# Patient Record
Sex: MALE | Race: WHITE | Employment: OTHER | URBAN - METROPOLITAN AREA
[De-identification: names, ages, dates, MRNs, and addresses within clinical notes are randomized per-mention and may not be internally consistent; named-entity substitution may affect disease eponyms.]

---

## 2017-01-22 PROBLEM — E78.5 HYPERLIPIDEMIA: Chronic | Status: ACTIVE | Noted: 2017-01-22

## 2017-01-22 PROBLEM — R20.8 DECREASED SENSE OF VIBRATION: Status: ACTIVE | Noted: 2017-01-22

## 2017-01-22 PROBLEM — G47.00 INSOMNIA: Status: ACTIVE | Noted: 2017-01-22

## 2017-01-22 PROBLEM — D72.829 LEUKOCYTOSIS: Status: ACTIVE | Noted: 2017-01-22

## 2017-01-22 PROBLEM — I10 HTN (HYPERTENSION): Chronic | Status: ACTIVE | Noted: 2017-01-22

## 2017-01-22 PROBLEM — G43.909 MIGRAINES: Status: ACTIVE | Noted: 2017-01-22

## 2017-01-22 PROBLEM — R68.89 COLD INTOLERANCE: Status: ACTIVE | Noted: 2017-01-22

## 2017-01-22 PROBLEM — R11.0 NAUSEA: Status: ACTIVE | Noted: 2017-01-22

## 2017-01-22 PROBLEM — F32.A DEPRESSION: Status: ACTIVE | Noted: 2017-01-22

## 2017-01-23 PROBLEM — R19.7 DIARRHEA: Status: ACTIVE | Noted: 2017-01-23

## 2017-04-18 PROBLEM — M25.561 ACUTE PAIN OF RIGHT KNEE: Status: ACTIVE | Noted: 2017-04-18

## 2018-05-24 ENCOUNTER — OFFICE VISIT (OUTPATIENT)
Dept: ORTHOPEDIC SURGERY | Age: 83
End: 2018-05-24
Payer: MEDICARE

## 2018-05-24 VITALS
WEIGHT: 198 LBS | HEART RATE: 82 BPM | DIASTOLIC BLOOD PRESSURE: 90 MMHG | SYSTOLIC BLOOD PRESSURE: 142 MMHG | HEIGHT: 72 IN | BODY MASS INDEX: 26.82 KG/M2 | TEMPERATURE: 97.7 F

## 2018-05-24 DIAGNOSIS — G89.29 CHRONIC PAIN OF LEFT KNEE: ICD-10-CM

## 2018-05-24 DIAGNOSIS — M17.12 PRIMARY OSTEOARTHRITIS OF LEFT KNEE: Primary | ICD-10-CM

## 2018-05-24 DIAGNOSIS — M25.562 CHRONIC PAIN OF LEFT KNEE: ICD-10-CM

## 2018-05-24 DIAGNOSIS — G89.29 CHRONIC PAIN OF RIGHT KNEE: ICD-10-CM

## 2018-05-24 DIAGNOSIS — M25.561 CHRONIC PAIN OF RIGHT KNEE: ICD-10-CM

## 2018-05-24 PROCEDURE — 20610 DRAIN/INJ JOINT/BURSA W/O US: CPT | Performed by: ORTHOPAEDIC SURGERY

## 2018-05-24 PROCEDURE — G8419 CALC BMI OUT NRM PARAM NOF/U: HCPCS | Performed by: ORTHOPAEDIC SURGERY

## 2018-05-24 PROCEDURE — 4040F PNEUMOC VAC/ADMIN/RCVD: CPT | Performed by: ORTHOPAEDIC SURGERY

## 2018-05-24 PROCEDURE — 1123F ACP DISCUSS/DSCN MKR DOCD: CPT | Performed by: ORTHOPAEDIC SURGERY

## 2018-05-24 PROCEDURE — 99213 OFFICE O/P EST LOW 20 MIN: CPT | Performed by: ORTHOPAEDIC SURGERY

## 2018-05-24 PROCEDURE — G8427 DOCREV CUR MEDS BY ELIG CLIN: HCPCS | Performed by: ORTHOPAEDIC SURGERY

## 2018-05-24 PROCEDURE — 1036F TOBACCO NON-USER: CPT | Performed by: ORTHOPAEDIC SURGERY

## 2018-05-24 RX ORDER — BUPIVACAINE HYDROCHLORIDE 2.5 MG/ML
3 INJECTION, SOLUTION INFILTRATION; PERINEURAL ONCE
Status: COMPLETED | OUTPATIENT
Start: 2018-05-24 | End: 2018-05-24

## 2018-05-24 RX ORDER — TRIAMCINOLONE ACETONIDE 40 MG/ML
80 INJECTION, SUSPENSION INTRA-ARTICULAR; INTRAMUSCULAR ONCE
Status: COMPLETED | OUTPATIENT
Start: 2018-05-24 | End: 2018-05-24

## 2018-05-24 RX ADMIN — BUPIVACAINE HYDROCHLORIDE 7.5 MG: 2.5 INJECTION, SOLUTION INFILTRATION; PERINEURAL at 16:20

## 2018-05-24 RX ADMIN — TRIAMCINOLONE ACETONIDE 80 MG: 40 INJECTION, SUSPENSION INTRA-ARTICULAR; INTRAMUSCULAR at 16:20

## 2018-07-10 ENCOUNTER — OFFICE VISIT (OUTPATIENT)
Dept: ORTHOPEDIC SURGERY | Age: 83
End: 2018-07-10
Payer: MEDICARE

## 2018-07-10 VITALS
BODY MASS INDEX: 26.82 KG/M2 | DIASTOLIC BLOOD PRESSURE: 80 MMHG | HEART RATE: 71 BPM | TEMPERATURE: 98.2 F | SYSTOLIC BLOOD PRESSURE: 139 MMHG | HEIGHT: 72 IN | WEIGHT: 198 LBS

## 2018-07-10 DIAGNOSIS — M17.12 PRIMARY OSTEOARTHRITIS OF LEFT KNEE: Primary | ICD-10-CM

## 2018-07-10 PROCEDURE — 4040F PNEUMOC VAC/ADMIN/RCVD: CPT | Performed by: ORTHOPAEDIC SURGERY

## 2018-07-10 PROCEDURE — 99213 OFFICE O/P EST LOW 20 MIN: CPT | Performed by: ORTHOPAEDIC SURGERY

## 2018-07-10 PROCEDURE — G8427 DOCREV CUR MEDS BY ELIG CLIN: HCPCS | Performed by: ORTHOPAEDIC SURGERY

## 2018-07-10 PROCEDURE — G8419 CALC BMI OUT NRM PARAM NOF/U: HCPCS | Performed by: ORTHOPAEDIC SURGERY

## 2018-07-10 PROCEDURE — 1036F TOBACCO NON-USER: CPT | Performed by: ORTHOPAEDIC SURGERY

## 2018-07-10 PROCEDURE — 1123F ACP DISCUSS/DSCN MKR DOCD: CPT | Performed by: ORTHOPAEDIC SURGERY

## 2018-07-10 NOTE — PROGRESS NOTES
Bobby Morrison  presents with continued left knee symptoms, reports minimal change with the injection last visit, at this time he actually rather denies pain reports a feeling of weakness or giving way. He feels the symptoms are similar to those that he had on the right previously treated successfully with physical therapy. Weakness and giving way are affecting his transfers and stair climbing primarily. No other joint complaints. Allergies; medications; past medical, surgical, family, and social history; and problem list have been reviewed today and updated as indicated in this encounter. Current Outpatient Prescriptions   Medication Sig Dispense Refill    ibuprofen (ADVIL;MOTRIN) 800 MG tablet       losartan-hydrochlorothiazide (HYZAAR) 50-12.5 MG per tablet       ranitidine (ZANTAC) 150 MG tablet Take 150 mg by mouth 2 times daily       buPROPion (WELLBUTRIN XL) 150 MG extended release tablet Take 1 tablet by mouth daily 30 tablet 0    mirtazapine (REMERON) 15 MG tablet Take 1 tablet by mouth nightly (Patient taking differently: Take 7.5 mg by mouth nightly ) 30 tablet 0    aspirin 325 MG tablet Take 325 mg by mouth daily      Aspirin-Dipyridamole (AGGRENOX PO) Take  by mouth daily.  amLODIPine (NORVASC) 10 MG tablet Take 10 mg by mouth daily. No current facility-administered medications for this visit. Exam: Leg lengths equal hip motion painless. Right knee benign, left knee shows partially correctable valgus deformity but no appreciable tenderness, there is lateral crepitus with full range of motion 0-120. No focal motor sensory deficits. Radiographs: Review of previous radiographs today show essentially end-stage valgus degenerative disease of left knee. Siobhan Wilkins was seen today for knee pain.     Diagnoses and all orders for this visit:    Primary osteoarthritis of left knee  -     Amb External Referral To Physical Therapy     Treatment options were reviewed, given the

## 2018-09-11 ENCOUNTER — OFFICE VISIT (OUTPATIENT)
Dept: ORTHOPEDIC SURGERY | Age: 83
End: 2018-09-11
Payer: MEDICARE

## 2018-09-11 VITALS
SYSTOLIC BLOOD PRESSURE: 178 MMHG | TEMPERATURE: 97.4 F | BODY MASS INDEX: 26.82 KG/M2 | HEART RATE: 73 BPM | WEIGHT: 198 LBS | DIASTOLIC BLOOD PRESSURE: 90 MMHG | HEIGHT: 72 IN

## 2018-09-11 DIAGNOSIS — M17.12 PRIMARY OSTEOARTHRITIS OF LEFT KNEE: Primary | ICD-10-CM

## 2018-09-11 PROCEDURE — G8427 DOCREV CUR MEDS BY ELIG CLIN: HCPCS | Performed by: ORTHOPAEDIC SURGERY

## 2018-09-11 PROCEDURE — G8419 CALC BMI OUT NRM PARAM NOF/U: HCPCS | Performed by: ORTHOPAEDIC SURGERY

## 2018-09-11 PROCEDURE — 4040F PNEUMOC VAC/ADMIN/RCVD: CPT | Performed by: ORTHOPAEDIC SURGERY

## 2018-09-11 PROCEDURE — 1036F TOBACCO NON-USER: CPT | Performed by: ORTHOPAEDIC SURGERY

## 2018-09-11 PROCEDURE — 99214 OFFICE O/P EST MOD 30 MIN: CPT | Performed by: ORTHOPAEDIC SURGERY

## 2018-09-11 PROCEDURE — 1123F ACP DISCUSS/DSCN MKR DOCD: CPT | Performed by: ORTHOPAEDIC SURGERY

## 2018-09-11 PROCEDURE — 1101F PT FALLS ASSESS-DOCD LE1/YR: CPT | Performed by: ORTHOPAEDIC SURGERY

## 2018-09-11 RX ORDER — MECLIZINE HYDROCHLORIDE 25 MG/1
TABLET ORAL
Refills: 1 | COMMUNITY
Start: 2018-08-16 | End: 2018-09-13 | Stop reason: ALTCHOICE

## 2018-09-11 RX ORDER — MULTIVITAMIN WITH IRON
200 TABLET ORAL DAILY
COMMUNITY
End: 2018-09-13 | Stop reason: ALTCHOICE

## 2018-09-11 RX ORDER — SUCRALFATE 1 G/1
TABLET ORAL
Refills: 3 | COMMUNITY
Start: 2018-08-29 | End: 2018-09-13 | Stop reason: ALTCHOICE

## 2018-09-11 RX ORDER — PNV NO.95/FERROUS FUM/FOLIC AC 28MG-0.8MG
TABLET ORAL
COMMUNITY
Start: 2008-10-29 | End: 2018-09-13 | Stop reason: ALTCHOICE

## 2018-09-11 NOTE — LETTER
If you have any questions or concerns, please contact our nurse at [phone], Option 2:  Monday through Thursday 9:00 am - 4:00 pm  Friday 10:00 am - 12 noon
until the next business day. You will be informed when your prescription has been printed and ready for pickup at the doctor's office.     Any questions, problems or concerns regarding your surgery should be addressed to our nurse by calling the office Monday through Thursday 10:00 am - 3:00 pm and Friday 10:00 am - 2:00 pm.

## 2018-09-12 NOTE — PROGRESS NOTES
no laxity deformity or effusion, left knee shows non-correctable varus alignment with synovitis medial tenderness and crepitus no effusion, medial aspect of the knee is tender to palpation and painful with weightbearing. Range of motion 0-120. Radiographs: Recent weightbearing x-rays are reviewed showing left knee with varus deformity complete loss of medial joint space subchondral sclerosis and osteophyte formation consistent with diagnosis of end stage varus DJD left knee. Ness Randall was seen today for knee pain. Diagnoses and all orders for this visit:    Primary osteoarthritis of left knee     Treatment options were reviewed, the patient has considered his options and requests we proceed with left total knee. Procedure itself likely risks benefits and probable outcomes were detailed questions were asked and answered and understood, plan for surgery on a.m. admission basis with short postoperative hospital stay and rehab as outpatient. Follow-up in office 7-10 days postop for clinical and x-ray exams left knee. Return in about 3 weeks (around 10/3/2018).

## 2018-09-13 ENCOUNTER — APPOINTMENT (OUTPATIENT)
Dept: CT IMAGING | Age: 83
End: 2018-09-13
Payer: MEDICARE

## 2018-09-13 ENCOUNTER — HOSPITAL ENCOUNTER (EMERGENCY)
Age: 83
Discharge: LEFT BEFORE DISCHARGE | End: 2018-09-14
Attending: EMERGENCY MEDICINE
Payer: MEDICARE

## 2018-09-13 VITALS
HEART RATE: 69 BPM | DIASTOLIC BLOOD PRESSURE: 84 MMHG | HEIGHT: 72 IN | WEIGHT: 195 LBS | RESPIRATION RATE: 14 BRPM | TEMPERATURE: 98.3 F | SYSTOLIC BLOOD PRESSURE: 156 MMHG | BODY MASS INDEX: 26.41 KG/M2 | OXYGEN SATURATION: 99 %

## 2018-09-13 DIAGNOSIS — R11.0 NAUSEA: Primary | ICD-10-CM

## 2018-09-13 DIAGNOSIS — K52.9 COLITIS, ACUTE: ICD-10-CM

## 2018-09-13 LAB
ALBUMIN SERPL-MCNC: 4 G/DL (ref 3.5–5.2)
ALP BLD-CCNC: 81 U/L (ref 40–129)
ALT SERPL-CCNC: 8 U/L (ref 0–40)
ANION GAP SERPL CALCULATED.3IONS-SCNC: 12 MMOL/L (ref 7–16)
AST SERPL-CCNC: 15 U/L (ref 0–39)
BACTERIA: ABNORMAL /HPF
BILIRUB SERPL-MCNC: 0.7 MG/DL (ref 0–1.2)
BILIRUBIN URINE: NEGATIVE
BLOOD, URINE: ABNORMAL
BUN BLDV-MCNC: 6 MG/DL (ref 8–23)
CALCIUM SERPL-MCNC: 9.3 MG/DL (ref 8.6–10.2)
CHLORIDE BLD-SCNC: 100 MMOL/L (ref 98–107)
CLARITY: CLEAR
CO2: 29 MMOL/L (ref 22–29)
COLOR: YELLOW
CREAT SERPL-MCNC: 0.9 MG/DL (ref 0.7–1.2)
GFR AFRICAN AMERICAN: >60
GFR NON-AFRICAN AMERICAN: >60 ML/MIN/1.73
GLUCOSE BLD-MCNC: 100 MG/DL (ref 74–109)
GLUCOSE URINE: NEGATIVE MG/DL
HCT VFR BLD CALC: 41.4 % (ref 37–54)
HEMOGLOBIN: 13.7 G/DL (ref 12.5–16.5)
KETONES, URINE: NEGATIVE MG/DL
LACTIC ACID: 0.8 MMOL/L (ref 0.5–2.2)
LEUKOCYTE ESTERASE, URINE: NEGATIVE
LIPASE: 14 U/L (ref 13–60)
MCH RBC QN AUTO: 29.3 PG (ref 26–35)
MCHC RBC AUTO-ENTMCNC: 33.1 % (ref 32–34.5)
MCV RBC AUTO: 88.7 FL (ref 80–99.9)
NITRITE, URINE: NEGATIVE
PDW BLD-RTO: 12.9 FL (ref 11.5–15)
PH UA: 7 (ref 5–9)
PLATELET # BLD: 325 E9/L (ref 130–450)
PMV BLD AUTO: 9.4 FL (ref 7–12)
POTASSIUM SERPL-SCNC: 3.1 MMOL/L (ref 3.5–5)
PROTEIN UA: NEGATIVE MG/DL
RBC # BLD: 4.67 E12/L (ref 3.8–5.8)
RBC UA: ABNORMAL /HPF (ref 0–2)
SODIUM BLD-SCNC: 141 MMOL/L (ref 132–146)
SPECIFIC GRAVITY UA: 1.02 (ref 1–1.03)
TOTAL PROTEIN: 7.3 G/DL (ref 6.4–8.3)
UROBILINOGEN, URINE: 0.2 E.U./DL
WBC # BLD: 8.2 E9/L (ref 4.5–11.5)
WBC UA: ABNORMAL /HPF (ref 0–5)

## 2018-09-13 PROCEDURE — 80053 COMPREHEN METABOLIC PANEL: CPT

## 2018-09-13 PROCEDURE — 81001 URINALYSIS AUTO W/SCOPE: CPT

## 2018-09-13 PROCEDURE — 6360000004 HC RX CONTRAST MEDICATION: Performed by: RADIOLOGY

## 2018-09-13 PROCEDURE — 99284 EMERGENCY DEPT VISIT MOD MDM: CPT

## 2018-09-13 PROCEDURE — 70450 CT HEAD/BRAIN W/O DYE: CPT

## 2018-09-13 PROCEDURE — 83690 ASSAY OF LIPASE: CPT

## 2018-09-13 PROCEDURE — 74177 CT ABD & PELVIS W/CONTRAST: CPT

## 2018-09-13 PROCEDURE — 6370000000 HC RX 637 (ALT 250 FOR IP): Performed by: EMERGENCY MEDICINE

## 2018-09-13 PROCEDURE — 83605 ASSAY OF LACTIC ACID: CPT

## 2018-09-13 PROCEDURE — 85027 COMPLETE CBC AUTOMATED: CPT

## 2018-09-13 PROCEDURE — 36415 COLL VENOUS BLD VENIPUNCTURE: CPT

## 2018-09-13 RX ORDER — POTASSIUM CHLORIDE 20 MEQ/1
40 TABLET, EXTENDED RELEASE ORAL ONCE
Status: COMPLETED | OUTPATIENT
Start: 2018-09-13 | End: 2018-09-13

## 2018-09-13 RX ORDER — PANTOPRAZOLE SODIUM 40 MG/1
40 GRANULE, DELAYED RELEASE ORAL
COMMUNITY
End: 2019-06-13 | Stop reason: SDUPTHER

## 2018-09-13 RX ADMIN — POTASSIUM CHLORIDE 40 MEQ: 20 TABLET, EXTENDED RELEASE ORAL at 20:57

## 2018-09-13 RX ADMIN — IOHEXOL 50 ML: 240 INJECTION, SOLUTION INTRATHECAL; INTRAVASCULAR; INTRAVENOUS; ORAL at 22:32

## 2018-09-13 RX ADMIN — IOPAMIDOL 100 ML: 755 INJECTION, SOLUTION INTRAVENOUS at 22:32

## 2018-09-13 ASSESSMENT — PAIN DESCRIPTION - PAIN TYPE: TYPE: ACUTE PAIN

## 2018-09-13 ASSESSMENT — PAIN DESCRIPTION - ONSET: ONSET: ON-GOING

## 2018-09-13 ASSESSMENT — PAIN DESCRIPTION - LOCATION: LOCATION: ABDOMEN

## 2018-09-13 ASSESSMENT — PAIN SCALES - GENERAL: PAINLEVEL_OUTOF10: 7

## 2018-09-13 ASSESSMENT — PAIN DESCRIPTION - DESCRIPTORS: DESCRIPTORS: CONSTANT;ACHING

## 2018-09-13 ASSESSMENT — PAIN DESCRIPTION - PROGRESSION: CLINICAL_PROGRESSION: NOT CHANGED

## 2018-09-14 ENCOUNTER — TELEPHONE (OUTPATIENT)
Dept: ORTHOPEDIC SURGERY | Age: 83
End: 2018-09-14

## 2018-09-14 RX ORDER — LEVOFLOXACIN 500 MG/1
500 TABLET, FILM COATED ORAL DAILY
Qty: 7 TABLET | Refills: 0 | Status: SHIPPED | OUTPATIENT
Start: 2018-09-14 | End: 2018-09-21

## 2018-09-14 NOTE — ED NOTES
Pt does not wish to be admitted at this time. Stated his DrBeth Is out of town and will return on Tuesday when his appt is scheduled. Dr. Jaime Em notified.       Jayesh Eddy RN  09/14/18 9911

## 2018-09-14 NOTE — ED PROVIDER NOTES
HPI:  9/14/18, Time: 1:24 AM         [facility] 81 [facility] presenting to the ED for vague weakness and abdominal pain, beginning 1 week ago. The patient presents with chief complaint of some generalized weakness and fatigue. He is also complaining of some abdominal discomfort and bloating. Patient states he normally has significant more energy than he is presenting with. Patient denies any melanotic stools. I did go over the findings of the CAT scan with the patient and he does not want to be admitted at this time. Patient will sign out against medical advice. Patient denies fever/chills, cough, congestion, chest pain, shortness of breath, edema, headache, visual disturbances, focal paresthesias, focal weakness, vomiting, diarrhea, constipation, dysuria, hematuria, trauma, neck or back pain or other complaints. ROS:   Pertinent positives and negatives are stated within HPI, all other systems reviewed and are negative.      --------------------------------------------- PAST HISTORY ---------------------------------------------  Past Medical History:  has a past medical history of Hyperlipidemia; Hypertension; and Unspecified cerebral artery occlusion with cerebral infarction. Past Surgical History:  has a past surgical history that includes Appendectomy; Cholecystectomy; Tonsillectomy; ECHO Compl W Dop Color Flow (12/4/2012); hernia repair (11/13/2002); sinus surgery (2002); and Anterior cruciate ligament repair (Left, 11/21/2001). Social History:  reports that he has never smoked. He has never used smokeless tobacco. He reports that he does not drink alcohol or use drugs. Family History: family history is not on file. The patients home medications have been reviewed. Allergies: Patient has no known allergies.         ---------------------------------------------------PHYSICAL EXAM--------------------------------------    Constitutional:  Well developed, well nourished, no acute Oxygen Saturation Interpretation: Normal    The patients available past medical records and past encounters were reviewed. ------------------------------ ED COURSE/MEDICAL DECISION MAKING---------------------      This patient's ED course included: a personal history and physicial examination and a personal history and physicial eaxmination    This patient has remained hemodynamically stable during their ED course. Re-Evaluations:             Time: 0030  Re-evaluation. Patients symptoms are improving  Repeat physical examination is improved          Counseling: The emergency provider has spoken with the patient and discussed todays results, in addition to providing specific details for the plan of care and counseling regarding the diagnosis and prognosis. Questions are answered at this time and they are agreeable with the plan.       --------------------------------- IMPRESSION AND DISPOSITION ---------------------------------    IMPRESSION  1. Nausea    2.  Colitis, acute        DISPOSITION  Disposition: Other Disposition: Left AMA  Patient condition is good                  [address] [place] [address], DO  09/14/18 5641

## 2018-09-19 ENCOUNTER — TELEPHONE (OUTPATIENT)
Dept: ORTHOPEDIC SURGERY | Age: 83
End: 2018-09-19

## 2018-10-03 ENCOUNTER — OFFICE VISIT (OUTPATIENT)
Dept: VASCULAR SURGERY | Age: 83
End: 2018-10-03
Payer: MEDICARE

## 2018-10-03 DIAGNOSIS — I72.2 ANEURYSM OF RIGHT RENAL ARTERY (HCC): ICD-10-CM

## 2018-10-03 PROBLEM — M25.561 ACUTE PAIN OF RIGHT KNEE: Status: RESOLVED | Noted: 2017-04-18 | Resolved: 2018-10-03

## 2018-10-03 PROBLEM — R19.7 DIARRHEA: Status: RESOLVED | Noted: 2017-01-23 | Resolved: 2018-10-03

## 2018-10-03 PROBLEM — R11.0 NAUSEA: Status: RESOLVED | Noted: 2017-01-22 | Resolved: 2018-10-03

## 2018-10-03 PROCEDURE — 1036F TOBACCO NON-USER: CPT | Performed by: SURGERY

## 2018-10-03 PROCEDURE — G8419 CALC BMI OUT NRM PARAM NOF/U: HCPCS | Performed by: SURGERY

## 2018-10-03 PROCEDURE — 4040F PNEUMOC VAC/ADMIN/RCVD: CPT | Performed by: SURGERY

## 2018-10-03 PROCEDURE — 1101F PT FALLS ASSESS-DOCD LE1/YR: CPT | Performed by: SURGERY

## 2018-10-03 PROCEDURE — 1123F ACP DISCUSS/DSCN MKR DOCD: CPT | Performed by: SURGERY

## 2018-10-03 PROCEDURE — 99204 OFFICE O/P NEW MOD 45 MIN: CPT | Performed by: SURGERY

## 2018-10-03 PROCEDURE — G8484 FLU IMMUNIZE NO ADMIN: HCPCS | Performed by: SURGERY

## 2018-10-03 PROCEDURE — G8427 DOCREV CUR MEDS BY ELIG CLIN: HCPCS | Performed by: SURGERY

## 2018-10-03 RX ORDER — MIRTAZAPINE 15 MG/1
7.5 TABLET, FILM COATED ORAL NIGHTLY
Refills: 3 | COMMUNITY
Start: 2018-09-18 | End: 2019-06-13 | Stop reason: CLARIF

## 2018-10-03 NOTE — PROGRESS NOTES
feet is normal.        Pulses Right  Left    Brachial 3 3    Radial    3=normal   Femoral 2 2  2=diminished   Popliteal    1=barely palpable   Dorsalis pedis    0=absent   Posterior tibial    4=aneurysmal             Other pertinent information:1. The past medical records were reviewed. 2.  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 Aneurysm of right renal artery (HCC)              Plan:        Discussed in detail the patient regarding all options, risks benefits and alternatives, as the patient is asymptomatic, as the aneurysm is only 1.5 cm, and densely calcified, unlikely to ruptured, patient was recommended conservative therapy with follow-up evaluation in one year and to call me when necessary if he has any abdominal, flank or back pain and come to Hospital emergency room          Patient was instructed to continue walking program and to call if any worsening of symptoms and to call if any focal lateralizing neurological symptoms like loss of speech, vision or loss of function of extremity. All the questions were answered. Indicated follow-up: Return in about 1 year (around 10/3/2019), or if symptoms worsen or fail to improve.

## 2018-12-20 ENCOUNTER — HOSPITAL ENCOUNTER (OUTPATIENT)
Age: 83
Discharge: HOME | End: 2018-12-22
Payer: MEDICARE

## 2018-12-20 PROCEDURE — 88304 TISSUE EXAM BY PATHOLOGIST: CPT

## 2019-01-07 ENCOUNTER — TELEPHONE (OUTPATIENT)
Dept: ORTHOPEDIC SURGERY | Age: 84
End: 2019-01-07

## 2019-01-07 DIAGNOSIS — G89.29 CHRONIC PAIN OF LEFT KNEE: ICD-10-CM

## 2019-01-07 DIAGNOSIS — M25.562 CHRONIC PAIN OF LEFT KNEE: ICD-10-CM

## 2019-01-07 DIAGNOSIS — M17.12 PRIMARY OSTEOARTHRITIS OF LEFT KNEE: Primary | ICD-10-CM

## 2019-01-10 ENCOUNTER — OFFICE VISIT (OUTPATIENT)
Dept: ORTHOPEDIC SURGERY | Age: 84
End: 2019-01-10
Payer: MEDICARE

## 2019-01-10 VITALS
HEART RATE: 75 BPM | DIASTOLIC BLOOD PRESSURE: 87 MMHG | SYSTOLIC BLOOD PRESSURE: 150 MMHG | BODY MASS INDEX: 26.41 KG/M2 | TEMPERATURE: 97.1 F | WEIGHT: 195 LBS | HEIGHT: 72 IN

## 2019-01-10 DIAGNOSIS — M17.12 PRIMARY OSTEOARTHRITIS OF LEFT KNEE: Primary | ICD-10-CM

## 2019-01-10 PROCEDURE — 4040F PNEUMOC VAC/ADMIN/RCVD: CPT | Performed by: ORTHOPAEDIC SURGERY

## 2019-01-10 PROCEDURE — 1036F TOBACCO NON-USER: CPT | Performed by: ORTHOPAEDIC SURGERY

## 2019-01-10 PROCEDURE — 1101F PT FALLS ASSESS-DOCD LE1/YR: CPT | Performed by: ORTHOPAEDIC SURGERY

## 2019-01-10 PROCEDURE — G8419 CALC BMI OUT NRM PARAM NOF/U: HCPCS | Performed by: ORTHOPAEDIC SURGERY

## 2019-01-10 PROCEDURE — 1123F ACP DISCUSS/DSCN MKR DOCD: CPT | Performed by: ORTHOPAEDIC SURGERY

## 2019-01-10 PROCEDURE — 99213 OFFICE O/P EST LOW 20 MIN: CPT | Performed by: ORTHOPAEDIC SURGERY

## 2019-01-10 PROCEDURE — G8484 FLU IMMUNIZE NO ADMIN: HCPCS | Performed by: ORTHOPAEDIC SURGERY

## 2019-01-10 PROCEDURE — G8427 DOCREV CUR MEDS BY ELIG CLIN: HCPCS | Performed by: ORTHOPAEDIC SURGERY

## 2019-01-10 RX ORDER — ACETAMINOPHEN 325 MG/1
650 TABLET ORAL 2 TIMES DAILY PRN
COMMUNITY
End: 2019-01-21

## 2019-01-11 ENCOUNTER — TELEPHONE (OUTPATIENT)
Dept: ORTHOPEDIC SURGERY | Age: 84
End: 2019-01-11

## 2019-01-15 ENCOUNTER — HOSPITAL ENCOUNTER (OUTPATIENT)
Dept: CT IMAGING | Age: 84
Discharge: HOME | End: 2019-01-17
Payer: MEDICARE

## 2019-01-15 DIAGNOSIS — M17.12 ARTHRITIS OF KNEE, LEFT: ICD-10-CM

## 2019-01-15 PROCEDURE — 73700 CT LOWER EXTREMITY W/O DYE: CPT

## 2019-01-20 ENCOUNTER — ANESTHESIA EVENT (OUTPATIENT)
Dept: OPERATING ROOM | Age: 84
DRG: 470 | End: 2019-01-20
Payer: MEDICARE

## 2019-01-21 ENCOUNTER — HOSPITAL ENCOUNTER (OUTPATIENT)
Dept: PREADMISSION TESTING | Age: 84
Discharge: HOME | End: 2019-01-21
Payer: MEDICARE

## 2019-01-21 VITALS
HEART RATE: 70 BPM | RESPIRATION RATE: 16 BRPM | WEIGHT: 202 LBS | SYSTOLIC BLOOD PRESSURE: 178 MMHG | DIASTOLIC BLOOD PRESSURE: 84 MMHG | HEIGHT: 72 IN | BODY MASS INDEX: 27.36 KG/M2 | OXYGEN SATURATION: 98 % | TEMPERATURE: 98 F

## 2019-01-21 LAB
ANION GAP SERPL CALCULATED.3IONS-SCNC: 11 MMOL/L (ref 7–16)
BUN BLDV-MCNC: 12 MG/DL (ref 8–23)
CALCIUM SERPL-MCNC: 9.2 MG/DL (ref 8.6–10.2)
CHLORIDE BLD-SCNC: 102 MMOL/L (ref 98–107)
CO2: 30 MMOL/L (ref 22–29)
CREAT SERPL-MCNC: 0.9 MG/DL (ref 0.7–1.2)
GFR AFRICAN AMERICAN: >60
GFR NON-AFRICAN AMERICAN: >60 ML/MIN/1.73
GLUCOSE BLD-MCNC: 97 MG/DL (ref 74–99)
HCT VFR BLD CALC: 44.4 % (ref 37–54)
HEMOGLOBIN: 14.3 G/DL (ref 12.5–16.5)
MCH RBC QN AUTO: 29.1 PG (ref 26–35)
MCHC RBC AUTO-ENTMCNC: 32.2 % (ref 32–34.5)
MCV RBC AUTO: 90.2 FL (ref 80–99.9)
PDW BLD-RTO: 13.2 FL (ref 11.5–15)
PLATELET # BLD: 374 E9/L (ref 130–450)
PMV BLD AUTO: 9.7 FL (ref 7–12)
POTASSIUM SERPL-SCNC: 3.4 MMOL/L (ref 3.5–5)
RBC # BLD: 4.92 E12/L (ref 3.8–5.8)
SODIUM BLD-SCNC: 143 MMOL/L (ref 132–146)
WBC # BLD: 5.6 E9/L (ref 4.5–11.5)

## 2019-01-21 PROCEDURE — 80048 BASIC METABOLIC PNL TOTAL CA: CPT

## 2019-01-21 PROCEDURE — 36415 COLL VENOUS BLD VENIPUNCTURE: CPT

## 2019-01-21 PROCEDURE — 85027 COMPLETE CBC AUTOMATED: CPT

## 2019-01-21 PROCEDURE — 87081 CULTURE SCREEN ONLY: CPT

## 2019-01-21 RX ORDER — GABAPENTIN 100 MG/1
200 CAPSULE ORAL ONCE
Status: CANCELLED | OUTPATIENT
Start: 2019-01-21 | End: 2019-01-21

## 2019-01-21 RX ORDER — OXYCODONE HYDROCHLORIDE 5 MG/1
5 TABLET ORAL ONCE
Status: CANCELLED | OUTPATIENT
Start: 2019-01-21 | End: 2019-01-21

## 2019-01-21 RX ORDER — DEXAMETHASONE SODIUM PHOSPHATE 4 MG/ML
4 INJECTION, SOLUTION INTRA-ARTICULAR; INTRALESIONAL; INTRAMUSCULAR; INTRAVENOUS; SOFT TISSUE ONCE
Status: CANCELLED | OUTPATIENT
Start: 2019-01-21 | End: 2019-01-21

## 2019-01-21 RX ORDER — ROPIVACAINE HYDROCHLORIDE 5 MG/ML
30 INJECTION, SOLUTION EPIDURAL; INFILTRATION; PERINEURAL
Status: CANCELLED | OUTPATIENT
Start: 2019-01-21 | End: 2019-01-21

## 2019-01-21 RX ORDER — FENTANYL CITRATE 50 UG/ML
100 INJECTION, SOLUTION INTRAMUSCULAR; INTRAVENOUS ONCE
Status: CANCELLED | OUTPATIENT
Start: 2019-01-21 | End: 2019-01-21

## 2019-01-21 RX ORDER — ACETAMINOPHEN 500 MG
1000 TABLET ORAL ONCE
Status: CANCELLED | OUTPATIENT
Start: 2019-01-21 | End: 2019-01-21

## 2019-01-21 RX ORDER — ACETAMINOPHEN 500 MG
1000 TABLET ORAL EVERY 6 HOURS PRN
COMMUNITY
End: 2019-05-14 | Stop reason: ALTCHOICE

## 2019-01-21 RX ORDER — MIDAZOLAM HYDROCHLORIDE 1 MG/ML
1 INJECTION INTRAMUSCULAR; INTRAVENOUS EVERY 5 MIN PRN
Status: CANCELLED | OUTPATIENT
Start: 2019-01-21

## 2019-01-21 RX ORDER — CELECOXIB 100 MG/1
200 CAPSULE ORAL ONCE
Status: CANCELLED | OUTPATIENT
Start: 2019-01-21 | End: 2019-01-21

## 2019-01-21 RX ORDER — LIDOCAINE HYDROCHLORIDE 10 MG/ML
10 INJECTION, SOLUTION INFILTRATION; PERINEURAL
Status: CANCELLED | OUTPATIENT
Start: 2019-01-21 | End: 2019-01-21

## 2019-01-21 ASSESSMENT — KOOS JR
STANDING UPRIGHT: 3
STRAIGHTENING KNEE FULLY: 2
TWISING OR PIVOTING ON KNEE: 1
BENDING TO THE FLOOR TO PICK UP OBJECT: 4
HOW SEVERE IS YOUR KNEE STIFFNESS AFTER FIRST WAKING IN MORNING: 3
GOING UP OR DOWN STAIRS: 4
RISING FROM SITTING: 3

## 2019-01-21 ASSESSMENT — PAIN DESCRIPTION - ONSET: ONSET: ON-GOING

## 2019-01-21 ASSESSMENT — PAIN DESCRIPTION - PAIN TYPE: TYPE: CHRONIC PAIN

## 2019-01-21 ASSESSMENT — PAIN DESCRIPTION - FREQUENCY: FREQUENCY: CONTINUOUS

## 2019-01-21 ASSESSMENT — PAIN SCALES - GENERAL: PAINLEVEL_OUTOF10: 6

## 2019-01-21 ASSESSMENT — PAIN DESCRIPTION - DESCRIPTORS: DESCRIPTORS: ACHING

## 2019-01-21 ASSESSMENT — PAIN DESCRIPTION - LOCATION: LOCATION: KNEE

## 2019-01-21 ASSESSMENT — PAIN DESCRIPTION - ORIENTATION: ORIENTATION: LEFT

## 2019-01-21 ASSESSMENT — PAIN DESCRIPTION - PROGRESSION: CLINICAL_PROGRESSION: GRADUALLY WORSENING

## 2019-01-21 ASSESSMENT — PAIN - FUNCTIONAL ASSESSMENT: PAIN_FUNCTIONAL_ASSESSMENT: PREVENTS OR INTERFERES SOME ACTIVE ACTIVITIES AND ADLS

## 2019-01-22 ENCOUNTER — PREP FOR PROCEDURE (OUTPATIENT)
Dept: ORTHOPEDIC SURGERY | Age: 84
End: 2019-01-22

## 2019-01-22 DIAGNOSIS — M17.12 PRIMARY OSTEOARTHRITIS OF LEFT KNEE: Primary | ICD-10-CM

## 2019-01-22 RX ORDER — SODIUM CHLORIDE 0.9 % (FLUSH) 0.9 %
10 SYRINGE (ML) INJECTION PRN
Status: CANCELLED | OUTPATIENT
Start: 2019-01-22

## 2019-01-22 RX ORDER — SODIUM CHLORIDE 9 MG/ML
INJECTION, SOLUTION INTRAVENOUS CONTINUOUS
Status: CANCELLED | OUTPATIENT
Start: 2019-01-22

## 2019-01-22 RX ORDER — SODIUM CHLORIDE 0.9 % (FLUSH) 0.9 %
10 SYRINGE (ML) INJECTION EVERY 12 HOURS SCHEDULED
Status: CANCELLED | OUTPATIENT
Start: 2019-01-22

## 2019-01-23 LAB — MRSA CULTURE ONLY: NORMAL

## 2019-01-25 ENCOUNTER — TELEPHONE (OUTPATIENT)
Dept: ORTHOPEDIC SURGERY | Age: 84
End: 2019-01-25

## 2019-01-28 ENCOUNTER — HOSPITAL ENCOUNTER (INPATIENT)
Age: 84
LOS: 2 days | Discharge: HOME HEALTH | DRG: 470 | End: 2019-01-30
Attending: ORTHOPAEDIC SURGERY | Admitting: ORTHOPAEDIC SURGERY
Payer: MEDICARE

## 2019-01-28 ENCOUNTER — ANESTHESIA (OUTPATIENT)
Dept: OPERATING ROOM | Age: 84
DRG: 470 | End: 2019-01-28
Payer: MEDICARE

## 2019-01-28 VITALS — OXYGEN SATURATION: 99 % | SYSTOLIC BLOOD PRESSURE: 105 MMHG | DIASTOLIC BLOOD PRESSURE: 53 MMHG

## 2019-01-28 DIAGNOSIS — Z96.652 STATUS POST LEFT KNEE REPLACEMENT: Primary | ICD-10-CM

## 2019-01-28 PROBLEM — G47.00 INSOMNIA: Status: RESOLVED | Noted: 2017-01-22 | Resolved: 2019-01-28

## 2019-01-28 PROBLEM — G43.909 MIGRAINES: Status: RESOLVED | Noted: 2017-01-22 | Resolved: 2019-01-28

## 2019-01-28 PROBLEM — F32.9 MAJOR DEPRESSIVE DISORDER: Status: ACTIVE | Noted: 2017-01-22

## 2019-01-28 PROBLEM — D72.829 LEUKOCYTOSIS: Status: RESOLVED | Noted: 2017-01-22 | Resolved: 2019-01-28

## 2019-01-28 PROBLEM — M19.90 OSTEOARTHRITIS: Status: ACTIVE | Noted: 2019-01-28

## 2019-01-28 PROBLEM — R68.89 COLD INTOLERANCE: Status: RESOLVED | Noted: 2017-01-22 | Resolved: 2019-01-28

## 2019-01-28 PROBLEM — M17.12 OSTEOARTHRITIS OF LEFT KNEE: Status: ACTIVE | Noted: 2019-01-28

## 2019-01-28 LAB — POTASSIUM SERPL-SCNC: 3.2 MMOL/L (ref 3.5–5)

## 2019-01-28 PROCEDURE — 2580000003 HC RX 258: Performed by: STUDENT IN AN ORGANIZED HEALTH CARE EDUCATION/TRAINING PROGRAM

## 2019-01-28 PROCEDURE — 27447 TOTAL KNEE ARTHROPLASTY: CPT | Performed by: ORTHOPAEDIC SURGERY

## 2019-01-28 PROCEDURE — 2580000003 HC RX 258: Performed by: ORTHOPAEDIC SURGERY

## 2019-01-28 PROCEDURE — 2500000003 HC RX 250 WO HCPCS: Performed by: ORTHOPAEDIC SURGERY

## 2019-01-28 PROCEDURE — 88305 TISSUE EXAM BY PATHOLOGIST: CPT

## 2019-01-28 PROCEDURE — 6360000002 HC RX W HCPCS: Performed by: ORTHOPAEDIC SURGERY

## 2019-01-28 PROCEDURE — 7100000000 HC PACU RECOVERY - FIRST 15 MIN: Performed by: ORTHOPAEDIC SURGERY

## 2019-01-28 PROCEDURE — 3600000015 HC SURGERY LEVEL 5 ADDTL 15MIN: Performed by: ORTHOPAEDIC SURGERY

## 2019-01-28 PROCEDURE — 36415 COLL VENOUS BLD VENIPUNCTURE: CPT

## 2019-01-28 PROCEDURE — 2709999900 HC NON-CHARGEABLE SUPPLY: Performed by: ORTHOPAEDIC SURGERY

## 2019-01-28 PROCEDURE — 7100000001 HC PACU RECOVERY - ADDTL 15 MIN: Performed by: ORTHOPAEDIC SURGERY

## 2019-01-28 PROCEDURE — 6360000002 HC RX W HCPCS: Performed by: STUDENT IN AN ORGANIZED HEALTH CARE EDUCATION/TRAINING PROGRAM

## 2019-01-28 PROCEDURE — 2720000010 HC SURG SUPPLY STERILE: Performed by: ORTHOPAEDIC SURGERY

## 2019-01-28 PROCEDURE — 0QRF07Z REPLACEMENT OF LEFT PATELLA WITH AUTOLOGOUS TISSUE SUBSTITUTE, OPEN APPROACH: ICD-10-PCS | Performed by: ORTHOPAEDIC SURGERY

## 2019-01-28 PROCEDURE — 6370000000 HC RX 637 (ALT 250 FOR IP): Performed by: STUDENT IN AN ORGANIZED HEALTH CARE EDUCATION/TRAINING PROGRAM

## 2019-01-28 PROCEDURE — 6370000000 HC RX 637 (ALT 250 FOR IP): Performed by: ANESTHESIOLOGY

## 2019-01-28 PROCEDURE — 2500000003 HC RX 250 WO HCPCS

## 2019-01-28 PROCEDURE — 3700000001 HC ADD 15 MINUTES (ANESTHESIA): Performed by: ORTHOPAEDIC SURGERY

## 2019-01-28 PROCEDURE — 76942 ECHO GUIDE FOR BIOPSY: CPT | Performed by: ANESTHESIOLOGY

## 2019-01-28 PROCEDURE — 6360000002 HC RX W HCPCS: Performed by: ANESTHESIOLOGY

## 2019-01-28 PROCEDURE — 0SRD0J9 REPLACEMENT OF LEFT KNEE JOINT WITH SYNTHETIC SUBSTITUTE, CEMENTED, OPEN APPROACH: ICD-10-PCS | Performed by: ORTHOPAEDIC SURGERY

## 2019-01-28 PROCEDURE — 3700000000 HC ANESTHESIA ATTENDED CARE: Performed by: ORTHOPAEDIC SURGERY

## 2019-01-28 PROCEDURE — C1776 JOINT DEVICE (IMPLANTABLE): HCPCS | Performed by: ORTHOPAEDIC SURGERY

## 2019-01-28 PROCEDURE — 20985 CPTR-ASST DIR MS PX: CPT | Performed by: ORTHOPAEDIC SURGERY

## 2019-01-28 PROCEDURE — 1200000000 HC SEMI PRIVATE

## 2019-01-28 PROCEDURE — C1713 ANCHOR/SCREW BN/BN,TIS/BN: HCPCS | Performed by: ORTHOPAEDIC SURGERY

## 2019-01-28 PROCEDURE — 97161 PT EVAL LOW COMPLEX 20 MIN: CPT

## 2019-01-28 PROCEDURE — 3600000005 HC SURGERY LEVEL 5 BASE: Performed by: ORTHOPAEDIC SURGERY

## 2019-01-28 PROCEDURE — 8E0Y0CZ ROBOTIC ASSISTED PROCEDURE OF LOWER EXTREMITY, OPEN APPROACH: ICD-10-PCS | Performed by: ORTHOPAEDIC SURGERY

## 2019-01-28 PROCEDURE — 6360000002 HC RX W HCPCS

## 2019-01-28 PROCEDURE — 88311 DECALCIFY TISSUE: CPT

## 2019-01-28 PROCEDURE — 97165 OT EVAL LOW COMPLEX 30 MIN: CPT

## 2019-01-28 PROCEDURE — 84132 ASSAY OF SERUM POTASSIUM: CPT

## 2019-01-28 DEVICE — CEMENT BNE 40 GM RADIOPAQUE BA SIMPLEX P: Type: IMPLANTABLE DEVICE | Site: KNEE | Status: FUNCTIONAL

## 2019-01-28 DEVICE — BASEPLATE TIB SZ 7 KNEE TRITANIUM CEM PRI LO PROF TRIATHLON: Type: IMPLANTABLE DEVICE | Site: KNEE | Status: FUNCTIONAL

## 2019-01-28 DEVICE — IMPLANTABLE DEVICE: Type: IMPLANTABLE DEVICE | Site: KNEE | Status: FUNCTIONAL

## 2019-01-28 RX ORDER — HYDROCHLOROTHIAZIDE 12.5 MG/1
12.5 TABLET ORAL DAILY
Status: DISCONTINUED | OUTPATIENT
Start: 2019-01-29 | End: 2019-01-30

## 2019-01-28 RX ORDER — SODIUM CHLORIDE 0.9 % (FLUSH) 0.9 %
10 SYRINGE (ML) INJECTION EVERY 12 HOURS SCHEDULED
Status: DISCONTINUED | OUTPATIENT
Start: 2019-01-28 | End: 2019-01-28 | Stop reason: HOSPADM

## 2019-01-28 RX ORDER — SODIUM CHLORIDE 0.9 % (FLUSH) 0.9 %
10 SYRINGE (ML) INJECTION EVERY 12 HOURS SCHEDULED
Status: DISCONTINUED | OUTPATIENT
Start: 2019-01-28 | End: 2019-01-30 | Stop reason: HOSPADM

## 2019-01-28 RX ORDER — KETOROLAC TROMETHAMINE 30 MG/ML
15 INJECTION, SOLUTION INTRAMUSCULAR; INTRAVENOUS EVERY 6 HOURS PRN
Status: DISCONTINUED | OUTPATIENT
Start: 2019-01-28 | End: 2019-01-30 | Stop reason: HOSPADM

## 2019-01-28 RX ORDER — SODIUM CHLORIDE 9 MG/ML
INJECTION, SOLUTION INTRAVENOUS CONTINUOUS
Status: DISCONTINUED | OUTPATIENT
Start: 2019-01-28 | End: 2019-01-28

## 2019-01-28 RX ORDER — LIDOCAINE HYDROCHLORIDE 10 MG/ML
INJECTION, SOLUTION INFILTRATION; PERINEURAL PRN
Status: DISCONTINUED | OUTPATIENT
Start: 2019-01-28 | End: 2019-01-28 | Stop reason: SDUPTHER

## 2019-01-28 RX ORDER — EPHEDRINE SULFATE 50 MG/ML
INJECTION, SOLUTION INTRAVENOUS PRN
Status: DISCONTINUED | OUTPATIENT
Start: 2019-01-28 | End: 2019-01-28 | Stop reason: SDUPTHER

## 2019-01-28 RX ORDER — HYDROCODONE BITARTRATE AND ACETAMINOPHEN 5; 325 MG/1; MG/1
2 TABLET ORAL EVERY 4 HOURS PRN
Status: DISCONTINUED | OUTPATIENT
Start: 2019-01-28 | End: 2019-01-30 | Stop reason: HOSPADM

## 2019-01-28 RX ORDER — GABAPENTIN 100 MG/1
200 CAPSULE ORAL EVERY 8 HOURS
Status: DISCONTINUED | OUTPATIENT
Start: 2019-01-28 | End: 2019-01-30 | Stop reason: HOSPADM

## 2019-01-28 RX ORDER — LOSARTAN POTASSIUM AND HYDROCHLOROTHIAZIDE 12.5; 5 MG/1; MG/1
1 TABLET ORAL DAILY
Status: DISCONTINUED | OUTPATIENT
Start: 2019-01-28 | End: 2019-01-28

## 2019-01-28 RX ORDER — MORPHINE SULFATE 2 MG/ML
2 INJECTION, SOLUTION INTRAMUSCULAR; INTRAVENOUS
Status: DISCONTINUED | OUTPATIENT
Start: 2019-01-28 | End: 2019-01-30 | Stop reason: HOSPADM

## 2019-01-28 RX ORDER — DIPHENHYDRAMINE HYDROCHLORIDE 50 MG/ML
25 INJECTION INTRAMUSCULAR; INTRAVENOUS EVERY 6 HOURS PRN
Status: DISCONTINUED | OUTPATIENT
Start: 2019-01-28 | End: 2019-01-30 | Stop reason: HOSPADM

## 2019-01-28 RX ORDER — DIPHENHYDRAMINE HYDROCHLORIDE 50 MG/ML
12.5 INJECTION INTRAMUSCULAR; INTRAVENOUS
Status: DISCONTINUED | OUTPATIENT
Start: 2019-01-28 | End: 2019-01-28 | Stop reason: HOSPADM

## 2019-01-28 RX ORDER — PROPOFOL 10 MG/ML
INJECTION, EMULSION INTRAVENOUS CONTINUOUS PRN
Status: DISCONTINUED | OUTPATIENT
Start: 2019-01-28 | End: 2019-01-28 | Stop reason: SDUPTHER

## 2019-01-28 RX ORDER — FENTANYL CITRATE 50 UG/ML
50 INJECTION, SOLUTION INTRAMUSCULAR; INTRAVENOUS EVERY 5 MIN PRN
Status: DISCONTINUED | OUTPATIENT
Start: 2019-01-28 | End: 2019-01-28 | Stop reason: HOSPADM

## 2019-01-28 RX ORDER — CEFAZOLIN SODIUM 2 G/50ML
2 SOLUTION INTRAVENOUS
Status: COMPLETED | OUTPATIENT
Start: 2019-01-28 | End: 2019-01-28

## 2019-01-28 RX ORDER — ACETAMINOPHEN 500 MG
1000 TABLET ORAL ONCE
Status: DISCONTINUED | OUTPATIENT
Start: 2019-01-28 | End: 2019-01-28

## 2019-01-28 RX ORDER — SODIUM CHLORIDE 0.9 % (FLUSH) 0.9 %
10 SYRINGE (ML) INJECTION PRN
Status: DISCONTINUED | OUTPATIENT
Start: 2019-01-28 | End: 2019-01-28 | Stop reason: HOSPADM

## 2019-01-28 RX ORDER — LIDOCAINE HYDROCHLORIDE 10 MG/ML
10 INJECTION, SOLUTION INFILTRATION; PERINEURAL
Status: DISCONTINUED | OUTPATIENT
Start: 2019-01-28 | End: 2019-01-28 | Stop reason: HOSPADM

## 2019-01-28 RX ORDER — CEFAZOLIN SODIUM 2 G/50ML
2 SOLUTION INTRAVENOUS EVERY 8 HOURS
Status: COMPLETED | OUTPATIENT
Start: 2019-01-28 | End: 2019-01-29

## 2019-01-28 RX ORDER — MIRTAZAPINE 15 MG/1
7.5 TABLET, FILM COATED ORAL NIGHTLY
Status: DISCONTINUED | OUTPATIENT
Start: 2019-01-28 | End: 2019-01-30 | Stop reason: HOSPADM

## 2019-01-28 RX ORDER — HYDROCODONE BITARTRATE AND ACETAMINOPHEN 5; 325 MG/1; MG/1
1 TABLET ORAL EVERY 4 HOURS PRN
Status: DISCONTINUED | OUTPATIENT
Start: 2019-01-28 | End: 2019-01-30 | Stop reason: HOSPADM

## 2019-01-28 RX ORDER — OXYCODONE HYDROCHLORIDE 5 MG/1
5 TABLET ORAL ONCE
Status: COMPLETED | OUTPATIENT
Start: 2019-01-28 | End: 2019-01-28

## 2019-01-28 RX ORDER — PROMETHAZINE HYDROCHLORIDE 25 MG/ML
12.5 INJECTION, SOLUTION INTRAMUSCULAR; INTRAVENOUS EVERY 6 HOURS PRN
Status: DISCONTINUED | OUTPATIENT
Start: 2019-01-28 | End: 2019-01-30 | Stop reason: HOSPADM

## 2019-01-28 RX ORDER — FENTANYL CITRATE 50 UG/ML
100 INJECTION, SOLUTION INTRAMUSCULAR; INTRAVENOUS ONCE
Status: COMPLETED | OUTPATIENT
Start: 2019-01-28 | End: 2019-01-28

## 2019-01-28 RX ORDER — DEXAMETHASONE SODIUM PHOSPHATE 10 MG/ML
4 INJECTION, SOLUTION INTRAMUSCULAR; INTRAVENOUS ONCE
Status: DISCONTINUED | OUTPATIENT
Start: 2019-01-28 | End: 2019-01-28 | Stop reason: HOSPADM

## 2019-01-28 RX ORDER — OXYCODONE HYDROCHLORIDE AND ACETAMINOPHEN 5; 325 MG/1; MG/1
1 TABLET ORAL
Status: DISCONTINUED | OUTPATIENT
Start: 2019-01-28 | End: 2019-01-28 | Stop reason: HOSPADM

## 2019-01-28 RX ORDER — PROMETHAZINE HYDROCHLORIDE 25 MG/ML
6.25 INJECTION, SOLUTION INTRAMUSCULAR; INTRAVENOUS
Status: DISCONTINUED | OUTPATIENT
Start: 2019-01-28 | End: 2019-01-28 | Stop reason: HOSPADM

## 2019-01-28 RX ORDER — CELECOXIB 100 MG/1
200 CAPSULE ORAL ONCE
Status: COMPLETED | OUTPATIENT
Start: 2019-01-28 | End: 2019-01-28

## 2019-01-28 RX ORDER — DOCUSATE SODIUM 100 MG/1
100 CAPSULE, LIQUID FILLED ORAL 2 TIMES DAILY
Status: DISCONTINUED | OUTPATIENT
Start: 2019-01-28 | End: 2019-01-30 | Stop reason: HOSPADM

## 2019-01-28 RX ORDER — SENNA AND DOCUSATE SODIUM 50; 8.6 MG/1; MG/1
1 TABLET, FILM COATED ORAL DAILY
Status: DISCONTINUED | OUTPATIENT
Start: 2019-01-28 | End: 2019-01-30 | Stop reason: HOSPADM

## 2019-01-28 RX ORDER — ACETAMINOPHEN 325 MG/1
650 TABLET ORAL EVERY 4 HOURS PRN
Status: DISCONTINUED | OUTPATIENT
Start: 2019-01-28 | End: 2019-01-30 | Stop reason: HOSPADM

## 2019-01-28 RX ORDER — ROPIVACAINE HYDROCHLORIDE 5 MG/ML
30 INJECTION, SOLUTION EPIDURAL; INFILTRATION; PERINEURAL
Status: COMPLETED | OUTPATIENT
Start: 2019-01-28 | End: 2019-01-28

## 2019-01-28 RX ORDER — BUPROPION HYDROCHLORIDE 150 MG/1
150 TABLET ORAL DAILY
Status: DISCONTINUED | OUTPATIENT
Start: 2019-01-29 | End: 2019-01-30 | Stop reason: HOSPADM

## 2019-01-28 RX ORDER — PANTOPRAZOLE SODIUM 40 MG/1
40 GRANULE, DELAYED RELEASE ORAL
Status: DISCONTINUED | OUTPATIENT
Start: 2019-01-29 | End: 2019-01-30 | Stop reason: HOSPADM

## 2019-01-28 RX ORDER — MIDAZOLAM HYDROCHLORIDE 1 MG/ML
1 INJECTION INTRAMUSCULAR; INTRAVENOUS EVERY 5 MIN PRN
Status: DISCONTINUED | OUTPATIENT
Start: 2019-01-28 | End: 2019-01-28 | Stop reason: HOSPADM

## 2019-01-28 RX ORDER — GABAPENTIN 100 MG/1
200 CAPSULE ORAL ONCE
Status: COMPLETED | OUTPATIENT
Start: 2019-01-28 | End: 2019-01-28

## 2019-01-28 RX ORDER — LOSARTAN POTASSIUM 50 MG/1
50 TABLET ORAL DAILY
Status: DISCONTINUED | OUTPATIENT
Start: 2019-01-29 | End: 2019-01-30

## 2019-01-28 RX ORDER — BUPIVACAINE HYDROCHLORIDE 7.5 MG/ML
INJECTION, SOLUTION INTRASPINAL PRN
Status: DISCONTINUED | OUTPATIENT
Start: 2019-01-28 | End: 2019-01-28 | Stop reason: SDUPTHER

## 2019-01-28 RX ORDER — SODIUM CHLORIDE 0.9 % (FLUSH) 0.9 %
10 SYRINGE (ML) INJECTION PRN
Status: DISCONTINUED | OUTPATIENT
Start: 2019-01-28 | End: 2019-01-30 | Stop reason: HOSPADM

## 2019-01-28 RX ORDER — MEPERIDINE HYDROCHLORIDE 25 MG/ML
12.5 INJECTION INTRAMUSCULAR; INTRAVENOUS; SUBCUTANEOUS EVERY 5 MIN PRN
Status: DISCONTINUED | OUTPATIENT
Start: 2019-01-28 | End: 2019-01-28 | Stop reason: HOSPADM

## 2019-01-28 RX ORDER — FENTANYL CITRATE 50 UG/ML
25 INJECTION, SOLUTION INTRAMUSCULAR; INTRAVENOUS EVERY 5 MIN PRN
Status: DISCONTINUED | OUTPATIENT
Start: 2019-01-28 | End: 2019-01-28 | Stop reason: HOSPADM

## 2019-01-28 RX ORDER — AMLODIPINE BESYLATE 5 MG/1
5 TABLET ORAL DAILY
Status: DISCONTINUED | OUTPATIENT
Start: 2019-01-29 | End: 2019-01-30 | Stop reason: HOSPADM

## 2019-01-28 RX ORDER — SODIUM CHLORIDE 9 MG/ML
INJECTION, SOLUTION INTRAVENOUS CONTINUOUS
Status: DISCONTINUED | OUTPATIENT
Start: 2019-01-28 | End: 2019-01-29

## 2019-01-28 RX ORDER — ONDANSETRON 2 MG/ML
4 INJECTION INTRAMUSCULAR; INTRAVENOUS EVERY 6 HOURS PRN
Status: DISCONTINUED | OUTPATIENT
Start: 2019-01-28 | End: 2019-01-30 | Stop reason: HOSPADM

## 2019-01-28 RX ORDER — MORPHINE SULFATE 2 MG/ML
4 INJECTION, SOLUTION INTRAMUSCULAR; INTRAVENOUS
Status: DISCONTINUED | OUTPATIENT
Start: 2019-01-28 | End: 2019-01-30 | Stop reason: HOSPADM

## 2019-01-28 RX ADMIN — EPHEDRINE SULFATE 5 MG: 50 INJECTION INTRAMUSCULAR; INTRAVENOUS; SUBCUTANEOUS at 13:34

## 2019-01-28 RX ADMIN — PHENYLEPHRINE HYDROCHLORIDE 100 MCG: 10 INJECTION INTRAVENOUS at 13:40

## 2019-01-28 RX ADMIN — GABAPENTIN 200 MG: 100 CAPSULE ORAL at 10:20

## 2019-01-28 RX ADMIN — SODIUM CHLORIDE: 9 INJECTION, SOLUTION INTRAVENOUS at 12:31

## 2019-01-28 RX ADMIN — CEFAZOLIN SODIUM 2 G: 2 SOLUTION INTRAVENOUS at 20:35

## 2019-01-28 RX ADMIN — SODIUM CHLORIDE: 9 INJECTION, SOLUTION INTRAVENOUS at 16:35

## 2019-01-28 RX ADMIN — OXYCODONE HYDROCHLORIDE 5 MG: 5 TABLET ORAL at 10:21

## 2019-01-28 RX ADMIN — BUPIVACAINE HYDROCHLORIDE IN DEXTROSE 1.8 ML: 7.5 INJECTION, SOLUTION SUBARACHNOID at 12:39

## 2019-01-28 RX ADMIN — TRANEXAMIC ACID 1000 MG: 100 INJECTION, SOLUTION INTRAVENOUS at 15:50

## 2019-01-28 RX ADMIN — ROPIVACAINE HYDROCHLORIDE 30 ML: 5 INJECTION EPIDURAL; INFILTRATION; PERINEURAL at 11:15

## 2019-01-28 RX ADMIN — PHENYLEPHRINE HYDROCHLORIDE 100 MCG: 10 INJECTION INTRAVENOUS at 13:28

## 2019-01-28 RX ADMIN — HYDROCODONE BITARTRATE AND ACETAMINOPHEN 1 TABLET: 5; 325 TABLET ORAL at 20:37

## 2019-01-28 RX ADMIN — TRANEXAMIC ACID 1 G: 100 INJECTION, SOLUTION INTRAVENOUS at 12:42

## 2019-01-28 RX ADMIN — MIRTAZAPINE 7.5 MG: 15 TABLET, FILM COATED ORAL at 20:34

## 2019-01-28 RX ADMIN — PHENYLEPHRINE HYDROCHLORIDE 100 MCG: 10 INJECTION INTRAVENOUS at 13:15

## 2019-01-28 RX ADMIN — SODIUM CHLORIDE: 9 INJECTION, SOLUTION INTRAVENOUS at 13:20

## 2019-01-28 RX ADMIN — PROPOFOL 50 MCG/KG/MIN: 10 INJECTION, EMULSION INTRAVENOUS at 12:42

## 2019-01-28 RX ADMIN — EPHEDRINE SULFATE 5 MG: 50 INJECTION INTRAMUSCULAR; INTRAVENOUS; SUBCUTANEOUS at 13:55

## 2019-01-28 RX ADMIN — CEFAZOLIN SODIUM 2 G: 2 SOLUTION INTRAVENOUS at 12:40

## 2019-01-28 RX ADMIN — EPHEDRINE SULFATE 10 MG: 50 INJECTION INTRAMUSCULAR; INTRAVENOUS; SUBCUTANEOUS at 13:38

## 2019-01-28 RX ADMIN — CELECOXIB 200 MG: 100 CAPSULE ORAL at 10:19

## 2019-01-28 RX ADMIN — EPHEDRINE SULFATE 10 MG: 50 INJECTION INTRAMUSCULAR; INTRAVENOUS; SUBCUTANEOUS at 13:58

## 2019-01-28 RX ADMIN — LIDOCAINE HYDROCHLORIDE 2 ML: 10 INJECTION, SOLUTION INFILTRATION; PERINEURAL at 12:39

## 2019-01-28 RX ADMIN — FENTANYL CITRATE 100 MCG: 50 INJECTION, SOLUTION INTRAMUSCULAR; INTRAVENOUS at 11:11

## 2019-01-28 RX ADMIN — DOCUSATE SODIUM 100 MG: 100 CAPSULE, LIQUID FILLED ORAL at 20:34

## 2019-01-28 RX ADMIN — ASPIRIN 325 MG: 325 TABLET, DELAYED RELEASE ORAL at 20:34

## 2019-01-28 RX ADMIN — MIDAZOLAM HYDROCHLORIDE 1 MG: 1 INJECTION, SOLUTION INTRAMUSCULAR; INTRAVENOUS at 11:11

## 2019-01-28 ASSESSMENT — PULMONARY FUNCTION TESTS
PIF_VALUE: 0
PIF_VALUE: 1
PIF_VALUE: 0
PIF_VALUE: 1
PIF_VALUE: 0
PIF_VALUE: 1
PIF_VALUE: 0
PIF_VALUE: 0
PIF_VALUE: 1
PIF_VALUE: 0
PIF_VALUE: 0
PIF_VALUE: 1
PIF_VALUE: 0
PIF_VALUE: 1
PIF_VALUE: 0
PIF_VALUE: 1
PIF_VALUE: 0
PIF_VALUE: 1
PIF_VALUE: 0
PIF_VALUE: 0
PIF_VALUE: 1
PIF_VALUE: 1
PIF_VALUE: 0
PIF_VALUE: 1
PIF_VALUE: 0
PIF_VALUE: 1
PIF_VALUE: 0
PIF_VALUE: 1
PIF_VALUE: 0
PIF_VALUE: 1
PIF_VALUE: 0
PIF_VALUE: 1
PIF_VALUE: 0
PIF_VALUE: 1
PIF_VALUE: 0
PIF_VALUE: 0
PIF_VALUE: 1
PIF_VALUE: 0
PIF_VALUE: 1
PIF_VALUE: 0
PIF_VALUE: 1
PIF_VALUE: 0
PIF_VALUE: 1
PIF_VALUE: 0
PIF_VALUE: 1
PIF_VALUE: 0
PIF_VALUE: 1
PIF_VALUE: 0
PIF_VALUE: 1
PIF_VALUE: 0
PIF_VALUE: 1
PIF_VALUE: 0
PIF_VALUE: 1
PIF_VALUE: 1
PIF_VALUE: 0
PIF_VALUE: 1
PIF_VALUE: 0

## 2019-01-28 ASSESSMENT — PAIN DESCRIPTION - FREQUENCY: FREQUENCY: CONTINUOUS

## 2019-01-28 ASSESSMENT — PAIN SCALES - GENERAL
PAINLEVEL_OUTOF10: 0
PAINLEVEL_OUTOF10: 2
PAINLEVEL_OUTOF10: 0
PAINLEVEL_OUTOF10: 4

## 2019-01-28 ASSESSMENT — PAIN DESCRIPTION - PROGRESSION: CLINICAL_PROGRESSION: GRADUALLY WORSENING

## 2019-01-28 ASSESSMENT — PAIN DESCRIPTION - DESCRIPTORS: DESCRIPTORS: ACHING;DISCOMFORT;DULL

## 2019-01-28 ASSESSMENT — PAIN DESCRIPTION - LOCATION: LOCATION: KNEE

## 2019-01-28 ASSESSMENT — PAIN DESCRIPTION - ONSET: ONSET: ON-GOING

## 2019-01-28 ASSESSMENT — PAIN DESCRIPTION - PAIN TYPE: TYPE: SURGICAL PAIN

## 2019-01-28 ASSESSMENT — PAIN - FUNCTIONAL ASSESSMENT: PAIN_FUNCTIONAL_ASSESSMENT: 0-10

## 2019-01-28 ASSESSMENT — PAIN DESCRIPTION - ORIENTATION: ORIENTATION: LEFT

## 2019-01-29 LAB
AMORPHOUS: ABNORMAL
ANION GAP SERPL CALCULATED.3IONS-SCNC: 11 MMOL/L (ref 7–16)
BACTERIA: ABNORMAL /HPF
BASOPHILS ABSOLUTE: 0.02 E9/L (ref 0–0.2)
BASOPHILS RELATIVE PERCENT: 0.2 % (ref 0–2)
BILIRUBIN URINE: NEGATIVE
BLOOD, URINE: NEGATIVE
BUN BLDV-MCNC: 10 MG/DL (ref 8–23)
CALCIUM SERPL-MCNC: 8.7 MG/DL (ref 8.6–10.2)
CHLORIDE BLD-SCNC: 104 MMOL/L (ref 98–107)
CLARITY: CLEAR
CO2: 25 MMOL/L (ref 22–29)
COLOR: YELLOW
CREAT SERPL-MCNC: 1 MG/DL (ref 0.7–1.2)
EOSINOPHILS ABSOLUTE: 0.01 E9/L (ref 0.05–0.5)
EOSINOPHILS RELATIVE PERCENT: 0.1 % (ref 0–6)
GFR AFRICAN AMERICAN: >60
GFR NON-AFRICAN AMERICAN: >60 ML/MIN/1.73
GLUCOSE BLD-MCNC: 130 MG/DL (ref 74–99)
GLUCOSE URINE: NEGATIVE MG/DL
HCT VFR BLD CALC: 39.3 % (ref 37–54)
HEMOGLOBIN: 13.2 G/DL (ref 12.5–16.5)
IMMATURE GRANULOCYTES #: 0.04 E9/L
IMMATURE GRANULOCYTES %: 0.4 % (ref 0–5)
KETONES, URINE: 15 MG/DL
LEUKOCYTE ESTERASE, URINE: NEGATIVE
LYMPHOCYTES ABSOLUTE: 0.85 E9/L (ref 1.5–4)
LYMPHOCYTES RELATIVE PERCENT: 8.1 % (ref 20–42)
MAGNESIUM: 1.7 MG/DL (ref 1.6–2.6)
MCH RBC QN AUTO: 29.9 PG (ref 26–35)
MCHC RBC AUTO-ENTMCNC: 33.6 % (ref 32–34.5)
MCV RBC AUTO: 89.1 FL (ref 80–99.9)
MONOCYTES ABSOLUTE: 0.98 E9/L (ref 0.1–0.95)
MONOCYTES RELATIVE PERCENT: 9.4 % (ref 2–12)
NEUTROPHILS ABSOLUTE: 8.53 E9/L (ref 1.8–7.3)
NEUTROPHILS RELATIVE PERCENT: 81.8 % (ref 43–80)
NITRITE, URINE: NEGATIVE
PDW BLD-RTO: 13.2 FL (ref 11.5–15)
PH UA: 7 (ref 5–9)
PLATELET # BLD: 313 E9/L (ref 130–450)
PMV BLD AUTO: 10 FL (ref 7–12)
POTASSIUM REFLEX MAGNESIUM: 3.4 MMOL/L (ref 3.5–5)
PROTEIN UA: NEGATIVE MG/DL
RBC # BLD: 4.41 E12/L (ref 3.8–5.8)
RBC UA: ABNORMAL /HPF (ref 0–2)
SODIUM BLD-SCNC: 140 MMOL/L (ref 132–146)
SPECIFIC GRAVITY UA: 1.01 (ref 1–1.03)
UROBILINOGEN, URINE: 0.2 E.U./DL
WBC # BLD: 10.4 E9/L (ref 4.5–11.5)
WBC UA: ABNORMAL /HPF (ref 0–5)

## 2019-01-29 PROCEDURE — 1200000000 HC SEMI PRIVATE

## 2019-01-29 PROCEDURE — 85025 COMPLETE CBC W/AUTO DIFF WBC: CPT

## 2019-01-29 PROCEDURE — 87088 URINE BACTERIA CULTURE: CPT

## 2019-01-29 PROCEDURE — 80048 BASIC METABOLIC PNL TOTAL CA: CPT

## 2019-01-29 PROCEDURE — 97110 THERAPEUTIC EXERCISES: CPT

## 2019-01-29 PROCEDURE — 83735 ASSAY OF MAGNESIUM: CPT

## 2019-01-29 PROCEDURE — 97530 THERAPEUTIC ACTIVITIES: CPT

## 2019-01-29 PROCEDURE — 81001 URINALYSIS AUTO W/SCOPE: CPT

## 2019-01-29 PROCEDURE — 36415 COLL VENOUS BLD VENIPUNCTURE: CPT

## 2019-01-29 PROCEDURE — 97535 SELF CARE MNGMENT TRAINING: CPT

## 2019-01-29 PROCEDURE — 6370000000 HC RX 637 (ALT 250 FOR IP): Performed by: INTERNAL MEDICINE

## 2019-01-29 PROCEDURE — 6370000000 HC RX 637 (ALT 250 FOR IP): Performed by: STUDENT IN AN ORGANIZED HEALTH CARE EDUCATION/TRAINING PROGRAM

## 2019-01-29 PROCEDURE — 2580000003 HC RX 258: Performed by: STUDENT IN AN ORGANIZED HEALTH CARE EDUCATION/TRAINING PROGRAM

## 2019-01-29 PROCEDURE — 6360000002 HC RX W HCPCS: Performed by: STUDENT IN AN ORGANIZED HEALTH CARE EDUCATION/TRAINING PROGRAM

## 2019-01-29 RX ORDER — ALPRAZOLAM 0.25 MG/1
0.5 TABLET ORAL NIGHTLY PRN
Status: DISCONTINUED | OUTPATIENT
Start: 2019-01-29 | End: 2019-01-30 | Stop reason: HOSPADM

## 2019-01-29 RX ORDER — POTASSIUM CHLORIDE 20 MEQ/1
40 TABLET, EXTENDED RELEASE ORAL ONCE
Status: COMPLETED | OUTPATIENT
Start: 2019-01-29 | End: 2019-01-29

## 2019-01-29 RX ADMIN — POTASSIUM CHLORIDE 40 MEQ: 20 TABLET, EXTENDED RELEASE ORAL at 08:43

## 2019-01-29 RX ADMIN — DOCUSATE SODIUM 100 MG: 100 CAPSULE, LIQUID FILLED ORAL at 08:44

## 2019-01-29 RX ADMIN — Medication 10 ML: at 22:33

## 2019-01-29 RX ADMIN — AMLODIPINE BESYLATE 5 MG: 5 TABLET ORAL at 08:44

## 2019-01-29 RX ADMIN — GABAPENTIN 200 MG: 100 CAPSULE ORAL at 00:32

## 2019-01-29 RX ADMIN — CEFAZOLIN SODIUM 2 G: 2 SOLUTION INTRAVENOUS at 04:21

## 2019-01-29 RX ADMIN — SENNOSIDES AND DOCUSATE SODIUM 1 TABLET: 8.6; 5 TABLET ORAL at 22:31

## 2019-01-29 RX ADMIN — MIRTAZAPINE 7.5 MG: 15 TABLET, FILM COATED ORAL at 22:31

## 2019-01-29 RX ADMIN — DOCUSATE SODIUM 100 MG: 100 CAPSULE, LIQUID FILLED ORAL at 22:31

## 2019-01-29 RX ADMIN — GABAPENTIN 200 MG: 100 CAPSULE ORAL at 17:06

## 2019-01-29 RX ADMIN — KETOROLAC TROMETHAMINE 15 MG: 30 INJECTION, SOLUTION INTRAMUSCULAR; INTRAVENOUS at 16:32

## 2019-01-29 RX ADMIN — Medication 10 ML: at 16:32

## 2019-01-29 RX ADMIN — Medication 10 ML: at 08:42

## 2019-01-29 RX ADMIN — HYDROCODONE BITARTRATE AND ACETAMINOPHEN 2 TABLET: 5; 325 TABLET ORAL at 04:33

## 2019-01-29 RX ADMIN — PANTOPRAZOLE SODIUM 40 MG: 40 GRANULE, DELAYED RELEASE ORAL at 05:28

## 2019-01-29 RX ADMIN — BUPROPION HYDROCHLORIDE 150 MG: 150 TABLET, FILM COATED, EXTENDED RELEASE ORAL at 08:43

## 2019-01-29 RX ADMIN — HYDROCODONE BITARTRATE AND ACETAMINOPHEN 2 TABLET: 5; 325 TABLET ORAL at 12:53

## 2019-01-29 RX ADMIN — LOSARTAN POTASSIUM 50 MG: 50 TABLET, FILM COATED ORAL at 08:45

## 2019-01-29 RX ADMIN — ASPIRIN 325 MG: 325 TABLET, DELAYED RELEASE ORAL at 08:43

## 2019-01-29 RX ADMIN — HYDROCODONE BITARTRATE AND ACETAMINOPHEN 2 TABLET: 5; 325 TABLET ORAL at 22:37

## 2019-01-29 RX ADMIN — ASPIRIN 325 MG: 325 TABLET, DELAYED RELEASE ORAL at 22:31

## 2019-01-29 RX ADMIN — GABAPENTIN 200 MG: 100 CAPSULE ORAL at 08:43

## 2019-01-29 ASSESSMENT — PAIN DESCRIPTION - DESCRIPTORS
DESCRIPTORS: ACHING;CONSTANT;DISCOMFORT
DESCRIPTORS: ACHING;DISCOMFORT;DULL
DESCRIPTORS: ACHING;CONSTANT;DISCOMFORT

## 2019-01-29 ASSESSMENT — PAIN DESCRIPTION - LOCATION
LOCATION: KNEE

## 2019-01-29 ASSESSMENT — PAIN DESCRIPTION - ORIENTATION
ORIENTATION: LEFT

## 2019-01-29 ASSESSMENT — PAIN DESCRIPTION - PROGRESSION
CLINICAL_PROGRESSION: GRADUALLY WORSENING

## 2019-01-29 ASSESSMENT — PAIN SCALES - GENERAL
PAINLEVEL_OUTOF10: 6
PAINLEVEL_OUTOF10: 8
PAINLEVEL_OUTOF10: 8
PAINLEVEL_OUTOF10: 7
PAINLEVEL_OUTOF10: 0
PAINLEVEL_OUTOF10: 0
PAINLEVEL_OUTOF10: 8

## 2019-01-29 ASSESSMENT — PAIN DESCRIPTION - FREQUENCY
FREQUENCY: CONTINUOUS

## 2019-01-29 ASSESSMENT — PAIN DESCRIPTION - ONSET
ONSET: ON-GOING

## 2019-01-29 ASSESSMENT — PAIN DESCRIPTION - PAIN TYPE
TYPE: SURGICAL PAIN

## 2019-01-30 VITALS
SYSTOLIC BLOOD PRESSURE: 143 MMHG | BODY MASS INDEX: 27.36 KG/M2 | OXYGEN SATURATION: 97 % | DIASTOLIC BLOOD PRESSURE: 72 MMHG | HEIGHT: 72 IN | WEIGHT: 202 LBS | HEART RATE: 95 BPM | RESPIRATION RATE: 16 BRPM | TEMPERATURE: 99.2 F

## 2019-01-30 LAB
BASOPHILS ABSOLUTE: 0.02 E9/L (ref 0–0.2)
BASOPHILS RELATIVE PERCENT: 0.2 % (ref 0–2)
EOSINOPHILS ABSOLUTE: 0.03 E9/L (ref 0.05–0.5)
EOSINOPHILS RELATIVE PERCENT: 0.3 % (ref 0–6)
HCT VFR BLD CALC: 35.9 % (ref 37–54)
HEMOGLOBIN: 11.9 G/DL (ref 12.5–16.5)
IMMATURE GRANULOCYTES #: 0.07 E9/L
IMMATURE GRANULOCYTES %: 0.6 % (ref 0–5)
LYMPHOCYTES ABSOLUTE: 1.1 E9/L (ref 1.5–4)
LYMPHOCYTES RELATIVE PERCENT: 9.8 % (ref 20–42)
MCH RBC QN AUTO: 29.9 PG (ref 26–35)
MCHC RBC AUTO-ENTMCNC: 33.1 % (ref 32–34.5)
MCV RBC AUTO: 90.2 FL (ref 80–99.9)
MONOCYTES ABSOLUTE: 1.36 E9/L (ref 0.1–0.95)
MONOCYTES RELATIVE PERCENT: 12.1 % (ref 2–12)
NEUTROPHILS ABSOLUTE: 8.62 E9/L (ref 1.8–7.3)
NEUTROPHILS RELATIVE PERCENT: 77 % (ref 43–80)
PDW BLD-RTO: 13.6 FL (ref 11.5–15)
PLATELET # BLD: 250 E9/L (ref 130–450)
PMV BLD AUTO: 10.4 FL (ref 7–12)
RBC # BLD: 3.98 E12/L (ref 3.8–5.8)
WBC # BLD: 11.2 E9/L (ref 4.5–11.5)

## 2019-01-30 PROCEDURE — 6370000000 HC RX 637 (ALT 250 FOR IP): Performed by: STUDENT IN AN ORGANIZED HEALTH CARE EDUCATION/TRAINING PROGRAM

## 2019-01-30 PROCEDURE — 85025 COMPLETE CBC W/AUTO DIFF WBC: CPT

## 2019-01-30 PROCEDURE — 97530 THERAPEUTIC ACTIVITIES: CPT

## 2019-01-30 PROCEDURE — 36415 COLL VENOUS BLD VENIPUNCTURE: CPT

## 2019-01-30 PROCEDURE — 6370000000 HC RX 637 (ALT 250 FOR IP): Performed by: INTERNAL MEDICINE

## 2019-01-30 PROCEDURE — 2580000003 HC RX 258: Performed by: STUDENT IN AN ORGANIZED HEALTH CARE EDUCATION/TRAINING PROGRAM

## 2019-01-30 PROCEDURE — 97110 THERAPEUTIC EXERCISES: CPT

## 2019-01-30 PROCEDURE — 97535 SELF CARE MNGMENT TRAINING: CPT

## 2019-01-30 RX ORDER — HYDROCODONE BITARTRATE AND ACETAMINOPHEN 5; 325 MG/1; MG/1
1 TABLET ORAL EVERY 6 HOURS PRN
Qty: 28 TABLET | Refills: 0 | Status: SHIPPED | OUTPATIENT
Start: 2019-01-30 | End: 2019-02-14 | Stop reason: SDUPTHER

## 2019-01-30 RX ORDER — LOSARTAN POTASSIUM AND HYDROCHLOROTHIAZIDE 25; 100 MG/1; MG/1
1 TABLET ORAL DAILY
Status: DISCONTINUED | OUTPATIENT
Start: 2019-01-30 | End: 2019-01-30 | Stop reason: CLARIF

## 2019-01-30 RX ORDER — LOSARTAN POTASSIUM 50 MG/1
100 TABLET ORAL DAILY
Status: DISCONTINUED | OUTPATIENT
Start: 2019-01-30 | End: 2019-01-30 | Stop reason: HOSPADM

## 2019-01-30 RX ORDER — HYDROCHLOROTHIAZIDE 25 MG/1
25 TABLET ORAL DAILY
Status: DISCONTINUED | OUTPATIENT
Start: 2019-01-30 | End: 2019-01-30 | Stop reason: HOSPADM

## 2019-01-30 RX ORDER — LOSARTAN POTASSIUM AND HYDROCHLOROTHIAZIDE 12.5; 5 MG/1; MG/1
2 TABLET ORAL DAILY
Qty: 30 TABLET | Refills: 0 | Status: SHIPPED | OUTPATIENT
Start: 2019-01-30 | End: 2019-05-14 | Stop reason: ALTCHOICE

## 2019-01-30 RX ORDER — LOSARTAN POTASSIUM AND HYDROCHLOROTHIAZIDE 12.5; 5 MG/1; MG/1
2 TABLET ORAL DAILY
Qty: 30 TABLET | Refills: 0 | Status: SHIPPED
Start: 2019-01-30 | End: 2019-01-30

## 2019-01-30 RX ADMIN — AMLODIPINE BESYLATE 5 MG: 5 TABLET ORAL at 09:17

## 2019-01-30 RX ADMIN — LOSARTAN POTASSIUM 100 MG: 50 TABLET, FILM COATED ORAL at 09:16

## 2019-01-30 RX ADMIN — DOCUSATE SODIUM 100 MG: 100 CAPSULE, LIQUID FILLED ORAL at 09:17

## 2019-01-30 RX ADMIN — BUPROPION HYDROCHLORIDE 150 MG: 150 TABLET, FILM COATED, EXTENDED RELEASE ORAL at 09:16

## 2019-01-30 RX ADMIN — GABAPENTIN 200 MG: 100 CAPSULE ORAL at 09:17

## 2019-01-30 RX ADMIN — ASPIRIN 325 MG: 325 TABLET, DELAYED RELEASE ORAL at 09:17

## 2019-01-30 RX ADMIN — HYDROCHLOROTHIAZIDE 25 MG: 25 TABLET ORAL at 09:16

## 2019-01-30 RX ADMIN — HYDROCODONE BITARTRATE AND ACETAMINOPHEN 1 TABLET: 5; 325 TABLET ORAL at 10:45

## 2019-01-30 RX ADMIN — PANTOPRAZOLE SODIUM 40 MG: 40 GRANULE, DELAYED RELEASE ORAL at 09:13

## 2019-01-30 RX ADMIN — HYDROCODONE BITARTRATE AND ACETAMINOPHEN 1 TABLET: 5; 325 TABLET ORAL at 05:59

## 2019-01-30 RX ADMIN — Medication 10 ML: at 09:16

## 2019-01-30 ASSESSMENT — PAIN DESCRIPTION - DESCRIPTORS
DESCRIPTORS: DISCOMFORT;TENDER
DESCRIPTORS: ACHING;CONSTANT;DISCOMFORT

## 2019-01-30 ASSESSMENT — PAIN DESCRIPTION - PAIN TYPE
TYPE: SURGICAL PAIN

## 2019-01-30 ASSESSMENT — PAIN DESCRIPTION - LOCATION
LOCATION: KNEE

## 2019-01-30 ASSESSMENT — PAIN DESCRIPTION - ONSET
ONSET: ON-GOING
ONSET: ON-GOING

## 2019-01-30 ASSESSMENT — PAIN SCALES - GENERAL
PAINLEVEL_OUTOF10: 4
PAINLEVEL_OUTOF10: 0
PAINLEVEL_OUTOF10: 0
PAINLEVEL_OUTOF10: 4
PAINLEVEL_OUTOF10: 6

## 2019-01-30 ASSESSMENT — PAIN DESCRIPTION - ORIENTATION
ORIENTATION: LEFT

## 2019-01-30 ASSESSMENT — PAIN DESCRIPTION - PROGRESSION: CLINICAL_PROGRESSION: GRADUALLY WORSENING

## 2019-01-30 ASSESSMENT — PAIN DESCRIPTION - FREQUENCY
FREQUENCY: CONTINUOUS
FREQUENCY: INTERMITTENT

## 2019-01-30 ASSESSMENT — PAIN - FUNCTIONAL ASSESSMENT: PAIN_FUNCTIONAL_ASSESSMENT: PREVENTS OR INTERFERES SOME ACTIVE ACTIVITIES AND ADLS

## 2019-01-31 LAB — URINE CULTURE, ROUTINE: NORMAL

## 2019-02-07 ENCOUNTER — OFFICE VISIT (OUTPATIENT)
Dept: ORTHOPEDIC SURGERY | Age: 84
End: 2019-02-07

## 2019-02-07 VITALS
DIASTOLIC BLOOD PRESSURE: 83 MMHG | HEART RATE: 95 BPM | HEIGHT: 72 IN | TEMPERATURE: 97 F | BODY MASS INDEX: 26.41 KG/M2 | WEIGHT: 195 LBS | SYSTOLIC BLOOD PRESSURE: 139 MMHG

## 2019-02-07 DIAGNOSIS — Z96.652 S/P TOTAL KNEE REPLACEMENT, LEFT: Primary | ICD-10-CM

## 2019-02-07 PROCEDURE — 99024 POSTOP FOLLOW-UP VISIT: CPT | Performed by: ORTHOPAEDIC SURGERY

## 2019-02-14 DIAGNOSIS — Z96.652 STATUS POST LEFT KNEE REPLACEMENT: ICD-10-CM

## 2019-02-14 RX ORDER — HYDROCODONE BITARTRATE AND ACETAMINOPHEN 5; 325 MG/1; MG/1
1 TABLET ORAL EVERY 8 HOURS PRN
Qty: 15 TABLET | Refills: 0 | Status: SHIPPED | OUTPATIENT
Start: 2019-02-14 | End: 2019-02-19

## 2019-03-07 ENCOUNTER — OFFICE VISIT (OUTPATIENT)
Dept: ORTHOPEDIC SURGERY | Age: 84
End: 2019-03-07

## 2019-03-07 VITALS
TEMPERATURE: 97.4 F | BODY MASS INDEX: 26.41 KG/M2 | HEART RATE: 78 BPM | HEIGHT: 72 IN | DIASTOLIC BLOOD PRESSURE: 82 MMHG | SYSTOLIC BLOOD PRESSURE: 142 MMHG | WEIGHT: 195 LBS

## 2019-03-07 DIAGNOSIS — Z96.652 S/P TOTAL KNEE REPLACEMENT, LEFT: Primary | ICD-10-CM

## 2019-03-07 PROCEDURE — 99024 POSTOP FOLLOW-UP VISIT: CPT | Performed by: ORTHOPAEDIC SURGERY

## 2019-04-18 ENCOUNTER — OFFICE VISIT (OUTPATIENT)
Dept: ORTHOPEDIC SURGERY | Age: 84
End: 2019-04-18

## 2019-04-18 VITALS
SYSTOLIC BLOOD PRESSURE: 133 MMHG | WEIGHT: 193 LBS | BODY MASS INDEX: 26.14 KG/M2 | TEMPERATURE: 97.2 F | HEART RATE: 72 BPM | HEIGHT: 72 IN | DIASTOLIC BLOOD PRESSURE: 85 MMHG

## 2019-04-18 DIAGNOSIS — Z96.652 S/P TOTAL KNEE REPLACEMENT, LEFT: Primary | ICD-10-CM

## 2019-04-18 PROCEDURE — 99024 POSTOP FOLLOW-UP VISIT: CPT | Performed by: ORTHOPAEDIC SURGERY

## 2019-04-19 NOTE — PROGRESS NOTES
Chief Complaint:   Chief Complaint   Patient presents with    Post-Op Check     Post op check left total knee replacement (surg. 1/28/19). Pt. states he is doing really well has slight stiffness in morning. Paloma Zaldivar  is approaching 3 months after left total knee, overall doing very well with continued improvement now virtually normal in all activities, no fever chills sweats chest pain or dyspnea taking nothing for pain. He does note some stiffness in the morning that improves with activities. No other joint complaints. Allergies; medications; past medical, surgical, family, and social history; and problem list have been reviewed today and updated as indicated in this encounter seen below. Exam: Left knee incision is healed completely, there is no erythema fluctuance or drainage, minimal expected periarticular soft tissue swelling and warmth no effusion. Excellent tissue balance range of motion from 5-115° of flexion, soft endpoints. Radiographs: Deferred today review of postoperative x-rays showed proper implant alignment fit fixation. Abdelrahman Muller was seen today for post-op check. Diagnoses and all orders for this visit:    S/P total knee replacement, left  -     Cancel: Amb External Referral To Physical Therapy  -     Amb External Referral To Physical Therapy       Patient is doing well, he would like to advance into a maintenance type exercise program rather than the [facility], he was given a prescription to do so. He will increase activities at home to tolerance, continue home exercise remain in spaces well, follow-up in 3 months for outcome assessment. Return in about 3 months (around 7/18/2019).      Current Outpatient Medications   Medication Sig Dispense Refill    aspirin 81 MG tablet Take 81 mg by mouth daily      mupirocin (BACTROBAN) 2 % ointment JOSE ARMANDO EXT AA BID  0    losartan-hydrochlorothiazide (HYZAAR) 50-12.5 MG per tablet Take 2 tablets by mouth daily (Patient taking differently: Take 1 tablet by mouth 2 times daily ) 30 tablet 0    acetaminophen (TYLENOL) 500 MG tablet Take 1,000 mg by mouth every 6 hours as needed for Pain Instructed to take morning of surgery with a sip of water if needs      mirtazapine (REMERON) 15 MG tablet Take 7.5 mg by mouth nightly   3    pantoprazole sodium (PROTONIX) 40 MG PACK packet Take 40 mg by mouth every morning (before breakfast) Instructed to take morning of surgery with a sip of water      buPROPion (WELLBUTRIN XL) 150 MG extended release tablet Take 1 tablet by mouth daily 30 tablet 0    amLODIPine (NORVASC) 5 MG tablet Take 5 mg by mouth daily Instructed to take morning of surgery with a sip of water       No current facility-administered medications for this visit. Patient Active Problem List   Diagnosis    Major depressive disorder    Decreased sense of vibration at the toes    HTN (hypertension)    Hyperlipidemia    Aneurysm of right renal artery (HCC)    Osteoarthritis of left knee    Status post left knee replacement       Past Medical History:   Diagnosis Date    Aneurysm of right renal artery ([address] 10/03/2018    follows with Dr. Lorenzo Million yearly    Depression     GERD (gastroesophageal reflux disease)     Hyperlipidemia     Hypertension     TIA (transient ischemic attack)     last episode 2006?     Wears dentures     upper partial plate       Past Surgical History:   Procedure Laterality Date    ANTERIOR CRUCIATE LIGAMENT REPAIR Left 11/21/2001    APPENDECTOMY      CHOLECYSTECTOMY  2007    Lap    ECHO COMPL W DOP COLOR FLOW  12/4/2012         HERNIA REPAIR Right 11/13/2002    double    SINUS SURGERY  2002,2003     done x 2    TONSILLECTOMY      TOTAL KNEE ARTHROPLASTY Left 1/28/2019    LEFT  ROBOTIC KNEE TOTAL ARTHROPLASTY  ++MEREDITH- OBALZVOX++   ++ADDUCTOR BLOCK++ performed by Marine [facility] at [facility] OR       No Known Allergies    Social History     Socioeconomic History    Marital status: Neurological: Alert and oriented to person, place, and time. Skin: Skin is warm and dry. Psychiatric: Normal mood and affect.  Behavior is normal. Thought content normal.    4/19/2019  12:05 PM

## 2019-04-24 ENCOUNTER — HOSPITAL ENCOUNTER (OUTPATIENT)
Age: 84
Discharge: HOME | End: 2019-04-26
Payer: MEDICARE

## 2019-04-24 LAB — PROSTATE SPECIFIC ANTIGEN: 2.86 NG/ML (ref 0–4)

## 2019-04-24 PROCEDURE — G0103 PSA SCREENING: HCPCS

## 2019-05-09 ENCOUNTER — TELEPHONE (OUTPATIENT)
Dept: FAMILY MEDICINE CLINIC | Age: 84
End: 2019-05-09

## 2019-05-09 RX ORDER — LISINOPRIL AND HYDROCHLOROTHIAZIDE 12.5; 1 MG/1; MG/1
1 TABLET ORAL DAILY
Qty: 30 TABLET | Refills: 5 | Status: SHIPPED | OUTPATIENT
Start: 2019-05-09 | End: 2019-08-08 | Stop reason: SINTOL

## 2019-05-09 NOTE — TELEPHONE ENCOUNTER
Pt would like a Rx to replace his Hyzaar due to the recent recall. He states he was contacted by his pharmacy that his medication was affected. Verified pharmacy with pt.

## 2019-05-14 ENCOUNTER — OFFICE VISIT (OUTPATIENT)
Dept: PRIMARY CARE CLINIC | Age: 84
End: 2019-05-14
Payer: MEDICARE

## 2019-05-14 VITALS
WEIGHT: 196 LBS | DIASTOLIC BLOOD PRESSURE: 72 MMHG | BODY MASS INDEX: 26.55 KG/M2 | SYSTOLIC BLOOD PRESSURE: 136 MMHG | HEART RATE: 73 BPM | HEIGHT: 72 IN | OXYGEN SATURATION: 99 % | TEMPERATURE: 97.5 F

## 2019-05-14 DIAGNOSIS — I10 ESSENTIAL HYPERTENSION: ICD-10-CM

## 2019-05-14 DIAGNOSIS — J01.10 ACUTE NON-RECURRENT FRONTAL SINUSITIS: Primary | ICD-10-CM

## 2019-05-14 DIAGNOSIS — R10.9 STOMACH DISCOMFORT: ICD-10-CM

## 2019-05-14 PROCEDURE — 99214 OFFICE O/P EST MOD 30 MIN: CPT | Performed by: FAMILY MEDICINE

## 2019-05-14 RX ORDER — PREDNISONE 1 MG/1
TABLET ORAL
Qty: 30 TABLET | Refills: 0 | Status: SHIPPED | OUTPATIENT
Start: 2019-05-14 | End: 2019-05-30

## 2019-05-14 RX ORDER — AZITHROMYCIN 250 MG/1
TABLET, FILM COATED ORAL
Qty: 1 PACKET | Refills: 0 | Status: SHIPPED | OUTPATIENT
Start: 2019-05-14 | End: 2019-05-30

## 2019-05-14 ASSESSMENT — ENCOUNTER SYMPTOMS
RESPIRATORY NEGATIVE: 1
ALLERGIC/IMMUNOLOGIC NEGATIVE: 1
EYES NEGATIVE: 1
SINUS PRESSURE: 1
NAUSEA: 1
SINUS PAIN: 1
COUGH: 1

## 2019-05-14 NOTE — PROGRESS NOTES
Negative. Musculoskeletal: Negative. Skin: Negative. Allergic/Immunologic: Negative. Neurological: Negative. Hematological: Negative. Psychiatric/Behavioral: Negative. Current Outpatient Medications:     azithromycin (ZITHROMAX Z-ELENA) 250 MG tablet, 2 today  Then 1 qd  4 days, Disp: 1 packet, Rfl: 0    predniSONE (DELTASONE) 5 MG tablet, 4 tablets daily for 3 days then 3 tablets daily for 3 days then 2 tablets daily for 3 days then 1 tablet daily for 3 days, Disp: 30 tablet, Rfl: 0    lisinopril-hydrochlorothiazide (PRINZIDE;ZESTORETIC) 10-12.5 MG per tablet, Take 1 tablet by mouth daily, Disp: 30 tablet, Rfl: 5    aspirin 81 MG tablet, Take 81 mg by mouth daily, Disp: , Rfl:     pantoprazole sodium (PROTONIX) 40 MG PACK packet, Take 40 mg by mouth every morning (before breakfast) Instructed to take morning of surgery with a sip of water, Disp: , Rfl:     buPROPion (WELLBUTRIN XL) 150 MG extended release tablet, Take 1 tablet by mouth daily, Disp: 30 tablet, Rfl: 0    amLODIPine (NORVASC) 5 MG tablet, Take 5 mg by mouth daily Instructed to take morning of surgery with a sip of water, Disp: , Rfl:     mirtazapine (REMERON) 15 MG tablet, Take 7.5 mg by mouth nightly , Disp: , Rfl: 3    No Known Allergies    Social History     Tobacco Use    Smoking status: Never Smoker    Smokeless tobacco: Never Used   Substance Use Topics    Alcohol use: No    Drug use: No      Past Surgical History:   Procedure Laterality Date    ANTERIOR CRUCIATE LIGAMENT REPAIR Left 11/21/2001    APPENDECTOMY      CHOLECYSTECTOMY  2007    Lap    ECHO COMPL W DOP COLOR FLOW  12/4/2012         HERNIA REPAIR Right 11/13/2002    double    SINUS SURGERY  2002,2003     done x 2    TONSILLECTOMY      TOTAL KNEE ARTHROPLASTY Left 1/28/2019    LEFT  ROBOTIC KNEE TOTAL ARTHROPLASTY  ++MEREDITH- [id number]++   ++ADDUCTOR BLOCK++ performed by Julissa [facility] at [facility] OR     No family history on file.   Past Medical History:   Diagnosis Date    Aneurysm of right renal artery ([address] 10/03/2018    follows with Dr. Knowles Generous yearly    Depression     GERD (gastroesophageal reflux disease)     Hyperlipidemia     Hypertension     TIA (transient ischemic attack)     last episode 2006?  Wears dentures     upper partial plate       Vitals:    05/14/19 1325   BP: 136/72   Pulse: 73   Temp: 97.5 °F (36.4 °C)   SpO2: 99%   Weight: 196 lb (88.9 kg)   Height: 6' (1.829 m)       Physical Exam   Constitutional: He is oriented to person, place, and time. He appears well-developed and well-nourished. HENT:   Head: Normocephalic. Right Ear: External ear normal.   Left Ear: External ear normal.   Nose: Nose normal.   Mouth/Throat: Oropharynx is clear and moist.   Increased drainage posterior pharynx drainage nares. Thick mucous secretions. Eyes: Pupils are equal, round, and reactive to light. Conjunctivae and EOM are normal.   Neck: Normal range of motion. Neck supple. Cardiovascular: Normal rate and intact distal pulses. Pulmonary/Chest: Breath sounds normal.   Abdominal: Soft. Bowel sounds are normal.   Musculoskeletal: Normal range of motion. Neurological: He is alert and oriented to person, place, and time. Skin: Skin is warm and dry. Psychiatric: He has a normal mood and affect. His behavior is normal.   Nursing note and vitals reviewed. Lab Results   Component Value Date    WBC 11.2 01/30/2019    HGB 11.9 (L) 01/30/2019    HCT 35.9 (L) 01/30/2019     01/30/2019    ALT 8 09/13/2018    AST 15 09/13/2018     01/29/2019    K 3.4 (L) 01/29/2019     01/29/2019    CREATININE 1.0 01/29/2019    BUN 10 01/29/2019    CO2 25 01/29/2019    TSH 1.200 01/22/2017    PSA 2.86 04/24/2019    INR 1.2 12/03/2012      Exam findings stable aside from sinus drainage. Hypertension controlled. Maintain current medication reassessment in June. Lab work to be completed prior.       Plan Per Assessment:  Patterson Pallas was seen today for hypertension and sinusitis. Diagnoses and all orders for this visit:    Acute non-recurrent frontal sinusitis  -     CBC Auto Differential; Future  -     Comprehensive Metabolic Panel; Future  -     T4; Future  -     TSH without Reflex; Future  -     Lipid Panel; Future    Essential hypertension  -     CBC Auto Differential; Future  -     Comprehensive Metabolic Panel; Future  -     T4; Future  -     TSH without Reflex; Future  -     Lipid Panel; Future    Stomach discomfort  -     CBC Auto Differential; Future  -     Comprehensive Metabolic Panel; Future  -     T4; Future  -     TSH without Reflex; Future  -     Lipid Panel; Future    Other orders  -     azithromycin (ZITHROMAX Z-ELENA) 250 MG tablet; 2 today  Then 1 qd  4 days  -     predniSONE (DELTASONE) 5 MG tablet; 4 tablets daily for 3 days then 3 tablets daily for 3 days then 2 tablets daily for 3 days then 1 tablet daily for 3 days            No follow-ups on file. Irene Gutierrez DO    Note was generated with the assistance of voice recognition software. Document was reviewed however may contain grammatical errors.

## 2019-05-14 NOTE — PROGRESS NOTES
(WELLBUTRIN XL) 150 MG extended release tablet, Take 1 tablet by mouth daily, Disp: 30 tablet, Rfl: 0    amLODIPine (NORVASC) 5 MG tablet, Take 5 mg by mouth daily Instructed to take morning of surgery with a sip of water, Disp: , Rfl:     mirtazapine (REMERON) 15 MG tablet, Take 7.5 mg by mouth nightly , Disp: , Rfl: 3    No Known Allergies    Social History     Tobacco Use    Smoking status: Never Smoker    Smokeless tobacco: Never Used   Substance Use Topics    Alcohol use: No    Drug use: No      Past Surgical History:   Procedure Laterality Date    ANTERIOR CRUCIATE LIGAMENT REPAIR Left 11/21/2001    APPENDECTOMY      CHOLECYSTECTOMY  2007    Lap    ECHO COMPL W DOP COLOR FLOW  12/4/2012         HERNIA REPAIR Right 11/13/2002    double    SINUS SURGERY  2002,2003     done x 2    TONSILLECTOMY      TOTAL KNEE ARTHROPLASTY Left 1/28/2019    LEFT  ROBOTIC KNEE TOTAL ARTHROPLASTY  ++MEREDITH- [id number]++   ++ADDUCTOR BLOCK++ performed by Joni Gordon MD at [facility] OR     No family history on file. Past Medical History:   Diagnosis Date    Aneurysm of right renal artery ([address] 10/03/2018    follows with Dr. Debora Aguilera yearly    Depression     GERD (gastroesophageal reflux disease)     Hyperlipidemia     Hypertension     TIA (transient ischemic attack)     last episode 2006?  Wears dentures     upper partial plate       Vitals:    05/14/19 1325   BP: 136/72   Pulse: 73   Temp: 97.5 °F (36.4 °C)   SpO2: 99%   Weight: 196 lb (88.9 kg)   Height: 6' (1.829 m)       Physical Exam   Constitutional: He is oriented to person, place, and time. He appears well-developed and well-nourished. HENT:   Head: Normocephalic. Right Ear: External ear normal.   Left Ear: External ear normal.   Nose: Nose normal.   Mouth/Throat: Oropharynx is clear and moist.   Eyes: Pupils are equal, round, and reactive to light. Conjunctivae and EOM are normal.   Neck: Normal range of motion. Neck supple.    Cardiovascular: Normal rate and intact distal pulses. Pulmonary/Chest: Breath sounds normal.   Abdominal: Soft. Bowel sounds are normal.   Musculoskeletal: Normal range of motion. Neurological: He is alert and oriented to person, place, and time. Skin: Skin is warm and dry. Psychiatric: He has a normal mood and affect. His behavior is normal.   Nursing note and vitals reviewed. Lab Results   Component Value Date    WBC 11.2 01/30/2019    HGB 11.9 (L) 01/30/2019    HCT 35.9 (L) 01/30/2019     01/30/2019    ALT 8 09/13/2018    AST 15 09/13/2018     01/29/2019    K 3.4 (L) 01/29/2019     01/29/2019    CREATININE 1.0 01/29/2019    BUN 10 01/29/2019    CO2 25 01/29/2019    TSH 1.200 01/22/2017    PSA 2.86 04/24/2019    INR 1.2 12/03/2012            Plan Per Assessment:  Anisa Hinson was seen today for hypertension and sinusitis. Diagnoses and all orders for this visit:    Acute non-recurrent frontal sinusitis    Essential hypertension    Stomach discomfort            No follow-ups on file. Vicki Peña DO    Note was generated with the assistance of voice recognition software. Document was reviewed however may contain grammatical errors.

## 2019-05-30 ENCOUNTER — OFFICE VISIT (OUTPATIENT)
Dept: FAMILY MEDICINE CLINIC | Age: 84
End: 2019-05-30
Payer: MEDICARE

## 2019-05-30 VITALS
RESPIRATION RATE: 18 BRPM | SYSTOLIC BLOOD PRESSURE: 134 MMHG | BODY MASS INDEX: 25.87 KG/M2 | DIASTOLIC BLOOD PRESSURE: 80 MMHG | WEIGHT: 191 LBS | HEIGHT: 72 IN | HEART RATE: 78 BPM | TEMPERATURE: 98.7 F

## 2019-05-30 DIAGNOSIS — J01.41 ACUTE RECURRENT PANSINUSITIS: Primary | ICD-10-CM

## 2019-05-30 PROCEDURE — 1123F ACP DISCUSS/DSCN MKR DOCD: CPT | Performed by: FAMILY MEDICINE

## 2019-05-30 PROCEDURE — 1036F TOBACCO NON-USER: CPT | Performed by: FAMILY MEDICINE

## 2019-05-30 PROCEDURE — G8427 DOCREV CUR MEDS BY ELIG CLIN: HCPCS | Performed by: FAMILY MEDICINE

## 2019-05-30 PROCEDURE — 4040F PNEUMOC VAC/ADMIN/RCVD: CPT | Performed by: FAMILY MEDICINE

## 2019-05-30 PROCEDURE — G8419 CALC BMI OUT NRM PARAM NOF/U: HCPCS | Performed by: FAMILY MEDICINE

## 2019-05-30 PROCEDURE — 99213 OFFICE O/P EST LOW 20 MIN: CPT | Performed by: FAMILY MEDICINE

## 2019-05-30 RX ORDER — DOXYCYCLINE HYCLATE 100 MG/1
100 CAPSULE ORAL 2 TIMES DAILY
Qty: 14 CAPSULE | Refills: 0 | Status: SHIPPED | OUTPATIENT
Start: 2019-05-30 | End: 2019-06-06

## 2019-05-30 NOTE — PROGRESS NOTES
19  Debi Isaac : 1935 Sex: male  Age: 81 y.o. Chief Complaint   Patient presents with    Congestion     x  2 week    Headache     x 2 week       HPI: : URI  The patient states that they have had rhinorrhea, nausea, congestion for the last 14 days. Cough is improved. The patient also reports nasal congestion and min sore throat. Positive for nasal discharge. The patient has had general malaise. No fever or chills but has felt warm at times. No CP. No dyspnea. No vomiting or diarrhea. Completed abx about 1 week ago with mild improvement. The patient presents for evaluation. ROS:  Const: Positives and pertinent negatives as per HPI. All others reviewed and are negative. Current Outpatient Medications:     doxycycline hyclate (VIBRAMYCIN) 100 MG capsule, Take 1 capsule by mouth 2 times daily for 7 days, Disp: 14 capsule, Rfl: 0    lisinopril-hydrochlorothiazide (PRINZIDE;ZESTORETIC) 10-12.5 MG per tablet, Take 1 tablet by mouth daily, Disp: 30 tablet, Rfl: 5    aspirin 81 MG tablet, Take 81 mg by mouth daily, Disp: , Rfl:     mirtazapine (REMERON) 15 MG tablet, Take 7.5 mg by mouth nightly , Disp: , Rfl: 3    pantoprazole sodium (PROTONIX) 40 MG PACK packet, Take 40 mg by mouth every morning (before breakfast) Instructed to take morning of surgery with a sip of water, Disp: , Rfl:     buPROPion (WELLBUTRIN XL) 150 MG extended release tablet, Take 1 tablet by mouth daily, Disp: 30 tablet, Rfl: 0    amLODIPine (NORVASC) 5 MG tablet, Take 5 mg by mouth daily Instructed to take morning of surgery with a sip of water, Disp: , Rfl:   No Known Allergies    Past Medical History:   Diagnosis Date    Aneurysm of right renal artery ([facility] [address] 10/03/2018    follows with Dr. Isaiah Morris yearly    Depression     GERD (gastroesophageal reflux disease)     Hyperlipidemia     Hypertension     TIA (transient ischemic attack)     last episode ?     Wears dentures     upper partial plate Past Surgical History:   Procedure Laterality Date    ANTERIOR CRUCIATE LIGAMENT REPAIR Left 11/21/2001    APPENDECTOMY      CHOLECYSTECTOMY  2007    Lap    ECHO COMPL W DOP COLOR FLOW  12/4/2012         HERNIA REPAIR Right 11/13/2002    double    SINUS SURGERY  2002,2003     done x 2    TONSILLECTOMY      TOTAL KNEE ARTHROPLASTY Left 1/28/2019    LEFT  ROBOTIC KNEE TOTAL ARTHROPLASTY  ++MEREDITH- [id number]++   ++ADDUCTOR BLOCK++ performed by Chelsie Wong MD at [facility] OR     No family history on file.   Social History     Socioeconomic History    Marital status:      Spouse name: Not on file    Number of children: Not on file    Years of education: Not on file    Highest education level: Not on file   Occupational History    Not on file   Social Needs    Financial resource strain: Not on file    Food insecurity:     Worry: Not on file     Inability: Not on file    Transportation needs:     Medical: Not on file     Non-medical: Not on file   Tobacco Use    Smoking status: Never Smoker    Smokeless tobacco: Never Used   Substance and Sexual Activity    Alcohol use: No    Drug use: No    Sexual activity: Not on file   Lifestyle    Physical activity:     Days per week: Not on file     Minutes per session: Not on file    Stress: Not on file   Relationships    Social connections:     Talks on phone: Not on file     Gets together: Not on file     Attends Hoahaoism service: Not on file     Active member of club or organization: Not on file     Attends meetings of clubs or organizations: Not on file     Relationship status: Not on file    Intimate partner violence:     Fear of current or ex partner: Not on file     Emotionally abused: Not on file     Physically abused: Not on file     Forced sexual activity: Not on file   Other Topics Concern    Not on file   Social History Narrative    Not on file         Vitals:    05/30/19 1515   BP: 134/80   Pulse: 78   Resp: 18   Temp: 98.7 °F (37.1 °C)   Weight: 191 lb (86.6 kg)   Height: 6' (1.829 m)       Exam:  Physical Exam   Constitutional: He appears well-developed. HENT:   Head: Normocephalic. Right Ear: Tympanic membrane normal.   Left Ear: Tympanic membrane normal.   Nose: Mucosal edema and rhinorrhea present. Right sinus exhibits maxillary sinus tenderness and frontal sinus tenderness. Left sinus exhibits maxillary sinus tenderness and frontal sinus tenderness. Mouth/Throat: Posterior oropharyngeal erythema present. No oropharyngeal exudate or posterior oropharyngeal edema. Eyes: Pupils are equal, round, and reactive to light. Conjunctivae are normal.   Cardiovascular: Normal rate, regular rhythm and normal heart sounds. No murmur heard. Pulmonary/Chest: Effort normal and breath sounds normal. He has no wheezes. He has no rales. Abdominal: Soft. Bowel sounds are normal. There is no tenderness. Lymphadenopathy:     He has no cervical adenopathy. Neurological: He is alert. Psychiatric: He has a normal mood and affect. Assessment and Plan:  Payam Jeffery was seen today for congestion and headache. Diagnoses and all orders for this visit:    Acute recurrent pansinusitis  -     doxycycline hyclate (VIBRAMYCIN) 100 MG capsule; Take 1 capsule by mouth 2 times daily for 7 days    Patient advised that this may be seasonal allergies. Advised on OTC mng for such. Return in 1 week (on 6/6/2019) for Recheck. The above treatment regimen was discussed at length and all questions in regards to such were answered. Antibiotics were reviewed including common side effects and rare side effects including but not limited to C. diff infection. Patient is to proceed to the emergency department with any worsening symptoms. Patient should follow-up with her family doctor within the next 3-5 days.     Seen By:   Oziel Alcantar MD

## 2019-06-10 ENCOUNTER — HOSPITAL ENCOUNTER (OUTPATIENT)
Age: 84
Discharge: HOME | End: 2019-06-10
Payer: MEDICARE

## 2019-06-10 DIAGNOSIS — R10.9 STOMACH DISCOMFORT: ICD-10-CM

## 2019-06-10 DIAGNOSIS — J01.10 ACUTE NON-RECURRENT FRONTAL SINUSITIS: ICD-10-CM

## 2019-06-10 DIAGNOSIS — I10 ESSENTIAL HYPERTENSION: ICD-10-CM

## 2019-06-10 LAB
ALBUMIN SERPL-MCNC: 4 G/DL (ref 3.5–5.2)
ALP BLD-CCNC: 80 U/L (ref 40–129)
ALT SERPL-CCNC: 10 U/L (ref 0–40)
ANION GAP SERPL CALCULATED.3IONS-SCNC: 9 MMOL/L (ref 7–16)
AST SERPL-CCNC: 14 U/L (ref 0–39)
BASOPHILS ABSOLUTE: 0.03 E9/L (ref 0–0.2)
BASOPHILS RELATIVE PERCENT: 0.5 % (ref 0–2)
BILIRUB SERPL-MCNC: 0.4 MG/DL (ref 0–1.2)
BUN BLDV-MCNC: 9 MG/DL (ref 8–23)
CALCIUM SERPL-MCNC: 9.2 MG/DL (ref 8.6–10.2)
CHLORIDE BLD-SCNC: 102 MMOL/L (ref 98–107)
CHOLESTEROL, TOTAL: 155 MG/DL (ref 0–199)
CO2: 33 MMOL/L (ref 22–29)
CREAT SERPL-MCNC: 0.9 MG/DL (ref 0.7–1.2)
EOSINOPHILS ABSOLUTE: 0.12 E9/L (ref 0.05–0.5)
EOSINOPHILS RELATIVE PERCENT: 2.1 % (ref 0–6)
GFR AFRICAN AMERICAN: >60
GFR NON-AFRICAN AMERICAN: >60 ML/MIN/1.73
GLUCOSE BLD-MCNC: 94 MG/DL (ref 74–99)
HCT VFR BLD CALC: 42.1 % (ref 37–54)
HDLC SERPL-MCNC: 47 MG/DL
HEMOGLOBIN: 13.3 G/DL (ref 12.5–16.5)
IMMATURE GRANULOCYTES #: 0.03 E9/L
IMMATURE GRANULOCYTES %: 0.5 % (ref 0–5)
LDL CHOLESTEROL CALCULATED: 99 MG/DL (ref 0–99)
LYMPHOCYTES ABSOLUTE: 1.52 E9/L (ref 1.5–4)
LYMPHOCYTES RELATIVE PERCENT: 26.5 % (ref 20–42)
MCH RBC QN AUTO: 28.4 PG (ref 26–35)
MCHC RBC AUTO-ENTMCNC: 31.6 % (ref 32–34.5)
MCV RBC AUTO: 90 FL (ref 80–99.9)
MONOCYTES ABSOLUTE: 0.52 E9/L (ref 0.1–0.95)
MONOCYTES RELATIVE PERCENT: 9.1 % (ref 2–12)
NEUTROPHILS ABSOLUTE: 3.52 E9/L (ref 1.8–7.3)
NEUTROPHILS RELATIVE PERCENT: 61.3 % (ref 43–80)
PDW BLD-RTO: 14.1 FL (ref 11.5–15)
PLATELET # BLD: 341 E9/L (ref 130–450)
PMV BLD AUTO: 10.5 FL (ref 7–12)
POTASSIUM SERPL-SCNC: 3.8 MMOL/L (ref 3.5–5)
RBC # BLD: 4.68 E12/L (ref 3.8–5.8)
SODIUM BLD-SCNC: 144 MMOL/L (ref 132–146)
T4 TOTAL: 6.6 MCG/DL (ref 4.5–11.7)
TOTAL PROTEIN: 7.1 G/DL (ref 6.4–8.3)
TRIGL SERPL-MCNC: 45 MG/DL (ref 0–149)
TSH SERPL DL<=0.05 MIU/L-ACNC: 2.37 UIU/ML (ref 0.27–4.2)
VLDLC SERPL CALC-MCNC: 9 MG/DL
WBC # BLD: 5.7 E9/L (ref 4.5–11.5)

## 2019-06-10 PROCEDURE — 80061 LIPID PANEL: CPT

## 2019-06-10 PROCEDURE — 36415 COLL VENOUS BLD VENIPUNCTURE: CPT

## 2019-06-10 PROCEDURE — 84443 ASSAY THYROID STIM HORMONE: CPT

## 2019-06-10 PROCEDURE — 84436 ASSAY OF TOTAL THYROXINE: CPT

## 2019-06-10 PROCEDURE — 80053 COMPREHEN METABOLIC PANEL: CPT

## 2019-06-10 PROCEDURE — 85025 COMPLETE CBC W/AUTO DIFF WBC: CPT

## 2019-06-13 ENCOUNTER — OFFICE VISIT (OUTPATIENT)
Dept: PRIMARY CARE CLINIC | Age: 84
End: 2019-06-13
Payer: MEDICARE

## 2019-06-13 VITALS
HEART RATE: 89 BPM | SYSTOLIC BLOOD PRESSURE: 142 MMHG | WEIGHT: 195 LBS | DIASTOLIC BLOOD PRESSURE: 76 MMHG | OXYGEN SATURATION: 97 % | BODY MASS INDEX: 26.45 KG/M2 | TEMPERATURE: 99 F

## 2019-06-13 DIAGNOSIS — E78.2 MIXED HYPERLIPIDEMIA: Chronic | ICD-10-CM

## 2019-06-13 DIAGNOSIS — I10 ESSENTIAL HYPERTENSION: Chronic | ICD-10-CM

## 2019-06-13 DIAGNOSIS — J01.01 ACUTE RECURRENT MAXILLARY SINUSITIS: Primary | ICD-10-CM

## 2019-06-13 DIAGNOSIS — Z96.652 STATUS POST LEFT KNEE REPLACEMENT: ICD-10-CM

## 2019-06-13 PROCEDURE — 99214 OFFICE O/P EST MOD 30 MIN: CPT | Performed by: FAMILY MEDICINE

## 2019-06-13 RX ORDER — CEFDINIR 300 MG/1
300 CAPSULE ORAL 2 TIMES DAILY
Qty: 20 CAPSULE | Refills: 0 | Status: SHIPPED | OUTPATIENT
Start: 2019-06-13 | End: 2019-06-23

## 2019-06-13 RX ORDER — PREDNISONE 1 MG/1
TABLET ORAL
Qty: 30 TABLET | Refills: 0 | Status: SHIPPED | OUTPATIENT
Start: 2019-06-13 | End: 2019-06-28 | Stop reason: CLARIF

## 2019-06-13 RX ORDER — PANTOPRAZOLE SODIUM 40 MG/1
TABLET, DELAYED RELEASE ORAL
Refills: 5 | COMMUNITY
Start: 2019-06-08 | End: 2019-06-28 | Stop reason: SDUPTHER

## 2019-06-13 ASSESSMENT — ENCOUNTER SYMPTOMS
SINUS PAIN: 1
GASTROINTESTINAL NEGATIVE: 1
RESPIRATORY NEGATIVE: 1
EYES NEGATIVE: 1
SINUS PRESSURE: 1
ALLERGIC/IMMUNOLOGIC NEGATIVE: 1

## 2019-06-13 NOTE — PROGRESS NOTES
19     Nay Carey    : 1935 Sex: male   Age: 81 y.o. Chief Complaint   Patient presents with    Discuss Labs    Hypertension    Carotid Disease    Sinusitis     alot of headaches. Using nedi pot at home       Prior to Admission medications    Medication Sig Start Date End Date Taking? Authorizing Provider   pantoprazole (PROTONIX) 40 MG tablet TK 1 T PO QD 19  Yes Historical Provider, MD   cefdinir (OMNICEF) 300 MG capsule Take 1 capsule by mouth 2 times daily for 10 days 19 Yes Mikayla Villa DO   predniSONE (DELTASONE) 5 MG tablet 4 tablets daily for 3 days then 3 tablets daily for 3 days then 2 tablets daily for 3 days then 1 tablet daily for 3 days 19  Yes Mikayla Villa DO   lisinopril-hydrochlorothiazide (PRINZIDE;ZESTORETIC) 10-12.5 MG per tablet Take 1 tablet by mouth daily 19  Yes Mikayla Villa DO   aspirin 81 MG tablet Take 81 mg by mouth daily   Yes Historical Provider, MD   buPROPion (WELLBUTRIN XL) 150 MG extended release tablet Take 1 tablet by mouth daily 17  Yes Mariza Amaya MD   amLODIPine (NORVASC) 5 MG tablet Take 5 mg by mouth daily Instructed to take morning of surgery with a sip of water   Yes Historical Provider, MD          HPI: . Seen today in medical follow-up hypertensive disease good control hyperlipidemia stable. Today with continued frontal maxillary sinus pressure. Has been on doxycycline as well as Zithromax without relief. Recommended CT scan of the sinuses. Placed on Cefdinir and prednisone. Will follow-up x-ray results. Mild stomach upset I believe related to doxycycline as well as use of probiotics. Discontinued both. Review of Systems   Constitutional: Negative. HENT: Positive for congestion, sinus pressure and sinus pain. Eyes: Negative. Respiratory: Negative. Gastrointestinal: Negative. Endocrine: Negative. Genitourinary: Negative. Musculoskeletal: Negative.     Skin: (transient ischemic attack)     last episode 2006?  Wears dentures     upper partial plate       Vitals:    06/13/19 1532 06/13/19 1535   BP: (!) 150/78 (!) 142/76   Pulse: 89    Temp: 99 °F (37.2 °C)    SpO2: 97%    Weight: 195 lb (88.5 kg)      BP Readings from Last 3 Encounters:   06/13/19 (!) 142/76   05/30/19 134/80   05/14/19 136/72        Physical Exam   Constitutional: He is oriented to person, place, and time. He appears well-developed and well-nourished. HENT:   Head: Normocephalic. Right Ear: External ear normal.   Left Ear: External ear normal.   Nose: Nose normal.   Mouth/Throat: Oropharynx is clear and moist.   Tenderness frontal maxillary sinus   Eyes: Pupils are equal, round, and reactive to light. Conjunctivae and EOM are normal.   Neck: Normal range of motion. Neck supple. Cardiovascular: Normal rate and intact distal pulses. Pulmonary/Chest: Breath sounds normal.   Abdominal: Soft. Bowel sounds are normal.   Musculoskeletal: Normal range of motion. Neurological: He is alert and oriented to person, place, and time. Skin: Skin is warm and dry. Psychiatric: He has a normal mood and affect. His behavior is normal.   Nursing note and vitals reviewed. URI sinusitis as noted. Follow-up CT scan. Treatment as noted. Will reassess in July. Labs reviewed all currently stable.     Lab Results   Component Value Date    TSH 2.370 06/10/2019    TSH 1.200 01/22/2017    O0ZVFNS 6.6 06/10/2019    T4FREE 1.10 01/22/2017     Lab Results   Component Value Date    CHOL 155 06/10/2019     Lab Results   Component Value Date    TRIG 45 06/10/2019     Lab Results   Component Value Date    HDL 47 06/10/2019     No results found for: [facility]  Lab Results   Component Value Date    LABVLDL 9 06/10/2019     No results found for: [facility]  Lab Results   Component Value Date    WBC 5.7 06/10/2019    HGB 13.3 06/10/2019    HCT 42.1 06/10/2019    MCV 90.0 06/10/2019     06/10/2019    LYMPHOPCT 26.5 06/10/2019    RBC 4.68 06/10/2019    MCH 28.4 06/10/2019    MCHC 31.6 (L) 06/10/2019    RDW 14.1 06/10/2019     Lab Results   Component Value Date     06/10/2019    K 3.8 06/10/2019     06/10/2019    CO2 33 (H) 06/10/2019    BUN 9 06/10/2019    CREATININE 0.9 06/10/2019    GLUCOSE 94 06/10/2019    CALCIUM 9.2 06/10/2019    PROT 7.1 06/10/2019    LABALBU 4.0 06/10/2019    BILITOT 0.4 06/10/2019    ALKPHOS 80 06/10/2019    AST 14 06/10/2019    ALT 10 06/10/2019    LABGLOM >60 06/10/2019    GFRAA >60 06/10/2019        Lab Results   Component Value Date    PSA 2.86 04/24/2019                     Plan Per Assessment:  Jumana Reyes was seen today for discuss labs, hypertension, carotid disease and sinusitis. Diagnoses and all orders for this visit:    Acute recurrent maxillary sinusitis  -     CT SINUS WO CONTRAST; Future    Essential hypertension    Mixed hyperlipidemia    Status post left knee replacement    Other orders  -     cefdinir (OMNICEF) 300 MG capsule; Take 1 capsule by mouth 2 times daily for 10 days  -     predniSONE (DELTASONE) 5 MG tablet; 4 tablets daily for 3 days then 3 tablets daily for 3 days then 2 tablets daily for 3 days then 1 tablet daily for 3 days            No follow-ups on file. [place]otah Life, DO    Note was generated with the assistance of voice recognition software. Document was reviewed however may contain grammatical errors.

## 2019-06-21 DIAGNOSIS — J01.01 ACUTE RECURRENT MAXILLARY SINUSITIS: ICD-10-CM

## 2019-06-28 ENCOUNTER — HOSPITAL ENCOUNTER (OUTPATIENT)
Age: 84
Discharge: HOME | End: 2019-06-30
Payer: MEDICARE

## 2019-06-28 ENCOUNTER — OFFICE VISIT (OUTPATIENT)
Dept: PRIMARY CARE CLINIC | Age: 84
End: 2019-06-28
Payer: MEDICARE

## 2019-06-28 VITALS
DIASTOLIC BLOOD PRESSURE: 80 MMHG | TEMPERATURE: 98.7 F | SYSTOLIC BLOOD PRESSURE: 138 MMHG | BODY MASS INDEX: 26.18 KG/M2 | WEIGHT: 193 LBS | OXYGEN SATURATION: 97 % | HEART RATE: 73 BPM

## 2019-06-28 DIAGNOSIS — R51.9 NONINTRACTABLE HEADACHE, UNSPECIFIED CHRONICITY PATTERN, UNSPECIFIED HEADACHE TYPE: ICD-10-CM

## 2019-06-28 DIAGNOSIS — M54.41 MIDLINE LOW BACK PAIN WITH RIGHT-SIDED SCIATICA, UNSPECIFIED CHRONICITY: Primary | ICD-10-CM

## 2019-06-28 DIAGNOSIS — I10 ESSENTIAL HYPERTENSION: ICD-10-CM

## 2019-06-28 DIAGNOSIS — R31.9 HEMATURIA, UNSPECIFIED TYPE: ICD-10-CM

## 2019-06-28 LAB
BILIRUBIN, POC: NORMAL
BLOOD URINE, POC: NORMAL
CLARITY, POC: CLEAR
COLOR, POC: YELLOW
GLUCOSE URINE, POC: NORMAL
KETONES, POC: NORMAL
LEUKOCYTE EST, POC: NORMAL
NITRITE, POC: NORMAL
PH, POC: 7
PROTEIN, POC: NORMAL
SPECIFIC GRAVITY, POC: 1.01
UROBILINOGEN, POC: 0.2

## 2019-06-28 PROCEDURE — 81002 URINALYSIS NONAUTO W/O SCOPE: CPT | Performed by: FAMILY MEDICINE

## 2019-06-28 PROCEDURE — 99214 OFFICE O/P EST MOD 30 MIN: CPT | Performed by: FAMILY MEDICINE

## 2019-06-28 PROCEDURE — 87088 URINE BACTERIA CULTURE: CPT

## 2019-06-28 RX ORDER — PANTOPRAZOLE SODIUM 40 MG/1
TABLET, DELAYED RELEASE ORAL
Qty: 90 TABLET | Refills: 3 | Status: SHIPPED
Start: 2019-06-28 | End: 2020-06-03 | Stop reason: SDUPTHER

## 2019-06-28 RX ORDER — IBUPROFEN 800 MG/1
800 TABLET ORAL
Qty: 90 TABLET | Refills: 5 | Status: SHIPPED | OUTPATIENT
Start: 2019-06-28 | End: 2019-10-07

## 2019-06-28 ASSESSMENT — ENCOUNTER SYMPTOMS
GASTROINTESTINAL NEGATIVE: 1
EYES NEGATIVE: 1
RESPIRATORY NEGATIVE: 1
ALLERGIC/IMMUNOLOGIC NEGATIVE: 1

## 2019-06-28 NOTE — PROGRESS NOTES
19     Donald Hines    : 1935 Sex: male   Age: 81 y.o. Chief Complaint   Patient presents with    Hypertension    Sinus Problem     review Ct scan still no improvement.  Flank Pain     last few days       Prior to Admission medications    Medication Sig Start Date End Date Taking? Authorizing Provider   pantoprazole (PROTONIX) 40 MG tablet TK 1 T PO QD 19  Yes Lenn Drain Traikoff, DO   lisinopril-hydrochlorothiazide (PRINZIDE;ZESTORETIC) 10-12.5 MG per tablet Take 1 tablet by mouth daily 19  Yes Lenn Drain Traikoff, DO   aspirin 81 MG tablet Take 81 mg by mouth daily   Yes Historical Provider, MD   buPROPion (WELLBUTRIN XL) 150 MG extended release tablet Take 1 tablet by mouth daily 17  Yes Any Thompson MD   amLODIPine (NORVASC) 5 MG tablet Take 5 mg by mouth daily Instructed to take morning of surgery with a sip of water   Yes Historical Provider, MD          HPI:           Review of Systems   Constitutional: Negative. HENT: Positive for congestion. Eyes: Negative. Respiratory: Negative. Gastrointestinal: Negative. Endocrine: Negative. Genitourinary: Negative. Musculoskeletal: Positive for arthralgias. Skin: Negative. Allergic/Immunologic: Negative. Neurological: Positive for headaches. Hematological: Negative. Psychiatric/Behavioral: Negative.                Current Outpatient Medications:     pantoprazole (PROTONIX) 40 MG tablet, TK 1 T PO QD, Disp: 90 tablet, Rfl: 3    lisinopril-hydrochlorothiazide (PRINZIDE;ZESTORETIC) 10-12.5 MG per tablet, Take 1 tablet by mouth daily, Disp: 30 tablet, Rfl: 5    aspirin 81 MG tablet, Take 81 mg by mouth daily, Disp: , Rfl:     buPROPion (WELLBUTRIN XL) 150 MG extended release tablet, Take 1 tablet by mouth daily, Disp: 30 tablet, Rfl: 0    amLODIPine (NORVASC) 5 MG tablet, Take 5 mg by mouth daily Instructed to take morning of surgery with a sip of water, Disp: , Rfl:     No Known Allergies    Social History     Tobacco Use    Smoking status: Never Smoker    Smokeless tobacco: Never Used   Substance Use Topics    Alcohol use: No    Drug use: No      Past Surgical History:   Procedure Laterality Date    ANTERIOR CRUCIATE LIGAMENT REPAIR Left 11/21/2001    APPENDECTOMY      CHOLECYSTECTOMY  2007    Lap    ECHO COMPL W DOP COLOR FLOW  12/4/2012         HERNIA REPAIR Right 11/13/2002    double    SINUS SURGERY  2002,2003     done x 2    TONSILLECTOMY      TOTAL KNEE ARTHROPLASTY Left 1/28/2019    LEFT  ROBOTIC KNEE TOTAL ARTHROPLASTY  ++MEREDITH- [id number]++   ++ADDUCTOR BLOCK++ performed by Estefania Lugo MD at [facility] OR     No family history on file. Past Medical History:   Diagnosis Date    Aneurysm of right renal artery ([address] 10/03/2018    follows with Dr. Victorina Garcia yearly    Depression     GERD (gastroesophageal reflux disease)     Hyperlipidemia     Hypertension     TIA (transient ischemic attack)     last episode 2006?  Wears dentures     upper partial plate       Vitals:    06/28/19 1137 06/28/19 1140   BP: (!) 146/72 138/80   Pulse: 73    Temp: 98.7 °F (37.1 °C)    TempSrc: Temporal    SpO2: 97%    Weight: 193 lb (87.5 kg)      BP Readings from Last 3 Encounters:   06/28/19 138/80   06/13/19 (!) 142/76   05/30/19 134/80        Physical Exam   Constitutional: He is oriented to person, place, and time. He appears well-developed and well-nourished. HENT:   Head: Normocephalic. Right Ear: External ear normal.   Left Ear: External ear normal.   Nose: Nose normal.   Mouth/Throat: Oropharynx is clear and moist.   Eyes: Pupils are equal, round, and reactive to light. Conjunctivae and EOM are normal.   Neck: Normal range of motion. Neck supple. Cardiovascular: Normal rate and intact distal pulses. Pulmonary/Chest: Breath sounds normal.   Abdominal: Soft. Bowel sounds are normal.   Musculoskeletal: Normal range of motion.    Neurological: He is alert and oriented to person, place, and time. Skin: Skin is warm and dry. Psychiatric: He has a normal mood and affect. His behavior is normal.   Nursing note and vitals reviewed. Lab Results   Component Value Date    TSH 2.370 06/10/2019    TSH 1.200 01/22/2017    K2SYUGV 6.6 06/10/2019    T4FREE 1.10 01/22/2017     Lab Results   Component Value Date    CHOL 155 06/10/2019     Lab Results   Component Value Date    TRIG 45 06/10/2019     Lab Results   Component Value Date    HDL 47 06/10/2019     No results found for: [facility]  Lab Results   Component Value Date    LABVLDL 9 06/10/2019     No results found for: [facility]  Lab Results   Component Value Date    WBC 5.7 06/10/2019    HGB 13.3 06/10/2019    HCT 42.1 06/10/2019    MCV 90.0 06/10/2019     06/10/2019    LYMPHOPCT 26.5 06/10/2019    RBC 4.68 06/10/2019    MCH 28.4 06/10/2019    MCHC 31.6 (L) 06/10/2019    RDW 14.1 06/10/2019     Lab Results   Component Value Date     06/10/2019    K 3.8 06/10/2019     06/10/2019    CO2 33 (H) 06/10/2019    BUN 9 06/10/2019    CREATININE 0.9 06/10/2019    GLUCOSE 94 06/10/2019    CALCIUM 9.2 06/10/2019    PROT 7.1 06/10/2019    LABALBU 4.0 06/10/2019    BILITOT 0.4 06/10/2019    ALKPHOS 80 06/10/2019    AST 14 06/10/2019    ALT 10 06/10/2019    LABGLOM >60 06/10/2019    GFRAA >60 06/10/2019        Lab Results   Component Value Date    PSA 2.86 04/24/2019      No results found for: LABA1C  No results found for: EAG     Plan Per Assessment:  Andrew Waite was seen today for hypertension, sinus problem and flank pain. Diagnoses and all orders for this visit:    Midline low back pain with right-sided sciatica, unspecified chronicity  -     POCT Urinalysis no Micro    Hematuria, unspecified type  -     URINE CULTURE; Future    Other orders  -     pantoprazole (PROTONIX) 40 MG tablet; TK 1 T PO QD            No follow-ups on file.       Cristobal Stagers, DO    Note was generated with the assistance of voice recognition software. Document was reviewed however may contain grammatical errors.

## 2019-07-01 LAB — URINE CULTURE, ROUTINE: NORMAL

## 2019-07-15 ENCOUNTER — HOSPITAL ENCOUNTER (OUTPATIENT)
Age: 84
Discharge: HOME | End: 2019-07-17
Payer: MEDICARE

## 2019-07-15 ENCOUNTER — OFFICE VISIT (OUTPATIENT)
Dept: PRIMARY CARE CLINIC | Age: 84
End: 2019-07-15
Payer: MEDICARE

## 2019-07-15 VITALS
OXYGEN SATURATION: 98 % | HEART RATE: 80 BPM | WEIGHT: 193 LBS | SYSTOLIC BLOOD PRESSURE: 142 MMHG | BODY MASS INDEX: 26.18 KG/M2 | DIASTOLIC BLOOD PRESSURE: 78 MMHG

## 2019-07-15 DIAGNOSIS — E78.2 MIXED HYPERLIPIDEMIA: Chronic | ICD-10-CM

## 2019-07-15 DIAGNOSIS — I10 ESSENTIAL HYPERTENSION: Primary | Chronic | ICD-10-CM

## 2019-07-15 DIAGNOSIS — R19.7 DIARRHEA, UNSPECIFIED TYPE: ICD-10-CM

## 2019-07-15 DIAGNOSIS — R10.31 RIGHT LOWER QUADRANT ABDOMINAL PAIN: ICD-10-CM

## 2019-07-15 LAB
ALBUMIN SERPL-MCNC: 4.1 G/DL (ref 3.5–5.2)
ALP BLD-CCNC: 89 U/L (ref 40–129)
ALT SERPL-CCNC: 13 U/L (ref 0–40)
AMYLASE: 61 U/L (ref 20–100)
ANION GAP SERPL CALCULATED.3IONS-SCNC: 12 MMOL/L (ref 7–16)
AST SERPL-CCNC: 17 U/L (ref 0–39)
BASOPHILS ABSOLUTE: 0.03 E9/L (ref 0–0.2)
BASOPHILS RELATIVE PERCENT: 0.5 % (ref 0–2)
BILIRUB SERPL-MCNC: 0.4 MG/DL (ref 0–1.2)
BUN BLDV-MCNC: 9 MG/DL (ref 8–23)
CALCIUM SERPL-MCNC: 9.5 MG/DL (ref 8.6–10.2)
CHLORIDE BLD-SCNC: 97 MMOL/L (ref 98–107)
CO2: 31 MMOL/L (ref 22–29)
CREAT SERPL-MCNC: 0.9 MG/DL (ref 0.7–1.2)
EOSINOPHILS ABSOLUTE: 0.14 E9/L (ref 0.05–0.5)
EOSINOPHILS RELATIVE PERCENT: 2.5 % (ref 0–6)
GFR AFRICAN AMERICAN: >60
GFR NON-AFRICAN AMERICAN: >60 ML/MIN/1.73
GLUCOSE BLD-MCNC: 100 MG/DL (ref 74–99)
HCT VFR BLD CALC: 43.7 % (ref 37–54)
HEMOGLOBIN: 14.1 G/DL (ref 12.5–16.5)
IMMATURE GRANULOCYTES #: 0.02 E9/L
IMMATURE GRANULOCYTES %: 0.4 % (ref 0–5)
LIPASE: 17 U/L (ref 13–60)
LYMPHOCYTES ABSOLUTE: 1.58 E9/L (ref 1.5–4)
LYMPHOCYTES RELATIVE PERCENT: 28.4 % (ref 20–42)
MCH RBC QN AUTO: 29 PG (ref 26–35)
MCHC RBC AUTO-ENTMCNC: 32.3 % (ref 32–34.5)
MCV RBC AUTO: 89.9 FL (ref 80–99.9)
MONOCYTES ABSOLUTE: 0.72 E9/L (ref 0.1–0.95)
MONOCYTES RELATIVE PERCENT: 12.9 % (ref 2–12)
NEUTROPHILS ABSOLUTE: 3.08 E9/L (ref 1.8–7.3)
NEUTROPHILS RELATIVE PERCENT: 55.3 % (ref 43–80)
PDW BLD-RTO: 13.4 FL (ref 11.5–15)
PLATELET # BLD: 404 E9/L (ref 130–450)
PMV BLD AUTO: 10.5 FL (ref 7–12)
POTASSIUM SERPL-SCNC: 4.1 MMOL/L (ref 3.5–5)
RBC # BLD: 4.86 E12/L (ref 3.8–5.8)
SEDIMENTATION RATE, ERYTHROCYTE: 15 MM/HR (ref 0–15)
SODIUM BLD-SCNC: 140 MMOL/L (ref 132–146)
TOTAL PROTEIN: 7.6 G/DL (ref 6.4–8.3)
WBC # BLD: 5.6 E9/L (ref 4.5–11.5)

## 2019-07-15 PROCEDURE — 85651 RBC SED RATE NONAUTOMATED: CPT

## 2019-07-15 PROCEDURE — 99214 OFFICE O/P EST MOD 30 MIN: CPT | Performed by: FAMILY MEDICINE

## 2019-07-15 PROCEDURE — 80053 COMPREHEN METABOLIC PANEL: CPT

## 2019-07-15 PROCEDURE — 82150 ASSAY OF AMYLASE: CPT

## 2019-07-15 PROCEDURE — 85025 COMPLETE CBC W/AUTO DIFF WBC: CPT

## 2019-07-15 PROCEDURE — 36415 COLL VENOUS BLD VENIPUNCTURE: CPT

## 2019-07-15 PROCEDURE — 83690 ASSAY OF LIPASE: CPT

## 2019-07-15 RX ORDER — METRONIDAZOLE 250 MG/1
250 TABLET ORAL 3 TIMES DAILY
Qty: 21 TABLET | Refills: 0 | Status: SHIPPED | OUTPATIENT
Start: 2019-07-15 | End: 2019-07-22

## 2019-07-15 ASSESSMENT — ENCOUNTER SYMPTOMS
ABDOMINAL PAIN: 1
RESPIRATORY NEGATIVE: 1
ABDOMINAL DISTENTION: 1
DIARRHEA: 1

## 2019-07-15 NOTE — PROGRESS NOTES
7/15/19     David Wing    : 1935 Sex: male   Age: 81 y.o. Chief Complaint   Patient presents with    Diarrhea     alot of belly burning & has rectal pain after bowel movements. Started worsening last 2-3 weeks    Abdominal Cramping     right side       Prior to Admission medications    Medication Sig Start Date End Date Taking? Authorizing Provider   pantoprazole (PROTONIX) 40 MG tablet TK 1 T PO QD 19  Yes Sweta Villa, DO   ibuprofen (ADVIL;MOTRIN) 800 MG tablet Take 1 tablet by mouth 3 times daily (with meals) 19  Yes Leellen Cheadle, DO   lisinopril-hydrochlorothiazide (PRINZIDE;ZESTORETIC) 10-12.5 MG per tablet Take 1 tablet by mouth daily 19  Yes Sweta Villa,    aspirin 81 MG tablet Take 81 mg by mouth daily   Yes Historical Provider, MD   buPROPion (WELLBUTRIN XL) 150 MG extended release tablet Take 1 tablet by mouth daily 17  Yes Rossana Siddiqui MD   amLODIPine (NORVASC) 5 MG tablet Take 5 mg by mouth daily Instructed to take morning of surgery with a sip of water   Yes Historical Provider, MD          HPI: Patient evaluated today some abdominal pain right lower quadrant etiology remains uncertain occasional intermittent diarrhea denies nausea vomiting or loss of appetite. Symptoms over the past week. History of mild colitis in the past.  Last colonoscopy and upper endoscopy in 2016 stable at that time        Review of Systems   Constitutional: Negative. HENT: Negative. Respiratory: Negative. Cardiovascular: Negative. Gastrointestinal: Positive for abdominal distention, abdominal pain (RLQ pain  ) and diarrhea (intermittent). Genitourinary: Negative. Musculoskeletal: Negative.                Current Outpatient Medications:     pantoprazole (PROTONIX) 40 MG tablet, TK 1 T PO QD, Disp: 90 tablet, Rfl: 3    ibuprofen (ADVIL;MOTRIN) 800 MG tablet, Take 1 tablet by mouth 3 times daily (with meals), Disp: 90 tablet, Rfl: 5  

## 2019-07-18 ENCOUNTER — OFFICE VISIT (OUTPATIENT)
Dept: ORTHOPEDIC SURGERY | Age: 84
End: 2019-07-18
Payer: MEDICARE

## 2019-07-18 VITALS
SYSTOLIC BLOOD PRESSURE: 139 MMHG | HEIGHT: 72 IN | BODY MASS INDEX: 26.14 KG/M2 | TEMPERATURE: 98.5 F | HEART RATE: 74 BPM | DIASTOLIC BLOOD PRESSURE: 85 MMHG | WEIGHT: 193 LBS

## 2019-07-18 DIAGNOSIS — Z96.652 S/P TOTAL KNEE REPLACEMENT, LEFT: Primary | ICD-10-CM

## 2019-07-18 PROCEDURE — G8419 CALC BMI OUT NRM PARAM NOF/U: HCPCS | Performed by: ORTHOPAEDIC SURGERY

## 2019-07-18 PROCEDURE — 4040F PNEUMOC VAC/ADMIN/RCVD: CPT | Performed by: ORTHOPAEDIC SURGERY

## 2019-07-18 PROCEDURE — 99212 OFFICE O/P EST SF 10 MIN: CPT | Performed by: ORTHOPAEDIC SURGERY

## 2019-07-18 PROCEDURE — 1036F TOBACCO NON-USER: CPT | Performed by: ORTHOPAEDIC SURGERY

## 2019-07-18 PROCEDURE — G8427 DOCREV CUR MEDS BY ELIG CLIN: HCPCS | Performed by: ORTHOPAEDIC SURGERY

## 2019-07-18 PROCEDURE — 1123F ACP DISCUSS/DSCN MKR DOCD: CPT | Performed by: ORTHOPAEDIC SURGERY

## 2019-07-29 ENCOUNTER — APPOINTMENT (OUTPATIENT)
Dept: CT IMAGING | Age: 84
End: 2019-07-29
Payer: MEDICARE

## 2019-07-29 ENCOUNTER — HOSPITAL ENCOUNTER (EMERGENCY)
Age: 84
Discharge: HOME | End: 2019-07-30
Attending: EMERGENCY MEDICINE
Payer: MEDICARE

## 2019-07-29 DIAGNOSIS — K56.7 ILEUS (HCC): Primary | ICD-10-CM

## 2019-07-29 LAB
ALBUMIN SERPL-MCNC: 4.3 G/DL (ref 3.5–5.2)
ALP BLD-CCNC: 85 U/L (ref 40–129)
ALT SERPL-CCNC: 20 U/L (ref 0–40)
ANION GAP SERPL CALCULATED.3IONS-SCNC: 14 MMOL/L (ref 7–16)
AST SERPL-CCNC: 19 U/L (ref 0–39)
BACTERIA: ABNORMAL /HPF
BASOPHILS ABSOLUTE: 0.04 E9/L (ref 0–0.2)
BASOPHILS RELATIVE PERCENT: 0.5 % (ref 0–2)
BILIRUB SERPL-MCNC: 0.6 MG/DL (ref 0–1.2)
BILIRUBIN URINE: NEGATIVE
BLOOD, URINE: ABNORMAL
BUN BLDV-MCNC: 12 MG/DL (ref 8–23)
CALCIUM SERPL-MCNC: 9.5 MG/DL (ref 8.6–10.2)
CHLORIDE BLD-SCNC: 97 MMOL/L (ref 98–107)
CLARITY: CLEAR
CO2: 30 MMOL/L (ref 22–29)
COLOR: YELLOW
CREAT SERPL-MCNC: 0.9 MG/DL (ref 0.7–1.2)
EOSINOPHILS ABSOLUTE: 0.12 E9/L (ref 0.05–0.5)
EOSINOPHILS RELATIVE PERCENT: 1.6 % (ref 0–6)
GFR AFRICAN AMERICAN: >60
GFR NON-AFRICAN AMERICAN: >60 ML/MIN/1.73
GLUCOSE BLD-MCNC: 99 MG/DL (ref 74–99)
GLUCOSE URINE: NEGATIVE MG/DL
HCT VFR BLD CALC: 44.2 % (ref 37–54)
HEMOGLOBIN: 14.4 G/DL (ref 12.5–16.5)
IMMATURE GRANULOCYTES #: 0.03 E9/L
IMMATURE GRANULOCYTES %: 0.4 % (ref 0–5)
KETONES, URINE: NEGATIVE MG/DL
LEUKOCYTE ESTERASE, URINE: ABNORMAL
LYMPHOCYTES ABSOLUTE: 1.77 E9/L (ref 1.5–4)
LYMPHOCYTES RELATIVE PERCENT: 23 % (ref 20–42)
MCH RBC QN AUTO: 29.3 PG (ref 26–35)
MCHC RBC AUTO-ENTMCNC: 32.6 % (ref 32–34.5)
MCV RBC AUTO: 89.8 FL (ref 80–99.9)
MONOCYTES ABSOLUTE: 0.8 E9/L (ref 0.1–0.95)
MONOCYTES RELATIVE PERCENT: 10.4 % (ref 2–12)
NEUTROPHILS ABSOLUTE: 4.94 E9/L (ref 1.8–7.3)
NEUTROPHILS RELATIVE PERCENT: 64.1 % (ref 43–80)
NITRITE, URINE: NEGATIVE
PDW BLD-RTO: 13.2 FL (ref 11.5–15)
PH UA: 7 (ref 5–9)
PLATELET # BLD: 409 E9/L (ref 130–450)
PMV BLD AUTO: 9.1 FL (ref 7–12)
POTASSIUM SERPL-SCNC: 3.6 MMOL/L (ref 3.5–5)
PROTEIN UA: NEGATIVE MG/DL
RBC # BLD: 4.92 E12/L (ref 3.8–5.8)
RBC UA: ABNORMAL /HPF (ref 0–2)
SODIUM BLD-SCNC: 141 MMOL/L (ref 132–146)
SPECIFIC GRAVITY UA: <=1.005 (ref 1–1.03)
TOTAL PROTEIN: 7.8 G/DL (ref 6.4–8.3)
UROBILINOGEN, URINE: 0.2 E.U./DL
WBC # BLD: 7.7 E9/L (ref 4.5–11.5)
WBC UA: ABNORMAL /HPF (ref 0–5)

## 2019-07-29 PROCEDURE — 36415 COLL VENOUS BLD VENIPUNCTURE: CPT

## 2019-07-29 PROCEDURE — 74177 CT ABD & PELVIS W/CONTRAST: CPT

## 2019-07-29 PROCEDURE — 81001 URINALYSIS AUTO W/SCOPE: CPT

## 2019-07-29 PROCEDURE — 6360000004 HC RX CONTRAST MEDICATION: Performed by: EMERGENCY MEDICINE

## 2019-07-29 PROCEDURE — 80053 COMPREHEN METABOLIC PANEL: CPT

## 2019-07-29 PROCEDURE — 2580000003 HC RX 258: Performed by: EMERGENCY MEDICINE

## 2019-07-29 PROCEDURE — 99284 EMERGENCY DEPT VISIT MOD MDM: CPT

## 2019-07-29 PROCEDURE — 85025 COMPLETE CBC W/AUTO DIFF WBC: CPT

## 2019-07-29 RX ORDER — 0.9 % SODIUM CHLORIDE 0.9 %
1000 INTRAVENOUS SOLUTION INTRAVENOUS ONCE
Status: COMPLETED | OUTPATIENT
Start: 2019-07-29 | End: 2019-07-30

## 2019-07-29 RX ADMIN — SODIUM CHLORIDE 1000 ML: 9 INJECTION, SOLUTION INTRAVENOUS at 23:08

## 2019-07-29 RX ADMIN — IOPAMIDOL 100 ML: 755 INJECTION, SOLUTION INTRAVENOUS at 23:56

## 2019-07-29 ASSESSMENT — PAIN DESCRIPTION - LOCATION: LOCATION: ABDOMEN

## 2019-07-29 ASSESSMENT — PAIN DESCRIPTION - DESCRIPTORS: DESCRIPTORS: BURNING

## 2019-07-29 ASSESSMENT — PAIN DESCRIPTION - ORIENTATION: ORIENTATION: RIGHT;LOWER

## 2019-07-29 ASSESSMENT — PAIN SCALES - GENERAL: PAINLEVEL_OUTOF10: 6

## 2019-07-30 VITALS
WEIGHT: 190 LBS | RESPIRATION RATE: 16 BRPM | HEIGHT: 72 IN | BODY MASS INDEX: 25.73 KG/M2 | DIASTOLIC BLOOD PRESSURE: 72 MMHG | SYSTOLIC BLOOD PRESSURE: 140 MMHG | TEMPERATURE: 98.2 F | OXYGEN SATURATION: 96 % | HEART RATE: 77 BPM

## 2019-07-30 PROCEDURE — 6360000002 HC RX W HCPCS: Performed by: EMERGENCY MEDICINE

## 2019-07-30 PROCEDURE — 96375 TX/PRO/DX INJ NEW DRUG ADDON: CPT

## 2019-07-30 PROCEDURE — 96374 THER/PROPH/DIAG INJ IV PUSH: CPT

## 2019-07-30 RX ORDER — ONDANSETRON 2 MG/ML
4 INJECTION INTRAMUSCULAR; INTRAVENOUS ONCE
Status: COMPLETED | OUTPATIENT
Start: 2019-07-30 | End: 2019-07-30

## 2019-07-30 RX ORDER — KETOROLAC TROMETHAMINE 30 MG/ML
30 INJECTION, SOLUTION INTRAMUSCULAR; INTRAVENOUS ONCE
Status: COMPLETED | OUTPATIENT
Start: 2019-07-30 | End: 2019-07-30

## 2019-07-30 RX ADMIN — KETOROLAC TROMETHAMINE 30 MG: 30 INJECTION, SOLUTION INTRAMUSCULAR; INTRAVENOUS at 00:32

## 2019-07-30 RX ADMIN — ONDANSETRON HYDROCHLORIDE 4 MG: 2 SOLUTION INTRAMUSCULAR; INTRAVENOUS at 00:32

## 2019-07-30 ASSESSMENT — PAIN SCALES - GENERAL
PAINLEVEL_OUTOF10: 3
PAINLEVEL_OUTOF10: 6

## 2019-08-08 ENCOUNTER — OFFICE VISIT (OUTPATIENT)
Dept: PRIMARY CARE CLINIC | Age: 84
End: 2019-08-08
Payer: MEDICARE

## 2019-08-08 VITALS
HEIGHT: 72 IN | WEIGHT: 193 LBS | DIASTOLIC BLOOD PRESSURE: 64 MMHG | BODY MASS INDEX: 26.14 KG/M2 | TEMPERATURE: 99.9 F | HEART RATE: 89 BPM | OXYGEN SATURATION: 97 % | SYSTOLIC BLOOD PRESSURE: 138 MMHG

## 2019-08-08 DIAGNOSIS — B00.1 COLD SORE: ICD-10-CM

## 2019-08-08 DIAGNOSIS — R10.9 ABDOMINAL CRAMPS: ICD-10-CM

## 2019-08-08 DIAGNOSIS — I72.2 ANEURYSM OF RIGHT RENAL ARTERY (HCC): ICD-10-CM

## 2019-08-08 DIAGNOSIS — I10 ESSENTIAL HYPERTENSION: Primary | Chronic | ICD-10-CM

## 2019-08-08 PROCEDURE — 99214 OFFICE O/P EST MOD 30 MIN: CPT | Performed by: FAMILY MEDICINE

## 2019-08-08 RX ORDER — VALACYCLOVIR HYDROCHLORIDE 500 MG/1
TABLET, FILM COATED ORAL
Qty: 14 TABLET | Refills: 2 | Status: SHIPPED | OUTPATIENT
Start: 2019-08-08 | End: 2019-10-07

## 2019-08-08 RX ORDER — AMLODIPINE BESYLATE 5 MG/1
5 TABLET ORAL DAILY
Qty: 90 TABLET | Refills: 3 | Status: SHIPPED | OUTPATIENT
Start: 2019-08-08 | End: 2019-10-07

## 2019-08-08 RX ORDER — BUPROPION HYDROCHLORIDE 150 MG/1
150 TABLET ORAL DAILY
Qty: 90 TABLET | Refills: 3 | Status: SHIPPED
Start: 2019-08-08 | End: 2020-06-03 | Stop reason: SDUPTHER

## 2019-08-08 ASSESSMENT — ENCOUNTER SYMPTOMS
RECTAL PAIN: 1
ABDOMINAL PAIN: 1

## 2019-08-15 ENCOUNTER — OFFICE VISIT (OUTPATIENT)
Dept: PRIMARY CARE CLINIC | Age: 84
End: 2019-08-15
Payer: MEDICARE

## 2019-08-15 VITALS
BODY MASS INDEX: 26.45 KG/M2 | TEMPERATURE: 98.3 F | DIASTOLIC BLOOD PRESSURE: 90 MMHG | WEIGHT: 195 LBS | SYSTOLIC BLOOD PRESSURE: 178 MMHG

## 2019-08-15 DIAGNOSIS — F32.89 OTHER DEPRESSION: ICD-10-CM

## 2019-08-15 DIAGNOSIS — I72.2 ANEURYSM OF RIGHT RENAL ARTERY (HCC): ICD-10-CM

## 2019-08-15 DIAGNOSIS — E78.2 MIXED HYPERLIPIDEMIA: Chronic | ICD-10-CM

## 2019-08-15 DIAGNOSIS — I10 ESSENTIAL HYPERTENSION: Primary | Chronic | ICD-10-CM

## 2019-08-15 PROBLEM — F32.A DEPRESSION: Status: ACTIVE | Noted: 2019-08-15

## 2019-08-15 PROCEDURE — 99214 OFFICE O/P EST MOD 30 MIN: CPT | Performed by: FAMILY MEDICINE

## 2019-08-15 RX ORDER — LOSARTAN POTASSIUM 50 MG/1
50 TABLET ORAL DAILY
Qty: 30 TABLET | Refills: 3 | Status: SHIPPED | OUTPATIENT
Start: 2019-08-15 | End: 2019-08-15 | Stop reason: SDUPTHER

## 2019-08-15 RX ORDER — LOSARTAN POTASSIUM 50 MG/1
50 TABLET ORAL DAILY
Qty: 90 TABLET | Refills: 3 | Status: SHIPPED | OUTPATIENT
Start: 2019-08-15 | End: 2019-09-30 | Stop reason: SDUPTHER

## 2019-08-15 ASSESSMENT — ENCOUNTER SYMPTOMS
EYES NEGATIVE: 1
ALLERGIC/IMMUNOLOGIC NEGATIVE: 1
RESPIRATORY NEGATIVE: 1
GASTROINTESTINAL NEGATIVE: 1

## 2019-08-15 NOTE — PROGRESS NOTES
extended release tablet, Take 1 tablet by mouth daily, Disp: 90 tablet, Rfl: 3    amLODIPine (NORVASC) 5 MG tablet, Take 1 tablet by mouth daily Instructed to take morning of surgery with a sip of water, Disp: 90 tablet, Rfl: 3    pantoprazole (PROTONIX) 40 MG tablet, TK 1 T PO QD, Disp: 90 tablet, Rfl: 3    ibuprofen (ADVIL;MOTRIN) 800 MG tablet, Take 1 tablet by mouth 3 times daily (with meals), Disp: 90 tablet, Rfl: 5    aspirin 81 MG tablet, Take 81 mg by mouth daily, Disp: , Rfl:     valACYclovir (VALTREX) 500 MG tablet, bid (Patient not taking: Reported on 8/15/2019), Disp: 14 tablet, Rfl: 2    Allergies   Allergen Reactions    Doxycycline      Other reaction(s): DIARRHEA AND ABD PAIN       Social History     Tobacco Use    Smoking status: Never Smoker    Smokeless tobacco: Never Used   Substance Use Topics    Alcohol use: No    Drug use: No      Past Surgical History:   Procedure Laterality Date    ANTERIOR CRUCIATE LIGAMENT REPAIR Left 11/21/2001    APPENDECTOMY      CHOLECYSTECTOMY  2007    Lap    ECHO COMPL W DOP COLOR FLOW  12/4/2012         HERNIA REPAIR Right 11/13/2002    double    SINUS SURGERY  2002,2003     done x 2    TONSILLECTOMY      TOTAL KNEE ARTHROPLASTY Left 1/28/2019    LEFT  ROBOTIC KNEE TOTAL ARTHROPLASTY  ++MEREDITH- [id number]++   ++ADDUCTOR BLOCK++ performed by Blane Lee MD at [facility] OR     No family history on file. Past Medical History:   Diagnosis Date    Aneurysm of right renal artery ([address] 10/03/2018    follows with Dr. Eliseo Colon yearly    Colitis     Depression     GERD (gastroesophageal reflux disease)     Hyperlipidemia     Hypertension     TIA (transient ischemic attack)     last episode 2006?     Wears dentures     upper partial plate       Vitals:    08/15/19 1411 08/15/19 1414   BP: (!) 184/88 (!) 178/90   Site: Left Upper Arm Right Upper Arm   Position: Sitting Sitting   Cuff Size: Medium Adult Medium Adult   Temp: 98.3 °F (36.8 °C)    Weight:

## 2019-08-21 ENCOUNTER — HOSPITAL ENCOUNTER (OUTPATIENT)
Age: 84
Discharge: HOME | End: 2019-08-21
Payer: MEDICARE

## 2019-08-21 DIAGNOSIS — I10 ESSENTIAL HYPERTENSION: Chronic | ICD-10-CM

## 2019-08-21 LAB
ALBUMIN SERPL-MCNC: 4.1 G/DL (ref 3.5–5.2)
ALP BLD-CCNC: 93 U/L (ref 40–129)
ALT SERPL-CCNC: 14 U/L (ref 0–40)
ANION GAP SERPL CALCULATED.3IONS-SCNC: 11 MMOL/L (ref 7–16)
AST SERPL-CCNC: 17 U/L (ref 0–39)
BILIRUB SERPL-MCNC: 0.6 MG/DL (ref 0–1.2)
BUN BLDV-MCNC: 11 MG/DL (ref 8–23)
CALCIUM SERPL-MCNC: 9.6 MG/DL (ref 8.6–10.2)
CHLORIDE BLD-SCNC: 102 MMOL/L (ref 98–107)
CO2: 30 MMOL/L (ref 22–29)
CREAT SERPL-MCNC: 1 MG/DL (ref 0.7–1.2)
GFR AFRICAN AMERICAN: >60
GFR NON-AFRICAN AMERICAN: >60 ML/MIN/1.73
GLUCOSE BLD-MCNC: 102 MG/DL (ref 74–99)
POTASSIUM SERPL-SCNC: 4.1 MMOL/L (ref 3.5–5)
SODIUM BLD-SCNC: 143 MMOL/L (ref 132–146)
TOTAL PROTEIN: 7.7 G/DL (ref 6.4–8.3)

## 2019-08-21 PROCEDURE — 36415 COLL VENOUS BLD VENIPUNCTURE: CPT

## 2019-08-21 PROCEDURE — 80053 COMPREHEN METABOLIC PANEL: CPT

## 2019-08-22 ENCOUNTER — OFFICE VISIT (OUTPATIENT)
Dept: PRIMARY CARE CLINIC | Age: 84
End: 2019-08-22
Payer: MEDICARE

## 2019-08-22 VITALS
SYSTOLIC BLOOD PRESSURE: 150 MMHG | DIASTOLIC BLOOD PRESSURE: 80 MMHG | BODY MASS INDEX: 26.04 KG/M2 | OXYGEN SATURATION: 96 % | WEIGHT: 192 LBS | TEMPERATURE: 98.2 F | HEART RATE: 70 BPM

## 2019-08-22 DIAGNOSIS — R10.9 STOMACH ACHE: ICD-10-CM

## 2019-08-22 DIAGNOSIS — R11.0 NAUSEA: ICD-10-CM

## 2019-08-22 DIAGNOSIS — I10 ESSENTIAL HYPERTENSION: Primary | ICD-10-CM

## 2019-08-22 DIAGNOSIS — R73.01 IMPAIRED FASTING GLUCOSE: ICD-10-CM

## 2019-08-22 DIAGNOSIS — K21.00 GASTROESOPHAGEAL REFLUX DISEASE WITH ESOPHAGITIS: ICD-10-CM

## 2019-08-22 PROCEDURE — 99214 OFFICE O/P EST MOD 30 MIN: CPT | Performed by: FAMILY MEDICINE

## 2019-08-22 ASSESSMENT — ENCOUNTER SYMPTOMS
EYES NEGATIVE: 1
ABDOMINAL PAIN: 1
ALLERGIC/IMMUNOLOGIC NEGATIVE: 1
RESPIRATORY NEGATIVE: 1
NAUSEA: 1

## 2019-08-22 NOTE — PROGRESS NOTES
Readings from Last 3 Encounters:   08/22/19 (!) 150/80   08/15/19 (!) 178/90   08/08/19 138/64        Physical Exam   Constitutional: He is oriented to person, place, and time. He appears well-developed and well-nourished. HENT:   Head: Normocephalic. Right Ear: External ear normal.   Left Ear: External ear normal.   Nose: Nose normal.   Mouth/Throat: Oropharynx is clear and moist.   Eyes: Pupils are equal, round, and reactive to light. Conjunctivae and EOM are normal.   Neck: Normal range of motion. Neck supple. Cardiovascular: Normal rate and intact distal pulses. Pulmonary/Chest: Breath sounds normal.   Abdominal: Soft. Bowel sounds are normal.   Musculoskeletal: Normal range of motion. Neurological: He is alert and oriented to person, place, and time. Skin: Skin is warm and dry. Psychiatric: He has a normal mood and affect. His behavior is normal.   Nursing note and vitals reviewed. Current physical exam findings are stable. Meds to continue as prescribed. Abdomen soft benign no organomegaly. No rebound no guarding as noted. Labs reviewed and all quite stable. Amylase lipase normal.  Surgical evaluation for recommendations maintain current care may require upper endoscopy. Review of CT scan from July unremarkable reading noted.   Lab Results   Component Value Date    TSH 2.370 06/10/2019    TSH 1.200 01/22/2017    H0KVGDH 6.6 06/10/2019    T4FREE 1.10 01/22/2017     Lab Results   Component Value Date    CHOL 155 06/10/2019     Lab Results   Component Value Date    TRIG 45 06/10/2019     Lab Results   Component Value Date    HDL 47 06/10/2019     No results found for: [facility]  Lab Results   Component Value Date    LABVLDL 9 06/10/2019     No results found for: [facility]  Lab Results   Component Value Date    WBC 7.7 07/29/2019    HGB 14.4 07/29/2019    HCT 44.2 07/29/2019    MCV 89.8 07/29/2019     07/29/2019    LYMPHOPCT 23.0 07/29/2019    RBC 4.92 07/29/2019    MCH 29.3
with the assistance of voice recognition software. Document was reviewed however may contain grammatical errors.

## 2019-08-30 ENCOUNTER — OFFICE VISIT (OUTPATIENT)
Dept: SURGERY | Age: 84
End: 2019-08-30
Payer: MEDICARE

## 2019-08-30 VITALS
SYSTOLIC BLOOD PRESSURE: 182 MMHG | HEIGHT: 72 IN | DIASTOLIC BLOOD PRESSURE: 91 MMHG | BODY MASS INDEX: 25.98 KG/M2 | OXYGEN SATURATION: 96 % | RESPIRATION RATE: 18 BRPM | TEMPERATURE: 97.8 F | WEIGHT: 191.8 LBS | HEART RATE: 68 BPM

## 2019-08-30 DIAGNOSIS — R10.9 ABDOMINAL CRAMPS: ICD-10-CM

## 2019-08-30 DIAGNOSIS — K29.70 GASTRITIS WITHOUT BLEEDING, UNSPECIFIED CHRONICITY, UNSPECIFIED GASTRITIS TYPE: Primary | ICD-10-CM

## 2019-08-30 DIAGNOSIS — R11.0 NAUSEA: ICD-10-CM

## 2019-08-30 PROCEDURE — 1036F TOBACCO NON-USER: CPT | Performed by: SURGERY

## 2019-08-30 PROCEDURE — G8419 CALC BMI OUT NRM PARAM NOF/U: HCPCS | Performed by: SURGERY

## 2019-08-30 PROCEDURE — 99203 OFFICE O/P NEW LOW 30 MIN: CPT | Performed by: SURGERY

## 2019-08-30 PROCEDURE — 1123F ACP DISCUSS/DSCN MKR DOCD: CPT | Performed by: SURGERY

## 2019-08-30 PROCEDURE — 4040F PNEUMOC VAC/ADMIN/RCVD: CPT | Performed by: SURGERY

## 2019-08-30 PROCEDURE — G8427 DOCREV CUR MEDS BY ELIG CLIN: HCPCS | Performed by: SURGERY

## 2019-08-30 RX ORDER — DICYCLOMINE HYDROCHLORIDE 10 MG/1
10 CAPSULE ORAL 4 TIMES DAILY PRN
Qty: 120 CAPSULE | Refills: 3 | Status: SHIPPED
Start: 2019-08-30 | End: 2020-07-23

## 2019-08-30 NOTE — PROGRESS NOTES
REPAIR Left 11/21/2001    APPENDECTOMY      CHOLECYSTECTOMY  2007    Lap    ECHO COMPL W DOP COLOR FLOW  12/4/2012         HERNIA REPAIR Right 11/13/2002    double    SINUS SURGERY  2002,2003     done x 2    TONSILLECTOMY      TOTAL KNEE ARTHROPLASTY Left 1/28/2019    LEFT  ROBOTIC KNEE TOTAL ARTHROPLASTY  ++MEREDITH- OKOIPCGO++   ++ADDUCTOR BLOCK++ performed by Adrian Duval MD at [address]       Prior to Admission medications    Medication Sig Start Date End Date Taking? Authorizing Provider   dicyclomine (BENTYL) 10 MG capsule Take 1 capsule by mouth 4 times daily as needed (cramping) 8/30/19  Yes Zoraida Vickers MD   losartan (COZAAR) 50 MG tablet TAKE 1 TABLET BY MOUTH DAILY 8/15/19  Yes Kyle Villa DO   buPROPion (WELLBUTRIN XL) 150 MG extended release tablet Take 1 tablet by mouth daily 8/8/19  Yes Kyle Villa DO   amLODIPine (NORVASC) 5 MG tablet Take 1 tablet by mouth daily Instructed to take morning of surgery with a sip of water 8/8/19  Yes Kyle Villa DO   valACYclovir (VALTREX) 500 MG tablet bid 8/8/19  Yes Kyle Villa DO   pantoprazole (PROTONIX) 40 MG tablet TK 1 T PO QD 6/28/19  Yes Kyle Villa DO   ibuprofen (ADVIL;MOTRIN) 800 MG tablet Take 1 tablet by mouth 3 times daily (with meals) 6/28/19  Yes Kyle Villa DO   aspirin 81 MG tablet Take 81 mg by mouth daily   Yes Historical Provider, MD       Allergies   Allergen Reactions    Doxycycline      Other reaction(s): DIARRHEA AND ABD PAIN       Social History     Socioeconomic History    Marital status:       Spouse name: None    Number of children: None    Years of education: None    Highest education level: None   Occupational History    None   Social Needs    Financial resource strain: None    Food insecurity:     Worry: None     Inability: None    Transportation needs:     Medical: None     Non-medical: None   Tobacco Use    Smoking status: Never Smoker    Smokeless tobacco: Never

## 2019-09-03 ENCOUNTER — TELEPHONE (OUTPATIENT)
Dept: SURGERY | Age: 84
End: 2019-09-03

## 2019-09-04 ENCOUNTER — OFFICE VISIT (OUTPATIENT)
Dept: PRIMARY CARE CLINIC | Age: 84
End: 2019-09-04
Payer: MEDICARE

## 2019-09-04 VITALS
WEIGHT: 192 LBS | RESPIRATION RATE: 19 BRPM | DIASTOLIC BLOOD PRESSURE: 74 MMHG | HEART RATE: 64 BPM | TEMPERATURE: 97.2 F | BODY MASS INDEX: 26.04 KG/M2 | OXYGEN SATURATION: 97 % | SYSTOLIC BLOOD PRESSURE: 146 MMHG

## 2019-09-04 DIAGNOSIS — I10 ESSENTIAL HYPERTENSION: Primary | ICD-10-CM

## 2019-09-04 DIAGNOSIS — R73.01 IMPAIRED FASTING GLUCOSE: ICD-10-CM

## 2019-09-04 DIAGNOSIS — R10.9 ABDOMINAL CRAMPS: ICD-10-CM

## 2019-09-04 DIAGNOSIS — K29.50 CHRONIC GASTRITIS WITHOUT BLEEDING, UNSPECIFIED GASTRITIS TYPE: ICD-10-CM

## 2019-09-04 DIAGNOSIS — R11.0 NAUSEA: ICD-10-CM

## 2019-09-04 PROCEDURE — 99214 OFFICE O/P EST MOD 30 MIN: CPT | Performed by: FAMILY MEDICINE

## 2019-09-04 ASSESSMENT — ENCOUNTER SYMPTOMS
NAUSEA: 1
ALLERGIC/IMMUNOLOGIC NEGATIVE: 1
EYES NEGATIVE: 1
RESPIRATORY NEGATIVE: 1
ABDOMINAL DISTENTION: 1

## 2019-09-04 NOTE — PROGRESS NOTES
07/29/2019    RBC 4.92 07/29/2019    MCH 29.3 07/29/2019    MCHC 32.6 07/29/2019    RDW 13.2 07/29/2019     Lab Results   Component Value Date     08/21/2019    K 4.1 08/21/2019     08/21/2019    CO2 30 (H) 08/21/2019    BUN 11 08/21/2019    CREATININE 1.0 08/21/2019    GLUCOSE 102 (H) 08/21/2019    CALCIUM 9.6 08/21/2019    PROT 7.7 08/21/2019    LABALBU 4.1 08/21/2019    BILITOT 0.6 08/21/2019    ALKPHOS 93 08/21/2019    AST 17 08/21/2019    ALT 14 08/21/2019    LABGLOM >60 08/21/2019    GFRAA >60 08/21/2019        Lab Results   Component Value Date    PSA 2.86 04/24/2019      No results found for: LABA1C  No results found for: EAG           Plan Per Assessment:  Suresh Enriquez was seen today for hypertension and gi problem. Diagnoses and all orders for this visit:    Essential hypertension    Impaired fasting glucose    Abdominal cramps    Nausea    Chronic gastritis without bleeding, unspecified gastritis type            No follow-ups on file. Emory Brownlee DO    Note was generated with the assistance of voice recognition software. Document was reviewed however may contain grammatical errors.

## 2019-09-18 ENCOUNTER — HOSPITAL ENCOUNTER (OUTPATIENT)
Dept: NUCLEAR MEDICINE | Age: 84
Discharge: HOME | End: 2019-09-18
Payer: MEDICARE

## 2019-09-18 ENCOUNTER — APPOINTMENT (OUTPATIENT)
Dept: GENERAL RADIOLOGY | Age: 84
End: 2019-09-18
Payer: MEDICARE

## 2019-09-18 DIAGNOSIS — R11.0 NAUSEA: ICD-10-CM

## 2019-09-18 PROCEDURE — A9541 TC99M SULFUR COLLOID: HCPCS | Performed by: RADIOLOGY

## 2019-09-18 PROCEDURE — 3430000000 HC RX DIAGNOSTIC RADIOPHARMACEUTICAL: Performed by: RADIOLOGY

## 2019-09-18 PROCEDURE — 78264 GASTRIC EMPTYING IMG STUDY: CPT

## 2019-09-18 RX ADMIN — Medication 1 MILLICURIE: at 10:23

## 2019-09-24 ENCOUNTER — HOSPITAL ENCOUNTER (OUTPATIENT)
Dept: GENERAL RADIOLOGY | Age: 84
Discharge: HOME | End: 2019-09-26
Payer: MEDICARE

## 2019-09-24 DIAGNOSIS — R10.9 ABDOMINAL CRAMPS: ICD-10-CM

## 2019-09-24 PROCEDURE — 74245 FL UGI W SMALL BOWEL: CPT

## 2019-09-24 PROCEDURE — 6370000000 HC RX 637 (ALT 250 FOR IP): Performed by: RADIOLOGY

## 2019-09-24 PROCEDURE — 2500000003 HC RX 250 WO HCPCS: Performed by: RADIOLOGY

## 2019-09-24 RX ADMIN — ANTACID/ANTIFLATULENT 1 EACH: 380; 550; 10; 10 GRANULE, EFFERVESCENT ORAL at 11:59

## 2019-09-24 RX ADMIN — BARIUM SULFATE 340 G: 980 POWDER, FOR SUSPENSION ORAL at 11:58

## 2019-09-24 RX ADMIN — BARIUM SULFATE 352 G: 960 POWDER, FOR SUSPENSION ORAL at 11:58

## 2019-09-27 ENCOUNTER — TELEPHONE (OUTPATIENT)
Dept: SURGERY | Age: 84
End: 2019-09-27

## 2019-09-30 ENCOUNTER — TELEPHONE (OUTPATIENT)
Dept: SURGERY | Age: 84
End: 2019-09-30

## 2019-09-30 RX ORDER — LOSARTAN POTASSIUM 50 MG/1
50 TABLET ORAL DAILY
Qty: 90 TABLET | Refills: 3 | Status: SHIPPED | OUTPATIENT
Start: 2019-09-30 | End: 2019-09-30 | Stop reason: SDUPTHER

## 2019-09-30 RX ORDER — LOSARTAN POTASSIUM 50 MG/1
TABLET ORAL
Qty: 180 TABLET | Refills: 3 | Status: SHIPPED
Start: 2019-09-30 | End: 2020-06-03 | Stop reason: SDUPTHER

## 2019-10-02 ENCOUNTER — TELEPHONE (OUTPATIENT)
Dept: SURGERY | Age: 84
End: 2019-10-02

## 2019-10-02 RX ORDER — ONDANSETRON 4 MG/1
4 TABLET, FILM COATED ORAL EVERY 8 HOURS PRN
Qty: 30 TABLET | Refills: 0 | Status: ON HOLD | OUTPATIENT
Start: 2019-10-02 | End: 2019-10-10 | Stop reason: SDUPTHER

## 2019-10-03 ENCOUNTER — OFFICE VISIT (OUTPATIENT)
Dept: VASCULAR SURGERY | Age: 84
End: 2019-10-03
Payer: MEDICARE

## 2019-10-03 DIAGNOSIS — R10.9 ABDOMINAL CRAMPS: ICD-10-CM

## 2019-10-03 DIAGNOSIS — R10.31 RIGHT LOWER QUADRANT ABDOMINAL PAIN: ICD-10-CM

## 2019-10-03 DIAGNOSIS — I72.2 ANEURYSM OF RIGHT RENAL ARTERY (HCC): Primary | ICD-10-CM

## 2019-10-03 PROCEDURE — G8427 DOCREV CUR MEDS BY ELIG CLIN: HCPCS | Performed by: SURGERY

## 2019-10-03 PROCEDURE — 1123F ACP DISCUSS/DSCN MKR DOCD: CPT | Performed by: SURGERY

## 2019-10-03 PROCEDURE — 4040F PNEUMOC VAC/ADMIN/RCVD: CPT | Performed by: SURGERY

## 2019-10-03 PROCEDURE — G8484 FLU IMMUNIZE NO ADMIN: HCPCS | Performed by: SURGERY

## 2019-10-03 PROCEDURE — 1036F TOBACCO NON-USER: CPT | Performed by: SURGERY

## 2019-10-03 PROCEDURE — 99214 OFFICE O/P EST MOD 30 MIN: CPT | Performed by: SURGERY

## 2019-10-03 PROCEDURE — G8419 CALC BMI OUT NRM PARAM NOF/U: HCPCS | Performed by: SURGERY

## 2019-10-07 RX ORDER — DICYCLOMINE HYDROCHLORIDE 10 MG/1
10 CAPSULE ORAL 4 TIMES DAILY PRN
COMMUNITY
End: 2020-04-24 | Stop reason: SDUPTHER

## 2019-10-07 RX ORDER — ONDANSETRON 4 MG/1
4 TABLET, FILM COATED ORAL EVERY 8 HOURS PRN
COMMUNITY
End: 2019-10-21

## 2019-10-07 RX ORDER — AMLODIPINE BESYLATE 5 MG/1
5 TABLET ORAL DAILY
COMMUNITY
End: 2020-06-03 | Stop reason: SDUPTHER

## 2019-10-10 ENCOUNTER — ANESTHESIA EVENT (OUTPATIENT)
Dept: ENDOSCOPY | Age: 84
End: 2019-10-10
Payer: MEDICARE

## 2019-10-10 ENCOUNTER — HOSPITAL ENCOUNTER (OUTPATIENT)
Age: 84
Setting detail: OUTPATIENT SURGERY
Discharge: HOME | End: 2019-10-10
Attending: SURGERY | Admitting: SURGERY
Payer: MEDICARE

## 2019-10-10 ENCOUNTER — ANESTHESIA (OUTPATIENT)
Dept: ENDOSCOPY | Age: 84
End: 2019-10-10
Payer: MEDICARE

## 2019-10-10 VITALS
HEART RATE: 77 BPM | DIASTOLIC BLOOD PRESSURE: 93 MMHG | TEMPERATURE: 97.3 F | RESPIRATION RATE: 18 BRPM | BODY MASS INDEX: 25.87 KG/M2 | SYSTOLIC BLOOD PRESSURE: 185 MMHG | HEIGHT: 72 IN | OXYGEN SATURATION: 97 % | WEIGHT: 191 LBS

## 2019-10-10 VITALS
SYSTOLIC BLOOD PRESSURE: 159 MMHG | OXYGEN SATURATION: 97 % | DIASTOLIC BLOOD PRESSURE: 87 MMHG | RESPIRATION RATE: 13 BRPM

## 2019-10-10 PROCEDURE — 2709999900 HC NON-CHARGEABLE SUPPLY: Performed by: SURGERY

## 2019-10-10 PROCEDURE — 7100000011 HC PHASE II RECOVERY - ADDTL 15 MIN: Performed by: SURGERY

## 2019-10-10 PROCEDURE — 7100000010 HC PHASE II RECOVERY - FIRST 15 MIN: Performed by: SURGERY

## 2019-10-10 PROCEDURE — 43239 EGD BIOPSY SINGLE/MULTIPLE: CPT | Performed by: SURGERY

## 2019-10-10 PROCEDURE — 88305 TISSUE EXAM BY PATHOLOGIST: CPT

## 2019-10-10 PROCEDURE — 2580000003 HC RX 258: Performed by: NURSE ANESTHETIST, CERTIFIED REGISTERED

## 2019-10-10 PROCEDURE — 3700000001 HC ADD 15 MINUTES (ANESTHESIA): Performed by: SURGERY

## 2019-10-10 PROCEDURE — 6360000002 HC RX W HCPCS: Performed by: NURSE ANESTHETIST, CERTIFIED REGISTERED

## 2019-10-10 PROCEDURE — 3609012400 HC EGD TRANSORAL BIOPSY SINGLE/MULTIPLE: Performed by: SURGERY

## 2019-10-10 PROCEDURE — 3700000000 HC ANESTHESIA ATTENDED CARE: Performed by: SURGERY

## 2019-10-10 RX ORDER — PROPOFOL 10 MG/ML
INJECTION, EMULSION INTRAVENOUS PRN
Status: DISCONTINUED | OUTPATIENT
Start: 2019-10-10 | End: 2019-10-10 | Stop reason: SDUPTHER

## 2019-10-10 RX ORDER — ONDANSETRON 4 MG/1
4 TABLET, FILM COATED ORAL EVERY 8 HOURS PRN
Qty: 30 TABLET | Refills: 0 | Status: SHIPPED | OUTPATIENT
Start: 2019-10-10 | End: 2019-10-21

## 2019-10-10 RX ORDER — 0.9 % SODIUM CHLORIDE 0.9 %
10 VIAL (ML) INJECTION PRN
Status: DISCONTINUED | OUTPATIENT
Start: 2019-10-10 | End: 2019-10-10 | Stop reason: HOSPADM

## 2019-10-10 RX ORDER — SODIUM CHLORIDE 0.9 % (FLUSH) 0.9 %
10 SYRINGE (ML) INJECTION EVERY 12 HOURS SCHEDULED
Status: DISCONTINUED | OUTPATIENT
Start: 2019-10-10 | End: 2019-10-10 | Stop reason: HOSPADM

## 2019-10-10 RX ORDER — SODIUM CHLORIDE 9 MG/ML
INJECTION, SOLUTION INTRAVENOUS CONTINUOUS PRN
Status: DISCONTINUED | OUTPATIENT
Start: 2019-10-10 | End: 2019-10-10 | Stop reason: SDUPTHER

## 2019-10-10 RX ADMIN — SODIUM CHLORIDE: 9 INJECTION, SOLUTION INTRAVENOUS at 10:51

## 2019-10-10 RX ADMIN — PROPOFOL 90 MG: 10 INJECTION, EMULSION INTRAVENOUS at 10:57

## 2019-10-10 ASSESSMENT — PAIN - FUNCTIONAL ASSESSMENT
PAIN_FUNCTIONAL_ASSESSMENT: 0-10
PAIN_FUNCTIONAL_ASSESSMENT: ACTIVITIES ARE NOT PREVENTED

## 2019-10-10 ASSESSMENT — PAIN SCALES - GENERAL
PAINLEVEL_OUTOF10: 0

## 2019-10-10 ASSESSMENT — PAIN DESCRIPTION - LOCATION
LOCATION: THROAT

## 2019-10-10 ASSESSMENT — PAIN DESCRIPTION - PAIN TYPE
TYPE: ACUTE PAIN
TYPE: ACUTE PAIN

## 2019-10-10 ASSESSMENT — PAIN DESCRIPTION - DESCRIPTORS: DESCRIPTORS: ACHING

## 2019-10-10 ASSESSMENT — PAIN DESCRIPTION - PROGRESSION
CLINICAL_PROGRESSION: NOT CHANGED
CLINICAL_PROGRESSION: NOT CHANGED

## 2019-10-15 ENCOUNTER — OFFICE VISIT (OUTPATIENT)
Dept: PRIMARY CARE CLINIC | Age: 84
End: 2019-10-15
Payer: MEDICARE

## 2019-10-15 VITALS
WEIGHT: 196 LBS | OXYGEN SATURATION: 98 % | TEMPERATURE: 97.8 F | RESPIRATION RATE: 16 BRPM | HEART RATE: 75 BPM | DIASTOLIC BLOOD PRESSURE: 76 MMHG | BODY MASS INDEX: 26.58 KG/M2 | SYSTOLIC BLOOD PRESSURE: 168 MMHG

## 2019-10-15 DIAGNOSIS — Z23 NEED FOR PNEUMOCOCCAL VACCINATION: Primary | ICD-10-CM

## 2019-10-15 DIAGNOSIS — I10 ESSENTIAL HYPERTENSION: ICD-10-CM

## 2019-10-15 DIAGNOSIS — F32.89 OTHER DEPRESSION: ICD-10-CM

## 2019-10-15 PROCEDURE — 1036F TOBACCO NON-USER: CPT | Performed by: FAMILY MEDICINE

## 2019-10-15 PROCEDURE — G0008 ADMIN INFLUENZA VIRUS VAC: HCPCS | Performed by: FAMILY MEDICINE

## 2019-10-15 PROCEDURE — 4040F PNEUMOC VAC/ADMIN/RCVD: CPT | Performed by: FAMILY MEDICINE

## 2019-10-15 PROCEDURE — G8419 CALC BMI OUT NRM PARAM NOF/U: HCPCS | Performed by: FAMILY MEDICINE

## 2019-10-15 PROCEDURE — 99214 OFFICE O/P EST MOD 30 MIN: CPT | Performed by: FAMILY MEDICINE

## 2019-10-15 PROCEDURE — G0009 ADMIN PNEUMOCOCCAL VACCINE: HCPCS | Performed by: FAMILY MEDICINE

## 2019-10-15 PROCEDURE — G8427 DOCREV CUR MEDS BY ELIG CLIN: HCPCS | Performed by: FAMILY MEDICINE

## 2019-10-15 PROCEDURE — 90670 PCV13 VACCINE IM: CPT | Performed by: FAMILY MEDICINE

## 2019-10-15 PROCEDURE — G8482 FLU IMMUNIZE ORDER/ADMIN: HCPCS | Performed by: FAMILY MEDICINE

## 2019-10-15 PROCEDURE — 90653 IIV ADJUVANT VACCINE IM: CPT | Performed by: FAMILY MEDICINE

## 2019-10-15 PROCEDURE — 1123F ACP DISCUSS/DSCN MKR DOCD: CPT | Performed by: FAMILY MEDICINE

## 2019-10-15 RX ORDER — HYDROCHLOROTHIAZIDE 25 MG/1
25 TABLET ORAL EVERY MORNING
Qty: 30 TABLET | Refills: 5 | Status: SHIPPED | OUTPATIENT
Start: 2019-10-15 | End: 2019-10-15 | Stop reason: SDUPTHER

## 2019-10-15 RX ORDER — SERTRALINE HYDROCHLORIDE 25 MG/1
25 TABLET, FILM COATED ORAL DAILY
Qty: 30 TABLET | Refills: 3
Start: 2019-10-15 | End: 2019-10-16 | Stop reason: SDUPTHER

## 2019-10-15 RX ORDER — HYDROCHLOROTHIAZIDE 25 MG/1
25 TABLET ORAL EVERY MORNING
Qty: 90 TABLET | Refills: 1 | Status: SHIPPED | OUTPATIENT
Start: 2019-10-15 | End: 2020-01-27 | Stop reason: SDUPTHER

## 2019-10-15 ASSESSMENT — ENCOUNTER SYMPTOMS
ALLERGIC/IMMUNOLOGIC NEGATIVE: 1
EYES NEGATIVE: 1
RESPIRATORY NEGATIVE: 1
GASTROINTESTINAL NEGATIVE: 1

## 2019-10-16 RX ORDER — SERTRALINE HYDROCHLORIDE 25 MG/1
25 TABLET, FILM COATED ORAL DAILY
Qty: 90 TABLET | Refills: 0 | Status: SHIPPED | OUTPATIENT
Start: 2019-10-16 | End: 2019-10-31

## 2019-10-16 RX ORDER — SERTRALINE HYDROCHLORIDE 25 MG/1
25 TABLET, FILM COATED ORAL DAILY
Qty: 30 TABLET | Refills: 3 | Status: SHIPPED | OUTPATIENT
Start: 2019-10-16 | End: 2019-10-16 | Stop reason: SDUPTHER

## 2019-10-21 ENCOUNTER — HOSPITAL ENCOUNTER (EMERGENCY)
Age: 84
Discharge: HOME | End: 2019-10-21
Attending: EMERGENCY MEDICINE
Payer: MEDICARE

## 2019-10-21 ENCOUNTER — OFFICE VISIT (OUTPATIENT)
Dept: FAMILY MEDICINE CLINIC | Age: 84
End: 2019-10-21
Payer: MEDICARE

## 2019-10-21 ENCOUNTER — APPOINTMENT (OUTPATIENT)
Dept: CT IMAGING | Age: 84
End: 2019-10-21
Payer: MEDICARE

## 2019-10-21 VITALS
HEIGHT: 72 IN | HEART RATE: 60 BPM | WEIGHT: 188 LBS | SYSTOLIC BLOOD PRESSURE: 124 MMHG | RESPIRATION RATE: 16 BRPM | TEMPERATURE: 98.7 F | BODY MASS INDEX: 25.47 KG/M2 | DIASTOLIC BLOOD PRESSURE: 60 MMHG | OXYGEN SATURATION: 97 %

## 2019-10-21 VITALS
HEART RATE: 63 BPM | SYSTOLIC BLOOD PRESSURE: 153 MMHG | DIASTOLIC BLOOD PRESSURE: 74 MMHG | RESPIRATION RATE: 18 BRPM | TEMPERATURE: 98.2 F | OXYGEN SATURATION: 100 %

## 2019-10-21 DIAGNOSIS — E86.0 DEHYDRATION: ICD-10-CM

## 2019-10-21 DIAGNOSIS — F41.9 ANXIETY: ICD-10-CM

## 2019-10-21 DIAGNOSIS — R63.0 DECREASED APPETITE: ICD-10-CM

## 2019-10-21 DIAGNOSIS — R11.0 NAUSEA: ICD-10-CM

## 2019-10-21 DIAGNOSIS — R10.84 GENERALIZED ABDOMINAL PAIN: Primary | ICD-10-CM

## 2019-10-21 DIAGNOSIS — F41.1 ANXIETY STATE: ICD-10-CM

## 2019-10-21 LAB
ALBUMIN SERPL-MCNC: 4.2 G/DL (ref 3.5–5.2)
ALP BLD-CCNC: 90 U/L (ref 40–129)
ALT SERPL-CCNC: 11 U/L (ref 0–40)
ANION GAP SERPL CALCULATED.3IONS-SCNC: 13 MMOL/L (ref 7–16)
AST SERPL-CCNC: 13 U/L (ref 0–39)
BACTERIA: NORMAL /HPF
BASOPHILS ABSOLUTE: 0.03 E9/L (ref 0–0.2)
BASOPHILS RELATIVE PERCENT: 0.5 % (ref 0–2)
BILIRUB SERPL-MCNC: 0.7 MG/DL (ref 0–1.2)
BILIRUBIN URINE: NEGATIVE
BLOOD, URINE: ABNORMAL
BUN BLDV-MCNC: 9 MG/DL (ref 8–23)
CALCIUM SERPL-MCNC: 9.2 MG/DL (ref 8.6–10.2)
CHLORIDE BLD-SCNC: 88 MMOL/L (ref 98–107)
CLARITY: CLEAR
CO2: 29 MMOL/L (ref 22–29)
COLOR: YELLOW
CREAT SERPL-MCNC: 0.9 MG/DL (ref 0.7–1.2)
EOSINOPHILS ABSOLUTE: 0.01 E9/L (ref 0.05–0.5)
EOSINOPHILS RELATIVE PERCENT: 0.2 % (ref 0–6)
GFR AFRICAN AMERICAN: >60
GFR NON-AFRICAN AMERICAN: >60 ML/MIN/1.73
GLUCOSE BLD-MCNC: 155 MG/DL (ref 74–99)
GLUCOSE URINE: NEGATIVE MG/DL
HCT VFR BLD CALC: 41.5 % (ref 37–54)
HEMOGLOBIN: 14.2 G/DL (ref 12.5–16.5)
IMMATURE GRANULOCYTES #: 0.01 E9/L
IMMATURE GRANULOCYTES %: 0.2 % (ref 0–5)
KETONES, URINE: ABNORMAL MG/DL
LACTIC ACID: 1.4 MMOL/L (ref 0.5–2.2)
LEUKOCYTE ESTERASE, URINE: NEGATIVE
LIPASE: 17 U/L (ref 13–60)
LYMPHOCYTES ABSOLUTE: 0.84 E9/L (ref 1.5–4)
LYMPHOCYTES RELATIVE PERCENT: 13.4 % (ref 20–42)
MAGNESIUM: 1.8 MG/DL (ref 1.6–2.6)
MCH RBC QN AUTO: 29.8 PG (ref 26–35)
MCHC RBC AUTO-ENTMCNC: 34.2 % (ref 32–34.5)
MCV RBC AUTO: 87 FL (ref 80–99.9)
MONOCYTES ABSOLUTE: 0.74 E9/L (ref 0.1–0.95)
MONOCYTES RELATIVE PERCENT: 11.8 % (ref 2–12)
MUCUS: PRESENT
NEUTROPHILS ABSOLUTE: 4.63 E9/L (ref 1.8–7.3)
NEUTROPHILS RELATIVE PERCENT: 73.9 % (ref 43–80)
NITRITE, URINE: NEGATIVE
PDW BLD-RTO: 12.2 FL (ref 11.5–15)
PH UA: 7 (ref 5–9)
PLATELET # BLD: 380 E9/L (ref 130–450)
PMV BLD AUTO: 9.7 FL (ref 7–12)
POTASSIUM REFLEX MAGNESIUM: 3 MMOL/L (ref 3.5–5)
PROTEIN UA: 30 MG/DL
RBC # BLD: 4.77 E12/L (ref 3.8–5.8)
RBC UA: NORMAL /HPF (ref 0–2)
SODIUM BLD-SCNC: 130 MMOL/L (ref 132–146)
SPECIFIC GRAVITY UA: 1.01 (ref 1–1.03)
TOTAL PROTEIN: 7.2 G/DL (ref 6.4–8.3)
TROPONIN: <0.01 NG/ML (ref 0–0.03)
UROBILINOGEN, URINE: 2 E.U./DL
WBC # BLD: 6.3 E9/L (ref 4.5–11.5)
WBC UA: NORMAL /HPF (ref 0–5)

## 2019-10-21 PROCEDURE — 1123F ACP DISCUSS/DSCN MKR DOCD: CPT | Performed by: FAMILY MEDICINE

## 2019-10-21 PROCEDURE — 84484 ASSAY OF TROPONIN QUANT: CPT

## 2019-10-21 PROCEDURE — 99284 EMERGENCY DEPT VISIT MOD MDM: CPT

## 2019-10-21 PROCEDURE — 80053 COMPREHEN METABOLIC PANEL: CPT

## 2019-10-21 PROCEDURE — 81001 URINALYSIS AUTO W/SCOPE: CPT

## 2019-10-21 PROCEDURE — 83690 ASSAY OF LIPASE: CPT

## 2019-10-21 PROCEDURE — 99213 OFFICE O/P EST LOW 20 MIN: CPT | Performed by: FAMILY MEDICINE

## 2019-10-21 PROCEDURE — G8417 CALC BMI ABV UP PARAM F/U: HCPCS | Performed by: FAMILY MEDICINE

## 2019-10-21 PROCEDURE — 6370000000 HC RX 637 (ALT 250 FOR IP): Performed by: EMERGENCY MEDICINE

## 2019-10-21 PROCEDURE — 4040F PNEUMOC VAC/ADMIN/RCVD: CPT | Performed by: FAMILY MEDICINE

## 2019-10-21 PROCEDURE — 1036F TOBACCO NON-USER: CPT | Performed by: FAMILY MEDICINE

## 2019-10-21 PROCEDURE — 6360000002 HC RX W HCPCS: Performed by: EMERGENCY MEDICINE

## 2019-10-21 PROCEDURE — 83735 ASSAY OF MAGNESIUM: CPT

## 2019-10-21 PROCEDURE — G8482 FLU IMMUNIZE ORDER/ADMIN: HCPCS | Performed by: FAMILY MEDICINE

## 2019-10-21 PROCEDURE — 74177 CT ABD & PELVIS W/CONTRAST: CPT

## 2019-10-21 PROCEDURE — 85025 COMPLETE CBC W/AUTO DIFF WBC: CPT

## 2019-10-21 PROCEDURE — 87088 URINE BACTERIA CULTURE: CPT

## 2019-10-21 PROCEDURE — G8427 DOCREV CUR MEDS BY ELIG CLIN: HCPCS | Performed by: FAMILY MEDICINE

## 2019-10-21 PROCEDURE — 6360000004 HC RX CONTRAST MEDICATION: Performed by: RADIOLOGY

## 2019-10-21 PROCEDURE — 83605 ASSAY OF LACTIC ACID: CPT

## 2019-10-21 PROCEDURE — 96372 THER/PROPH/DIAG INJ SC/IM: CPT

## 2019-10-21 PROCEDURE — 93005 ELECTROCARDIOGRAM TRACING: CPT | Performed by: EMERGENCY MEDICINE

## 2019-10-21 RX ORDER — PROMETHAZINE HYDROCHLORIDE 25 MG/ML
25 INJECTION, SOLUTION INTRAMUSCULAR; INTRAVENOUS ONCE
Status: COMPLETED | OUTPATIENT
Start: 2019-10-21 | End: 2019-10-21

## 2019-10-21 RX ORDER — ONDANSETRON 4 MG/1
4 TABLET, ORALLY DISINTEGRATING ORAL EVERY 8 HOURS PRN
Qty: 24 TABLET | Refills: 0 | Status: SHIPPED | OUTPATIENT
Start: 2019-10-21 | End: 2020-04-24 | Stop reason: CLARIF

## 2019-10-21 RX ORDER — ONDANSETRON 2 MG/ML
4 INJECTION INTRAMUSCULAR; INTRAVENOUS ONCE
Status: DISCONTINUED | OUTPATIENT
Start: 2019-10-21 | End: 2019-10-21

## 2019-10-21 RX ORDER — POTASSIUM CHLORIDE 20 MEQ/1
40 TABLET, EXTENDED RELEASE ORAL ONCE
Status: COMPLETED | OUTPATIENT
Start: 2019-10-21 | End: 2019-10-21

## 2019-10-21 RX ADMIN — PROMETHAZINE HYDROCHLORIDE 25 MG: 25 INJECTION INTRAMUSCULAR; INTRAVENOUS at 10:14

## 2019-10-21 RX ADMIN — POTASSIUM CHLORIDE 40 MEQ: 20 TABLET, EXTENDED RELEASE ORAL at 12:18

## 2019-10-21 RX ADMIN — IOPAMIDOL 110 ML: 755 INJECTION, SOLUTION INTRAVENOUS at 10:26

## 2019-10-21 ASSESSMENT — PAIN DESCRIPTION - LOCATION: LOCATION: ABDOMEN

## 2019-10-21 ASSESSMENT — ENCOUNTER SYMPTOMS
VOMITING: 0
ABDOMINAL PAIN: 1
WHEEZING: 0
SORE THROAT: 0
NAUSEA: 1
COUGH: 0
EYE DISCHARGE: 0
DIARRHEA: 0
SHORTNESS OF BREATH: 0
SINUS PRESSURE: 0
EYE REDNESS: 0
BACK PAIN: 0
EYE PAIN: 0

## 2019-10-21 ASSESSMENT — PAIN DESCRIPTION - DESCRIPTORS: DESCRIPTORS: ACHING

## 2019-10-21 ASSESSMENT — PAIN DESCRIPTION - FREQUENCY: FREQUENCY: CONTINUOUS

## 2019-10-21 ASSESSMENT — PAIN DESCRIPTION - ORIENTATION: ORIENTATION: RIGHT

## 2019-10-21 ASSESSMENT — PAIN SCALES - GENERAL: PAINLEVEL_OUTOF10: 2

## 2019-10-21 ASSESSMENT — PAIN DESCRIPTION - PAIN TYPE: TYPE: ACUTE PAIN

## 2019-10-21 ASSESSMENT — PAIN DESCRIPTION - PROGRESSION: CLINICAL_PROGRESSION: GRADUALLY WORSENING

## 2019-10-22 LAB
EKG ATRIAL RATE: 54 BPM
EKG P AXIS: 56 DEGREES
EKG P-R INTERVAL: 194 MS
EKG Q-T INTERVAL: 484 MS
EKG QRS DURATION: 158 MS
EKG QTC CALCULATION (BAZETT): 458 MS
EKG R AXIS: 48 DEGREES
EKG T AXIS: 38 DEGREES
EKG VENTRICULAR RATE: 54 BPM

## 2019-10-22 PROCEDURE — 93010 ELECTROCARDIOGRAM REPORT: CPT | Performed by: INTERNAL MEDICINE

## 2019-10-23 LAB — URINE CULTURE, ROUTINE: NORMAL

## 2019-10-25 ASSESSMENT — KOOS JR
BENDING TO THE FLOOR TO PICK UP OBJECT: 1
HOW SEVERE IS YOUR KNEE STIFFNESS AFTER FIRST WAKING IN MORNING: 0
GOING UP OR DOWN STAIRS: 0
STRAIGHTENING KNEE FULLY: 0
RISING FROM SITTING: 0
TWISING OR PIVOTING ON KNEE: 0
STANDING UPRIGHT: 0

## 2019-10-25 ASSESSMENT — PROMIS GLOBAL HEALTH SCALE
HOW IS THE PROMIS V1.1 BEING ADMINISTERED?: 2
IN GENERAL, HOW WOULD YOU RATE YOUR PHYSICAL HEALTH [ON A SCALE OF 1 (POOR) TO 5 (EXCELLENT)]?: 4
IN GENERAL, PLEASE RATE HOW WELL YOU CARRY OUT YOUR USUAL SOCIAL ACTIVITIES (INCLUDES ACTIVITIES AT HOME, AT WORK, AND IN YOUR COMMUNITY, AND RESPONSIBILITIES AS A PARENT, CHILD, SPOUSE, EMPLOYEE, FRIEND, ETC) [ON A SCALE OF 1 (POOR) TO 5 (EXCELLENT)]?: 4
IN THE PAST 7 DAYS, HOW OFTEN HAVE YOU BEEN BOTHERED BY EMOTIONAL PROBLEMS, SUCH AS FEELING ANXIOUS, DEPRESSED, OR IRRITABLE [ON A SCALE FROM 1 (NEVER) TO 5 (ALWAYS)]?: 2
IN GENERAL, HOW WOULD YOU RATE YOUR SATISFACTION WITH YOUR SOCIAL ACTIVITIES AND RELATIONSHIPS [ON A SCALE OF 1 (POOR) TO 5 (EXCELLENT)]?: 4
IN GENERAL, WOULD YOU SAY YOUR HEALTH IS...[ON A SCALE OF 1 (POOR) TO 5 (EXCELLENT)]: 4
SUM OF RESPONSES TO QUESTIONS 2, 4, 5, & 10: 15
TO WHAT EXTENT ARE YOU ABLE TO CARRY OUT YOUR EVERYDAY PHYSICAL ACTIVITIES SUCH AS WALKING, CLIMBING STAIRS, CARRYING GROCERIES, OR MOVING A CHAIR [ON A SCALE OF 1 (NOT AT ALL) TO 5 (COMPLETELY)]?: 5
IN THE PAST 7 DAYS, HOW WOULD YOU RATE YOUR FATIGUE ON AVERAGE [ON A SCALE FROM 1 (NONE) TO 5 (VERY SEVERE)]?: 4
IN GENERAL, HOW WOULD YOU RATE YOUR MENTAL HEALTH, INCLUDING YOUR MOOD AND YOUR ABILITY TO THINK [ON A SCALE OF 1 (POOR) TO 5 (EXCELLENT)]?: 4
IN GENERAL, WOULD YOU SAY YOUR QUALITY OF LIFE IS...[ON A SCALE OF 1 (POOR) TO 5 (EXCELLENT)]: 5
SUM OF RESPONSES TO QUESTIONS 3, 6, 7, & 8: 13
IN THE PAST 7 DAYS, HOW WOULD YOU RATE YOUR PAIN ON AVERAGE [ON A SCALE FROM 0 (NO PAIN) TO 10 (WORST IMAGINABLE PAIN)]?: 0
WHO IS THE PERSON COMPLETING THE PROMIS V1.1 SURVEY?: 0

## 2019-10-29 ENCOUNTER — OFFICE VISIT (OUTPATIENT)
Dept: SURGERY | Age: 84
End: 2019-10-29
Payer: MEDICARE

## 2019-10-29 VITALS
OXYGEN SATURATION: 98 % | SYSTOLIC BLOOD PRESSURE: 151 MMHG | RESPIRATION RATE: 16 BRPM | DIASTOLIC BLOOD PRESSURE: 82 MMHG | WEIGHT: 191 LBS | BODY MASS INDEX: 25.87 KG/M2 | TEMPERATURE: 98 F | HEART RATE: 80 BPM | HEIGHT: 72 IN

## 2019-10-29 DIAGNOSIS — R20.8 BURNING SENSATION OF RECTUM: ICD-10-CM

## 2019-10-29 DIAGNOSIS — R11.0 NAUSEA: Primary | ICD-10-CM

## 2019-10-29 PROCEDURE — 99213 OFFICE O/P EST LOW 20 MIN: CPT | Performed by: SURGERY

## 2019-10-29 PROCEDURE — 4040F PNEUMOC VAC/ADMIN/RCVD: CPT | Performed by: SURGERY

## 2019-10-29 PROCEDURE — G8482 FLU IMMUNIZE ORDER/ADMIN: HCPCS | Performed by: SURGERY

## 2019-10-29 PROCEDURE — 1123F ACP DISCUSS/DSCN MKR DOCD: CPT | Performed by: SURGERY

## 2019-10-29 PROCEDURE — G8427 DOCREV CUR MEDS BY ELIG CLIN: HCPCS | Performed by: SURGERY

## 2019-10-29 PROCEDURE — 1036F TOBACCO NON-USER: CPT | Performed by: SURGERY

## 2019-10-29 PROCEDURE — G8417 CALC BMI ABV UP PARAM F/U: HCPCS | Performed by: SURGERY

## 2019-10-29 RX ORDER — METOCLOPRAMIDE 10 MG/1
10 TABLET ORAL
Qty: 120 TABLET | Refills: 3 | Status: SHIPPED | OUTPATIENT
Start: 2019-10-29 | End: 2019-10-31 | Stop reason: SINTOL

## 2019-10-31 ENCOUNTER — OFFICE VISIT (OUTPATIENT)
Dept: PRIMARY CARE CLINIC | Age: 84
End: 2019-10-31
Payer: MEDICARE

## 2019-10-31 VITALS
WEIGHT: 188 LBS | BODY MASS INDEX: 25.5 KG/M2 | DIASTOLIC BLOOD PRESSURE: 70 MMHG | TEMPERATURE: 98.9 F | SYSTOLIC BLOOD PRESSURE: 120 MMHG | HEART RATE: 89 BPM | OXYGEN SATURATION: 98 %

## 2019-10-31 DIAGNOSIS — F34.1 DYSTHYMIA: ICD-10-CM

## 2019-10-31 DIAGNOSIS — R73.01 IMPAIRED FASTING GLUCOSE: ICD-10-CM

## 2019-10-31 DIAGNOSIS — I10 ESSENTIAL HYPERTENSION: Primary | Chronic | ICD-10-CM

## 2019-10-31 DIAGNOSIS — K21.00 GASTROESOPHAGEAL REFLUX DISEASE WITH ESOPHAGITIS: ICD-10-CM

## 2019-10-31 DIAGNOSIS — E78.2 MIXED HYPERLIPIDEMIA: Chronic | ICD-10-CM

## 2019-10-31 PROCEDURE — G8427 DOCREV CUR MEDS BY ELIG CLIN: HCPCS | Performed by: FAMILY MEDICINE

## 2019-10-31 PROCEDURE — 99214 OFFICE O/P EST MOD 30 MIN: CPT | Performed by: FAMILY MEDICINE

## 2019-10-31 PROCEDURE — G8482 FLU IMMUNIZE ORDER/ADMIN: HCPCS | Performed by: FAMILY MEDICINE

## 2019-10-31 PROCEDURE — 4040F PNEUMOC VAC/ADMIN/RCVD: CPT | Performed by: FAMILY MEDICINE

## 2019-10-31 PROCEDURE — 1036F TOBACCO NON-USER: CPT | Performed by: FAMILY MEDICINE

## 2019-10-31 PROCEDURE — G8417 CALC BMI ABV UP PARAM F/U: HCPCS | Performed by: FAMILY MEDICINE

## 2019-10-31 PROCEDURE — 1123F ACP DISCUSS/DSCN MKR DOCD: CPT | Performed by: FAMILY MEDICINE

## 2019-10-31 RX ORDER — MIRTAZAPINE 15 MG/1
15 TABLET, FILM COATED ORAL NIGHTLY
Qty: 30 TABLET | Refills: 3 | Status: SHIPPED | OUTPATIENT
Start: 2019-10-31 | End: 2020-01-27 | Stop reason: SDUPTHER

## 2019-10-31 ASSESSMENT — ENCOUNTER SYMPTOMS
RESPIRATORY NEGATIVE: 1
GASTROINTESTINAL NEGATIVE: 1
ALLERGIC/IMMUNOLOGIC NEGATIVE: 1
EYES NEGATIVE: 1

## 2019-11-14 ENCOUNTER — TELEPHONE (OUTPATIENT)
Dept: PRIMARY CARE CLINIC | Age: 84
End: 2019-11-14

## 2019-11-20 ENCOUNTER — OFFICE VISIT (OUTPATIENT)
Dept: PRIMARY CARE CLINIC | Age: 84
End: 2019-11-20
Payer: MEDICARE

## 2019-11-20 VITALS
OXYGEN SATURATION: 99 % | TEMPERATURE: 98.6 F | BODY MASS INDEX: 25.47 KG/M2 | RESPIRATION RATE: 16 BRPM | DIASTOLIC BLOOD PRESSURE: 80 MMHG | HEIGHT: 72 IN | SYSTOLIC BLOOD PRESSURE: 130 MMHG | WEIGHT: 188 LBS | HEART RATE: 78 BPM

## 2019-11-20 DIAGNOSIS — I10 ESSENTIAL HYPERTENSION: Primary | Chronic | ICD-10-CM

## 2019-11-20 DIAGNOSIS — E78.2 MIXED HYPERLIPIDEMIA: Chronic | ICD-10-CM

## 2019-11-20 DIAGNOSIS — F32.89 OTHER DEPRESSION: ICD-10-CM

## 2019-11-20 DIAGNOSIS — Z96.652 STATUS POST LEFT KNEE REPLACEMENT: ICD-10-CM

## 2019-11-20 DIAGNOSIS — F41.9 ANXIETY: ICD-10-CM

## 2019-11-20 DIAGNOSIS — G47.00 INSOMNIA, UNSPECIFIED TYPE: ICD-10-CM

## 2019-11-20 PROBLEM — E86.0 DEHYDRATION: Status: RESOLVED | Noted: 2019-10-21 | Resolved: 2019-11-20

## 2019-11-20 PROCEDURE — 4040F PNEUMOC VAC/ADMIN/RCVD: CPT | Performed by: FAMILY MEDICINE

## 2019-11-20 PROCEDURE — 1123F ACP DISCUSS/DSCN MKR DOCD: CPT | Performed by: FAMILY MEDICINE

## 2019-11-20 PROCEDURE — 1036F TOBACCO NON-USER: CPT | Performed by: FAMILY MEDICINE

## 2019-11-20 PROCEDURE — G8427 DOCREV CUR MEDS BY ELIG CLIN: HCPCS | Performed by: FAMILY MEDICINE

## 2019-11-20 PROCEDURE — 99214 OFFICE O/P EST MOD 30 MIN: CPT | Performed by: FAMILY MEDICINE

## 2019-11-20 PROCEDURE — G8482 FLU IMMUNIZE ORDER/ADMIN: HCPCS | Performed by: FAMILY MEDICINE

## 2019-11-20 PROCEDURE — G8417 CALC BMI ABV UP PARAM F/U: HCPCS | Performed by: FAMILY MEDICINE

## 2019-11-20 ASSESSMENT — ENCOUNTER SYMPTOMS
ALLERGIC/IMMUNOLOGIC NEGATIVE: 1
GASTROINTESTINAL NEGATIVE: 1
RESPIRATORY NEGATIVE: 1
EYES NEGATIVE: 1

## 2019-12-19 ENCOUNTER — APPOINTMENT (OUTPATIENT)
Dept: CT IMAGING | Age: 84
End: 2019-12-19
Payer: MEDICARE

## 2019-12-19 ENCOUNTER — APPOINTMENT (OUTPATIENT)
Dept: GENERAL RADIOLOGY | Age: 84
End: 2019-12-19
Payer: MEDICARE

## 2019-12-19 ENCOUNTER — TELEPHONE (OUTPATIENT)
Dept: PRIMARY CARE CLINIC | Age: 84
End: 2019-12-19

## 2019-12-19 ENCOUNTER — HOSPITAL ENCOUNTER (EMERGENCY)
Age: 84
Discharge: HOME | End: 2019-12-19
Attending: FAMILY MEDICINE
Payer: MEDICARE

## 2019-12-19 VITALS
SYSTOLIC BLOOD PRESSURE: 170 MMHG | OXYGEN SATURATION: 97 % | RESPIRATION RATE: 18 BRPM | WEIGHT: 188 LBS | DIASTOLIC BLOOD PRESSURE: 90 MMHG | BODY MASS INDEX: 25.47 KG/M2 | HEART RATE: 78 BPM | TEMPERATURE: 98.3 F | HEIGHT: 72 IN

## 2019-12-19 DIAGNOSIS — R42 DIZZINESS: Primary | ICD-10-CM

## 2019-12-19 LAB
ALBUMIN SERPL-MCNC: 4.1 G/DL (ref 3.5–5.2)
ALP BLD-CCNC: 92 U/L (ref 40–129)
ALT SERPL-CCNC: 18 U/L (ref 0–40)
ANION GAP SERPL CALCULATED.3IONS-SCNC: 11 MMOL/L (ref 7–16)
AST SERPL-CCNC: 22 U/L (ref 0–39)
BACTERIA: NORMAL /HPF
BASOPHILS ABSOLUTE: 0.04 E9/L (ref 0–0.2)
BASOPHILS RELATIVE PERCENT: 0.6 % (ref 0–2)
BILIRUB SERPL-MCNC: 0.5 MG/DL (ref 0–1.2)
BILIRUBIN URINE: NEGATIVE
BLOOD, URINE: NORMAL
BUN BLDV-MCNC: 12 MG/DL (ref 8–23)
CALCIUM SERPL-MCNC: 9.5 MG/DL (ref 8.6–10.2)
CHLORIDE BLD-SCNC: 99 MMOL/L (ref 98–107)
CLARITY: CLEAR
CO2: 32 MMOL/L (ref 22–29)
COLOR: YELLOW
CREAT SERPL-MCNC: 0.9 MG/DL (ref 0.7–1.2)
EOSINOPHILS ABSOLUTE: 0.07 E9/L (ref 0.05–0.5)
EOSINOPHILS RELATIVE PERCENT: 1.1 % (ref 0–6)
GFR AFRICAN AMERICAN: >60
GFR NON-AFRICAN AMERICAN: >60 ML/MIN/1.73
GLUCOSE BLD-MCNC: 103 MG/DL (ref 74–99)
GLUCOSE URINE: NEGATIVE MG/DL
HCT VFR BLD CALC: 43.2 % (ref 37–54)
HEMOGLOBIN: 14 G/DL (ref 12.5–16.5)
IMMATURE GRANULOCYTES #: 0.02 E9/L
IMMATURE GRANULOCYTES %: 0.3 % (ref 0–5)
KETONES, URINE: NEGATIVE MG/DL
LEUKOCYTE ESTERASE, URINE: NEGATIVE
LYMPHOCYTES ABSOLUTE: 1.28 E9/L (ref 1.5–4)
LYMPHOCYTES RELATIVE PERCENT: 19.7 % (ref 20–42)
MCH RBC QN AUTO: 29.4 PG (ref 26–35)
MCHC RBC AUTO-ENTMCNC: 32.4 % (ref 32–34.5)
MCV RBC AUTO: 90.8 FL (ref 80–99.9)
METER GLUCOSE: 88 MG/DL (ref 74–99)
MONOCYTES ABSOLUTE: 0.6 E9/L (ref 0.1–0.95)
MONOCYTES RELATIVE PERCENT: 9.2 % (ref 2–12)
NEUTROPHILS ABSOLUTE: 4.48 E9/L (ref 1.8–7.3)
NEUTROPHILS RELATIVE PERCENT: 69.1 % (ref 43–80)
NITRITE, URINE: NEGATIVE
PDW BLD-RTO: 13 FL (ref 11.5–15)
PH UA: 7 (ref 5–9)
PLATELET # BLD: 363 E9/L (ref 130–450)
PMV BLD AUTO: 9.6 FL (ref 7–12)
POTASSIUM SERPL-SCNC: 3.6 MMOL/L (ref 3.5–5)
PROTEIN UA: NEGATIVE MG/DL
RBC # BLD: 4.76 E12/L (ref 3.8–5.8)
RBC UA: NORMAL /HPF (ref 0–2)
SODIUM BLD-SCNC: 142 MMOL/L (ref 132–146)
SPECIFIC GRAVITY UA: 1.01 (ref 1–1.03)
TOTAL PROTEIN: 7.7 G/DL (ref 6.4–8.3)
TROPONIN: <0.01 NG/ML (ref 0–0.03)
UROBILINOGEN, URINE: 0.2 E.U./DL
WBC # BLD: 6.5 E9/L (ref 4.5–11.5)
WBC UA: NORMAL /HPF (ref 0–5)

## 2019-12-19 PROCEDURE — 71045 X-RAY EXAM CHEST 1 VIEW: CPT

## 2019-12-19 PROCEDURE — 99284 EMERGENCY DEPT VISIT MOD MDM: CPT

## 2019-12-19 PROCEDURE — 93005 ELECTROCARDIOGRAM TRACING: CPT | Performed by: FAMILY MEDICINE

## 2019-12-19 PROCEDURE — 70450 CT HEAD/BRAIN W/O DYE: CPT

## 2019-12-19 PROCEDURE — 85025 COMPLETE CBC W/AUTO DIFF WBC: CPT

## 2019-12-19 PROCEDURE — 80053 COMPREHEN METABOLIC PANEL: CPT

## 2019-12-19 PROCEDURE — 82962 GLUCOSE BLOOD TEST: CPT

## 2019-12-19 PROCEDURE — 6370000000 HC RX 637 (ALT 250 FOR IP)

## 2019-12-19 PROCEDURE — 84484 ASSAY OF TROPONIN QUANT: CPT

## 2019-12-19 PROCEDURE — 36415 COLL VENOUS BLD VENIPUNCTURE: CPT

## 2019-12-19 PROCEDURE — 2580000003 HC RX 258: Performed by: FAMILY MEDICINE

## 2019-12-19 PROCEDURE — 81001 URINALYSIS AUTO W/SCOPE: CPT

## 2019-12-19 PROCEDURE — 6360000002 HC RX W HCPCS

## 2019-12-19 RX ORDER — 0.9 % SODIUM CHLORIDE 0.9 %
1000 INTRAVENOUS SOLUTION INTRAVENOUS ONCE
Status: COMPLETED | OUTPATIENT
Start: 2019-12-19 | End: 2019-12-19

## 2019-12-19 RX ORDER — ACETAMINOPHEN 500 MG
1000 TABLET ORAL ONCE
Status: COMPLETED | OUTPATIENT
Start: 2019-12-19 | End: 2019-12-19

## 2019-12-19 RX ORDER — ONDANSETRON 2 MG/ML
4 INJECTION INTRAMUSCULAR; INTRAVENOUS ONCE
Status: COMPLETED | OUTPATIENT
Start: 2019-12-19 | End: 2019-12-19

## 2019-12-19 RX ORDER — ONDANSETRON 2 MG/ML
INJECTION INTRAMUSCULAR; INTRAVENOUS
Status: COMPLETED
Start: 2019-12-19 | End: 2019-12-19

## 2019-12-19 RX ORDER — ACETAMINOPHEN 500 MG
TABLET ORAL
Status: COMPLETED
Start: 2019-12-19 | End: 2019-12-19

## 2019-12-19 RX ADMIN — Medication 1000 MG: at 19:24

## 2019-12-19 RX ADMIN — ONDANSETRON 4 MG: 2 INJECTION INTRAMUSCULAR; INTRAVENOUS at 17:43

## 2019-12-19 RX ADMIN — SODIUM CHLORIDE 1000 ML: 9 INJECTION, SOLUTION INTRAVENOUS at 17:43

## 2019-12-19 RX ADMIN — ACETAMINOPHEN 1000 MG: 500 TABLET ORAL at 19:24

## 2019-12-19 ASSESSMENT — PAIN DESCRIPTION - PAIN TYPE: TYPE: ACUTE PAIN

## 2019-12-19 ASSESSMENT — PAIN DESCRIPTION - FREQUENCY: FREQUENCY: CONTINUOUS

## 2019-12-19 ASSESSMENT — PAIN DESCRIPTION - LOCATION: LOCATION: HEAD

## 2019-12-19 ASSESSMENT — PAIN DESCRIPTION - DESCRIPTORS: DESCRIPTORS: HEADACHE

## 2019-12-19 ASSESSMENT — PAIN SCALES - GENERAL
PAINLEVEL_OUTOF10: 5
PAINLEVEL_OUTOF10: 6

## 2019-12-20 LAB
EKG ATRIAL RATE: 68 BPM
EKG P AXIS: 56 DEGREES
EKG P-R INTERVAL: 186 MS
EKG Q-T INTERVAL: 398 MS
EKG QRS DURATION: 144 MS
EKG QTC CALCULATION (BAZETT): 423 MS
EKG R AXIS: 53 DEGREES
EKG T AXIS: 18 DEGREES
EKG VENTRICULAR RATE: 68 BPM

## 2019-12-20 PROCEDURE — 93010 ELECTROCARDIOGRAM REPORT: CPT | Performed by: INTERNAL MEDICINE

## 2019-12-26 ENCOUNTER — OFFICE VISIT (OUTPATIENT)
Dept: PRIMARY CARE CLINIC | Age: 84
End: 2019-12-26
Payer: MEDICARE

## 2019-12-26 VITALS
BODY MASS INDEX: 23.71 KG/M2 | RESPIRATION RATE: 18 BRPM | DIASTOLIC BLOOD PRESSURE: 72 MMHG | WEIGHT: 174.8 LBS | OXYGEN SATURATION: 95 % | HEART RATE: 83 BPM | SYSTOLIC BLOOD PRESSURE: 136 MMHG | TEMPERATURE: 98.1 F

## 2019-12-26 DIAGNOSIS — F41.9 ANXIETY: ICD-10-CM

## 2019-12-26 DIAGNOSIS — E78.2 MIXED HYPERLIPIDEMIA: Chronic | ICD-10-CM

## 2019-12-26 DIAGNOSIS — R42 VERTIGO: Primary | ICD-10-CM

## 2019-12-26 DIAGNOSIS — F32.89 OTHER DEPRESSION: ICD-10-CM

## 2019-12-26 DIAGNOSIS — J01.00 ACUTE NON-RECURRENT MAXILLARY SINUSITIS: ICD-10-CM

## 2019-12-26 DIAGNOSIS — I10 ESSENTIAL HYPERTENSION: Chronic | ICD-10-CM

## 2019-12-26 PROCEDURE — 1036F TOBACCO NON-USER: CPT | Performed by: FAMILY MEDICINE

## 2019-12-26 PROCEDURE — G8427 DOCREV CUR MEDS BY ELIG CLIN: HCPCS | Performed by: FAMILY MEDICINE

## 2019-12-26 PROCEDURE — 99214 OFFICE O/P EST MOD 30 MIN: CPT | Performed by: FAMILY MEDICINE

## 2019-12-26 PROCEDURE — 4040F PNEUMOC VAC/ADMIN/RCVD: CPT | Performed by: FAMILY MEDICINE

## 2019-12-26 PROCEDURE — G8420 CALC BMI NORM PARAMETERS: HCPCS | Performed by: FAMILY MEDICINE

## 2019-12-26 PROCEDURE — G8482 FLU IMMUNIZE ORDER/ADMIN: HCPCS | Performed by: FAMILY MEDICINE

## 2019-12-26 PROCEDURE — 1123F ACP DISCUSS/DSCN MKR DOCD: CPT | Performed by: FAMILY MEDICINE

## 2019-12-26 RX ORDER — MECLIZINE HYDROCHLORIDE 25 MG/1
TABLET ORAL
Qty: 30 TABLET | Refills: 1 | Status: SHIPPED
Start: 2019-12-26 | End: 2020-05-20 | Stop reason: SDUPTHER

## 2019-12-26 RX ORDER — CEFDINIR 300 MG/1
300 CAPSULE ORAL 2 TIMES DAILY
Qty: 20 CAPSULE | Refills: 0 | Status: SHIPPED | OUTPATIENT
Start: 2019-12-26 | End: 2020-01-05

## 2019-12-26 RX ORDER — PREDNISONE 1 MG/1
TABLET ORAL
Qty: 30 TABLET | Refills: 0 | Status: SHIPPED
Start: 2019-12-26 | End: 2020-04-24 | Stop reason: CLARIF

## 2019-12-26 ASSESSMENT — ENCOUNTER SYMPTOMS
EYES NEGATIVE: 1
GASTROINTESTINAL NEGATIVE: 1
SINUS PAIN: 1
SINUS PRESSURE: 1
RESPIRATORY NEGATIVE: 1
ALLERGIC/IMMUNOLOGIC NEGATIVE: 1

## 2020-01-27 ENCOUNTER — OFFICE VISIT (OUTPATIENT)
Dept: PRIMARY CARE CLINIC | Age: 85
End: 2020-01-27
Payer: MEDICARE

## 2020-01-27 VITALS
SYSTOLIC BLOOD PRESSURE: 144 MMHG | OXYGEN SATURATION: 96 % | HEART RATE: 71 BPM | RESPIRATION RATE: 18 BRPM | WEIGHT: 198 LBS | BODY MASS INDEX: 26.85 KG/M2 | TEMPERATURE: 97.6 F | DIASTOLIC BLOOD PRESSURE: 68 MMHG

## 2020-01-27 PROCEDURE — G8417 CALC BMI ABV UP PARAM F/U: HCPCS | Performed by: FAMILY MEDICINE

## 2020-01-27 PROCEDURE — G8427 DOCREV CUR MEDS BY ELIG CLIN: HCPCS | Performed by: FAMILY MEDICINE

## 2020-01-27 PROCEDURE — 1123F ACP DISCUSS/DSCN MKR DOCD: CPT | Performed by: FAMILY MEDICINE

## 2020-01-27 PROCEDURE — G8482 FLU IMMUNIZE ORDER/ADMIN: HCPCS | Performed by: FAMILY MEDICINE

## 2020-01-27 PROCEDURE — 99214 OFFICE O/P EST MOD 30 MIN: CPT | Performed by: FAMILY MEDICINE

## 2020-01-27 PROCEDURE — 1036F TOBACCO NON-USER: CPT | Performed by: FAMILY MEDICINE

## 2020-01-27 PROCEDURE — 4040F PNEUMOC VAC/ADMIN/RCVD: CPT | Performed by: FAMILY MEDICINE

## 2020-01-27 RX ORDER — MIRTAZAPINE 15 MG/1
15 TABLET, FILM COATED ORAL NIGHTLY
Qty: 30 TABLET | Refills: 3 | Status: SHIPPED
Start: 2020-01-27 | End: 2020-04-24 | Stop reason: SDUPTHER

## 2020-01-27 RX ORDER — HYDROCHLOROTHIAZIDE 25 MG/1
25 TABLET ORAL EVERY MORNING
Qty: 90 TABLET | Refills: 3 | Status: SHIPPED
Start: 2020-01-27 | End: 2020-12-14

## 2020-01-27 NOTE — PROGRESS NOTES
20     Laisha Shen    : 1935 Sex: male   Age: 81 y.o. Chief Complaint   Patient presents with    Hypertension     discuss losartan- directions say BID, pt has been taking daily.  Hyperlipidemia    Anxiety    Depression       Prior to Admission medications    Medication Sig Start Date End Date Taking?  Authorizing Provider   hydrochlorothiazide (HYDRODIURIL) 25 MG tablet Take 1 tablet by mouth every morning 20  Yes Veda Villa, DO   mirtazapine (REMERON) 15 MG tablet Take 1 tablet by mouth nightly 20  Yes Veda Villa, DO   meclizine (ANTIVERT) 25 MG tablet Bid prn 19  Yes David Villa, DO   amLODIPine (NORVASC) 5 MG tablet Take 5 mg by mouth daily   Yes Historical Provider, MD   losartan (COZAAR) 50 MG tablet 1 by mouth bid  Patient taking differently: Take 50 mg by mouth daily  19  Yes Veda Villa DO   buPROPion (WELLBUTRIN XL) 150 MG extended release tablet Take 1 tablet by mouth daily 19  Yes Veda Villa DO   pantoprazole (PROTONIX) 40 MG tablet TK 1 T PO QD 19  Yes Veda Villa DO   aspirin 81 MG tablet Take 81 mg by mouth daily   Yes Historical Provider, MD   predniSONE (DELTASONE) 5 MG tablet 4 tablets daily for 3 days then 3 tablets daily for 3 days then 2 tablets daily for 3 days then 1 tablet daily for 3 days  Patient not taking: Reported on 19   Veda Villa, DO   ondansetron (ZOFRAN ODT) 4 MG disintegrating tablet Take 1 tablet by mouth every 8 hours as needed for Nausea or Vomiting  Patient not taking: Reported on 2020 10/21/19   Denis Grace, DO   dicyclomine (BENTYL) 10 MG capsule Take 10 mg by mouth 4 times daily as needed    Historical Provider, MD   dicyclomine (BENTYL) 10 MG capsule Take 1 capsule by mouth 4 times daily as needed (cramping)  Patient not taking: Reported on 2020   Gloria Parker MD          HPI: Patient is seen today hypertension hyperlipidemia

## 2020-02-06 ENCOUNTER — CARE COORDINATION (OUTPATIENT)
Dept: CARE COORDINATION | Age: 85
End: 2020-02-06

## 2020-02-06 NOTE — LETTER
2/6/2020      [phone] [id number]      Dear Adan Montes,    My name is Lucas Gaxiola and I am a registered nurse who partners with Marylou Carlos DO to improve patients' health. Marylou Carlos DO believes you would benefit from working with me. As a member of your health care team, I would work with other providers involved in your care, offer education for your specific health conditions, and connect you with additional resources as needed. I will collaborate with Marylou Carlos DO to support you in following your treatment plan. The additional support I provide is no additional cost to you. My primary focus is to help you achieve specific goals and improve your health. We are committed to walk with you on this journey and look forward to working with you. Please call me to further discuss your healthcare needs. I am available by phone or for appointments at the office. You can reach me at [phone].       In good health,         Lucas Gaxiola RN

## 2020-02-07 ENCOUNTER — CARE COORDINATION (OUTPATIENT)
Dept: CARE COORDINATION | Age: 85
End: 2020-02-07

## 2020-02-10 ENCOUNTER — CARE COORDINATION (OUTPATIENT)
Dept: CARE COORDINATION | Age: 85
End: 2020-02-10

## 2020-02-10 NOTE — CARE COORDINATION
-ACM attempted to reach patient to offer enrollment into Care Coordination program, however no answer.  -Again, Pt's mailbox is full an unable to except any messages.  -Will call daughter, Summer Escalante.

## 2020-02-13 ENCOUNTER — CARE COORDINATION (OUTPATIENT)
Dept: CARE COORDINATION | Age: 85
End: 2020-02-13

## 2020-02-13 NOTE — CARE COORDINATION
-ACM attempted second  outreach to patient to offer enrollment into Care Coordination program, however no answer.  -Again, Pt's mailbox is full an unable to except any messages.  -Will call daughter, Lay Hirsch.

## 2020-02-13 NOTE — CARE COORDINATION
-[facility] attempted to reach daughter, Anahi Genao. to offer enrollment into Care Coordination program for her father, however no answer.  -Detailed VM left introducing self, reason for call, and brief explanation of program.  -Left AC's contact information, requesting call back.       V

## 2020-02-20 ENCOUNTER — CARE COORDINATION (OUTPATIENT)
Dept: CARE COORDINATION | Age: 85
End: 2020-02-20

## 2020-04-21 ENCOUNTER — HOSPITAL ENCOUNTER (OUTPATIENT)
Age: 85
Discharge: HOME | End: 2020-04-23
Payer: MEDICARE

## 2020-04-21 LAB
ALBUMIN SERPL-MCNC: 4 G/DL (ref 3.5–5.2)
ALP BLD-CCNC: 91 U/L (ref 40–129)
ALT SERPL-CCNC: 10 U/L (ref 0–40)
ANION GAP SERPL CALCULATED.3IONS-SCNC: 14 MMOL/L (ref 7–16)
AST SERPL-CCNC: 17 U/L (ref 0–39)
BASOPHILS ABSOLUTE: 0.04 E9/L (ref 0–0.2)
BASOPHILS RELATIVE PERCENT: 0.5 % (ref 0–2)
BILIRUB SERPL-MCNC: 0.6 MG/DL (ref 0–1.2)
BUN BLDV-MCNC: 12 MG/DL (ref 8–23)
CALCIUM SERPL-MCNC: 9.6 MG/DL (ref 8.6–10.2)
CHLORIDE BLD-SCNC: 100 MMOL/L (ref 98–107)
CHOLESTEROL, TOTAL: 157 MG/DL (ref 0–199)
CO2: 28 MMOL/L (ref 22–29)
CREAT SERPL-MCNC: 1 MG/DL (ref 0.7–1.2)
EOSINOPHILS ABSOLUTE: 0.12 E9/L (ref 0.05–0.5)
EOSINOPHILS RELATIVE PERCENT: 1.6 % (ref 0–6)
GFR AFRICAN AMERICAN: >60
GFR NON-AFRICAN AMERICAN: >60 ML/MIN/1.73
GLUCOSE BLD-MCNC: 97 MG/DL (ref 74–99)
HCT VFR BLD CALC: 43.8 % (ref 37–54)
HDLC SERPL-MCNC: 38 MG/DL
HEMOGLOBIN: 14.1 G/DL (ref 12.5–16.5)
IMMATURE GRANULOCYTES #: 0.02 E9/L
IMMATURE GRANULOCYTES %: 0.3 % (ref 0–5)
LDL CHOLESTEROL CALCULATED: 103 MG/DL (ref 0–99)
LYMPHOCYTES ABSOLUTE: 2.08 E9/L (ref 1.5–4)
LYMPHOCYTES RELATIVE PERCENT: 28.1 % (ref 20–42)
MCH RBC QN AUTO: 29.1 PG (ref 26–35)
MCHC RBC AUTO-ENTMCNC: 32.2 % (ref 32–34.5)
MCV RBC AUTO: 90.3 FL (ref 80–99.9)
MONOCYTES ABSOLUTE: 0.83 E9/L (ref 0.1–0.95)
MONOCYTES RELATIVE PERCENT: 11.2 % (ref 2–12)
NEUTROPHILS ABSOLUTE: 4.31 E9/L (ref 1.8–7.3)
NEUTROPHILS RELATIVE PERCENT: 58.3 % (ref 43–80)
PDW BLD-RTO: 12.5 FL (ref 11.5–15)
PLATELET # BLD: 420 E9/L (ref 130–450)
PMV BLD AUTO: 10.1 FL (ref 7–12)
POTASSIUM SERPL-SCNC: 3.6 MMOL/L (ref 3.5–5)
RBC # BLD: 4.85 E12/L (ref 3.8–5.8)
SODIUM BLD-SCNC: 142 MMOL/L (ref 132–146)
T4 TOTAL: 7.7 MCG/DL (ref 4.5–11.7)
TOTAL PROTEIN: 7.5 G/DL (ref 6.4–8.3)
TRIGL SERPL-MCNC: 78 MG/DL (ref 0–149)
TSH SERPL DL<=0.05 MIU/L-ACNC: 3.64 UIU/ML (ref 0.27–4.2)
VLDLC SERPL CALC-MCNC: 16 MG/DL
WBC # BLD: 7.4 E9/L (ref 4.5–11.5)

## 2020-04-21 PROCEDURE — 80053 COMPREHEN METABOLIC PANEL: CPT

## 2020-04-21 PROCEDURE — 85025 COMPLETE CBC W/AUTO DIFF WBC: CPT

## 2020-04-21 PROCEDURE — 84443 ASSAY THYROID STIM HORMONE: CPT

## 2020-04-21 PROCEDURE — 36415 COLL VENOUS BLD VENIPUNCTURE: CPT

## 2020-04-21 PROCEDURE — 80061 LIPID PANEL: CPT

## 2020-04-21 PROCEDURE — 84436 ASSAY OF TOTAL THYROXINE: CPT

## 2020-04-24 ENCOUNTER — OFFICE VISIT (OUTPATIENT)
Dept: PRIMARY CARE CLINIC | Age: 85
End: 2020-04-24
Payer: MEDICARE

## 2020-04-24 VITALS
SYSTOLIC BLOOD PRESSURE: 138 MMHG | TEMPERATURE: 98.3 F | OXYGEN SATURATION: 97 % | BODY MASS INDEX: 27.4 KG/M2 | WEIGHT: 202 LBS | HEART RATE: 83 BPM | DIASTOLIC BLOOD PRESSURE: 64 MMHG

## 2020-04-24 PROCEDURE — 1123F ACP DISCUSS/DSCN MKR DOCD: CPT | Performed by: FAMILY MEDICINE

## 2020-04-24 PROCEDURE — 99214 OFFICE O/P EST MOD 30 MIN: CPT | Performed by: FAMILY MEDICINE

## 2020-04-24 PROCEDURE — 1036F TOBACCO NON-USER: CPT | Performed by: FAMILY MEDICINE

## 2020-04-24 PROCEDURE — 4040F PNEUMOC VAC/ADMIN/RCVD: CPT | Performed by: FAMILY MEDICINE

## 2020-04-24 PROCEDURE — G8417 CALC BMI ABV UP PARAM F/U: HCPCS | Performed by: FAMILY MEDICINE

## 2020-04-24 PROCEDURE — G8427 DOCREV CUR MEDS BY ELIG CLIN: HCPCS | Performed by: FAMILY MEDICINE

## 2020-04-24 RX ORDER — IBUPROFEN 800 MG/1
TABLET ORAL
COMMUNITY
Start: 2020-03-31 | End: 2020-04-24 | Stop reason: SDUPTHER

## 2020-04-24 RX ORDER — IBUPROFEN 800 MG/1
800 TABLET ORAL EVERY 8 HOURS PRN
Qty: 120 TABLET | Refills: 2 | Status: SHIPPED
Start: 2020-04-24 | End: 2020-10-01 | Stop reason: SDUPTHER

## 2020-04-24 RX ORDER — MIRTAZAPINE 15 MG/1
15 TABLET, FILM COATED ORAL NIGHTLY
Qty: 30 TABLET | Refills: 3 | Status: SHIPPED
Start: 2020-04-24 | End: 2020-12-28 | Stop reason: SDUPTHER

## 2020-04-24 ASSESSMENT — ENCOUNTER SYMPTOMS
EYES NEGATIVE: 1
GASTROINTESTINAL NEGATIVE: 1
ALLERGIC/IMMUNOLOGIC NEGATIVE: 1
RESPIRATORY NEGATIVE: 1

## 2020-04-24 NOTE — PROGRESS NOTES
Outpatient Medications:     mirtazapine (REMERON) 15 MG tablet, Take 1 tablet by mouth nightly, Disp: 30 tablet, Rfl: 3    ibuprofen (ADVIL;MOTRIN) 800 MG tablet, Take 1 tablet by mouth every 8 hours as needed for Pain, Disp: 120 tablet, Rfl: 2    hydrochlorothiazide (HYDRODIURIL) 25 MG tablet, Take 1 tablet by mouth every morning, Disp: 90 tablet, Rfl: 3    meclizine (ANTIVERT) 25 MG tablet, Bid prn, Disp: 30 tablet, Rfl: 1    amLODIPine (NORVASC) 5 MG tablet, Take 5 mg by mouth daily, Disp: , Rfl:     losartan (COZAAR) 50 MG tablet, 1 by mouth bid (Patient taking differently: Take 50 mg by mouth daily ), Disp: 180 tablet, Rfl: 3    buPROPion (WELLBUTRIN XL) 150 MG extended release tablet, Take 1 tablet by mouth daily, Disp: 90 tablet, Rfl: 3    pantoprazole (PROTONIX) 40 MG tablet, TK 1 T PO QD, Disp: 90 tablet, Rfl: 3    aspirin 81 MG tablet, Take 81 mg by mouth daily, Disp: , Rfl:     dicyclomine (BENTYL) 10 MG capsule, Take 1 capsule by mouth 4 times daily as needed (cramping) (Patient not taking: Reported on 4/24/2020), Disp: 120 capsule, Rfl: 3    No Known Allergies    Social History     Tobacco Use    Smoking status: Never Smoker    Smokeless tobacco: Never Used   Substance Use Topics    Alcohol use: No    Drug use: No      Past Surgical History:   Procedure Laterality Date    ANTERIOR CRUCIATE LIGAMENT REPAIR Left 11/21/2001    APPENDECTOMY      CHOLECYSTECTOMY  2007    Lap    ECHO COMPL W DOP COLOR FLOW  12/4/2012         HERNIA REPAIR Right 11/13/2002    double    SINUS SURGERY  2002,2003     done x 2    TONSILLECTOMY      TOTAL KNEE ARTHROPLASTY Left 1/28/2019    LEFT  ROBOTIC KNEE TOTAL ARTHROPLASTY  ++MEREDITH- YYSLAGZP++   ++ADDUCTOR BLOCK++ performed by Milla Aponte MD at [address] N/[address] 10/10/2019    EGD BIOPSY performed by Rodriguez Castro MD at [facility] ENDOSCOPY     No family history on file.   Past Medical History:   Diagnosis Date    04/21/2020    TRIG 45 06/10/2019     Lab Results   Component Value Date    HDL 38 04/21/2020    HDL 47 06/10/2019     No results found for: [facility]  Lab Results   Component Value Date    LABVLDL 16 04/21/2020    LABVLDL 9 06/10/2019     No results found for: [facility]  Lab Results   Component Value Date    WBC 7.4 04/21/2020    HGB 14.1 04/21/2020    HCT 43.8 04/21/2020    MCV 90.3 04/21/2020     04/21/2020    LYMPHOPCT 28.1 04/21/2020    RBC 4.85 04/21/2020    MCH 29.1 04/21/2020    MCHC 32.2 04/21/2020    RDW 12.5 04/21/2020     Lab Results   Component Value Date     04/21/2020    K 3.6 04/21/2020     04/21/2020    CO2 28 04/21/2020    BUN 12 04/21/2020    CREATININE 1.0 04/21/2020    GLUCOSE 97 04/21/2020    CALCIUM 9.6 04/21/2020    PROT 7.5 04/21/2020    LABALBU 4.0 04/21/2020    BILITOT 0.6 04/21/2020    ALKPHOS 91 04/21/2020    AST 17 04/21/2020    ALT 10 04/21/2020    LABGLOM >60 04/21/2020    GFRAA >60 04/21/2020        Lab Results   Component Value Date    PSA 2.86 04/24/2019      No results found for: LABA1C  No results found for: EAG Plan Per Assessment:  Rowdy Dubose was seen today for hypertension and discuss labs. Diagnoses and all orders for this visit:    Gastroesophageal reflux disease with esophagitis    Mixed hyperlipidemia    Anxiety    Essential hypertension    Other orders  -     mirtazapine (REMERON) 15 MG tablet; Take 1 tablet by mouth nightly  -     ibuprofen (ADVIL;MOTRIN) 800 MG tablet; Take 1 tablet by mouth every 8 hours as needed for Pain            Return in about 3 months (around 7/24/2020). Mohan You DO    Note was generated with the assistance of voice recognition software. Document was reviewed however may contain grammatical errors.

## 2020-04-29 ENCOUNTER — HOSPITAL ENCOUNTER (OUTPATIENT)
Age: 85
Discharge: HOME | End: 2020-05-01
Payer: MEDICARE

## 2020-04-29 LAB — PROSTATE SPECIFIC ANTIGEN: 2.83 NG/ML (ref 0–4)

## 2020-04-29 PROCEDURE — G0103 PSA SCREENING: HCPCS

## 2020-05-20 RX ORDER — MECLIZINE HYDROCHLORIDE 25 MG/1
TABLET ORAL
Qty: 30 TABLET | Refills: 1 | Status: SHIPPED
Start: 2020-05-20 | End: 2020-10-01 | Stop reason: SDUPTHER

## 2020-05-27 ENCOUNTER — HOSPITAL ENCOUNTER (EMERGENCY)
Age: 85
Discharge: HOME | End: 2020-05-27
Attending: EMERGENCY MEDICINE
Payer: MEDICARE

## 2020-05-27 ENCOUNTER — APPOINTMENT (OUTPATIENT)
Dept: CT IMAGING | Age: 85
End: 2020-05-27
Payer: MEDICARE

## 2020-05-27 VITALS
WEIGHT: 195 LBS | RESPIRATION RATE: 16 BRPM | DIASTOLIC BLOOD PRESSURE: 80 MMHG | HEART RATE: 70 BPM | BODY MASS INDEX: 26.41 KG/M2 | OXYGEN SATURATION: 98 % | HEIGHT: 72 IN | TEMPERATURE: 98.3 F | SYSTOLIC BLOOD PRESSURE: 150 MMHG

## 2020-05-27 LAB
ALBUMIN SERPL-MCNC: 4.1 G/DL (ref 3.5–5.2)
ALP BLD-CCNC: 80 U/L (ref 40–129)
ALT SERPL-CCNC: 11 U/L (ref 0–40)
ANION GAP SERPL CALCULATED.3IONS-SCNC: 11 MMOL/L (ref 7–16)
AST SERPL-CCNC: 17 U/L (ref 0–39)
BASOPHILS ABSOLUTE: 0.04 E9/L (ref 0–0.2)
BASOPHILS RELATIVE PERCENT: 0.6 % (ref 0–2)
BILIRUB SERPL-MCNC: 0.5 MG/DL (ref 0–1.2)
BUN BLDV-MCNC: 10 MG/DL (ref 8–23)
CALCIUM SERPL-MCNC: 9.2 MG/DL (ref 8.6–10.2)
CHLORIDE BLD-SCNC: 95 MMOL/L (ref 98–107)
CO2: 30 MMOL/L (ref 22–29)
CREAT SERPL-MCNC: 1 MG/DL (ref 0.7–1.2)
EOSINOPHILS ABSOLUTE: 0.07 E9/L (ref 0.05–0.5)
EOSINOPHILS RELATIVE PERCENT: 1.1 % (ref 0–6)
GFR AFRICAN AMERICAN: >60
GFR NON-AFRICAN AMERICAN: >60 ML/MIN/1.73
GLUCOSE BLD-MCNC: 103 MG/DL (ref 74–99)
HCT VFR BLD CALC: 40.3 % (ref 37–54)
HEMOGLOBIN: 13.6 G/DL (ref 12.5–16.5)
IMMATURE GRANULOCYTES #: 0.02 E9/L
IMMATURE GRANULOCYTES %: 0.3 % (ref 0–5)
LACTIC ACID, SEPSIS: 1 MMOL/L (ref 0.5–1.9)
LIPASE: 16 U/L (ref 13–60)
LYMPHOCYTES ABSOLUTE: 1.48 E9/L (ref 1.5–4)
LYMPHOCYTES RELATIVE PERCENT: 22.3 % (ref 20–42)
MCH RBC QN AUTO: 30.4 PG (ref 26–35)
MCHC RBC AUTO-ENTMCNC: 33.7 % (ref 32–34.5)
MCV RBC AUTO: 90 FL (ref 80–99.9)
MONOCYTES ABSOLUTE: 0.7 E9/L (ref 0.1–0.95)
MONOCYTES RELATIVE PERCENT: 10.5 % (ref 2–12)
NEUTROPHILS ABSOLUTE: 4.33 E9/L (ref 1.8–7.3)
NEUTROPHILS RELATIVE PERCENT: 65.2 % (ref 43–80)
PDW BLD-RTO: 13 FL (ref 11.5–15)
PLATELET # BLD: 357 E9/L (ref 130–450)
PMV BLD AUTO: 9.3 FL (ref 7–12)
POTASSIUM SERPL-SCNC: 3.6 MMOL/L (ref 3.5–5)
RBC # BLD: 4.48 E12/L (ref 3.8–5.8)
SODIUM BLD-SCNC: 136 MMOL/L (ref 132–146)
TOTAL PROTEIN: 7.1 G/DL (ref 6.4–8.3)
WBC # BLD: 6.6 E9/L (ref 4.5–11.5)

## 2020-05-27 PROCEDURE — 6360000002 HC RX W HCPCS: Performed by: EMERGENCY MEDICINE

## 2020-05-27 PROCEDURE — 93005 ELECTROCARDIOGRAM TRACING: CPT | Performed by: EMERGENCY MEDICINE

## 2020-05-27 PROCEDURE — 80053 COMPREHEN METABOLIC PANEL: CPT

## 2020-05-27 PROCEDURE — 96374 THER/PROPH/DIAG INJ IV PUSH: CPT

## 2020-05-27 PROCEDURE — 99284 EMERGENCY DEPT VISIT MOD MDM: CPT

## 2020-05-27 PROCEDURE — 85025 COMPLETE CBC W/AUTO DIFF WBC: CPT

## 2020-05-27 PROCEDURE — 70450 CT HEAD/BRAIN W/O DYE: CPT

## 2020-05-27 PROCEDURE — 36415 COLL VENOUS BLD VENIPUNCTURE: CPT

## 2020-05-27 PROCEDURE — 83605 ASSAY OF LACTIC ACID: CPT

## 2020-05-27 PROCEDURE — 83690 ASSAY OF LIPASE: CPT

## 2020-05-27 RX ORDER — OFLOXACIN 3 MG/ML
5 SOLUTION AURICULAR (OTIC) 2 TIMES DAILY
Qty: 1 BOTTLE | Refills: 0 | Status: SHIPPED | OUTPATIENT
Start: 2020-05-27 | End: 2020-06-10

## 2020-05-27 RX ORDER — ONDANSETRON 2 MG/ML
8 INJECTION INTRAMUSCULAR; INTRAVENOUS ONCE
Status: COMPLETED | OUTPATIENT
Start: 2020-05-27 | End: 2020-05-27

## 2020-05-27 RX ADMIN — ONDANSETRON 8 MG: 2 INJECTION INTRAMUSCULAR; INTRAVENOUS at 17:00

## 2020-05-27 ASSESSMENT — PAIN DESCRIPTION - DESCRIPTORS: DESCRIPTORS: HEADACHE

## 2020-05-27 ASSESSMENT — PAIN DESCRIPTION - LOCATION: LOCATION: HEAD

## 2020-05-27 ASSESSMENT — PAIN SCALES - GENERAL: PAINLEVEL_OUTOF10: 6

## 2020-05-27 NOTE — ED PROVIDER NOTES
Department of Emergency Medicine   ED  Provider Note  Admit Date/RoomTime: 5/27/2020  3:48 PM  ED Room: 07/07 5/27/20  3:59 PM EDT      HISTORY OF PRESENT ILLNESS:  (Nurses Notes Reviewed)    Chief Complaint:   Otalgia (bilateral ); Dizziness (hx of vertigo  no relief with antivert); Headache (x 2 days); and Nausea (x 2 days)      Source of history provided by:  patient. History/Exam Limitations: none. Tamar Gaston is a 81 y.o. old male presenting to the emergency department for complaints of sudden onset dizziness which began 3 day(s) prior to arrival.  Since recognized his symptoms have been intermittent. He has associated symptoms of headaches and nausea and denies any fever, chest pain, palpitations, blurred vision, loss of vision, slurred speech, focal weakness, focal sensory loss or clumsiness. The symptoms are aggravated by Rolling out of bed. The patient has a past history of: Vertigo and HTN. There has been no history of recent trauma. Patient reports the past few days he is been having intermittent episodes of dizziness that he describes a room spinning sensation. The episodes are sudden onset and greatest when he is first getting out of bed but then improve throughout the day. He denies any current dizziness. He also complains of a frontal headache that he currently rates as 6/10, he took ibuprofen yesterday with partial relief has not taken anything today. Also complains of nausea and bilateral earaches. Patient has a history of GERD 2 ago for which he is prescribed meclizine but reports it has not been working this week and so did not take it today. Vertebrobasilar CVA Symptoms:    Vertigo: yes (1). Diplopia: no (0). Decreased Vision: no (0). Oscillopsia: no (0). Ataxia: no (0). Precipitated by moving one upper extremity w/  Decreased BP (subclavian steal syndrome):   no (0). Total:    1.          Review of Systems:   Pertinent positives and negatives are stated within HPI, all other systems reviewed and are negative.          --------------------------------------------- PAST HISTORY ---------------------------------------------  Past Medical History:  has a past medical history of Aneurysm of right renal artery ([facility] [address] Colitis, Depression, GERD (gastroesophageal reflux disease), Hyperlipidemia, Hypertension, and TIA (transient ischemic attack). Past Surgical History:  has a past surgical history that includes Appendectomy; Tonsillectomy; ECHO Compl W Dop Color Flow (12/4/2012); sinus surgery (0901,4041); Anterior cruciate ligament repair (Left, 11/21/2001); Cholecystectomy (2007); hernia repair (Right, 11/13/2002); Total knee arthroplasty (Left, 1/28/2019); and Upper gastrointestinal endoscopy (N/A, 10/10/2019). Social History:  reports that he has never smoked. He has never used smokeless tobacco. He reports that he does not drink alcohol or use drugs. Family History: family history is not on file. The patients home medications have been reviewed. Allergies: Patient has no known allergies. ---------------------------------------------------PHYSICAL EXAM--------------------------------------    Constitutional/General: Alert and oriented x3, well appearing, non toxic in NAD  Head: Normocephalic and atraumatic  Eyes: PERRL, EOMI  Ears: Right TM occluded by cerumen. Left TM clear. Mouth: Oropharynx clear, handling secretions, no trismus  Neck: Supple, full ROM, non tender to palpation in the midline, no stridor, no crepitus, no meningeal signs  Pulmonary: Lungs clear to auscultation bilaterally, no wheezes, rales, or rhonchi. Not in respiratory distress  Cardiovascular:  Regular rate. Regular rhythm. No murmurs, gallops, or rubs. 2+ distal pulses  Chest: no chest wall tenderness  Abdomen: Soft. Non tender. Non distended. +BS. No rebound, guarding, or rigidity. No pulsatile masses appreciated.   Musculoskeletal: Moves all MCHC 33.7 32.0 - 34.5 %    RDW 13.0 11.5 - 15.0 fL    Platelets 671 096 - 032 E9/L    MPV 9.3 7.0 - 12.0 fL    Neutrophils % 65.2 43.0 - 80.0 %    Immature Granulocytes % 0.3 0.0 - 5.0 %    Lymphocytes % 22.3 20.0 - 42.0 %    Monocytes % 10.5 2.0 - 12.0 %    Eosinophils % 1.1 0.0 - 6.0 %    Basophils % 0.6 0.0 - 2.0 %    Neutrophils Absolute 4.33 1.80 - 7.30 E9/L    Immature Granulocytes # 0.02 E9/L    Lymphocytes Absolute 1.48 (L) 1.50 - 4.00 E9/L    Monocytes Absolute 0.70 0.10 - 0.95 E9/L    Eosinophils Absolute 0.07 0.05 - 0.50 E9/L    Basophils Absolute 0.04 0.00 - 0.20 E9/L   Comprehensive Metabolic Panel   Result Value Ref Range    Sodium 136 132 - 146 mmol/L    Potassium 3.6 3.5 - 5.0 mmol/L    Chloride 95 (L) 98 - 107 mmol/L    CO2 30 (H) 22 - 29 mmol/L    Anion Gap 11 7 - 16 mmol/L    Glucose 103 (H) 74 - 99 mg/dL    BUN 10 8 - 23 mg/dL    CREATININE 1.0 0.7 - 1.2 mg/dL    GFR Non-African American >60 >=60 mL/min/1.73    GFR African American >60     Calcium 9.2 8.6 - 10.2 mg/dL    Total Protein 7.1 6.4 - 8.3 g/dL    Alb 4.1 3.5 - 5.2 g/dL    Total Bilirubin 0.5 0.0 - 1.2 mg/dL    Alkaline Phosphatase 80 40 - 129 U/L    ALT 11 0 - 40 U/L    AST 17 0 - 39 U/L   Lipase   Result Value Ref Range    Lipase 16 13 - 60 U/L   Lactate, Sepsis   Result Value Ref Range    Lactic Acid, Sepsis 1.0 0.5 - 1.9 mmol/L   EKG 12 Lead   Result Value Ref Range    Ventricular Rate 62 BPM    Atrial Rate 62 BPM    P-R Interval 208 ms    QRS Duration 148 ms    Q-T Interval 444 ms    QTc Calculation (Bazett) 450 ms    P Axis 43 degrees    R Axis 43 degrees    T Axis 22 degrees       RADIOLOGY:  Interpreted by Radiologist.  CT Head WO Contrast   Final Result   Mild atrophy and likely chronic microvascular ischemic disease. EKG:  This EKG is signed by emergency department physician.     Rate: 62  Rhythm: Sinus  Interpretation: right bundle branch block  Comparison: stable as compared to patient's most recent EKG

## 2020-05-28 ENCOUNTER — TELEPHONE (OUTPATIENT)
Dept: ENT CLINIC | Age: 85
End: 2020-05-28

## 2020-05-29 LAB
EKG ATRIAL RATE: 62 BPM
EKG P AXIS: 43 DEGREES
EKG P-R INTERVAL: 208 MS
EKG Q-T INTERVAL: 444 MS
EKG QRS DURATION: 148 MS
EKG QTC CALCULATION (BAZETT): 450 MS
EKG R AXIS: 43 DEGREES
EKG T AXIS: 22 DEGREES
EKG VENTRICULAR RATE: 62 BPM

## 2020-05-29 PROCEDURE — 93010 ELECTROCARDIOGRAM REPORT: CPT | Performed by: INTERNAL MEDICINE

## 2020-06-01 ENCOUNTER — TELEPHONE (OUTPATIENT)
Dept: ADMINISTRATIVE | Age: 85
End: 2020-06-01

## 2020-06-03 ENCOUNTER — OFFICE VISIT (OUTPATIENT)
Dept: PRIMARY CARE CLINIC | Age: 85
End: 2020-06-03
Payer: MEDICARE

## 2020-06-03 VITALS — DIASTOLIC BLOOD PRESSURE: 78 MMHG | OXYGEN SATURATION: 96 % | HEART RATE: 84 BPM | SYSTOLIC BLOOD PRESSURE: 140 MMHG

## 2020-06-03 PROBLEM — H92.01 OTALGIA OF RIGHT EAR: Status: ACTIVE | Noted: 2020-06-03

## 2020-06-03 PROCEDURE — 1123F ACP DISCUSS/DSCN MKR DOCD: CPT | Performed by: FAMILY MEDICINE

## 2020-06-03 PROCEDURE — G8427 DOCREV CUR MEDS BY ELIG CLIN: HCPCS | Performed by: FAMILY MEDICINE

## 2020-06-03 PROCEDURE — G8417 CALC BMI ABV UP PARAM F/U: HCPCS | Performed by: FAMILY MEDICINE

## 2020-06-03 PROCEDURE — 99214 OFFICE O/P EST MOD 30 MIN: CPT | Performed by: FAMILY MEDICINE

## 2020-06-03 PROCEDURE — 4040F PNEUMOC VAC/ADMIN/RCVD: CPT | Performed by: FAMILY MEDICINE

## 2020-06-03 PROCEDURE — 1036F TOBACCO NON-USER: CPT | Performed by: FAMILY MEDICINE

## 2020-06-03 RX ORDER — PANTOPRAZOLE SODIUM 40 MG/1
TABLET, DELAYED RELEASE ORAL
Qty: 90 TABLET | Refills: 3 | Status: SHIPPED
Start: 2020-06-03 | End: 2021-03-29 | Stop reason: SDUPTHER

## 2020-06-03 RX ORDER — AMLODIPINE BESYLATE 5 MG/1
5 TABLET ORAL DAILY
Qty: 90 TABLET | Refills: 3 | Status: SHIPPED
Start: 2020-06-03 | End: 2021-03-10

## 2020-06-03 RX ORDER — LOSARTAN POTASSIUM 50 MG/1
50 TABLET ORAL DAILY
Qty: 90 TABLET | Refills: 3 | Status: SHIPPED
Start: 2020-06-03 | End: 2020-12-14 | Stop reason: SDUPTHER

## 2020-06-03 RX ORDER — BUPROPION HYDROCHLORIDE 150 MG/1
150 TABLET ORAL DAILY
Qty: 90 TABLET | Refills: 3 | Status: SHIPPED
Start: 2020-06-03 | End: 2021-01-04

## 2020-06-03 ASSESSMENT — ENCOUNTER SYMPTOMS
GASTROINTESTINAL NEGATIVE: 1
ALLERGIC/IMMUNOLOGIC NEGATIVE: 1
RESPIRATORY NEGATIVE: 1
EYES NEGATIVE: 1

## 2020-06-03 NOTE — PROGRESS NOTES
Reba        Review of Systems   Constitutional: Negative. HENT: Negative. Eyes: Negative. Respiratory: Negative. Gastrointestinal: Negative. Endocrine: Negative. Genitourinary: Negative. Musculoskeletal: Negative. Skin: Negative. Allergic/Immunologic: Negative. Neurological: Negative. Hematological: Negative. Psychiatric/Behavioral: Negative. Systems review is all stable. Medications continue as prescribed.           Current Outpatient Medications:     amLODIPine (NORVASC) 5 MG tablet, Take 1 tablet by mouth daily, Disp: 90 tablet, Rfl: 3    pantoprazole (PROTONIX) 40 MG tablet, TK 1 T PO QD, Disp: 90 tablet, Rfl: 3    buPROPion (WELLBUTRIN XL) 150 MG extended release tablet, Take 1 tablet by mouth daily, Disp: 90 tablet, Rfl: 3    losartan (COZAAR) 50 MG tablet, Take 1 tablet by mouth daily, Disp: 90 tablet, Rfl: 3    ofloxacin (FLOXIN OTIC) 0.3 % otic solution, Place 5 drops into the right ear 2 times daily for 14 days Use only on affected side, Disp: 1 Bottle, Rfl: 0    meclizine (ANTIVERT) 25 MG tablet, Bid prn, Disp: 30 tablet, Rfl: 1    mirtazapine (REMERON) 15 MG tablet, Take 1 tablet by mouth nightly, Disp: 30 tablet, Rfl: 3    ibuprofen (ADVIL;MOTRIN) 800 MG tablet, Take 1 tablet by mouth every 8 hours as needed for Pain, Disp: 120 tablet, Rfl: 2    hydrochlorothiazide (HYDRODIURIL) 25 MG tablet, Take 1 tablet by mouth every morning, Disp: 90 tablet, Rfl: 3    dicyclomine (BENTYL) 10 MG capsule, Take 1 capsule by mouth 4 times daily as needed (cramping), Disp: 120 capsule, Rfl: 3    aspirin 81 MG tablet, Take 81 mg by mouth daily, Disp: , Rfl:     No Known Allergies    Social History     Tobacco Use    Smoking status: Never Smoker    Smokeless tobacco: Never Used   Substance Use Topics    Alcohol use: No    Drug use: No      Past Surgical History:   Procedure Laterality Date    ANTERIOR CRUCIATE LIGAMENT REPAIR Left 11/21/2001    APPENDECTOMY      within the tympanic membrane. Will consult with ear nose and throat on vertigo and discomfort but stable at this time. Reassessment in July. Plan Per Assessment:  Gemma Xavier was seen today for otalgia. Diagnoses and all orders for this visit:    Essential hypertension    Gastroesophageal reflux disease with esophagitis    Mixed hyperlipidemia    Anxiety    Vertigo  -     Amb External Referral To ENT    Otalgia of right ear  -     Amb External Referral To ENT    Other orders  -     amLODIPine (NORVASC) 5 MG tablet; Take 1 tablet by mouth daily  -     pantoprazole (PROTONIX) 40 MG tablet; TK 1 T PO QD  -     buPROPion (WELLBUTRIN XL) 150 MG extended release tablet; Take 1 tablet by mouth daily  -     losartan (COZAAR) 50 MG tablet; Take 1 tablet by mouth daily            No follow-ups on file. John Dooley DO    Note was generated with the assistance of voice recognition software. Document was reviewed however may contain grammatical errors.

## 2020-07-23 ENCOUNTER — OFFICE VISIT (OUTPATIENT)
Dept: PRIMARY CARE CLINIC | Age: 85
End: 2020-07-23
Payer: MEDICARE

## 2020-07-23 VITALS
HEART RATE: 82 BPM | DIASTOLIC BLOOD PRESSURE: 70 MMHG | BODY MASS INDEX: 26.31 KG/M2 | SYSTOLIC BLOOD PRESSURE: 130 MMHG | TEMPERATURE: 98.2 F | WEIGHT: 194 LBS | OXYGEN SATURATION: 97 %

## 2020-07-23 PROBLEM — J01.01 ACUTE RECURRENT MAXILLARY SINUSITIS: Status: ACTIVE | Noted: 2020-07-23

## 2020-07-23 PROCEDURE — 99214 OFFICE O/P EST MOD 30 MIN: CPT | Performed by: FAMILY MEDICINE

## 2020-07-23 PROCEDURE — 1036F TOBACCO NON-USER: CPT | Performed by: FAMILY MEDICINE

## 2020-07-23 PROCEDURE — 4040F PNEUMOC VAC/ADMIN/RCVD: CPT | Performed by: FAMILY MEDICINE

## 2020-07-23 PROCEDURE — G8427 DOCREV CUR MEDS BY ELIG CLIN: HCPCS | Performed by: FAMILY MEDICINE

## 2020-07-23 PROCEDURE — G8417 CALC BMI ABV UP PARAM F/U: HCPCS | Performed by: FAMILY MEDICINE

## 2020-07-23 PROCEDURE — 1123F ACP DISCUSS/DSCN MKR DOCD: CPT | Performed by: FAMILY MEDICINE

## 2020-07-23 RX ORDER — AZITHROMYCIN 250 MG/1
TABLET, FILM COATED ORAL
Qty: 1 PACKET | Refills: 0 | Status: SHIPPED
Start: 2020-07-23 | End: 2020-09-01 | Stop reason: ALTCHOICE

## 2020-07-23 RX ORDER — PREDNISONE 1 MG/1
TABLET ORAL
Qty: 30 TABLET | Refills: 0 | Status: SHIPPED
Start: 2020-07-23 | End: 2020-09-01 | Stop reason: ALTCHOICE

## 2020-07-23 ASSESSMENT — ENCOUNTER SYMPTOMS
SINUS PAIN: 1
EYES NEGATIVE: 1
ALLERGIC/IMMUNOLOGIC NEGATIVE: 1
RESPIRATORY NEGATIVE: 1
SINUS PRESSURE: 1
GASTROINTESTINAL NEGATIVE: 1

## 2020-07-23 NOTE — PROGRESS NOTES
20     Gaudencio Joshi    : 1935 Sex: male   Age: [address] y.o. Chief Complaint   Patient presents with    Hypertension    Gastroesophageal Reflux    Sinusitis       Prior to Admission medications    Medication Sig Start Date End Date Taking? Authorizing Provider   predniSONE (DELTASONE) 5 MG tablet 4 tablets daily for 3 days then 3 tablets daily for 3 days then 2 tablets daily for 3 days then 1 tablet daily for 3 days 20  Yes Shawn Villa, DO   azithromycin (ZITHROMAX Z-ELNEA) 250 MG tablet 2 today  Then 1 qd  4 days 20  Yes Shawn Villa, DO   amLODIPine (NORVASC) 5 MG tablet Take 1 tablet by mouth daily 6/3/20  Yes Shawn Villa, DO   pantoprazole (PROTONIX) 40 MG tablet TK 1 T PO QD 6/3/20  Yes Shawn Villa, DO   buPROPion (WELLBUTRIN XL) 150 MG extended release tablet Take 1 tablet by mouth daily 6/3/20  Yes Shawn Villa, DO   losartan (COZAAR) 50 MG tablet Take 1 tablet by mouth daily 6/3/20  Yes Shawn Villa, DO   meclizine (ANTIVERT) 25 MG tablet Bid prn 20  Yes Shawn Villa, DO   mirtazapine (REMERON) 15 MG tablet Take 1 tablet by mouth nightly 20  Yes Shawn Villa, DO   ibuprofen (ADVIL;MOTRIN) 800 MG tablet Take 1 tablet by mouth every 8 hours as needed for Pain 20  Yes Shawn Villa, DO   hydrochlorothiazide (HYDRODIURIL) 25 MG tablet Take 1 tablet by mouth every morning 20  Yes Shawn Villa, DO   aspirin 81 MG tablet Take 81 mg by mouth daily   Yes Historical Provider, MD          HPI: Patient evaluated today with hypertension impaired fasting glucose hyperlipidemia renal artery aneurysm all of which is been stable. Medications currently well-tolerated. Intermittent vertigo he is going to undergo ENT  Vertiginous testing in the next couple weeks. Presently dealing with some sinus pressure maxillary frontal and treatment will be provided today. Review of Systems   Constitutional: Negative.     HENT: Positive for congestion, ear pain, postnasal drip, sinus pressure and sinus pain. Eyes: Negative. Respiratory: Negative. Gastrointestinal: Negative. Endocrine: Negative. Genitourinary: Negative. Musculoskeletal: Negative. Skin: Negative. Allergic/Immunologic: Negative. Neurological: Negative. Hematological: Negative. Psychiatric/Behavioral: Negative. System review is overall stable aside from sinus complaints as noted.         Current Outpatient Medications:     predniSONE (DELTASONE) 5 MG tablet, 4 tablets daily for 3 days then 3 tablets daily for 3 days then 2 tablets daily for 3 days then 1 tablet daily for 3 days, Disp: 30 tablet, Rfl: 0    azithromycin (ZITHROMAX Z-ELENA) 250 MG tablet, 2 today  Then 1 qd  4 days, Disp: 1 packet, Rfl: 0    amLODIPine (NORVASC) 5 MG tablet, Take 1 tablet by mouth daily, Disp: 90 tablet, Rfl: 3    pantoprazole (PROTONIX) 40 MG tablet, TK 1 T PO QD, Disp: 90 tablet, Rfl: 3    buPROPion (WELLBUTRIN XL) 150 MG extended release tablet, Take 1 tablet by mouth daily, Disp: 90 tablet, Rfl: 3    losartan (COZAAR) 50 MG tablet, Take 1 tablet by mouth daily, Disp: 90 tablet, Rfl: 3    meclizine (ANTIVERT) 25 MG tablet, Bid prn, Disp: 30 tablet, Rfl: 1    mirtazapine (REMERON) 15 MG tablet, Take 1 tablet by mouth nightly, Disp: 30 tablet, Rfl: 3    ibuprofen (ADVIL;MOTRIN) 800 MG tablet, Take 1 tablet by mouth every 8 hours as needed for Pain, Disp: 120 tablet, Rfl: 2    hydrochlorothiazide (HYDRODIURIL) 25 MG tablet, Take 1 tablet by mouth every morning, Disp: 90 tablet, Rfl: 3    aspirin 81 MG tablet, Take 81 mg by mouth daily, Disp: , Rfl:     No Known Allergies    Social History     Tobacco Use    Smoking status: Never Smoker    Smokeless tobacco: Never Used   Substance Use Topics    Alcohol use: No    Drug use: No      Past Surgical History:   Procedure Laterality Date    ANTERIOR CRUCIATE LIGAMENT REPAIR Left 11/21/2001    APPENDECTOMY      CHOLECYSTECTOMY  2007    Lap    ECHO COMPL W DOP COLOR FLOW  12/4/2012         HERNIA REPAIR Right 11/13/2002    double    SINUS SURGERY  [id number]     done x 2    TONSILLECTOMY      TOTAL KNEE ARTHROPLASTY Left 1/28/2019    LEFT  ROBOTIC KNEE TOTAL ARTHROPLASTY  ++MEREDITH- [id number]++   ++ADDUCTOR BLOCK++ performed by Deirdre Hopkins MD at [address] [place] [address] 10/10/2019    EGD BIOPSY performed by Tariq Vyas MD at [facility] ENDOSCOPY     No family history on file. Past Medical History:   Diagnosis Date    Aneurysm of right renal artery ([address]     follows with Dr. Laurence Acuña yearly (last visit 9/19)    Colitis     Depression     GERD (gastroesophageal reflux disease)     Hyperlipidemia     Hypertension     TIA (transient ischemic attack)        Vitals:    07/23/20 1302   BP: 130/70   Pulse: 82   Temp: 98.2 °F (36.8 °C)   SpO2: 97%   Weight: 194 lb (88 kg)     BP Readings from Last 3 Encounters:   07/23/20 130/70   06/03/20 (!) 140/78   05/27/20 (!) 150/80        Physical Exam  Vitals signs and nursing note reviewed. Constitutional:       Appearance: He is well-developed. HENT:      Head: Normocephalic. Right Ear: External ear normal.      Left Ear: External ear normal.      Nose: Nose normal.   Eyes:      Conjunctiva/sclera: Conjunctivae normal.      Pupils: Pupils are equal, round, and reactive to light. Neck:      Musculoskeletal: Normal range of motion and neck supple. Cardiovascular:      Rate and Rhythm: Normal rate. Pulmonary:      Breath sounds: Normal breath sounds. Abdominal:      General: Bowel sounds are normal.      Palpations: Abdomen is soft. Musculoskeletal: Normal range of motion. Skin:     General: Skin is warm and dry. Neurological:      Mental Status: He is alert and oriented to person, place, and time. Psychiatric:         Behavior: Behavior normal.     Today's vitals physical exam stable. Pressures controlled.   I will maintain present medications and care with the addition of Zithromax and prednisone today for the sinuses and reassess if persistent. All improves we will follow-up in October with blood work. Plan Per Assessment:  Marlene Perez was seen today for hypertension, gastroesophageal reflux and sinusitis. Diagnoses and all orders for this visit:    Essential hypertension  -     CBC Auto Differential; Future  -     Comprehensive Metabolic Panel; Future  -     Lipid Panel; Future  -     T4; Future  -     TSH without Reflex; Future    Aneurysm of right renal artery (HCC)    Impaired fasting glucose  -     CBC Auto Differential; Future  -     Comprehensive Metabolic Panel; Future  -     Lipid Panel; Future  -     T4; Future  -     TSH without Reflex; Future  -     Hemoglobin A1C; Future    Mixed hyperlipidemia  -     CBC Auto Differential; Future  -     Comprehensive Metabolic Panel; Future  -     Lipid Panel; Future  -     T4; Future  -     TSH without Reflex; Future    Acute recurrent maxillary sinusitis  -     azithromycin (ZITHROMAX Z-ELENA) 250 MG tablet; 2 today  Then 1 qd  4 days    Other orders  -     predniSONE (DELTASONE) 5 MG tablet; 4 tablets daily for 3 days then 3 tablets daily for 3 days then 2 tablets daily for 3 days then 1 tablet daily for 3 days            Return in about 3 months (around 10/23/2020). Annetta Carter DO    Note was generated with the assistance of voice recognition software. Document was reviewed however may contain grammatical errors.

## 2020-08-24 ENCOUNTER — TELEPHONE (OUTPATIENT)
Dept: PRIMARY CARE CLINIC | Age: 85
End: 2020-08-24

## 2020-08-24 NOTE — TELEPHONE ENCOUNTER
The pt is calling because since yesterday morning he has been dealing with some vertigo, nausea, and he has a headache. He says she takes his temperature regularly and he doesn't have one, but he feels flushed and his ears actually feel hot.  I offered for the pt to see if you have any openings today and he says he wouldn't be able to drive

## 2020-08-24 NOTE — TELEPHONE ENCOUNTER
Fluids today. And then slowly advance diet if tolerated. Persistent or worsening symptoms or should see.

## 2020-09-01 ENCOUNTER — HOSPITAL ENCOUNTER (OUTPATIENT)
Age: 85
Discharge: HOME | End: 2020-09-03
Payer: MEDICARE

## 2020-09-01 ENCOUNTER — OFFICE VISIT (OUTPATIENT)
Dept: FAMILY MEDICINE CLINIC | Age: 85
End: 2020-09-01
Payer: MEDICARE

## 2020-09-01 VITALS
RESPIRATION RATE: 18 BRPM | HEART RATE: 80 BPM | BODY MASS INDEX: 26.68 KG/M2 | WEIGHT: 197 LBS | DIASTOLIC BLOOD PRESSURE: 76 MMHG | TEMPERATURE: 97.6 F | HEIGHT: 72 IN | OXYGEN SATURATION: 97 % | SYSTOLIC BLOOD PRESSURE: 132 MMHG

## 2020-09-01 PROCEDURE — 1123F ACP DISCUSS/DSCN MKR DOCD: CPT | Performed by: PHYSICIAN ASSISTANT

## 2020-09-01 PROCEDURE — 99213 OFFICE O/P EST LOW 20 MIN: CPT | Performed by: PHYSICIAN ASSISTANT

## 2020-09-01 PROCEDURE — 4040F PNEUMOC VAC/ADMIN/RCVD: CPT | Performed by: PHYSICIAN ASSISTANT

## 2020-09-01 PROCEDURE — G8417 CALC BMI ABV UP PARAM F/U: HCPCS | Performed by: PHYSICIAN ASSISTANT

## 2020-09-01 PROCEDURE — U0003 INFECTIOUS AGENT DETECTION BY NUCLEIC ACID (DNA OR RNA); SEVERE ACUTE RESPIRATORY SYNDROME CORONAVIRUS 2 (SARS-COV-2) (CORONAVIRUS DISEASE [COVID-19]), AMPLIFIED PROBE TECHNIQUE, MAKING USE OF HIGH THROUGHPUT TECHNOLOGIES AS DESCRIBED BY CMS-2020-01-R: HCPCS

## 2020-09-01 PROCEDURE — G8427 DOCREV CUR MEDS BY ELIG CLIN: HCPCS | Performed by: PHYSICIAN ASSISTANT

## 2020-09-01 PROCEDURE — 1036F TOBACCO NON-USER: CPT | Performed by: PHYSICIAN ASSISTANT

## 2020-09-01 RX ORDER — AZITHROMYCIN 250 MG/1
250 TABLET, FILM COATED ORAL SEE ADMIN INSTRUCTIONS
Qty: 6 TABLET | Refills: 0 | Status: SHIPPED | OUTPATIENT
Start: 2020-09-01 | End: 2020-09-06

## 2020-09-01 RX ORDER — DEXTROMETHORPHAN HYDROBROMIDE AND PROMETHAZINE HYDROCHLORIDE 15; 6.25 MG/5ML; MG/5ML
5 SYRUP ORAL 4 TIMES DAILY PRN
Qty: 120 ML | Refills: 0 | Status: SHIPPED | OUTPATIENT
Start: 2020-09-01 | End: 2020-09-08

## 2020-09-01 NOTE — PROGRESS NOTES
20  Yadira Atkinson : 1935 Sex: male  Age 81 y.o. Subjective:  Chief Complaint   Patient presents with    Sinus Problem     pt states sinus drainage, headache and upset stomach          HPI:   Yadira Atkinson , 81 y.o. male presents to express care for evaluation of 0sinus drainage, headache, nausea. The patient has had the symptoms ongoing for about a week now. Does not seem to be getting better. Patient does not have any chest pain, shortness of breath, lightheadedness or dizziness. The patient is not currently on any antibiotics. The patient called in to speak with his physician and they recommended that he be evaluated. Patient was also told to increase fluids. The patient is not having a lightheadedness, dizziness. The patient is not having any syncope. He denies any loss of smell, taste. No fevers. ROS:   Unless otherwise stated in this report the patient's positive and negative responses for review of systems for constitutional, eyes, ENT, cardiovascular, respiratory, gastrointestinal, neurological, , musculoskeletal, and integument systems and related systems to the presenting problem are either stated in the history of present illness or were not pertinent or were negative for the symptoms and/or complaints related to the presenting medical problem. Positives and pertinent negatives as per HPI. All others reviewed and are negative.       PMH:     Past Medical History:   Diagnosis Date    Aneurysm of right renal artery ([address]     follows with Dr. Justen Valentine yearly (last visit )    Colitis     Depression     GERD (gastroesophageal reflux disease)     Hyperlipidemia     Hypertension     TIA (transient ischemic attack)        Past Surgical History:   Procedure Laterality Date    ANTERIOR CRUCIATE LIGAMENT REPAIR Left 2001    APPENDECTOMY      CHOLECYSTECTOMY      Lap    ECHO COMPL W DOP COLOR FLOW  2012         HERNIA REPAIR Right 2002 double    SINUS SURGERY  2002,2003     done x 2    TONSILLECTOMY      TOTAL KNEE ARTHROPLASTY Left 1/28/2019    LEFT  ROBOTIC KNEE TOTAL ARTHROPLASTY  ++MEREDITH- [id number]++   ++ADDUCTOR BLOCK++ performed by Gavin Herron MD at [place] [address] 10/10/2019    EGD BIOPSY performed by Shubham Pacheco MD at [facility] ENDOSCOPY       No family history on file. Medications:     Current Outpatient Medications:     promethazine-dextromethorphan (PROMETHAZINE-DM) 6.25-15 MG/5ML syrup, Take 5 mLs by mouth 4 times daily as needed for Cough, Disp: 120 mL, Rfl: 0    azithromycin (ZITHROMAX) 250 MG tablet, Take 1 tablet by mouth See Admin Instructions for 5 days 500mg on day 1 followed by 250mg on days 2 - 5, Disp: 6 tablet, Rfl: 0    amLODIPine (NORVASC) 5 MG tablet, Take 1 tablet by mouth daily, Disp: 90 tablet, Rfl: 3    pantoprazole (PROTONIX) 40 MG tablet, TK 1 T PO QD, Disp: 90 tablet, Rfl: 3    buPROPion (WELLBUTRIN XL) 150 MG extended release tablet, Take 1 tablet by mouth daily, Disp: 90 tablet, Rfl: 3    losartan (COZAAR) 50 MG tablet, Take 1 tablet by mouth daily, Disp: 90 tablet, Rfl: 3    meclizine (ANTIVERT) 25 MG tablet, Bid prn, Disp: 30 tablet, Rfl: 1    mirtazapine (REMERON) 15 MG tablet, Take 1 tablet by mouth nightly, Disp: 30 tablet, Rfl: 3    ibuprofen (ADVIL;MOTRIN) 800 MG tablet, Take 1 tablet by mouth every 8 hours as needed for Pain, Disp: 120 tablet, Rfl: 2    hydrochlorothiazide (HYDRODIURIL) 25 MG tablet, Take 1 tablet by mouth every morning, Disp: 90 tablet, Rfl: 3    aspirin 81 MG tablet, Take 81 mg by mouth daily, Disp: , Rfl:     Allergies:   No Known Allergies    Social History:     Social History     Tobacco Use    Smoking status: Never Smoker    Smokeless tobacco: Never Used   Substance Use Topics    Alcohol use: No    Drug use: No       Patient lives at home.     Physical Exam:     Vitals:    09/01/20 1508   BP: 132/76   Pulse: 80   Resp: 18 Temp: 97.6 °F (36.4 °C)   SpO2: 97%   Weight: 197 lb (89.4 kg)   Height: 6' (1.829 m)       Exam:  Physical Exam  Nurse's notes and vital signs reviewed. The patient is not hypoxic. ? General: Alert, no acute distress, patient resting comfortably Patient is not toxic or lethargic. Skin: Warm, intact, no pallor noted. There is no evidence of rash at this time. Head: Normocephalic, atraumatic  Eye: Normal conjunctiva  Ears, Nose, Throat: Right tympanic membrane clear, left tympanic membrane clear. No drainage or discharge noted. No pre- or post-auricular tenderness, erythema, or swelling noted. Clear rhinorrhea, drainage, no epistaxis  Posterior oropharynx shows erythema and cobblestoning but no evidence of tonsillar hypertrophy, or exudate. the uvula is midline. No trismus or drooling is noted. Moist mucous membranes. Neck: No anterior/posterior lymphadenopathy noted. No erythema, no masses, no fluctuance or induration noted. No meningeal signs. Cardiovascular: Regular Rate and Rhythm  Respiratory: No acute distress, no rhonchi, wheezing or crackles noted. No stridor or retractions are noted. Neurological: A&O x4, normal speech  Psychiatric: Cooperative         Testing:           Medical Decision Making:     The patient has been seen and evaluated. The vital signs have been reviewed. The patient does not appear to be toxic or lethargic. The patient will have COVID-19 swab performed. The patient will be contacted with the results. The patient will return if any of the signs or symptoms worsen. The patient will also be started on a azithromycin and Promethazine DM. The patient was educated on the proper dosage of motrin and tylenol and the appropriate intervals of each. The patient is to increase fluid intake over the next several days.  The patient is to use OTC nasal spray    The patient is to return to express care or go directly to the emergency department should any of the signs or symptoms worsen. The patient is to followup with primary care physician in 2-3 days for repeat evaluation. The patient has no other questions or concerns at this time the patient will be discharged home. Clinical Impression:   Tanya Duarte was seen today for sinus problem. Diagnoses and all orders for this visit:    Acute upper respiratory infection, unspecified  -     azithromycin (ZITHROMAX) 250 MG tablet; Take 1 tablet by mouth See Admin Instructions for 5 days 500mg on day 1 followed by 250mg on days 2 - 5    Acute non-recurrent sinusitis, unspecified location    Sinus headache    Nausea    Other orders  -     COVID-19 Ambulatory; Future  -     promethazine-dextromethorphan (PROMETHAZINE-DM) 6.25-15 MG/5ML syrup; Take 5 mLs by mouth 4 times daily as needed for Cough        The patient is to call for any concerns or return if any of the signs or symptoms worsen. The patient is to follow-up with PCP in the next 2-3 days for repeat evaluation repeat assessment or go directly to the emergency department.      SIGNATURE: Sofya Monroe III, PA-C

## 2020-09-09 LAB
SARS-COV-2: NOT DETECTED
SOURCE: NORMAL

## 2020-09-10 ENCOUNTER — VIRTUAL VISIT (OUTPATIENT)
Dept: PRIMARY CARE CLINIC | Age: 85
End: 2020-09-10
Payer: MEDICARE

## 2020-09-10 PROBLEM — J01.91 ACUTE RECURRENT SINUSITIS: Status: ACTIVE | Noted: 2020-07-23

## 2020-09-10 PROCEDURE — 1036F TOBACCO NON-USER: CPT | Performed by: FAMILY MEDICINE

## 2020-09-10 PROCEDURE — 99213 OFFICE O/P EST LOW 20 MIN: CPT | Performed by: FAMILY MEDICINE

## 2020-09-10 PROCEDURE — 1123F ACP DISCUSS/DSCN MKR DOCD: CPT | Performed by: FAMILY MEDICINE

## 2020-09-10 PROCEDURE — 4040F PNEUMOC VAC/ADMIN/RCVD: CPT | Performed by: FAMILY MEDICINE

## 2020-09-10 PROCEDURE — G8417 CALC BMI ABV UP PARAM F/U: HCPCS | Performed by: FAMILY MEDICINE

## 2020-09-10 PROCEDURE — G8427 DOCREV CUR MEDS BY ELIG CLIN: HCPCS | Performed by: FAMILY MEDICINE

## 2020-09-10 RX ORDER — PREDNISONE 1 MG/1
TABLET ORAL
Qty: 30 TABLET | Refills: 0 | Status: SHIPPED
Start: 2020-09-10 | End: 2020-10-01

## 2020-09-10 RX ORDER — AZITHROMYCIN 250 MG/1
TABLET, FILM COATED ORAL
Qty: 1 PACKET | Refills: 0 | Status: SHIPPED
Start: 2020-09-10 | End: 2020-09-17 | Stop reason: CLARIF

## 2020-09-10 ASSESSMENT — ENCOUNTER SYMPTOMS
SINUS PAIN: 1
ALLERGIC/IMMUNOLOGIC NEGATIVE: 1
RESPIRATORY NEGATIVE: 1
SINUS PRESSURE: 1
EYES NEGATIVE: 1
GASTROINTESTINAL NEGATIVE: 1

## 2020-09-10 NOTE — PROGRESS NOTES
9/10/20     Fawn Books    : 1935 Sex: male   Age: 81 y.o. Chief Complaint   Patient presents with    Hypertension    Gastroesophageal Reflux       Prior to Admission medications    Medication Sig Start Date End Date Taking?  Authorizing Provider   amLODIPine (NORVASC) 5 MG tablet Take 1 tablet by mouth daily 6/3/20   Phoebe Leyden, DO   pantoprazole (PROTONIX) 40 MG tablet TK 1 T PO QD 6/3/20   Phoebe Leyden, DO   buPROPion (WELLBUTRIN XL) 150 MG extended release tablet Take 1 tablet by mouth daily 6/3/20   [facility], DO   losartan (COZAAR) 50 MG tablet Take 1 tablet by mouth daily 6/3/20   [facility], DO   meclizine (ANTIVERT) 25 MG tablet Bid prn 20   [facility], DO   mirtazapine (REMERON) 15 MG tablet Take 1 tablet by mouth nightly 20   Phoebe Leyden, DO   ibuprofen (ADVIL;MOTRIN) 800 MG tablet Take 1 tablet by mouth every 8 hours as needed for Pain 20   Suzanne Villa, DO   hydrochlorothiazide (HYDRODIURIL) 25 MG tablet Take 1 tablet by mouth every morning 20   Phoebe Leyden, DO   aspirin 81 MG tablet Take 81 mg by mouth daily    Historical Provider, MD          HPI:           Review of Systems           Current Outpatient Medications:     amLODIPine (NORVASC) 5 MG tablet, Take 1 tablet by mouth daily, Disp: 90 tablet, Rfl: 3    pantoprazole (PROTONIX) 40 MG tablet, TK 1 T PO QD, Disp: 90 tablet, Rfl: 3    buPROPion (WELLBUTRIN XL) 150 MG extended release tablet, Take 1 tablet by mouth daily, Disp: 90 tablet, Rfl: 3    losartan (COZAAR) 50 MG tablet, Take 1 tablet by mouth daily, Disp: 90 tablet, Rfl: 3    meclizine (ANTIVERT) 25 MG tablet, Bid prn, Disp: 30 tablet, Rfl: 1    mirtazapine (REMERON) 15 MG tablet, Take 1 tablet by mouth nightly, Disp: 30 tablet, Rfl: 3    ibuprofen (ADVIL;MOTRIN) 800 MG tablet, Take 1 tablet by mouth every 8 hours as needed for Pain, Disp: 120 tablet, Rfl: 2    hydrochlorothiazide (HYDRODIURIL) 25 MG tablet, Take 1 tablet by mouth every morning, Disp: 90 tablet, Rfl: 3    aspirin 81 MG tablet, Take 81 mg by mouth daily, Disp: , Rfl:     No Known Allergies    Social History     Tobacco Use    Smoking status: Never Smoker    Smokeless tobacco: Never Used   Substance Use Topics    Alcohol use: No    Drug use: No      Past Surgical History:   Procedure Laterality Date    ANTERIOR CRUCIATE LIGAMENT REPAIR Left 11/21/2001    APPENDECTOMY      CHOLECYSTECTOMY  2007    Lap    ECHO COMPL W DOP COLOR FLOW  12/4/2012         HERNIA REPAIR Right 11/13/2002    double    SINUS SURGERY  2002,2003     done x 2    TONSILLECTOMY      TOTAL KNEE ARTHROPLASTY Left 1/28/2019    LEFT  ROBOTIC KNEE TOTAL ARTHROPLASTY  ++MEREDITH- ANURAZKF++   ++ADDUCTOR BLOCK++ performed by Debbie Zuluaga MD at [facility] 10/10/2019    EGD BIOPSY performed by Arliss Bernheim, MD at [facility] ENDOSCOPY     No family history on file. Past Medical History:   Diagnosis Date    Aneurysm of right renal artery ([address]     follows with Dr. Tahir Magaña yearly (last visit 9/19)    Colitis     Depression     GERD (gastroesophageal reflux disease)     Hyperlipidemia     Hypertension     TIA (transient ischemic attack)        There were no vitals filed for this visit. BP Readings from Last 3 Encounters:   09/01/20 132/76   07/23/20 130/70   06/03/20 (!) 140/78        Physical Exam             Plan Per Assessment:  There are no diagnoses linked to this encounter. No follow-ups on file. Jaswinder Ritchie DO    Note was generated with the assistance of voice recognition software. Document was reviewed however may contain grammatical errors.

## 2020-09-10 NOTE — PROGRESS NOTES
9/10/20     Janak Jaramillo    : 1935 Sex: male   Age: 81 y.o. Chief Complaint   Patient presents with    Hypertension    Gastroesophageal Reflux       Prior to Admission medications    Medication Sig Start Date End Date Taking? Authorizing Provider   azithromycin (ZITHROMAX Z-ELENA) 250 MG tablet 2 today  Then 1 qd  4 days 9/10/20  Yes Jonas Villa, DO   predniSONE (DELTASONE) 5 MG tablet 4 tablets daily for 3 days then 3 tablets daily for 3 days then 2 tablets daily for 3 days then 1 tablet daily for 3 days 9/10/20  Yes Jonas Villa, DO   amLODIPine (NORVASC) 5 MG tablet Take 1 tablet by mouth daily 6/3/20   Dorothea Goel, DO   pantoprazole (PROTONIX) 40 MG tablet TK 1 T PO QD 6/3/20   Dorothea Goel, DO   buPROPion (WELLBUTRIN XL) 150 MG extended release tablet Take 1 tablet by mouth daily 6/3/20   Dorothea Goel, DO   losartan (COZAAR) 50 MG tablet Take 1 tablet by mouth daily 6/3/20   Dorothea Goel, DO   meclizine (ANTIVERT) 25 MG tablet Bid prn 20   Dorothea Goel, DO   mirtazapine (REMERON) 15 MG tablet Take 1 tablet by mouth nightly 20   Dorothea Goel, DO   ibuprofen (ADVIL;MOTRIN) 800 MG tablet Take 1 tablet by mouth every 8 hours as needed for Pain 20   Jonas Villa, DO   hydrochlorothiazide (HYDRODIURIL) 25 MG tablet Take 1 tablet by mouth every morning 20   Dorothea Goel, DO   aspirin 81 MG tablet Take 81 mg by mouth daily    Historical Provider, MD          HPI: Liyahoralia Velazquez is evaluated today video visit. Problems with reflux disease hypertension impaired fasting glucose chronic sinus. Today with complaints of sinus drainage upset stomach frontal maxillary pressure. Addition of Zithromax and prednisone and then will follow-up in about 3 weeks. He will see ear nose and throat on . Review of Systems   Constitutional: Negative. HENT: Positive for congestion, sinus pressure and sinus pain. Eyes: Negative. Respiratory: Negative. Gastrointestinal: Negative. Endocrine: Negative. Genitourinary: Negative. Musculoskeletal: Negative. Skin: Negative. Allergic/Immunologic: Negative. Neurological: Negative. Hematological: Negative. Psychiatric/Behavioral: Negative. This time review currently stable aside from the upper respiratory complaints as noted.       Current Outpatient Medications:     azithromycin (ZITHROMAX Z-ELENA) 250 MG tablet, 2 today  Then 1 qd  4 days, Disp: 1 packet, Rfl: 0    predniSONE (DELTASONE) 5 MG tablet, 4 tablets daily for 3 days then 3 tablets daily for 3 days then 2 tablets daily for 3 days then 1 tablet daily for 3 days, Disp: 30 tablet, Rfl: 0    amLODIPine (NORVASC) 5 MG tablet, Take 1 tablet by mouth daily, Disp: 90 tablet, Rfl: 3    pantoprazole (PROTONIX) 40 MG tablet, TK 1 T PO QD, Disp: 90 tablet, Rfl: 3    buPROPion (WELLBUTRIN XL) 150 MG extended release tablet, Take 1 tablet by mouth daily, Disp: 90 tablet, Rfl: 3    losartan (COZAAR) 50 MG tablet, Take 1 tablet by mouth daily, Disp: 90 tablet, Rfl: 3    meclizine (ANTIVERT) 25 MG tablet, Bid prn, Disp: 30 tablet, Rfl: 1    mirtazapine (REMERON) 15 MG tablet, Take 1 tablet by mouth nightly, Disp: 30 tablet, Rfl: 3    ibuprofen (ADVIL;MOTRIN) 800 MG tablet, Take 1 tablet by mouth every 8 hours as needed for Pain, Disp: 120 tablet, Rfl: 2    hydrochlorothiazide (HYDRODIURIL) 25 MG tablet, Take 1 tablet by mouth every morning, Disp: 90 tablet, Rfl: 3    aspirin 81 MG tablet, Take 81 mg by mouth daily, Disp: , Rfl:     No Known Allergies    Social History     Tobacco Use    Smoking status: Never Smoker    Smokeless tobacco: Never Used   Substance Use Topics    Alcohol use: No    Drug use: No      Past Surgical History:   Procedure Laterality Date    ANTERIOR CRUCIATE LIGAMENT REPAIR Left 11/21/2001    APPENDECTOMY      CHOLECYSTECTOMY  2007    Lap    ECHO COMPL W DOP COLOR FLOW  12/4/2012  HERNIA REPAIR Right 11/13/2002    double    SINUS SURGERY  0228,5557     done x 2    TONSILLECTOMY      TOTAL KNEE ARTHROPLASTY Left 1/28/2019    LEFT  ROBOTIC KNEE TOTAL ARTHROPLASTY  ++MEREDITH- [id number]++   ++ADDUCTOR BLOCK++ performed by Mindi Cee MD at [address] [place] [address] 10/10/2019    EGD BIOPSY performed by Marie Ervin MD at [facility] ENDOSCOPY     No family history on file. Past Medical History:   Diagnosis Date    Aneurysm of right renal artery ([address]     follows with Dr. Can Montana yearly (last visit 9/19)    Colitis     Depression     GERD (gastroesophageal reflux disease)     Hyperlipidemia     Hypertension     TIA (transient ischemic attack)        There were no vitals filed for this visit. BP Readings from Last 3 Encounters:   09/01/20 132/76   07/23/20 130/70   06/03/20 (!) 140/78        Physical Exam  Nursing note reviewed. Video visit today. Unable to do physical exam in the office but no mouth sores were noted via video evaluation and mucous membranes were pink and moist.  Drainage is present. Treatment as noted reassessment with me in about 3 weeks post ENT evaluation. Remainder medications as prescribed. TeleMedicine Video Visit    This visit was performed as a virtual video visit using a synchronous, two-way, audio-video telehealth technology platform. Patient identification was verified at the start of the visit, including the patient's telephone number and physical location. I discussed with the patient the nature of our telehealth visits, that:     1. Due to the nature of an audio- video modality, the only components of a physical exam that could be done are the elements supported by direct observation. 2. I would evaluate the patient and recommend diagnostics and treatments based on my assessment. 3. If it was felt that the patient should be evaluated in clinic or an emergency room setting, then they would be directed there.   4. Our sessions are not being recorded and that personal health information is protected. 5. Our team would provide follow up care in person if/when the patient needs it. Patient does agree to proceed with telemedicine consultation. Patient's location: home address in [facility]  Physician  location home address in [facility] other people involved in call      Time spent: Greater than Not billed by time    This visit was completed virtually using Doxy. me              Plan Per Assessment:  Star Handy was seen today for hypertension and gastroesophageal reflux. Diagnoses and all orders for this visit:    Gastroesophageal reflux disease with esophagitis    Essential hypertension    Impaired fasting glucose    Acute recurrent sinusitis, unspecified location  -     azithromycin (ZITHROMAX Z-ELENA) 250 MG tablet; 2 today  Then 1 qd  4 days    Other orders  -     predniSONE (DELTASONE) 5 MG tablet; 4 tablets daily for 3 days then 3 tablets daily for 3 days then 2 tablets daily for 3 days then 1 tablet daily for 3 days            No follow-ups on file. Gabriella Cantu DO    Note was generated with the assistance of voice recognition software. Document was reviewed however may contain grammatical errors.

## 2020-09-16 ENCOUNTER — TELEPHONE (OUTPATIENT)
Dept: ADMINISTRATIVE | Age: 85
End: 2020-09-16

## 2020-09-16 NOTE — TELEPHONE ENCOUNTER
Pt calling in to let dr know that he feels warm, does not have a fever, still has upset stomach, is on prednisone and z kole, please advise pt at [phone]

## 2020-09-17 ENCOUNTER — OFFICE VISIT (OUTPATIENT)
Dept: PRIMARY CARE CLINIC | Age: 85
End: 2020-09-17
Payer: MEDICARE

## 2020-09-17 VITALS
TEMPERATURE: 98.1 F | WEIGHT: 198 LBS | DIASTOLIC BLOOD PRESSURE: 82 MMHG | OXYGEN SATURATION: 97 % | BODY MASS INDEX: 26.85 KG/M2 | HEART RATE: 94 BPM | SYSTOLIC BLOOD PRESSURE: 136 MMHG

## 2020-09-17 PROBLEM — R53.83 FATIGUE: Status: ACTIVE | Noted: 2020-09-17

## 2020-09-17 PROCEDURE — 1036F TOBACCO NON-USER: CPT | Performed by: FAMILY MEDICINE

## 2020-09-17 PROCEDURE — G8427 DOCREV CUR MEDS BY ELIG CLIN: HCPCS | Performed by: FAMILY MEDICINE

## 2020-09-17 PROCEDURE — 1123F ACP DISCUSS/DSCN MKR DOCD: CPT | Performed by: FAMILY MEDICINE

## 2020-09-17 PROCEDURE — 99214 OFFICE O/P EST MOD 30 MIN: CPT | Performed by: FAMILY MEDICINE

## 2020-09-17 PROCEDURE — 4040F PNEUMOC VAC/ADMIN/RCVD: CPT | Performed by: FAMILY MEDICINE

## 2020-09-17 PROCEDURE — G8417 CALC BMI ABV UP PARAM F/U: HCPCS | Performed by: FAMILY MEDICINE

## 2020-09-17 RX ORDER — CIPROFLOXACIN 500 MG/1
500 TABLET, FILM COATED ORAL 2 TIMES DAILY
Qty: 14 TABLET | Refills: 0 | Status: SHIPPED | OUTPATIENT
Start: 2020-09-17 | End: 2020-09-24

## 2020-09-17 ASSESSMENT — ENCOUNTER SYMPTOMS
RESPIRATORY NEGATIVE: 1
ALLERGIC/IMMUNOLOGIC NEGATIVE: 1
EYES NEGATIVE: 1
DIARRHEA: 1

## 2020-09-17 NOTE — PROGRESS NOTES
20     Andrew Machuca    : 1935 Sex: male   Age: 81 y.o. Chief Complaint   Patient presents with    Sinus Problem     felt warm all over yesterday no fever    Diarrhea       Prior to Admission medications    Medication Sig Start Date End Date Taking? Authorizing Provider   ciprofloxacin (CIPRO) 500 MG tablet Take 1 tablet by mouth 2 times daily for 7 days 20 Yes Levon Carrel Traikoff, DO   predniSONE (DELTASONE) 5 MG tablet 4 tablets daily for 3 days then 3 tablets daily for 3 days then 2 tablets daily for 3 days then 1 tablet daily for 3 days 9/10/20  Yes Fabion Carrel Traikoff, DO   amLODIPine (NORVASC) 5 MG tablet Take 1 tablet by mouth daily 6/3/20  Yes Fabion Carrel Traikoff, DO   pantoprazole (PROTONIX) 40 MG tablet TK 1 T PO QD 6/3/20  Yes Fabion Carrel Traikoff, DO   buPROPion (WELLBUTRIN XL) 150 MG extended release tablet Take 1 tablet by mouth daily 6/3/20  Yes Fabion Carrel Traikoff, DO   losartan (COZAAR) 50 MG tablet Take 1 tablet by mouth daily 6/3/20  Yes Fabion Carrel Traikoff, DO   meclizine (ANTIVERT) 25 MG tablet Bid prn 20  Yes Fabion Carrel Traikoff, DO   mirtazapine (REMERON) 15 MG tablet Take 1 tablet by mouth nightly 20  Yes Fabion Carrel Traikoff, DO   ibuprofen (ADVIL;MOTRIN) 800 MG tablet Take 1 tablet by mouth every 8 hours as needed for Pain 20  Yes Fabion Carrel Traikoff, DO   hydrochlorothiazide (HYDRODIURIL) 25 MG tablet Take 1 tablet by mouth every morning 20  Yes Fabion Carrel Traikoff, DO   aspirin 81 MG tablet Take 81 mg by mouth daily   Yes Historical Provider, MD          HPI: Here is in today for evaluation on complaints of diarrhea muscle aches not feeling himself for the past 1 to 2 days. Denies fever chills night sweats. Recently tested for COVID virus was negative. Hypertension currently controlled. Fatigue is present. Review of Systems   Constitutional: Positive for fatigue. HENT: Negative. Eyes: Negative. Respiratory: Negative. Gastrointestinal: Positive for diarrhea. Endocrine: Negative. Genitourinary: Negative. Musculoskeletal: Negative. Skin: Negative. Allergic/Immunologic: Negative. Neurological: Negative. Hematological: Negative. Psychiatric/Behavioral: Negative.                Current Outpatient Medications:     ciprofloxacin (CIPRO) 500 MG tablet, Take 1 tablet by mouth 2 times daily for 7 days, Disp: 14 tablet, Rfl: 0    predniSONE (DELTASONE) 5 MG tablet, 4 tablets daily for 3 days then 3 tablets daily for 3 days then 2 tablets daily for 3 days then 1 tablet daily for 3 days, Disp: 30 tablet, Rfl: 0    amLODIPine (NORVASC) 5 MG tablet, Take 1 tablet by mouth daily, Disp: 90 tablet, Rfl: 3    pantoprazole (PROTONIX) 40 MG tablet, TK 1 T PO QD, Disp: 90 tablet, Rfl: 3    buPROPion (WELLBUTRIN XL) 150 MG extended release tablet, Take 1 tablet by mouth daily, Disp: 90 tablet, Rfl: 3    losartan (COZAAR) 50 MG tablet, Take 1 tablet by mouth daily, Disp: 90 tablet, Rfl: 3    meclizine (ANTIVERT) 25 MG tablet, Bid prn, Disp: 30 tablet, Rfl: 1    mirtazapine (REMERON) 15 MG tablet, Take 1 tablet by mouth nightly, Disp: 30 tablet, Rfl: 3    ibuprofen (ADVIL;MOTRIN) 800 MG tablet, Take 1 tablet by mouth every 8 hours as needed for Pain, Disp: 120 tablet, Rfl: 2    hydrochlorothiazide (HYDRODIURIL) 25 MG tablet, Take 1 tablet by mouth every morning, Disp: 90 tablet, Rfl: 3    aspirin 81 MG tablet, Take 81 mg by mouth daily, Disp: , Rfl:     No Known Allergies    Social History     Tobacco Use    Smoking status: Never Smoker    Smokeless tobacco: Never Used   Substance Use Topics    Alcohol use: No    Drug use: No      Past Surgical History:   Procedure Laterality Date    ANTERIOR CRUCIATE LIGAMENT REPAIR Left 11/21/2001    APPENDECTOMY      CHOLECYSTECTOMY  2007    Lap    ECHO COMPL W DOP COLOR FLOW  12/4/2012         HERNIA REPAIR Right 11/13/2002    double    SINUS SURGERY  2002,2003     done x 2    TONSILLECTOMY      TOTAL KNEE ARTHROPLASTY Left 1/28/2019    LEFT  ROBOTIC KNEE TOTAL ARTHROPLASTY  ++MEREDITH- [id number]++   ++ADDUCTOR BLOCK++ performed by Renae Lim MD at [facility] 10/10/2019    EGD BIOPSY performed by Tahmina Berg MD at [facility] ENDOSCOPY     No family history on file. Past Medical History:   Diagnosis Date    Aneurysm of right renal artery ([address]     follows with Dr. Patricia Oakley yearly (last visit 9/19)    Colitis     Depression     GERD (gastroesophageal reflux disease)     Hyperlipidemia     Hypertension     TIA (transient ischemic attack)        Vitals:    09/17/20 1511   BP: 136/82   Pulse: 94   Temp: 98.1 °F (36.7 °C)   SpO2: 97%   Weight: 198 lb (89.8 kg)     BP Readings from Last 3 Encounters:   09/17/20 136/82   09/01/20 132/76   07/23/20 130/70        Physical Exam  Vitals signs and nursing note reviewed. Constitutional:       Appearance: He is well-developed. HENT:      Head: Normocephalic. Right Ear: External ear normal.      Left Ear: External ear normal.      Nose: Nose normal.   Eyes:      Conjunctiva/sclera: Conjunctivae normal.      Pupils: Pupils are equal, round, and reactive to light. Neck:      Musculoskeletal: Normal range of motion and neck supple. Cardiovascular:      Rate and Rhythm: Normal rate. Pulmonary:      Breath sounds: Normal breath sounds. Abdominal:      General: Bowel sounds are normal.      Palpations: Abdomen is soft. Musculoskeletal: Normal range of motion. Skin:     General: Skin is warm and dry. Neurological:      Mental Status: He is alert and oriented to person, place, and time. Psychiatric:         Behavior: Behavior normal.     Today's physical exam findings are actually all stable. Heart remains controlled lungs are clear abdomen benign. We will sit tight with present medications care.   Addition of Cipro 500 twice daily over the next week and reassessment in 1 to 2 weeks.  Encouraged adequate fluid intake and follow-up sooner if problems. Plan Per Assessment:  Kylee Multani was seen today for sinus problem and diarrhea. Diagnoses and all orders for this visit:    Diarrhea, unspecified type    Essential hypertension    Fatigue, unspecified type    Other orders  -     ciprofloxacin (CIPRO) 500 MG tablet; Take 1 tablet by mouth 2 times daily for 7 days            Return in about 1 week (around 9/24/2020). Maximilian Freire DO    Note was generated with the assistance of voice recognition software. Document was reviewed however may contain grammatical errors.

## 2020-10-01 ENCOUNTER — OFFICE VISIT (OUTPATIENT)
Dept: PRIMARY CARE CLINIC | Age: 85
End: 2020-10-01
Payer: MEDICARE

## 2020-10-01 VITALS
HEART RATE: 75 BPM | BODY MASS INDEX: 26.83 KG/M2 | DIASTOLIC BLOOD PRESSURE: 76 MMHG | SYSTOLIC BLOOD PRESSURE: 134 MMHG | TEMPERATURE: 97.7 F | WEIGHT: 197.8 LBS

## 2020-10-01 PROBLEM — J30.89 NON-SEASONAL ALLERGIC RHINITIS: Status: ACTIVE | Noted: 2020-10-01

## 2020-10-01 PROCEDURE — 4040F PNEUMOC VAC/ADMIN/RCVD: CPT | Performed by: FAMILY MEDICINE

## 2020-10-01 PROCEDURE — 1036F TOBACCO NON-USER: CPT | Performed by: FAMILY MEDICINE

## 2020-10-01 PROCEDURE — G8417 CALC BMI ABV UP PARAM F/U: HCPCS | Performed by: FAMILY MEDICINE

## 2020-10-01 PROCEDURE — 90694 VACC AIIV4 NO PRSRV 0.5ML IM: CPT | Performed by: FAMILY MEDICINE

## 2020-10-01 PROCEDURE — G8427 DOCREV CUR MEDS BY ELIG CLIN: HCPCS | Performed by: FAMILY MEDICINE

## 2020-10-01 PROCEDURE — 99214 OFFICE O/P EST MOD 30 MIN: CPT | Performed by: FAMILY MEDICINE

## 2020-10-01 PROCEDURE — G8484 FLU IMMUNIZE NO ADMIN: HCPCS | Performed by: FAMILY MEDICINE

## 2020-10-01 PROCEDURE — G0008 ADMIN INFLUENZA VIRUS VAC: HCPCS | Performed by: FAMILY MEDICINE

## 2020-10-01 PROCEDURE — 1123F ACP DISCUSS/DSCN MKR DOCD: CPT | Performed by: FAMILY MEDICINE

## 2020-10-01 RX ORDER — IBUPROFEN 800 MG/1
800 TABLET ORAL EVERY 8 HOURS PRN
Qty: 120 TABLET | Refills: 2 | Status: ON HOLD
Start: 2020-10-01 | End: 2021-08-13

## 2020-10-01 RX ORDER — MECLIZINE HYDROCHLORIDE 25 MG/1
TABLET ORAL
Qty: 30 TABLET | Refills: 1 | Status: SHIPPED
Start: 2020-10-01 | End: 2021-08-10

## 2020-10-01 ASSESSMENT — ENCOUNTER SYMPTOMS
ALLERGIC/IMMUNOLOGIC NEGATIVE: 1
GASTROINTESTINAL NEGATIVE: 1
EYES NEGATIVE: 1
RESPIRATORY NEGATIVE: 1

## 2020-10-01 NOTE — PROGRESS NOTES
10/1/20     Ephriam Pencil    : 1935 Sex: male   Age: 81 y.o. Chief Complaint   Patient presents with    Sinusitis     discuss visit with dr Moisés Lomeli Hypertension       Prior to Admission medications    Medication Sig Start Date End Date Taking? Authorizing Provider   meclizine (ANTIVERT) 25 MG tablet Bid prn 10/1/20  Yes Koby Villa, DO   ibuprofen (ADVIL;MOTRIN) 800 MG tablet Take 1 tablet by mouth every 8 hours as needed for Pain 10/1/20  Yes Koby Villa, DO   amLODIPine (NORVASC) 5 MG tablet Take 1 tablet by mouth daily 6/3/20   Nico Cano, DO   pantoprazole (PROTONIX) 40 MG tablet TK 1 T PO QD 6/3/20   [facility], DO   buPROPion (WELLBUTRIN XL) 150 MG extended release tablet Take 1 tablet by mouth daily 6/3/20   Nico Cano, DO   losartan (COZAAR) 50 MG tablet Take 1 tablet by mouth daily 6/3/20   Nico Banco, DO   mirtazapine (REMERON) 15 MG tablet Take 1 tablet by mouth nightly 20   Nico Banco, DO   hydrochlorothiazide (HYDRODIURIL) 25 MG tablet Take 1 tablet by mouth every morning 20   Nico Cano, DO   aspirin 81 MG tablet Take 81 mg by mouth daily    Historical Provider, MD          HPI: Katie Mccormick seen today in follow-up on hypertension hyperlipidemia depression anxiety. He is coming along very well at this time. Medications well-tolerated. Seasonal allergies controlled with Zyrtec and then provided some Nasacort spray today. 1 spray each nostril daily as needed. Continue with simply saline. Review of Systems   Constitutional: Negative. HENT: Negative. Eyes: Negative. Respiratory: Negative. Gastrointestinal: Negative. Endocrine: Negative. Genitourinary: Negative. Musculoskeletal: Negative. Skin: Negative. Allergic/Immunologic: Negative. Neurological: Negative. Hematological: Negative. Psychiatric/Behavioral: Negative. Today systems review is stable.   Doing well with current medications. Allergies controlled. Current Outpatient Medications:     meclizine (ANTIVERT) 25 MG tablet, Bid prn, Disp: 30 tablet, Rfl: 1    ibuprofen (ADVIL;MOTRIN) 800 MG tablet, Take 1 tablet by mouth every 8 hours as needed for Pain, Disp: 120 tablet, Rfl: 2    amLODIPine (NORVASC) 5 MG tablet, Take 1 tablet by mouth daily, Disp: 90 tablet, Rfl: 3    pantoprazole (PROTONIX) 40 MG tablet, TK 1 T PO QD, Disp: 90 tablet, Rfl: 3    buPROPion (WELLBUTRIN XL) 150 MG extended release tablet, Take 1 tablet by mouth daily, Disp: 90 tablet, Rfl: 3    losartan (COZAAR) 50 MG tablet, Take 1 tablet by mouth daily, Disp: 90 tablet, Rfl: 3    mirtazapine (REMERON) 15 MG tablet, Take 1 tablet by mouth nightly, Disp: 30 tablet, Rfl: 3    hydrochlorothiazide (HYDRODIURIL) 25 MG tablet, Take 1 tablet by mouth every morning, Disp: 90 tablet, Rfl: 3    aspirin 81 MG tablet, Take 81 mg by mouth daily, Disp: , Rfl:     No Known Allergies    Social History     Tobacco Use    Smoking status: Never Smoker    Smokeless tobacco: Never Used   Substance Use Topics    Alcohol use: No    Drug use: No      Past Surgical History:   Procedure Laterality Date    ANTERIOR CRUCIATE LIGAMENT REPAIR Left 11/21/2001    APPENDECTOMY      CHOLECYSTECTOMY  2007    Lap    ECHO COMPL W DOP COLOR FLOW  12/4/2012         HERNIA REPAIR Right 11/13/2002    double    SINUS SURGERY  2002,2003     done x 2    TONSILLECTOMY      TOTAL KNEE ARTHROPLASTY Left 1/28/2019    LEFT  ROBOTIC KNEE TOTAL ARTHROPLASTY  ++MEREDITH- [id number]++   ++ADDUCTOR BLOCK++ performed by Sherri Kellogg MD at [address] N/[address] 10/10/2019    EGD BIOPSY performed by Phylicia Khan MD at [facility] ENDOSCOPY     No family history on file.   Past Medical History:   Diagnosis Date    Aneurysm of right renal artery ([address]     follows with Dr. Heri Valle yearly (last visit 9/19)    Colitis     Depression     GERD (gastroesophageal reflux disease)     Hyperlipidemia     Hypertension     TIA (transient ischemic attack)        Vitals:    10/01/20 1430 10/01/20 1441   BP: (!) 150/84 134/76   Site: Right Upper Arm    Position: Sitting    Pulse: 75    Temp: 97.7 °F (36.5 °C)    TempSrc: Temporal    Weight: 197 lb 12.8 oz (89.7 kg)      BP Readings from Last 3 Encounters:   10/01/20 134/76   09/17/20 136/82   09/01/20 132/76      134/76  Physical Exam  Vitals signs and nursing note reviewed. Constitutional:       Appearance: He is well-developed. HENT:      Head: Normocephalic. Right Ear: External ear normal.      Left Ear: External ear normal.      Nose: Nose normal.   Eyes:      Conjunctiva/sclera: Conjunctivae normal.      Pupils: Pupils are equal, round, and reactive to light. Neck:      Musculoskeletal: Normal range of motion and neck supple. Cardiovascular:      Rate and Rhythm: Normal rate. Pulmonary:      Breath sounds: Normal breath sounds. Abdominal:      General: Bowel sounds are normal.      Palpations: Abdomen is soft. Musculoskeletal: Normal range of motion. Skin:     General: Skin is warm and dry. Neurological:      Mental Status: He is alert and oriented to person, place, and time. Psychiatric:         Behavior: Behavior normal.     Today's vitals physical exam stable. I will continue with present medications and care. Reassessment end of month and sooner if problems. Plan Per Assessment:  Mary Ellen Ward was seen today for sinusitis and hypertension. Diagnoses and all orders for this visit:    Essential hypertension    Anxiety    Depression, unspecified depression type    Mixed hyperlipidemia    Non-seasonal allergic rhinitis, unspecified trigger    Need for influenza vaccination  -     INFLUENZA, QUADV, ADJUVANTED, 65 YRS =, IM, PF, PREFILL SYR, 0.5ML (FLUAD)    Other orders  -     meclizine (ANTIVERT) 25 MG tablet; Bid prn  -     ibuprofen (ADVIL;MOTRIN) 800 MG tablet;  Take 1 tablet by mouth every 8

## 2020-10-08 ENCOUNTER — OFFICE VISIT (OUTPATIENT)
Dept: VASCULAR SURGERY | Age: 85
End: 2020-10-08
Payer: MEDICARE

## 2020-10-08 VITALS — BODY MASS INDEX: 25.73 KG/M2 | HEIGHT: 72 IN | WEIGHT: 190 LBS | RESPIRATION RATE: 16 BRPM

## 2020-10-08 PROBLEM — R10.9 ABDOMINAL CRAMPS: Status: RESOLVED | Noted: 2019-08-08 | Resolved: 2020-10-08

## 2020-10-08 PROBLEM — J01.91 ACUTE RECURRENT SINUSITIS: Status: RESOLVED | Noted: 2020-07-23 | Resolved: 2020-10-08

## 2020-10-08 PROBLEM — R53.83 FATIGUE: Status: RESOLVED | Noted: 2020-09-17 | Resolved: 2020-10-08

## 2020-10-08 PROBLEM — R10.31 RIGHT LOWER QUADRANT ABDOMINAL PAIN: Status: RESOLVED | Noted: 2019-07-15 | Resolved: 2020-10-08

## 2020-10-08 PROBLEM — R11.0 NAUSEA: Status: RESOLVED | Noted: 2017-01-22 | Resolved: 2020-10-08

## 2020-10-08 PROBLEM — J01.00 ACUTE NON-RECURRENT MAXILLARY SINUSITIS: Status: RESOLVED | Noted: 2019-12-26 | Resolved: 2020-10-08

## 2020-10-08 PROBLEM — R10.9 STOMACH ACHE: Status: RESOLVED | Noted: 2019-08-22 | Resolved: 2020-10-08

## 2020-10-08 PROBLEM — R19.7 DIARRHEA: Status: RESOLVED | Noted: 2017-01-23 | Resolved: 2020-10-08

## 2020-10-08 PROBLEM — H92.01 OTALGIA OF RIGHT EAR: Status: RESOLVED | Noted: 2020-06-03 | Resolved: 2020-10-08

## 2020-10-08 PROCEDURE — G8417 CALC BMI ABV UP PARAM F/U: HCPCS | Performed by: SURGERY

## 2020-10-08 PROCEDURE — G8427 DOCREV CUR MEDS BY ELIG CLIN: HCPCS | Performed by: SURGERY

## 2020-10-08 PROCEDURE — 1036F TOBACCO NON-USER: CPT | Performed by: SURGERY

## 2020-10-08 PROCEDURE — 99213 OFFICE O/P EST LOW 20 MIN: CPT | Performed by: SURGERY

## 2020-10-08 PROCEDURE — 1123F ACP DISCUSS/DSCN MKR DOCD: CPT | Performed by: SURGERY

## 2020-10-08 PROCEDURE — G8484 FLU IMMUNIZE NO ADMIN: HCPCS | Performed by: SURGERY

## 2020-10-08 PROCEDURE — 4040F PNEUMOC VAC/ADMIN/RCVD: CPT | Performed by: SURGERY

## 2020-10-08 NOTE — PROGRESS NOTES
Chief Complaint:   Chief Complaint   Patient presents with    Follow-up     renal artery         HPI: Patient came to the office, for the evaluation of right renal artery disease, documented on the CAT scan done last year, 1.5 cm, densely calcified, doing well    Patient denies any abdominal pain or back pain      Patient denies any focal lateralizing neurological symptoms like loss of speech, vision or loss of function of extremity    Patient can walk a few blocks , and denies any symptoms of rest pain    No Known Allergies    Current Outpatient Medications   Medication Sig Dispense Refill    meclizine (ANTIVERT) 25 MG tablet Bid prn 30 tablet 1    ibuprofen (ADVIL;MOTRIN) 800 MG tablet Take 1 tablet by mouth every 8 hours as needed for Pain 120 tablet 2    amLODIPine (NORVASC) 5 MG tablet Take 1 tablet by mouth daily 90 tablet 3    pantoprazole (PROTONIX) 40 MG tablet TK 1 T PO QD 90 tablet 3    buPROPion (WELLBUTRIN XL) 150 MG extended release tablet Take 1 tablet by mouth daily 90 tablet 3    losartan (COZAAR) 50 MG tablet Take 1 tablet by mouth daily 90 tablet 3    mirtazapine (REMERON) 15 MG tablet Take 1 tablet by mouth nightly 30 tablet 3    hydrochlorothiazide (HYDRODIURIL) 25 MG tablet Take 1 tablet by mouth every morning 90 tablet 3    aspirin 81 MG tablet Take 81 mg by mouth daily       No current facility-administered medications for this visit.         Past Medical History:   Diagnosis Date    Aneurysm of right renal artery ([address]     follows with Dr. Richard Guillen yearly (last visit 9/19)    Colitis     Depression     GERD (gastroesophageal reflux disease)     Hyperlipidemia     Hypertension     TIA (transient ischemic attack)        Past Surgical History:   Procedure Laterality Date    ANTERIOR CRUCIATE LIGAMENT REPAIR Left 11/21/2001    APPENDECTOMY      CHOLECYSTECTOMY  2007    Lap    ECHO COMPL W DOP COLOR FLOW  12/4/2012         HERNIA REPAIR Right 11/13/2002    double    SINUS SURGERY  2002,2003     done x 2    TONSILLECTOMY      TOTAL KNEE ARTHROPLASTY Left 1/28/2019    LEFT  ROBOTIC KNEE TOTAL ARTHROPLASTY  ++MEREDITH- AVCEIGRS++   ++ADDUCTOR BLOCK++ performed by Kathie Cote MD at [facility] 10/10/2019    EGD BIOPSY performed by Ivana Evans MD at [facility]       History reviewed. No pertinent family history. Social History     Socioeconomic History    Marital status:      Spouse name: Not on file    Number of children: Not on file    Years of education: Not on file    Highest education level: Not on file   Occupational History    Not on file   Social Needs    Financial resource strain: Not on file    Food insecurity     Worry: Not on file     Inability: Not on file    Transportation needs     Medical: Not on file     Non-medical: Not on file   Tobacco Use    Smoking status: Never Smoker    Smokeless tobacco: Never Used   Substance and Sexual Activity    Alcohol use: No    Drug use: No    Sexual activity: Not on file   Lifestyle    Physical activity     Days per week: Not on file     Minutes per session: Not on file    Stress: Not on file   Relationships    Social connections     Talks on phone: Not on file     Gets together: Not on file     Attends [employer] service: Not on file     Active member of club or organization: Not on file     Attends meetings of clubs or organizations: Not on file     Relationship status: Not on file    Intimate partner violence     Fear of current or ex partner: Not on file     Emotionally abused: Not on file     Physically abused: Not on file     Forced sexual activity: Not on file   Other Topics Concern    Not on file   Social History Narrative    Not on file       Review of Systems:    Eyes:  No blurring, diplopia or vision loss. Respiratory:  No cough, pleuritic chest pain, dyspnea, or wheezing. Cardiovascular: No angina, palpitations .   Hypertension  Musculoskeletal: No arthritis or weakness. Neurologic:  No paralysis, paresis, paresthesia, seizures or headache. Physical Exam:  General appearance:  Alert, awake, oriented x 3. No distress. Eyes:  Conjunctivae appear normal; PERRL  Neck:  No jugular venous distention, lymphadenopathy or thyromegaly. No evidence of carotid bruit  Lungs:  Clear to ausculation bilaterally. No rhonchi, crackles, wheezes  Heart:  Regular rate and rhythm. No rub or murmur  Abdomen:  Soft, non-tender. No masses, organomegaly. Musculoskeletal : No joint effusions, tenderness swelling    Neuro: Speech is intact. Moving all extremities. No focal motor or sensory deficits      Extremities:  Both feet are warm to touch. The color of both feet is normal.        Pulses Right  Left    Brachial 3 3    Radial    3=normal   Femoral 2 2  2=diminished   Popliteal    1=barely palpable   Dorsalis pedis    0=absent   Posterior tibial    4=aneurysmal             Other pertinent information:1. The past medical records were reviewed. Assessment:    1. Aneurysm of right renal artery (HCC)              Plan:       Discussed with the patient, options risks benefits and alternatives explained, at the patient's renal artery instrument densely calcified, it was decided to proceed with a KUB of the abdomen for documentation to decrease the radiation, and will defer the CT scan till next year and patient was instructed call and come to hospital any abdominal, flank or back pain           All the questions were answered. Orders Placed This Encounter   Procedures    XR ABDOMEN (KUB) (SINGLE AP VIEW)             Indicated follow-up: Return in about 1 year (around 10/8/2021), or if symptoms worsen or fail to improve.

## 2020-10-09 ENCOUNTER — HOSPITAL ENCOUNTER (OUTPATIENT)
Age: 85
Discharge: HOME | End: 2020-10-11
Payer: MEDICARE

## 2020-10-09 ENCOUNTER — HOSPITAL ENCOUNTER (OUTPATIENT)
Dept: GENERAL RADIOLOGY | Age: 85
Discharge: HOME | End: 2020-10-11
Payer: MEDICARE

## 2020-10-09 PROCEDURE — 74018 RADEX ABDOMEN 1 VIEW: CPT

## 2020-10-12 ENCOUNTER — TELEPHONE (OUTPATIENT)
Dept: VASCULAR SURGERY | Age: 85
End: 2020-10-12

## 2020-10-12 NOTE — TELEPHONE ENCOUNTER
Notified patient that the x-ray showed a stable aneurysm, keep follow up appointment next year and call with any problems in the interim.

## 2020-10-27 ENCOUNTER — OFFICE VISIT (OUTPATIENT)
Dept: PRIMARY CARE CLINIC | Age: 85
End: 2020-10-27
Payer: MEDICARE

## 2020-10-27 VITALS
BODY MASS INDEX: 26.85 KG/M2 | DIASTOLIC BLOOD PRESSURE: 82 MMHG | SYSTOLIC BLOOD PRESSURE: 136 MMHG | OXYGEN SATURATION: 97 % | HEART RATE: 75 BPM | WEIGHT: 198 LBS | TEMPERATURE: 96.2 F

## 2020-10-27 PROCEDURE — 1123F ACP DISCUSS/DSCN MKR DOCD: CPT | Performed by: FAMILY MEDICINE

## 2020-10-27 PROCEDURE — 99214 OFFICE O/P EST MOD 30 MIN: CPT | Performed by: FAMILY MEDICINE

## 2020-10-27 PROCEDURE — G8484 FLU IMMUNIZE NO ADMIN: HCPCS | Performed by: FAMILY MEDICINE

## 2020-10-27 PROCEDURE — G8417 CALC BMI ABV UP PARAM F/U: HCPCS | Performed by: FAMILY MEDICINE

## 2020-10-27 PROCEDURE — 4040F PNEUMOC VAC/ADMIN/RCVD: CPT | Performed by: FAMILY MEDICINE

## 2020-10-27 PROCEDURE — G8427 DOCREV CUR MEDS BY ELIG CLIN: HCPCS | Performed by: FAMILY MEDICINE

## 2020-10-27 PROCEDURE — 1036F TOBACCO NON-USER: CPT | Performed by: FAMILY MEDICINE

## 2020-10-27 ASSESSMENT — ENCOUNTER SYMPTOMS
GASTROINTESTINAL NEGATIVE: 1
RESPIRATORY NEGATIVE: 1
ALLERGIC/IMMUNOLOGIC NEGATIVE: 1
EYES NEGATIVE: 1

## 2020-10-27 NOTE — PROGRESS NOTES
10/27/20     Angel Aparicio    : 1935 Sex: male   Age: 81 y.o. Chief Complaint   Patient presents with    Hypertension    Gastroesophageal Reflux       Prior to Admission medications    Medication Sig Start Date End Date Taking? Authorizing Provider   meclizine (ANTIVERT) 25 MG tablet Bid prn 10/1/20  Yes Jene Lundborg Traikoff, DO   ibuprofen (ADVIL;MOTRIN) 800 MG tablet Take 1 tablet by mouth every 8 hours as needed for Pain 10/1/20  Yes Jene Lundborg Traikoff, DO   amLODIPine (NORVASC) 5 MG tablet Take 1 tablet by mouth daily 6/3/20  Yes Jene Lundborg Traikoff, DO   pantoprazole (PROTONIX) 40 MG tablet TK 1 T PO QD 6/3/20  Yes Jene Lundborg Traikoff, DO   buPROPion (WELLBUTRIN XL) 150 MG extended release tablet Take 1 tablet by mouth daily 6/3/20  Yes Jene Lundborg Traikoff, DO   losartan (COZAAR) 50 MG tablet Take 1 tablet by mouth daily 6/3/20  Yes Jene Lundborg Traikoff, DO   mirtazapine (REMERON) 15 MG tablet Take 1 tablet by mouth nightly 20  Yes Jene Lundborg Traikoff, DO   hydrochlorothiazide (HYDRODIURIL) 25 MG tablet Take 1 tablet by mouth every morning 20  Yes Jene Lundborg Traikoff, DO   aspirin 81 MG tablet Take 81 mg by mouth daily   Yes Historical Provider, MD          HPI:  evaluated today with hypertension anxiety hyperlipidemia insomnia reflux disease gastritis. Medically doing well. Medications well-tolerated. Systems review currently stable. Review of Systems   Constitutional: Negative. HENT: Negative. Eyes: Negative. Respiratory: Negative. Gastrointestinal: Negative. Endocrine: Negative. Genitourinary: Negative. Musculoskeletal: Negative. Skin: Negative. Allergic/Immunologic: Negative. Neurological: Negative. Hematological: Negative. Psychiatric/Behavioral: Negative.                Current Outpatient Medications:     meclizine (ANTIVERT) 25 MG tablet, Bid prn, Disp: 30 tablet, Rfl: 1    ibuprofen (ADVIL;MOTRIN) 800 MG tablet, Take 1 tablet by mouth every 8 hours as needed for Pain, Disp: 120 tablet, Rfl: 2    amLODIPine (NORVASC) 5 MG tablet, Take 1 tablet by mouth daily, Disp: 90 tablet, Rfl: 3    pantoprazole (PROTONIX) 40 MG tablet, TK 1 T PO QD, Disp: 90 tablet, Rfl: 3    buPROPion (WELLBUTRIN XL) 150 MG extended release tablet, Take 1 tablet by mouth daily, Disp: 90 tablet, Rfl: 3    losartan (COZAAR) 50 MG tablet, Take 1 tablet by mouth daily, Disp: 90 tablet, Rfl: 3    mirtazapine (REMERON) 15 MG tablet, Take 1 tablet by mouth nightly, Disp: 30 tablet, Rfl: 3    hydrochlorothiazide (HYDRODIURIL) 25 MG tablet, Take 1 tablet by mouth every morning, Disp: 90 tablet, Rfl: 3    aspirin 81 MG tablet, Take 81 mg by mouth daily, Disp: , Rfl:     No Known Allergies    Social History     Tobacco Use    Smoking status: Never Smoker    Smokeless tobacco: Never Used   Substance Use Topics    Alcohol use: No    Drug use: No      Past Surgical History:   Procedure Laterality Date    ANTERIOR CRUCIATE LIGAMENT REPAIR Left 11/21/2001    APPENDECTOMY      CHOLECYSTECTOMY  2007    Lap    ECHO COMPL W DOP COLOR FLOW  12/4/2012         HERNIA REPAIR Right 11/13/2002    double    SINUS SURGERY  2002,2003     done x 2    TONSILLECTOMY      TOTAL KNEE ARTHROPLASTY Left 1/28/2019    LEFT  ROBOTIC KNEE TOTAL ARTHROPLASTY  ++MEREDITH- GHOCQYNR++   ++ADDUCTOR BLOCK++ performed by Dominga Cain MD at [facility] N/A 10/10/2019    EGD BIOPSY performed by Nancy Campo MD at [facility] ENDOSCOPY     No family history on file.   Past Medical History:   Diagnosis Date    [address] of right renal artery [address] [address]     follows with Dr. Juliocesar Masterson yearly (last visit 9/19)    Colitis     Depression     GERD (gastroesophageal reflux disease)     Hyperlipidemia     Hypertension     TIA (transient ischemic attack)        Vitals:    10/27/20 1256   BP: 136/82   Pulse: 75   Temp: 96.2 °F (35.7 °C)   SpO2: 97%   Weight: 198 lb (89.8 kg)     BP Readings unspecified gastritis type            Return in about 3 months (around 1/27/2021). Les Dibjuan carlos, DO    Note was generated with the assistance of voice recognition software. Document was reviewed however may contain grammatical errors.

## 2020-10-29 ENCOUNTER — TELEPHONE (OUTPATIENT)
Dept: PRIMARY CARE CLINIC | Age: 85
End: 2020-10-29

## 2020-10-29 NOTE — TELEPHONE ENCOUNTER
The pt was here to see you on Tuesday so he didn't eat until about 2 o'clock around an hour later he started to feel very crampy so he went to use the restroom. He says at first it was very hard to have a bowel movement then it started to loosen up. He says he passed out, but we talked about it because he says he was still sitting up on the toilet so he decided he thinks he may have just blacked out but he says it was almost like a dream to him.  Since then he has had diarrhea, nausea, no appetite, and a bad headache, he generally just doens't feel well

## 2020-10-30 ENCOUNTER — APPOINTMENT (OUTPATIENT)
Dept: CT IMAGING | Age: 85
End: 2020-10-30
Payer: MEDICARE

## 2020-10-30 ENCOUNTER — HOSPITAL ENCOUNTER (EMERGENCY)
Age: 85
Discharge: HOME | End: 2020-10-30
Attending: EMERGENCY MEDICINE
Payer: MEDICARE

## 2020-10-30 VITALS
OXYGEN SATURATION: 97 % | RESPIRATION RATE: 18 BRPM | DIASTOLIC BLOOD PRESSURE: 80 MMHG | HEART RATE: 68 BPM | TEMPERATURE: 97 F | SYSTOLIC BLOOD PRESSURE: 158 MMHG | WEIGHT: 195 LBS | BODY MASS INDEX: 26.41 KG/M2 | HEIGHT: 72 IN

## 2020-10-30 LAB
ALBUMIN SERPL-MCNC: 4.2 G/DL (ref 3.5–5.2)
ALP BLD-CCNC: 92 U/L (ref 40–129)
ALT SERPL-CCNC: 15 U/L (ref 0–40)
ANION GAP SERPL CALCULATED.3IONS-SCNC: 12 MMOL/L (ref 7–16)
AST SERPL-CCNC: 18 U/L (ref 0–39)
BACTERIA: ABNORMAL /HPF
BASOPHILS ABSOLUTE: 0.03 E9/L (ref 0–0.2)
BASOPHILS RELATIVE PERCENT: 0.5 % (ref 0–2)
BILIRUB SERPL-MCNC: 0.8 MG/DL (ref 0–1.2)
BILIRUBIN URINE: NEGATIVE
BLOOD, URINE: NORMAL
BUN BLDV-MCNC: 7 MG/DL (ref 8–23)
CALCIUM SERPL-MCNC: 9.7 MG/DL (ref 8.6–10.2)
CHLORIDE BLD-SCNC: 92 MMOL/L (ref 98–107)
CLARITY: CLEAR
CO2: 30 MMOL/L (ref 22–29)
COLOR: YELLOW
CREAT SERPL-MCNC: 0.9 MG/DL (ref 0.7–1.2)
EOSINOPHILS ABSOLUTE: 0.03 E9/L (ref 0.05–0.5)
EOSINOPHILS RELATIVE PERCENT: 0.5 % (ref 0–6)
EPITHELIAL CELLS, UA: ABNORMAL /HPF
GFR AFRICAN AMERICAN: >60
GFR NON-AFRICAN AMERICAN: >60 ML/MIN/1.73
GLUCOSE BLD-MCNC: 109 MG/DL (ref 74–99)
GLUCOSE URINE: NEGATIVE MG/DL
HCT VFR BLD CALC: 41.6 % (ref 37–54)
HEMOGLOBIN: 14.4 G/DL (ref 12.5–16.5)
IMMATURE GRANULOCYTES #: 0.02 E9/L
IMMATURE GRANULOCYTES %: 0.3 % (ref 0–5)
KETONES, URINE: NEGATIVE MG/DL
LACTIC ACID: 1.1 MMOL/L (ref 0.5–2.2)
LEUKOCYTE ESTERASE, URINE: NEGATIVE
LIPASE: 14 U/L (ref 13–60)
LYMPHOCYTES ABSOLUTE: 0.91 E9/L (ref 1.5–4)
LYMPHOCYTES RELATIVE PERCENT: 15.8 % (ref 20–42)
MAGNESIUM: 1.8 MG/DL (ref 1.6–2.6)
MCH RBC QN AUTO: 30.4 PG (ref 26–35)
MCHC RBC AUTO-ENTMCNC: 34.6 % (ref 32–34.5)
MCV RBC AUTO: 87.9 FL (ref 80–99.9)
MONOCYTES ABSOLUTE: 0.48 E9/L (ref 0.1–0.95)
MONOCYTES RELATIVE PERCENT: 8.3 % (ref 2–12)
NEUTROPHILS ABSOLUTE: 4.3 E9/L (ref 1.8–7.3)
NEUTROPHILS RELATIVE PERCENT: 74.6 % (ref 43–80)
NITRITE, URINE: NEGATIVE
PDW BLD-RTO: 12.3 FL (ref 11.5–15)
PH UA: 7 (ref 5–9)
PLATELET # BLD: 389 E9/L (ref 130–450)
PMV BLD AUTO: 9.4 FL (ref 7–12)
POTASSIUM REFLEX MAGNESIUM: 3.3 MMOL/L (ref 3.5–5)
PROTEIN UA: NEGATIVE MG/DL
RBC # BLD: 4.73 E12/L (ref 3.8–5.8)
RBC UA: ABNORMAL /HPF (ref 0–2)
SODIUM BLD-SCNC: 134 MMOL/L (ref 132–146)
SPECIFIC GRAVITY UA: <=1.005 (ref 1–1.03)
TOTAL PROTEIN: 7.7 G/DL (ref 6.4–8.3)
TROPONIN: <0.01 NG/ML (ref 0–0.03)
UROBILINOGEN, URINE: 0.2 E.U./DL
WBC # BLD: 5.8 E9/L (ref 4.5–11.5)
WBC UA: ABNORMAL /HPF (ref 0–5)

## 2020-10-30 PROCEDURE — 96374 THER/PROPH/DIAG INJ IV PUSH: CPT

## 2020-10-30 PROCEDURE — 80053 COMPREHEN METABOLIC PANEL: CPT

## 2020-10-30 PROCEDURE — 99283 EMERGENCY DEPT VISIT LOW MDM: CPT

## 2020-10-30 PROCEDURE — 36415 COLL VENOUS BLD VENIPUNCTURE: CPT

## 2020-10-30 PROCEDURE — 83735 ASSAY OF MAGNESIUM: CPT

## 2020-10-30 PROCEDURE — 83605 ASSAY OF LACTIC ACID: CPT

## 2020-10-30 PROCEDURE — 83690 ASSAY OF LIPASE: CPT

## 2020-10-30 PROCEDURE — 84484 ASSAY OF TROPONIN QUANT: CPT

## 2020-10-30 PROCEDURE — 85025 COMPLETE CBC W/AUTO DIFF WBC: CPT

## 2020-10-30 PROCEDURE — 6360000004 HC RX CONTRAST MEDICATION: Performed by: RADIOLOGY

## 2020-10-30 PROCEDURE — 74177 CT ABD & PELVIS W/CONTRAST: CPT

## 2020-10-30 PROCEDURE — 96372 THER/PROPH/DIAG INJ SC/IM: CPT

## 2020-10-30 PROCEDURE — 81001 URINALYSIS AUTO W/SCOPE: CPT

## 2020-10-30 PROCEDURE — 2580000003 HC RX 258: Performed by: EMERGENCY MEDICINE

## 2020-10-30 PROCEDURE — 6360000002 HC RX W HCPCS: Performed by: EMERGENCY MEDICINE

## 2020-10-30 PROCEDURE — 93005 ELECTROCARDIOGRAM TRACING: CPT | Performed by: EMERGENCY MEDICINE

## 2020-10-30 RX ORDER — DICYCLOMINE HYDROCHLORIDE 10 MG/ML
20 INJECTION INTRAMUSCULAR ONCE
Status: COMPLETED | OUTPATIENT
Start: 2020-10-30 | End: 2020-10-30

## 2020-10-30 RX ORDER — 0.9 % SODIUM CHLORIDE 0.9 %
1000 INTRAVENOUS SOLUTION INTRAVENOUS ONCE
Status: COMPLETED | OUTPATIENT
Start: 2020-10-30 | End: 2020-10-30

## 2020-10-30 RX ORDER — ONDANSETRON 2 MG/ML
4 INJECTION INTRAMUSCULAR; INTRAVENOUS ONCE
Status: COMPLETED | OUTPATIENT
Start: 2020-10-30 | End: 2020-10-30

## 2020-10-30 RX ORDER — ONDANSETRON 4 MG/1
4 TABLET, ORALLY DISINTEGRATING ORAL EVERY 8 HOURS PRN
Qty: 10 TABLET | Refills: 0 | Status: SHIPPED | OUTPATIENT
Start: 2020-10-30 | End: 2020-11-02 | Stop reason: SDUPTHER

## 2020-10-30 RX ORDER — KETOROLAC TROMETHAMINE 30 MG/ML
15 INJECTION, SOLUTION INTRAMUSCULAR; INTRAVENOUS ONCE
Status: DISCONTINUED | OUTPATIENT
Start: 2020-10-30 | End: 2020-10-30

## 2020-10-30 RX ADMIN — SODIUM CHLORIDE 1000 ML: 9 INJECTION, SOLUTION INTRAVENOUS at 15:36

## 2020-10-30 RX ADMIN — ONDANSETRON 4 MG: 2 INJECTION INTRAMUSCULAR; INTRAVENOUS at 15:36

## 2020-10-30 RX ADMIN — IOPAMIDOL 75 ML: 755 INJECTION, SOLUTION INTRAVENOUS at 17:27

## 2020-10-30 RX ADMIN — DICYCLOMINE HYDROCHLORIDE 20 MG: 20 INJECTION, SOLUTION INTRAMUSCULAR at 18:21

## 2020-10-30 ASSESSMENT — ENCOUNTER SYMPTOMS
ABDOMINAL PAIN: 1
VOMITING: 0
EYE REDNESS: 0
SHORTNESS OF BREATH: 0
NAUSEA: 1

## 2020-10-30 ASSESSMENT — PAIN DESCRIPTION - PROGRESSION: CLINICAL_PROGRESSION: GRADUALLY WORSENING

## 2020-10-30 ASSESSMENT — PAIN DESCRIPTION - DESCRIPTORS: DESCRIPTORS: ACHING

## 2020-10-30 ASSESSMENT — PAIN SCALES - GENERAL: PAINLEVEL_OUTOF10: 6

## 2020-10-30 ASSESSMENT — PAIN DESCRIPTION - ORIENTATION: ORIENTATION: MID

## 2020-10-30 ASSESSMENT — PAIN DESCRIPTION - LOCATION: LOCATION: ABDOMEN

## 2020-10-30 ASSESSMENT — PAIN DESCRIPTION - PAIN TYPE: TYPE: ACUTE PAIN

## 2020-10-30 ASSESSMENT — PAIN DESCRIPTION - FREQUENCY: FREQUENCY: CONTINUOUS

## 2020-10-30 NOTE — ED PROVIDER NOTES
Chief complaint: Abdominal pain    HPI:  10/30/20, Time: 3:29 PM EDT      HPI       Shaye Marinelli is a 81 y.o. male presenting to the ED for dental pain. The history is obtained from the patient as well as the patient's medical record. The patient is presenting to the emergency department the chief complaint of abdominal pain. Patient states that the pain has been chronic for the last few weeks but is worsening over the last 3 days. The pain is located in the right lower quadrant as well as right upper quadrant. The pain is carved as dull and aching. Pain currently rated 6 out of 10. Nothing makes it better. Nothing makes worse. The pain is nonradiating. He has not tried any treatments prior to arrival for the pain. Does admit to associated nausea with no episodes of emesis. He has never had any similar pain in the past.  He does have prior cholecystectomy, appendectomy. He does have a renal artery aneurysm. ROS:   Review of Systems   Constitutional: Negative for chills and fever. HENT: Negative for congestion. Eyes: Negative for redness. Respiratory: Negative for shortness of breath. Cardiovascular: Negative for chest pain. Gastrointestinal: Positive for abdominal pain and nausea. Negative for vomiting. Genitourinary: Negative for dysuria. Musculoskeletal: Negative for arthralgias. Skin: Negative for rash. Neurological: Negative for light-headedness. Psychiatric/Behavioral: Negative for confusion.       --------------------------------------------- PAST HISTORY ---------------------------------------------  Past Medical History:  has a past medical history of Aneurysm of right renal artery ([address] Colitis, Depression, GERD (gastroesophageal reflux disease), Hyperlipidemia, Hypertension, and TIA (transient ischemic attack). Past Surgical History:  has a past surgical history that includes Appendectomy;  Tonsillectomy; ECHO Compl W Dop Color Flow (12/4/2012); sinus surgery (8733,1615); Anterior cruciate ligament repair (Left, 11/21/2001); Cholecystectomy (2007); hernia repair (Right, 11/13/2002); Total knee arthroplasty (Left, 1/28/2019); and Upper gastrointestinal endoscopy (N/A, 10/10/2019). Social History:  reports that he has never smoked. He has never used smokeless tobacco. He reports that he does not drink alcohol or use drugs. Family History: family history is not on file. The patients home medications have been reviewed. Allergies: Patient has no known allergies. ---------------------------------------------------PHYSICAL EXAM--------------------------------------    Constitutional/General: Alert and oriented x3, well appearing, non toxic in NAD  Head: Normocephalic and atraumatic  Mouth: Oropharynx clear, handling secretions, no trismus  Neck: Supple, full ROM,  Pulmonary: Lungs clear to auscultation bilaterally, no wheezes, rales, or rhonchi. Not in respiratory distress  Cardiovascular:  Regular rate. Regular rhythm. No murmurs  Chest: no chest wall tenderness  Abdomen: Soft. Non tender. Non distended. No rebound, guarding, or rigidity. No pulsatile masses appreciated. Musculoskeletal: Moves all extremities x 4. Warm and well perfused, no clubbing, cyanosis, or edema. Capillary refill <3 seconds  Skin: warm and dry. No rashes. Neurologic: GCS 15, no gross focal neurologic deficits  Psych: Normal Affect    -------------------------------------------------- RESULTS -------------------------------------------------  I have personally reviewed all laboratory and imaging results for this patient. Results are listed below.      LABS:  Results for orders placed or performed during the hospital encounter of 10/30/20   CBC Auto Differential   Result Value Ref Range    WBC 5.8 4.5 - 11.5 E9/L    RBC 4.73 3.80 - 5.80 E12/L    Hemoglobin 14.4 12.5 - 16.5 g/dL    Hematocrit 41.6 37.0 - 54.0 %    MCV 87.9 80.0 - 99.9 fL    MCH 30.4 26.0 - 35.0 pg    MCHC 34.6 (H) 32.0 - 34.5 %    RDW 12.3 11.5 - 15.0 fL    Platelets 139 451 - 592 E9/L    MPV 9.4 7.0 - 12.0 fL    Neutrophils % 74.6 43.0 - 80.0 %    Immature Granulocytes % 0.3 0.0 - 5.0 %    Lymphocytes % 15.8 (L) 20.0 - 42.0 %    Monocytes % 8.3 2.0 - 12.0 %    Eosinophils % 0.5 0.0 - 6.0 %    Basophils % 0.5 0.0 - 2.0 %    Neutrophils Absolute 4.30 1.80 - 7.30 E9/L    Immature Granulocytes # 0.02 E9/L    Lymphocytes Absolute 0.91 (L) 1.50 - 4.00 E9/L    Monocytes Absolute 0.48 0.10 - 0.95 E9/L    Eosinophils Absolute 0.03 (L) 0.05 - 0.50 E9/L    Basophils Absolute 0.03 0.00 - 0.20 E9/L   Comprehensive Metabolic Panel w/ Reflex to MG   Result Value Ref Range    Sodium 134 132 - 146 mmol/L    Potassium reflex Magnesium 3.3 (L) 3.5 - 5.0 mmol/L    Chloride 92 (L) 98 - 107 mmol/L    CO2 30 (H) 22 - 29 mmol/L    Anion Gap 12 7 - 16 mmol/L    Glucose 109 (H) 74 - 99 mg/dL    BUN 7 (L) 8 - 23 mg/dL    CREATININE 0.9 0.7 - 1.2 mg/dL    GFR Non-African American >60 >=60 mL/min/1.73    GFR African American >60     Calcium 9.7 8.6 - 10.2 mg/dL    Total Protein 7.7 6.4 - 8.3 g/dL    Alb 4.2 3.5 - 5.2 g/dL    Total Bilirubin 0.8 0.0 - 1.2 mg/dL    Alkaline Phosphatase 92 40 - 129 U/L    ALT 15 0 - 40 U/L    AST 18 0 - 39 U/L   Lipase   Result Value Ref Range    Lipase 14 13 - 60 U/L   Urinalysis, reflex to microscopic   Result Value Ref Range    Color, UA Yellow Straw/Yellow    Clarity, UA Clear Clear    Glucose, Ur Negative Negative mg/dL    Bilirubin Urine Negative Negative    Ketones, Urine Negative Negative mg/dL    Specific Gravity, UA <=1.005 1.005 - 1.030    Blood, Urine TRACE-INTACT Negative    pH, UA 7.0 5.0 - 9.0    Protein, UA Negative Negative mg/dL    Urobilinogen, Urine 0.2 <2.0 E.U./dL    Nitrite, Urine Negative Negative    Leukocyte Esterase, Urine Negative Negative   Lactic Acid, Plasma   Result Value Ref Range    Lactic Acid 1.1 0.5 - 2.2 mmol/L   Troponin   Result Value Ref Range    Troponin <0.01 0.00 - 0.03 ng/mL Microscopic Urinalysis   Result Value Ref Range    WBC, UA 0-1 0 - 5 /HPF    RBC, UA 0-1 0 - 2 /HPF    Epithelial Cells, UA FEW /HPF    Bacteria, UA RARE (A) None Seen /HPF   Magnesium   Result Value Ref Range    Magnesium 1.8 1.6 - 2.6 mg/dL   EKG 12 Lead   Result Value Ref Range    Ventricular Rate 63 BPM    Atrial Rate 63 BPM    P-R Interval 162 ms    QRS Duration 152 ms    Q-T Interval 466 ms    QTc Calculation (Bazett) 476 ms    P Axis 28 degrees    R Axis 39 degrees    T Axis 11 degrees       RADIOLOGY:  Interpreted by Radiologist.  CT ABDOMEN PELVIS W IV CONTRAST Additional Contrast? None   Final Result   1. No acute abnormality is seen in the abdomen or the pelvis. 2. Grossly stable 1.7 cm rim calcified right renal artery aneurysm. CT   follow-up recommended in 12 months. 3. Enlarged prostate. 4. Mild bladder wall thickening may be the result of chronic outlet   obstruction. 5. Noncalcified plaque in the proximal SMA, resulting in approximately 50%   stenosis. EKG:  Normal sinus rhythm, rate of 63, no ST segment elevation or depression, IN interval 162 MS,  MS,  MS, stable compared to patient's prior EKG      ------------------------- NURSING NOTES AND VITALS REVIEWED ---------------------------   The nursing notes within the ED encounter and vital signs as below have been reviewed by myself. BP (!) 158/80   Pulse 68   Temp 97 °F (36.1 °C) (Infrared)   Resp 18   Ht 6' (1.829 m)   Wt 195 lb (88.5 kg)   SpO2 97%   BMI 26.45 kg/m²   Oxygen Saturation Interpretation: Normal    The patients available past medical records and past encounters were reviewed.         ------------------------------ ED COURSE/MEDICAL DECISION MAKING----------------------  Medications   dicyclomine (BENTYL) injection 20 mg (has no administration in time range)   0.9 % sodium chloride bolus (0 mLs Intravenous Stopped 10/30/20 3831)   ondansetron (ZOFRAN) injection 4 mg (4 mg Intravenous Given 10/30/20 1536)   iopamidol (ISOVUE-370) 76 % injection 75 mL (75 mLs Intravenous Given 10/30/20 1727)             Medical Decision Making:   Patient is present emergency department the chief complaint of abdominal pain and nausea. Patient did have labs and imaging which reviewed. Patient was educated about renal artery aneurysm as well as SMA stenosis. Patient will be discharged and follow-up outpatient. Re-Evaluations/Consultations:              Patient no acute distress. Results discussed. Patient be discharged. This patient's ED course included: History, physical examination, reevaluation prior to disposition, labs, imaging, telemetry monitoring, EKG, IV fluid, IV medication    This patient has remained hemodynamically stable during their ED course. Counseling: The emergency provider has spoken with the patient and discussed todays results, in addition to providing specific details for the plan of care and counseling regarding the diagnosis and prognosis. Questions are answered at this time and they are agreeable with the plan.       --------------------------------- IMPRESSION AND DISPOSITION ---------------------------------    IMPRESSION  1. Generalized abdominal pain    2. Renal artery aneurysm ([address]    3. Superior mesenteric artery stenosis ([address]    4. Nausea        DISPOSITION  Disposition: Discharge to home  Patient condition is stable        NOTE: This report was transcribed using voice recognition software.  Every effort was made to ensure accuracy; however, inadvertent computerized transcription errors may be present          Lilian Byrd DO  10/30/20 7836

## 2020-10-31 LAB
EKG ATRIAL RATE: 63 BPM
EKG P AXIS: 28 DEGREES
EKG P-R INTERVAL: 162 MS
EKG Q-T INTERVAL: 466 MS
EKG QRS DURATION: 152 MS
EKG QTC CALCULATION (BAZETT): 476 MS
EKG R AXIS: 39 DEGREES
EKG T AXIS: 11 DEGREES
EKG VENTRICULAR RATE: 63 BPM

## 2020-10-31 PROCEDURE — 93010 ELECTROCARDIOGRAM REPORT: CPT | Performed by: INTERNAL MEDICINE

## 2020-11-02 ENCOUNTER — TELEPHONE (OUTPATIENT)
Dept: VASCULAR SURGERY | Age: 85
End: 2020-11-02

## 2020-11-02 ENCOUNTER — OFFICE VISIT (OUTPATIENT)
Dept: PRIMARY CARE CLINIC | Age: 85
End: 2020-11-02
Payer: MEDICARE

## 2020-11-02 VITALS
BODY MASS INDEX: 26.45 KG/M2 | OXYGEN SATURATION: 97 % | HEART RATE: 92 BPM | SYSTOLIC BLOOD PRESSURE: 140 MMHG | DIASTOLIC BLOOD PRESSURE: 76 MMHG | WEIGHT: 195 LBS | TEMPERATURE: 98.7 F

## 2020-11-02 PROBLEM — R10.11 RIGHT UPPER QUADRANT ABDOMINAL PAIN: Status: ACTIVE | Noted: 2020-11-02

## 2020-11-02 PROBLEM — K55.1 SMAS (SUPERIOR MESENTERIC ARTERY SYNDROME) (HCC): Status: ACTIVE | Noted: 2020-11-02

## 2020-11-02 PROBLEM — K55.1 SMAS (SUPERIOR MESENTERIC ARTERY SYNDROME) (HCC): Status: RESOLVED | Noted: 2020-11-02 | Resolved: 2020-11-02

## 2020-11-02 PROCEDURE — 1123F ACP DISCUSS/DSCN MKR DOCD: CPT | Performed by: FAMILY MEDICINE

## 2020-11-02 PROCEDURE — G8417 CALC BMI ABV UP PARAM F/U: HCPCS | Performed by: FAMILY MEDICINE

## 2020-11-02 PROCEDURE — 99214 OFFICE O/P EST MOD 30 MIN: CPT | Performed by: FAMILY MEDICINE

## 2020-11-02 PROCEDURE — 1036F TOBACCO NON-USER: CPT | Performed by: FAMILY MEDICINE

## 2020-11-02 PROCEDURE — 4040F PNEUMOC VAC/ADMIN/RCVD: CPT | Performed by: FAMILY MEDICINE

## 2020-11-02 PROCEDURE — G8484 FLU IMMUNIZE NO ADMIN: HCPCS | Performed by: FAMILY MEDICINE

## 2020-11-02 PROCEDURE — G8427 DOCREV CUR MEDS BY ELIG CLIN: HCPCS | Performed by: FAMILY MEDICINE

## 2020-11-02 RX ORDER — ONDANSETRON 4 MG/1
4 TABLET, ORALLY DISINTEGRATING ORAL EVERY 8 HOURS PRN
Qty: 10 TABLET | Refills: 2 | Status: SHIPPED
Start: 2020-11-02 | End: 2020-11-16 | Stop reason: DRUGHIGH

## 2020-11-02 ASSESSMENT — ENCOUNTER SYMPTOMS
ALLERGIC/IMMUNOLOGIC NEGATIVE: 1
ABDOMINAL DISTENTION: 1
NAUSEA: 1
EYES NEGATIVE: 1
ABDOMINAL PAIN: 1
RESPIRATORY NEGATIVE: 1

## 2020-11-02 NOTE — TELEPHONE ENCOUNTER
Discussed with the patient's PCP Dr. Brina kaplan, regarding the CTA, reviewed personally all the CAT scan done since September 2018 the last one being done on the October 2020, contrast mixture, poor quality, evidence of approximate 20% stenosis of the SMA in the lateral view noted increasing up to 62% in certain views only again the quality of the pictures is not good, the celiac axis widely patent internal iliacs are patent, what appears to be inflammation arteries patent, no weight loss, doubt patient has severe mesenteric artery syndrome and angina etc., left message for the patient to call the office to discuss further, if necessary we will see him in the office, repeat the CT scan and then make additional recommendations

## 2020-11-02 NOTE — PROGRESS NOTES
20     Kathe Dumas    : 1935 Sex: male   Age: 81 y.o. Chief Complaint   Patient presents with    Abdominal Pain     was seen in ER not feeling much better    Constipation     took ducolax but still having issues feels very weak       Prior to Admission medications    Medication Sig Start Date End Date Taking? Authorizing Provider   ondansetron (ZOFRAN ODT) 4 MG disintegrating tablet Take 1 tablet by mouth every 8 hours as needed for Nausea or Vomiting 20  Yes Rosanne Villa, DO   meclizine (ANTIVERT) 25 MG tablet Bid prn 10/1/20  Yes Rosanne Villa, DO   ibuprofen (ADVIL;MOTRIN) 800 MG tablet Take 1 tablet by mouth every 8 hours as needed for Pain 10/1/20  Yes Rosanne Villa, DO   amLODIPine (NORVASC) 5 MG tablet Take 1 tablet by mouth daily 6/3/20  Yes Rosanne Villa, DO   pantoprazole (PROTONIX) 40 MG tablet TK 1 T PO QD 6/3/20  Yes Rosanne Villa, DO   buPROPion (WELLBUTRIN XL) 150 MG extended release tablet Take 1 tablet by mouth daily 6/3/20  Yes Rosanne Villa, DO   losartan (COZAAR) 50 MG tablet Take 1 tablet by mouth daily 6/3/20  Yes Rosanne Dos Santosoff, DO   mirtazapine (REMERON) 15 MG tablet Take 1 tablet by mouth nightly 20  Yes Rosanne Villa, DO   hydrochlorothiazide (HYDRODIURIL) 25 MG tablet Take 1 tablet by mouth every morning 20  Yes Rosanne Dos Santosoff, DO   aspirin 81 MG tablet Take 81 mg by mouth daily   Yes Historical Provider, MD          HPI: Patient seen today hypertension reflux disease decreased appetite right upper quadrant abdominal pain. Was evaluated emergency room over the weekend after difficulty with a bowel movement and mild vasovagal symptoms which have since resolved. CT scan study of the abdomen was suggestive of superior mesenteric artery stenosis and we will have study reviewed by vascular surgery and then further discuss. Review of Systems   Constitutional: Negative. HENT: Negative. Eyes: Negative. Respiratory: Negative. Gastrointestinal: Positive for abdominal distention, abdominal pain and nausea. Endocrine: Negative. Genitourinary: Negative. Musculoskeletal: Negative. Skin: Negative. Allergic/Immunologic: Negative. Neurological: Negative. Hematological: Negative. Psychiatric/Behavioral: Negative. Systems review is presently stable aside from the abdominal complaints and associated nausea.         Current Outpatient Medications:     ondansetron (ZOFRAN ODT) 4 MG disintegrating tablet, Take 1 tablet by mouth every 8 hours as needed for Nausea or Vomiting, Disp: 10 tablet, Rfl: 2    meclizine (ANTIVERT) 25 MG tablet, Bid prn, Disp: 30 tablet, Rfl: 1    ibuprofen (ADVIL;MOTRIN) 800 MG tablet, Take 1 tablet by mouth every 8 hours as needed for Pain, Disp: 120 tablet, Rfl: 2    amLODIPine (NORVASC) 5 MG tablet, Take 1 tablet by mouth daily, Disp: 90 tablet, Rfl: 3    pantoprazole (PROTONIX) 40 MG tablet, TK 1 T PO QD, Disp: 90 tablet, Rfl: 3    buPROPion (WELLBUTRIN XL) 150 MG extended release tablet, Take 1 tablet by mouth daily, Disp: 90 tablet, Rfl: 3    losartan (COZAAR) 50 MG tablet, Take 1 tablet by mouth daily, Disp: 90 tablet, Rfl: 3    mirtazapine (REMERON) 15 MG tablet, Take 1 tablet by mouth nightly, Disp: 30 tablet, Rfl: 3    hydrochlorothiazide (HYDRODIURIL) 25 MG tablet, Take 1 tablet by mouth every morning, Disp: 90 tablet, Rfl: 3    aspirin 81 MG tablet, Take 81 mg by mouth daily, Disp: , Rfl:     No Known Allergies    Social History     Tobacco Use    Smoking status: Never Smoker    Smokeless tobacco: Never Used   Substance Use Topics    Alcohol use: No    Drug use: No      Past Surgical History:   Procedure Laterality Date    ANTERIOR CRUCIATE LIGAMENT REPAIR Left 11/21/2001    APPENDECTOMY      CHOLECYSTECTOMY  2007    Lap    ECHO COMPL W DOP COLOR FLOW  12/4/2012         HERNIA REPAIR Right 11/13/2002    double    SINUS SURGERY  2002,2003 done x 2    TONSILLECTOMY      TOTAL KNEE ARTHROPLASTY Left 1/28/2019    LEFT  ROBOTIC KNEE TOTAL ARTHROPLASTY  ++MEREDITH- [id number]++   ++ADDUCTOR BLOCK++ performed [place] 10/10/2019    EGD BIOPSY performed by Gaurang Freeman MD at [facility] ENDOSCOPY     No family history on file. Past Medical History:   Diagnosis Date    Aneurysm of right renal artery ([address]     follows with Dr. Myron Watson yearly (last visit 9/19)    Colitis     Depression     GERD (gastroesophageal reflux disease)     Hyperlipidemia     Hypertension     TIA (transient ischemic attack)        Vitals:    11/02/20 1302   BP: (!) 140/76   Pulse: 92   Temp: 98.7 °F (37.1 °C)   SpO2: 97%   Weight: 195 lb (88.5 kg)     BP Readings from Last 3 Encounters:   11/02/20 (!) 140/76   10/30/20 (!) 158/80   10/27/20 136/82        Physical Exam  Vitals signs and nursing note reviewed. Constitutional:       Appearance: He is well-developed. HENT:      Head: Normocephalic. Right Ear: External ear normal.      Left Ear: External ear normal.      Nose: Nose normal.   Eyes:      Conjunctiva/sclera: Conjunctivae normal.      Pupils: Pupils are equal, round, and reactive to light. Neck:      Musculoskeletal: Normal range of motion and neck supple. Cardiovascular:      Rate and Rhythm: Normal rate. Pulmonary:      Breath sounds: Normal breath sounds. Abdominal:      General: Bowel sounds are normal.      Palpations: Abdomen is soft. Musculoskeletal: Normal range of motion. Skin:     General: Skin is warm and dry. Neurological:      Mental Status: He is alert and oriented to person, place, and time. Psychiatric:         Behavior: Behavior normal.        Currently afebrile vitals are stable. Abdominal pain as noted. Associated nausea. Abnormal CT scan with mesenteric artery stenosis on reading.   Will discuss further with vascular surgery and then give further recommendations. 1 week follow-up. Plan Per Assessment:  Cady Quiros was seen today for abdominal pain and constipation. Diagnoses and all orders for this visit:    Essential hypertension    Gastroesophageal reflux disease with esophagitis without hemorrhage    Decreased appetite    Right upper quadrant abdominal pain    Other orders  -     ondansetron (ZOFRAN ODT) 4 MG disintegrating tablet; Take 1 tablet by mouth every 8 hours as needed for Nausea or Vomiting            Return in about 1 week (around 11/9/2020). Libby Christine,     Note was generated with the assistance of voice recognition software. Document was reviewed however may contain grammatical errors.

## 2020-11-03 ENCOUNTER — TELEPHONE (OUTPATIENT)
Dept: VASCULAR SURGERY | Age: 85
End: 2020-11-03

## 2020-11-03 PROBLEM — F32.A DEPRESSION: Status: RESOLVED | Noted: 2019-08-15 | Resolved: 2020-11-03

## 2020-11-04 ENCOUNTER — HOSPITAL ENCOUNTER (OUTPATIENT)
Age: 85
Discharge: HOME | End: 2020-11-04
Payer: MEDICARE

## 2020-11-04 ENCOUNTER — TELEPHONE (OUTPATIENT)
Dept: VASCULAR SURGERY | Age: 85
End: 2020-11-04

## 2020-11-04 ENCOUNTER — OFFICE VISIT (OUTPATIENT)
Dept: VASCULAR SURGERY | Age: 85
End: 2020-11-04
Payer: MEDICARE

## 2020-11-04 VITALS — HEIGHT: 72 IN | RESPIRATION RATE: 16 BRPM | BODY MASS INDEX: 26.41 KG/M2 | WEIGHT: 195 LBS

## 2020-11-04 PROBLEM — K55.1 SUPERIOR MESENTERIC ARTERY STENOSIS (HCC): Status: ACTIVE | Noted: 2020-11-04

## 2020-11-04 LAB
ANION GAP SERPL CALCULATED.3IONS-SCNC: 10 MMOL/L (ref 7–16)
BUN BLDV-MCNC: 12 MG/DL (ref 8–23)
CALCIUM SERPL-MCNC: 9.7 MG/DL (ref 8.6–10.2)
CHLORIDE BLD-SCNC: 96 MMOL/L (ref 98–107)
CO2: 34 MMOL/L (ref 22–29)
CREAT SERPL-MCNC: 1.1 MG/DL (ref 0.7–1.2)
GFR AFRICAN AMERICAN: >60
GFR NON-AFRICAN AMERICAN: >60 ML/MIN/1.73
GLUCOSE BLD-MCNC: 114 MG/DL (ref 74–99)
POTASSIUM SERPL-SCNC: 3.7 MMOL/L (ref 3.5–5)
SODIUM BLD-SCNC: 140 MMOL/L (ref 132–146)

## 2020-11-04 PROCEDURE — 99214 OFFICE O/P EST MOD 30 MIN: CPT | Performed by: SURGERY

## 2020-11-04 PROCEDURE — G8427 DOCREV CUR MEDS BY ELIG CLIN: HCPCS | Performed by: SURGERY

## 2020-11-04 PROCEDURE — G8417 CALC BMI ABV UP PARAM F/U: HCPCS | Performed by: SURGERY

## 2020-11-04 PROCEDURE — 36415 COLL VENOUS BLD VENIPUNCTURE: CPT

## 2020-11-04 PROCEDURE — 4040F PNEUMOC VAC/ADMIN/RCVD: CPT | Performed by: SURGERY

## 2020-11-04 PROCEDURE — 1123F ACP DISCUSS/DSCN MKR DOCD: CPT | Performed by: SURGERY

## 2020-11-04 PROCEDURE — G8484 FLU IMMUNIZE NO ADMIN: HCPCS | Performed by: SURGERY

## 2020-11-04 PROCEDURE — 1036F TOBACCO NON-USER: CPT | Performed by: SURGERY

## 2020-11-04 PROCEDURE — 80048 BASIC METABOLIC PNL TOTAL CA: CPT

## 2020-11-04 NOTE — TELEPHONE ENCOUNTER
Left message regarding CTA at [facility] on Friday, 11-6-20 at 7:30 am.  Maysel at 7:00 am.  NPO 4 hours prior.

## 2020-11-04 NOTE — PROGRESS NOTES
Chief Complaint:   Chief Complaint   Patient presents with    Follow-up     anekouorosoym of rt. renal artery         HPI: Patient came to the office for evaluation of abnormal CT scan abdomen.   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, denies any flank pain or back pain    Patient denies any abdominal pain, denies any postprandial pain, denies any weight loss    Patient also complains of decreased appetite      Patient denies any focal lateralizing neurological symptoms like loss of speech, vision or loss of function of extremity    Patient can walk a few blocks slowly, and denies any symptoms of rest pain    No Known Allergies    Current Outpatient Medications   Medication Sig Dispense Refill    ondansetron (ZOFRAN ODT) 4 MG disintegrating tablet Take 1 tablet by mouth every 8 hours as needed for Nausea or Vomiting 10 tablet 2    meclizine (ANTIVERT) 25 MG tablet Bid prn 30 tablet 1    ibuprofen (ADVIL;MOTRIN) 800 MG tablet Take 1 tablet by mouth every 8 hours as needed for Pain 120 tablet 2    amLODIPine (NORVASC) 5 MG tablet Take 1 tablet by mouth daily 90 tablet 3    pantoprazole (PROTONIX) 40 MG tablet TK 1 T PO QD 90 tablet 3    buPROPion (WELLBUTRIN XL) 150 MG extended release tablet Take 1 tablet by mouth daily 90 tablet 3    losartan (COZAAR) 50 MG tablet Take 1 tablet by mouth daily 90 tablet 3    mirtazapine (REMERON) 15 MG tablet Take 1 tablet by mouth nightly 30 tablet 3    hydrochlorothiazide (HYDRODIURIL) 25 MG tablet Take 1 tablet by mouth every morning 90 tablet 3    aspirin 81 MG tablet Take 81 mg by mouth daily       No current facility-administered medications for this visit. Past Medical History:   Diagnosis Date    Aneurysm of right renal artery (HCC)     follows with Dr. Shay Ford yearly (last visit 9/19)    Colitis     Depression     GERD (gastroesophageal reflux disease)     Hyperlipidemia     Hypertension     Superior mesenteric artery stenosis ([address] 11/4/2020    TIA (transient ischemic attack)        Past Surgical History:   Procedure Laterality Date    ANTERIOR CRUCIATE LIGAMENT REPAIR Left 11/21/2001    APPENDECTOMY      CHOLECYSTECTOMY  2007    Lap    ECHO COMPL W DOP COLOR FLOW  12/4/2012         HERNIA REPAIR Right 11/13/2002    double    SINUS SURGERY  2002,2003     done x 2    TONSILLECTOMY      TOTAL KNEE ARTHROPLASTY Left 1/28/2019    LEFT  ROBOTIC KNEE TOTAL ARTHROPLASTY  ++MEREDITH- OIRMUFAO++   ++ADDUCTOR BLOCK++ performed by Radha Bennett MD at [facility] 10/10/2019    EGD BIOPSY performed by Tahmina Newsome MD at [address]       History reviewed. No pertinent family history. Social History     Socioeconomic History    Marital status:       Spouse name: Not on file    Number of children: Not on file    Years of education: Not on file    Highest education level: Not on file   Occupational History    Not on file   Social Needs    Financial resource strain: Not on file    Food insecurity     Worry: Not on file     Inability: Not on file    Transportation needs     Medical: Not on file     Non-medical: Not on file   Tobacco Use    Smoking status: Never Smoker    Smokeless tobacco: Never Used   Substance and Sexual Activity    Alcohol use: No    Drug use: No    Sexual activity: Not on file   Lifestyle    Physical activity     Days per week: Not on file     Minutes per session: Not on file    Stress: Not on file   Relationships    Social connections     Talks on phone: Not on file     Gets together: Not on file     Attends Christian service: Not on file     Active member of club or organization: Not on file     Attends meetings of clubs or organizations: Not on file     Relationship status: Not on file    Intimate partner violence     Fear of current or ex partner: Not on file     Emotionally abused: Not on file     Physically abused: Not on file     Forced sexual activity: Not on file   Other Topics Concern    Not on file   Social History Narrative    Not on file       Review of Systems:    Eyes:  No blurring, diplopia or vision loss. Respiratory:  No cough, pleuritic chest pain, dyspnea, or wheezing. Cardiovascular: No angina, palpitations . Hypertension  Musculoskeletal:  No arthritis or weakness. Neurologic:  No paralysis, paresis, paresthesia, seizures or headache. Gastrointestinal: History of GERD and esophagitis in the past, recently underwent endoscopy, for nausea, lack of appetite, tells me that he was told no major abnormal findings were noted      Physical Exam:  General appearance:  Alert, awake, oriented x 3. No distress. Eyes:  Conjunctivae appear normal; PERRL  Neck:  No jugular venous distention, lymphadenopathy or thyromegaly. No evidence of carotid bruit  Lungs:  Clear to ausculation bilaterally. No rhonchi, crackles, wheezes  Heart:  Regular rate and rhythm. No rub or murmur  Abdomen:  Soft, non-tender. No masses, organomegaly. Musculoskeletal : No joint effusions, tenderness swelling    Neuro: Speech is intact. Moving all extremities. No focal motor or sensory deficits      Extremities:  Both feet are warm to touch. The color of both feet is normal.        Pulses Right  Left    Brachial 3 3    Radial    3=normal   Femoral 2 2  2=diminished   Popliteal    1=barely palpable   Dorsalis pedis    0=absent   Posterior tibial    4=aneurysmal             Other pertinent information:1.  The past medical records were reviewed. 2.  I have personally reviewed the 3 CT scans that were done during the last few years, with contrast, unfortunately the quality of the imaging is poor, but noted, the latest one revealed approximately 40% narrowing particularly noted in the lateral views, with some suspicion of potential dissection but clarity is not clear, unable to explain the symptoms based upon the findings are noted on the CT scan    The celiac axis is patent, the inflammation in the artery appears to be patent, the hypogastric arteries are patent      Assessment:    1. Decreased appetite    2. Nausea    3. Superior mesenteric artery stenosis ([address]              Plan:       I had a long and detailed discussion the patient, based upon the CT scan that was done, though the technical quality is suboptimal, that I do not feel his symptoms of nausea, lack of appetite are coming from basically missing artery stenosis but nevertheless informed him the quality of the x-rays is poor    At the patient's concern, after detailed discussion, patient was recommended repeat CT scan, CTA of abdomen, for evaluation of supraventricular stenosis then make additional recommendations, patient was instead call me immediately for any abdominal pain etc.           All the questions were answered. Orders Placed This Encounter   Procedures    CTA ABDOMEN PELVIS W CONTRAST    Basic Metabolic Panel             Indicated follow-up: Return if symptoms worsen or fail to improve.

## 2020-11-06 ENCOUNTER — HOSPITAL ENCOUNTER (OUTPATIENT)
Dept: CT IMAGING | Age: 85
Discharge: HOME | End: 2020-11-08
Payer: MEDICARE

## 2020-11-06 PROCEDURE — 2580000003 HC RX 258: Performed by: STUDENT IN AN ORGANIZED HEALTH CARE EDUCATION/TRAINING PROGRAM

## 2020-11-06 PROCEDURE — 6360000004 HC RX CONTRAST MEDICATION: Performed by: STUDENT IN AN ORGANIZED HEALTH CARE EDUCATION/TRAINING PROGRAM

## 2020-11-06 PROCEDURE — 74174 CTA ABD&PLVS W/CONTRAST: CPT

## 2020-11-06 RX ORDER — SODIUM CHLORIDE 0.9 % (FLUSH) 0.9 %
10 SYRINGE (ML) INJECTION
Status: COMPLETED | OUTPATIENT
Start: 2020-11-06 | End: 2020-11-06

## 2020-11-06 RX ADMIN — Medication 10 ML: at 07:46

## 2020-11-06 RX ADMIN — IOPAMIDOL 90 ML: 755 INJECTION, SOLUTION INTRAVENOUS at 07:46

## 2020-11-13 ENCOUNTER — TELEPHONE (OUTPATIENT)
Dept: VASCULAR SURGERY | Age: 85
End: 2020-11-13

## 2020-11-13 NOTE — TELEPHONE ENCOUNTER
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

## 2020-11-16 ENCOUNTER — OFFICE VISIT (OUTPATIENT)
Dept: PRIMARY CARE CLINIC | Age: 85
End: 2020-11-16
Payer: MEDICARE

## 2020-11-16 ENCOUNTER — FOLLOWUP TELEPHONE ENCOUNTER (OUTPATIENT)
Dept: PRIMARY CARE CLINIC | Age: 85
End: 2020-11-16

## 2020-11-16 VITALS
WEIGHT: 195 LBS | OXYGEN SATURATION: 99 % | TEMPERATURE: 97.6 F | SYSTOLIC BLOOD PRESSURE: 136 MMHG | HEART RATE: 64 BPM | RESPIRATION RATE: 16 BRPM | BODY MASS INDEX: 26.45 KG/M2 | DIASTOLIC BLOOD PRESSURE: 64 MMHG

## 2020-11-16 PROCEDURE — 99213 OFFICE O/P EST LOW 20 MIN: CPT | Performed by: STUDENT IN AN ORGANIZED HEALTH CARE EDUCATION/TRAINING PROGRAM

## 2020-11-16 RX ORDER — ONDANSETRON 4 MG/1
8 TABLET, ORALLY DISINTEGRATING ORAL EVERY 8 HOURS PRN
Qty: 30 TABLET | Refills: 3 | Status: SHIPPED | OUTPATIENT
Start: 2020-11-16 | End: 2020-12-16

## 2020-11-16 ASSESSMENT — ENCOUNTER SYMPTOMS
EYE PAIN: 0
ABDOMINAL PAIN: 0
COUGH: 0
DIARRHEA: 0
CHEST TIGHTNESS: 0
CONSTIPATION: 1
VOMITING: 0
NAUSEA: 1
SHORTNESS OF BREATH: 0
RHINORRHEA: 0
BACK PAIN: 0

## 2020-11-16 NOTE — PROGRESS NOTES
Alva Brass Traikoff, DO   ibuprofen (ADVIL;MOTRIN) 800 MG tablet Take 1 tablet by mouth every 8 hours as needed for Pain Yes Alva Brass Traikoff, DO   amLODIPine (NORVASC) 5 MG tablet Take 1 tablet by mouth daily Yes Alva Brass Traikoff, DO   pantoprazole (PROTONIX) 40 MG tablet TK 1 T PO QD Yes Alva Brass Traikoff, DO   buPROPion (WELLBUTRIN XL) 150 MG extended release tablet Take 1 tablet by mouth daily Yes Alva Brass Traikoff, DO   losartan (COZAAR) 50 MG tablet Take 1 tablet by mouth daily Yes Alva Brass Traikoff, DO   mirtazapine (REMERON) 15 MG tablet Take 1 tablet by mouth nightly Yes Alva Brass Traikoff, DO   hydrochlorothiazide (HYDRODIURIL) 25 MG tablet Take 1 tablet by mouth every morning Yes Alva Brass Traikoff, DO   aspirin 81 MG tablet Take 81 mg by mouth daily Yes Historical Provider, MD        No Known Allergies    Past Medical History:   Diagnosis Date    [address]m of right renal [address]     follows with Dr. Ashlee Silva yearly (last visit 9/19)    Colitis     Depression     GERD (gastroesophageal reflux disease)     Hyperlipidemia     Hypertension     Superior mesenteric artery stenosis ([facility] 11/4/2020    TIA (transient ischemic attack)        Past Surgical History:   Procedure Laterality Date    ANTERIOR CRUCIATE LIGAMENT REPAIR Left 11/21/2001    APPENDECTOMY      CHOLECYSTECTOMY  2007    Lap    ECHO COMPL W DOP COLOR FLOW  12/4/2012         HERNIA REPAIR Right 11/13/2002    double    SINUS SURGERY  2002,2003     done x 2    TONSILLECTOMY      TOTAL KNEE ARTHROPLASTY Left 1/28/2019    LEFT  ROBOTIC KNEE TOTAL ARTHROPLASTY  ++MEREDITH- ZTTANTDI++   ++ADDUCTOR BLOCK++ performed by Juan Pablo Lancaster MD at [facility] N/A 10/10/2019    EGD BIOPSY performed by Angela Gregg MD at [facility] History     Socioeconomic History    Marital status:       Spouse name: Not on file    Number of children: Not on file    Years of education: Not on file  Highest education level: Not on file   Occupational History    Not on file   Social Needs    Financial resource strain: Not on file    Food insecurity     Worry: Not on file     Inability: Not on file    Transportation needs     Medical: Not on file     Non-medical: Not on file   Tobacco Use    Smoking status: Never Smoker    Smokeless tobacco: Never Used   Substance and Sexual Activity    Alcohol use: No    Drug use: No    Sexual activity: Not on file   Lifestyle    Physical activity     Days per week: Not on file     Minutes per session: Not on file    Stress: Not on file   Relationships    Social connections     Talks on phone: Not on file     Gets together: Not on file     Attends Confucianism service: Not on file     Active member of club or organization: Not on file     Attends meetings of clubs or organizations: Not on file     Relationship status: Not on file    Intimate partner violence     Fear of current or ex partner: Not on file     Emotionally abused: Not on file     Physically abused: Not on file     Forced sexual activity: Not on file   Other Topics Concern    Not on file   Social History Narrative    Not on file        History reviewed. No pertinent family history. Vitals:    11/16/20 1259   BP: 136/64   Pulse: 64   Resp: 16   Temp: 97.6 °F (36.4 °C)   TempSrc: Temporal   SpO2: 99%   Weight: 195 lb (88.5 kg)     Estimated body mass index is 26.45 kg/m² as calculated from the following:    Height as of 11/4/20: 6' (1.829 m). Weight as of this encounter: 195 lb (88.5 kg).     Most Recent Labs  CBC  Lab Results   Component Value Date    WBC 5.8 10/30/2020    WBC 6.6 05/27/2020    WBC 7.4 04/21/2020    RBC 4.73 10/30/2020    RBC 4.48 05/27/2020    RBC 4.85 04/21/2020    HGB 14.4 10/30/2020    HGB 13.6 05/27/2020    HGB 14.1 04/21/2020    HCT 41.6 10/30/2020    HCT 40.3 05/27/2020    HCT 43.8 04/21/2020    MCV 87.9 10/30/2020    MCV 90.0 05/27/2020    MCV 90.3 04/21/2020    PLT 389 10/30/2020     05/27/2020     04/21/2020      CMP  Lab Results   Component Value Date     11/04/2020     10/30/2020     05/27/2020    K 3.7 11/04/2020    K 3.3 10/30/2020    K 3.6 05/27/2020    K 3.6 04/21/2020    K 3.0 10/21/2019    K 3.4 01/29/2019    CL 96 11/04/2020    CL 92 10/30/2020    CL 95 05/27/2020    CO2 34 11/04/2020    CO2 30 10/30/2020    CO2 30 05/27/2020    ANIONGAP 10 11/04/2020    ANIONGAP 12 10/30/2020    ANIONGAP 11 05/27/2020    GLUCOSE 114 11/04/2020    GLUCOSE 109 10/30/2020    GLUCOSE 103 05/27/2020    BUN 12 11/04/2020    BUN 7 10/30/2020    BUN 10 05/27/2020    CREATININE 1.1 11/04/2020    CREATININE 0.9 10/30/2020    CREATININE 1.0 05/27/2020    LABGLOM >60 11/04/2020    LABGLOM >60 10/30/2020    LABGLOM >60 05/27/2020    GFRAA >60 11/04/2020    GFRAA >60 10/30/2020    GFRAA >60 05/27/2020    CALCIUM 9.7 11/04/2020    CALCIUM 9.7 10/30/2020    CALCIUM 9.2 05/27/2020    PROT 7.7 10/30/2020    PROT 7.1 05/27/2020    PROT 7.5 04/21/2020    LABALBU 4.2 10/30/2020    LABALBU 4.1 05/27/2020    LABALBU 4.0 04/21/2020    BILITOT 0.8 10/30/2020    BILITOT 0.5 05/27/2020    BILITOT 0.6 04/21/2020    ALKPHOS 92 10/30/2020    ALKPHOS 80 05/27/2020    ALKPHOS 91 04/21/2020    AST 18 10/30/2020    AST 17 05/27/2020    AST 17 04/21/2020    ALT 15 10/30/2020    ALT 11 05/27/2020    ALT 10 04/21/2020     A1C  No results found for: LABA1C  TSH  Lab Results   Component Value Date    TSH 3.640 04/21/2020    TSH 2.370 06/10/2019    TSH 1.200 01/22/2017     FREET4  Lab Results   Component Value Date    H1LURPX 7.7 04/21/2020    F7JQGMT 6.6 06/10/2019     LIPID  Lab Results   Component Value Date    CHOL 157 04/21/2020    CHOL 155 06/10/2019    HDL 38 04/21/2020    HDL 47 06/10/2019    LDLCALC 103 04/21/2020    LDLCALC 99 06/10/2019    TRIG 78 04/21/2020    TRIG 45 06/10/2019     VITAMIN D  No results found for: VITD25  MAGNESIUM  Lab Results   Component Value Date    MG 1.8 10/30/2020    MG 1.8 10/21/2019    MG 1.7 01/29/2019   UA  Lab Results   Component Value Date    COLORU Yellow 10/30/2020    COLORU Yellow 12/19/2019    COLORU Yellow 10/21/2019    CLARITYU Clear 10/30/2020    CLARITYU Clear 12/19/2019    CLARITYU Clear 10/21/2019    GLUCOSEU Negative 10/30/2020    GLUCOSEU Negative 12/19/2019    GLUCOSEU Negative 10/21/2019    BILIRUBINUR Negative 10/30/2020    BILIRUBINUR Negative 12/19/2019    BILIRUBINUR Negative 10/21/2019    BILIRUBINUR neg 06/28/2019    KETUA Negative 10/30/2020    KETUA Negative 12/19/2019    KETUA TRACE 10/21/2019    SPECGRAV <=1.005 10/30/2020    SPECGRAV 1.010 12/19/2019    SPECGRAV 1.010 10/21/2019    BLOODU TRACE-INTACT 10/30/2020    BLOODU TRACE-LYSED 12/19/2019    BLOODU TRACE-INTACT 10/21/2019    PHUR 7.0 10/30/2020    PHUR 7.0 12/19/2019    PHUR 7.0 10/21/2019    PROTEINU Negative 10/30/2020    PROTEINU Negative 12/19/2019    PROTEINU 30 10/21/2019    UROBILINOGEN 0.2 10/30/2020    UROBILINOGEN 0.2 12/19/2019    UROBILINOGEN 2.0 10/21/2019    NITRU Negative 10/30/2020    NITRU Negative 12/19/2019    NITRU Negative 10/21/2019    LEUKOCYTESUR Negative 10/30/2020    LEUKOCYTESUR Negative 12/19/2019    LEUKOCYTESUR Negative 10/21/2019         Physical Exam    ASSESSMENT/PLAN:  Demarcus Rivers was seen today for nausea. Diagnoses and all orders for this visit:    Nausea  -     ondansetron (ZOFRAN ODT) 4 MG disintegrating tablet; Take 2 tablets by mouth every 8 hours as needed for Nausea or Vomiting  Will increase dose, if still does not work, can try pherergan     Constipation, unspecified constipation type  Discussed using miralax at home and if he does not have a BM or has abdominal pain or blood in his stools to call us and/or go to the ER    Superior mesenteric artery stenosis [facility])  Called Dr. Em Mcneal office and will speak with them there for further plan of action for patient.       Educational materials and/or home exercises printed for patient's

## 2020-11-16 NOTE — PROGRESS NOTES
Please call patient and let him know I spoke with Dr. Starr Liao. We will have patient follow up with the doctor who did his previous colonoscopy and set up an appointment with them to follow with these symptoms. Dr. Starr Liao would like him to call his office as well.

## 2020-11-16 NOTE — TELEPHONE ENCOUNTER
Spoke with patient advised  He should see GI states that his previous GI was Dr. Gopal Dill who is . Would like referral to a new one. Thank you.

## 2020-11-17 ENCOUNTER — TELEPHONE (OUTPATIENT)
Dept: VASCULAR SURGERY | Age: 85
End: 2020-11-17

## 2020-11-17 ENCOUNTER — TELEPHONE (OUTPATIENT)
Dept: PRIMARY CARE CLINIC | Age: 85
End: 2020-11-17

## 2020-11-17 NOTE — TELEPHONE ENCOUNTER
Discussed yesterday with Dr. Reese Nathan who is covering for Dr. 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 Jimbo Fam, unable to explain the symptoms of nausea without any abdominal pain or postprandial pain based upon the CTA findings    Informed him, the best approach would be to discuss with his general surgeon who has done endoscopy, including gastroscopy to see what else is going on, it is been noted, patient in the past has undergone cholecystectomy    Patient told me that he in fact is scheduled for colonoscopy for further evaluation of his symptoms    Patient is able to tolerate oral feedings, taking small feedings, with the protein supplements and drinks, still denies any abdominal pain    Informed him, if no definite etiology is identified after colonoscopy and after general surgery work-up, to let me know, I will send him for second opinion    Patient also was informed to call me pain if any abdominal pain    All his questions were answered

## 2020-11-17 NOTE — TELEPHONE ENCOUNTER
You saw the pt in the office yesterday for Dr. Joe Varghese and discussed with him about him getting a colonoscopy.   He is calling because in the past he has seen Dr. Matilde Calderón for his colonoscopy's so he is asking that the referral be placed to him

## 2020-11-17 NOTE — TELEPHONE ENCOUNTER
I have put in an external referral for Dr. Héctor Reina. Please let me know if you need anything else!

## 2020-11-23 ENCOUNTER — TELEPHONE (OUTPATIENT)
Dept: PRIMARY CARE CLINIC | Age: 85
End: 2020-11-23

## 2020-11-23 RX ORDER — PROMETHAZINE HYDROCHLORIDE 12.5 MG/1
12.5 TABLET ORAL 3 TIMES DAILY PRN
Qty: 90 TABLET | Refills: 0 | Status: SHIPPED | OUTPATIENT
Start: 2020-11-23 | End: 2020-12-23

## 2020-11-23 NOTE — TELEPHONE ENCOUNTER
You saw pt last week for nausea, upped his zofran. Not helping any with the nausea, wondering if you could send in the phenergan that you had recommended?

## 2020-11-25 NOTE — TELEPHONE ENCOUNTER
lmom advising him that a referral was placed to Dr Matilde Calderón, but an appointment was is in the computer so pt may have been advised already

## 2020-12-14 ENCOUNTER — OFFICE VISIT (OUTPATIENT)
Dept: PRIMARY CARE CLINIC | Age: 85
End: 2020-12-14
Payer: MEDICARE

## 2020-12-14 VITALS
OXYGEN SATURATION: 98 % | SYSTOLIC BLOOD PRESSURE: 122 MMHG | DIASTOLIC BLOOD PRESSURE: 68 MMHG | WEIGHT: 192 LBS | HEART RATE: 72 BPM | BODY MASS INDEX: 26.04 KG/M2 | TEMPERATURE: 97.2 F

## 2020-12-14 PROCEDURE — 4040F PNEUMOC VAC/ADMIN/RCVD: CPT | Performed by: FAMILY MEDICINE

## 2020-12-14 PROCEDURE — G8417 CALC BMI ABV UP PARAM F/U: HCPCS | Performed by: FAMILY MEDICINE

## 2020-12-14 PROCEDURE — G8484 FLU IMMUNIZE NO ADMIN: HCPCS | Performed by: FAMILY MEDICINE

## 2020-12-14 PROCEDURE — G8427 DOCREV CUR MEDS BY ELIG CLIN: HCPCS | Performed by: FAMILY MEDICINE

## 2020-12-14 PROCEDURE — 99214 OFFICE O/P EST MOD 30 MIN: CPT | Performed by: FAMILY MEDICINE

## 2020-12-14 PROCEDURE — 1123F ACP DISCUSS/DSCN MKR DOCD: CPT | Performed by: FAMILY MEDICINE

## 2020-12-14 PROCEDURE — 1036F TOBACCO NON-USER: CPT | Performed by: FAMILY MEDICINE

## 2020-12-14 RX ORDER — PREDNISONE 1 MG/1
TABLET ORAL
Qty: 30 TABLET | Refills: 0 | Status: SHIPPED
Start: 2020-12-14 | End: 2020-12-28 | Stop reason: CLARIF

## 2020-12-14 RX ORDER — AZITHROMYCIN 250 MG/1
TABLET, FILM COATED ORAL
Qty: 1 PACKET | Refills: 0 | Status: SHIPPED
Start: 2020-12-14 | End: 2020-12-28 | Stop reason: CLARIF

## 2020-12-14 RX ORDER — LOSARTAN POTASSIUM 50 MG/1
50 TABLET ORAL DAILY
Qty: 90 TABLET | Refills: 3 | Status: SHIPPED
Start: 2020-12-14 | End: 2021-03-22

## 2020-12-14 ASSESSMENT — ENCOUNTER SYMPTOMS
RESPIRATORY NEGATIVE: 1
EYES NEGATIVE: 1
ALLERGIC/IMMUNOLOGIC NEGATIVE: 1
SINUS PRESSURE: 1
SINUS PAIN: 1
GASTROINTESTINAL NEGATIVE: 1

## 2020-12-14 NOTE — PROGRESS NOTES
20     Sola Castillo    : 1935 Sex: male   Age: 81 y.o. Chief Complaint   Patient presents with    Congestion    Drainage    Nausea & Vomiting       Prior to Admission medications    Medication Sig Start Date End Date Taking?  Authorizing Provider   losartan (COZAAR) 50 MG tablet Take 1 tablet by mouth daily 20  Yes Holley Villa, DO   predniSONE (DELTASONE) 5 MG tablet 4 tablets daily for 3 days then 3 tablets daily for 3 days then 2 tablets daily for 3 days then 1 tablet daily for 3 days 20  Yes Holley Kyle Dos Santosoff, DO   azithromycin (ZITHROMAX Z-ELENA) 250 MG tablet 2 today  Then 1 qd  4 days 20  Yes Holley Villa, DO   promethazine (PHENERGAN) 12.5 MG tablet Take 1 tablet by mouth 3 times daily as needed for Nausea 20  Marva Morrissey DO   ondansetron (ZOFRAN ODT) 4 MG disintegrating tablet Take 2 tablets by mouth every 8 hours as needed for Nausea or Vomiting 20  Marva Morrissey DO   meclizine (ANTIVERT) 25 MG tablet Bid prn 10/1/20   Wild Orellana DO   ibuprofen (ADVIL;MOTRIN) 800 MG tablet Take 1 tablet by mouth every 8 hours as needed for Pain 10/1/20   Holley Villa,    amLODIPine (NORVASC) 5 MG tablet Take 1 tablet by mouth daily 6/3/20   Holley Villa, DO   pantoprazole (PROTONIX) 40 MG tablet TK 1 T PO QD 6/3/20   Wild Orellana DO   buPROPion (WELLBUTRIN XL) 150 MG extended release tablet Take 1 tablet by mouth daily 6/3/20   Wild Orellana DO   mirtazapine (REMERON) 15 MG tablet Take 1 tablet by mouth nightly 20   Holley Villa, DO   aspirin 81 MG tablet Take 81 mg by mouth daily    Historical Provider, MD HPI: Patient is seen today to follow-up on upper respiratory infection hypertension some nausea reflux disease anxiety. Medically overall doing well. Primary complaint today is the sinus drainage and associated nausea. Addition of Zithromax prednisone and some Mucinex and then will follow-up in a couple weeks. Blood pressure has been very well controlled and we discussed possibly stopping hydrochlorothiazide at this point and then continuing to monitor. Is on amlodipine and losartan and will maintain. Review of Systems   Constitutional: Negative. HENT: Positive for congestion, postnasal drip, sinus pressure and sinus pain. Eyes: Negative. Respiratory: Negative. Gastrointestinal: Negative. Endocrine: Negative. Genitourinary: Negative. Musculoskeletal: Negative. Skin: Negative. Allergic/Immunologic: Negative. Neurological: Negative. Hematological: Negative. Psychiatric/Behavioral: Negative.                Current Outpatient Medications:     losartan (COZAAR) 50 MG tablet, Take 1 tablet by mouth daily, Disp: 90 tablet, Rfl: 3    predniSONE (DELTASONE) 5 MG tablet, 4 tablets daily for 3 days then 3 tablets daily for 3 days then 2 tablets daily for 3 days then 1 tablet daily for 3 days, Disp: 30 tablet, Rfl: 0    azithromycin (ZITHROMAX Z-ELENA) 250 MG tablet, 2 today  Then 1 qd  4 days, Disp: 1 packet, Rfl: 0    promethazine (PHENERGAN) 12.5 MG tablet, Take 1 tablet by mouth 3 times daily as needed for Nausea, Disp: 90 tablet, Rfl: 0    ondansetron (ZOFRAN ODT) 4 MG disintegrating tablet, Take 2 tablets by mouth every 8 hours as needed for Nausea or Vomiting, Disp: 30 tablet, Rfl: 3    meclizine (ANTIVERT) 25 MG tablet, Bid prn, Disp: 30 tablet, Rfl: 1    ibuprofen (ADVIL;MOTRIN) 800 MG tablet, Take 1 tablet by mouth every 8 hours as needed for Pain, Disp: 120 tablet, Rfl: 2    amLODIPine (NORVASC) 5 MG tablet, Take 1 tablet by mouth daily, Disp: 90 tablet, Rfl: 3   pantoprazole (PROTONIX) 40 MG tablet, TK 1 T PO QD, Disp: 90 tablet, Rfl: 3    buPROPion (WELLBUTRIN XL) 150 MG extended release tablet, Take 1 tablet by mouth daily, Disp: 90 tablet, Rfl: 3    mirtazapine (REMERON) 15 MG tablet, Take 1 tablet by mouth nightly, Disp: 30 tablet, Rfl: 3    aspirin 81 MG tablet, Take 81 mg by mouth daily, Disp: , Rfl:     No Known Allergies    Social History     Tobacco Use    Smoking status: Never Smoker    Smokeless tobacco: Never Used   Substance Use Topics    Alcohol use: No    Drug use: No      Past Surgical History:   Procedure Laterality Date    ANTERIOR CRUCIATE LIGAMENT REPAIR Left 11/21/2001    APPENDECTOMY      CHOLECYSTECTOMY  2007    Lap    ECHO COMPL W DOP COLOR FLOW  12/4/2012         HERNIA REPAIR Right 11/13/2002    double    SINUS SURGERY  2002,2003     done x 2    TONSILLECTOMY      TOTAL KNEE ARTHROPLASTY Left 1/28/2019    LEFT  ROBOTIC KNEE TOTAL ARTHROPLASTY  ++MEREDITH- [id number]++   ++ADDUCTOR BLOCK++ performed by Carly Lilly MD at [address] [place] [address] N/A 10/10/2019    EGD BIOPSY performed by Tegan Gomez MD at [facility] ENDOSCOPY     No family history on file. Past Medical History:   Diagnosis Date    Aneurysm of right renal artery (HCC)     follows with Dr. Corina Agustin yearly (last visit 9/19)    Colitis     Depression     GERD (gastroesophageal reflux disease)     Hyperlipidemia     Hypertension     Superior mesenteric artery stenosis ([facility] [address] 11/4/2020    TIA (transient ischemic attack)        Vitals:    12/14/20 1308   BP: 122/68   Pulse: 72   Temp: 97.2 °F (36.2 °C)   SpO2: 98%   Weight: 192 lb (87.1 kg)     BP Readings from Last 3 Encounters:   12/14/20 122/68   11/16/20 136/64   11/02/20 (!) 140/76        Physical Exam  Vitals signs and nursing note reviewed. Constitutional:       Appearance: He is well-developed. HENT:      Head: Normocephalic.       Right Ear: External ear normal. Left Ear: External ear normal.      Nose: Nose normal.   Eyes:      Conjunctiva/sclera: Conjunctivae normal.      Pupils: Pupils are equal, round, and reactive to light. Neck:      Musculoskeletal: Normal range of motion and neck supple. Cardiovascular:      Rate and Rhythm: Normal rate. Pulmonary:      Breath sounds: Normal breath sounds. Abdominal:      General: Bowel sounds are normal.      Palpations: Abdomen is soft. Musculoskeletal: Normal range of motion. Skin:     General: Skin is warm and dry. Neurological:      Mental Status: He is alert and oriented to person, place, and time. Psychiatric:         Behavior: Behavior normal.        Exam findings remain stable. Meds to continue as prescribed. Reassessment 2 weeks and sooner if problems. Plan Per Assessment:  Robert Bran was seen today for congestion, drainage and nausea & vomiting. Diagnoses and all orders for this visit:    Acute recurrent maxillary sinusitis  -     azithromycin (ZITHROMAX Z-ELENA) 250 MG tablet; 2 today  Then 1 qd  4 days    Essential hypertension    Nausea    Gastroesophageal reflux disease with esophagitis without hemorrhage    Anxiety    Other orders  -     losartan (COZAAR) 50 MG tablet; Take 1 tablet by mouth daily  -     predniSONE (DELTASONE) 5 MG tablet; 4 tablets daily for 3 days then 3 tablets daily for 3 days then 2 tablets daily for 3 days then 1 tablet daily for 3 days            Return in about 2 weeks (around 12/28/2020). Behzad Varner DO    Note was generated with the assistance of voice recognition software. Document was reviewed however may contain grammatical errors.

## 2020-12-28 ENCOUNTER — OFFICE VISIT (OUTPATIENT)
Dept: PRIMARY CARE CLINIC | Age: 85
End: 2020-12-28
Payer: MEDICARE

## 2020-12-28 VITALS
BODY MASS INDEX: 26.31 KG/M2 | HEART RATE: 82 BPM | TEMPERATURE: 97.7 F | SYSTOLIC BLOOD PRESSURE: 148 MMHG | OXYGEN SATURATION: 98 % | WEIGHT: 194 LBS | DIASTOLIC BLOOD PRESSURE: 78 MMHG

## 2020-12-28 PROCEDURE — G8484 FLU IMMUNIZE NO ADMIN: HCPCS | Performed by: FAMILY MEDICINE

## 2020-12-28 PROCEDURE — 1123F ACP DISCUSS/DSCN MKR DOCD: CPT | Performed by: FAMILY MEDICINE

## 2020-12-28 PROCEDURE — 99214 OFFICE O/P EST MOD 30 MIN: CPT | Performed by: FAMILY MEDICINE

## 2020-12-28 PROCEDURE — 1036F TOBACCO NON-USER: CPT | Performed by: FAMILY MEDICINE

## 2020-12-28 PROCEDURE — G8427 DOCREV CUR MEDS BY ELIG CLIN: HCPCS | Performed by: FAMILY MEDICINE

## 2020-12-28 PROCEDURE — G8417 CALC BMI ABV UP PARAM F/U: HCPCS | Performed by: FAMILY MEDICINE

## 2020-12-28 PROCEDURE — 4040F PNEUMOC VAC/ADMIN/RCVD: CPT | Performed by: FAMILY MEDICINE

## 2020-12-28 RX ORDER — MIRTAZAPINE 15 MG/1
15 TABLET, FILM COATED ORAL NIGHTLY
Qty: 30 TABLET | Refills: 3 | Status: SHIPPED
Start: 2020-12-28 | End: 2021-01-27 | Stop reason: SDUPTHER

## 2020-12-28 ASSESSMENT — ENCOUNTER SYMPTOMS
NAUSEA: 1
EYES NEGATIVE: 1
ALLERGIC/IMMUNOLOGIC NEGATIVE: 1
RESPIRATORY NEGATIVE: 1

## 2021-01-04 ENCOUNTER — OFFICE VISIT (OUTPATIENT)
Dept: PRIMARY CARE CLINIC | Age: 86
End: 2021-01-04
Payer: MEDICARE

## 2021-01-04 VITALS
WEIGHT: 194 LBS | DIASTOLIC BLOOD PRESSURE: 66 MMHG | HEART RATE: 68 BPM | OXYGEN SATURATION: 98 % | SYSTOLIC BLOOD PRESSURE: 138 MMHG | TEMPERATURE: 96.8 F | BODY MASS INDEX: 26.31 KG/M2

## 2021-01-04 DIAGNOSIS — R63.0 DECREASED APPETITE: ICD-10-CM

## 2021-01-04 DIAGNOSIS — G47.00 INSOMNIA, UNSPECIFIED TYPE: ICD-10-CM

## 2021-01-04 DIAGNOSIS — I10 ESSENTIAL HYPERTENSION: Primary | Chronic | ICD-10-CM

## 2021-01-04 DIAGNOSIS — F41.9 ANXIETY: ICD-10-CM

## 2021-01-04 PROCEDURE — 1036F TOBACCO NON-USER: CPT | Performed by: FAMILY MEDICINE

## 2021-01-04 PROCEDURE — 99214 OFFICE O/P EST MOD 30 MIN: CPT | Performed by: FAMILY MEDICINE

## 2021-01-04 PROCEDURE — G8484 FLU IMMUNIZE NO ADMIN: HCPCS | Performed by: FAMILY MEDICINE

## 2021-01-04 PROCEDURE — G8427 DOCREV CUR MEDS BY ELIG CLIN: HCPCS | Performed by: FAMILY MEDICINE

## 2021-01-04 PROCEDURE — 4040F PNEUMOC VAC/ADMIN/RCVD: CPT | Performed by: FAMILY MEDICINE

## 2021-01-04 PROCEDURE — G8417 CALC BMI ABV UP PARAM F/U: HCPCS | Performed by: FAMILY MEDICINE

## 2021-01-04 PROCEDURE — 1123F ACP DISCUSS/DSCN MKR DOCD: CPT | Performed by: FAMILY MEDICINE

## 2021-01-04 ASSESSMENT — ENCOUNTER SYMPTOMS
ABDOMINAL DISTENTION: 1
EYES NEGATIVE: 1
RESPIRATORY NEGATIVE: 1
NAUSEA: 1
ALLERGIC/IMMUNOLOGIC NEGATIVE: 1

## 2021-01-04 NOTE — PROGRESS NOTES
21     Armond Cosby    : 1935 Sex: male   Age: 81 y.o. Chief Complaint   Patient presents with    Hypertension    Abdominal Pain     cramps/ little improvement from last week     Anorexia       Prior to Admission medications    Medication Sig Start Date End Date Taking? Authorizing Provider   mirtazapine (REMERON) 15 MG tablet Take 1 tablet by mouth nightly 20   Barbaralevy Allen, DO   losartan (COZAAR) 50 MG tablet Take 1 tablet by mouth daily 20   Barbaralevy Allen, DO   meclizine (ANTIVERT) 25 MG tablet Bid prn 10/1/20   Barbaralevy Allen, DO   ibuprofen (ADVIL;MOTRIN) 800 MG tablet Take 1 tablet by mouth every 8 hours as needed for Pain 10/1/20   Orest Spatz Traikoff, DO   amLODIPine (NORVASC) 5 MG tablet Take 1 tablet by mouth daily 6/3/20   Orest Spatz Traikoff, DO   pantoprazole (PROTONIX) 40 MG tablet TK 1 T PO QD 6/3/20   Barbara Allen, DO   aspirin 81 MG tablet Take 81 mg by mouth daily    Historical Provider, MD          HPI: Patient evaluated today with hypertension insomnia decreased appetite and continued complaints of abdominal discomfort bloating. Further discussion today he is taking fiber supplements bene fiber which I have asked him to discontinue. Wellbutrin XL has been placed on hold. Remeron adjusted to 30 mg at at bedtime and reassess here in the next 2 weeks. Counseling session for next week. Review of Systems   Constitutional: Negative. HENT: Negative. Eyes: Negative. Respiratory: Negative. Gastrointestinal: Positive for abdominal distention and nausea. Endocrine: Negative. Genitourinary: Negative. Musculoskeletal: Negative. Skin: Negative. Allergic/Immunologic: Negative. Neurological: Negative. Hematological: Negative. Psychiatric/Behavioral: Negative.                Current Outpatient Medications:     mirtazapine (REMERON) 15 MG tablet, Take 1 tablet by mouth nightly, Disp: 30 tablet, Rfl: 3   losartan (COZAAR) 50 MG tablet, Take 1 tablet by mouth daily, Disp: 90 tablet, Rfl: 3    meclizine (ANTIVERT) 25 MG tablet, Bid prn, Disp: 30 tablet, Rfl: 1    ibuprofen (ADVIL;MOTRIN) 800 MG tablet, Take 1 tablet by mouth every 8 hours as needed for Pain, Disp: 120 tablet, Rfl: 2    amLODIPine (NORVASC) 5 MG tablet, Take 1 tablet by mouth daily, Disp: 90 tablet, Rfl: 3    pantoprazole (PROTONIX) 40 MG tablet, TK 1 T PO QD, Disp: 90 tablet, Rfl: 3    aspirin 81 MG tablet, Take 81 mg by mouth daily, Disp: , Rfl:     No Known Allergies    Social History     Tobacco Use    Smoking status: Never Smoker    Smokeless tobacco: Never Used   Substance Use Topics    Alcohol use: No    Drug use: No      Past Surgical History:   Procedure Laterality Date    ANTERIOR CRUCIATE LIGAMENT REPAIR Left 11/21/2001    APPENDECTOMY      CHOLECYSTECTOMY  2007    Lap    ECHO COMPL W DOP COLOR FLOW  12/4/2012         HERNIA REPAIR Right 11/13/2002    double    SINUS SURGERY  2002,2003     done x 2    TONSILLECTOMY      TOTAL KNEE ARTHROPLASTY Left 1/28/2019    LEFT  ROBOTIC KNEE TOTAL ARTHROPLASTY  ++MEREDITH- [id number]++   ++ADDUCTOR BLOCK++ performed by Gabriel Nichols MD at [facility] N/A 10/10/2019    EGD BIOPSY performed by Sapphire Wheatley MD at [facility] ENDOSCOPY     No family history on file.   Past Medical History:   Diagnosis Date    Aneurysm of right renal artery (HCC)     follows with Dr. Dari Trinidad yearly (last visit 9/19)    Colitis     Depression     GERD (gastroesophageal reflux disease)     Hyperlipidemia     Hypertension     Superior mesenteric artery stenosis ([facility] [address] 11/4/2020    TIA (transient ischemic attack)        Vitals:    01/04/21 1544   BP: 138/66   Pulse: 68   Temp: 96.8 °F (36 °C)   SpO2: 98%   Weight: 194 lb (88 kg)     BP Readings from Last 3 Encounters:   01/04/21 138/66   12/28/20 (!) 148/78   12/14/20 122/68        Physical Exam Vitals signs and nursing note reviewed. Constitutional:       Appearance: He is well-developed. HENT:      Head: Normocephalic. Right Ear: External ear normal.      Left Ear: External ear normal.      Nose: Nose normal.   Eyes:      Conjunctiva/sclera: Conjunctivae normal.      Pupils: Pupils are equal, round, and reactive to light. Neck:      Musculoskeletal: Normal range of motion and neck supple. Cardiovascular:      Rate and Rhythm: Normal rate. Pulmonary:      Breath sounds: Normal breath sounds. Abdominal:      General: Bowel sounds are normal.      Palpations: Abdomen is soft. Musculoskeletal: Normal range of motion. Skin:     General: Skin is warm and dry. Neurological:      Mental Status: He is alert and oriented to person, place, and time. Psychiatric:         Behavior: Behavior normal.        Today's vitals physical examination stable. Heart was controlled lungs are clear. Medications as prescribed. Abdominal symptoms may be related to the fiber supplementation. Antidepressant adjusted and fiber supplement discontinued. Reassessment 2 weeks        Plan Per Assessment:  Brandy Lund was seen today for hypertension, abdominal pain and anorexia. Diagnoses and all orders for this visit:    Essential hypertension    Insomnia, unspecified type    Decreased appetite    Anxiety            Return in about 2 weeks (around 1/18/2021). Karlie Joseph DO    Note was generated with the assistance of voice recognition software. Document was reviewed however may contain grammatical errors.

## 2021-01-14 ENCOUNTER — OFFICE VISIT (OUTPATIENT)
Dept: PRIMARY CARE CLINIC | Age: 86
End: 2021-01-14
Payer: MEDICARE

## 2021-01-14 VITALS
HEART RATE: 71 BPM | RESPIRATION RATE: 18 BRPM | WEIGHT: 194 LBS | DIASTOLIC BLOOD PRESSURE: 86 MMHG | SYSTOLIC BLOOD PRESSURE: 150 MMHG | OXYGEN SATURATION: 98 % | HEIGHT: 72 IN | BODY MASS INDEX: 26.28 KG/M2 | TEMPERATURE: 97.6 F

## 2021-01-14 DIAGNOSIS — R73.01 IMPAIRED FASTING GLUCOSE: ICD-10-CM

## 2021-01-14 DIAGNOSIS — F41.9 ANXIETY: ICD-10-CM

## 2021-01-14 DIAGNOSIS — E78.2 MIXED HYPERLIPIDEMIA: Chronic | ICD-10-CM

## 2021-01-14 DIAGNOSIS — G47.00 INSOMNIA, UNSPECIFIED TYPE: ICD-10-CM

## 2021-01-14 DIAGNOSIS — I10 ESSENTIAL HYPERTENSION: Primary | Chronic | ICD-10-CM

## 2021-01-14 PROCEDURE — G8484 FLU IMMUNIZE NO ADMIN: HCPCS | Performed by: FAMILY MEDICINE

## 2021-01-14 PROCEDURE — 99214 OFFICE O/P EST MOD 30 MIN: CPT | Performed by: FAMILY MEDICINE

## 2021-01-14 PROCEDURE — 4040F PNEUMOC VAC/ADMIN/RCVD: CPT | Performed by: FAMILY MEDICINE

## 2021-01-14 PROCEDURE — G8417 CALC BMI ABV UP PARAM F/U: HCPCS | Performed by: FAMILY MEDICINE

## 2021-01-14 PROCEDURE — G8427 DOCREV CUR MEDS BY ELIG CLIN: HCPCS | Performed by: FAMILY MEDICINE

## 2021-01-14 PROCEDURE — 1036F TOBACCO NON-USER: CPT | Performed by: FAMILY MEDICINE

## 2021-01-14 PROCEDURE — 1123F ACP DISCUSS/DSCN MKR DOCD: CPT | Performed by: FAMILY MEDICINE

## 2021-01-14 ASSESSMENT — ENCOUNTER SYMPTOMS
ALLERGIC/IMMUNOLOGIC NEGATIVE: 1
EYES NEGATIVE: 1
GASTROINTESTINAL NEGATIVE: 1
RESPIRATORY NEGATIVE: 1

## 2021-01-14 NOTE — PROGRESS NOTES
21     Shayla Panchal    : 1935 Sex: male   Age: 81 y.o. Chief Complaint   Patient presents with    Hypertension     2wk       Prior to Admission medications    Medication Sig Start Date End Date Taking? Authorizing Provider   mirtazapine (REMERON) 15 MG tablet Take 1 tablet by mouth nightly 20  Yes Tierney Villa, DO   losartan (COZAAR) 50 MG tablet Take 1 tablet by mouth daily 20  Yes Tierney Villa, DO   meclizine (ANTIVERT) 25 MG tablet Bid prn 10/1/20  Yes Tierney Villa,    ibuprofen (ADVIL;MOTRIN) 800 MG tablet Take 1 tablet by mouth every 8 hours as needed for Pain 10/1/20  Yes Tierney Villa, DO   amLODIPine (NORVASC) 5 MG tablet Take 1 tablet by mouth daily 6/3/20  Yes Tierney Villa DO   pantoprazole (PROTONIX) 40 MG tablet TK 1 T PO QD 6/3/20  Yes Tierney Villa DO   aspirin 81 MG tablet Take 81 mg by mouth daily   Yes Historical Provider, MD          HPI: Patient seen today for hypertension impaired fasting glucose hyperlipidemia anxiety insomnia. He is doing better at this point. Remeron is well-tolerated and will maintain 30 mg daily. Wellbutrin has been discontinued. Review of Systems   Constitutional: Negative. HENT: Negative. Eyes: Negative. Respiratory: Negative. Gastrointestinal: Negative. Endocrine: Negative. Genitourinary: Negative. Musculoskeletal: Negative. Skin: Negative. Allergic/Immunologic: Negative. Neurological: Negative. Hematological: Negative. Psychiatric/Behavioral: Negative.                Current Outpatient Medications:     mirtazapine (REMERON) 15 MG tablet, Take 1 tablet by mouth nightly, Disp: 30 tablet, Rfl: 3    losartan (COZAAR) 50 MG tablet, Take 1 tablet by mouth daily, Disp: 90 tablet, Rfl: 3    meclizine (ANTIVERT) 25 MG tablet, Bid prn, Disp: 30 tablet, Rfl: 1 Appearance: He is well-developed. HENT:      Head: Normocephalic. Right Ear: External ear normal.      Left Ear: External ear normal.      Nose: Nose normal.   Eyes:      Conjunctiva/sclera: Conjunctivae normal.      Pupils: Pupils are equal, round, and reactive to light. Neck:      Musculoskeletal: Normal range of motion and neck supple. Cardiovascular:      Rate and Rhythm: Normal rate. Pulmonary:      Breath sounds: Normal breath sounds. Abdominal:      General: Bowel sounds are normal.      Palpations: Abdomen is soft. Musculoskeletal: Normal range of motion. Skin:     General: Skin is warm and dry. Neurological:      Mental Status: He is alert and oriented to person, place, and time. Psychiatric:         Behavior: Behavior normal.     's vitals physical examination stable. Medications as prescribed. Maintain current care follow-up again 27th of this month and sooner if problems. Plan Per Assessment:  Rolanda Ye was seen today for hypertension. Diagnoses and all orders for this visit:    Essential hypertension  -     CBC Auto Differential; Future  -     Comprehensive Metabolic Panel; Future  -     Hemoglobin A1C; Future  -     Lipid Panel; Future  -     T4; Future  -     TSH without Reflex; Future    Impaired fasting glucose  -     CBC Auto Differential; Future  -     Comprehensive Metabolic Panel; Future  -     Hemoglobin A1C; Future  -     Lipid Panel; Future  -     T4; Future  -     TSH without Reflex; Future    Mixed hyperlipidemia  -     CBC Auto Differential; Future  -     Comprehensive Metabolic Panel; Future  -     Hemoglobin A1C; Future  -     Lipid Panel; Future  -     T4; Future  -     TSH without Reflex; Future    Anxiety    Insomnia, unspecified type            No follow-ups on file. Rozina Moreno DO    Note was generated with the assistance of voice recognition software. Document was reviewed however may contain grammatical errors.

## 2021-01-25 ENCOUNTER — HOSPITAL ENCOUNTER (OUTPATIENT)
Age: 86
Discharge: HOME | End: 2021-01-25
Payer: MEDICARE

## 2021-01-25 DIAGNOSIS — E78.2 MIXED HYPERLIPIDEMIA: Chronic | ICD-10-CM

## 2021-01-25 DIAGNOSIS — R73.01 IMPAIRED FASTING GLUCOSE: ICD-10-CM

## 2021-01-25 DIAGNOSIS — I10 ESSENTIAL HYPERTENSION: Chronic | ICD-10-CM

## 2021-01-25 LAB
ALBUMIN SERPL-MCNC: 3.9 G/DL (ref 3.5–5.2)
ALP BLD-CCNC: 95 U/L (ref 40–129)
ALT SERPL-CCNC: 11 U/L (ref 0–40)
ANION GAP SERPL CALCULATED.3IONS-SCNC: 10 MMOL/L (ref 7–16)
AST SERPL-CCNC: 14 U/L (ref 0–39)
BASOPHILS ABSOLUTE: 0.03 E9/L (ref 0–0.2)
BASOPHILS RELATIVE PERCENT: 0.4 % (ref 0–2)
BILIRUB SERPL-MCNC: 0.4 MG/DL (ref 0–1.2)
BUN BLDV-MCNC: 11 MG/DL (ref 8–23)
CALCIUM SERPL-MCNC: 9.3 MG/DL (ref 8.6–10.2)
CHLORIDE BLD-SCNC: 103 MMOL/L (ref 98–107)
CHOLESTEROL, TOTAL: 153 MG/DL (ref 0–199)
CO2: 31 MMOL/L (ref 22–29)
CREAT SERPL-MCNC: 0.9 MG/DL (ref 0.7–1.2)
EOSINOPHILS ABSOLUTE: 0.13 E9/L (ref 0.05–0.5)
EOSINOPHILS RELATIVE PERCENT: 1.8 % (ref 0–6)
GFR AFRICAN AMERICAN: >60
GFR NON-AFRICAN AMERICAN: >60 ML/MIN/1.73
GLUCOSE BLD-MCNC: 98 MG/DL (ref 74–99)
HBA1C MFR BLD: 5.1 % (ref 4–5.6)
HCT VFR BLD CALC: 43.5 % (ref 37–54)
HDLC SERPL-MCNC: 47 MG/DL
HEMOGLOBIN: 14.3 G/DL (ref 12.5–16.5)
IMMATURE GRANULOCYTES #: 0.03 E9/L
IMMATURE GRANULOCYTES %: 0.4 % (ref 0–5)
LDL CHOLESTEROL CALCULATED: 95 MG/DL (ref 0–99)
LYMPHOCYTES ABSOLUTE: 1.9 E9/L (ref 1.5–4)
LYMPHOCYTES RELATIVE PERCENT: 26 % (ref 20–42)
MCH RBC QN AUTO: 30 PG (ref 26–35)
MCHC RBC AUTO-ENTMCNC: 32.9 % (ref 32–34.5)
MCV RBC AUTO: 91.4 FL (ref 80–99.9)
MONOCYTES ABSOLUTE: 0.66 E9/L (ref 0.1–0.95)
MONOCYTES RELATIVE PERCENT: 9 % (ref 2–12)
NEUTROPHILS ABSOLUTE: 4.56 E9/L (ref 1.8–7.3)
NEUTROPHILS RELATIVE PERCENT: 62.4 % (ref 43–80)
PDW BLD-RTO: 13.5 FL (ref 11.5–15)
PLATELET # BLD: 362 E9/L (ref 130–450)
PMV BLD AUTO: 9.1 FL (ref 7–12)
POTASSIUM SERPL-SCNC: 4.1 MMOL/L (ref 3.5–5)
RBC # BLD: 4.76 E12/L (ref 3.8–5.8)
SODIUM BLD-SCNC: 144 MMOL/L (ref 132–146)
T4 TOTAL: 6.8 MCG/DL (ref 4.5–11.7)
TOTAL PROTEIN: 7.2 G/DL (ref 6.4–8.3)
TRIGL SERPL-MCNC: 55 MG/DL (ref 0–149)
TSH SERPL DL<=0.05 MIU/L-ACNC: 1.97 UIU/ML (ref 0.27–4.2)
VLDLC SERPL CALC-MCNC: 11 MG/DL
WBC # BLD: 7.3 E9/L (ref 4.5–11.5)

## 2021-01-25 PROCEDURE — 84436 ASSAY OF TOTAL THYROXINE: CPT

## 2021-01-25 PROCEDURE — 83036 HEMOGLOBIN GLYCOSYLATED A1C: CPT

## 2021-01-25 PROCEDURE — 80061 LIPID PANEL: CPT

## 2021-01-25 PROCEDURE — 36415 COLL VENOUS BLD VENIPUNCTURE: CPT

## 2021-01-25 PROCEDURE — 84443 ASSAY THYROID STIM HORMONE: CPT

## 2021-01-25 PROCEDURE — 85025 COMPLETE CBC W/AUTO DIFF WBC: CPT

## 2021-01-25 PROCEDURE — 80053 COMPREHEN METABOLIC PANEL: CPT

## 2021-01-27 ENCOUNTER — OFFICE VISIT (OUTPATIENT)
Dept: PRIMARY CARE CLINIC | Age: 86
End: 2021-01-27
Payer: MEDICARE

## 2021-01-27 VITALS
DIASTOLIC BLOOD PRESSURE: 80 MMHG | HEART RATE: 82 BPM | SYSTOLIC BLOOD PRESSURE: 150 MMHG | BODY MASS INDEX: 26.85 KG/M2 | TEMPERATURE: 98.4 F | OXYGEN SATURATION: 96 % | WEIGHT: 198 LBS

## 2021-01-27 DIAGNOSIS — I10 ESSENTIAL HYPERTENSION: Primary | Chronic | ICD-10-CM

## 2021-01-27 DIAGNOSIS — G47.00 INSOMNIA, UNSPECIFIED TYPE: ICD-10-CM

## 2021-01-27 DIAGNOSIS — K21.00 GASTROESOPHAGEAL REFLUX DISEASE WITH ESOPHAGITIS WITHOUT HEMORRHAGE: ICD-10-CM

## 2021-01-27 DIAGNOSIS — F41.9 ANXIETY: ICD-10-CM

## 2021-01-27 DIAGNOSIS — E78.2 MIXED HYPERLIPIDEMIA: Chronic | ICD-10-CM

## 2021-01-27 PROCEDURE — 1036F TOBACCO NON-USER: CPT | Performed by: FAMILY MEDICINE

## 2021-01-27 PROCEDURE — G8417 CALC BMI ABV UP PARAM F/U: HCPCS | Performed by: FAMILY MEDICINE

## 2021-01-27 PROCEDURE — 99214 OFFICE O/P EST MOD 30 MIN: CPT | Performed by: FAMILY MEDICINE

## 2021-01-27 PROCEDURE — G8484 FLU IMMUNIZE NO ADMIN: HCPCS | Performed by: FAMILY MEDICINE

## 2021-01-27 PROCEDURE — G8427 DOCREV CUR MEDS BY ELIG CLIN: HCPCS | Performed by: FAMILY MEDICINE

## 2021-01-27 PROCEDURE — 1123F ACP DISCUSS/DSCN MKR DOCD: CPT | Performed by: FAMILY MEDICINE

## 2021-01-27 PROCEDURE — 4040F PNEUMOC VAC/ADMIN/RCVD: CPT | Performed by: FAMILY MEDICINE

## 2021-01-27 RX ORDER — MIRTAZAPINE 30 MG/1
30 TABLET, FILM COATED ORAL NIGHTLY
Qty: 30 TABLET | Refills: 2 | Status: SHIPPED
Start: 2021-01-27 | End: 2021-03-29 | Stop reason: SDUPTHER

## 2021-01-27 ASSESSMENT — ENCOUNTER SYMPTOMS
EYES NEGATIVE: 1
RESPIRATORY NEGATIVE: 1
GASTROINTESTINAL NEGATIVE: 1
ALLERGIC/IMMUNOLOGIC NEGATIVE: 1

## 2021-01-27 NOTE — PROGRESS NOTES
21     Lila Arce    : 1935 Sex: male   Age: 81 y.o. Chief Complaint   Patient presents with    Hypertension    Discuss Labs       Prior to Admission medications    Medication Sig Start Date End Date Taking? Authorizing Provider   mirtazapine (REMERON) 30 MG tablet Take 1 tablet by mouth nightly 21  Yes Billie Render Traikoff, DO   losartan (COZAAR) 50 MG tablet Take 1 tablet by mouth daily 20  Yes Billie Render Traikoff, DO   meclizine (ANTIVERT) 25 MG tablet Bid prn 10/1/20  Yes Billie Render Traikoff, DO   ibuprofen (ADVIL;MOTRIN) 800 MG tablet Take 1 tablet by mouth every 8 hours as needed for Pain 10/1/20  Yes Billie Render Traikoff, DO   amLODIPine (NORVASC) 5 MG tablet Take 1 tablet by mouth daily 6/3/20  Yes Billie Render Traikoff, DO   pantoprazole (PROTONIX) 40 MG tablet TK 1 T PO QD 6/3/20  Yes Billie Render Traikoff, DO   aspirin 81 MG tablet Take 81 mg by mouth daily   Yes Historical Provider, MD          HPI: pt seen today medical follow-up hypertension reflux disease hyperlipidemia insomnia anxiety depression. Actually doing quite well at this point. Increased dose of Remeron has improved his appetite as well as his sleep and anxiety. We will maintain present meds present dosing and reassess in about 6 weeks recheck blood pressure at that time. Mild systolic elevation will continue to closely monitor remains elevated adjust medication accordingly. Review of Systems   Constitutional: Negative. HENT: Negative. Eyes: Negative. Respiratory: Negative. Gastrointestinal: Negative. Endocrine: Negative. Genitourinary: Negative. Musculoskeletal: Negative. Skin: Negative. Allergic/Immunologic: Negative. Neurological: Negative. Hematological: Negative. Psychiatric/Behavioral: Negative. Systems review is stable. Medications well-tolerated.         Current Outpatient Medications:   mirtazapine (REMERON) 30 MG tablet, Take 1 tablet by mouth nightly, Disp: 30 tablet, Rfl: 2    losartan (COZAAR) 50 MG tablet, Take 1 tablet by mouth daily, Disp: 90 tablet, Rfl: 3    meclizine (ANTIVERT) 25 MG tablet, Bid prn, Disp: 30 tablet, Rfl: 1    ibuprofen (ADVIL;MOTRIN) 800 MG tablet, Take 1 tablet by mouth every 8 hours as needed for Pain, Disp: 120 tablet, Rfl: 2    amLODIPine (NORVASC) 5 MG tablet, Take 1 tablet by mouth daily, Disp: 90 tablet, Rfl: 3    pantoprazole (PROTONIX) 40 MG tablet, TK 1 T PO QD, Disp: 90 tablet, Rfl: 3    aspirin 81 MG tablet, Take 81 mg by mouth daily, Disp: , Rfl:     No Known Allergies    Social History     Tobacco Use    Smoking status: Never Smoker    Smokeless tobacco: Never Used   Substance Use Topics    Alcohol use: No    Drug use: No      Past Surgical History:   Procedure Laterality Date    ANTERIOR CRUCIATE LIGAMENT REPAIR Left 11/21/2001    APPENDECTOMY      CHOLECYSTECTOMY  2007    Lap    ECHO COMPL W DOP COLOR FLOW  12/4/2012         HERNIA REPAIR Right 11/13/2002    double    SINUS SURGERY  2002,2003     done x 2    TONSILLECTOMY      TOTAL KNEE ARTHROPLASTY Left 1/28/2019    LEFT  ROBOTIC KNEE TOTAL ARTHROPLASTY  ++MEREDITH- [id number]++   ++ADDUCTOR BLOCK++ performed by Patrick Cook MD at [address] [place] [address] N/A 10/10/2019    EGD BIOPSY performed by Gabrielle Barron MD at [facility] ENDOSCOPY     No family history on file.   Past Medical History:   Diagnosis Date    Aneurysm of right renal artery ([facility] [address]     follows with Dr. Carmen Downey yearly (last visit 9/19)    Colitis     Depression     GERD (gastroesophageal reflux disease)     Hyperlipidemia     Hypertension     Superior mesenteric artery stenosis ([facility] 11/4/2020    TIA (transient ischemic attack)        Vitals:    01/27/21 1314 01/27/21 1317   BP: (!) 158/78 (!) 150/80   Pulse: 82    Temp: 98.4 °F (36.9 °C)    SpO2: 96%    Weight: 198 lb (89.8 kg) BP Readings from Last 3 Encounters:   01/27/21 (!) 150/80   01/14/21 (!) 150/86   01/04/21 138/66      142/78  Physical Exam  Vitals signs and nursing note reviewed. Constitutional:       Appearance: He is well-developed. HENT:      Head: Normocephalic. Right Ear: External ear normal.      Left Ear: External ear normal.      Nose: Nose normal.   Eyes:      Conjunctiva/sclera: Conjunctivae normal.      Pupils: Pupils are equal, round, and reactive to light. Neck:      Musculoskeletal: Normal range of motion and neck supple. Cardiovascular:      Rate and Rhythm: Normal rate. Pulmonary:      Breath sounds: Normal breath sounds. Abdominal:      General: Bowel sounds are normal.      Palpations: Abdomen is soft. Musculoskeletal: Normal range of motion. Skin:     General: Skin is warm and dry. Neurological:      Mental Status: He is alert and oriented to person, place, and time. Psychiatric:         Behavior: Behavior normal.        Present vitals physical examination stable. I will maintain current medications and care. Reassessment 6 weeks and sooner if problems.       Lab Results   Component Value Date    TSH 1.970 01/25/2021    TSH 3.640 04/21/2020    G3YIHLR 6.8 01/25/2021    O4KQCOW 7.7 04/21/2020    T4FREE 1.10 01/22/2017     Lab Results   Component Value Date    CHOL 153 01/25/2021    CHOL 157 04/21/2020     Lab Results   Component Value Date    TRIG 55 01/25/2021    TRIG 78 04/21/2020     Lab Results   Component Value Date    HDL 47 01/25/2021    HDL 38 04/21/2020     No results found for: [facility]  Lab Results   Component Value Date    LABVLDL 11 01/25/2021    LABVLDL 16 04/21/2020     No results found for: [place]  Lab Results   Component Value Date    WBC 7.3 01/25/2021    HGB 14.3 01/25/2021    HCT 43.5 01/25/2021    MCV 91.4 01/25/2021     01/25/2021    LYMPHOPCT 26.0 01/25/2021    RBC 4.76 01/25/2021    MCH 30.0 01/25/2021    MCHC 32.9 01/25/2021 RDW 13.5 01/25/2021     Lab Results   Component Value Date     01/25/2021    K 4.1 01/25/2021     01/25/2021    CO2 31 (H) 01/25/2021    BUN 11 01/25/2021    CREATININE 0.9 01/25/2021    GLUCOSE 98 01/25/2021    CALCIUM 9.3 01/25/2021    PROT 7.2 01/25/2021    LABALBU 3.9 01/25/2021    BILITOT 0.4 01/25/2021    ALKPHOS 95 01/25/2021    AST 14 01/25/2021    ALT 11 01/25/2021    LABGLOM >60 01/25/2021    GFRAA >60 01/25/2021        Lab Results   Component Value Date    PSA 2.83 04/29/2020    PSA 2.86 04/24/2019      Lab Results   Component Value Date    LABA1C 5.1 01/25/2021     No results found for: EAG     Plan Per Assessment:  Samuel Cox was seen today for hypertension and discuss labs. Diagnoses and all orders for this visit:    Essential hypertension    Gastroesophageal reflux disease with esophagitis without hemorrhage    Mixed hyperlipidemia    Insomnia, unspecified type    Anxiety    Other orders  -     mirtazapine (REMERON) 30 MG tablet; Take 1 tablet by mouth nightly            Return in about 6 weeks (around 3/10/2021). Zack Edward, DO    Note was generated with the assistance of voice recognition software. Document was reviewed however may contain grammatical errors.

## 2021-03-10 ENCOUNTER — OFFICE VISIT (OUTPATIENT)
Dept: PRIMARY CARE CLINIC | Age: 86
End: 2021-03-10
Payer: MEDICARE

## 2021-03-10 VITALS
TEMPERATURE: 98.4 F | HEART RATE: 74 BPM | BODY MASS INDEX: 26.99 KG/M2 | DIASTOLIC BLOOD PRESSURE: 64 MMHG | WEIGHT: 199 LBS | OXYGEN SATURATION: 98 % | SYSTOLIC BLOOD PRESSURE: 146 MMHG

## 2021-03-10 DIAGNOSIS — R60.0 EDEMA OF EXTREMITIES: ICD-10-CM

## 2021-03-10 DIAGNOSIS — K21.00 GASTROESOPHAGEAL REFLUX DISEASE WITH ESOPHAGITIS WITHOUT HEMORRHAGE: ICD-10-CM

## 2021-03-10 DIAGNOSIS — I10 ESSENTIAL HYPERTENSION: Primary | Chronic | ICD-10-CM

## 2021-03-10 DIAGNOSIS — E78.2 MIXED HYPERLIPIDEMIA: Chronic | ICD-10-CM

## 2021-03-10 DIAGNOSIS — F41.9 ANXIETY: ICD-10-CM

## 2021-03-10 DIAGNOSIS — I10 ESSENTIAL HYPERTENSION: Chronic | ICD-10-CM

## 2021-03-10 LAB
ALBUMIN SERPL-MCNC: 4.2 G/DL (ref 3.5–5.2)
ALP BLD-CCNC: 90 U/L (ref 40–129)
ALT SERPL-CCNC: 9 U/L (ref 0–40)
ANION GAP SERPL CALCULATED.3IONS-SCNC: 11 MMOL/L (ref 7–16)
AST SERPL-CCNC: 18 U/L (ref 0–39)
BASOPHILS ABSOLUTE: 0.03 E9/L (ref 0–0.2)
BASOPHILS RELATIVE PERCENT: 0.5 % (ref 0–2)
BILIRUB SERPL-MCNC: 0.4 MG/DL (ref 0–1.2)
BUN BLDV-MCNC: 10 MG/DL (ref 8–23)
CALCIUM SERPL-MCNC: 9.3 MG/DL (ref 8.6–10.2)
CHLORIDE BLD-SCNC: 105 MMOL/L (ref 98–107)
CHOLESTEROL, TOTAL: 147 MG/DL (ref 0–199)
CO2: 30 MMOL/L (ref 22–29)
CREAT SERPL-MCNC: 0.9 MG/DL (ref 0.7–1.2)
EOSINOPHILS ABSOLUTE: 0.1 E9/L (ref 0.05–0.5)
EOSINOPHILS RELATIVE PERCENT: 1.7 % (ref 0–6)
GFR AFRICAN AMERICAN: >60
GFR NON-AFRICAN AMERICAN: >60 ML/MIN/1.73
GLUCOSE BLD-MCNC: 90 MG/DL (ref 74–99)
HCT VFR BLD CALC: 46 % (ref 37–54)
HDLC SERPL-MCNC: 39 MG/DL
HEMOGLOBIN: 14.5 G/DL (ref 12.5–16.5)
IMMATURE GRANULOCYTES #: 0.02 E9/L
IMMATURE GRANULOCYTES %: 0.3 % (ref 0–5)
LDL CHOLESTEROL CALCULATED: 93 MG/DL (ref 0–99)
LYMPHOCYTES ABSOLUTE: 1.41 E9/L (ref 1.5–4)
LYMPHOCYTES RELATIVE PERCENT: 23.5 % (ref 20–42)
MCH RBC QN AUTO: 29.4 PG (ref 26–35)
MCHC RBC AUTO-ENTMCNC: 31.5 % (ref 32–34.5)
MCV RBC AUTO: 93.3 FL (ref 80–99.9)
MONOCYTES ABSOLUTE: 0.63 E9/L (ref 0.1–0.95)
MONOCYTES RELATIVE PERCENT: 10.5 % (ref 2–12)
NEUTROPHILS ABSOLUTE: 3.8 E9/L (ref 1.8–7.3)
NEUTROPHILS RELATIVE PERCENT: 63.5 % (ref 43–80)
PDW BLD-RTO: 13.1 FL (ref 11.5–15)
PLATELET # BLD: 385 E9/L (ref 130–450)
PMV BLD AUTO: 10.5 FL (ref 7–12)
POTASSIUM SERPL-SCNC: 4.3 MMOL/L (ref 3.5–5)
RBC # BLD: 4.93 E12/L (ref 3.8–5.8)
SEDIMENTATION RATE, ERYTHROCYTE: 13 MM/HR (ref 0–15)
SODIUM BLD-SCNC: 146 MMOL/L (ref 132–146)
T4 TOTAL: 7.6 MCG/DL (ref 4.5–11.7)
TOTAL PROTEIN: 7.5 G/DL (ref 6.4–8.3)
TRIGL SERPL-MCNC: 76 MG/DL (ref 0–149)
TSH SERPL DL<=0.05 MIU/L-ACNC: 2.21 UIU/ML (ref 0.27–4.2)
VLDLC SERPL CALC-MCNC: 15 MG/DL
WBC # BLD: 6 E9/L (ref 4.5–11.5)

## 2021-03-10 PROCEDURE — 1123F ACP DISCUSS/DSCN MKR DOCD: CPT | Performed by: FAMILY MEDICINE

## 2021-03-10 PROCEDURE — G8484 FLU IMMUNIZE NO ADMIN: HCPCS | Performed by: FAMILY MEDICINE

## 2021-03-10 PROCEDURE — 4040F PNEUMOC VAC/ADMIN/RCVD: CPT | Performed by: FAMILY MEDICINE

## 2021-03-10 PROCEDURE — G8427 DOCREV CUR MEDS BY ELIG CLIN: HCPCS | Performed by: FAMILY MEDICINE

## 2021-03-10 PROCEDURE — 1036F TOBACCO NON-USER: CPT | Performed by: FAMILY MEDICINE

## 2021-03-10 PROCEDURE — 99214 OFFICE O/P EST MOD 30 MIN: CPT | Performed by: FAMILY MEDICINE

## 2021-03-10 PROCEDURE — G8417 CALC BMI ABV UP PARAM F/U: HCPCS | Performed by: FAMILY MEDICINE

## 2021-03-10 RX ORDER — BUPROPION HYDROCHLORIDE 150 MG/1
TABLET ORAL
COMMUNITY
Start: 2021-02-16 | End: 2021-03-10

## 2021-03-10 NOTE — PROGRESS NOTES
REPAIR Left 11/21/2001    APPENDECTOMY      CHOLECYSTECTOMY  2007    Lap    ECHO COMPL W DOP COLOR FLOW  12/4/2012         HERNIA REPAIR Right 11/13/2002    double    SINUS SURGERY  8402,6802     done x 2    TONSILLECTOMY      TOTAL KNEE ARTHROPLASTY Left 1/28/2019    LEFT  ROBOTIC KNEE TOTAL ARTHROPLASTY  ++MEREDITH- [id number]++   ++ADDUCTOR BLOCK++ performed by Henny Iniguez MD at [address] 10/10/2019    EGD BIOPSY performed by Reed Boyle MD at [facility] ENDOSCOPY     No family history on file. Past Medical History:   Diagnosis Date    Aneurysm of right renal artery (HCC)     follows with Dr. Celena Bowling yearly (last visit 9/19)    Colitis     Depression     GERD (gastroesophageal reflux disease)     Hyperlipidemia     Hypertension     Superior mesenteric artery stenosis ([facility] [address] 11/4/2020    TIA (transient ischemic attack)        Vitals:    03/10/21 1257 03/10/21 1301   BP: (!) 160/78 (!) 146/64   Pulse: 74    Temp: 98.4 °F (36.9 °C)    SpO2: 98%    Weight: 199 lb (90.3 kg)      BP Readings from Last 3 Encounters:   03/10/21 (!) 146/64   01/27/21 (!) 150/80   01/14/21 (!) 150/86    156/80    Physical Exam             Plan Per Assessment:  There are no diagnoses linked to this encounter. No follow-ups on file. Mynor Thomas DO    Note was generated with the assistance of voice recognition software. Document was reviewed however may contain grammatical errors.

## 2021-03-10 NOTE — PROGRESS NOTES
3/10/21     Faith Donaldson    : 1935 Sex: male   Age: 81 y.o. Chief Complaint   Patient presents with    Hypertension    Anxiety    Insomnia     sleeping better at night       Prior to Admission medications    Medication Sig Start Date End Date Taking? Authorizing Provider   mirtazapine (REMERON) 30 MG tablet Take 1 tablet by mouth nightly 21  Yes Winsome Villa DO   losartan (COZAAR) 50 MG tablet Take 1 tablet by mouth daily 20  Yes Winsome Villa DO   meclizine (ANTIVERT) 25 MG tablet Bid prn 10/1/20  Yes Winsome Villa DO   ibuprofen (ADVIL;MOTRIN) 800 MG tablet Take 1 tablet by mouth every 8 hours as needed for Pain 10/1/20  Yes Winsome Villa DO   pantoprazole (PROTONIX) 40 MG tablet TK 1 T PO QD 6/3/20  Yes Winsome Villa DO   aspirin 81 MG tablet Take 81 mg by mouth daily   Yes Historical Provider, MD          HPI: Patient evaluated today problems with hypertensive disease reflux disease recent problems with bilateral foot swelling just minimal.  Shoes feel tight. Mild headache. Concerns for amlodipine and we have placed this on hold. Adjusted losartan to 50 mg twice a day or 100 mg once a day and then reassess in a week. CBC CMP today. Westergren sed rate today. Review of Systems   Constitutional: Negative. HENT: Negative. Eyes: Negative. Respiratory: Negative. Gastrointestinal: Negative. Endocrine: Negative. Genitourinary: Negative. Musculoskeletal: Negative. Skin: Negative. Allergic/Immunologic: Negative. Neurological: Positive for headaches. Hematological: Negative. Psychiatric/Behavioral: Negative.                Current Outpatient Medications:     mirtazapine (REMERON) 30 MG tablet, Take 1 tablet by mouth nightly, Disp: 30 tablet, Rfl: 2    losartan (COZAAR) 50 MG tablet, Take 1 tablet by mouth daily, Disp: 90 tablet, Rfl: 3    meclizine (ANTIVERT) 25 MG tablet, Bid prn, Disp: 30 tablet, Rfl: 1   ibuprofen (ADVIL;MOTRIN) 800 MG tablet, Take 1 tablet by mouth every 8 hours as needed for Pain, Disp: 120 tablet, Rfl: 2    pantoprazole (PROTONIX) 40 MG tablet, TK 1 T PO QD, Disp: 90 tablet, Rfl: 3    aspirin 81 MG tablet, Take 81 mg by mouth daily, Disp: , Rfl:     No Known Allergies    Social History     Tobacco Use    Smoking status: Never Smoker    Smokeless tobacco: Never Used   Substance Use Topics    Alcohol use: No    Drug use: No      Past Surgical History:   Procedure Laterality Date    ANTERIOR CRUCIATE LIGAMENT REPAIR Left 11/21/2001    APPENDECTOMY      CHOLECYSTECTOMY  2007    Lap    ECHO COMPL W DOP COLOR FLOW  12/4/2012         HERNIA REPAIR Right 11/13/2002    double    SINUS SURGERY  2002,2003     done x 2    TONSILLECTOMY      TOTAL KNEE ARTHROPLASTY Left 1/28/2019    LEFT  ROBOTIC KNEE TOTAL ARTHROPLASTY  ++MEREDITH- [id number]++   ++ADDUCTOR BLOCK++ performed by Milagros Fuentes MD at [id number] N/A 10/10/2019    EGD BIOPSY performed by Radha Sibley MD at [facility] ENDOSCOPY     No family history on file. Past Medical History:   Diagnosis Date    Aneurysm of right renal artery (HCC)     follows with Dr. Edwards Left yearly (last visit 9/19)    Colitis     Depression     GERD (gastroesophageal reflux disease)     Hyperlipidemia     Hypertension     Superior mesenteric artery stenosis ([address] 11/4/2020    TIA (transient ischemic attack)        Vitals:    03/10/21 1257 03/10/21 1301   BP: (!) 160/78 (!) 146/64   Pulse: 74    Temp: 98.4 °F (36.9 °C)    SpO2: 98%    Weight: 199 lb (90.3 kg)      BP Readings from Last 3 Encounters:   03/10/21 (!) 146/64   01/27/21 (!) 150/80   01/14/21 (!) 150/86        Physical Exam  Vitals signs and nursing note reviewed. Constitutional:       Appearance: He is well-developed. HENT:      Head: Normocephalic.       Right Ear: External ear normal.      Left Ear: External ear normal.      Nose: Nose normal.   Eyes: Conjunctiva/sclera: Conjunctivae normal.      Pupils: Pupils are equal, round, and reactive to light. Neck:      Musculoskeletal: Normal range of motion and neck supple. Cardiovascular:      Rate and Rhythm: Normal rate. Pulmonary:      Breath sounds: Normal breath sounds. Abdominal:      General: Bowel sounds are normal.      Palpations: Abdomen is soft. Musculoskeletal: Normal range of motion. Skin:     General: Skin is warm and dry. Neurological:      Mental Status: He is alert and oriented to person, place, and time. Psychiatric:         Behavior: Behavior normal.     Labile blood pressure as noted. Lower extremity edema as noted. Mild headaches. Adjustments today with medication and reassess in a week further recommendations at that time. Plan Per Assessment:  Ramos Palmer was seen today for hypertension, anxiety and insomnia. Diagnoses and all orders for this visit:    Essential hypertension  -     CBC Auto Differential; Future  -     Comprehensive Metabolic Panel; Future  -     SEDIMENTATION RATE; Future            Return in about 1 week (around 3/17/2021). Lamin Olson DO    Note was generated with the assistance of voice recognition software. Document was reviewed however may contain grammatical errors.

## 2021-03-22 ENCOUNTER — OFFICE VISIT (OUTPATIENT)
Dept: PRIMARY CARE CLINIC | Age: 86
End: 2021-03-22
Payer: MEDICARE

## 2021-03-22 VITALS
TEMPERATURE: 97.3 F | OXYGEN SATURATION: 98 % | HEIGHT: 72 IN | BODY MASS INDEX: 26.95 KG/M2 | SYSTOLIC BLOOD PRESSURE: 166 MMHG | DIASTOLIC BLOOD PRESSURE: 90 MMHG | RESPIRATION RATE: 18 BRPM | HEART RATE: 63 BPM | WEIGHT: 199 LBS

## 2021-03-22 DIAGNOSIS — I10 ESSENTIAL HYPERTENSION: Primary | Chronic | ICD-10-CM

## 2021-03-22 DIAGNOSIS — K21.00 GASTROESOPHAGEAL REFLUX DISEASE WITH ESOPHAGITIS WITHOUT HEMORRHAGE: ICD-10-CM

## 2021-03-22 DIAGNOSIS — J30.89 NON-SEASONAL ALLERGIC RHINITIS, UNSPECIFIED TRIGGER: ICD-10-CM

## 2021-03-22 DIAGNOSIS — E78.2 MIXED HYPERLIPIDEMIA: Chronic | ICD-10-CM

## 2021-03-22 PROCEDURE — 4040F PNEUMOC VAC/ADMIN/RCVD: CPT | Performed by: FAMILY MEDICINE

## 2021-03-22 PROCEDURE — 99214 OFFICE O/P EST MOD 30 MIN: CPT | Performed by: FAMILY MEDICINE

## 2021-03-22 PROCEDURE — 1123F ACP DISCUSS/DSCN MKR DOCD: CPT | Performed by: FAMILY MEDICINE

## 2021-03-22 PROCEDURE — G8484 FLU IMMUNIZE NO ADMIN: HCPCS | Performed by: FAMILY MEDICINE

## 2021-03-22 PROCEDURE — 1036F TOBACCO NON-USER: CPT | Performed by: FAMILY MEDICINE

## 2021-03-22 PROCEDURE — G8427 DOCREV CUR MEDS BY ELIG CLIN: HCPCS | Performed by: FAMILY MEDICINE

## 2021-03-22 PROCEDURE — G8417 CALC BMI ABV UP PARAM F/U: HCPCS | Performed by: FAMILY MEDICINE

## 2021-03-22 RX ORDER — LOSARTAN POTASSIUM 50 MG/1
50 TABLET ORAL 2 TIMES DAILY
COMMUNITY
End: 2021-03-29 | Stop reason: SDUPTHER

## 2021-03-22 RX ORDER — AMLODIPINE BESYLATE 5 MG/1
5 TABLET ORAL DAILY
Qty: 30 TABLET | Refills: 3 | Status: SHIPPED
Start: 2021-03-22 | End: 2021-03-29 | Stop reason: SDUPTHER

## 2021-03-22 ASSESSMENT — ENCOUNTER SYMPTOMS
EYES NEGATIVE: 1
RESPIRATORY NEGATIVE: 1
ALLERGIC/IMMUNOLOGIC NEGATIVE: 1
GASTROINTESTINAL NEGATIVE: 1

## 2021-03-22 NOTE — PROGRESS NOTES
Disp: 30 tablet, Rfl: 2    meclizine (ANTIVERT) 25 MG tablet, Bid prn, Disp: 30 tablet, Rfl: 1    ibuprofen (ADVIL;MOTRIN) 800 MG tablet, Take 1 tablet by mouth every 8 hours as needed for Pain, Disp: 120 tablet, Rfl: 2    pantoprazole (PROTONIX) 40 MG tablet, TK 1 T PO QD, Disp: 90 tablet, Rfl: 3    aspirin 81 MG tablet, Take 81 mg by mouth daily, Disp: , Rfl:     No Known Allergies    Social History     Tobacco Use    Smoking status: Never Smoker    Smokeless tobacco: Never Used   Substance Use Topics    Alcohol use: No    Drug use: No      Past Surgical History:   Procedure Laterality Date    ANTERIOR CRUCIATE LIGAMENT REPAIR Left 11/21/2001    APPENDECTOMY      CHOLECYSTECTOMY  2007    Lap    ECHO COMPL W DOP COLOR FLOW  12/4/2012         HERNIA REPAIR Right 11/13/2002    double    SINUS SURGERY  2002,2003     done x 2    TONSILLECTOMY      TOTAL KNEE ARTHROPLASTY Left 1/28/2019    LEFT  ROBOTIC KNEE TOTAL ARTHROPLASTY  ++MEREDITH- [id number]++   ++ADDUCTOR BLOCK++ performed by Gabriel Nichols MD at [address] N/A 10/10/2019    EGD BIOPSY performed by Sapphire Wheatley MD at [facility] ENDOSCOPY     No family history on file. Past Medical History:   Diagnosis Date    Aneurysm of right renal artery (HCC)     follows with Dr. Dari Trinidad yearly (last visit 9/19)    Colitis     Depression     GERD (gastroesophageal reflux disease)     Hyperlipidemia     Hypertension     Superior mesenteric artery stenosis ([facility] [address] 11/4/2020    TIA (transient ischemic attack)        Vitals:    03/22/21 0915 03/22/21 0919   BP: (!) 184/100 (!) 166/90   Site: Left Upper Arm Right Upper Arm   Pulse: 63    Resp: 18    Temp: 97.3 °F (36.3 °C)    SpO2: 98%    Weight: 199 lb (90.3 kg)    Height: 6' (1.829 m)      BP Readings from Last 3 Encounters:   03/22/21 (!) 166/90   03/10/21 (!) 146/64   01/27/21 (!) 150/80        Physical Exam  Vitals signs and nursing note reviewed.    Constitutional: Appearance: He is well-developed. HENT:      Head: Normocephalic. Right Ear: External ear normal.      Left Ear: External ear normal.      Nose: Nose normal.   Eyes:      Conjunctiva/sclera: Conjunctivae normal.      Pupils: Pupils are equal, round, and reactive to light. Neck:      Musculoskeletal: Normal range of motion and neck supple. Cardiovascular:      Rate and Rhythm: Normal rate. Pulmonary:      Breath sounds: Normal breath sounds. Abdominal:      General: Bowel sounds are normal.      Palpations: Abdomen is soft. Musculoskeletal: Normal range of motion. Skin:     General: Skin is warm and dry. Neurological:      Mental Status: He is alert and oriented to person, place, and time. Psychiatric:         Behavior: Behavior normal.     Today's vitals physical exam stable we will sit tight with current meds. Neurologically stable. Blood pressure was 152/82 per myself. Reassessment again 1 week and sooner if problems. Further adjustments on meds at that time if necessary. Plan Per Assessment:  Cortez Cat was seen today for hypertension and medication check. Diagnoses and all orders for this visit:    Essential hypertension    Mixed hyperlipidemia    Non-seasonal allergic rhinitis, unspecified trigger    Gastroesophageal reflux disease with esophagitis without hemorrhage    Other orders  -     amLODIPine (NORVASC) 5 MG tablet; Take 1 tablet by mouth daily            Return in about 1 week (around 3/29/2021). Andre Rhodes DO    Note was generated with the assistance of voice recognition software. Document was reviewed however may contain grammatical errors.

## 2021-03-29 ENCOUNTER — OFFICE VISIT (OUTPATIENT)
Dept: PRIMARY CARE CLINIC | Age: 86
End: 2021-03-29
Payer: MEDICARE

## 2021-03-29 VITALS
TEMPERATURE: 98 F | HEART RATE: 78 BPM | SYSTOLIC BLOOD PRESSURE: 138 MMHG | WEIGHT: 199 LBS | OXYGEN SATURATION: 98 % | DIASTOLIC BLOOD PRESSURE: 82 MMHG | BODY MASS INDEX: 26.99 KG/M2

## 2021-03-29 DIAGNOSIS — E78.2 MIXED HYPERLIPIDEMIA: Chronic | ICD-10-CM

## 2021-03-29 DIAGNOSIS — F41.9 ANXIETY: ICD-10-CM

## 2021-03-29 DIAGNOSIS — I10 ESSENTIAL HYPERTENSION: Primary | Chronic | ICD-10-CM

## 2021-03-29 DIAGNOSIS — G47.00 INSOMNIA, UNSPECIFIED TYPE: ICD-10-CM

## 2021-03-29 DIAGNOSIS — Z96.652 STATUS POST LEFT KNEE REPLACEMENT: ICD-10-CM

## 2021-03-29 DIAGNOSIS — K21.00 GASTROESOPHAGEAL REFLUX DISEASE WITH ESOPHAGITIS WITHOUT HEMORRHAGE: ICD-10-CM

## 2021-03-29 PROCEDURE — 1123F ACP DISCUSS/DSCN MKR DOCD: CPT | Performed by: FAMILY MEDICINE

## 2021-03-29 PROCEDURE — 1036F TOBACCO NON-USER: CPT | Performed by: FAMILY MEDICINE

## 2021-03-29 PROCEDURE — G8417 CALC BMI ABV UP PARAM F/U: HCPCS | Performed by: FAMILY MEDICINE

## 2021-03-29 PROCEDURE — G8427 DOCREV CUR MEDS BY ELIG CLIN: HCPCS | Performed by: FAMILY MEDICINE

## 2021-03-29 PROCEDURE — G8484 FLU IMMUNIZE NO ADMIN: HCPCS | Performed by: FAMILY MEDICINE

## 2021-03-29 PROCEDURE — 99214 OFFICE O/P EST MOD 30 MIN: CPT | Performed by: FAMILY MEDICINE

## 2021-03-29 PROCEDURE — 4040F PNEUMOC VAC/ADMIN/RCVD: CPT | Performed by: FAMILY MEDICINE

## 2021-03-29 RX ORDER — AMLODIPINE BESYLATE 5 MG/1
5 TABLET ORAL DAILY
Qty: 90 TABLET | Refills: 3 | Status: SHIPPED
Start: 2021-03-29 | End: 2021-07-20 | Stop reason: SDUPTHER

## 2021-03-29 RX ORDER — TERAZOSIN 5 MG/1
5 CAPSULE ORAL NIGHTLY
Qty: 30 CAPSULE | Refills: 3 | Status: SHIPPED
Start: 2021-03-29 | End: 2021-04-02

## 2021-03-29 RX ORDER — LOSARTAN POTASSIUM 100 MG/1
TABLET ORAL
Qty: 90 TABLET | Refills: 1 | Status: SHIPPED
Start: 2021-03-29 | End: 2021-07-20 | Stop reason: SDUPTHER

## 2021-03-29 RX ORDER — PANTOPRAZOLE SODIUM 40 MG/1
TABLET, DELAYED RELEASE ORAL
Qty: 90 TABLET | Refills: 3 | Status: SHIPPED
Start: 2021-03-29 | End: 2021-06-23 | Stop reason: SINTOL

## 2021-03-29 RX ORDER — MIRTAZAPINE 30 MG/1
30 TABLET, FILM COATED ORAL NIGHTLY
Qty: 90 TABLET | Refills: 3 | Status: SHIPPED
Start: 2021-03-29 | End: 2021-06-15

## 2021-03-29 ASSESSMENT — ENCOUNTER SYMPTOMS
GASTROINTESTINAL NEGATIVE: 1
ALLERGIC/IMMUNOLOGIC NEGATIVE: 1
EYES NEGATIVE: 1
RESPIRATORY NEGATIVE: 1

## 2021-03-29 NOTE — PROGRESS NOTES
losartan (COZAAR) 100 MG tablet, 1 QD, Disp: 90 tablet, Rfl: 1    amLODIPine (NORVASC) 5 MG tablet, Take 1 tablet by mouth daily, Disp: 90 tablet, Rfl: 3    pantoprazole (PROTONIX) 40 MG tablet, TK 1 T PO QD, Disp: 90 tablet, Rfl: 3    mirtazapine (REMERON) 30 MG tablet, Take 1 tablet by mouth nightly, Disp: 90 tablet, Rfl: 3    terazosin (HYTRIN) 5 MG capsule, Take 1 capsule by mouth nightly, Disp: 30 capsule, Rfl: 3    meclizine (ANTIVERT) 25 MG tablet, Bid prn, Disp: 30 tablet, Rfl: 1    ibuprofen (ADVIL;MOTRIN) 800 MG tablet, Take 1 tablet by mouth every 8 hours as needed for Pain, Disp: 120 tablet, Rfl: 2    aspirin 81 MG tablet, Take 81 mg by mouth daily, Disp: , Rfl:     No Known Allergies    Social History     Tobacco Use    Smoking status: Never Smoker    Smokeless tobacco: Never Used   Substance Use Topics    Alcohol use: No    Drug use: No      Past Surgical History:   Procedure Laterality Date    ANTERIOR CRUCIATE LIGAMENT REPAIR Left 11/21/2001    APPENDECTOMY      CHOLECYSTECTOMY  2007    Lap    ECHO COMPL W DOP COLOR FLOW  12/4/2012         HERNIA REPAIR Right 11/13/2002    double    SINUS SURGERY  2002,2003     done x 2    TONSILLECTOMY      TOTAL KNEE ARTHROPLASTY Left 1/28/2019    LEFT  ROBOTIC KNEE TOTAL ARTHROPLASTY  ++MEREDITH- [id number]++   ++ADDUCTOR BLOCK++ performed by Ventura Garcia MD at [address] N/A 10/10/2019    EGD BIOPSY performed by Alondra Hanley MD at [facility] ENDOSCOPY     No family history on file.   Past Medical History:   Diagnosis Date    Aneurysm of right renal artery ([facility]     follows with Dr. Bush  yearly (last visit 9/19)    Colitis     Depression     GERD (gastroesophageal reflux disease)     Hyperlipidemia     Hypertension     Superior mesenteric artery stenosis ([facility] 11/4/2020    TIA (transient ischemic attack)        Vitals:    03/29/21 1352   BP: 138/82   Pulse: 78   Temp: 98 °F (36.7 °C)   SpO2: 98% Weight: 199 lb (90.3 kg)     BP Readings from Last 3 Encounters:   03/29/21 138/82   03/22/21 (!) 166/90   03/10/21 (!) 146/64    156/82    Physical Exam  Vitals signs and nursing note reviewed. Constitutional:       Appearance: He is well-developed. HENT:      Head: Normocephalic. Right Ear: External ear normal.      Left Ear: External ear normal.      Nose: Nose normal.   Eyes:      Conjunctiva/sclera: Conjunctivae normal.      Pupils: Pupils are equal, round, and reactive to light. Neck:      Musculoskeletal: Normal range of motion and neck supple. Cardiovascular:      Rate and Rhythm: Normal rate. Pulmonary:      Breath sounds: Normal breath sounds. Abdominal:      General: Bowel sounds are normal.      Palpations: Abdomen is soft. Musculoskeletal: Normal range of motion. Skin:     General: Skin is warm and dry. Neurological:      Mental Status: He is alert and oriented to person, place, and time. Psychiatric:         Behavior: Behavior normal.     Present vitals physical examination stable. I will maintain present meds and care. Reassessment 2 weeks and sooner if need be. Addition of Terazosin today. Plan Per Assessment:  Feliz Fong was seen today for hypertension, hyperlipidemia and discuss labs. Diagnoses and all orders for this visit:    Essential hypertension    Gastroesophageal reflux disease with esophagitis without hemorrhage    Anxiety    Insomnia, unspecified type    Mixed hyperlipidemia    Status post left knee replacement    Other orders  -     losartan (COZAAR) 100 MG tablet; 1 QD  -     amLODIPine (NORVASC) 5 MG tablet; Take 1 tablet by mouth daily  -     pantoprazole (PROTONIX) 40 MG tablet; TK 1 T PO QD  -     mirtazapine (REMERON) 30 MG tablet; Take 1 tablet by mouth nightly  -     terazosin (HYTRIN) 5 MG capsule; Take 1 capsule by mouth nightly            Return in about 2 weeks (around 4/12/2021).       Denzel Arana DO    Note was generated with the assistance of voice recognition software. Document was reviewed however may contain grammatical errors.

## 2021-03-31 ENCOUNTER — TELEPHONE (OUTPATIENT)
Dept: PRIMARY CARE CLINIC | Age: 86
End: 2021-03-31

## 2021-03-31 NOTE — TELEPHONE ENCOUNTER
Patient called into the office stating that you advised him to call if he started having side effects from the Terazosin. Patient stated he is feeling nauseated. When asking if he had any other sx he stated he is having a drainage/runny nose. Pt's BP is 167/84 L;  166/84 R. Please advise.

## 2021-04-02 ENCOUNTER — TELEPHONE (OUTPATIENT)
Dept: PRIMARY CARE CLINIC | Age: 86
End: 2021-04-02

## 2021-04-02 ENCOUNTER — OFFICE VISIT (OUTPATIENT)
Dept: PRIMARY CARE CLINIC | Age: 86
End: 2021-04-02
Payer: MEDICARE

## 2021-04-02 VITALS
DIASTOLIC BLOOD PRESSURE: 76 MMHG | TEMPERATURE: 97.9 F | OXYGEN SATURATION: 98 % | HEART RATE: 82 BPM | SYSTOLIC BLOOD PRESSURE: 170 MMHG

## 2021-04-02 DIAGNOSIS — I10 ESSENTIAL HYPERTENSION: Primary | Chronic | ICD-10-CM

## 2021-04-02 DIAGNOSIS — G44.89 OTHER HEADACHE SYNDROME: ICD-10-CM

## 2021-04-02 DIAGNOSIS — F41.9 ANXIETY: ICD-10-CM

## 2021-04-02 DIAGNOSIS — K21.00 GASTROESOPHAGEAL REFLUX DISEASE WITH ESOPHAGITIS WITHOUT HEMORRHAGE: ICD-10-CM

## 2021-04-02 PROCEDURE — 99214 OFFICE O/P EST MOD 30 MIN: CPT | Performed by: FAMILY MEDICINE

## 2021-04-02 PROCEDURE — 1036F TOBACCO NON-USER: CPT | Performed by: FAMILY MEDICINE

## 2021-04-02 PROCEDURE — G8427 DOCREV CUR MEDS BY ELIG CLIN: HCPCS | Performed by: FAMILY MEDICINE

## 2021-04-02 PROCEDURE — G8417 CALC BMI ABV UP PARAM F/U: HCPCS | Performed by: FAMILY MEDICINE

## 2021-04-02 PROCEDURE — 1123F ACP DISCUSS/DSCN MKR DOCD: CPT | Performed by: FAMILY MEDICINE

## 2021-04-02 PROCEDURE — 4040F PNEUMOC VAC/ADMIN/RCVD: CPT | Performed by: FAMILY MEDICINE

## 2021-04-02 PROCEDURE — 96372 THER/PROPH/DIAG INJ SC/IM: CPT | Performed by: FAMILY MEDICINE

## 2021-04-02 RX ORDER — KETOROLAC TROMETHAMINE 30 MG/ML
60 INJECTION, SOLUTION INTRAMUSCULAR; INTRAVENOUS ONCE
Status: COMPLETED | OUTPATIENT
Start: 2021-04-02 | End: 2021-04-02

## 2021-04-02 RX ORDER — KETOROLAC TROMETHAMINE 30 MG/ML
60 INJECTION, SOLUTION INTRAMUSCULAR; INTRAVENOUS ONCE
Qty: 2 ML | Refills: 0
Start: 2021-04-02 | End: 2021-04-02

## 2021-04-02 RX ORDER — HYDROCHLOROTHIAZIDE 25 MG/1
25 TABLET ORAL EVERY MORNING
Qty: 30 TABLET | Refills: 5 | Status: SHIPPED
Start: 2021-04-02 | End: 2021-07-20 | Stop reason: SDUPTHER

## 2021-04-02 RX ADMIN — KETOROLAC TROMETHAMINE 60 MG: 30 INJECTION, SOLUTION INTRAMUSCULAR; INTRAVENOUS at 10:40

## 2021-04-02 NOTE — PROGRESS NOTES
Negative. Neurological: Positive for headaches. Hematological: Negative. Psychiatric/Behavioral: Negative. Systems review overall stable aside from the headache complaints. Current Outpatient Medications:     hydroCHLOROthiazide (HYDRODIURIL) 25 MG tablet, Take 1 tablet by mouth every morning, Disp: 30 tablet, Rfl: 5    losartan (COZAAR) 100 MG tablet, 1 QD, Disp: 90 tablet, Rfl: 1    amLODIPine (NORVASC) 5 MG tablet, Take 1 tablet by mouth daily (Patient taking differently: Take 10 mg by mouth daily ), Disp: 90 tablet, Rfl: 3    pantoprazole (PROTONIX) 40 MG tablet, TK 1 T PO QD, Disp: 90 tablet, Rfl: 3    mirtazapine (REMERON) 30 MG tablet, Take 1 tablet by mouth nightly, Disp: 90 tablet, Rfl: 3    meclizine (ANTIVERT) 25 MG tablet, Bid prn, Disp: 30 tablet, Rfl: 1    ibuprofen (ADVIL;MOTRIN) 800 MG tablet, Take 1 tablet by mouth every 8 hours as needed for Pain, Disp: 120 tablet, Rfl: 2    aspirin 81 MG tablet, Take 81 mg by mouth daily, Disp: , Rfl:     No Known Allergies    Social History     Tobacco Use    Smoking status: Never Smoker    Smokeless tobacco: Never Used   Substance Use Topics    Alcohol use: No    Drug use: No      Past Surgical History:   Procedure Laterality Date    ANTERIOR CRUCIATE LIGAMENT REPAIR Left 11/21/2001    APPENDECTOMY      CHOLECYSTECTOMY  2007    Lap    ECHO COMPL W DOP COLOR FLOW  12/4/2012         HERNIA REPAIR Right 11/13/2002    double    SINUS SURGERY  2002,2003     done x 2    TONSILLECTOMY      TOTAL KNEE ARTHROPLASTY Left 1/28/2019    LEFT  ROBOTIC KNEE TOTAL ARTHROPLASTY  ++MEREDITH- [id number]++   ++ADDUCTOR BLOCK++ performed by Jimi Orona MD at [address] N/A 10/10/2019    EGD BIOPSY performed by Ruth Martinez MD at [facility] ENDOSCOPY     No family history on file.   Past Medical History:   Diagnosis Date    Aneurysm of right renal artery ([address]     follows with Dr. Anabela Verdugo yearly (last visit LDLCHOLESTEROL  Lab Results   Component Value Date    LABVLDL 15 03/10/2021    LABVLDL 11 01/25/2021     No results found for: [place]  Lab Results   Component Value Date    WBC 6.0 03/10/2021    HGB 14.5 03/10/2021    HCT 46.0 03/10/2021    MCV 93.3 03/10/2021     03/10/2021    LYMPHOPCT 23.5 03/10/2021    RBC 4.93 03/10/2021    MCH 29.4 03/10/2021    MCHC 31.5 (L) 03/10/2021    RDW 13.1 03/10/2021     Lab Results   Component Value Date     03/10/2021    K 4.3 03/10/2021     03/10/2021    CO2 30 (H) 03/10/2021    BUN 10 03/10/2021    CREATININE 0.9 03/10/2021    GLUCOSE 90 03/10/2021    CALCIUM 9.3 03/10/2021    PROT 7.5 03/10/2021    LABALBU 4.2 03/10/2021    BILITOT 0.4 03/10/2021    ALKPHOS 90 03/10/2021    AST 18 03/10/2021    ALT 9 03/10/2021    LABGLOM >60 03/10/2021    GFRAA >60 03/10/2021        Lab Results   Component Value Date    PSA 2.83 04/29/2020    PSA 2.86 04/24/2019      Lab Results   Component Value Date    LABA1C 5.1 01/25/2021     No results found for: EAG     Plan Per Assessment:  Debbie Renee was seen today for hypertension and nausea. Diagnoses and all orders for this visit:    Essential hypertension  -     Comprehensive Metabolic Panel; Future    Anxiety    Gastroesophageal reflux disease with esophagitis without hemorrhage    Other headache syndrome    Other orders  -     hydroCHLOROthiazide (HYDRODIURIL) 25 MG tablet; Take 1 tablet by mouth every morning  -     Discontinue: ketorolac (TORADOL) 60 MG/2ML injection; Inject 2 mLs into the muscle once for 1 dose  -     ketorolac (TORADOL) injection 60 mg            No follow-ups on file. Jakob Francois DO    Note was generated with the assistance of voice recognition software. Document was reviewed however may contain grammatical errors.

## 2021-04-09 ENCOUNTER — HOSPITAL ENCOUNTER (OUTPATIENT)
Age: 86
Discharge: HOME | End: 2021-04-09
Payer: MEDICARE

## 2021-04-09 DIAGNOSIS — I10 ESSENTIAL HYPERTENSION: Chronic | ICD-10-CM

## 2021-04-09 LAB
ALBUMIN SERPL-MCNC: 4.2 G/DL (ref 3.5–5.2)
ALP BLD-CCNC: 90 U/L (ref 40–129)
ALT SERPL-CCNC: 11 U/L (ref 0–40)
ANION GAP SERPL CALCULATED.3IONS-SCNC: 7 MMOL/L (ref 7–16)
AST SERPL-CCNC: 16 U/L (ref 0–39)
BILIRUB SERPL-MCNC: 0.7 MG/DL (ref 0–1.2)
BUN BLDV-MCNC: 11 MG/DL (ref 8–23)
CALCIUM SERPL-MCNC: 9.8 MG/DL (ref 8.6–10.2)
CHLORIDE BLD-SCNC: 101 MMOL/L (ref 98–107)
CO2: 36 MMOL/L (ref 22–29)
CREAT SERPL-MCNC: 1.1 MG/DL (ref 0.7–1.2)
GFR AFRICAN AMERICAN: >60
GFR NON-AFRICAN AMERICAN: >60 ML/MIN/1.73
GLUCOSE BLD-MCNC: 102 MG/DL (ref 74–99)
POTASSIUM SERPL-SCNC: 3.2 MMOL/L (ref 3.5–5)
SODIUM BLD-SCNC: 144 MMOL/L (ref 132–146)
TOTAL PROTEIN: 7.5 G/DL (ref 6.4–8.3)

## 2021-04-09 PROCEDURE — 80053 COMPREHEN METABOLIC PANEL: CPT

## 2021-04-09 PROCEDURE — 36415 COLL VENOUS BLD VENIPUNCTURE: CPT

## 2021-04-12 ENCOUNTER — OFFICE VISIT (OUTPATIENT)
Dept: PRIMARY CARE CLINIC | Age: 86
End: 2021-04-12
Payer: MEDICARE

## 2021-04-12 VITALS
SYSTOLIC BLOOD PRESSURE: 144 MMHG | OXYGEN SATURATION: 97 % | BODY MASS INDEX: 26.55 KG/M2 | WEIGHT: 196 LBS | TEMPERATURE: 97.7 F | DIASTOLIC BLOOD PRESSURE: 72 MMHG | HEIGHT: 72 IN | HEART RATE: 89 BPM

## 2021-04-12 DIAGNOSIS — F41.9 ANXIETY: ICD-10-CM

## 2021-04-12 DIAGNOSIS — E87.6 HYPOKALEMIA: ICD-10-CM

## 2021-04-12 DIAGNOSIS — I10 ESSENTIAL HYPERTENSION: Primary | Chronic | ICD-10-CM

## 2021-04-12 DIAGNOSIS — F32.A DEPRESSION, UNSPECIFIED DEPRESSION TYPE: ICD-10-CM

## 2021-04-12 DIAGNOSIS — E78.2 MIXED HYPERLIPIDEMIA: Chronic | ICD-10-CM

## 2021-04-12 PROCEDURE — G8427 DOCREV CUR MEDS BY ELIG CLIN: HCPCS | Performed by: FAMILY MEDICINE

## 2021-04-12 PROCEDURE — 4040F PNEUMOC VAC/ADMIN/RCVD: CPT | Performed by: FAMILY MEDICINE

## 2021-04-12 PROCEDURE — 1036F TOBACCO NON-USER: CPT | Performed by: FAMILY MEDICINE

## 2021-04-12 PROCEDURE — G8417 CALC BMI ABV UP PARAM F/U: HCPCS | Performed by: FAMILY MEDICINE

## 2021-04-12 PROCEDURE — 99214 OFFICE O/P EST MOD 30 MIN: CPT | Performed by: FAMILY MEDICINE

## 2021-04-12 PROCEDURE — 1123F ACP DISCUSS/DSCN MKR DOCD: CPT | Performed by: FAMILY MEDICINE

## 2021-04-12 RX ORDER — POTASSIUM CHLORIDE 20 MEQ/1
20 TABLET, EXTENDED RELEASE ORAL DAILY
Qty: 30 TABLET | Refills: 5 | Status: SHIPPED
Start: 2021-04-12 | End: 2021-07-20 | Stop reason: SDUPTHER

## 2021-04-12 RX ORDER — BUPROPION HYDROCHLORIDE 150 MG/1
150 TABLET ORAL EVERY MORNING
Qty: 30 TABLET | Refills: 3 | Status: SHIPPED
Start: 2021-04-12 | End: 2021-07-02

## 2021-04-12 ASSESSMENT — ENCOUNTER SYMPTOMS
GASTROINTESTINAL NEGATIVE: 1
EYES NEGATIVE: 1
ALLERGIC/IMMUNOLOGIC NEGATIVE: 1
RESPIRATORY NEGATIVE: 1

## 2021-04-12 NOTE — PROGRESS NOTES
Negative. Respiratory: Negative. Gastrointestinal: Negative. Endocrine: Negative. Genitourinary: Negative. Musculoskeletal: Negative. Skin: Negative. Allergic/Immunologic: Negative. Neurological: Negative. Hematological: Negative. Psychiatric/Behavioral: Negative.                Current Outpatient Medications:     buPROPion (WELLBUTRIN XL) 150 MG extended release tablet, Take 1 tablet by mouth every morning, Disp: 30 tablet, Rfl: 3    potassium chloride (KLOR-CON M) 20 MEQ extended release tablet, Take 1 tablet by mouth daily, Disp: 30 tablet, Rfl: 5    hydroCHLOROthiazide (HYDRODIURIL) 25 MG tablet, Take 1 tablet by mouth every morning, Disp: 30 tablet, Rfl: 5    losartan (COZAAR) 100 MG tablet, 1 QD, Disp: 90 tablet, Rfl: 1    amLODIPine (NORVASC) 5 MG tablet, Take 1 tablet by mouth daily (Patient taking differently: Take 10 mg by mouth daily ), Disp: 90 tablet, Rfl: 3    pantoprazole (PROTONIX) 40 MG tablet, TK 1 T PO QD, Disp: 90 tablet, Rfl: 3    mirtazapine (REMERON) 30 MG tablet, Take 1 tablet by mouth nightly, Disp: 90 tablet, Rfl: 3    meclizine (ANTIVERT) 25 MG tablet, Bid prn, Disp: 30 tablet, Rfl: 1    ibuprofen (ADVIL;MOTRIN) 800 MG tablet, Take 1 tablet by mouth every 8 hours as needed for Pain, Disp: 120 tablet, Rfl: 2    aspirin 81 MG tablet, Take 81 mg by mouth daily, Disp: , Rfl:     No Known Allergies    Social History     Tobacco Use    Smoking status: Never Smoker    Smokeless tobacco: Never Used   Substance Use Topics    Alcohol use: No    Drug use: No      Past Surgical History:   Procedure Laterality Date    ANTERIOR CRUCIATE LIGAMENT REPAIR Left 11/21/2001    APPENDECTOMY      CHOLECYSTECTOMY  2007    Lap    ECHO COMPL W DOP COLOR FLOW  12/4/2012         HERNIA REPAIR Right 11/13/2002    double    SINUS SURGERY  2002,2003     done x 2    TONSILLECTOMY      TOTAL KNEE ARTHROPLASTY Left 1/28/2019    LEFT  ROBOTIC KNEE TOTAL ARTHROPLASTY ++MEREDITH- LDLSWHFH++   ++ADDUCTOR BLOCK++ performed by Sanaz Ibarra MD at [facility] 10/10/2019    EGD BIOPSY performed by Vianey Sevilla MD at [facility] ENDOSCOPY     No family history on file. Past Medical History:   Diagnosis Date    Aneurysm of right renal artery (HCC)     follows with Dr. Kristy Renee yearly (last visit 9/19)    Colitis     Depression     GERD (gastroesophageal reflux disease)     Hyperlipidemia     Hypertension     Superior mesenteric artery stenosis ([facility] [address] 11/4/2020    TIA (transient ischemic attack)        Vitals:    04/12/21 1357 04/12/21 1401   BP: (!) 142/84 (!) 144/72   Pulse: 89    Temp: 97.7 °F (36.5 °C)    SpO2: 97%    Weight: 196 lb (88.9 kg)    Height: 6' (1.829 m)      BP Readings from Last 3 Encounters:   04/12/21 (!) 144/72   04/02/21 (!) 170/76   03/29/21 138/82    140/68    Physical Exam  Vitals signs and nursing note reviewed. Constitutional:       Appearance: He is well-developed. HENT:      Head: Normocephalic. Right Ear: External ear normal.      Left Ear: External ear normal.      Nose: Nose normal.   Eyes:      Conjunctiva/sclera: Conjunctivae normal.      Pupils: Pupils are equal, round, and reactive to light. Neck:      Musculoskeletal: Normal range of motion and neck supple. Cardiovascular:      Rate and Rhythm: Normal rate. Pulmonary:      Breath sounds: Normal breath sounds. Abdominal:      General: Bowel sounds are normal.      Palpations: Abdomen is soft. Musculoskeletal: Normal range of motion. Skin:     General: Skin is warm and dry. Neurological:      Mental Status: He is alert and oriented to person, place, and time. Psychiatric:         Behavior: Behavior normal.     Today's vitals physical examination stable. Medications as prescribed. Reassessment with me 2 weeks and sooner if problems.   Lab Results   Component Value Date    TSH 2.210 03/10/2021    TSH 1.970 01/25/2021    [id number] 7.6 03/10/2021    B8SAEVO 6.8 01/25/2021    T4FREE 1.10 01/22/2017     Lab Results   Component Value Date    CHOL 147 03/10/2021    CHOL 153 01/25/2021     Lab Results   Component Value Date    TRIG 76 03/10/2021    TRIG 55 01/25/2021     Lab Results   Component Value Date    HDL 39 03/10/2021    HDL 47 01/25/2021     No results found for: [facility]  Lab Results   Component Value Date    LABVLDL 15 03/10/2021    LABVLDL 11 01/25/2021     No results found for: [facility]  Lab Results   Component Value Date    WBC 6.0 03/10/2021    HGB 14.5 03/10/2021    HCT 46.0 03/10/2021    MCV 93.3 03/10/2021     03/10/2021    LYMPHOPCT 23.5 03/10/2021    RBC 4.93 03/10/2021    MCH 29.4 03/10/2021    MCHC 31.5 (L) 03/10/2021    RDW 13.1 03/10/2021     Lab Results   Component Value Date     04/09/2021    K 3.2 (L) 04/09/2021     04/09/2021    CO2 36 (H) 04/09/2021    BUN 11 04/09/2021    CREATININE 1.1 04/09/2021    GLUCOSE 102 (H) 04/09/2021    CALCIUM 9.8 04/09/2021    PROT 7.5 04/09/2021    LABALBU 4.2 04/09/2021    BILITOT 0.7 04/09/2021    ALKPHOS 90 04/09/2021    AST 16 04/09/2021    ALT 11 04/09/2021    LABGLOM >60 04/09/2021    GFRAA >60 04/09/2021        Lab Results   Component Value Date    PSA 2.83 04/29/2020    PSA 2.86 04/24/2019      Lab Results   Component Value Date    LABA1C 5.1 01/25/2021     No results found for: EAG           Plan Per Assessment:  Margot Munoz was seen today for hypertension and depression. Diagnoses and all orders for this visit:    Essential hypertension  -     Comprehensive Metabolic Panel; Future    Anxiety    Mixed hyperlipidemia    Depression, unspecified depression type    Hypokalemia  -     Comprehensive Metabolic Panel; Future    Other orders  -     buPROPion (WELLBUTRIN XL) 150 MG extended release tablet; Take 1 tablet by mouth every morning  -     potassium chloride (KLOR-CON M) 20 MEQ extended release tablet;  Take 1 tablet by mouth daily            Return in about 2 weeks (around 4/26/2021). Corin Polanco DO    Note was generated with the assistance of voice recognition software. Document was reviewed however may contain grammatical errors.

## 2021-04-26 ENCOUNTER — HOSPITAL ENCOUNTER (OUTPATIENT)
Age: 86
Discharge: HOME | End: 2021-04-26
Payer: MEDICARE

## 2021-04-26 LAB
ALBUMIN SERPL-MCNC: 4.2 G/DL (ref 3.5–5.2)
ALP BLD-CCNC: 103 U/L (ref 40–129)
ALT SERPL-CCNC: 10 U/L (ref 0–40)
ANION GAP SERPL CALCULATED.3IONS-SCNC: 9 MMOL/L (ref 7–16)
AST SERPL-CCNC: 16 U/L (ref 0–39)
BILIRUB SERPL-MCNC: 0.5 MG/DL (ref 0–1.2)
BUN BLDV-MCNC: 13 MG/DL (ref 6–23)
CALCIUM SERPL-MCNC: 9.8 MG/DL (ref 8.6–10.2)
CHLORIDE BLD-SCNC: 103 MMOL/L (ref 98–107)
CO2: 33 MMOL/L (ref 22–29)
CREAT SERPL-MCNC: 1.1 MG/DL (ref 0.7–1.2)
GFR AFRICAN AMERICAN: >60
GFR NON-AFRICAN AMERICAN: >60 ML/MIN/1.73
GLUCOSE BLD-MCNC: 105 MG/DL (ref 74–99)
POTASSIUM SERPL-SCNC: 4.3 MMOL/L (ref 3.5–5)
SODIUM BLD-SCNC: 145 MMOL/L (ref 132–146)
TOTAL PROTEIN: 7.9 G/DL (ref 6.4–8.3)

## 2021-04-26 PROCEDURE — 36415 COLL VENOUS BLD VENIPUNCTURE: CPT

## 2021-04-26 PROCEDURE — 80053 COMPREHEN METABOLIC PANEL: CPT

## 2021-04-27 ENCOUNTER — OFFICE VISIT (OUTPATIENT)
Dept: PRIMARY CARE CLINIC | Age: 86
End: 2021-04-27
Payer: MEDICARE

## 2021-04-27 VITALS
WEIGHT: 194 LBS | TEMPERATURE: 97.8 F | HEART RATE: 94 BPM | SYSTOLIC BLOOD PRESSURE: 126 MMHG | OXYGEN SATURATION: 98 % | DIASTOLIC BLOOD PRESSURE: 52 MMHG | BODY MASS INDEX: 26.31 KG/M2

## 2021-04-27 DIAGNOSIS — I10 ESSENTIAL HYPERTENSION: Primary | Chronic | ICD-10-CM

## 2021-04-27 DIAGNOSIS — J30.89 NON-SEASONAL ALLERGIC RHINITIS, UNSPECIFIED TRIGGER: ICD-10-CM

## 2021-04-27 DIAGNOSIS — K55.1 SUPERIOR MESENTERIC ARTERY STENOSIS (HCC): ICD-10-CM

## 2021-04-27 DIAGNOSIS — F32.A DEPRESSION, UNSPECIFIED DEPRESSION TYPE: ICD-10-CM

## 2021-04-27 PROCEDURE — 1036F TOBACCO NON-USER: CPT | Performed by: FAMILY MEDICINE

## 2021-04-27 PROCEDURE — 99214 OFFICE O/P EST MOD 30 MIN: CPT | Performed by: FAMILY MEDICINE

## 2021-04-27 PROCEDURE — G8427 DOCREV CUR MEDS BY ELIG CLIN: HCPCS | Performed by: FAMILY MEDICINE

## 2021-04-27 PROCEDURE — G8417 CALC BMI ABV UP PARAM F/U: HCPCS | Performed by: FAMILY MEDICINE

## 2021-04-27 PROCEDURE — 1123F ACP DISCUSS/DSCN MKR DOCD: CPT | Performed by: FAMILY MEDICINE

## 2021-04-27 PROCEDURE — 4040F PNEUMOC VAC/ADMIN/RCVD: CPT | Performed by: FAMILY MEDICINE

## 2021-04-27 ASSESSMENT — ENCOUNTER SYMPTOMS
ALLERGIC/IMMUNOLOGIC NEGATIVE: 1
RESPIRATORY NEGATIVE: 1
EYES NEGATIVE: 1
GASTROINTESTINAL NEGATIVE: 1

## 2021-04-27 NOTE — PROGRESS NOTES
21     Jimena Armenta    : 1935 Sex: male   Age: 81 y.o. Chief Complaint   Patient presents with    Discuss Labs    Anxiety    Hypertension       Prior to Admission medications    Medication Sig Start Date End Date Taking? Authorizing Provider   buPROPion (WELLBUTRIN XL) 150 MG extended release tablet Take 1 tablet by mouth every morning 21  Yes Carson Villa DO   potassium chloride (KLOR-CON M) 20 MEQ extended release tablet Take 1 tablet by mouth daily 21  Yes Carson Villa DO   hydroCHLOROthiazide (HYDRODIURIL) 25 MG tablet Take 1 tablet by mouth every morning 21  Yes Carson Villa DO   losartan (COZAAR) 100 MG tablet 1 QD 3/29/21 6/29/21 Yes David Villa DO   amLODIPine (NORVASC) 5 MG tablet Take 1 tablet by mouth daily  Patient taking differently: Take 10 mg by mouth daily  3/29/21  Yes Carson Villa DO   pantoprazole (PROTONIX) 40 MG tablet TK 1 T PO QD 3/29/21  Yes Carson Villa DO   mirtazapine (REMERON) 30 MG tablet Take 1 tablet by mouth nightly 3/29/21  Yes Carson Villa DO   meclizine (ANTIVERT) 25 MG tablet Bid prn 10/1/20  Yes Carson Villa DO   ibuprofen (ADVIL;MOTRIN) 800 MG tablet Take 1 tablet by mouth every 8 hours as needed for Pain 10/1/20  Yes Carson Villa DO   aspirin 81 MG tablet Take 81 mg by mouth daily   Yes Historical Provider, MD          HPI: Patient evaluated today hypertension none seasonal allergies anxiety depression. Doing much better at this point on Wellbutrin we will maintain at 150 daily. Feeling wellbeing stable. Reviewed and will continue as prescribed. Blood pressures come under nice control. Review of Systems   Constitutional: Negative. HENT: Negative. Eyes: Negative. Respiratory: Negative. Gastrointestinal: Negative. Endocrine: Negative. Genitourinary: Negative. Musculoskeletal: Negative. Skin: Negative. Allergic/Immunologic: Negative.     Neurological: Negative. Hematological: Negative. Psychiatric/Behavioral: Negative.                Current Outpatient Medications:     buPROPion (WELLBUTRIN XL) 150 MG extended release tablet, Take 1 tablet by mouth every morning, Disp: 30 tablet, Rfl: 3    potassium chloride (KLOR-CON M) 20 MEQ extended release tablet, Take 1 tablet by mouth daily, Disp: 30 tablet, Rfl: 5    hydroCHLOROthiazide (HYDRODIURIL) 25 MG tablet, Take 1 tablet by mouth every morning, Disp: 30 tablet, Rfl: 5    losartan (COZAAR) 100 MG tablet, 1 QD, Disp: 90 tablet, Rfl: 1    amLODIPine (NORVASC) 5 MG tablet, Take 1 tablet by mouth daily (Patient taking differently: Take 10 mg by mouth daily ), Disp: 90 tablet, Rfl: 3    pantoprazole (PROTONIX) 40 MG tablet, TK 1 T PO QD, Disp: 90 tablet, Rfl: 3    mirtazapine (REMERON) 30 MG tablet, Take 1 tablet by mouth nightly, Disp: 90 tablet, Rfl: 3    meclizine (ANTIVERT) 25 MG tablet, Bid prn, Disp: 30 tablet, Rfl: 1    ibuprofen (ADVIL;MOTRIN) 800 MG tablet, Take 1 tablet by mouth every 8 hours as needed for Pain, Disp: 120 tablet, Rfl: 2    aspirin 81 MG tablet, Take 81 mg by mouth daily, Disp: , Rfl:     No Known Allergies    Social History     Tobacco Use    Smoking status: Never Smoker    Smokeless tobacco: Never Used   Substance Use Topics    Alcohol use: No    Drug use: No      Past Surgical History:   Procedure Laterality Date    ANTERIOR CRUCIATE LIGAMENT REPAIR Left 11/21/2001    APPENDECTOMY      CHOLECYSTECTOMY  2007    Lap    ECHO COMPL W DOP COLOR FLOW  12/4/2012         HERNIA REPAIR Right 11/13/2002    double    SINUS SURGERY  2002,2003     done x 2    TONSILLECTOMY      TOTAL KNEE ARTHROPLASTY Left 1/28/2019    LEFT  ROBOTIC KNEE TOTAL ARTHROPLASTY  ++MEREDITH- [id number]++   ++ADDUCTOR BLOCK++ performed by Wolf Walker MD at [facility]. N/A 10/10/2019    EGD BIOPSY performed by Krishna Olguin MD at [facility] ENDOSCOPY     No family history on file. Past Medical History:   Diagnosis Date    Aneurysm of right renal artery (HCC)     follows with Dr. Melissa Lawrence yearly (last visit 9/19)    Colitis     Depression     GERD (gastroesophageal reflux disease)     Hyperlipidemia     Hypertension     [address] mesenteric artery stenosis [address] [address] 11/4/2020    TIA (transient ischemic attack)        Vitals:    04/27/21 1302   BP: (!) 126/52   Pulse: 94   Temp: 97.8 °F (36.6 °C)   SpO2: 98%   Weight: 194 lb (88 kg)     BP Readings from Last 3 Encounters:   04/27/21 (!) 126/52   04/12/21 (!) 144/72   04/02/21 (!) 170/76    122/56    Physical Exam  Vitals signs and nursing note reviewed. Constitutional:       Appearance: He is well-developed. HENT:      Head: Normocephalic. Right Ear: External ear normal.      Left Ear: External ear normal.      Nose: Nose normal.   Eyes:      Conjunctiva/sclera: Conjunctivae normal.      Pupils: Pupils are equal, round, and reactive to light. Neck:      Musculoskeletal: Normal range of motion and neck supple. Cardiovascular:      Rate and Rhythm: Normal rate. Pulmonary:      Breath sounds: Normal breath sounds. Abdominal:      General: Bowel sounds are normal.      Palpations: Abdomen is soft. Musculoskeletal: Normal range of motion. Skin:     General: Skin is warm and dry. Neurological:      Mental Status: He is alert and oriented to person, place, and time. Psychiatric:         Behavior: Behavior normal.     Today's vitals physical examination stable. We will maintain current meds and care. Reassessment 1 month and sooner if problems. Maintain Wellbutrin at current dosing. Continue Norvasc at 10 daily. Further adjustments next visit if necessary. Labs reviewed and very stable.     Lab Results   Component Value Date    TSH 2.210 03/10/2021    TSH 1.970 01/25/2021    [id number] 7.6 03/10/2021    R7OUSLI 6.8 01/25/2021    T4FREE 1.10 01/22/2017     Lab Results   Component Value Date    CHOL 147 03/10/2021 CHOL 153 01/25/2021     Lab Results   Component Value Date    TRIG 76 03/10/2021    TRIG 55 01/25/2021     Lab Results   Component Value Date    HDL 39 03/10/2021    HDL 47 01/25/2021     No results found for: [facility]  Lab Results   Component Value Date    LABVLDL 15 03/10/2021    LABVLDL 11 01/25/2021     No results found for: [facility]  Lab Results   Component Value Date    WBC 6.0 03/10/2021    HGB 14.5 03/10/2021    HCT 46.0 03/10/2021    MCV 93.3 03/10/2021     03/10/2021    LYMPHOPCT 23.5 03/10/2021    RBC 4.93 03/10/2021    MCH 29.4 03/10/2021    MCHC 31.5 (L) 03/10/2021    RDW 13.1 03/10/2021     Lab Results   Component Value Date     04/26/2021    K 4.3 04/26/2021     04/26/2021    CO2 33 (H) 04/26/2021    BUN 13 04/26/2021    CREATININE 1.1 04/26/2021    GLUCOSE 105 (H) 04/26/2021    CALCIUM 9.8 04/26/2021    PROT 7.9 04/26/2021    LABALBU 4.2 04/26/2021    BILITOT 0.5 04/26/2021    ALKPHOS 103 04/26/2021    AST 16 04/26/2021    ALT 10 04/26/2021    LABGLOM >60 04/26/2021    GFRAA >60 04/26/2021        Lab Results   Component Value Date    PSA 2.83 04/29/2020    PSA 2.86 04/24/2019      Lab Results   Component Value Date    LABA1C 5.1 01/25/2021     No results found for: EAG       Plan Per Assessment:  Ulysees Severs was seen today for discuss labs, anxiety and hypertension. Diagnoses and all orders for this visit:    Essential hypertension    Superior mesenteric artery stenosis (HCC)    Non-seasonal allergic rhinitis, unspecified trigger    Depression, unspecified depression type            No follow-ups on file. Parul Crane DO    Note was generated with the assistance of voice recognition software. Document was reviewed however may contain grammatical errors.

## 2021-05-02 ENCOUNTER — HOSPITAL ENCOUNTER (EMERGENCY)
Age: 86
Discharge: HOME | End: 2021-05-02
Attending: FAMILY MEDICINE
Payer: MEDICARE

## 2021-05-02 ENCOUNTER — APPOINTMENT (OUTPATIENT)
Dept: CT IMAGING | Age: 86
End: 2021-05-02
Payer: MEDICARE

## 2021-05-02 VITALS
SYSTOLIC BLOOD PRESSURE: 146 MMHG | BODY MASS INDEX: 25.73 KG/M2 | WEIGHT: 190 LBS | RESPIRATION RATE: 18 BRPM | DIASTOLIC BLOOD PRESSURE: 90 MMHG | HEART RATE: 94 BPM | TEMPERATURE: 97.8 F | HEIGHT: 72 IN | OXYGEN SATURATION: 98 %

## 2021-05-02 DIAGNOSIS — R11.0 NAUSEA: ICD-10-CM

## 2021-05-02 DIAGNOSIS — R51.9 NONINTRACTABLE HEADACHE, UNSPECIFIED CHRONICITY PATTERN, UNSPECIFIED HEADACHE TYPE: Primary | ICD-10-CM

## 2021-05-02 LAB
ALBUMIN SERPL-MCNC: 4 G/DL (ref 3.5–5.2)
ALP BLD-CCNC: 99 U/L (ref 40–129)
ALT SERPL-CCNC: 22 U/L (ref 0–40)
ANION GAP SERPL CALCULATED.3IONS-SCNC: 8 MMOL/L (ref 7–16)
AST SERPL-CCNC: 27 U/L (ref 0–39)
BASOPHILS ABSOLUTE: 0.03 E9/L (ref 0–0.2)
BASOPHILS RELATIVE PERCENT: 0.5 % (ref 0–2)
BILIRUB SERPL-MCNC: 0.5 MG/DL (ref 0–1.2)
BUN BLDV-MCNC: 8 MG/DL (ref 6–23)
CALCIUM SERPL-MCNC: 9.5 MG/DL (ref 8.6–10.2)
CHLORIDE BLD-SCNC: 99 MMOL/L (ref 98–107)
CO2: 31 MMOL/L (ref 22–29)
CREAT SERPL-MCNC: 1 MG/DL (ref 0.7–1.2)
EOSINOPHILS ABSOLUTE: 0.09 E9/L (ref 0.05–0.5)
EOSINOPHILS RELATIVE PERCENT: 1.6 % (ref 0–6)
GFR AFRICAN AMERICAN: >60
GFR NON-AFRICAN AMERICAN: >60 ML/MIN/1.73
GLUCOSE BLD-MCNC: 108 MG/DL (ref 74–99)
HCT VFR BLD CALC: 41.7 % (ref 37–54)
HEMOGLOBIN: 13.9 G/DL (ref 12.5–16.5)
IMMATURE GRANULOCYTES #: 0.02 E9/L
IMMATURE GRANULOCYTES %: 0.4 % (ref 0–5)
LACTIC ACID: 1.9 MMOL/L (ref 0.5–2.2)
LIPASE: 14 U/L (ref 13–60)
LYMPHOCYTES ABSOLUTE: 1.14 E9/L (ref 1.5–4)
LYMPHOCYTES RELATIVE PERCENT: 20.7 % (ref 20–42)
MCH RBC QN AUTO: 29.1 PG (ref 26–35)
MCHC RBC AUTO-ENTMCNC: 33.3 % (ref 32–34.5)
MCV RBC AUTO: 87.2 FL (ref 80–99.9)
MONOCYTES ABSOLUTE: 0.51 E9/L (ref 0.1–0.95)
MONOCYTES RELATIVE PERCENT: 9.3 % (ref 2–12)
NEUTROPHILS ABSOLUTE: 3.72 E9/L (ref 1.8–7.3)
NEUTROPHILS RELATIVE PERCENT: 67.5 % (ref 43–80)
PDW BLD-RTO: 12.7 FL (ref 11.5–15)
PLATELET # BLD: 360 E9/L (ref 130–450)
PMV BLD AUTO: 9.4 FL (ref 7–12)
POTASSIUM SERPL-SCNC: 3.5 MMOL/L (ref 3.5–5)
RBC # BLD: 4.78 E12/L (ref 3.8–5.8)
SODIUM BLD-SCNC: 138 MMOL/L (ref 132–146)
TOTAL PROTEIN: 7.3 G/DL (ref 6.4–8.3)
TROPONIN: <0.01 NG/ML (ref 0–0.03)
WBC # BLD: 5.5 E9/L (ref 4.5–11.5)

## 2021-05-02 PROCEDURE — 85025 COMPLETE CBC W/AUTO DIFF WBC: CPT

## 2021-05-02 PROCEDURE — 99283 EMERGENCY DEPT VISIT LOW MDM: CPT

## 2021-05-02 PROCEDURE — 83690 ASSAY OF LIPASE: CPT

## 2021-05-02 PROCEDURE — 36415 COLL VENOUS BLD VENIPUNCTURE: CPT

## 2021-05-02 PROCEDURE — 6360000004 HC RX CONTRAST MEDICATION: Performed by: RADIOLOGY

## 2021-05-02 PROCEDURE — 93005 ELECTROCARDIOGRAM TRACING: CPT | Performed by: FAMILY MEDICINE

## 2021-05-02 PROCEDURE — 70450 CT HEAD/BRAIN W/O DYE: CPT

## 2021-05-02 PROCEDURE — 83605 ASSAY OF LACTIC ACID: CPT

## 2021-05-02 PROCEDURE — 84484 ASSAY OF TROPONIN QUANT: CPT

## 2021-05-02 PROCEDURE — 2580000003 HC RX 258: Performed by: FAMILY MEDICINE

## 2021-05-02 PROCEDURE — 74177 CT ABD & PELVIS W/CONTRAST: CPT

## 2021-05-02 PROCEDURE — 80053 COMPREHEN METABOLIC PANEL: CPT

## 2021-05-02 RX ORDER — 0.9 % SODIUM CHLORIDE 0.9 %
1000 INTRAVENOUS SOLUTION INTRAVENOUS ONCE
Status: COMPLETED | OUTPATIENT
Start: 2021-05-02 | End: 2021-05-02

## 2021-05-02 RX ORDER — ONDANSETRON 4 MG/1
4 TABLET, ORALLY DISINTEGRATING ORAL EVERY 8 HOURS PRN
Qty: 10 TABLET | Refills: 0 | Status: SHIPPED | OUTPATIENT
Start: 2021-05-02 | End: 2021-06-08 | Stop reason: SDUPTHER

## 2021-05-02 RX ORDER — BUTALBITAL, ACETAMINOPHEN AND CAFFEINE 50; 325; 40 MG/1; MG/1; MG/1
1 TABLET ORAL EVERY 6 HOURS PRN
Qty: 12 TABLET | Refills: 0 | Status: SHIPPED | OUTPATIENT
Start: 2021-05-02 | End: 2021-06-15

## 2021-05-02 RX ADMIN — IOPAMIDOL 75 ML: 755 INJECTION, SOLUTION INTRAVENOUS at 14:41

## 2021-05-02 RX ADMIN — SODIUM CHLORIDE 1000 ML: 9 INJECTION, SOLUTION INTRAVENOUS at 13:25

## 2021-05-02 NOTE — ED PROVIDER NOTES
[address] [facility]  Department of Emergency Medicine   ED  Encounter Note  Admit Date/RoomTime: 2021 12:54 PM  ED Room:     NAME: Wm Castellanos  : 1935  MRN: [id number]     Chief Complaint:  Abdominal Pain (Nausea since Thursday. \"Really bad headache. \")    History of Present Illness        Wm Castellanos is a 81 y.o. old male who presents to the emergency department by private vehicle, for gradual onset aching pain in the entire abdomen without radiation which began 3 day(s) prior to arrival.  There has been no similar episodes in the past .   Since onset the symptoms have been persistent. The pain is associated with Decreased appetite and nausea. The pain is aggravated by nothing in particular and relieved by nothing. There has been NO back pain, chills, constipation, diarrhea, dysuria, hematuria, urinary frequency or vomiting. Patient also complains of bad headache frontal pounding has had previous headaches but not like this or this bad was gradual onset denies any blurred vision any neck pain any numbness tingling or weakness no difficulty walking. Timoteo PAYNE   Pertinent positives and negatives are stated within HPI, all other systems reviewed and are negative. Past Medical History:  has a past medical history of Aneurysm of right renal artery ([address] Colitis, Depression, GERD (gastroesophageal reflux disease), Hyperlipidemia, Hypertension, Superior mesenteric artery stenosis ([address] and TIA (transient ischemic attack). Surgical History:  has a past surgical history that includes Appendectomy; Tonsillectomy; ECHO Compl W Dop Color Flow (2012); sinus surgery (6324,5453); Anterior cruciate ligament repair (Left, 2001); Cholecystectomy (); hernia repair (Right, 2002); Total knee arthroplasty (Left, 2019); and Upper gastrointestinal endoscopy (N/A, 10/10/2019). Social History:  reports that he has never smoked.  He has never used smokeless tobacco. He reports that he does not drink alcohol or use drugs. Family History: family history is not on file. Allergies: Patient has no known allergies. Physical Exam   Oxygen Saturation Interpretation: Normal.        ED Triage Vitals   BP Temp Temp Source Pulse Resp SpO2 Height Weight   05/02/21 1252 05/02/21 1252 05/02/21 1252 05/02/21 1252 05/02/21 1252 05/02/21 1252 05/02/21 1301 05/02/21 1301   (!) 140/85 97.8 °F (36.6 °C) Infrared 97 18 98 % 6' (1.829 m) 190 lb (86.2 kg)         General Appearance/Constitutional:  Alert, development consistent with age. HEENT:  NC/NT. PERRLA. Airway patent. Neck:  Supple. No lymphadenopathy. Respiratory: Lungs Clear to auscultation and breath sounds equal.  CV:  Regular rate and rhythm. GI:  normal appearing, non-distended with no visible hernias. Bowel sounds: hypoactive bowel sounds. Tenderness: There is mild tenderness present - located in the LLQ. [place] Liver: non-tender. Spleen:  non-tender. Back: CVA Tenderness: No.  Integument:  Normal turgor. Warm, dry, without visible rash, unless noted elsewhere. Neurological:  Orientation age-appropriate. Motor functions intact.   CN II through XII grossly intact muscular tone and strength 5/5 gait is normal    Lab / Imaging Results   (All laboratory and radiology results have been personally reviewed by myself)  Labs:  Results for orders placed or performed during the hospital encounter of 05/02/21   CBC Auto Differential   Result Value Ref Range    WBC 5.5 4.5 - 11.5 E9/L    RBC 4.78 3.80 - 5.80 E12/L    Hemoglobin 13.9 12.5 - 16.5 g/dL    Hematocrit 41.7 37.0 - 54.0 %    MCV 87.2 80.0 - 99.9 fL    MCH 29.1 26.0 - 35.0 pg    MCHC 33.3 32.0 - 34.5 %    RDW 12.7 11.5 - 15.0 fL    Platelets 590 259 - 660 E9/L    MPV 9.4 7.0 - 12.0 fL    Neutrophils % 67.5 43.0 - 80.0 %    Immature Granulocytes % 0.4 0.0 - 5.0 %    Lymphocytes % 20.7 20.0 - 42.0 %    Monocytes % 9.3 2.0 - 12.0 %    Eosinophils % 1.6 0.0 - 6.0 %    Basophils % 0.5 0.0 - 2.0 %    Neutrophils Absolute 3.72 1.80 - 7.30 E9/L    Immature Granulocytes # 0.02 E9/L    Lymphocytes Absolute 1.14 (L) 1.50 - 4.00 E9/L    Monocytes Absolute 0.51 0.10 - 0.95 E9/L    Eosinophils Absolute 0.09 0.05 - 0.50 E9/L    Basophils Absolute 0.03 0.00 - 0.20 E9/L   Comprehensive Metabolic Panel   Result Value Ref Range    Sodium 138 132 - 146 mmol/L    Potassium 3.5 3.5 - 5.0 mmol/L    Chloride 99 98 - 107 mmol/L    CO2 31 (H) 22 - 29 mmol/L    Anion Gap 8 7 - 16 mmol/L    Glucose 108 (H) 74 - 99 mg/dL    BUN 8 6 - 23 mg/dL    CREATININE 1.0 0.7 - 1.2 mg/dL    GFR Non-African American >60 >=60 mL/min/1.73    GFR African American >60     Calcium 9.5 8.6 - 10.2 mg/dL    Total Protein 7.3 6.4 - 8.3 g/dL    Albumin 4.0 3.5 - 5.2 g/dL    Total Bilirubin 0.5 0.0 - 1.2 mg/dL    Alkaline Phosphatase 99 40 - 129 U/L    ALT 22 0 - 40 U/L    AST 27 0 - 39 U/L   Lactic Acid, Plasma   Result Value Ref Range    Lactic Acid 1.9 0.5 - 2.2 mmol/L   Lipase   Result Value Ref Range    Lipase 14 13 - 60 U/L   Troponin   Result Value Ref Range    Troponin <0.01 0.00 - 0.03 ng/mL   EKG 12 Lead   Result Value Ref Range    Ventricular Rate 69 BPM    Atrial Rate 69 BPM    P-R Interval 196 ms    QRS Duration 140 ms    Q-T Interval 434 ms    QTc Calculation (Bazett) 465 ms    P Axis 92 degrees    R Axis 38 degrees    T Axis 19 degrees     Imaging: All Radiology results interpreted by Radiologist unless otherwise noted. CT HEAD WO CONTRAST   Final Result   No evidence of acute intracranial process. Mild atrophy and moderate chronic   small vessel ischemic changes noted. CT ABDOMEN PELVIS W IV CONTRAST Additional Contrast? None   Final Result   Unremarkable contrast-enhanced CT examination of the abdomen and pelvis,   without finding to account for the patient's symptoms.       Small hiatal hernia again noted, along with a peripherally calcified 1.5 cm   right renal artery aneurysm. EKG:  This EKG is signed and interpreted by me. Rate: 69  Rhythm: Sinus rhythm right bundle branch block  Interpretation: Normal sinus rhythm right bundle branch block  Comparison: changes compared to previous EKG      ED Course / Medical Decision Making     Medications   0.9 % sodium chloride bolus (1,000 mLs Intravenous New Bag 5/2/21 1325)   iopamidol (ISOVUE-370) 76 % injection 75 mL (75 mLs Intravenous Given 5/2/21 1441)        Re-Evaluations:  5/2/21      Time: 3:30    Patients condition is improving after treatment. Consultations:             None    Procedures:   none    MDM: Patient has no focal neurological findings gait is normal abdomen soft nontender is no guarding or rebound CT of the head and abdomen and pelvis with IV and oral contrast are unremarkable for any acute findings    Plan of Care/Counseling:  I reviewed today's visit with the patient in addition to providing specific details for the plan of care and counseling regarding the diagnosis and prognosis. Questions are answered at this time and are agreeable with the plan. Assessment      1. Nonintractable headache, unspecified chronicity pattern, unspecified headache type    2. Nausea      This patient's ED course included: a personal history and physicial examination, re-evaluation prior to disposition, multiple bedside re-evaluations, IV medications and complex medical decision making and emergency management  This patient has remained hemodynamically stable and improved during their ED course. Plan   Discharge home  Patient condition is good.     New Medications     New Prescriptions    BUTALBITAL-ACETAMINOPHEN-CAFFEINE (FIORICET, ESGIC) -40 MG PER TABLET    Take 1 tablet by mouth every 6 hours as needed for Headaches    ONDANSETRON (ZOFRAN ODT) 4 MG DISINTEGRATING TABLET    Place 1 tablet under the tongue every 8 hours as needed for Nausea or Vomiting     Electronically signed by Haile Hoep Edgar Gonzalez MD   DD: 5/2/21  **This report was transcribed using voice recognition software. Every effort was made to ensure accuracy; however, inadvertent computerized transcription errors may be present.   END OF PROVIDER NOTE        Rocio Wood MD  05/02/21 0824

## 2021-05-03 LAB
EKG ATRIAL RATE: 69 BPM
EKG P AXIS: 92 DEGREES
EKG P-R INTERVAL: 196 MS
EKG Q-T INTERVAL: 434 MS
EKG QRS DURATION: 140 MS
EKG QTC CALCULATION (BAZETT): 465 MS
EKG R AXIS: 38 DEGREES
EKG T AXIS: 19 DEGREES
EKG VENTRICULAR RATE: 69 BPM

## 2021-05-03 PROCEDURE — 93010 ELECTROCARDIOGRAM REPORT: CPT | Performed by: INTERNAL MEDICINE

## 2021-05-25 ENCOUNTER — OFFICE VISIT (OUTPATIENT)
Dept: PRIMARY CARE CLINIC | Age: 86
End: 2021-05-25
Payer: MEDICARE

## 2021-05-25 VITALS
TEMPERATURE: 97.8 F | HEART RATE: 78 BPM | OXYGEN SATURATION: 97 % | SYSTOLIC BLOOD PRESSURE: 118 MMHG | WEIGHT: 193 LBS | DIASTOLIC BLOOD PRESSURE: 68 MMHG | BODY MASS INDEX: 26.18 KG/M2

## 2021-05-25 DIAGNOSIS — E78.2 MIXED HYPERLIPIDEMIA: Chronic | ICD-10-CM

## 2021-05-25 DIAGNOSIS — R42 VERTIGO: ICD-10-CM

## 2021-05-25 DIAGNOSIS — I10 ESSENTIAL HYPERTENSION: Chronic | ICD-10-CM

## 2021-05-25 DIAGNOSIS — J30.1 SEASONAL ALLERGIC RHINITIS DUE TO POLLEN: Primary | ICD-10-CM

## 2021-05-25 PROCEDURE — G8427 DOCREV CUR MEDS BY ELIG CLIN: HCPCS | Performed by: FAMILY MEDICINE

## 2021-05-25 PROCEDURE — 99214 OFFICE O/P EST MOD 30 MIN: CPT | Performed by: FAMILY MEDICINE

## 2021-05-25 PROCEDURE — G8417 CALC BMI ABV UP PARAM F/U: HCPCS | Performed by: FAMILY MEDICINE

## 2021-05-25 PROCEDURE — 1123F ACP DISCUSS/DSCN MKR DOCD: CPT | Performed by: FAMILY MEDICINE

## 2021-05-25 PROCEDURE — 1036F TOBACCO NON-USER: CPT | Performed by: FAMILY MEDICINE

## 2021-05-25 PROCEDURE — 4040F PNEUMOC VAC/ADMIN/RCVD: CPT | Performed by: FAMILY MEDICINE

## 2021-05-25 PROCEDURE — 96372 THER/PROPH/DIAG INJ SC/IM: CPT | Performed by: FAMILY MEDICINE

## 2021-05-25 RX ORDER — METHYLPREDNISOLONE ACETATE 80 MG/ML
80 INJECTION, SUSPENSION INTRA-ARTICULAR; INTRALESIONAL; INTRAMUSCULAR; SOFT TISSUE ONCE
Status: COMPLETED | OUTPATIENT
Start: 2021-05-25 | End: 2021-05-25

## 2021-05-25 RX ADMIN — METHYLPREDNISOLONE ACETATE 80 MG: 80 INJECTION, SUSPENSION INTRA-ARTICULAR; INTRALESIONAL; INTRAMUSCULAR; SOFT TISSUE at 16:16

## 2021-05-25 NOTE — PROGRESS NOTES
21     Marycarmen Dense    : 1935 Sex: male   Age: 81 y.o. Chief Complaint   Patient presents with    Hypertension    Depression    Anxiety    Sinusitis       Prior to Admission medications    Medication Sig Start Date End Date Taking? Authorizing Provider   butalbital-acetaminophen-caffeine (FIORICET, ESGIC) -51 MG per tablet Take 1 tablet by mouth every 6 hours as needed for Headaches 21  Yes Cathleen Lambert MD   ondansetron (ZOFRAN ODT) 4 MG disintegrating tablet Place 1 tablet under the tongue every 8 hours as needed for Nausea or Vomiting 21 Yes Cathleen Lambert MD   buPROPion (WELLBUTRIN XL) 150 MG extended release tablet Take 1 tablet by mouth every morning 21  Yes Tyler Villa DO   potassium chloride (KLOR-CON M) 20 MEQ extended release tablet Take 1 tablet by mouth daily 21  Yes Tyler Villa DO   hydroCHLOROthiazide (HYDRODIURIL) 25 MG tablet Take 1 tablet by mouth every morning 21  Yes Tyler Villa DO   losartan (COZAAR) 100 MG tablet 1 QD 3/29/21 6/29/21 Yes David Villa DO   amLODIPine (NORVASC) 5 MG tablet Take 1 tablet by mouth daily  Patient taking differently: Take 10 mg by mouth daily  3/29/21  Yes Tyler Villa DO   pantoprazole (PROTONIX) 40 MG tablet TK 1 T PO QD 3/29/21  Yes Tyler Villa DO   mirtazapine (REMERON) 30 MG tablet Take 1 tablet by mouth nightly 3/29/21  Yes Tyler Villa DO   meclizine (ANTIVERT) 25 MG tablet Bid prn 10/1/20  Yes Tyler Villa DO   ibuprofen (ADVIL;MOTRIN) 800 MG tablet Take 1 tablet by mouth every 8 hours as needed for Pain 10/1/20  Yes Tyler Villa DO   aspirin 81 MG tablet Take 81 mg by mouth daily   Yes Historical Provider, MD          HPI: Jim Llanos is seen today follow-up on hypertension hyperlipidemia concerns with seasonal allergies. Discussed Depo-Medrol shot and we provided today.   Problems with intermittent vertigo that significantly Use    Smoking status: Never Smoker    Smokeless tobacco: Never Used   Vaping Use    Vaping Use: Never used   Substance Use Topics    Alcohol use: No    Drug use: No      Past Surgical History:   Procedure Laterality Date    ANTERIOR CRUCIATE LIGAMENT REPAIR Left 11/21/2001    APPENDECTOMY      CHOLECYSTECTOMY  2007    Lap    ECHO COMPL W DOP COLOR FLOW  12/4/2012         HERNIA REPAIR Right 11/13/2002    double    SINUS SURGERY  2002,2003     done x 2    TONSILLECTOMY      TOTAL KNEE ARTHROPLASTY Left 1/28/2019    LEFT  ROBOTIC KNEE TOTAL ARTHROPLASTY  ++MEREDITH- LWBAOLEW++   ++ADDUCTOR BLOCK++ performed by Karen Hutchinson MD at [address] N/A 10/10/2019    EGD [facility]OPSY performed by Lamin Dennis MD at [facility] ENDOSCOPY     No family history on file. Past Medical History:   Diagnosis Date    Aneurysm of right renal artery (HCC)     follows with Dr. Breanna Chavarria yearly (last visit 9/19)    Colitis     Depression     GERD (gastroesophageal reflux disease)     Hyperlipidemia     Hypertension     Superior mesenteric artery stenosis ([address] 11/4/2020    TIA (transient ischemic attack)        Vitals:    05/25/21 1600   BP: 118/68   Pulse: 78   Temp: 97.8 °F (36.6 °C)   SpO2: 97%   Weight: 193 lb (87.5 kg)     BP Readings from Last 3 Encounters:   05/25/21 118/68   05/02/21 (!) 146/90   04/27/21 (!) 126/52    134/62    Physical Exam  Vitals and nursing note reviewed. Constitutional:       Appearance: He is well-developed. HENT:      Head: Normocephalic. Right Ear: External ear normal.      Left Ear: External ear normal.      Nose: Nose normal.   Eyes:      Conjunctiva/sclera: Conjunctivae normal.      Pupils: Pupils are equal, round, and reactive to light. Cardiovascular:      Rate and Rhythm: Normal rate. Pulmonary:      Breath sounds: Normal breath sounds. Abdominal:      General: Bowel sounds are normal.      Palpations: Abdomen is soft. Musculoskeletal:         General: Normal range of motion. Cervical back: Normal range of motion and neck supple. Skin:     General: Skin is warm and dry. Neurological:      Mental Status: He is alert and oriented to person, place, and time. Psychiatric:         Behavior: Behavior normal.        Present vitals physical examination stable. Heart is controlled lungs are clear. HEENT some clear drainage. Depo-Medrol injection today. Medications reviewed continue as prescribed. Vertigo we will maintain the meclizine. Follow-up visit in 2 weeks. Continue Remeron at at bedtime as prescribed. Continue bupropion one fifty daily. Further discussion when he returns. Plan Per Assessment:  Alcira Corcoran was seen today for hypertension, depression, anxiety and sinusitis. Diagnoses and all orders for this visit:    Seasonal allergic rhinitis due to pollen  -     methylPREDNISolone acetate (DEPO-MEDROL) injection 80 mg    Essential hypertension    Vertigo    Mixed hyperlipidemia            Return in about 2 weeks (around 6/8/2021). Dimas Jj DO    Note was generated with the assistance of voice recognition software. Document was reviewed however may contain grammatical errors.

## 2021-06-08 ENCOUNTER — OFFICE VISIT (OUTPATIENT)
Dept: PRIMARY CARE CLINIC | Age: 86
End: 2021-06-08
Payer: MEDICARE

## 2021-06-08 VITALS
DIASTOLIC BLOOD PRESSURE: 82 MMHG | OXYGEN SATURATION: 97 % | SYSTOLIC BLOOD PRESSURE: 138 MMHG | TEMPERATURE: 98.1 F | HEIGHT: 72 IN | WEIGHT: 188 LBS | BODY MASS INDEX: 25.47 KG/M2 | HEART RATE: 72 BPM | RESPIRATION RATE: 16 BRPM

## 2021-06-08 DIAGNOSIS — I10 ESSENTIAL HYPERTENSION: Primary | Chronic | ICD-10-CM

## 2021-06-08 DIAGNOSIS — J32.9 CHRONIC SINUSITIS, UNSPECIFIED LOCATION: ICD-10-CM

## 2021-06-08 DIAGNOSIS — R11.0 NAUSEA: ICD-10-CM

## 2021-06-08 DIAGNOSIS — E78.2 MIXED HYPERLIPIDEMIA: Chronic | ICD-10-CM

## 2021-06-08 DIAGNOSIS — F32.A DEPRESSION, UNSPECIFIED DEPRESSION TYPE: ICD-10-CM

## 2021-06-08 PROCEDURE — 1036F TOBACCO NON-USER: CPT | Performed by: FAMILY MEDICINE

## 2021-06-08 PROCEDURE — G8417 CALC BMI ABV UP PARAM F/U: HCPCS | Performed by: FAMILY MEDICINE

## 2021-06-08 PROCEDURE — 1123F ACP DISCUSS/DSCN MKR DOCD: CPT | Performed by: FAMILY MEDICINE

## 2021-06-08 PROCEDURE — G8427 DOCREV CUR MEDS BY ELIG CLIN: HCPCS | Performed by: FAMILY MEDICINE

## 2021-06-08 PROCEDURE — 99214 OFFICE O/P EST MOD 30 MIN: CPT | Performed by: FAMILY MEDICINE

## 2021-06-08 PROCEDURE — 4040F PNEUMOC VAC/ADMIN/RCVD: CPT | Performed by: FAMILY MEDICINE

## 2021-06-08 RX ORDER — ONDANSETRON 4 MG/1
4 TABLET, ORALLY DISINTEGRATING ORAL EVERY 8 HOURS PRN
Qty: 10 TABLET | Refills: 0 | Status: SHIPPED
Start: 2021-06-08 | End: 2021-06-23

## 2021-06-08 RX ORDER — FLUTICASONE PROPIONATE 50 MCG
1 SPRAY, SUSPENSION (ML) NASAL DAILY
Qty: 1 BOTTLE | Refills: 2 | Status: ON HOLD
Start: 2021-06-08 | End: 2021-08-13

## 2021-06-08 SDOH — ECONOMIC STABILITY: FOOD INSECURITY: WITHIN THE PAST 12 MONTHS, YOU WORRIED THAT YOUR FOOD WOULD RUN OUT BEFORE YOU GOT MONEY TO BUY MORE.: NEVER TRUE

## 2021-06-08 SDOH — ECONOMIC STABILITY: FOOD INSECURITY: WITHIN THE PAST 12 MONTHS, THE FOOD YOU BOUGHT JUST DIDN'T LAST AND YOU DIDN'T HAVE MONEY TO GET MORE.: NEVER TRUE

## 2021-06-08 ASSESSMENT — SOCIAL DETERMINANTS OF HEALTH (SDOH): HOW HARD IS IT FOR YOU TO PAY FOR THE VERY BASICS LIKE FOOD, HOUSING, MEDICAL CARE, AND HEATING?: NOT HARD AT ALL

## 2021-06-08 NOTE — PROGRESS NOTES
problems with nausea. Appears to be related to chronic sinus drainage. Recommending some Flonase daily and then will reassess at next visit. May continue with Zofran as needed. Depression remains persistent and we adjusted Wellbutrin to 300 daily continue Remeron at at bedtime. Reassessment with me in a week. Hypertension controlled. Hyperlipidemia controlled. Review of Systems   Constitutional: Negative. HENT: Positive for congestion. Eyes: Negative. Respiratory: Negative. Gastrointestinal: Negative. Endocrine: Negative. Genitourinary: Negative. Musculoskeletal: Negative. Skin: Negative. Allergic/Immunologic: Negative. Neurological: Negative. Hematological: Negative. Psychiatric/Behavioral: Negative. Respiratory congestion with associated nausea.       Current Outpatient Medications:     ondansetron (ZOFRAN ODT) 4 MG disintegrating tablet, Place 1 tablet under the tongue every 8 hours as needed for Nausea or Vomiting, Disp: 10 tablet, Rfl: 0    fluticasone (FLONASE) 50 MCG/ACT nasal spray, 1 spray by Each Nostril route daily, Disp: 1 Bottle, Rfl: 2    butalbital-acetaminophen-caffeine (FIORICET, ESGIC) -40 MG per tablet, Take 1 tablet by mouth every 6 hours as needed for Headaches, Disp: 12 tablet, Rfl: 0    buPROPion (WELLBUTRIN XL) 150 MG extended release tablet, Take 1 tablet by mouth every morning, Disp: 30 tablet, Rfl: 3    potassium chloride (KLOR-CON M) 20 MEQ extended release tablet, Take 1 tablet by mouth daily, Disp: 30 tablet, Rfl: 5    hydroCHLOROthiazide (HYDRODIURIL) 25 MG tablet, Take 1 tablet by mouth every morning, Disp: 30 tablet, Rfl: 5    losartan (COZAAR) 100 MG tablet, 1 QD, Disp: 90 tablet, Rfl: 1    amLODIPine (NORVASC) 5 MG tablet, Take 1 tablet by mouth daily (Patient taking differently: Take 10 mg by mouth daily ), Disp: 90 tablet, Rfl: 3    pantoprazole (PROTONIX) 40 MG tablet, TK 1 T PO QD, Disp: 90 tablet, Rfl: 3    mirtazapine (REMERON) 30 MG tablet, Take 1 tablet by mouth nightly, Disp: 90 tablet, Rfl: 3    meclizine (ANTIVERT) 25 MG tablet, Bid prn, Disp: 30 tablet, Rfl: 1    ibuprofen (ADVIL;MOTRIN) 800 MG tablet, Take 1 tablet by mouth every 8 hours as needed for Pain, Disp: 120 tablet, Rfl: 2    aspirin 81 MG tablet, Take 81 mg by mouth daily, Disp: , Rfl:     No Known Allergies    Social History     Tobacco Use    Smoking status: Never Smoker    Smokeless tobacco: Never Used   Vaping Use    Vaping Use: Never used   Substance Use Topics    Alcohol use: No    Drug use: No      Past Surgical History:   Procedure Laterality Date    ANTERIOR CRUCIATE LIGAMENT REPAIR Left 11/21/2001    APPENDECTOMY      CHOLECYSTECTOMY  2007    Lap    ECHO COMPL W DOP COLOR FLOW  12/4/2012         HERNIA REPAIR Right 11/13/2002    double    SINUS SURGERY  2002,2003     done x 2    TONSILLECTOMY      TOTAL KNEE ARTHROPLASTY Left 1/28/2019    LEFT  ROBOTIC KNEE TOTAL ARTHROPLASTY  ++MEREDITH- OCNRFPMS++   ++ADDUCTOR BLOCK++ performed by Giancarlo Doe MD at [address] N/A 10/10/2019    EGD BIOPSY performed by Molly Luu MD at [facility] ENDOSCOPY     No family history on file. Past Medical History:   Diagnosis Date    Aneurysm of right renal artery (HCC)     follows with Dr. Haider Huang yearly (last visit 9/19)    Colitis     Depression     GERD (gastroesophageal reflux disease)     Hyperlipidemia     Hypertension     Superior mesenteric artery stenosis ([address] 11/4/2020    TIA (transient ischemic attack)        Vitals:    06/08/21 1305   BP: 138/82   Pulse: 72   Resp: 16   Temp: 98.1 °F (36.7 °C)   SpO2: 97%   Weight: 188 lb (85.3 kg)   Height: 6' (1.829 m)     BP Readings from Last 3 Encounters:   06/08/21 138/82   05/25/21 118/68   05/02/21 (!) 146/90        Physical Exam  Vitals and nursing note reviewed. Constitutional:       Appearance: He is well-developed.    HENT: Head: Normocephalic. Right Ear: External ear normal.      Left Ear: External ear normal.      Nose: Nose normal.   Eyes:      Conjunctiva/sclera: Conjunctivae normal.      Pupils: Pupils are equal, round, and reactive to light. Cardiovascular:      Rate and Rhythm: Normal rate. Pulmonary:      Breath sounds: Normal breath sounds. Abdominal:      General: Bowel sounds are normal.      Palpations: Abdomen is soft. Musculoskeletal:         General: Normal range of motion. Cervical back: Normal range of motion and neck supple. Skin:     General: Skin is warm and dry. Neurological:      Mental Status: He is alert and oriented to person, place, and time. Psychiatric:         Behavior: Behavior normal.      findings are actually all stable. We will maintain current meds and care. Medications as prescribed and reassess with me 1 week. Plan Per Assessment:  Surjit Morillo was seen today for depression, drainage, nausea and sinusitis. Diagnoses and all orders for this visit:    Essential hypertension    Chronic sinusitis, unspecified location    Nausea    Mixed hyperlipidemia    Depression, unspecified depression type    Other orders  -     ondansetron (ZOFRAN ODT) 4 MG disintegrating tablet; Place 1 tablet under the tongue every 8 hours as needed for Nausea or Vomiting  -     fluticasone (FLONASE) 50 MCG/ACT nasal spray; 1 spray by Each Nostril route daily            Return in about 1 week (around 6/15/2021). Germaine Rodriguezt, DO    Note was generated with the assistance of voice recognition software. Document was reviewed however may contain grammatical errors.

## 2021-06-14 ENCOUNTER — CARE COORDINATION (OUTPATIENT)
Dept: CARE COORDINATION | Age: 86
End: 2021-06-14

## 2021-06-15 ENCOUNTER — OFFICE VISIT (OUTPATIENT)
Dept: PRIMARY CARE CLINIC | Age: 86
End: 2021-06-15
Payer: MEDICARE

## 2021-06-15 VITALS
SYSTOLIC BLOOD PRESSURE: 142 MMHG | BODY MASS INDEX: 25.6 KG/M2 | TEMPERATURE: 97.7 F | OXYGEN SATURATION: 96 % | HEART RATE: 81 BPM | WEIGHT: 189 LBS | HEIGHT: 72 IN | DIASTOLIC BLOOD PRESSURE: 78 MMHG

## 2021-06-15 DIAGNOSIS — R63.0 DECREASED APPETITE: ICD-10-CM

## 2021-06-15 DIAGNOSIS — F41.9 ANXIETY: ICD-10-CM

## 2021-06-15 DIAGNOSIS — R73.01 IMPAIRED FASTING GLUCOSE: ICD-10-CM

## 2021-06-15 DIAGNOSIS — F32.A DEPRESSION, UNSPECIFIED DEPRESSION TYPE: ICD-10-CM

## 2021-06-15 DIAGNOSIS — I10 ESSENTIAL HYPERTENSION: Primary | Chronic | ICD-10-CM

## 2021-06-15 PROCEDURE — 4040F PNEUMOC VAC/ADMIN/RCVD: CPT | Performed by: FAMILY MEDICINE

## 2021-06-15 PROCEDURE — 99214 OFFICE O/P EST MOD 30 MIN: CPT | Performed by: FAMILY MEDICINE

## 2021-06-15 PROCEDURE — G8427 DOCREV CUR MEDS BY ELIG CLIN: HCPCS | Performed by: FAMILY MEDICINE

## 2021-06-15 PROCEDURE — G8417 CALC BMI ABV UP PARAM F/U: HCPCS | Performed by: FAMILY MEDICINE

## 2021-06-15 PROCEDURE — 1036F TOBACCO NON-USER: CPT | Performed by: FAMILY MEDICINE

## 2021-06-15 PROCEDURE — 1123F ACP DISCUSS/DSCN MKR DOCD: CPT | Performed by: FAMILY MEDICINE

## 2021-06-15 RX ORDER — LORAZEPAM 0.5 MG/1
0.5 TABLET ORAL EVERY 8 HOURS PRN
Qty: 30 TABLET | Refills: 1 | Status: SHIPPED | OUTPATIENT
Start: 2021-06-15 | End: 2021-06-23

## 2021-06-15 ASSESSMENT — ENCOUNTER SYMPTOMS
ALLERGIC/IMMUNOLOGIC NEGATIVE: 1
NAUSEA: 1
RESPIRATORY NEGATIVE: 1
CONSTIPATION: 1
EYES NEGATIVE: 1

## 2021-06-15 NOTE — PROGRESS NOTES
a sleep standpoint as well as anxiety control. Remains on Wellbutrin 300 mg a day. Addition of Ativan 0.5 mg every 8 hour basis as needed today. Reassessment in 1 week and then further adjustments as needed. Off the Remeron at this point. Hypertension controlled. Reflux disease stable. Review of Systems   Constitutional: Negative. HENT: Negative. Eyes: Negative. Respiratory: Negative. Gastrointestinal: Positive for constipation and nausea. Endocrine: Negative. Genitourinary: Negative. Musculoskeletal: Negative. Skin: Negative. Allergic/Immunologic: Negative. Neurological: Negative. Hematological: Negative. Psychiatric/Behavioral: Positive for agitation. The patient is nervous/anxious. Complaints of irritable bowel constipation some nausea. Medications adjusted today will reassess with next visit. Current Outpatient Medications:     LORazepam (ATIVAN) 0.5 MG tablet, Take 1 tablet by mouth every 8 hours as needed for Anxiety for up to 30 days. , Disp: 30 tablet, Rfl: 1    ondansetron (ZOFRAN ODT) 4 MG disintegrating tablet, Place 1 tablet under the tongue every 8 hours as needed for Nausea or Vomiting, Disp: 10 tablet, Rfl: 0    fluticasone (FLONASE) 50 MCG/ACT nasal spray, 1 spray by Each Nostril route daily, Disp: 1 Bottle, Rfl: 2    buPROPion (WELLBUTRIN XL) 150 MG extended release tablet, Take 1 tablet by mouth every morning, Disp: 30 tablet, Rfl: 3    potassium chloride (KLOR-CON M) 20 MEQ extended release tablet, Take 1 tablet by mouth daily, Disp: 30 tablet, Rfl: 5    hydroCHLOROthiazide (HYDRODIURIL) 25 MG tablet, Take 1 tablet by mouth every morning, Disp: 30 tablet, Rfl: 5    losartan (COZAAR) 100 MG tablet, 1 QD, Disp: 90 tablet, Rfl: 1    amLODIPine (NORVASC) 5 MG tablet, Take 1 tablet by mouth daily (Patient taking differently: Take 10 mg by mouth daily ), Disp: 90 tablet, Rfl: 3    pantoprazole (PROTONIX) 40 MG tablet, TK 1 T PO QD, Disp: 90 tablet, Rfl: 3    meclizine (ANTIVERT) 25 MG tablet, Bid prn, Disp: 30 tablet, Rfl: 1    ibuprofen (ADVIL;MOTRIN) 800 MG tablet, Take 1 tablet by mouth every 8 hours as needed for Pain, Disp: 120 tablet, Rfl: 2    aspirin 81 MG tablet, Take 81 mg by mouth daily, Disp: , Rfl:     No Known Allergies    Social History     Tobacco Use    Smoking status: Never Smoker    Smokeless tobacco: Never Used   Vaping Use    Vaping Use: Never used   Substance Use Topics    Alcohol use: No    Drug use: No      Past Surgical History:   Procedure Laterality Date    ANTERIOR CRUCIATE LIGAMENT REPAIR Left 11/21/2001    APPENDECTOMY      CHOLECYSTECTOMY  2007    Lap    ECHO COMPL W DOP COLOR FLOW  12/4/2012         HERNIA REPAIR Right 11/13/2002    double    SINUS SURGERY  2002,2003     done x 2    TONSILLECTOMY      TOTAL KNEE ARTHROPLASTY Left 1/28/2019    LEFT  ROBOTIC KNEE TOTAL ARTHROPLASTY  ++MEREDITH- [id number]++   ++ADDUCTOR BLOCK++ performed by Karen Hutchinson MD at [place] [id number]. N/A 10/10/2019    EGD BIOPSY performed by Lamin Dennis MD at [facility] ENDOSCOPY     No family history on file. Past Medical History:   Diagnosis Date    Aneurysm of right renal artery (HCC)     follows with Dr. Breanna Chavarria yearly (last visit 9/19)    Colitis     Depression     GERD (gastroesophageal reflux disease)     Hyperlipidemia     Hypertension     Superior mesenteric artery stenosis ([facility] 11/4/2020    TIA (transient ischemic attack)        Vitals:    06/15/21 1305 06/15/21 1312   BP: (!) 150/76 (!) 142/78   Pulse: 81    Temp: 97.7 °F (36.5 °C)    SpO2: 96%    Weight: 189 lb (85.7 kg)    Height: 6' (1.829 m)      BP Readings from Last 3 Encounters:   06/15/21 (!) 142/78   06/08/21 138/82   05/25/21 118/68        Physical Exam  Vitals and nursing note reviewed. Constitutional:       Appearance: He is well-developed. HENT:      Head: Normocephalic.       Right Ear: External ear normal.      Left Ear: External ear normal.      Nose: Nose normal.   Eyes:      Conjunctiva/sclera: Conjunctivae normal.      Pupils: Pupils are equal, round, and reactive to light. Cardiovascular:      Rate and Rhythm: Normal rate. Pulmonary:      Breath sounds: Normal breath sounds. Abdominal:      General: Bowel sounds are normal.      Palpations: Abdomen is soft. Musculoskeletal:         General: Normal range of motion. Cervical back: Normal range of motion and neck supple. Skin:     General: Skin is warm and dry. Neurological:      Mental Status: He is alert and oriented to person, place, and time. Psychiatric:         Behavior: Behavior normal.        Physical exam is actually quite stable. Abdomen soft no rebound no guarding. Medications reviewed and continue as prescribed. Discontinue Remeron as noted. Addition of Ativan as noted. Reassessment with me 1 week.         Lab Results   Component Value Date    TSH 2.210 03/10/2021    TSH 1.970 01/25/2021    T2EDHQF 7.6 03/10/2021    V5XLQRR 6.8 01/25/2021    T4FREE 1.10 01/22/2017     Lab Results   Component Value Date    CHOL 147 03/10/2021    CHOL 153 01/25/2021     Lab Results   Component Value Date    TRIG 76 03/10/2021    TRIG 55 01/25/2021     Lab Results   Component Value Date    HDL 39 03/10/2021    HDL 47 01/25/2021     No results found for: [facility]  Lab Results   Component Value Date    LABVLDL 15 03/10/2021    LABVLDL 11 01/25/2021     No results found for: [facility]  Lab Results   Component Value Date    WBC 5.5 05/02/2021    HGB 13.9 05/02/2021    HCT 41.7 05/02/2021    MCV 87.2 05/02/2021     05/02/2021    LYMPHOPCT 20.7 05/02/2021    RBC 4.78 05/02/2021    MCH 29.1 05/02/2021    MCHC 33.3 05/02/2021    RDW 12.7 05/02/2021     Lab Results   Component Value Date     05/02/2021    K 3.5 05/02/2021    CL 99 05/02/2021    CO2 31 (H) 05/02/2021    BUN 8 05/02/2021    CREATININE 1.0 05/02/2021    GLUCOSE 108 (H) 05/02/2021    CALCIUM 9.5 05/02/2021    PROT 7.3 05/02/2021    LABALBU 4.0 05/02/2021    BILITOT 0.5 05/02/2021    ALKPHOS 99 05/02/2021    AST 27 05/02/2021    ALT 22 05/02/2021    LABGLOM >60 05/02/2021    GFRAA >60 05/02/2021        Lab Results   Component Value Date    PSA 2.83 04/29/2020    PSA 2.86 04/24/2019      Lab Results   Component Value Date    LABA1C 5.1 01/25/2021     No results found for: EAG   Plan Per Assessment:  Toyin Fowler was seen today for depression, sinus problem and anxiety. Diagnoses and all orders for this visit:    Essential hypertension  -     CBC Auto Differential; Future  -     Comprehensive Metabolic Panel; Future  -     T4; Future  -     TSH without Reflex; Future  -     AMYLASE; Future  -     LIPASE; Future    Impaired fasting glucose  -     CBC Auto Differential; Future  -     Comprehensive Metabolic Panel; Future  -     T4; Future  -     TSH without Reflex; Future  -     AMYLASE; Future  -     LIPASE; Future    Depression, unspecified depression type  -     LORazepam (ATIVAN) 0.5 MG tablet; Take 1 tablet by mouth every 8 hours as needed for Anxiety for up to 30 days.  -     External Referral To Psychiatry  -     CBC Auto Differential; Future  -     Comprehensive Metabolic Panel; Future  -     T4; Future  -     TSH without Reflex; Future  -     AMYLASE; Future  -     LIPASE; Future    Anxiety  -     LORazepam (ATIVAN) 0.5 MG tablet; Take 1 tablet by mouth every 8 hours as needed for Anxiety for up to 30 days.  -     External Referral To Psychiatry  -     CBC Auto Differential; Future  -     Comprehensive Metabolic Panel; Future  -     T4; Future  -     TSH without Reflex; Future  -     AMYLASE; Future  -     LIPASE; Future    Decreased appetite            Return in about 1 week (around 6/22/2021). Meghan Carlos DO    Note was generated with the assistance of voice recognition software. Document was reviewed however may contain grammatical errors.

## 2021-06-22 ENCOUNTER — HOSPITAL ENCOUNTER (OUTPATIENT)
Age: 86
Discharge: HOME | End: 2021-06-22
Payer: MEDICARE

## 2021-06-22 DIAGNOSIS — F32.A DEPRESSION, UNSPECIFIED DEPRESSION TYPE: ICD-10-CM

## 2021-06-22 DIAGNOSIS — I10 ESSENTIAL HYPERTENSION: Chronic | ICD-10-CM

## 2021-06-22 DIAGNOSIS — R73.01 IMPAIRED FASTING GLUCOSE: ICD-10-CM

## 2021-06-22 DIAGNOSIS — F41.9 ANXIETY: ICD-10-CM

## 2021-06-22 LAB
ALBUMIN SERPL-MCNC: 4 G/DL (ref 3.5–5.2)
ALP BLD-CCNC: 105 U/L (ref 40–129)
ALT SERPL-CCNC: 5 U/L (ref 0–40)
AMYLASE: 58 U/L (ref 20–100)
ANION GAP SERPL CALCULATED.3IONS-SCNC: 8 MMOL/L (ref 7–16)
AST SERPL-CCNC: 11 U/L (ref 0–39)
BASOPHILS ABSOLUTE: 0.02 E9/L (ref 0–0.2)
BASOPHILS RELATIVE PERCENT: 0.3 % (ref 0–2)
BILIRUB SERPL-MCNC: 0.6 MG/DL (ref 0–1.2)
BUN BLDV-MCNC: 11 MG/DL (ref 6–23)
CALCIUM SERPL-MCNC: 9.6 MG/DL (ref 8.6–10.2)
CHLORIDE BLD-SCNC: 94 MMOL/L (ref 98–107)
CO2: 32 MMOL/L (ref 22–29)
CREAT SERPL-MCNC: 1 MG/DL (ref 0.7–1.2)
EOSINOPHILS ABSOLUTE: 0.08 E9/L (ref 0.05–0.5)
EOSINOPHILS RELATIVE PERCENT: 1.1 % (ref 0–6)
GFR AFRICAN AMERICAN: >60
GFR NON-AFRICAN AMERICAN: >60 ML/MIN/1.73
GLUCOSE BLD-MCNC: 109 MG/DL (ref 74–99)
HCT VFR BLD CALC: 40.7 % (ref 37–54)
HEMOGLOBIN: 14.1 G/DL (ref 12.5–16.5)
IMMATURE GRANULOCYTES #: 0.04 E9/L
IMMATURE GRANULOCYTES %: 0.6 % (ref 0–5)
LIPASE: 16 U/L (ref 13–60)
LYMPHOCYTES ABSOLUTE: 1.25 E9/L (ref 1.5–4)
LYMPHOCYTES RELATIVE PERCENT: 17.4 % (ref 20–42)
MCH RBC QN AUTO: 29.8 PG (ref 26–35)
MCHC RBC AUTO-ENTMCNC: 34.6 % (ref 32–34.5)
MCV RBC AUTO: 86 FL (ref 80–99.9)
MONOCYTES ABSOLUTE: 0.82 E9/L (ref 0.1–0.95)
MONOCYTES RELATIVE PERCENT: 11.4 % (ref 2–12)
NEUTROPHILS ABSOLUTE: 4.97 E9/L (ref 1.8–7.3)
NEUTROPHILS RELATIVE PERCENT: 69.2 % (ref 43–80)
PDW BLD-RTO: 12.7 FL (ref 11.5–15)
PLATELET # BLD: 391 E9/L (ref 130–450)
PMV BLD AUTO: 8.4 FL (ref 7–12)
POTASSIUM SERPL-SCNC: 4.3 MMOL/L (ref 3.5–5)
RBC # BLD: 4.73 E12/L (ref 3.8–5.8)
SODIUM BLD-SCNC: 134 MMOL/L (ref 132–146)
T4 TOTAL: 7.7 MCG/DL (ref 4.5–11.7)
TOTAL PROTEIN: 6.9 G/DL (ref 6.4–8.3)
TSH SERPL DL<=0.05 MIU/L-ACNC: 1.92 UIU/ML (ref 0.27–4.2)
WBC # BLD: 7.2 E9/L (ref 4.5–11.5)

## 2021-06-22 PROCEDURE — 84443 ASSAY THYROID STIM HORMONE: CPT

## 2021-06-22 PROCEDURE — 85025 COMPLETE CBC W/AUTO DIFF WBC: CPT

## 2021-06-22 PROCEDURE — 80053 COMPREHEN METABOLIC PANEL: CPT

## 2021-06-22 PROCEDURE — 82150 ASSAY OF AMYLASE: CPT

## 2021-06-22 PROCEDURE — 84436 ASSAY OF TOTAL THYROXINE: CPT

## 2021-06-22 PROCEDURE — 83690 ASSAY OF LIPASE: CPT

## 2021-06-22 PROCEDURE — 36415 COLL VENOUS BLD VENIPUNCTURE: CPT

## 2021-06-23 ENCOUNTER — OFFICE VISIT (OUTPATIENT)
Dept: PRIMARY CARE CLINIC | Age: 86
End: 2021-06-23
Payer: MEDICARE

## 2021-06-23 VITALS
DIASTOLIC BLOOD PRESSURE: 70 MMHG | OXYGEN SATURATION: 98 % | TEMPERATURE: 97.1 F | HEART RATE: 96 BPM | BODY MASS INDEX: 25.63 KG/M2 | HEIGHT: 72 IN | SYSTOLIC BLOOD PRESSURE: 130 MMHG

## 2021-06-23 DIAGNOSIS — K21.00 GASTROESOPHAGEAL REFLUX DISEASE WITH ESOPHAGITIS WITHOUT HEMORRHAGE: ICD-10-CM

## 2021-06-23 DIAGNOSIS — F41.9 ANXIETY: ICD-10-CM

## 2021-06-23 DIAGNOSIS — R11.0 NAUSEA: Primary | ICD-10-CM

## 2021-06-23 DIAGNOSIS — R10.84 GENERALIZED ABDOMINAL PAIN: ICD-10-CM

## 2021-06-23 PROCEDURE — 4040F PNEUMOC VAC/ADMIN/RCVD: CPT | Performed by: FAMILY MEDICINE

## 2021-06-23 PROCEDURE — 1123F ACP DISCUSS/DSCN MKR DOCD: CPT | Performed by: FAMILY MEDICINE

## 2021-06-23 PROCEDURE — G8417 CALC BMI ABV UP PARAM F/U: HCPCS | Performed by: FAMILY MEDICINE

## 2021-06-23 PROCEDURE — 99214 OFFICE O/P EST MOD 30 MIN: CPT | Performed by: FAMILY MEDICINE

## 2021-06-23 PROCEDURE — G8427 DOCREV CUR MEDS BY ELIG CLIN: HCPCS | Performed by: FAMILY MEDICINE

## 2021-06-23 PROCEDURE — 1036F TOBACCO NON-USER: CPT | Performed by: FAMILY MEDICINE

## 2021-06-23 RX ORDER — PROMETHAZINE HYDROCHLORIDE 25 MG/1
25 TABLET ORAL EVERY 8 HOURS PRN
Qty: 25 TABLET | Refills: 0 | Status: SHIPPED
Start: 2021-06-23 | End: 2021-06-29

## 2021-06-23 RX ORDER — FAMOTIDINE 40 MG/1
40 TABLET, FILM COATED ORAL EVERY EVENING
Qty: 30 TABLET | Refills: 3 | Status: SHIPPED
Start: 2021-06-23 | End: 2021-09-14 | Stop reason: SDUPTHER

## 2021-06-23 ASSESSMENT — ENCOUNTER SYMPTOMS
RESPIRATORY NEGATIVE: 1
ALLERGIC/IMMUNOLOGIC NEGATIVE: 1
EYES NEGATIVE: 1
ABDOMINAL PAIN: 1
NAUSEA: 1

## 2021-06-23 NOTE — PROGRESS NOTES
21     Mihai Lugo    : 1935 Sex: male   Age: 81 y.o. Chief Complaint   Patient presents with    Anxiety     ativan not helping       Prior to Admission medications    Medication Sig Start Date End Date Taking? Authorizing Provider   famotidine (PEPCID) 40 MG tablet Take 1 tablet by mouth every evening 21  Yes Darinel Villa DO   promethazine (PHENERGAN) 25 MG tablet Take 1 tablet by mouth every 8 hours as needed for Nausea 6/23/21 7/3/21 Yes Darinel Villa DO   fluticasone Terrea Abbot) 50 MCG/ACT nasal spray 1 spray by Each Nostril route daily 21  Yes Darinel Villa DO   buPROPion (WELLBUTRIN XL) 150 MG extended release tablet Take 1 tablet by mouth every morning 21  Yes Darinel Villa DO   potassium chloride (KLOR-CON M) 20 MEQ extended release tablet Take 1 tablet by mouth daily 21  Yes Darinel Villa DO   hydroCHLOROthiazide (HYDRODIURIL) 25 MG tablet Take 1 tablet by mouth every morning 21  Yes Darinel Villa DO   losartan (COZAAR) 100 MG tablet 1 QD 3/29/21 6/29/21 Yes David Villa DO   amLODIPine (NORVASC) 5 MG tablet Take 1 tablet by mouth daily  Patient taking differently: Take 10 mg by mouth daily  3/29/21  Yes Darinel Villa DO   meclizine (ANTIVERT) 25 MG tablet Bid prn 10/1/20  Yes Darinel Villa DO   ibuprofen (ADVIL;MOTRIN) 800 MG tablet Take 1 tablet by mouth every 8 hours as needed for Pain 10/1/20  Yes Darinel Villa DO   aspirin 81 MG tablet Take 81 mg by mouth daily   Yes Historical Provider, MD          HPI: Patient evaluated today some problems with generalized abdominal discomfort reflux disease nausea anxiety. Continued complaints of generalized abdominal discomfort and associated nausea. Has had full work-up in the past which did not reveal pathology. Recent lab studies were stable. We are going to give him a trial off of Protonix and try Pepcid as an alternative.   Prescribed Phenergan as needed for nausea over the next week. Will reassess in the next 1 to 2 weeks and then further recommendations. Possibly follow-up with alternative GI referral.  Patient is scheduled next week with psychiatry. Review of Systems   Constitutional: Positive for fatigue. HENT: Negative. Eyes: Negative. Respiratory: Negative. Gastrointestinal: Positive for abdominal pain and nausea. Endocrine: Negative. Genitourinary: Negative. Musculoskeletal: Negative. Skin: Negative. Allergic/Immunologic: Negative. Neurological: Negative. Hematological: Negative. Psychiatric/Behavioral: Negative. Systems review stable aside from HPI complaints. Nausea and generalized abdominal discomfort.           Current Outpatient Medications:     famotidine (PEPCID) 40 MG tablet, Take 1 tablet by mouth every evening, Disp: 30 tablet, Rfl: 3    promethazine (PHENERGAN) 25 MG tablet, Take 1 tablet by mouth every 8 hours as needed for Nausea, Disp: 25 tablet, Rfl: 0    fluticasone (FLONASE) 50 MCG/ACT nasal spray, 1 spray by Each Nostril route daily, Disp: 1 Bottle, Rfl: 2    buPROPion (WELLBUTRIN XL) 150 MG extended release tablet, Take 1 tablet by mouth every morning, Disp: 30 tablet, Rfl: 3    potassium chloride (KLOR-CON M) 20 MEQ extended release tablet, Take 1 tablet by mouth daily, Disp: 30 tablet, Rfl: 5    hydroCHLOROthiazide (HYDRODIURIL) 25 MG tablet, Take 1 tablet by mouth every morning, Disp: 30 tablet, Rfl: 5    losartan (COZAAR) 100 MG tablet, 1 QD, Disp: 90 tablet, Rfl: 1    amLODIPine (NORVASC) 5 MG tablet, Take 1 tablet by mouth daily (Patient taking differently: Take 10 mg by mouth daily ), Disp: 90 tablet, Rfl: 3    meclizine (ANTIVERT) 25 MG tablet, Bid prn, Disp: 30 tablet, Rfl: 1    ibuprofen (ADVIL;MOTRIN) 800 MG tablet, Take 1 tablet by mouth every 8 hours as needed for Pain, Disp: 120 tablet, Rfl: 2    aspirin 81 MG tablet, Take 81 mg by mouth daily, Disp: , Rfl:     No Known Allergies    Social History     Tobacco Use    Smoking status: Never Smoker    Smokeless tobacco: Never Used   Vaping Use    Vaping Use: Never used   Substance Use Topics    Alcohol use: No    Drug use: No      Past Surgical History:   Procedure Laterality Date    ANTERIOR CRUCIATE LIGAMENT REPAIR Left 11/21/2001    APPENDECTOMY      CHOLECYSTECTOMY  2007    Lap    ECHO COMPL W DOP COLOR FLOW  12/4/2012         HERNIA REPAIR Right 11/13/2002    double    SINUS SURGERY  2002,2003     done x 2    TONSILLECTOMY      TOTAL KNEE ARTHROPLASTY Left 1/28/2019    LEFT  ROBOTIC KNEE TOTAL ARTHROPLASTY  ++MEREDITH- [id number]++   ++ADDUCTOR BLOCK++ performed by Shayy Carlson MD at [address] N/A 10/10/2019    EGD BIOPSY performed by Roxanna Walls MD at [facility] ENDOSCOPY     No family history on file. Past Medical History:   Diagnosis Date    Aneurysm of right renal artery (HCC)     follows with Dr. Isabel Kaufman yearly (last visit 9/19)    Colitis     Depression     GERD (gastroesophageal reflux disease)     Hyperlipidemia     Hypertension     Superior mesenteric artery stenosis ([address] 11/4/2020    TIA (transient ischemic attack)        Vitals:    06/23/21 1406   BP: 130/70   Pulse: 96   Temp: 97.1 °F (36.2 °C)   SpO2: 98%   Height: 6' (1.829 m)     BP Readings from Last 3 Encounters:   06/23/21 130/70   06/15/21 (!) 142/78   06/08/21 138/82        Physical Exam  Vitals and nursing note reviewed. Constitutional:       Appearance: He is well-developed. HENT:      Head: Normocephalic. Right Ear: External ear normal.      Left Ear: External ear normal.      Nose: Nose normal.   Eyes:      Conjunctiva/sclera: Conjunctivae normal.      Pupils: Pupils are equal, round, and reactive to light. Cardiovascular:      Rate and Rhythm: Normal rate. Pulmonary:      Breath sounds: Normal breath sounds.    Abdominal:      General: Bowel sounds are normal.      Palpations: Abdomen is soft. Musculoskeletal:         General: Normal range of motion. Cervical back: Normal range of motion and neck supple. Skin:     General: Skin is warm and dry. Neurological:      Mental Status: He is alert and oriented to person, place, and time. Psychiatric:         Behavior: Behavior normal.        Physical exam findings essentially stable. Weight remains stable. Heart was regular lungs are clear and abdomen is benign. We will sit tight with current meds with the changes noted. Reassessment with me in approximately 2 weeks. Off of Ativan at this time. Off of Protonix at this time. Plan Per Assessment:  Tanner Echevarria was seen today for anxiety. Diagnoses and all orders for this visit:    Nausea    Generalized abdominal pain    Gastroesophageal reflux disease with esophagitis without hemorrhage    Anxiety    Other orders  -     famotidine (PEPCID) 40 MG tablet; Take 1 tablet by mouth every evening  -     promethazine (PHENERGAN) 25 MG tablet; Take 1 tablet by mouth every 8 hours as needed for Nausea            Return in about 8 days (around 7/1/2021). Marck Barbosa,     Note was generated with the assistance of voice recognition software. Document was reviewed however may contain grammatical errors.

## 2021-06-26 ENCOUNTER — HOSPITAL ENCOUNTER (EMERGENCY)
Age: 86
Discharge: HOME | End: 2021-06-26
Attending: EMERGENCY MEDICINE
Payer: MEDICARE

## 2021-06-26 VITALS
DIASTOLIC BLOOD PRESSURE: 70 MMHG | SYSTOLIC BLOOD PRESSURE: 120 MMHG | WEIGHT: 185 LBS | HEIGHT: 72 IN | HEART RATE: 76 BPM | TEMPERATURE: 98.6 F | BODY MASS INDEX: 25.06 KG/M2 | OXYGEN SATURATION: 96 % | RESPIRATION RATE: 16 BRPM

## 2021-06-26 DIAGNOSIS — E87.6 HYPOKALEMIA: ICD-10-CM

## 2021-06-26 DIAGNOSIS — R11.0 NAUSEA: Primary | ICD-10-CM

## 2021-06-26 DIAGNOSIS — E83.42 HYPOMAGNESEMIA: ICD-10-CM

## 2021-06-26 LAB
ALBUMIN SERPL-MCNC: 3.7 G/DL (ref 3.5–5.2)
ALP BLD-CCNC: 94 U/L (ref 40–129)
ALT SERPL-CCNC: 5 U/L (ref 0–40)
ANION GAP SERPL CALCULATED.3IONS-SCNC: 11 MMOL/L (ref 7–16)
AST SERPL-CCNC: 12 U/L (ref 0–39)
BACTERIA: ABNORMAL /HPF
BASOPHILS ABSOLUTE: 0.02 E9/L (ref 0–0.2)
BASOPHILS RELATIVE PERCENT: 0.3 % (ref 0–2)
BILIRUB SERPL-MCNC: 0.7 MG/DL (ref 0–1.2)
BILIRUBIN URINE: NEGATIVE
BLOOD, URINE: NEGATIVE
BUN BLDV-MCNC: 12 MG/DL (ref 6–23)
CALCIUM SERPL-MCNC: 9.1 MG/DL (ref 8.6–10.2)
CHLORIDE BLD-SCNC: 91 MMOL/L (ref 98–107)
CLARITY: CLEAR
CO2: 29 MMOL/L (ref 22–29)
COLOR: YELLOW
CREAT SERPL-MCNC: 0.9 MG/DL (ref 0.7–1.2)
EOSINOPHILS ABSOLUTE: 0.01 E9/L (ref 0.05–0.5)
EOSINOPHILS RELATIVE PERCENT: 0.2 % (ref 0–6)
GFR AFRICAN AMERICAN: >60
GFR NON-AFRICAN AMERICAN: >60 ML/MIN/1.73
GLUCOSE BLD-MCNC: 115 MG/DL (ref 74–99)
GLUCOSE URINE: NEGATIVE MG/DL
HCT VFR BLD CALC: 37.3 % (ref 37–54)
HEMOGLOBIN: 13.2 G/DL (ref 12.5–16.5)
IMMATURE GRANULOCYTES #: 0.02 E9/L
IMMATURE GRANULOCYTES %: 0.3 % (ref 0–5)
KETONES, URINE: ABNORMAL MG/DL
LEUKOCYTE ESTERASE, URINE: NEGATIVE
LYMPHOCYTES ABSOLUTE: 1.1 E9/L (ref 1.5–4)
LYMPHOCYTES RELATIVE PERCENT: 16.8 % (ref 20–42)
MAGNESIUM: 1.5 MG/DL (ref 1.6–2.6)
MCH RBC QN AUTO: 30.3 PG (ref 26–35)
MCHC RBC AUTO-ENTMCNC: 35.4 % (ref 32–34.5)
MCV RBC AUTO: 85.7 FL (ref 80–99.9)
MONOCYTES ABSOLUTE: 0.63 E9/L (ref 0.1–0.95)
MONOCYTES RELATIVE PERCENT: 9.6 % (ref 2–12)
NEUTROPHILS ABSOLUTE: 4.78 E9/L (ref 1.8–7.3)
NEUTROPHILS RELATIVE PERCENT: 72.8 % (ref 43–80)
NITRITE, URINE: NEGATIVE
PDW BLD-RTO: 12.8 FL (ref 11.5–15)
PH UA: 6.5 (ref 5–9)
PLATELET # BLD: 369 E9/L (ref 130–450)
PMV BLD AUTO: 8.5 FL (ref 7–12)
POTASSIUM REFLEX MAGNESIUM: 3.2 MMOL/L (ref 3.5–5)
PROTEIN UA: NEGATIVE MG/DL
RBC # BLD: 4.35 E12/L (ref 3.8–5.8)
RBC UA: ABNORMAL /HPF (ref 0–2)
SODIUM BLD-SCNC: 131 MMOL/L (ref 132–146)
SPECIFIC GRAVITY UA: 1.01 (ref 1–1.03)
TOTAL PROTEIN: 6.8 G/DL (ref 6.4–8.3)
TROPONIN, HIGH SENSITIVITY: 15 NG/L (ref 0–11)
TROPONIN, HIGH SENSITIVITY: 17 NG/L (ref 0–11)
UROBILINOGEN, URINE: 0.2 E.U./DL
WBC # BLD: 6.6 E9/L (ref 4.5–11.5)
WBC UA: ABNORMAL /HPF (ref 0–5)

## 2021-06-26 PROCEDURE — 2580000003 HC RX 258: Performed by: EMERGENCY MEDICINE

## 2021-06-26 PROCEDURE — 83735 ASSAY OF MAGNESIUM: CPT

## 2021-06-26 PROCEDURE — 85025 COMPLETE CBC W/AUTO DIFF WBC: CPT

## 2021-06-26 PROCEDURE — 99284 EMERGENCY DEPT VISIT MOD MDM: CPT

## 2021-06-26 PROCEDURE — 6370000000 HC RX 637 (ALT 250 FOR IP): Performed by: EMERGENCY MEDICINE

## 2021-06-26 PROCEDURE — 84484 ASSAY OF TROPONIN QUANT: CPT

## 2021-06-26 PROCEDURE — 80053 COMPREHEN METABOLIC PANEL: CPT

## 2021-06-26 PROCEDURE — 6360000002 HC RX W HCPCS: Performed by: EMERGENCY MEDICINE

## 2021-06-26 PROCEDURE — 81001 URINALYSIS AUTO W/SCOPE: CPT

## 2021-06-26 PROCEDURE — 36415 COLL VENOUS BLD VENIPUNCTURE: CPT

## 2021-06-26 PROCEDURE — 93005 ELECTROCARDIOGRAM TRACING: CPT | Performed by: EMERGENCY MEDICINE

## 2021-06-26 PROCEDURE — 96365 THER/PROPH/DIAG IV INF INIT: CPT

## 2021-06-26 PROCEDURE — 96375 TX/PRO/DX INJ NEW DRUG ADDON: CPT

## 2021-06-26 RX ORDER — POTASSIUM CHLORIDE 20 MEQ/1
40 TABLET, EXTENDED RELEASE ORAL ONCE
Status: COMPLETED | OUTPATIENT
Start: 2021-06-26 | End: 2021-06-26

## 2021-06-26 RX ORDER — MAGNESIUM SULFATE 1 G/100ML
1000 INJECTION INTRAVENOUS ONCE
Status: COMPLETED | OUTPATIENT
Start: 2021-06-26 | End: 2021-06-26

## 2021-06-26 RX ORDER — DIPHENHYDRAMINE HYDROCHLORIDE 50 MG/ML
12.5 INJECTION INTRAMUSCULAR; INTRAVENOUS ONCE
Status: COMPLETED | OUTPATIENT
Start: 2021-06-26 | End: 2021-06-26

## 2021-06-26 RX ORDER — METOCLOPRAMIDE HYDROCHLORIDE 5 MG/ML
10 INJECTION INTRAMUSCULAR; INTRAVENOUS ONCE
Status: COMPLETED | OUTPATIENT
Start: 2021-06-26 | End: 2021-06-26

## 2021-06-26 RX ORDER — METOCLOPRAMIDE 10 MG/1
10 TABLET ORAL 3 TIMES DAILY PRN
Qty: 60 TABLET | Refills: 0 | Status: SHIPPED | OUTPATIENT
Start: 2021-06-26 | End: 2021-08-10

## 2021-06-26 RX ORDER — 0.9 % SODIUM CHLORIDE 0.9 %
1000 INTRAVENOUS SOLUTION INTRAVENOUS ONCE
Status: COMPLETED | OUTPATIENT
Start: 2021-06-26 | End: 2021-06-26

## 2021-06-26 RX ADMIN — POTASSIUM CHLORIDE 40 MEQ: 1500 TABLET, EXTENDED RELEASE ORAL at 17:04

## 2021-06-26 RX ADMIN — SODIUM CHLORIDE 1000 ML: 9 INJECTION, SOLUTION INTRAVENOUS at 15:40

## 2021-06-26 RX ADMIN — METOCLOPRAMIDE HYDROCHLORIDE 10 MG: 5 INJECTION INTRAMUSCULAR; INTRAVENOUS at 15:45

## 2021-06-26 RX ADMIN — DIPHENHYDRAMINE HYDROCHLORIDE 12.5 MG: 50 INJECTION INTRAMUSCULAR; INTRAVENOUS at 15:43

## 2021-06-26 RX ADMIN — MAGNESIUM SULFATE HEPTAHYDRATE 1000 MG: 1 INJECTION, SOLUTION INTRAVENOUS at 17:01

## 2021-06-26 NOTE — ED PROVIDER NOTES
Department of Emergency Medicine   ED  Provider Note  Admit Date/RoomTime: 6/26/2021  3:01 PM  ED Room: 14/14 6/26/21  3:22 PM EDT    History of Present Illness:  Chief Complaint   Patient presents with    Abdominal Pain     always feeling nausea, and no appetite        Marnell Krabbe is a 81 y.o. male presenting to the ED for chronic nausea for \"all his life\" worse this week. The complaint has been constant, severe in severity, and worsened by nothing. Patient primarily follows with his family doctor for this issue recently changed from omeprazole to Pepcid and Zofran to Phenergan, also taking meclizine and Ativan all without relief. Patient ports he saw a GI doctor 2 years ago and had scopes without any abnormality. Patient ports he is chronically nauseous with no appetite making him tired and not feeling up to doing any normal life activities. Patient also reports questionable 1 second episode of syncope while in a chair at a table yesterday. He denies any surrounding lightheadedness chest pain or other symptoms. Patient reports the past 2 days he has been having some diarrhea and for the past 1 week his urine has been malodorous. Review of Systems:   Pertinent positives and negatives are stated within HPI, all other systems reviewed and are negative. Review of Systems   Constitutional: Positive for appetite change. Negative for chills and fever. HENT: Negative for ear pain, sinus pressure and sore throat. Eyes: Negative for pain, discharge and redness. Respiratory: Negative for cough, shortness of breath and wheezing. Cardiovascular: Negative for chest pain. Gastrointestinal: Positive for nausea. Negative for abdominal pain, diarrhea and vomiting. Genitourinary: Negative for dysuria and frequency. Musculoskeletal: Negative for arthralgias and back pain. Skin: Negative for rash and wound. Neurological: Negative for weakness and headaches.    Hematological: Negative for adenopathy. All other systems reviewed and are negative.           --------------------------------------------- PAST HISTORY ---------------------------------------------  Past Medical History:  has a past medical history of Aneurysm of right renal artery ([facility] [address] Colitis, Depression, GERD (gastroesophageal reflux disease), Hyperlipidemia, Hypertension, Superior mesenteric artery stenosis ([facility] [address] and TIA (transient ischemic attack). Past Surgical History:  has a past surgical history that includes Appendectomy; Tonsillectomy; ECHO Compl W Dop Color Flow (12/4/2012); sinus surgery (2462,7851); Anterior cruciate ligament repair (Left, 11/21/2001); Cholecystectomy (2007); hernia repair (Right, 11/13/2002); Total knee arthroplasty (Left, 1/28/2019); and Upper gastrointestinal endoscopy (N/A, 10/10/2019). Social History:  reports that he has never smoked. He has never used smokeless tobacco. He reports that he does not drink alcohol and does not use drugs. Family History: family history is not on file. Unless otherwise noted, family history is non contributory    The patients home medications have been reviewed. Allergies: Patient has no known allergies. ------------------------- NURSING NOTES AND VITALS REVIEWED ---------------------------   The nursing notes within the ED encounter and vital signs as below have been reviewed. /70   Pulse 76   Temp 98.6 °F (37 °C) (Infrared)   Resp 16   Ht 6' (1.829 m)   Wt 185 lb (83.9 kg)   SpO2 96%   BMI 25.09 kg/m²   Oxygen Saturation Interpretation: Normal    The patients available past medical records and past encounters were reviewed. ---------------------------------------------------PHYSICAL EXAM--------------------------------------    Physical Exam  Vitals and nursing note reviewed. Constitutional:       Appearance: He is well-developed. HENT:      Head: Normocephalic and atraumatic.    Eyes:      Conjunctiva/sclera: Conjunctivae normal.   Cardiovascular:      Rate and Rhythm: Normal rate and regular rhythm. Heart sounds: Normal heart sounds. No murmur heard. Pulmonary:      Effort: Pulmonary effort is normal. No respiratory distress. Breath sounds: Normal breath sounds. No wheezing or rales. Abdominal:      General: Bowel sounds are normal.      Palpations: Abdomen is soft. Tenderness: There is no abdominal tenderness. There is no guarding or rebound. Musculoskeletal:         General: No tenderness or deformity. Cervical back: Normal range of motion and neck supple. Skin:     General: Skin is warm and dry. Neurological:      Mental Status: He is alert and oriented to person, place, and time. Cranial Nerves: No cranial nerve deficit.       Coordination: Coordination normal.            -------------------------------------------------- RESULTS -------------------------------------------------    LABS: (Lab results interpreted by me)  Results for orders placed or performed during the hospital encounter of 06/26/21   CBC Auto Differential   Result Value Ref Range    WBC 6.6 4.5 - 11.5 E9/L    RBC 4.35 3.80 - 5.80 E12/L    Hemoglobin 13.2 12.5 - 16.5 g/dL    Hematocrit 37.3 37.0 - 54.0 %    MCV 85.7 80.0 - 99.9 fL    MCH 30.3 26.0 - 35.0 pg    MCHC 35.4 (H) 32.0 - 34.5 %    RDW 12.8 11.5 - 15.0 fL    Platelets 712 937 - 538 E9/L    MPV 8.5 7.0 - 12.0 fL    Neutrophils % 72.8 43.0 - 80.0 %    Immature Granulocytes % 0.3 0.0 - 5.0 %    Lymphocytes % 16.8 (L) 20.0 - 42.0 %    Monocytes % 9.6 2.0 - 12.0 %    Eosinophils % 0.2 0.0 - 6.0 %    Basophils % 0.3 0.0 - 2.0 %    Neutrophils Absolute 4.78 1.80 - 7.30 E9/L    Immature Granulocytes # 0.02 E9/L    Lymphocytes Absolute 1.10 (L) 1.50 - 4.00 E9/L    Monocytes Absolute 0.63 0.10 - 0.95 E9/L    Eosinophils Absolute 0.01 (L) 0.05 - 0.50 E9/L    Basophils Absolute 0.02 0.00 - 0.20 E9/L   Comprehensive Metabolic Panel w/ Reflex to MG   Result Value Ref Range    Sodium 131 (L) 132 - 146 mmol/L    Potassium reflex Magnesium 3.2 (L) 3.5 - 5.0 mmol/L    Chloride 91 (L) 98 - 107 mmol/L    CO2 29 22 - 29 mmol/L    Anion Gap 11 7 - 16 mmol/L    Glucose 115 (H) 74 - 99 mg/dL    BUN 12 6 - 23 mg/dL    CREATININE 0.9 0.7 - 1.2 mg/dL    GFR Non-African American >60 >=60 mL/min/1.73    GFR African American >60     Calcium 9.1 8.6 - 10.2 mg/dL    Total Protein 6.8 6.4 - 8.3 g/dL    Albumin 3.7 3.5 - 5.2 g/dL    Total Bilirubin 0.7 0.0 - 1.2 mg/dL    Alkaline Phosphatase 94 40 - 129 U/L    ALT 5 0 - 40 U/L    AST 12 0 - 39 U/L   Troponin   Result Value Ref Range    Troponin, High Sensitivity 17 (H) 0 - 11 ng/L   Urinalysis with Microscopic   Result Value Ref Range    Color, UA Yellow Straw/Yellow    Clarity, UA Clear Clear    Glucose, Ur Negative Negative mg/dL    Bilirubin Urine Negative Negative    Ketones, Urine TRACE (A) Negative mg/dL    Specific Gravity, UA 1.015 1.005 - 1.030    Blood, Urine Negative Negative    pH, UA 6.5 5.0 - 9.0    Protein, UA Negative Negative mg/dL    Urobilinogen, Urine 0.2 <2.0 E.U./dL    Nitrite, Urine Negative Negative    Leukocyte Esterase, Urine Negative Negative    WBC, UA NONE 0 - 5 /HPF    RBC, UA NONE 0 - 2 /HPF    Bacteria, UA NONE SEEN None Seen /HPF   Magnesium   Result Value Ref Range    Magnesium 1.5 (L) 1.6 - 2.6 mg/dL   Troponin   Result Value Ref Range    Troponin, High Sensitivity 15 (H) 0 - 11 ng/L   EKG 12 Lead   Result Value Ref Range    Ventricular Rate 72 BPM    Atrial Rate 72 BPM    P-R Interval 204 ms    QRS Duration 156 ms    Q-T Interval 422 ms    QTc Calculation (Bazett) 462 ms    P Axis 59 degrees    R Axis 49 degrees    T Axis 23 degrees       All radiology results have been personally reviewed by myself   RADIOLOGY:  Interpreted by Radiologist.  No orders to display           ------------------------------ ED COURSE/MEDICAL DECISION MAKING----------------------  Medications   0.9 % sodium chloride bolus (0 mLs Intravenous Stopped 6/26/21 1645)   metoclopramide (REGLAN) injection 10 mg (10 mg Intravenous Given 6/26/21 1545)   diphenhydrAMINE (BENADRYL) injection 12.5 mg (12.5 mg Intravenous Given 6/26/21 1543)   magnesium sulfate 1000 mg in dextrose 5% 100 mL IVPB (0 mg Intravenous Stopped 6/26/21 1800)   potassium chloride (KLOR-CON M) extended release tablet 40 mEq (40 mEq Oral Given 6/26/21 1704)       Re-evaluations & Consults:  ED Course as of Jun 28 1923   Sat Jun 26, 2021   1700 Patient reports nausea has improved, his medications work better than others he has tried. Informed him of his electrolyte abnormalities and need for replacement and he is agreeable    [MF]      ED Course User Index  [MF] Anabell Lopez DO          Medical Decision Making:    -year-old male with chronic nausea and decreased appetite that he states has been most of his life followed by his family doctor and gastroenterology. Patient tried multiple medications has not tried Reglan. He is feeling better here in department after that medication he is mildly hypokalemic and hypomagnesium again these were replaced. Patient charged home with Reglan to follow-up with his specialist      This patient's ED course included: a personal history and physicial examination and re-evaluation prior to disposition    This patient has remained hemodynamically stable and improved during their ED course. Critical Care Time:       Counseling: The emergency provider has spoken with the patient and discussed todays results, in addition to providing specific details for the plan of care and counseling regarding the diagnosis and prognosis. Questions are answered at this time and they are agreeable with the plan.       Discharge Medication List as of 6/26/2021  7:46 PM      START taking these medications    Details   metoclopramide (REGLAN) 10 MG tablet Take 1 tablet by mouth 3 times daily as needed (nausea and vomiting), Disp-60 tablet, R-0Normal

## 2021-06-27 LAB
EKG ATRIAL RATE: 72 BPM
EKG P AXIS: 59 DEGREES
EKG P-R INTERVAL: 204 MS
EKG Q-T INTERVAL: 422 MS
EKG QRS DURATION: 156 MS
EKG QTC CALCULATION (BAZETT): 462 MS
EKG R AXIS: 49 DEGREES
EKG T AXIS: 23 DEGREES
EKG VENTRICULAR RATE: 72 BPM

## 2021-06-27 PROCEDURE — 93010 ELECTROCARDIOGRAM REPORT: CPT | Performed by: INTERNAL MEDICINE

## 2021-06-28 ASSESSMENT — ENCOUNTER SYMPTOMS
EYE REDNESS: 0
NAUSEA: 1
DIARRHEA: 0
SINUS PRESSURE: 0
ABDOMINAL PAIN: 0
BACK PAIN: 0
SHORTNESS OF BREATH: 0
WHEEZING: 0
COUGH: 0
EYE PAIN: 0
EYE DISCHARGE: 0
VOMITING: 0
SORE THROAT: 0

## 2021-06-29 ENCOUNTER — HOSPITAL ENCOUNTER (EMERGENCY)
Age: 86
Discharge: HOME | End: 2021-06-29
Attending: EMERGENCY MEDICINE
Payer: MEDICARE

## 2021-06-29 ENCOUNTER — OFFICE VISIT (OUTPATIENT)
Dept: FAMILY MEDICINE CLINIC | Age: 86
End: 2021-06-29
Payer: MEDICARE

## 2021-06-29 ENCOUNTER — APPOINTMENT (OUTPATIENT)
Dept: CT IMAGING | Age: 86
End: 2021-06-29
Payer: MEDICARE

## 2021-06-29 VITALS
BODY MASS INDEX: 25.06 KG/M2 | HEIGHT: 72 IN | WEIGHT: 185 LBS | HEART RATE: 82 BPM | DIASTOLIC BLOOD PRESSURE: 88 MMHG | SYSTOLIC BLOOD PRESSURE: 186 MMHG | RESPIRATION RATE: 16 BRPM | TEMPERATURE: 98.3 F | OXYGEN SATURATION: 96 %

## 2021-06-29 VITALS
HEART RATE: 87 BPM | SYSTOLIC BLOOD PRESSURE: 134 MMHG | DIASTOLIC BLOOD PRESSURE: 78 MMHG | TEMPERATURE: 98 F | OXYGEN SATURATION: 97 %

## 2021-06-29 DIAGNOSIS — E86.0 DEHYDRATION: ICD-10-CM

## 2021-06-29 DIAGNOSIS — I72.2 RENAL ARTERY ANEURYSM (HCC): ICD-10-CM

## 2021-06-29 DIAGNOSIS — R10.31 ACUTE ABDOMINAL PAIN IN RIGHT LOWER QUADRANT: Primary | ICD-10-CM

## 2021-06-29 DIAGNOSIS — R11.2 NON-INTRACTABLE VOMITING WITH NAUSEA, UNSPECIFIED VOMITING TYPE: Primary | ICD-10-CM

## 2021-06-29 DIAGNOSIS — R10.31 ACUTE ABDOMINAL PAIN IN RIGHT LOWER QUADRANT: ICD-10-CM

## 2021-06-29 DIAGNOSIS — E87.6 HYPOKALEMIA: ICD-10-CM

## 2021-06-29 DIAGNOSIS — I10 ESSENTIAL HYPERTENSION: Chronic | ICD-10-CM

## 2021-06-29 LAB
ALBUMIN SERPL-MCNC: 4 G/DL (ref 3.5–5.2)
ALP BLD-CCNC: 111 U/L (ref 40–129)
ALT SERPL-CCNC: 13 U/L (ref 0–40)
ANION GAP SERPL CALCULATED.3IONS-SCNC: 9 MMOL/L (ref 7–16)
AST SERPL-CCNC: 15 U/L (ref 0–39)
BACTERIA: NORMAL /HPF
BASOPHILS ABSOLUTE: 0.02 E9/L (ref 0–0.2)
BASOPHILS RELATIVE PERCENT: 0.2 % (ref 0–2)
BILIRUB SERPL-MCNC: 0.8 MG/DL (ref 0–1.2)
BILIRUBIN DIRECT: 0.2 MG/DL (ref 0–0.3)
BILIRUBIN URINE: NEGATIVE
BILIRUBIN, INDIRECT: 0.6 MG/DL (ref 0–1)
BILIRUBIN, POC: NORMAL
BLOOD URINE, POC: NORMAL
BLOOD, URINE: NORMAL
BUN BLDV-MCNC: 5 MG/DL (ref 6–23)
CALCIUM SERPL-MCNC: 9.5 MG/DL (ref 8.6–10.2)
CHLORIDE BLD-SCNC: 86 MMOL/L (ref 98–107)
CLARITY, POC: CLEAR
CLARITY: CLEAR
CO2: 31 MMOL/L (ref 22–29)
COLOR, POC: YELLOW
COLOR: YELLOW
CREAT SERPL-MCNC: 0.8 MG/DL (ref 0.7–1.2)
EOSINOPHILS ABSOLUTE: 0.1 E9/L (ref 0.05–0.5)
EOSINOPHILS RELATIVE PERCENT: 1.2 % (ref 0–6)
GFR AFRICAN AMERICAN: >60
GFR NON-AFRICAN AMERICAN: >60 ML/MIN/1.73
GLUCOSE BLD-MCNC: 107 MG/DL (ref 74–99)
GLUCOSE URINE, POC: NORMAL
GLUCOSE URINE: NEGATIVE MG/DL
HCT VFR BLD CALC: 38.3 % (ref 37–54)
HEMOGLOBIN: 13.4 G/DL (ref 12.5–16.5)
IMMATURE GRANULOCYTES #: 0.02 E9/L
IMMATURE GRANULOCYTES %: 0.2 % (ref 0–5)
KETONES, POC: NORMAL
KETONES, URINE: NEGATIVE MG/DL
LACTIC ACID: 1.3 MMOL/L (ref 0.5–2.2)
LEUKOCYTE EST, POC: NORMAL
LEUKOCYTE ESTERASE, URINE: NEGATIVE
LIPASE: 17 U/L (ref 13–60)
LYMPHOCYTES ABSOLUTE: 1.19 E9/L (ref 1.5–4)
LYMPHOCYTES RELATIVE PERCENT: 14.7 % (ref 20–42)
MAGNESIUM: 1.7 MG/DL (ref 1.6–2.6)
MCH RBC QN AUTO: 29.5 PG (ref 26–35)
MCHC RBC AUTO-ENTMCNC: 35 % (ref 32–34.5)
MCV RBC AUTO: 84.4 FL (ref 80–99.9)
MONOCYTES ABSOLUTE: 0.73 E9/L (ref 0.1–0.95)
MONOCYTES RELATIVE PERCENT: 9 % (ref 2–12)
NEUTROPHILS ABSOLUTE: 6.01 E9/L (ref 1.8–7.3)
NEUTROPHILS RELATIVE PERCENT: 74.7 % (ref 43–80)
NITRITE, POC: NORMAL
NITRITE, URINE: NEGATIVE
PDW BLD-RTO: 12.4 FL (ref 11.5–15)
PH UA: 7 (ref 5–9)
PH, POC: 7
PLATELET # BLD: 422 E9/L (ref 130–450)
PMV BLD AUTO: 8.6 FL (ref 7–12)
POTASSIUM REFLEX MAGNESIUM: 2.8 MMOL/L (ref 3.5–5)
PROTEIN UA: NEGATIVE MG/DL
PROTEIN, POC: NORMAL
RBC # BLD: 4.54 E12/L (ref 3.8–5.8)
RBC UA: NORMAL /HPF (ref 0–2)
SODIUM BLD-SCNC: 126 MMOL/L (ref 132–146)
SPECIFIC GRAVITY UA: 1.01 (ref 1–1.03)
SPECIFIC GRAVITY, POC: 1.02
TOTAL PROTEIN: 7.3 G/DL (ref 6.4–8.3)
UROBILINOGEN, POC: 0.2
UROBILINOGEN, URINE: 0.2 E.U./DL
WBC # BLD: 8.1 E9/L (ref 4.5–11.5)
WBC UA: NORMAL /HPF (ref 0–5)

## 2021-06-29 PROCEDURE — 2580000003 HC RX 258: Performed by: EMERGENCY MEDICINE

## 2021-06-29 PROCEDURE — 96361 HYDRATE IV INFUSION ADD-ON: CPT

## 2021-06-29 PROCEDURE — 6360000002 HC RX W HCPCS: Performed by: EMERGENCY MEDICINE

## 2021-06-29 PROCEDURE — 99214 OFFICE O/P EST MOD 30 MIN: CPT | Performed by: FAMILY MEDICINE

## 2021-06-29 PROCEDURE — G8417 CALC BMI ABV UP PARAM F/U: HCPCS | Performed by: FAMILY MEDICINE

## 2021-06-29 PROCEDURE — 81001 URINALYSIS AUTO W/SCOPE: CPT

## 2021-06-29 PROCEDURE — 96365 THER/PROPH/DIAG IV INF INIT: CPT

## 2021-06-29 PROCEDURE — 6370000000 HC RX 637 (ALT 250 FOR IP): Performed by: STUDENT IN AN ORGANIZED HEALTH CARE EDUCATION/TRAINING PROGRAM

## 2021-06-29 PROCEDURE — 1123F ACP DISCUSS/DSCN MKR DOCD: CPT | Performed by: FAMILY MEDICINE

## 2021-06-29 PROCEDURE — 6360000004 HC RX CONTRAST MEDICATION: Performed by: RADIOLOGY

## 2021-06-29 PROCEDURE — 81002 URINALYSIS NONAUTO W/O SCOPE: CPT | Performed by: FAMILY MEDICINE

## 2021-06-29 PROCEDURE — 80076 HEPATIC FUNCTION PANEL: CPT

## 2021-06-29 PROCEDURE — 96375 TX/PRO/DX INJ NEW DRUG ADDON: CPT

## 2021-06-29 PROCEDURE — 83735 ASSAY OF MAGNESIUM: CPT

## 2021-06-29 PROCEDURE — G8427 DOCREV CUR MEDS BY ELIG CLIN: HCPCS | Performed by: FAMILY MEDICINE

## 2021-06-29 PROCEDURE — 74174 CTA ABD&PLVS W/CONTRAST: CPT

## 2021-06-29 PROCEDURE — 83605 ASSAY OF LACTIC ACID: CPT

## 2021-06-29 PROCEDURE — 1036F TOBACCO NON-USER: CPT | Performed by: FAMILY MEDICINE

## 2021-06-29 PROCEDURE — 80048 BASIC METABOLIC PNL TOTAL CA: CPT

## 2021-06-29 PROCEDURE — 99284 EMERGENCY DEPT VISIT MOD MDM: CPT

## 2021-06-29 PROCEDURE — 96366 THER/PROPH/DIAG IV INF ADDON: CPT

## 2021-06-29 PROCEDURE — 85025 COMPLETE CBC W/AUTO DIFF WBC: CPT

## 2021-06-29 PROCEDURE — 83690 ASSAY OF LIPASE: CPT

## 2021-06-29 PROCEDURE — 4040F PNEUMOC VAC/ADMIN/RCVD: CPT | Performed by: FAMILY MEDICINE

## 2021-06-29 RX ORDER — FENTANYL CITRATE 50 UG/ML
50 INJECTION, SOLUTION INTRAMUSCULAR; INTRAVENOUS ONCE
Status: COMPLETED | OUTPATIENT
Start: 2021-06-29 | End: 2021-06-29

## 2021-06-29 RX ORDER — 0.9 % SODIUM CHLORIDE 0.9 %
1000 INTRAVENOUS SOLUTION INTRAVENOUS ONCE
Status: COMPLETED | OUTPATIENT
Start: 2021-06-29 | End: 2021-06-29

## 2021-06-29 RX ORDER — ONDANSETRON 2 MG/ML
4 INJECTION INTRAMUSCULAR; INTRAVENOUS ONCE
Status: COMPLETED | OUTPATIENT
Start: 2021-06-29 | End: 2021-06-29

## 2021-06-29 RX ORDER — POTASSIUM CHLORIDE 7.45 MG/ML
10 INJECTION INTRAVENOUS
Status: COMPLETED | OUTPATIENT
Start: 2021-06-29 | End: 2021-06-29

## 2021-06-29 RX ORDER — POTASSIUM CHLORIDE 20 MEQ/1
40 TABLET, EXTENDED RELEASE ORAL ONCE
Status: COMPLETED | OUTPATIENT
Start: 2021-06-29 | End: 2021-06-29

## 2021-06-29 RX ADMIN — FENTANYL CITRATE 50 MCG: 50 INJECTION INTRAMUSCULAR; INTRAVENOUS at 15:54

## 2021-06-29 RX ADMIN — IOPAMIDOL 75 ML: 755 INJECTION, SOLUTION INTRAVENOUS at 17:09

## 2021-06-29 RX ADMIN — POTASSIUM CHLORIDE 10 MEQ: 7.46 INJECTION, SOLUTION INTRAVENOUS at 18:27

## 2021-06-29 RX ADMIN — POTASSIUM CHLORIDE 10 MEQ: 7.46 INJECTION, SOLUTION INTRAVENOUS at 17:19

## 2021-06-29 RX ADMIN — POTASSIUM CHLORIDE 40 MEQ: 1500 TABLET, EXTENDED RELEASE ORAL at 18:27

## 2021-06-29 RX ADMIN — ONDANSETRON 4 MG: 2 INJECTION INTRAMUSCULAR; INTRAVENOUS at 15:54

## 2021-06-29 RX ADMIN — SODIUM CHLORIDE 1000 ML: 9 INJECTION, SOLUTION INTRAVENOUS at 15:54

## 2021-06-29 ASSESSMENT — ENCOUNTER SYMPTOMS
ABDOMINAL PAIN: 1
NAUSEA: 1
ABDOMINAL PAIN: 1
SHORTNESS OF BREATH: 0
BACK PAIN: 0
VOMITING: 0
RESPIRATORY NEGATIVE: 1
COUGH: 0
EYE PAIN: 0
SORE THROAT: 0
NAUSEA: 1
DIARRHEA: 1
ALLERGIC/IMMUNOLOGIC NEGATIVE: 1
DIARRHEA: 0
EYES NEGATIVE: 1

## 2021-06-29 ASSESSMENT — PAIN DESCRIPTION - PAIN TYPE
TYPE: ACUTE PAIN
TYPE: ACUTE PAIN

## 2021-06-29 ASSESSMENT — PAIN SCALES - GENERAL
PAINLEVEL_OUTOF10: 8
PAINLEVEL_OUTOF10: 8
PAINLEVEL_OUTOF10: 5

## 2021-06-29 ASSESSMENT — PAIN DESCRIPTION - ORIENTATION: ORIENTATION: RIGHT

## 2021-06-29 ASSESSMENT — PAIN DESCRIPTION - DESCRIPTORS: DESCRIPTORS: ACHING

## 2021-06-29 ASSESSMENT — PAIN DESCRIPTION - LOCATION
LOCATION: ABDOMEN
LOCATION: ABDOMEN

## 2021-06-29 NOTE — PROGRESS NOTES
21     Ac Renee    : 1935 Sex: male   Age: 81 y.o. Chief Complaint   Patient presents with    Abdominal Pain     & nausea was in ER saturday. Taking reglan and pepcid       Prior to Admission medications    Medication Sig Start Date End Date Taking? Authorizing Provider   metoclopramide (REGLAN) 10 MG tablet Take 1 tablet by mouth 3 times daily as needed (nausea and vomiting) 21  Yes Georgie Zuniga DO   famotidine (PEPCID) 40 MG tablet Take 1 tablet by mouth every evening 21  Yes Donaldo Villa DO   fluticasone (FLONASE) 50 MCG/ACT nasal spray 1 spray by Each Nostril route daily 21  Yes Donaldo Villa DO   buPROPion (WELLBUTRIN XL) 150 MG extended release tablet Take 1 tablet by mouth every morning 21  Yes Donaldo Villa DO   potassium chloride (KLOR-CON M) 20 MEQ extended release tablet Take 1 tablet by mouth daily 21  Yes Donaldo Villa DO   hydroCHLOROthiazide (HYDRODIURIL) 25 MG tablet Take 1 tablet by mouth every morning 21  Yes Donaldo Villa DO   losartan (COZAAR) 100 MG tablet 1 QD 3/29/21 6/29/21 Yes David Villa DO   amLODIPine (NORVASC) 5 MG tablet Take 1 tablet by mouth daily  Patient taking differently: Take 10 mg by mouth daily  3/29/21  Yes Donaldo Villa DO   meclizine (ANTIVERT) 25 MG tablet Bid prn 10/1/20  Yes Donaldo Villa DO   ibuprofen (ADVIL;MOTRIN) 800 MG tablet Take 1 tablet by mouth every 8 hours as needed for Pain 10/1/20  Yes Donaldo Villa DO   aspirin 81 MG tablet Take 81 mg by mouth daily   Yes Historical Provider, MD   promethazine (PHENERGAN) 25 MG tablet Take 1 tablet by mouth every 8 hours as needed for Nausea  Patient not taking: Reported on 2021 6/23/21 7/3/21  Evelyn Galloway DO          HPI: Gabrielle Mera is seen today problems with acute abdominal pain right lower quadrant. Difficulty with ambulation because of the pain. Associated nausea. Denies vomiting.   Diarrhea x2 this morning. Denies significant urinary tract symptoms. No significant fever mild chills. Review of Systems   Constitutional: Negative. HENT: Negative. Eyes: Negative. Respiratory: Negative. Gastrointestinal: Positive for abdominal pain, diarrhea and nausea. Endocrine: Negative. Genitourinary: Negative. Musculoskeletal: Negative. Skin: Negative. Allergic/Immunologic: Negative. Neurological: Negative. Hematological: Negative. Psychiatric/Behavioral: Negative. Abdominal pain as noted. Seen through the emergency room this past week for nausea and treated with Reglan. Pain is worse at this time he does need further evaluation possible CT scan of the belly.         Current Outpatient Medications:     metoclopramide (REGLAN) 10 MG tablet, Take 1 tablet by mouth 3 times daily as needed (nausea and vomiting), Disp: 60 tablet, Rfl: 0    famotidine (PEPCID) 40 MG tablet, Take 1 tablet by mouth every evening, Disp: 30 tablet, Rfl: 3    fluticasone (FLONASE) 50 MCG/ACT nasal spray, 1 spray by Each Nostril route daily, Disp: 1 Bottle, Rfl: 2    buPROPion (WELLBUTRIN XL) 150 MG extended release tablet, Take 1 tablet by mouth every morning, Disp: 30 tablet, Rfl: 3    potassium chloride (KLOR-CON M) 20 MEQ extended release tablet, Take 1 tablet by mouth daily, Disp: 30 tablet, Rfl: 5    hydroCHLOROthiazide (HYDRODIURIL) 25 MG tablet, Take 1 tablet by mouth every morning, Disp: 30 tablet, Rfl: 5    losartan (COZAAR) 100 MG tablet, 1 QD, Disp: 90 tablet, Rfl: 1    amLODIPine (NORVASC) 5 MG tablet, Take 1 tablet by mouth daily (Patient taking differently: Take 10 mg by mouth daily ), Disp: 90 tablet, Rfl: 3    meclizine (ANTIVERT) 25 MG tablet, Bid prn, Disp: 30 tablet, Rfl: 1    ibuprofen (ADVIL;MOTRIN) 800 MG tablet, Take 1 tablet by mouth every 8 hours as needed for Pain, Disp: 120 tablet, Rfl: 2    aspirin 81 MG tablet, Take 81 mg by mouth daily, Disp: , Rfl:    promethazine (PHENERGAN) 25 MG tablet, Take 1 tablet by mouth every 8 hours as needed for Nausea (Patient not taking: Reported on 6/29/2021), Disp: 25 tablet, Rfl: 0    No Known Allergies    Social History     Tobacco Use    Smoking status: Never Smoker    Smokeless tobacco: Never Used   Vaping Use    Vaping Use: Never used   Substance Use Topics    Alcohol use: No    Drug use: No      Past Surgical History:   Procedure Laterality Date    ANTERIOR CRUCIATE LIGAMENT REPAIR Left 11/21/2001    APPENDECTOMY      CHOLECYSTECTOMY  2007    Lap    ECHO COMPL W DOP COLOR FLOW  12/4/2012         HERNIA REPAIR Right 11/13/2002    double    SINUS SURGERY  2002,2003     done x 2    TONSILLECTOMY      TOTAL KNEE ARTHROPLASTY Left 1/28/2019    LEFT  ROBOTIC KNEE TOTAL ARTHROPLASTY  ++MEREDITH- SOIMSDGD++   ++ADDUCTOR BLOCK++ performed by Sinai Johnson MD at [address] [place] [address] N/A 10/10/2019    EGD BIOPSY performed by Tony Horn MD at [facility] ENDOSCOPY     No family history on file. Past Medical History:   Diagnosis Date    Aneurysm of right renal artery (HCC)     follows with Dr. Jonas Sargent yearly (last visit 9/19)    Colitis     Depression     GERD (gastroesophageal reflux disease)     Hyperlipidemia     Hypertension     Superior mesenteric artery stenosis ([address] 11/4/2020    TIA (transient ischemic attack)        Vitals:    06/29/21 1301   BP: 134/78   Pulse: 87   Temp: 98 °F (36.7 °C)   SpO2: 97%     BP Readings from Last 3 Encounters:   06/29/21 134/78   06/26/21 120/70   06/23/21 130/70        Physical Exam  Vitals and nursing note reviewed. Constitutional:       Appearance: He is well-developed. HENT:      Head: Normocephalic. Right Ear: External ear normal.      Left Ear: External ear normal.      Nose: Nose normal.   Eyes:      Conjunctiva/sclera: Conjunctivae normal.      Pupils: Pupils are equal, round, and reactive to light.    Cardiovascular:      Rate and Rhythm: Normal rate. Pulmonary:      Breath sounds: Normal breath sounds. Abdominal:      General: Bowel sounds are normal.      Palpations: Abdomen is soft. Tenderness: There is abdominal tenderness. There is guarding. Musculoskeletal:         General: Normal range of motion. Cervical back: Normal range of motion and neck supple. Skin:     General: Skin is warm and dry. Neurological:      Mental Status: He is alert and oriented to person, place, and time. Psychiatric:         Behavior: Behavior normal.     Complaints of right lower quadrant abdominal discomfort mild guarding. Needs further evaluation. We will plan evaluation through the emergency room possible CT scan of the abdomen and then further evaluation as necessary. Urine does show trace blood trace leukocytes believe he would benefit from CT of the abdomen and CT urogram and then appropriate treatment as indicated. Plan Per Assessment:  Sonam Mendeita was seen today for abdominal pain. Diagnoses and all orders for this visit:    Acute abdominal pain in right lower quadrant  -     POCT Urinalysis no Micro  -     Culture, Urine; Future    Essential hypertension            No follow-ups on file. Carolina Cramer DO    Note was generated with the assistance of voice recognition software. Document was reviewed however may contain grammatical errors.

## 2021-06-29 NOTE — ED PROVIDER NOTES
HPI   Patient is a 81 y.o. male with a past medical history of hypertension, hyperlipidemia, depression, renal artery aneurysm, superior mesenteric artery stenosis, presenting to the Emergency Department for abdominal pain, nausea. History obtained by patient. Symptoms are moderate in severity and intermittent since onset. They are improved by time and worsened by today and palpation. Patient with abdominal pain intermittently for the last few months. It worsened today. Described as a sharp and stabbing pain to his right lower quadrant. He followed up with his primary care provider today who sent him to the ER for evaluation. He states he is also nauseous no episodes of vomiting. He had a softer stool today but denies diarrhea or constipation. He denies any fevers or chills. He has a history of a renal artery aneurysm. Denies history of stone. No flank pain at this time. Pain does not radiate. Review of Systems   Constitutional: Negative for chills and fever. HENT: Negative for congestion and sore throat. Eyes: Negative for pain and visual disturbance. Respiratory: Negative for cough and shortness of breath. Cardiovascular: Negative for chest pain. Gastrointestinal: Positive for abdominal pain and nausea. Negative for diarrhea and vomiting. Genitourinary: Negative for dysuria and frequency. Musculoskeletal: Negative for arthralgias and back pain. Skin: Negative for rash and wound. Neurological: Negative for weakness and headaches. All other systems reviewed and are negative. Physical Exam  Vitals and nursing note reviewed. Constitutional:       General: He is not in acute distress. Appearance: He is well-developed. He is not ill-appearing. HENT:      Head: Normocephalic and atraumatic. Eyes:      Pupils: Pupils are equal, round, and reactive to light. Cardiovascular:      Rate and Rhythm: Normal rate and regular rhythm.       Heart sounds: Normal heart care.      ED Course as of Jun 30 1025   Tue Jun 29, 2021   1810 CT scan negative, shows chronic changes that are unchanged compared to previous, will give patient more potassium orally since his CT scan is negative. See if can tolerate p.o. by giving potassium. [JG]   1936 Present in the emergency department for right upper quadrant abdominal pain, nausea, vomiting. CT abdomen pelvis was obtained, did not show any acute intra-abdominal pathology, did note incidental findings that patient was aware of and has already been following with vascular surgery for. Patient was dehydrated on labs, given fluids, potassium was replaced as well as it was low. He was offered nausea medication, says he already had a prescription for Reglan at home. [JG]      ED Course User Index  [JG] Sandy Sparrow MD      --------------------------------------------- PAST HISTORY ---------------------------------------------  Past Medical History:  has a past medical history of Aneurysm of right renal artery ([facility] [address] Colitis, Depression, GERD (gastroesophageal reflux disease), Hyperlipidemia, Hypertension, Superior mesenteric artery stenosis ([address] and TIA (transient ischemic attack). Past Surgical History:  has a past surgical history that includes Appendectomy; Tonsillectomy; ECHO Compl W Dop Color Flow (12/4/2012); sinus surgery ([id number]); Anterior cruciate ligament repair (Left, 11/21/2001); Cholecystectomy (2007); hernia repair (Right, 11/13/2002); Total knee arthroplasty (Left, 1/28/2019); and Upper gastrointestinal endoscopy (N/A, 10/10/2019). Social History:  reports that he has never smoked. He has never used smokeless tobacco. He reports that he does not drink alcohol and does not use drugs. Family History: family history is not on file. The patients home medications have been reviewed. Allergies: Patient has no known allergies.     -------------------------------------------------- RESULTS -------------------------------------------------  Labs:  Results for orders placed or performed during the hospital encounter of 06/29/21   CBC Auto Differential   Result Value Ref Range    WBC 8.1 4.5 - 11.5 E9/L    RBC 4.54 3.80 - 5.80 E12/L    Hemoglobin 13.4 12.5 - 16.5 g/dL    Hematocrit 38.3 37.0 - 54.0 %    MCV 84.4 80.0 - 99.9 fL    MCH 29.5 26.0 - 35.0 pg    MCHC 35.0 (H) 32.0 - 34.5 %    RDW 12.4 11.5 - 15.0 fL    Platelets 847 569 - 283 E9/L    MPV 8.6 7.0 - 12.0 fL    Neutrophils % 74.7 43.0 - 80.0 %    Immature Granulocytes % 0.2 0.0 - 5.0 %    Lymphocytes % 14.7 (L) 20.0 - 42.0 %    Monocytes % 9.0 2.0 - 12.0 %    Eosinophils % 1.2 0.0 - 6.0 %    Basophils % 0.2 0.0 - 2.0 %    Neutrophils Absolute 6.01 1.80 - 7.30 E9/L    Immature Granulocytes # 0.02 E9/L    Lymphocytes Absolute 1.19 (L) 1.50 - 4.00 E9/L    Monocytes Absolute 0.73 0.10 - 0.95 E9/L    Eosinophils Absolute 0.10 0.05 - 0.50 E9/L    Basophils Absolute 0.02 0.00 - 0.20 H6/G   Basic Metabolic Panel w/ Reflex to MG   Result Value Ref Range    Sodium 126 (L) 132 - 146 mmol/L    Potassium reflex Magnesium 2.8 (L) 3.5 - 5.0 mmol/L    Chloride 86 (L) 98 - 107 mmol/L    CO2 31 (H) 22 - 29 mmol/L    Anion Gap 9 7 - 16 mmol/L    Glucose 107 (H) 74 - 99 mg/dL    BUN 5 (L) 6 - 23 mg/dL    CREATININE 0.8 0.7 - 1.2 mg/dL    GFR Non-African American >60 >=60 mL/min/1.73    GFR African American >60     Calcium 9.5 8.6 - 10.2 mg/dL   Hepatic Function Panel   Result Value Ref Range    Total Protein 7.3 6.4 - 8.3 g/dL    Albumin 4.0 3.5 - 5.2 g/dL    Alkaline Phosphatase 111 40 - 129 U/L    ALT 13 0 - 40 U/L    AST 15 0 - 39 U/L    Total Bilirubin 0.8 0.0 - 1.2 mg/dL    Bilirubin, Direct 0.2 0.0 - 0.3 mg/dL    Bilirubin, Indirect 0.6 0.0 - 1.0 mg/dL   Lipase   Result Value Ref Range    Lipase 17 13 - 60 U/L   Lactic Acid, Plasma   Result Value Ref Range    Lactic Acid 1.3 0.5 - 2.2 mmol/L   Urinalysis, reflex to microscopic   Result Value Ref Range Color, UA Yellow Straw/Yellow    Clarity, UA Clear Clear    Glucose, Ur Negative Negative mg/dL    Bilirubin Urine Negative Negative    Ketones, Urine Negative Negative mg/dL    Specific Gravity, UA 1.010 1.005 - 1.030    Blood, Urine TRACE-INTACT Negative    pH, UA 7.0 5.0 - 9.0    Protein, UA Negative Negative mg/dL    Urobilinogen, Urine 0.2 <2.0 E.U./dL    Nitrite, Urine Negative Negative    Leukocyte Esterase, Urine Negative Negative   Microscopic Urinalysis   Result Value Ref Range    WBC, UA NONE 0 - 5 /HPF    RBC, UA 1-3 0 - 2 /HPF    Bacteria, UA NONE SEEN None Seen /HPF   Magnesium   Result Value Ref Range    Magnesium 1.7 1.6 - 2.6 mg/dL       Radiology:  CTA ABDOMEN PELVIS W CONTRAST   Final Result      CTA ABDOMEN AND PELVIS:      1. Right renal artery aneurysm 1.7 x 1.4 cm. The aneurysm is partially   thrombosed is stable as of the recent CT from 05/02/2012. Compared to   11/06/2020, when in the same planes it measured approximately 1.6 x 1.2 cm,   it appears to be slightly larger. 2. Stable dilation of the celiac trunk 1.3 cm, which may be due to   poststenotic dilatation relating to proximal impingement by median arcuate   ligament. CT ABDOMEN AND PELVIS (NON ANGIOGRAPHIC PORTION OF THE STUDY):      1. No acute abnormality is seen in the abdomen or the pelvis. 2. Small hiatal hernia. 3. Enlarged prostate. 4. 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 If indicated, MRI may be obtained for further evaluation.                 ------------------------- NURSING NOTES AND VITALS REVIEWED ---------------------------  Date / Time Roomed:  6/29/2021  1:49 PM  ED Bed Assignment:  21/21    The nursing notes within the ED encounter and vital signs as below have been reviewed.    BP (!) 186/88   Pulse 82   Temp 98.3 °F (36.8 °C) (Temporal)   Resp 16   Ht 6' (1.829 m)   Wt 185 lb (83.9 kg)   SpO2 96%   BMI 25.09 kg/m²   Oxygen Saturation Interpretation: Normal      ------------------------------------------ PROGRESS NOTES ------------------------------------------  ED COURSE MEDICATIONS:                Medications   0.9 % sodium chloride bolus (0 mLs Intravenous Stopped 6/29/21 1710)   ondansetron (ZOFRAN) injection 4 mg (4 mg Intravenous Given 6/29/21 1554)   fentaNYL (SUBLIMAZE) injection 50 mcg (50 mcg Intravenous Given 6/29/21 1554)   potassium chloride 10 mEq/100 mL IVPB (Peripheral Line) (0 mEq Intravenous Stopped 6/29/21 1930)   iopamidol (ISOVUE-370) 76 % injection 75 mL (75 mLs Intravenous Given 6/29/21 1709)   potassium chloride (KLOR-CON M) extended release tablet 40 mEq (40 mEq Oral Given 6/29/21 1827)       I have spoken with the patient and discussed todays results, in addition to providing specific details for the plan of care and counseling regarding the diagnosis and prognosis. Their questions are answered at this time and they are agreeable with the plan. I discussed at length with them reasons for immediate return here for re evaluation. They will followup with primary care by calling their office tomorrow. --------------------------------- ADDITIONAL PROVIDER NOTES ---------------------------------  At this time the patient is without objective evidence of an acute process requiring hospitalization or inpatient management. They have remained hemodynamically stable throughout their entire ED visit and are stable for discharge with outpatient follow-up. The plan has been discussed in detail and they are aware of the specific conditions for emergent return, as well as the importance of follow-up. Discharge Medication List as of 6/29/2021  7:37 PM          Diagnosis:  1. Non-intractable vomiting with nausea, unspecified vomiting type    2. Dehydration    3. Hypokalemia    4. Renal artery aneurysm [facility])        Disposition:  Patient's disposition: Discharge to home  Patient's condition is stable.        [place] DO Connor  Resident  06/30/21 6422

## 2021-06-29 NOTE — PROGRESS NOTES
21     Mihai Lugo    : 1935 Sex: male   Age: 81 y.o. Chief Complaint   Patient presents with    Abdominal Pain     & nausea was in ER saturday. Taking reglan and pepcid       Prior to Admission medications    Medication Sig Start Date End Date Taking?  Authorizing Provider   metoclopramide (REGLAN) 10 MG tablet Take 1 tablet by mouth 3 times daily as needed (nausea and vomiting) 21  Yes Juan Carlos Elmore,    famotidine (PEPCID) 40 MG tablet Take 1 tablet by mouth every evening 21  Yes Darinel Villa DO   fluticasone (FLONASE) 50 MCG/ACT nasal spray 1 spray by Each Nostril route daily 21  Yes Darinel Villa DO   buPROPion (WELLBUTRIN XL) 150 MG extended release tablet Take 1 tablet by mouth every morning 21  Yes Darinel Villa DO   potassium chloride (KLOR-CON M) 20 MEQ extended release tablet Take 1 tablet by mouth daily 21  Yes Darinel Villa DO   hydroCHLOROthiazide (HYDRODIURIL) 25 MG tablet Take 1 tablet by mouth every morning 21  Yes Darinel Villa DO   losartan (COZAAR) 100 MG tablet 1 QD 3/29/21 6/29/21 Yes David Villa DO   amLODIPine (NORVASC) 5 MG tablet Take 1 tablet by mouth daily  Patient taking differently: Take 10 mg by mouth daily  3/29/21  Yes Darinel Villa DO   meclizine (ANTIVERT) 25 MG tablet Bid prn 10/1/20  Yes Darinel Villa DO   ibuprofen (ADVIL;MOTRIN) 800 MG tablet Take 1 tablet by mouth every 8 hours as needed for Pain 10/1/20  Yes Darinel Villa DO   aspirin 81 MG tablet Take 81 mg by mouth daily   Yes Historical Provider, MD   promethazine (PHENERGAN) 25 MG tablet Take 1 tablet by mouth every 8 hours as needed for Nausea  Patient not taking: Reported on 2021 6/23/21 7/3/21  Carolina Cramer DO          HPI:           Review of Systems           Current Outpatient Medications:     metoclopramide (REGLAN) 10 MG tablet, Take 1 tablet by mouth 3 times daily as needed (nausea and vomiting), Disp: 60 tablet, Rfl: 0    famotidine (PEPCID) 40 MG tablet, Take 1 tablet by mouth every evening, Disp: 30 tablet, Rfl: 3    fluticasone (FLONASE) 50 MCG/ACT nasal spray, 1 spray by Each Nostril route daily, Disp: 1 Bottle, Rfl: 2    buPROPion (WELLBUTRIN XL) 150 MG extended release tablet, Take 1 tablet by mouth every morning, Disp: 30 tablet, Rfl: 3    potassium chloride (KLOR-CON M) 20 MEQ extended release tablet, Take 1 tablet by mouth daily, Disp: 30 tablet, Rfl: 5    hydroCHLOROthiazide (HYDRODIURIL) 25 MG tablet, Take 1 tablet by mouth every morning, Disp: 30 tablet, Rfl: 5    losartan (COZAAR) 100 MG tablet, 1 QD, Disp: 90 tablet, Rfl: 1    amLODIPine (NORVASC) 5 MG tablet, Take 1 tablet by mouth daily (Patient taking differently: Take 10 mg by mouth daily ), Disp: 90 tablet, Rfl: 3    meclizine (ANTIVERT) 25 MG tablet, Bid prn, Disp: 30 tablet, Rfl: 1    ibuprofen (ADVIL;MOTRIN) 800 MG tablet, Take 1 tablet by mouth every 8 hours as needed for Pain, Disp: 120 tablet, Rfl: 2    aspirin 81 MG tablet, Take 81 mg by mouth daily, Disp: , Rfl:     promethazine (PHENERGAN) 25 MG tablet, Take 1 tablet by mouth every 8 hours as needed for Nausea (Patient not taking: Reported on 6/29/2021), Disp: 25 tablet, Rfl: 0    No Known Allergies    Social History     Tobacco Use    Smoking status: Never Smoker    Smokeless tobacco: Never Used   Vaping Use    Vaping Use: Never used   Substance Use Topics    Alcohol use: No    Drug use: No      Past Surgical History:   Procedure Laterality Date    ANTERIOR CRUCIATE LIGAMENT REPAIR Left 11/21/2001    APPENDECTOMY      CHOLECYSTECTOMY  2007    Lap    ECHO COMPL W DOP COLOR FLOW  12/4/2012         HERNIA REPAIR Right 11/13/2002    double    SINUS SURGERY  2002,2003     done x 2    TONSILLECTOMY      TOTAL KNEE ARTHROPLASTY Left 1/28/2019    LEFT  ROBOTIC KNEE TOTAL ARTHROPLASTY  ++MEREDITH- LKYTTISB++   ++ADDUCTOR BLOCK++ performed by Luis Oleary MD at [address] N/A 10/10/2019    EGD BIOPSY performed by Ashley Desai MD at [facility] ENDOSCOPY     No family history on file. Past Medical History:   Diagnosis Date    Aneurysm of right renal artery (HCC)     follows with Dr. Tai Dowd yearly (last visit 9/19)    Colitis     Depression     GERD (gastroesophageal reflux disease)     Hyperlipidemia     Hypertension     Superior mesenteric artery stenosis ([address] 11/4/2020    TIA (transient ischemic attack)        Vitals:    06/29/21 1301   BP: 134/78   Pulse: 87   Temp: 98 °F (36.7 °C)   SpO2: 97%     BP Readings from Last 3 Encounters:   06/29/21 134/78   06/26/21 120/70   06/23/21 130/70        Physical Exam             Plan Per Assessment:  There are no diagnoses linked to this encounter. No follow-ups on file. Angie Cabot, DO    Note was generated with the assistance of voice recognition software. Document was reviewed however may contain grammatical errors.

## 2021-07-02 ENCOUNTER — OFFICE VISIT (OUTPATIENT)
Dept: PRIMARY CARE CLINIC | Age: 86
End: 2021-07-02
Payer: MEDICARE

## 2021-07-02 VITALS
OXYGEN SATURATION: 98 % | SYSTOLIC BLOOD PRESSURE: 134 MMHG | TEMPERATURE: 97.5 F | DIASTOLIC BLOOD PRESSURE: 78 MMHG | HEART RATE: 69 BPM

## 2021-07-02 DIAGNOSIS — E78.2 MIXED HYPERLIPIDEMIA: Chronic | ICD-10-CM

## 2021-07-02 DIAGNOSIS — E87.6 HYPOKALEMIA: ICD-10-CM

## 2021-07-02 DIAGNOSIS — I10 ESSENTIAL HYPERTENSION: Primary | Chronic | ICD-10-CM

## 2021-07-02 DIAGNOSIS — K29.50 CHRONIC GASTRITIS WITHOUT BLEEDING, UNSPECIFIED GASTRITIS TYPE: ICD-10-CM

## 2021-07-02 DIAGNOSIS — F41.9 ANXIETY: ICD-10-CM

## 2021-07-02 LAB — URINE CULTURE, ROUTINE: NORMAL

## 2021-07-02 PROCEDURE — 99214 OFFICE O/P EST MOD 30 MIN: CPT | Performed by: FAMILY MEDICINE

## 2021-07-02 PROCEDURE — 1123F ACP DISCUSS/DSCN MKR DOCD: CPT | Performed by: FAMILY MEDICINE

## 2021-07-02 PROCEDURE — G8417 CALC BMI ABV UP PARAM F/U: HCPCS | Performed by: FAMILY MEDICINE

## 2021-07-02 PROCEDURE — G8427 DOCREV CUR MEDS BY ELIG CLIN: HCPCS | Performed by: FAMILY MEDICINE

## 2021-07-02 PROCEDURE — 4040F PNEUMOC VAC/ADMIN/RCVD: CPT | Performed by: FAMILY MEDICINE

## 2021-07-02 PROCEDURE — 1036F TOBACCO NON-USER: CPT | Performed by: FAMILY MEDICINE

## 2021-07-02 RX ORDER — VENLAFAXINE HYDROCHLORIDE 37.5 MG/1
CAPSULE, EXTENDED RELEASE ORAL
COMMUNITY
Start: 2021-06-30 | End: 2021-07-27

## 2021-07-02 RX ORDER — MIRTAZAPINE 15 MG/1
TABLET, FILM COATED ORAL
COMMUNITY
Start: 2021-06-30 | End: 2021-11-04 | Stop reason: SDUPTHER

## 2021-07-02 NOTE — PROGRESS NOTES
calcified right renal artery aneurysm. Will be following up with vascular surgery in October. Medications currently as prescribed. Recent evaluation with Dr. Nguyen Severe psychiatry. Now on Effexor and doing well. Review of Systems   Constitutional: Negative. HENT: Negative. Eyes: Negative. Respiratory: Negative. Gastrointestinal: Negative. Endocrine: Negative. Genitourinary: Negative. Musculoskeletal: Negative. Skin: Negative. Allergic/Immunologic: Negative. Neurological: Negative. Hematological: Negative. Psychiatric/Behavioral: Negative.                Current Outpatient Medications:     mirtazapine (REMERON) 15 MG tablet, TAKE 1 TABLET BY MOUTH EVERY DAY AT BEDTIME, Disp: , Rfl:     venlafaxine (EFFEXOR XR) 37.5 MG extended release capsule, , Disp: , Rfl:     metoclopramide (REGLAN) 10 MG tablet, Take 1 tablet by mouth 3 times daily as needed (nausea and vomiting), Disp: 60 tablet, Rfl: 0    famotidine (PEPCID) 40 MG tablet, Take 1 tablet by mouth every evening, Disp: 30 tablet, Rfl: 3    fluticasone (FLONASE) 50 MCG/ACT nasal spray, 1 spray by Each Nostril route daily, Disp: 1 Bottle, Rfl: 2    potassium chloride (KLOR-CON M) 20 MEQ extended release tablet, Take 1 tablet by mouth daily, Disp: 30 tablet, Rfl: 5    hydroCHLOROthiazide (HYDRODIURIL) 25 MG tablet, Take 1 tablet by mouth every morning, Disp: 30 tablet, Rfl: 5    amLODIPine (NORVASC) 5 MG tablet, Take 1 tablet by mouth daily (Patient taking differently: Take 10 mg by mouth daily ), Disp: 90 tablet, Rfl: 3    meclizine (ANTIVERT) 25 MG tablet, Bid prn, Disp: 30 tablet, Rfl: 1    ibuprofen (ADVIL;MOTRIN) 800 MG tablet, Take 1 tablet by mouth every 8 hours as needed for Pain, Disp: 120 tablet, Rfl: 2    aspirin 81 MG tablet, Take 81 mg by mouth daily, Disp: , Rfl:     losartan (COZAAR) 100 MG tablet, 1 QD, Disp: 90 tablet, Rfl: 1    No Known Allergies    Social History     Tobacco Use    Smoking status: Never Smoker    Smokeless tobacco: Never Used   Vaping Use    Vaping Use: Never used   Substance Use Topics    Alcohol use: No    Drug use: No      Past Surgical History:   Procedure Laterality Date    ANTERIOR CRUCIATE LIGAMENT REPAIR Left 11/21/2001    APPENDECTOMY      CHOLECYSTECTOMY  2007    Lap    ECHO COMPL W DOP COLOR FLOW  12/4/2012         HERNIA REPAIR Right 11/13/2002    double    SINUS SURGERY  2002,2003     done x 2    TONSILLECTOMY      TOTAL KNEE ARTHROPLASTY Left 1/28/2019    LEFT  ROBOTIC KNEE TOTAL ARTHROPLASTY  ++MEREDITH- FJPAOTPO++   ++ADDUCTOR BLOCK++ performed by Libby Orlando MD at [address] [facility] N/A 10/10/2019    EGD BIOPSY performed by Nicolette Reese MD at [facility] ENDOSCOPY     No family history on file. Past Medical History:   Diagnosis Date    Aneurysm of right renal artery (HCC)     follows with Dr. Michael Console yearly (last visit 9/19)    Colitis     Depression     GERD (gastroesophageal reflux disease)     Hyperlipidemia     Hypertension     Superior mesenteric artery stenosis ([address] 11/4/2020    TIA (transient ischemic attack)        Vitals:    07/02/21 1203   BP: 134/78   Pulse: 69   Temp: 97.5 °F (36.4 °C)   SpO2: 98%     BP Readings from Last 3 Encounters:   07/02/21 134/78   06/29/21 (!) 186/88   06/29/21 134/78    122/60    Physical Exam  Vitals and nursing note reviewed. Constitutional:       Appearance: He is well-developed. HENT:      Head: Normocephalic. Right Ear: External ear normal.      Left Ear: External ear normal.      Nose: Nose normal.   Eyes:      Conjunctiva/sclera: Conjunctivae normal.      Pupils: Pupils are equal, round, and reactive to light. Cardiovascular:      Rate and Rhythm: Normal rate. Pulmonary:      Breath sounds: Normal breath sounds. Abdominal:      General: Bowel sounds are normal.      Palpations: Abdomen is soft. Musculoskeletal:         General: Normal range of motion. Cervical back: Normal range of motion and neck supple. Skin:     General: Skin is warm and dry. Neurological:      Mental Status: He is alert and oriented to person, place, and time. Psychiatric:         Behavior: Behavior normal.     Today's vitals physical examination stable. Medications as prescribed. Reassessment with me next 2 to 3 weeks and sooner if problems. Plan Per Assessment:  Gale Torre was seen today for abdominal pain, hypertension, depression and anxiety. Diagnoses and all orders for this visit:    Essential hypertension    Chronic gastritis without bleeding, unspecified gastritis type    Mixed hyperlipidemia    Anxiety    Hypokalemia            Return in about 18 days (around 7/20/2021). Luster Corpus, DO    Note was generated with the assistance of voice recognition software. Document was reviewed however may contain grammatical errors.

## 2021-07-20 ENCOUNTER — OFFICE VISIT (OUTPATIENT)
Dept: PRIMARY CARE CLINIC | Age: 86
End: 2021-07-20
Payer: MEDICARE

## 2021-07-20 VITALS — DIASTOLIC BLOOD PRESSURE: 60 MMHG | SYSTOLIC BLOOD PRESSURE: 120 MMHG

## 2021-07-20 DIAGNOSIS — E78.2 MIXED HYPERLIPIDEMIA: Chronic | ICD-10-CM

## 2021-07-20 DIAGNOSIS — F32.A DEPRESSION, UNSPECIFIED DEPRESSION TYPE: ICD-10-CM

## 2021-07-20 DIAGNOSIS — F41.9 ANXIETY: ICD-10-CM

## 2021-07-20 DIAGNOSIS — I10 ESSENTIAL HYPERTENSION: Primary | Chronic | ICD-10-CM

## 2021-07-20 DIAGNOSIS — G47.00 INSOMNIA, UNSPECIFIED TYPE: ICD-10-CM

## 2021-07-20 DIAGNOSIS — I10 ESSENTIAL HYPERTENSION: Chronic | ICD-10-CM

## 2021-07-20 LAB
ALBUMIN SERPL-MCNC: 4.2 G/DL (ref 3.5–5.2)
ALP BLD-CCNC: 98 U/L (ref 40–129)
ALT SERPL-CCNC: 14 U/L (ref 0–40)
ANION GAP SERPL CALCULATED.3IONS-SCNC: 10 MMOL/L (ref 7–16)
AST SERPL-CCNC: 14 U/L (ref 0–39)
BASOPHILS ABSOLUTE: 0.03 E9/L (ref 0–0.2)
BASOPHILS RELATIVE PERCENT: 0.4 % (ref 0–2)
BILIRUB SERPL-MCNC: 0.6 MG/DL (ref 0–1.2)
BUN BLDV-MCNC: 8 MG/DL (ref 6–23)
CALCIUM SERPL-MCNC: 9.4 MG/DL (ref 8.6–10.2)
CHLORIDE BLD-SCNC: 88 MMOL/L (ref 98–107)
CO2: 31 MMOL/L (ref 22–29)
CREAT SERPL-MCNC: 0.9 MG/DL (ref 0.7–1.2)
EOSINOPHILS ABSOLUTE: 0.01 E9/L (ref 0.05–0.5)
EOSINOPHILS RELATIVE PERCENT: 0.1 % (ref 0–6)
GFR AFRICAN AMERICAN: >60
GFR NON-AFRICAN AMERICAN: >60 ML/MIN/1.73
GLUCOSE BLD-MCNC: 106 MG/DL (ref 74–99)
HCT VFR BLD CALC: 41 % (ref 37–54)
HEMOGLOBIN: 13.7 G/DL (ref 12.5–16.5)
IMMATURE GRANULOCYTES #: 0.03 E9/L
IMMATURE GRANULOCYTES %: 0.4 % (ref 0–5)
LYMPHOCYTES ABSOLUTE: 0.91 E9/L (ref 1.5–4)
LYMPHOCYTES RELATIVE PERCENT: 11.8 % (ref 20–42)
MCH RBC QN AUTO: 30 PG (ref 26–35)
MCHC RBC AUTO-ENTMCNC: 33.4 % (ref 32–34.5)
MCV RBC AUTO: 89.7 FL (ref 80–99.9)
MONOCYTES ABSOLUTE: 0.84 E9/L (ref 0.1–0.95)
MONOCYTES RELATIVE PERCENT: 10.9 % (ref 2–12)
NEUTROPHILS ABSOLUTE: 5.92 E9/L (ref 1.8–7.3)
NEUTROPHILS RELATIVE PERCENT: 76.4 % (ref 43–80)
PDW BLD-RTO: 12.8 FL (ref 11.5–15)
PLATELET # BLD: 483 E9/L (ref 130–450)
PMV BLD AUTO: 9.5 FL (ref 7–12)
POTASSIUM SERPL-SCNC: 4.2 MMOL/L (ref 3.5–5)
RBC # BLD: 4.57 E12/L (ref 3.8–5.8)
SODIUM BLD-SCNC: 129 MMOL/L (ref 132–146)
TOTAL PROTEIN: 7.3 G/DL (ref 6.4–8.3)
WBC # BLD: 7.7 E9/L (ref 4.5–11.5)

## 2021-07-20 PROCEDURE — 4040F PNEUMOC VAC/ADMIN/RCVD: CPT | Performed by: FAMILY MEDICINE

## 2021-07-20 PROCEDURE — 1123F ACP DISCUSS/DSCN MKR DOCD: CPT | Performed by: FAMILY MEDICINE

## 2021-07-20 PROCEDURE — 99214 OFFICE O/P EST MOD 30 MIN: CPT | Performed by: FAMILY MEDICINE

## 2021-07-20 PROCEDURE — G8428 CUR MEDS NOT DOCUMENT: HCPCS | Performed by: FAMILY MEDICINE

## 2021-07-20 PROCEDURE — G8417 CALC BMI ABV UP PARAM F/U: HCPCS | Performed by: FAMILY MEDICINE

## 2021-07-20 PROCEDURE — 1036F TOBACCO NON-USER: CPT | Performed by: FAMILY MEDICINE

## 2021-07-20 RX ORDER — AMLODIPINE BESYLATE 10 MG/1
TABLET ORAL
Qty: 90 TABLET | Refills: 3 | Status: SHIPPED
Start: 2021-07-20 | End: 2021-09-14

## 2021-07-20 RX ORDER — POTASSIUM CHLORIDE 20 MEQ/1
20 TABLET, EXTENDED RELEASE ORAL DAILY
Qty: 30 TABLET | Refills: 5 | Status: SHIPPED
Start: 2021-07-20 | End: 2021-09-14 | Stop reason: SDUPTHER

## 2021-07-20 RX ORDER — LOSARTAN POTASSIUM 100 MG/1
TABLET ORAL
Qty: 90 TABLET | Refills: 1 | Status: SHIPPED
Start: 2021-07-20 | End: 2022-03-15 | Stop reason: SDUPTHER

## 2021-07-20 RX ORDER — HYDROCHLOROTHIAZIDE 25 MG/1
25 TABLET ORAL EVERY MORNING
Qty: 30 TABLET | Refills: 5 | Status: SHIPPED
Start: 2021-07-20 | End: 2021-07-27 | Stop reason: SDUPTHER

## 2021-07-20 NOTE — PROGRESS NOTES
21     Briseida Crews    : 1935 Sex: male   Age: 81 y.o. Chief Complaint   Patient presents with    Hypertension    Other       Prior to Admission medications    Medication Sig Start Date End Date Taking? Authorizing Provider   amLODIPine (NORVASC) 10 MG tablet 1 qd 21  Yes Humble Villa, DO   losartan (COZAAR) 100 MG tablet 1 QD 7/20/21 10/20/21 Yes Humble Villa, DO   hydroCHLOROthiazide (HYDRODIURIL) 25 MG tablet Take 1 tablet by mouth every morning 21  Yes Humble Villa, DO   potassium chloride (KLOR-CON M) 20 MEQ extended release tablet Take 1 tablet by mouth daily 21  Yes Humble Villa, DO   mirtazapine (REMERON) 15 MG tablet TAKE 1 TABLET BY MOUTH EVERY DAY AT BEDTIME 21   Historical Provider, MD   venlafaxine (EFFEXOR XR) 37.5 MG extended release capsule  21   Historical Provider, MD   metoclopramide (REGLAN) 10 MG tablet Take 1 tablet by mouth 3 times daily as needed (nausea and vomiting) 21   Bertha Samples,    famotidine (PEPCID) 40 MG tablet Take 1 tablet by mouth every evening 21   Kamille Solis DO   fluticasone [facility]) 50 MCG/ACT nasal spray 1 spray by Each Nostril route daily 21   Kamille Solis DO   meclizine (ANTIVERT) 25 MG tablet Bid prn 10/1/20   Kamille Solis DO   ibuprofen (ADVIL;MOTRIN) 800 MG tablet Take 1 tablet by mouth every 8 hours as needed for Pain 10/1/20   Humble Villa DO   aspirin 81 MG tablet Take 81 mg by mouth daily    Historical Provider, MD          HPI: Chris Guevara was seen today hypertension anxiety depression hyperlipidemia insomnia. He is also following with psychiatry. Medications well-tolerated. Anxiety seems to be persistent but somewhat improved on current medications and will be maintained. Hydroxyzine's 10 mg as directed per psychiatry. Effexor once daily at 37.5 mg and Remeron at at bedtime. Pressures well controlled.   Meds will be maintained as prescribed. Review of Systems   Constitutional: Negative. HENT: Negative. Eyes: Negative. Respiratory: Negative. Gastrointestinal: Negative. Endocrine: Negative. Genitourinary: Negative. Musculoskeletal: Negative. Skin: Negative. Allergic/Immunologic: Negative. Neurological: Negative. Hematological: Negative. Psychiatric/Behavioral: Negative. Today system review stable meds as prescribed.           Current Outpatient Medications:     amLODIPine (NORVASC) 10 MG tablet, 1 qd, Disp: 90 tablet, Rfl: 3    losartan (COZAAR) 100 MG tablet, 1 QD, Disp: 90 tablet, Rfl: 1    hydroCHLOROthiazide (HYDRODIURIL) 25 MG tablet, Take 1 tablet by mouth every morning, Disp: 30 tablet, Rfl: 5    potassium chloride (KLOR-CON M) 20 MEQ extended release tablet, Take 1 tablet by mouth daily, Disp: 30 tablet, Rfl: 5    mirtazapine (REMERON) 15 MG tablet, TAKE 1 TABLET BY MOUTH EVERY DAY AT BEDTIME, Disp: , Rfl:     venlafaxine (EFFEXOR XR) 37.5 MG extended release capsule, , Disp: , Rfl:     metoclopramide (REGLAN) 10 MG tablet, Take 1 tablet by mouth 3 times daily as needed (nausea and vomiting), Disp: 60 tablet, Rfl: 0    famotidine (PEPCID) 40 MG tablet, Take 1 tablet by mouth every evening, Disp: 30 tablet, Rfl: 3    fluticasone (FLONASE) 50 MCG/ACT nasal spray, 1 spray by Each Nostril route daily, Disp: 1 Bottle, Rfl: 2    meclizine (ANTIVERT) 25 MG tablet, Bid prn, Disp: 30 tablet, Rfl: 1    ibuprofen (ADVIL;MOTRIN) 800 MG tablet, Take 1 tablet by mouth every 8 hours as needed for Pain, Disp: 120 tablet, Rfl: 2    aspirin 81 MG tablet, Take 81 mg by mouth daily, Disp: , Rfl:     No Known Allergies    Social History     Tobacco Use    Smoking status: Never Smoker    Smokeless tobacco: Never Used   Vaping Use    Vaping Use: Never used   Substance Use Topics    Alcohol use: No    Drug use: No      Past Surgical History:   Procedure Laterality Date    ANTERIOR CRUCIATE LIGAMENT REPAIR Left 11/21/2001    APPENDECTOMY      CHOLECYSTECTOMY  2007    Lap    ECHO COMPL W DOP COLOR FLOW  12/4/2012         HERNIA REPAIR Right 11/13/2002    double    SINUS SURGERY  1290,0876     done x 2    TONSILLECTOMY      TOTAL KNEE ARTHROPLASTY Left 1/28/2019    LEFT  ROBOTIC KNEE TOTAL ARTHROPLASTY  ++MEREDITH- [id number]++   ++ADDUCTOR BLOCK++ performed by Demetrio Bowling MD at [address] [place] [address] 10/10/2019    EGD BIOPSY performed by Willie Apley, MD at [facility] ENDOSCOPY     No family history on file. Past Medical History:   Diagnosis Date    Aneurysm of right renal artery (HCC)     follows with Dr. Nino Allen yearly (last visit 9/19)    Colitis     Depression     GERD (gastroesophageal reflux disease)     Hyperlipidemia     Hypertension     Superior mesenteric artery stenosis ([address] 11/4/2020    TIA (transient ischemic attack)        Vitals:    07/20/21 1306   BP: 120/60     BP Readings from Last 3 Encounters:   07/20/21 120/60   07/02/21 134/78   06/29/21 (!) 186/88        Physical Exam  Vitals and nursing note reviewed. Constitutional:       Appearance: He is well-developed. HENT:      Head: Normocephalic. Right Ear: External ear normal.      Left Ear: External ear normal.      Nose: Nose normal.   Eyes:      Conjunctiva/sclera: Conjunctivae normal.      Pupils: Pupils are equal, round, and reactive to light. Cardiovascular:      Rate and Rhythm: Normal rate. Pulmonary:      Breath sounds: Normal breath sounds. Abdominal:      General: Bowel sounds are normal.      Palpations: Abdomen is soft. Musculoskeletal:         General: Normal range of motion. Cervical back: Normal range of motion and neck supple. Skin:     General: Skin is warm and dry. Neurological:      Mental Status: He is alert and oriented to person, place, and time. Psychiatric:         Behavior: Behavior normal.     Present physical exam stable.   Blood pressure

## 2021-07-27 ENCOUNTER — OFFICE VISIT (OUTPATIENT)
Dept: PRIMARY CARE CLINIC | Age: 86
End: 2021-07-27
Payer: MEDICARE

## 2021-07-27 VITALS
SYSTOLIC BLOOD PRESSURE: 120 MMHG | TEMPERATURE: 98.7 F | DIASTOLIC BLOOD PRESSURE: 78 MMHG | OXYGEN SATURATION: 95 % | HEART RATE: 92 BPM | BODY MASS INDEX: 24.68 KG/M2 | WEIGHT: 182 LBS

## 2021-07-27 DIAGNOSIS — J30.1 SEASONAL ALLERGIC RHINITIS DUE TO POLLEN: ICD-10-CM

## 2021-07-27 DIAGNOSIS — I72.2 ANEURYSM OF RIGHT RENAL ARTERY (HCC): ICD-10-CM

## 2021-07-27 DIAGNOSIS — I10 ESSENTIAL HYPERTENSION: Primary | Chronic | ICD-10-CM

## 2021-07-27 DIAGNOSIS — F32.A DEPRESSION, UNSPECIFIED DEPRESSION TYPE: ICD-10-CM

## 2021-07-27 DIAGNOSIS — F41.9 ANXIETY: ICD-10-CM

## 2021-07-27 DIAGNOSIS — K29.50 CHRONIC GASTRITIS WITHOUT BLEEDING, UNSPECIFIED GASTRITIS TYPE: ICD-10-CM

## 2021-07-27 DIAGNOSIS — E87.1 HYPONATREMIA: ICD-10-CM

## 2021-07-27 PROCEDURE — 1123F ACP DISCUSS/DSCN MKR DOCD: CPT | Performed by: FAMILY MEDICINE

## 2021-07-27 PROCEDURE — 1036F TOBACCO NON-USER: CPT | Performed by: FAMILY MEDICINE

## 2021-07-27 PROCEDURE — 99214 OFFICE O/P EST MOD 30 MIN: CPT | Performed by: FAMILY MEDICINE

## 2021-07-27 PROCEDURE — G8427 DOCREV CUR MEDS BY ELIG CLIN: HCPCS | Performed by: FAMILY MEDICINE

## 2021-07-27 PROCEDURE — G8420 CALC BMI NORM PARAMETERS: HCPCS | Performed by: FAMILY MEDICINE

## 2021-07-27 PROCEDURE — 4040F PNEUMOC VAC/ADMIN/RCVD: CPT | Performed by: FAMILY MEDICINE

## 2021-07-27 RX ORDER — HYDROXYZINE HYDROCHLORIDE 10 MG/1
TABLET, FILM COATED ORAL
Status: ON HOLD | COMMUNITY
Start: 2021-07-13 | End: 2021-08-14

## 2021-07-27 RX ORDER — VENLAFAXINE HYDROCHLORIDE 75 MG/1
CAPSULE, EXTENDED RELEASE ORAL
COMMUNITY
Start: 2021-07-06 | End: 2021-09-24

## 2021-07-27 RX ORDER — HYDROCHLOROTHIAZIDE 12.5 MG/1
TABLET ORAL
Qty: 30 TABLET | Refills: 2 | Status: SHIPPED
Start: 2021-07-27 | End: 2021-08-25 | Stop reason: SDUPTHER

## 2021-07-27 ASSESSMENT — ENCOUNTER SYMPTOMS
RESPIRATORY NEGATIVE: 1
GASTROINTESTINAL NEGATIVE: 1
EYES NEGATIVE: 1
ALLERGIC/IMMUNOLOGIC NEGATIVE: 1

## 2021-07-27 NOTE — PROGRESS NOTES
21     Polina Whitfield    : 1935 Sex: male   Age: 81 y.o. Chief Complaint   Patient presents with    Anxiety     seeing psych on thursday    Hypertension    Depression    Discuss Labs       Prior to Admission medications    Medication Sig Start Date End Date Taking? Authorizing Provider   hydrOXYzine (ATARAX) 10 MG tablet  21  Yes Historical Provider, MD   venlafaxine (EFFEXOR XR) 75 MG extended release capsule  21  Yes Historical Provider, MD   hydroCHLOROthiazide (HYDRODIURIL) 12.5 MG tablet 1 qd 21  Yes Ethan Dos Santosoff, DO   amLODIPine (NORVASC) 10 MG tablet 1 qd 21  Yes Kareemora Ordanyel Dos Santosoff, DO   losartan (COZAAR) 100 MG tablet 1 QD 7/20/21 10/20/21 Yes Ethan Ordanyel Dos Santosoff, DO   potassium chloride (KLOR-CON M) 20 MEQ extended release tablet Take 1 tablet by mouth daily 21  Yes Ethan Villa, DO   mirtazapine (REMERON) 15 MG tablet TAKE 1 TABLET BY MOUTH EVERY DAY AT BEDTIME 21  Yes Historical Provider, MD   metoclopramide (REGLAN) 10 MG tablet Take 1 tablet by mouth 3 times daily as needed (nausea and vomiting) 21  Yes Nadia Rico, DO   famotidine (PEPCID) 40 MG tablet Take 1 tablet by mouth every evening 21  Yes Ethan Villa, DO   fluticasone (FLONASE) 50 MCG/ACT nasal spray 1 spray by Each Nostril route daily 21  Yes Ethan Ordanyel Villa, DO   meclizine (ANTIVERT) 25 MG tablet Bid prn 10/1/20  Yes Kareemora Ort Rayaoff, DO   ibuprofen (ADVIL;MOTRIN) 800 MG tablet Take 1 tablet by mouth every 8 hours as needed for Pain 10/1/20  Yes Ethan Villa, DO   aspirin 81 MG tablet Take 81 mg by mouth daily   Yes Historical Provider, MD          HPI: Patient evaluated today with hypertension renal artery aneurysm seasonal allergies chronic gastritis anxiety depression hyponatremia. Backed off on hydrochlorothiazide to 12.5 mg a day and then will reassess CMP prior to next follow-up. Medically otherwise is stable.           Review of Systems Constitutional: Negative. HENT: Negative. Eyes: Negative. Respiratory: Negative. Gastrointestinal: Negative. Endocrine: Negative. Genitourinary: Negative. Musculoskeletal: Negative. Skin: Negative. Allergic/Immunologic: Negative. Neurological: Negative. Hematological: Negative. Psychiatric/Behavioral: Negative.                Current Outpatient Medications:     hydrOXYzine (ATARAX) 10 MG tablet, , Disp: , Rfl:     venlafaxine (EFFEXOR XR) 75 MG extended release capsule, , Disp: , Rfl:     hydroCHLOROthiazide (HYDRODIURIL) 12.5 MG tablet, 1 qd, Disp: 30 tablet, Rfl: 2    amLODIPine (NORVASC) 10 MG tablet, 1 qd, Disp: 90 tablet, Rfl: 3    losartan (COZAAR) 100 MG tablet, 1 QD, Disp: 90 tablet, Rfl: 1    potassium chloride (KLOR-CON M) 20 MEQ extended release tablet, Take 1 tablet by mouth daily, Disp: 30 tablet, Rfl: 5    mirtazapine (REMERON) 15 MG tablet, TAKE 1 TABLET BY MOUTH EVERY DAY AT BEDTIME, Disp: , Rfl:     metoclopramide (REGLAN) 10 MG tablet, Take 1 tablet by mouth 3 times daily as needed (nausea and vomiting), Disp: 60 tablet, Rfl: 0    famotidine (PEPCID) 40 MG tablet, Take 1 tablet by mouth every evening, Disp: 30 tablet, Rfl: 3    fluticasone (FLONASE) 50 MCG/ACT nasal spray, 1 spray by Each Nostril route daily, Disp: 1 Bottle, Rfl: 2    meclizine (ANTIVERT) 25 MG tablet, Bid prn, Disp: 30 tablet, Rfl: 1    ibuprofen (ADVIL;MOTRIN) 800 MG tablet, Take 1 tablet by mouth every 8 hours as needed for Pain, Disp: 120 tablet, Rfl: 2    aspirin 81 MG tablet, Take 81 mg by mouth daily, Disp: , Rfl:     No Known Allergies    Social History     Tobacco Use    Smoking status: Never Smoker    Smokeless tobacco: Never Used   Vaping Use    Vaping Use: Never used   Substance Use Topics    Alcohol use: No    Drug use: No      Past Surgical History:   Procedure Laterality Date    ANTERIOR CRUCIATE LIGAMENT REPAIR Left 11/21/2001    APPENDECTOMY      CHOLECYSTECTOMY  2007    Lap    ECHO COMPL W DOP COLOR FLOW  12/4/2012         HERNIA REPAIR Right 11/13/2002    double    SINUS SURGERY  5682,1304     done x 2    TONSILLECTOMY      TOTAL KNEE ARTHROPLASTY Left 1/28/2019    LEFT  ROBOTIC KNEE TOTAL ARTHROPLASTY  ++MEREDITH- MICHEL++   ++ADDUCTOR BLOCK++ performed by Dhaval Gonsales MD at [address] [facility] 10/10/2019    EGD BIOPSY performed by Emma Stahl MD at [facility] ENDOSCOPY     No family history on file. Past Medical History:   Diagnosis Date    Aneurysm of right renal artery (HCC)     follows with Dr. Anthony Jackson yearly (last visit 9/19)    Colitis     Depression     GERD (gastroesophageal reflux disease)     Hyperlipidemia     Hypertension     Superior mesenteric artery stenosis ([address] 11/4/2020    TIA (transient ischemic attack)        Vitals:    07/27/21 1331   BP: 120/78   Pulse: 92   Temp: 98.7 °F (37.1 °C)   SpO2: 95%   Weight: 182 lb (82.6 kg)     BP Readings from Last 3 Encounters:   07/27/21 120/78   07/20/21 120/60   07/02/21 134/78      110/60  Physical Exam  Vitals and nursing note reviewed. Constitutional:       Appearance: He is well-developed. HENT:      Head: Normocephalic. Right Ear: External ear normal.      Left Ear: External ear normal.      Nose: Nose normal.   Eyes:      Conjunctiva/sclera: Conjunctivae normal.      Pupils: Pupils are equal, round, and reactive to light. Cardiovascular:      Rate and Rhythm: Normal rate. Pulmonary:      Breath sounds: Normal breath sounds. Abdominal:      General: Bowel sounds are normal.      Palpations: Abdomen is soft. Musculoskeletal:         General: Normal range of motion. Cervical back: Normal range of motion and neck supple. Skin:     General: Skin is warm and dry. Neurological:      Mental Status: He is alert and oriented to person, place, and time.    Psychiatric:         Behavior: Behavior normal.        's physical exam findings are actually improved. Energy levels improved strength improved. Plans to continue with current meds and care with the adjustment on hydrochlorothiazide. Follow-up with me 2 weeks and lab work again prior. Plan Per Assessment:  Norma Harris was seen today for anxiety, hypertension, depression and discuss labs. Diagnoses and all orders for this visit:    Essential hypertension  -     Comprehensive Metabolic Panel; Future    Aneurysm of right renal artery (HCC)    Seasonal allergic rhinitis due to pollen    Chronic gastritis without bleeding, unspecified gastritis type    Anxiety    Depression, unspecified depression type    Hyponatremia  -     Comprehensive Metabolic Panel; Future    Other orders  -     hydroCHLOROthiazide (HYDRODIURIL) 12.5 MG tablet; 1 qd            Return in about 2 weeks (around 8/10/2021). Smiley Acuña, DO    Note was generated with the assistance of voice recognition software. Document was reviewed however may contain grammatical errors.

## 2021-08-09 ENCOUNTER — HOSPITAL ENCOUNTER (OUTPATIENT)
Age: 86
Discharge: HOME | End: 2021-08-09
Payer: MEDICARE

## 2021-08-09 DIAGNOSIS — I10 ESSENTIAL HYPERTENSION: Chronic | ICD-10-CM

## 2021-08-09 DIAGNOSIS — E87.1 HYPONATREMIA: ICD-10-CM

## 2021-08-09 LAB
ALBUMIN SERPL-MCNC: 3.6 G/DL (ref 3.5–5.2)
ALP BLD-CCNC: 91 U/L (ref 40–129)
ALT SERPL-CCNC: 9 U/L (ref 0–40)
ANION GAP SERPL CALCULATED.3IONS-SCNC: 10 MMOL/L (ref 7–16)
AST SERPL-CCNC: 13 U/L (ref 0–39)
BILIRUB SERPL-MCNC: 0.5 MG/DL (ref 0–1.2)
BUN BLDV-MCNC: 12 MG/DL (ref 6–23)
CALCIUM SERPL-MCNC: 9.6 MG/DL (ref 8.6–10.2)
CHLORIDE BLD-SCNC: 102 MMOL/L (ref 98–107)
CO2: 30 MMOL/L (ref 22–29)
CREAT SERPL-MCNC: 1 MG/DL (ref 0.7–1.2)
GFR AFRICAN AMERICAN: >60
GFR NON-AFRICAN AMERICAN: >60 ML/MIN/1.73
GLUCOSE BLD-MCNC: 98 MG/DL (ref 74–99)
POTASSIUM SERPL-SCNC: 4.1 MMOL/L (ref 3.5–5)
SODIUM BLD-SCNC: 142 MMOL/L (ref 132–146)
TOTAL PROTEIN: 6.8 G/DL (ref 6.4–8.3)

## 2021-08-09 PROCEDURE — 36415 COLL VENOUS BLD VENIPUNCTURE: CPT

## 2021-08-09 PROCEDURE — 80053 COMPREHEN METABOLIC PANEL: CPT

## 2021-08-10 ENCOUNTER — OFFICE VISIT (OUTPATIENT)
Dept: PRIMARY CARE CLINIC | Age: 86
End: 2021-08-10
Payer: MEDICARE

## 2021-08-10 VITALS
HEIGHT: 72 IN | HEART RATE: 76 BPM | OXYGEN SATURATION: 98 % | DIASTOLIC BLOOD PRESSURE: 66 MMHG | TEMPERATURE: 96.8 F | SYSTOLIC BLOOD PRESSURE: 120 MMHG | BODY MASS INDEX: 24.68 KG/M2

## 2021-08-10 DIAGNOSIS — F32.A DEPRESSION, UNSPECIFIED DEPRESSION TYPE: ICD-10-CM

## 2021-08-10 DIAGNOSIS — I10 ESSENTIAL HYPERTENSION: Primary | Chronic | ICD-10-CM

## 2021-08-10 DIAGNOSIS — K29.50 CHRONIC GASTRITIS WITHOUT BLEEDING, UNSPECIFIED GASTRITIS TYPE: ICD-10-CM

## 2021-08-10 DIAGNOSIS — E87.1 HYPONATREMIA: ICD-10-CM

## 2021-08-10 DIAGNOSIS — F41.9 ANXIETY: ICD-10-CM

## 2021-08-10 DIAGNOSIS — E78.2 MIXED HYPERLIPIDEMIA: Chronic | ICD-10-CM

## 2021-08-10 PROBLEM — K21.00 GASTROESOPHAGEAL REFLUX DISEASE WITH ESOPHAGITIS: Status: RESOLVED | Noted: 2019-08-22 | Resolved: 2021-08-10

## 2021-08-10 PROCEDURE — G8427 DOCREV CUR MEDS BY ELIG CLIN: HCPCS | Performed by: FAMILY MEDICINE

## 2021-08-10 PROCEDURE — G8420 CALC BMI NORM PARAMETERS: HCPCS | Performed by: FAMILY MEDICINE

## 2021-08-10 PROCEDURE — 4040F PNEUMOC VAC/ADMIN/RCVD: CPT | Performed by: FAMILY MEDICINE

## 2021-08-10 PROCEDURE — 99214 OFFICE O/P EST MOD 30 MIN: CPT | Performed by: FAMILY MEDICINE

## 2021-08-10 PROCEDURE — 1036F TOBACCO NON-USER: CPT | Performed by: FAMILY MEDICINE

## 2021-08-10 PROCEDURE — 1123F ACP DISCUSS/DSCN MKR DOCD: CPT | Performed by: FAMILY MEDICINE

## 2021-08-10 RX ORDER — PROMETHAZINE HYDROCHLORIDE 25 MG/1
25 TABLET ORAL EVERY 6 HOURS PRN
Qty: 60 TABLET | Refills: 1 | Status: SHIPPED
Start: 2021-08-10 | End: 2021-08-10 | Stop reason: ALTCHOICE

## 2021-08-10 NOTE — PROGRESS NOTES
8/10/21     Sola Castillo    : 1935 Sex: male   Age: 81 y.o. Chief Complaint   Patient presents with    Anxiety     2 weeks    Discuss Labs       Prior to Admission medications    Medication Sig Start Date End Date Taking? Authorizing Provider   hydrOXYzine (ATARAX) 10 MG tablet  21  Yes Historical Provider, MD   venlafaxine (EFFEXOR XR) 75 MG extended release capsule  21  Yes Historical Provider, MD   hydroCHLOROthiazide (HYDRODIURIL) 12.5 MG tablet 1 qd 21  Yes Holley Dos Santosoff, DO   amLODIPine (NORVASC) 10 MG tablet 1 qd 21  Yes Holleydella Coulterikoff, DO   losartan (COZAAR) 100 MG tablet 1 QD 7/20/21 10/20/21 Yes Holley Dos Santosoff, DO   potassium chloride (KLOR-CON M) 20 MEQ extended release tablet Take 1 tablet by mouth daily 21  Yes Holley Villa, DO   mirtazapine (REMERON) 15 MG tablet TAKE 1 TABLET BY MOUTH EVERY DAY AT BEDTIME 21  Yes Historical Provider, MD   famotidine (PEPCID) 40 MG tablet Take 1 tablet by mouth every evening 21  Yes Holley Dos Santosoff, DO   fluticasone [facility]ad[facility] Long) 50 MCG/ACT nasal spray 1 spray by Each Nostril route daily 21  Yes Holley Dos Santosoff, DO   ibuprofen (ADVIL;MOTRIN) 800 MG tablet Take 1 tablet by mouth every 8 hours as needed for Pain 10/1/20  Yes Holley Villa, DO   aspirin 81 MG tablet Take 81 mg by mouth daily   Yes Historical Provider, MD          HPI: Anupama Medina presents today in follow-up on hypertension gastritis anxiety hyperlipidemia and depression. Medically overall doing much better. Medications are well-tolerated. Anxiety significantly improved with Effexor at 75 daily and Remeron 15 at at bedtime. Maintain as prescribed. Atarax has not been needed. Hyponatremia significantly improved with backing off on hydrochlorothiazide to 12.5 daily. Review of Systems   Constitutional: Negative. HENT: Negative. Eyes: Negative. Respiratory: Negative. Gastrointestinal: Negative.     Endocrine: Negative. Genitourinary: Negative. Musculoskeletal: Negative. Skin: Negative. Allergic/Immunologic: Negative. Neurological: Negative. Hematological: Negative. Psychiatric/Behavioral: Negative. Current systems review improved and meds as prescribed.           Current Outpatient Medications:     hydrOXYzine (ATARAX) 10 MG tablet, , Disp: , Rfl:     venlafaxine (EFFEXOR XR) 75 MG extended release capsule, , Disp: , Rfl:     hydroCHLOROthiazide (HYDRODIURIL) 12.5 MG tablet, 1 qd, Disp: 30 tablet, Rfl: 2    amLODIPine (NORVASC) 10 MG tablet, 1 qd, Disp: 90 tablet, Rfl: 3    losartan (COZAAR) 100 MG tablet, 1 QD, Disp: 90 tablet, Rfl: 1    potassium chloride (KLOR-CON M) 20 MEQ extended release tablet, Take 1 tablet by mouth daily, Disp: 30 tablet, Rfl: 5    mirtazapine (REMERON) 15 MG tablet, TAKE 1 TABLET BY MOUTH EVERY DAY AT BEDTIME, Disp: , Rfl:     famotidine (PEPCID) 40 MG tablet, Take 1 tablet by mouth every evening, Disp: 30 tablet, Rfl: 3    fluticasone (FLONASE) 50 MCG/ACT nasal spray, 1 spray by Each Nostril route daily, Disp: 1 Bottle, Rfl: 2    ibuprofen (ADVIL;MOTRIN) 800 MG tablet, Take 1 tablet by mouth every 8 hours as needed for Pain, Disp: 120 tablet, Rfl: 2    aspirin 81 MG tablet, Take 81 mg by mouth daily, Disp: , Rfl:     No Known Allergies    Social History     Tobacco Use    Smoking status: Never Smoker    Smokeless tobacco: Never Used   Vaping Use    Vaping Use: Never used   Substance Use Topics    Alcohol use: No    Drug use: No      Past Surgical History:   Procedure Laterality Date    ANTERIOR CRUCIATE LIGAMENT REPAIR Left 11/21/2001    APPENDECTOMY      CHOLECYSTECTOMY  2007    Lap    ECHO COMPL W DOP COLOR FLOW  12/4/2012         HERNIA REPAIR Right 11/13/2002    double    SINUS SURGERY  2002,2003     done x 2    TONSILLECTOMY      TOTAL KNEE ARTHROPLASTY Left 1/28/2019    LEFT  ROBOTIC KNEE TOTAL ARTHROPLASTY  ++MEREDITH- [id number]++ ++ADDUCTOR BLOCK++ performed by Breonna Willett MD at [facility] 10/10/2019    EGD BIOPSY performed by Mana Torres MD at [facility] ENDOSCOPY     No family history on file. Past Medical History:   Diagnosis Date    Aneurysm of right renal artery (HCC)     follows with Dr. Cinthia Walden yearly (last visit 9/19)    Colitis     Depression     GERD (gastroesophageal reflux disease)     Hyperlipidemia     Hypertension     Superior mesenteric artery stenosis ([address] 11/4/2020    TIA (transient ischemic attack)        Vitals:    08/10/21 1329   BP: 120/66   Pulse: 76   Temp: 96.8 °F (36 °C)   SpO2: 98%   Height: 6' (1.829 m)     BP Readings from Last 3 Encounters:   08/10/21 120/66   07/27/21 120/78   07/20/21 120/60    110/62    Physical Exam  Vitals and nursing note reviewed. Constitutional:       Appearance: He is well-developed. HENT:      Head: Normocephalic. Right Ear: External ear normal.      Left Ear: External ear normal.      Nose: Nose normal.   Eyes:      Conjunctiva/sclera: Conjunctivae normal.      Pupils: Pupils are equal, round, and reactive to light. Cardiovascular:      Rate and Rhythm: Normal rate. Pulmonary:      Breath sounds: Normal breath sounds. Abdominal:      General: Bowel sounds are normal.      Palpations: Abdomen is soft. Musculoskeletal:         General: Normal range of motion. Cervical back: Normal range of motion and neck supple. Skin:     General: Skin is warm and dry. Neurological:      Mental Status: He is alert and oriented to person, place, and time. Psychiatric:         Behavior: Behavior normal.     Present vitals physical examination stable. I will maintain current meds and care. Reassessment 1 month and sooner if problems.         Lab Results   Component Value Date    TSH 1.920 06/22/2021    TSH 2.210 03/10/2021    W4FIPKT 7.7 06/22/2021    Y5OUQRE 7.6 03/10/2021    T4FREE 1.10 01/22/2017     Lab Results Component Value Date    CHOL 147 03/10/2021    CHOL 153 01/25/2021     Lab Results   Component Value Date    TRIG 76 03/10/2021    TRIG 55 01/25/2021     Lab Results   Component Value Date    HDL 39 03/10/2021    HDL 47 01/25/2021     No results found for: [facility]  Lab Results   Component Value Date    LABVLDL 15 03/10/2021    LABVLDL 11 01/25/2021     No results found for: [facility]  Lab Results   Component Value Date    WBC 7.7 07/20/2021    HGB 13.7 07/20/2021    HCT 41.0 07/20/2021    MCV 89.7 07/20/2021     (H) 07/20/2021    LYMPHOPCT 11.8 (L) 07/20/2021    RBC 4.57 07/20/2021    MCH 30.0 07/20/2021    MCHC 33.4 07/20/2021    RDW 12.8 07/20/2021     Lab Results   Component Value Date     08/09/2021    K 4.1 08/09/2021     08/09/2021    CO2 30 (H) 08/09/2021    BUN 12 08/09/2021    CREATININE 1.0 08/09/2021    GLUCOSE 98 08/09/2021    CALCIUM 9.6 08/09/2021    PROT 6.8 08/09/2021    LABALBU 3.6 08/09/2021    BILITOT 0.5 08/09/2021    ALKPHOS 91 08/09/2021    AST 13 08/09/2021    ALT 9 08/09/2021    LABGLOM >60 08/09/2021    GFRAA >60 08/09/2021        Lab Results   Component Value Date    PSA 2.83 04/29/2020    PSA 2.86 04/24/2019      Lab Results   Component Value Date    LABA1C 5.1 01/25/2021     No results found for: EAG     Plan Per Assessment:  Norma Harris was seen today for anxiety and discuss labs. Diagnoses and all orders for this visit:    Essential hypertension    Chronic gastritis without bleeding, unspecified gastritis type    Anxiety    Mixed hyperlipidemia    Depression, unspecified depression type    Hyponatremia    Other orders  -     Discontinue: promethazine (PHENERGAN) 25 MG tablet; Take 1 tablet by mouth every 6 hours as needed for Nausea            Return in about 1 month (around 9/10/2021). Smiley Clipper, DO    Note was generated with the assistance of voice recognition software. Document was reviewed however may contain grammatical errors.

## 2021-08-13 ENCOUNTER — HOSPITAL ENCOUNTER (INPATIENT)
Age: 86
LOS: 2 days | Discharge: HOME | DRG: 066 | End: 2021-08-15
Attending: INTERNAL MEDICINE | Admitting: INTERNAL MEDICINE
Payer: MEDICARE

## 2021-08-13 ENCOUNTER — HOSPITAL ENCOUNTER (EMERGENCY)
Age: 86
Discharge: ANOTHER HOSPITAL | End: 2021-08-13
Attending: STUDENT IN AN ORGANIZED HEALTH CARE EDUCATION/TRAINING PROGRAM
Payer: MEDICARE

## 2021-08-13 ENCOUNTER — APPOINTMENT (OUTPATIENT)
Dept: GENERAL RADIOLOGY | Age: 86
End: 2021-08-13
Payer: MEDICARE

## 2021-08-13 ENCOUNTER — APPOINTMENT (OUTPATIENT)
Dept: CT IMAGING | Age: 86
End: 2021-08-13
Payer: MEDICARE

## 2021-08-13 VITALS
HEART RATE: 78 BPM | TEMPERATURE: 98 F | SYSTOLIC BLOOD PRESSURE: 153 MMHG | OXYGEN SATURATION: 97 % | RESPIRATION RATE: 16 BRPM | WEIGHT: 180 LBS | BODY MASS INDEX: 24.38 KG/M2 | DIASTOLIC BLOOD PRESSURE: 85 MMHG | HEIGHT: 72 IN

## 2021-08-13 DIAGNOSIS — I63.9 CEREBROVASCULAR ACCIDENT (CVA), UNSPECIFIED MECHANISM (HCC): Primary | ICD-10-CM

## 2021-08-13 LAB
ANION GAP SERPL CALCULATED.3IONS-SCNC: 10 MMOL/L (ref 7–16)
BASOPHILS ABSOLUTE: 0.04 E9/L (ref 0–0.2)
BASOPHILS RELATIVE PERCENT: 0.5 % (ref 0–2)
BUN BLDV-MCNC: 10 MG/DL (ref 6–23)
CALCIUM SERPL-MCNC: 9.2 MG/DL (ref 8.6–10.2)
CHLORIDE BLD-SCNC: 101 MMOL/L (ref 98–107)
CO2: 30 MMOL/L (ref 22–29)
CREAT SERPL-MCNC: 0.9 MG/DL (ref 0.7–1.2)
EOSINOPHILS ABSOLUTE: 0.04 E9/L (ref 0.05–0.5)
EOSINOPHILS RELATIVE PERCENT: 0.5 % (ref 0–6)
GFR AFRICAN AMERICAN: >60
GFR NON-AFRICAN AMERICAN: >60 ML/MIN/1.73
GLUCOSE BLD-MCNC: 100 MG/DL (ref 74–99)
HCT VFR BLD CALC: 41.4 % (ref 37–54)
HEMOGLOBIN: 13.6 G/DL (ref 12.5–16.5)
IMMATURE GRANULOCYTES #: 0.03 E9/L
IMMATURE GRANULOCYTES %: 0.4 % (ref 0–5)
LYMPHOCYTES ABSOLUTE: 0.88 E9/L (ref 1.5–4)
LYMPHOCYTES RELATIVE PERCENT: 11.6 % (ref 20–42)
MCH RBC QN AUTO: 29.8 PG (ref 26–35)
MCHC RBC AUTO-ENTMCNC: 32.9 % (ref 32–34.5)
MCV RBC AUTO: 90.8 FL (ref 80–99.9)
MONOCYTES ABSOLUTE: 0.61 E9/L (ref 0.1–0.95)
MONOCYTES RELATIVE PERCENT: 8.1 % (ref 2–12)
NEUTROPHILS ABSOLUTE: 5.97 E9/L (ref 1.8–7.3)
NEUTROPHILS RELATIVE PERCENT: 78.9 % (ref 43–80)
PDW BLD-RTO: 13.2 FL (ref 11.5–15)
PLATELET # BLD: 421 E9/L (ref 130–450)
PMV BLD AUTO: 8.8 FL (ref 7–12)
POTASSIUM REFLEX MAGNESIUM: 3.8 MMOL/L (ref 3.5–5)
RBC # BLD: 4.56 E12/L (ref 3.8–5.8)
SODIUM BLD-SCNC: 141 MMOL/L (ref 132–146)
WBC # BLD: 7.6 E9/L (ref 4.5–11.5)

## 2021-08-13 PROCEDURE — 70498 CT ANGIOGRAPHY NECK: CPT

## 2021-08-13 PROCEDURE — 2060000000 HC ICU INTERMEDIATE R&B

## 2021-08-13 PROCEDURE — 80048 BASIC METABOLIC PNL TOTAL CA: CPT

## 2021-08-13 PROCEDURE — 93005 ELECTROCARDIOGRAM TRACING: CPT | Performed by: STUDENT IN AN ORGANIZED HEALTH CARE EDUCATION/TRAINING PROGRAM

## 2021-08-13 PROCEDURE — G0425 INPT/ED TELECONSULT30: HCPCS | Performed by: PSYCHIATRY & NEUROLOGY

## 2021-08-13 PROCEDURE — 6370000000 HC RX 637 (ALT 250 FOR IP): Performed by: EMERGENCY MEDICINE

## 2021-08-13 PROCEDURE — 99285 EMERGENCY DEPT VISIT HI MDM: CPT

## 2021-08-13 PROCEDURE — 70496 CT ANGIOGRAPHY HEAD: CPT

## 2021-08-13 PROCEDURE — 85025 COMPLETE CBC W/AUTO DIFF WBC: CPT

## 2021-08-13 PROCEDURE — 6360000004 HC RX CONTRAST MEDICATION: Performed by: RADIOLOGY

## 2021-08-13 PROCEDURE — 71045 X-RAY EXAM CHEST 1 VIEW: CPT

## 2021-08-13 RX ORDER — CLOPIDOGREL BISULFATE 75 MG/1
300 TABLET ORAL ONCE
Status: COMPLETED | OUTPATIENT
Start: 2021-08-13 | End: 2021-08-13

## 2021-08-13 RX ORDER — PANTOPRAZOLE SODIUM 20 MG/1
20 TABLET, DELAYED RELEASE ORAL DAILY
COMMUNITY
End: 2021-08-19

## 2021-08-13 RX ADMIN — CLOPIDOGREL BISULFATE 300 MG: 75 TABLET ORAL at 18:32

## 2021-08-13 RX ADMIN — IOPAMIDOL 75 ML: 755 INJECTION, SOLUTION INTRAVENOUS at 15:35

## 2021-08-13 ASSESSMENT — PAIN SCALES - GENERAL
PAINLEVEL_OUTOF10: 4
PAINLEVEL_OUTOF10: 0
PAINLEVEL_OUTOF10: 0

## 2021-08-13 ASSESSMENT — PAIN DESCRIPTION - DESCRIPTORS: DESCRIPTORS: HEADACHE

## 2021-08-13 ASSESSMENT — PAIN DESCRIPTION - LOCATION: LOCATION: HEAD

## 2021-08-13 ASSESSMENT — PAIN DESCRIPTION - ORIENTATION: ORIENTATION: ANTERIOR

## 2021-08-13 ASSESSMENT — PAIN DESCRIPTION - FREQUENCY: FREQUENCY: CONTINUOUS

## 2021-08-13 ASSESSMENT — PAIN - FUNCTIONAL ASSESSMENT: PAIN_FUNCTIONAL_ASSESSMENT: ACTIVITIES ARE NOT PREVENTED

## 2021-08-13 ASSESSMENT — PAIN DESCRIPTION - PAIN TYPE: TYPE: ACUTE PAIN

## 2021-08-13 NOTE — VIRTUAL HEALTH
Consults  Patient Location:  [address] [facility] Emergency Department    Provider Location (City/State):   [facility], [place], [place], [address]    This virtual visit was conducted via interactive/real-time audio/video. [facility] AT [place] Stroke and Vascular Neurology Consult for  [address] Stroke Alert through [address] @ 5:46pm  8/13/2021 5:53 PM  Pt Name: Sobia Escaloan  MRN: [id number]  YOB: 1935  Date of evaluation: 8/13/2021  Primary Care Physician: Behzad Varner DO  Reason for Evaluation: Stroke Evaluation with Discussion with Ed or primary team with Telemedicine and stroke evaluation with Review of imaging and labs    Sobia Escalona is a 81 y.o. male with HTN, c/o 2 days of mild dysarthria and he was worry if this is a stroke and decided to come into the ED. He has no other complaint of weakness or numbness. LKW: 2 days  NIH:  1 - mild dysarthria    Allergies  has No Known Allergies. Medications  Prior to Admission medications    Medication Sig Start Date End Date Taking?  Authorizing Provider   hydrOXYzine (ATARAX) 10 MG tablet  7/13/21   Historical Provider, MD   venlafaxine (EFFEXOR XR) 75 MG extended release capsule  7/6/21   Historical Provider, MD   hydroCHLOROthiazide (HYDRODIURIL) 12.5 MG tablet 1 qd 7/27/21   Phil Villa DO   amLODIPine (NORVASC) 10 MG tablet 1 qd 7/20/21   Phil Villa DO   losartan (COZAAR) 100 MG tablet 1 QD 7/20/21 10/20/21  Behzad Varner DO   potassium chloride (KLOR-CON M) 20 MEQ extended release tablet Take 1 tablet by mouth daily 7/20/21   Phil Villa DO   mirtazapine (REMERON) 15 MG tablet TAKE 1 TABLET BY MOUTH EVERY DAY AT BEDTIME 6/30/21   Historical Provider, MD   famotidine (PEPCID) 40 MG tablet Take 1 tablet by mouth every evening 6/23/21   Phil Villa DO   fluticasone (FLONASE) 50 MCG/ACT nasal spray 1 spray by Each Nostril route daily 6/8/21   Inga López, DO   ibuprofen (ADVIL;MOTRIN) 800 MG tablet Take 1 tablet by mouth every 8 hours as needed for Pain 10/1/20   Ethan Villa, DO   aspirin 81 MG tablet Take 81 mg by mouth daily    Historical Provider, MD    Scheduled Meds:  Continuous Infusions:  PRN Meds:.  Past Medical History   has a past medical history of Aneurysm of right renal artery ([facility] [address] Colitis, Depression, GERD (gastroesophageal reflux disease), Hyperlipidemia, Hypertension, Superior mesenteric artery stenosis ([address] and TIA (transient ischemic attack). Social History  Social History     Socioeconomic History    Marital status:      Spouse name: Not on file    Number of children: Not on file    Years of education: Not on file    Highest education level: Not on file   Occupational History    Not on file   Tobacco Use    Smoking status: Never Smoker    Smokeless tobacco: Never Used   Vaping Use    Vaping Use: Never used   Substance and Sexual Activity    Alcohol use: No    Drug use: No    Sexual activity: Not on file   Other Topics Concern    Not on file   Social History Narrative    Not on file     Social Determinants of Health     Financial Resource Strain: Low Risk     Difficulty of Paying Living Expenses: Not hard at all   Food Insecurity: No Food Insecurity    Worried About [address] in the Last Year: Never true    [address] in the Last Year: Never true   Transportation Needs:     Lack of Transportation (Medical):      Lack of Transportation (Non-Medical):    Physical Activity:     Days of Exercise per Week:     Minutes of Exercise per Session:    Stress:     Feeling of Stress :    Social Connections:     Frequency of Communication with Friends and Family:     Frequency of Social Gatherings with Friends and Family:     Attends Jain Services:     Active Member of Clubs or Organizations:     Attends Club or Organization Meetings:     Marital Status: Intimate Partner Violence:     Fear of Current or Ex-Partner:     Emotionally Abused:     Physically Abused:     Sexually Abused:      Family History  History reviewed. No pertinent family history. OBJECTIVE  /71   Pulse 74   Temp 97.9 °F (36.6 °C) (Temporal)   Resp 16   Ht 6' (1.829 m)   Wt 180 lb (81.6 kg)   SpO2 96%   BMI 24.41 kg/m²        Pre-Morbid mRS: 0  Imaging:  Images were personally reviewed with PACS used to review images including:  CT brain without contrast: nothing acute, a lot of chronic white changes  CTA imaging: mild intracranial stenosis, no LVO    Assessment    81year old man with HTN, now with 2 days of mild dysarthria      Recommendations:  1. NIH 1  2. Recommend Inpatient Neurology Consult for further assessment and evaluation   3.  consider . BSMHNOIVTPA  4. Not a tPA candidate due to out of window  5. con't home dose aspirin 81mg daily  6. Load plavix 300mg x 1 dose follow by plavix 75mg daily, will treat this as a possible small stroke until MRI brain rule it out  7. If MRI brain neg, ok to d/c plavix  8. Aim for LDL less than 70 with any statin        Discussed with ED Physician Dr Kipp Opitz    At least 39 min of Telemedicine and time in conversation directly with ED staff and physician for the patient who is in imminent and life threatening deterioration without further treatment and evaluation. This Virtual Visit was conducted with patient's (and/or legal guardian's) consent, to provide telestroke consultation and necessary medical care.   Time spent examining patient, reviewing the images personally, reviewing the chart, perform high complexity decision making and speaking with the nursing staff regarding recommendations        Francisco Spicer MD, MD   Stroke, Neurocritical Care And/or [address] [place] [address] Stroke [address]  Electronically signed 8/13/2021 at 5:53 PM

## 2021-08-13 NOTE — ED PROVIDER NOTES
DO  Emergency Medicine    NOTE: This report was transcribed using voice recognition software.  Every effort was made to ensure accuracy; however, inadvertent computerized transcription errors may be present       Dorothy Aquino DO  08/13/21 5900

## 2021-08-13 NOTE — ED PROVIDER NOTES
[facility]  Department of Emergency Medicine     Written by: Marylee Blue, DO  Patient Name: Fly Greenwood  Attending Provider: Joleen Nevarez DO  Admit Date: 2021  1:53 PM  MRN: [id number]                   : 1935        Chief Complaint   Patient presents with    Aphasia     Started 2 days ago, pt reports he has been able to eat and drink without problems    Facial Droop     Right side started 2 days ago    - Chief complaint    Mr. Irma Pennington is a 82 yo male who presents to the ED due to dysarthria and right sided facial droop. Patient states that his symptoms began 2 days ago. He felt that it might have been his blood pressure medication. His symptoms never resolved over the past 2 days. They have been constant and not progressing since onset. He is concerned he may be having a stroke. It has been more than 48 hours since he was last known to be normal.  He is not currently on any pain. Nothing seems to aggravate or relieve his symptoms. He denies any associated symptoms. Patient denies any fever, chills, nausea, vomiting, chest pain, shortness of breath, abdominal pain, bowel or urinary changes, headaches or vision changes. Review of Systems   Constitutional: Negative for chills, fatigue and fever. HENT: Negative for congestion, sinus pressure, sinus pain and sore throat. Eyes: Negative for pain, redness and visual disturbance. Respiratory: Negative for cough, chest tightness and shortness of breath. Cardiovascular: Negative for chest pain, palpitations and leg swelling. Gastrointestinal: Negative for abdominal pain, constipation, nausea and vomiting. Genitourinary: Negative for dysuria, flank pain, frequency, hematuria and urgency. Musculoskeletal: Negative for joint swelling. Skin: Negative for rash. Neurological: Positive for facial asymmetry (right sided facial droop) and speech difficulty.  Negative for dizziness, tremors, light-headedness and headaches. Physical Exam  Constitutional:       Appearance: Normal appearance. He is normal weight. HENT:      Head: Normocephalic and atraumatic. Right Ear: External ear normal.      Left Ear: External ear normal.      Mouth/Throat:      Mouth: Mucous membranes are moist.      Pharynx: Oropharynx is clear. Comments: Right-sided lip droop. Patient also has some aphasia. Eyes:      Extraocular Movements: Extraocular movements intact. Conjunctiva/sclera: Conjunctivae normal.   Cardiovascular:      Rate and Rhythm: Normal rate and regular rhythm. Pulses: Normal pulses. Heart sounds: Normal heart sounds. Pulmonary:      Effort: Pulmonary effort is normal.      Breath sounds: Normal breath sounds. Abdominal:      General: Abdomen is flat. Bowel sounds are normal.      Palpations: Abdomen is soft. Musculoskeletal:         General: Normal range of motion. Cervical back: Normal range of motion and neck supple. Skin:     General: Skin is warm and dry. Neurological:      Mental Status: He is alert and oriented to person, place, and time. Cranial Nerves: Cranial nerve deficit present. Sensory: No sensory deficit. Motor: No weakness. Comments: Right lower facial droop, dysarthria   Psychiatric:         Mood and Affect: Mood normal.         Behavior: Behavior normal.          Procedures       MDM  Number of Diagnoses or Management Options  Cerebrovascular accident (CVA), unspecified mechanism ([facility] [address]  Diagnosis management comments: This is a 80 yo male who presents to the ED due to aphasia and right sided facial droop. NIH score of 3. His symptoms are concerning for a CVA. Subacute stroke work-up was initiated. Patient is currently outside of the window for any TPA administration. Imaging will also be obtained, he has no other complaints at this time. CBC BMP and magnesium are all within normal limits.   CT head is unremarkable for any -------------------------------------------------    LABS:  Results for orders placed or performed during the hospital encounter of 08/13/21   CBC Auto Differential   Result Value Ref Range    WBC 7.6 4.5 - 11.5 E9/L    RBC 4.56 3.80 - 5.80 E12/L    Hemoglobin 13.6 12.5 - 16.5 g/dL    Hematocrit 41.4 37.0 - 54.0 %    MCV 90.8 80.0 - 99.9 fL    MCH 29.8 26.0 - 35.0 pg    MCHC 32.9 32.0 - 34.5 %    RDW 13.2 11.5 - 15.0 fL    Platelets 451 312 - 358 E9/L    MPV 8.8 7.0 - 12.0 fL    Neutrophils % 78.9 43.0 - 80.0 %    Immature Granulocytes % 0.4 0.0 - 5.0 %    Lymphocytes % 11.6 (L) 20.0 - 42.0 %    Monocytes % 8.1 2.0 - 12.0 %    Eosinophils % 0.5 0.0 - 6.0 %    Basophils % 0.5 0.0 - 2.0 %    Neutrophils Absolute 5.97 1.80 - 7.30 E9/L    Immature Granulocytes # 0.03 E9/L    Lymphocytes Absolute 0.88 (L) 1.50 - 4.00 E9/L    Monocytes Absolute 0.61 0.10 - 0.95 E9/L    Eosinophils Absolute 0.04 (L) 0.05 - 0.50 E9/L    Basophils Absolute 0.04 0.00 - 0.20 F0/P   Basic Metabolic Panel w/ Reflex to MG   Result Value Ref Range    Sodium 141 132 - 146 mmol/L    Potassium reflex Magnesium 3.8 3.5 - 5.0 mmol/L    Chloride 101 98 - 107 mmol/L    CO2 30 (H) 22 - 29 mmol/L    Anion Gap 10 7 - 16 mmol/L    Glucose 100 (H) 74 - 99 mg/dL    BUN 10 6 - 23 mg/dL    CREATININE 0.9 0.7 - 1.2 mg/dL    GFR Non-African American >60 >=60 mL/min/1.73    GFR African American >60     Calcium 9.2 8.6 - 10.2 mg/dL   EKG 12 Lead   Result Value Ref Range    Ventricular Rate 68 BPM    Atrial Rate 68 BPM    P-R Interval 170 ms    QRS Duration 136 ms    Q-T Interval 432 ms    QTc Calculation (Bazett) 459 ms    P Axis 43 degrees    R Axis 55 degrees    T Axis 25 degrees       RADIOLOGY:  CTA HEAD W CONTRAST   Final Result   1. No acute intracranial abnormality. Microangiopathic change. 2. No significant stenosis or occlusion of the cervical vasculature.    3. Intermittent stenosis within the left MCA branches anteriorly without   large vessel occlusion identified. 4. 1.7 cm right thyroid nodule. RECOMMENDATIONS:   1.7 cm incidental right thyroid nodule. Recommend thyroid US. Reference: J Am Marci Radiol. 2015 Feb;12(2): 143-50         CTA NECK W CONTRAST   Final Result   1. No acute intracranial abnormality. Microangiopathic change. 2. No significant stenosis or occlusion of the cervical vasculature. 3. Intermittent stenosis within the left MCA branches anteriorly without   large vessel occlusion identified. 4. 1.7 cm right thyroid nodule. RECOMMENDATIONS:   1.7 cm incidental right thyroid nodule. Recommend thyroid US. Reference: J Am Marci Radiol. 2015 Feb;12(2): 143-50         XR CHEST PORTABLE   Final Result   No acute disease. ED Course as of Aug 17 0935   Fri Aug 13, 2021   9517 Dr Ritchie Bates neurology did a teleconsult and wanted plavix 300mg, pt not a endovascular or tpa candidate ok to admit to neurotele floor    [ADELINA]      ED Course User Index  [ADELINA] Oz Devlin, DO       --------------------------------------------- PAST HISTORY ---------------------------------------------  Past Medical History:  has a past medical history of Aneurysm of right renal artery ([facility] [address] Colitis, Depression, GERD (gastroesophageal reflux disease), Hyperlipidemia, Hypertension, Superior mesenteric artery stenosis ([address] and TIA (transient ischemic attack). Past Surgical History:  has a past surgical history that includes Appendectomy; Tonsillectomy; ECHO Compl W Dop Color Flow (12/4/2012); sinus surgery (9159,5393); Anterior cruciate ligament repair (Left, 11/21/2001); Cholecystectomy (2007); hernia repair (Right, 11/13/2002); Total knee arthroplasty (Left, 1/28/2019); and Upper gastrointestinal endoscopy (N/A, 10/10/2019). Social History:  reports that he has never smoked. He has never used smokeless tobacco. He reports that he does not drink alcohol and does not use drugs. Family History: family history is not on file.      The patients home medications have been reviewed. Allergies: Patient has no known allergies. -------------------------------------------------- RESULTS -------------------------------------------------    Lab  Results for orders placed or performed during the hospital encounter of 08/13/21   CBC Auto Differential   Result Value Ref Range    WBC 7.6 4.5 - 11.5 E9/L    RBC 4.56 3.80 - 5.80 E12/L    Hemoglobin 13.6 12.5 - 16.5 g/dL    Hematocrit 41.4 37.0 - 54.0 %    MCV 90.8 80.0 - 99.9 fL    MCH 29.8 26.0 - 35.0 pg    MCHC 32.9 32.0 - 34.5 %    RDW 13.2 11.5 - 15.0 fL    Platelets 177 625 - 707 E9/L    MPV 8.8 7.0 - 12.0 fL    Neutrophils % 78.9 43.0 - 80.0 %    Immature Granulocytes % 0.4 0.0 - 5.0 %    Lymphocytes % 11.6 (L) 20.0 - 42.0 %    Monocytes % 8.1 2.0 - 12.0 %    Eosinophils % 0.5 0.0 - 6.0 %    Basophils % 0.5 0.0 - 2.0 %    Neutrophils Absolute 5.97 1.80 - 7.30 E9/L    Immature Granulocytes # 0.03 E9/L    Lymphocytes Absolute 0.88 (L) 1.50 - 4.00 E9/L    Monocytes Absolute 0.61 0.10 - 0.95 E9/L    Eosinophils Absolute 0.04 (L) 0.05 - 0.50 E9/L    Basophils Absolute 0.04 0.00 - 0.20 T0/U   Basic Metabolic Panel w/ Reflex to MG   Result Value Ref Range    Sodium 141 132 - 146 mmol/L    Potassium reflex Magnesium 3.8 3.5 - 5.0 mmol/L    Chloride 101 98 - 107 mmol/L    CO2 30 (H) 22 - 29 mmol/L    Anion Gap 10 7 - 16 mmol/L    Glucose 100 (H) 74 - 99 mg/dL    BUN 10 6 - 23 mg/dL    CREATININE 0.9 0.7 - 1.2 mg/dL    GFR Non-African American >60 >=60 mL/min/1.73    GFR African American >60     Calcium 9.2 8.6 - 10.2 mg/dL   EKG 12 Lead   Result Value Ref Range    Ventricular Rate 68 BPM    Atrial Rate 68 BPM    P-R Interval 170 ms    QRS Duration 136 ms    Q-T Interval 432 ms    QTc Calculation (Bazett) 459 ms    P Axis 43 degrees    R Axis 55 degrees    T Axis 25 degrees       Radiology  CTA HEAD W CONTRAST   Final Result   1. No acute intracranial abnormality. Microangiopathic change.    2. No significant stenosis or occlusion of the cervical vasculature. 3. Intermittent stenosis within the left MCA branches anteriorly without   large vessel occlusion identified. 4. 1.7 cm right thyroid nodule. RECOMMENDATIONS:   1.7 cm incidental right thyroid nodule. Recommend thyroid US. Reference: J Am Marci Radiol. 2015 Feb;12(2): 143-50         CTA NECK W CONTRAST   Final Result   1. No acute intracranial abnormality. Microangiopathic change. 2. No significant stenosis or occlusion of the cervical vasculature. 3. Intermittent stenosis within the left MCA branches anteriorly without   large vessel occlusion identified. 4. 1.7 cm right thyroid nodule. RECOMMENDATIONS:   1.7 cm incidental right thyroid nodule. Recommend thyroid US. Reference: J Am Marci Radiol. 2015 Feb;12(2): 143-50         XR CHEST PORTABLE   Final Result   No acute disease. EKG:  This EKG is signed and interpreted by me. Rate: 68  Rhythm: Sinus  Interpretation: right bundle branch block  Comparison: stable as compared to patient's most recent EKG      ------------------------- NURSING NOTES AND VITALS REVIEWED ---------------------------  Date / Time Roomed:  8/13/2021  1:53 PM  ED Bed Assignment:  [facility] [place]    The nursing notes within the ED encounter and vital signs as below have been reviewed. No data found. Oxygen Saturation Interpretation: Normal      ------------------------------------------ PROGRESS NOTES ------------------------------------------  Re-evaluation(s):  Time: 1400. Patients symptoms show no change  Repeat physical examination is not changed    I have spoken with the patient and discussed todays results, in addition to providing specific details for the plan of care and counseling regarding the diagnosis and prognosis. Their questions are answered at this time and they are agreeable with the plan.   I have discussed the risks and benefits of transfer and they wish to proceed with the transfer. --------------------------------- ADDITIONAL PROVIDER NOTES ---------------------------------  Consultations:  Spoke with Dr. Jurgen Lala (Neurology). Discussed case. They will admit this patient. Reason for transfer: CVA. This patient's ED course included: a personal history and physicial examination, re-evaluation prior to disposition and multiple bedside re-evaluations    This patient has remained unchanged during their ED course. Clinical Impression  1. Cerebrovascular accident (CVA), unspecified mechanism ([facility] [address]          Disposition  Patient's disposition: Transfer to [facility]. Transferred by: AMS. Patient's condition is stable. Patient was seen and evaluated by myself and my attending Tiffanie Jay DO. Assessment and Plan discussed with attending provider, please see attestation for final plan of care.      Nell Kapoor, [address] [facility]  Resident  08/17/21 [address] Debbie Tang DO  08/17/21 6516

## 2021-08-14 ENCOUNTER — APPOINTMENT (OUTPATIENT)
Dept: MRI IMAGING | Age: 86
DRG: 066 | End: 2021-08-14
Attending: INTERNAL MEDICINE
Payer: MEDICARE

## 2021-08-14 LAB
ANION GAP SERPL CALCULATED.3IONS-SCNC: 10 MMOL/L (ref 7–16)
BUN BLDV-MCNC: 11 MG/DL (ref 6–23)
CALCIUM SERPL-MCNC: 9.2 MG/DL (ref 8.6–10.2)
CHLORIDE BLD-SCNC: 101 MMOL/L (ref 98–107)
CHOLESTEROL, TOTAL: 128 MG/DL (ref 0–199)
CO2: 29 MMOL/L (ref 22–29)
CREAT SERPL-MCNC: 0.7 MG/DL (ref 0.7–1.2)
EKG ATRIAL RATE: 68 BPM
EKG P AXIS: 43 DEGREES
EKG P-R INTERVAL: 170 MS
EKG Q-T INTERVAL: 432 MS
EKG QRS DURATION: 136 MS
EKG QTC CALCULATION (BAZETT): 459 MS
EKG R AXIS: 55 DEGREES
EKG T AXIS: 25 DEGREES
EKG VENTRICULAR RATE: 68 BPM
GFR AFRICAN AMERICAN: >60
GFR NON-AFRICAN AMERICAN: >60 ML/MIN/1.73
GLUCOSE BLD-MCNC: 91 MG/DL (ref 74–99)
HBA1C MFR BLD: 5.4 % (ref 4–5.6)
HCT VFR BLD CALC: 37.4 % (ref 37–54)
HDLC SERPL-MCNC: 39 MG/DL
HEMOGLOBIN: 12.7 G/DL (ref 12.5–16.5)
LDL CHOLESTEROL CALCULATED: 75 MG/DL (ref 0–99)
MAGNESIUM: 1.8 MG/DL (ref 1.6–2.6)
MCH RBC QN AUTO: 30 PG (ref 26–35)
MCHC RBC AUTO-ENTMCNC: 34 % (ref 32–34.5)
MCV RBC AUTO: 88.2 FL (ref 80–99.9)
PDW BLD-RTO: 13.2 FL (ref 11.5–15)
PLATELET # BLD: 400 E9/L (ref 130–450)
PMV BLD AUTO: 9.1 FL (ref 7–12)
POTASSIUM REFLEX MAGNESIUM: 3.4 MMOL/L (ref 3.5–5)
RBC # BLD: 4.24 E12/L (ref 3.8–5.8)
SODIUM BLD-SCNC: 140 MMOL/L (ref 132–146)
TRIGL SERPL-MCNC: 70 MG/DL (ref 0–149)
VLDLC SERPL CALC-MCNC: 14 MG/DL
WBC # BLD: 7.9 E9/L (ref 4.5–11.5)

## 2021-08-14 PROCEDURE — 80048 BASIC METABOLIC PNL TOTAL CA: CPT

## 2021-08-14 PROCEDURE — 36415 COLL VENOUS BLD VENIPUNCTURE: CPT

## 2021-08-14 PROCEDURE — 6370000000 HC RX 637 (ALT 250 FOR IP): Performed by: INTERNAL MEDICINE

## 2021-08-14 PROCEDURE — 6360000002 HC RX W HCPCS: Performed by: FAMILY MEDICINE

## 2021-08-14 PROCEDURE — 99222 1ST HOSP IP/OBS MODERATE 55: CPT | Performed by: NURSE PRACTITIONER

## 2021-08-14 PROCEDURE — 2060000000 HC ICU INTERMEDIATE R&B

## 2021-08-14 PROCEDURE — 6370000000 HC RX 637 (ALT 250 FOR IP): Performed by: FAMILY MEDICINE

## 2021-08-14 PROCEDURE — 6360000002 HC RX W HCPCS: Performed by: INTERNAL MEDICINE

## 2021-08-14 PROCEDURE — 97165 OT EVAL LOW COMPLEX 30 MIN: CPT

## 2021-08-14 PROCEDURE — 83735 ASSAY OF MAGNESIUM: CPT

## 2021-08-14 PROCEDURE — 70551 MRI BRAIN STEM W/O DYE: CPT

## 2021-08-14 PROCEDURE — 85027 COMPLETE CBC AUTOMATED: CPT

## 2021-08-14 PROCEDURE — 2580000003 HC RX 258: Performed by: FAMILY MEDICINE

## 2021-08-14 PROCEDURE — 97161 PT EVAL LOW COMPLEX 20 MIN: CPT | Performed by: PHYSICAL THERAPIST

## 2021-08-14 PROCEDURE — 83036 HEMOGLOBIN GLYCOSYLATED A1C: CPT

## 2021-08-14 PROCEDURE — 80061 LIPID PANEL: CPT

## 2021-08-14 PROCEDURE — 93010 ELECTROCARDIOGRAM REPORT: CPT | Performed by: INTERNAL MEDICINE

## 2021-08-14 RX ORDER — PANTOPRAZOLE SODIUM 20 MG/1
20 TABLET, DELAYED RELEASE ORAL DAILY
Status: DISCONTINUED | OUTPATIENT
Start: 2021-08-14 | End: 2021-08-15 | Stop reason: HOSPADM

## 2021-08-14 RX ORDER — SODIUM CHLORIDE 9 MG/ML
25 INJECTION, SOLUTION INTRAVENOUS PRN
Status: DISCONTINUED | OUTPATIENT
Start: 2021-08-14 | End: 2021-08-15 | Stop reason: HOSPADM

## 2021-08-14 RX ORDER — VENLAFAXINE HYDROCHLORIDE 75 MG/1
75 CAPSULE, EXTENDED RELEASE ORAL
Status: DISCONTINUED | OUTPATIENT
Start: 2021-08-14 | End: 2021-08-15 | Stop reason: HOSPADM

## 2021-08-14 RX ORDER — HYDROXYZINE HYDROCHLORIDE 10 MG/1
10 TABLET, FILM COATED ORAL 3 TIMES DAILY PRN
Status: DISCONTINUED | OUTPATIENT
Start: 2021-08-14 | End: 2021-08-15 | Stop reason: HOSPADM

## 2021-08-14 RX ORDER — MAGNESIUM SULFATE IN WATER 40 MG/ML
2000 INJECTION, SOLUTION INTRAVENOUS ONCE
Status: COMPLETED | OUTPATIENT
Start: 2021-08-14 | End: 2021-08-14

## 2021-08-14 RX ORDER — HYDROCHLOROTHIAZIDE 12.5 MG/1
12.5 TABLET ORAL DAILY
Status: DISCONTINUED | OUTPATIENT
Start: 2021-08-14 | End: 2021-08-15 | Stop reason: HOSPADM

## 2021-08-14 RX ORDER — CLOPIDOGREL BISULFATE 75 MG/1
75 TABLET ORAL DAILY
Status: DISCONTINUED | OUTPATIENT
Start: 2021-08-14 | End: 2021-08-15 | Stop reason: HOSPADM

## 2021-08-14 RX ORDER — POTASSIUM CHLORIDE 20 MEQ/1
20 TABLET, EXTENDED RELEASE ORAL DAILY
Status: DISCONTINUED | OUTPATIENT
Start: 2021-08-14 | End: 2021-08-15 | Stop reason: HOSPADM

## 2021-08-14 RX ORDER — AMLODIPINE BESYLATE 10 MG/1
10 TABLET ORAL DAILY
Status: DISCONTINUED | OUTPATIENT
Start: 2021-08-14 | End: 2021-08-15 | Stop reason: HOSPADM

## 2021-08-14 RX ORDER — ASPIRIN 81 MG/1
81 TABLET, CHEWABLE ORAL DAILY
Status: DISCONTINUED | OUTPATIENT
Start: 2021-08-14 | End: 2021-08-15 | Stop reason: HOSPADM

## 2021-08-14 RX ORDER — ONDANSETRON 2 MG/ML
4 INJECTION INTRAMUSCULAR; INTRAVENOUS EVERY 6 HOURS PRN
Status: DISCONTINUED | OUTPATIENT
Start: 2021-08-14 | End: 2021-08-15 | Stop reason: HOSPADM

## 2021-08-14 RX ORDER — ATORVASTATIN CALCIUM 40 MG/1
80 TABLET, FILM COATED ORAL NIGHTLY
Status: DISCONTINUED | OUTPATIENT
Start: 2021-08-14 | End: 2021-08-15 | Stop reason: HOSPADM

## 2021-08-14 RX ORDER — POLYETHYLENE GLYCOL 3350 17 G/17G
17 POWDER, FOR SOLUTION ORAL DAILY PRN
Status: DISCONTINUED | OUTPATIENT
Start: 2021-08-14 | End: 2021-08-15 | Stop reason: HOSPADM

## 2021-08-14 RX ORDER — SODIUM CHLORIDE 0.9 % (FLUSH) 0.9 %
5-40 SYRINGE (ML) INJECTION PRN
Status: DISCONTINUED | OUTPATIENT
Start: 2021-08-14 | End: 2021-08-15 | Stop reason: HOSPADM

## 2021-08-14 RX ORDER — FLUTICASONE PROPIONATE 50 MCG
1 SPRAY, SUSPENSION (ML) NASAL DAILY
Status: DISCONTINUED | OUTPATIENT
Start: 2021-08-14 | End: 2021-08-15 | Stop reason: HOSPADM

## 2021-08-14 RX ORDER — LABETALOL HYDROCHLORIDE 5 MG/ML
10 INJECTION, SOLUTION INTRAVENOUS EVERY 10 MIN PRN
Status: DISCONTINUED | OUTPATIENT
Start: 2021-08-14 | End: 2021-08-15 | Stop reason: HOSPADM

## 2021-08-14 RX ORDER — MIRTAZAPINE 15 MG/1
15 TABLET, FILM COATED ORAL NIGHTLY
Status: DISCONTINUED | OUTPATIENT
Start: 2021-08-14 | End: 2021-08-15 | Stop reason: HOSPADM

## 2021-08-14 RX ORDER — HYDROXYZINE HYDROCHLORIDE 10 MG/1
10 TABLET, FILM COATED ORAL 3 TIMES DAILY PRN
COMMUNITY
End: 2021-08-27

## 2021-08-14 RX ORDER — ONDANSETRON 4 MG/1
4 TABLET, ORALLY DISINTEGRATING ORAL EVERY 8 HOURS PRN
Status: DISCONTINUED | OUTPATIENT
Start: 2021-08-14 | End: 2021-08-15 | Stop reason: HOSPADM

## 2021-08-14 RX ORDER — SODIUM CHLORIDE 0.9 % (FLUSH) 0.9 %
5-40 SYRINGE (ML) INJECTION EVERY 12 HOURS SCHEDULED
Status: DISCONTINUED | OUTPATIENT
Start: 2021-08-14 | End: 2021-08-15 | Stop reason: HOSPADM

## 2021-08-14 RX ORDER — LOSARTAN POTASSIUM 50 MG/1
100 TABLET ORAL DAILY
Status: DISCONTINUED | OUTPATIENT
Start: 2021-08-14 | End: 2021-08-15 | Stop reason: HOSPADM

## 2021-08-14 RX ADMIN — CLOPIDOGREL BISULFATE 75 MG: 75 TABLET ORAL at 08:18

## 2021-08-14 RX ADMIN — SODIUM CHLORIDE, PRESERVATIVE FREE 10 ML: 5 INJECTION INTRAVENOUS at 08:18

## 2021-08-14 RX ADMIN — ASPIRIN 81 MG: 81 TABLET, CHEWABLE ORAL at 08:21

## 2021-08-14 RX ADMIN — PANTOPRAZOLE SODIUM 20 MG: 20 TABLET, DELAYED RELEASE ORAL at 08:18

## 2021-08-14 RX ADMIN — HYDROXYZINE HYDROCHLORIDE 10 MG: 10 TABLET ORAL at 21:32

## 2021-08-14 RX ADMIN — FLUTICASONE PROPIONATE 1 SPRAY: 50 SPRAY, METERED NASAL at 21:37

## 2021-08-14 RX ADMIN — MAGNESIUM SULFATE HEPTAHYDRATE 2000 MG: 40 INJECTION, SOLUTION INTRAVENOUS at 11:17

## 2021-08-14 RX ADMIN — AMLODIPINE BESYLATE 10 MG: 10 TABLET ORAL at 08:18

## 2021-08-14 RX ADMIN — MIRTAZAPINE 15 MG: 15 TABLET, FILM COATED ORAL at 21:32

## 2021-08-14 RX ADMIN — POTASSIUM CHLORIDE 20 MEQ: 1500 TABLET, EXTENDED RELEASE ORAL at 08:18

## 2021-08-14 RX ADMIN — ENOXAPARIN SODIUM 40 MG: 40 INJECTION SUBCUTANEOUS at 08:18

## 2021-08-14 RX ADMIN — HYDROXYZINE HYDROCHLORIDE 10 MG: 10 TABLET ORAL at 11:39

## 2021-08-14 RX ADMIN — ATORVASTATIN CALCIUM 80 MG: 40 TABLET, FILM COATED ORAL at 21:32

## 2021-08-14 RX ADMIN — LOSARTAN POTASSIUM 100 MG: 50 TABLET, FILM COATED ORAL at 08:18

## 2021-08-14 RX ADMIN — VENLAFAXINE HYDROCHLORIDE 75 MG: 75 CAPSULE, EXTENDED RELEASE ORAL at 08:18

## 2021-08-14 RX ADMIN — HYDROCHLOROTHIAZIDE 12.5 MG: 12.5 TABLET ORAL at 08:18

## 2021-08-14 RX ADMIN — SODIUM CHLORIDE, PRESERVATIVE FREE 10 ML: 5 INJECTION INTRAVENOUS at 21:34

## 2021-08-14 ASSESSMENT — PAIN SCALES - GENERAL
PAINLEVEL_OUTOF10: 0

## 2021-08-14 NOTE — PROGRESS NOTES
Physical Therapy  Physical Therapy Initial Assessment     Name: Julia Sorto  : 1935  MRN: [id number]      Date of Service: 2021    Evaluating PT:  Jaxson Whitney PT, DPT  [id number]      Room #:  5129/7862-O  Diagnosis:  Acute cerebrovascular accident (CVA) ([facility] [I63.9]  PMHx/PSHx:  HTN, HLD, aneurysm of R renal artery, GERD, depression, TIA, superior mesenteric artery stenosis, TIA  Procedure/Surgery:  L TKA, R hernia repair, L ACL repair  Precautions:  R facial droop, falls  Equipment Needs:  none    SUBJECTIVE:    Pt lives alone in a single story house with 2 stairs to enter and B rails. Full flight of stairs with B rails to basement laundry. Bed is on first floor and bath is on first floor. Pt ambulated with no AD PTA. OBJECTIVE:   Initial Evaluation  Date: 21 Treatment Short Term/ Long Term   Goals   AM-PAC 6 Clicks 98/16     Was pt agreeable to Eval/treatment? yes     Does pt have pain? No c/o pain     Bed Mobility  Rolling: Supervision   Supine to sit: Supervision   Sit to supine: NT  Scooting: Supervision   Rolling: Independent   Supine to sit: Independent   Sit to supine: Independent   Scooting: Independent    Transfers Sit to stand: Supervision   Stand to sit: Supervision   Stand pivot: Supervision with no AD  Sit to stand: Independent   Stand to sit:  Independent   Stand pivot: Independent    Ambulation    300 feet with no AD with Supervision   >400 feet with no AD with Independent    Stair negotiation: ascended and descended  12 steps with 1 rail Supervision   12 steps with 1 rail Independent    ROM BUE:  WNL  BLE:  WNL     Strength BUE:  WNL  BLE:  4+/5     Balance Sitting EOB:  Supervision   Dynamic Standing:  Supervision with no AD  Sitting EOB:  Independent   Dynamic Standing:  Independent with no AD     Pt is A & O x 4  Sensation:  Pt denies numbness and tingling to extremities  Edema:  unremarkable    Patient education  Pt educated on role of therapy, safety with mobility, attention to balance    Patient response to education:   Pt verbalized understanding Pt demonstrated skill Pt requires further education in this area   yes yes yes     ASSESSMENT:    Conditions Requiring Skilled Therapeutic Intervention:    []Decreased strength     []Decreased ROM  []Decreased functional mobility  [x]Decreased balance   []Decreased endurance   [x]Decreased posture  []Decreased sensation  []Decreased coordination   []Decreased vision  [x]Decreased safety awareness   []Increased pain       Comments:  Pt resting semi-supine upon arrival, agreeable to PT eval. Pt completed bed mobility with HOB fully lowered and bed rail removed to simulate home set up. Supervision provided for safety and monitoring of symptoms with ea position change. Pt amb with decreased gait speed and mild antalgic pattern noted which pt attributes due to feeling stiff s/p laying in bed since admission. Pt completed stairs with single HR and demonstrated reciprocal pattern without LOB. Pt was seated in chair upon completion of session with all needs in reach. He will benefit from short course of skilled PT for high level balance training to restore PLOF. Treatment:  Patient practiced and was instructed in the following treatment:     Bed mobility: cues for safety   Transfer training: cues for safety, Supervision for monitoring of symptoms with postural changes   Gait training: cues for safety with amb on level surfaces and with stairs    Pt's/ family goals   1. To return home independently    Prognosis is good for reaching above PT goals. Patient and or family understand(s) diagnosis, prognosis, and plan of care.   yes    PHYSICAL THERAPY PLAN OF CARE:    PT POC is established based on physician order and patient diagnosis     Referring provider/PT Order:    08/14/21 0115  PT evaluation and treat Start: 08/14/21 0115, End: 08/14/21 0115, ONE TIME, Standing Count: 1 Occurrences, R      Selma Eaton MD     Diagnosis:  Acute cerebrovascular accident (CVA) ([facility] [I63.9]  Specific instructions for next treatment:  Progress mobility as tolerated, high level balance training    Current Treatment Recommendations:     [x] Strengthening to improve independence with functional mobility   [] ROM to improve independence with functional mobility   [x] Balance Training to improve static/dynamic balance and to reduce fall risk  [x] Endurance Training to improve activity tolerance during functional mobility   [x] Transfer Training to improve safety and independence with all functional transfers   [x] Gait Training to improve gait mechanics, endurance and asses need for appropriate assistive device  [x] Stair Training in preparation for safe discharge home and/or into the community   [x] Positioning to prevent skin breakdown and contractures  [x] Safety and Education Training   [x] Patient/Caregiver Education   [] HEP  [] Other     PT long term treatment goals are located in above grid    Frequency of treatments: 2-5x/week x 1-2 weeks. Time in  0823  Time out  0840    Total Treatment Time  0 minutes     Evaluation Time includes thorough review of current medical information, gathering information on past medical history/social history and prior level of function, completion of standardized testing/informal observation of tasks, assessment of data and education on plan of care and goals.     CPT codes:  [x] Low Complexity PT evaluation 68358  [] Moderate Complexity PT evaluation 05383  [] High Complexity PT evaluation 52036  [] PT Re-evaluation 09244  [] Gait training 15987 0 minutes  [] Manual therapy 47336 0 minutes  [] Therapeutic activities 31405 0 minutes  [] Therapeutic exercises 80328 0 minutes  [] Neuromuscular reeducation 87015 0 minutes     Stacy Rios, PT, DPT  [id number]

## 2021-08-14 NOTE — H&P
.        Hospital Medicine History & Physical      PCP: Shandra Buckner DO    Date of Admission: 8/13/2021    Date of Service: Pt seen/examined on 8/14/2021 and Admitted to Inpatient with expected LOS greater than two midnights due to medical therapy. Chief Complaint:  Slurred speech       History Of Present Illness:85 y.o. male who is a transfer from [facility] . Patient presents to the ED due to slurred speech and facial droop[ . Patient state sthat it started two days ago . He stated that  sthat he had right sided facial droop as well as slurrde speech . He states that he thought it was due to the  antidepressant that he started taking recently . He reported no issues with eating food . He reported no weakness of upper or lower extremities . He denied  numbness or tingling  . Patient's last known well was 48 hours ago In the ED stroke alert was activated  . NIH score was 1 . Patient was evaluated by telestroke and npatient neurology consult was recommended . Patient  received   Plavix  loading dose of 300 mg . Patient reported improvement in speech after loading dose . CT H revealed no acute intracranial pathology   .  CTA H/N revealed no significant stenosis He is transferred to [facility] forf further evaluation   Past Medical History:          Diagnosis Date    Aneurysm of right renal artery ([address]     follows with Dr. Freddy Gillette yearly (last visit 9/19)    Colitis     Depression     GERD (gastroesophageal reflux disease)     Hyperlipidemia     Hypertension     Superior mesenteric artery stenosis ([address] 11/4/2020    TIA (transient ischemic attack)        Past Surgical History:          Procedure Laterality Date    ANTERIOR CRUCIATE LIGAMENT REPAIR Left 11/21/2001    APPENDECTOMY      CHOLECYSTECTOMY  2007    Lap    ECHO COMPL W DOP COLOR FLOW  12/4/2012         HERNIA REPAIR Right 11/13/2002    double    SINUS SURGERY  2002,2003     done x 2    TONSILLECTOMY      TOTAL KNEE ARTHROPLASTY Left 1/28/2019    LEFT  ROBOTIC KNEE TOTAL ARTHROPLASTY  ++MEREDITH- CDMIVGNN++   ++ADDUCTOR BLOCK++ performed by Marleny Ventura MD at [place] [address] N/A 10/10/2019    EGD BIOPSY performed by Isa Smith MD at [facility] ENDOSCOPY       Medications Prior to Admission:      Prior to Admission medications    Medication Sig Start Date End Date Taking? Authorizing Provider   pantoprazole (PROTONIX) 20 MG tablet Take 20 mg by mouth daily   Yes Historical Provider, MD   venlafaxine (EFFEXOR XR) 75 MG extended release capsule  7/6/21  Yes Historical Provider, MD   hydroCHLOROthiazide (HYDRODIURIL) 12.5 MG tablet 1 qd 7/27/21  Yes Real Sabjenniffer Dos Santosoff, DO   amLODIPine (NORVASC) 10 MG tablet 1 qd 7/20/21  Yes Real Sabal Traikoff, DO   losartan (COZAAR) 100 MG tablet 1 QD 7/20/21 10/20/21 Yes David Villa, DO   potassium chloride (KLOR-CON M) 20 MEQ extended release tablet Take 1 tablet by mouth daily 7/20/21  Yes Real Eric Dos Santosoff, DO   mirtazapine (REMERON) 15 MG tablet TAKE 1 TABLET BY MOUTH EVERY DAY AT BEDTIME 6/30/21  Yes Historical Provider, MD   famotidine (PEPCID) 40 MG tablet Take 1 tablet by mouth every evening 6/23/21  Yes Real Eric Dos Santosoff, DO   aspirin 81 MG tablet Take 81 mg by mouth daily   Yes Historical Provider, MD       Allergies:  Patient has no known allergies. Social History:      The patient currently lives with family     TOBACCO:   reports that he has never smoked. He has never used smokeless tobacco.  ETOH:   reports no history of alcohol use. Family History:       Reviewed in detail and negative for DM, CAD, Cancer, CVA. Positive as follows:    No family history on file. REVIEW OF SYSTEMS:   Pertinent positives as noted in the HPI. All other systems reviewed and negative.     PHYSICAL EXAM:    BP (!) 152/105   Pulse 97   Temp 98.7 °F (37.1 °C) (Temporal)   Resp 20   SpO2 97%     General appearance:  No apparent distress, appears stated age and cooperative. HEENT:  Normal cephalic, atraumatic without obvious deformity. Pupils equal, round, and reactive to light. Extra ocular muscles intact. Conjunctivae/corneas clear. Neck: Supple, with full range of motion. No jugular venous distention. Trachea midline. Respiratory:  Normal respiratory effort. Clear to auscultation, bilaterally without Rales/Wheezes/Rhonchi. Cardiovascular:  Regular rate and rhythm with normal S1/S2 without murmurs, rubs or gallops. Abdomen: Soft, non-tender, non-distended with normal bowel sounds. Musculoskeletal:  No clubbing, cyanosis or edema bilaterally. Full range of motion without deformity. Skin: Skin color, texture, turgor normal.  No rashes or lesions. Neurologic:   Facial droop right side mild dysarthria intact sensation equal and symmetric strength in upper and lower extremities   Neurovascularly intact without any focal sensory/motor deficits. Cranial nerves: II-XII intact, grossly non-focal.  Psychiatric:  Alert and oriented, thought content appropriate, normal insight      Labs:     Recent Labs     08/13/21  1456   WBC 7.6   HGB 13.6   HCT 41.4        Recent Labs     08/13/21  1456      K 3.8      CO2 30*   BUN 10   CREATININE 0.9   CALCIUM 9.2     No results for input(s): AST, ALT, BILIDIR, BILITOT, ALKPHOS in the last 72 hours. No results for input(s): INR in the last 72 hours. No results for input(s): Les Юлия in the last 72 hours. Urinalysis:      Lab Results   Component Value Date    NITRU Negative 06/29/2021    WBCUA NONE 06/29/2021    BACTERIA NONE SEEN 06/29/2021    RBCUA 1-3 06/29/2021    BLOODU TRACE-INTACT 06/29/2021    BLOODU TRACE-INTACT 06/29/2021    SPECGRAV 1.010 06/29/2021    SPECGRAV 1.020 06/29/2021    GLUCOSEU Negative 06/29/2021    GLUCOSEU NEG 06/29/2021     No acute intracranial abnormality.  Microangiopathic change. 2. No significant stenosis or occlusion of the cervical vasculature.    3. Intermittent stenosis within the left MCA branches anteriorly without   large vessel occlusion identified. 4. 1.7 cm right thyroid nodule.       RECOMMENDATIONS:   1.7 cm incidental right thyroid nodule. Recommend thyroid US. Reference: J Am Marci Radiol. 2015 Feb;12(2): 143-50         ASSESSMENT:    Active Hospital Problems    Diagnosis Date Noted    Acute cerebrovascular accident (CVA) ([facility] [I63.9] 08/13/2021       PLAN:    Stroke like symptoms : Patient presented with dysarthria and facial droop that occurred over 48 hours ago . NIH score 1 . TPA was not recommended by tele neuro CT head and CTH/N revealed no acute changes . Admit inpatient vitals  telemetry NPO swallow evaluation  PT/OT Lipid A1c  MRI H if negative can Dc Plavix . Neurology consult     Hypertension : Norvasc HCTZ and Losartan     Depression :C/w Effexor     Thyroid Nodule : right 1.7 cm         DVT Prophylaxis: Lovenox   Diet: ADULT DIET;  Regular; Low Fat/Low Chol/High Fiber/HILDA  Code Status: Full Code    PT/OT Eval Status: ordered        Sydnie Ye MD

## 2021-08-14 NOTE — ED NOTES
Mobile ICU preparing Pt for transport to [address] [place] [address]; Pt in no visible distress     Ema Greenberg RN  08/13/21 9258

## 2021-08-14 NOTE — PROGRESS NOTES
Occupational Therapy  OCCUPATIONAL THERAPY INITIAL EVALUATION     Soo Rodgers [employer] [address] [address]:3/72/1062                                                Patient Name: Loreto Ferrari  MRN: [id number]  : 1935  Room: [address]    Referring Provider: Patrica Riojas MD  Specific Provider Orders/Date: OT eval and treat 21    Diagnosis: Acute cerebrovascular accident (CVA) ([facility] [I63.9]   Pt admitted to hospital on 21 for facial droop and slurred speech     Pertinent Medical History:  has a past medical history of Aneurysm of right renal artery ([facility] Colitis, Depression, GERD (gastroesophageal reflux disease), Hyperlipidemia, Hypertension, Superior mesenteric artery stenosis ([facility] and TIA (transient ischemic attack).        Precautions:  Fall Risk, R facial droop, dysarthric    Assessment of current deficits    [x] Functional mobility  [x]ADLs  [x] Strength               []Cognition    [x] Functional transfers   [x] IADLs         [x] Safety Awareness   [x]Endurance    [] Fine Coordination              [x] Balance      [] Vision/perception   []Sensation     []Gross Motor Coordination  [] ROM  [] Delirium                   [] Motor Control     OT PLAN OF CARE   OT POC based on physician orders, patient diagnosis and results of clinical assessment    Frequency/Duration 1-3 days/wk for 2 weeks PRN   Specific OT Treatment Interventions to include:   * Instruction/training on adapted ADL techniques and AE recommendations to increase functional independence within precautions       * Training on energy conservation strategies, correct breathing pattern and techniques to improve independence/tolerance for self-care routine  * Functional transfer/mobility training/DME recommendations for increased independence, safety, and fall prevention  * Patient/Family education to increase follow Supine to sit: Modified [place]   Sit to supine: Modified [place]   -   Functional Transfers Supervision  Various surfaces   Independent    Functional Mobility Supervision w/o AD  Independent    Balance Sitting:     Static:  Independent    Dynamic: Supervision  Standing: Supervision w/o AD     Activity Tolerance Fair  Good   Visual/  Perceptual Glasses: yes  Visual scanning: WFL                  Hand Dominance R   AROM (PROM) Strength Additional Info:    RUE  WFL 4+/5 good  and wfl FMC/dexterity noted during ADL tasks     Finger><nose: WFL  Finger opp: WFL     LUE WFL 4+/5 good  and wfl FMC/dexterity noted during ADL tasks    Finger><nose: WFL  Finger opp: WFL     Hearing: WFL  Sensation:  No c/o numbness or tingling   Light touch: intact B UE's/LE's  Tone: WFL   Edema: none noted    Comments: Upon arrival patient lying in bed. Pt agreeable to OT session this date. Therapist educated pt on role of OT. At end of session, patient seated in chair with call light and phone within reach, all lines and tubes intact. Overall patient demonstrated decreased independence and safety during completion of ADL/functional transfer/mobility tasks. Pt would benefit from continued skilled OT to increase safety and independence with completion of ADL/IADL tasks for functional independence and quality of life. Treatment: OT treatment provided this date includes: Facilitation of bed mobility, unsupported sitting balance, functional transfers (various surfaces), standing tolerance tasks (addressing posture, balance and activity tolerance while incorporating light functional reaching; impacting ADLs and functional activity) and functional ambulation tasks without AD (including to/ from bathroom and in preparation for item retrieval task w/ education on posture and and safety).   Therapist facilitated self-care retraining: LB self-care tasks (socks, simulated pants), simulated toileting task and standing grooming tasks while educating pt on modified techniques, posture, safety and energy conservation techniques. Skilled monitoring of HR, O2 sats and pts response to treatment. Rehab Potential: Good for established goals     Patient / Family Goal: To return home    Patient and/or family were instructed on functional diagnosis, prognosis/goals and OT plan of care. Demonstrated good understanding. Eval Complexity: Low    Time In: 07:55  Time Out: 08:15  Total Treatment Time: 0 minutes    Min Units   OT Eval Low 97165  X  1   OT Eval Medium 22751      OT Eval High 25369      OT Re-Eval [id number]       Therapeutic Ex 93265       Therapeutic Activities 98703       ADL/Self Care 90450       Orthotic Management 79195       Manual 58969     Neuro Re-Ed 55398       Non-Billable Time          Evaluation Time additionally includes thorough review of current medical information, gathering information on past medical history/social history and prior level of function, interpretation of standardized testing/informal observation of tasks, assessment of data and development of plan of care and goals.         KIEL McconnellR/L #[id number]

## 2021-08-14 NOTE — CONSULTS
Rosa Sevilla is a right-handed 81 y.o. male    Neurology consulted for stroke    Past Medical History:     Past Medical History:   Diagnosis Date    Aneurysm of right renal artery ([facility]     follows with Dr. Anh Velasquez yearly (last visit 9/19)    Colitis     Depression     GERD (gastroesophageal reflux disease)     Hyperlipidemia     Hypertension     Superior mesenteric artery stenosis ([facility] 11/4/2020    TIA (transient ischemic attack)      Past Surgical History:     Past Surgical History:   Procedure Laterality Date    ANTERIOR CRUCIATE LIGAMENT REPAIR Left 11/21/2001    APPENDECTOMY      CHOLECYSTECTOMY  2007    Lap    ECHO COMPL W DOP COLOR FLOW  12/4/2012         HERNIA REPAIR Right 11/13/2002    double    SINUS SURGERY  2002,2003     done x 2    TONSILLECTOMY      TOTAL KNEE ARTHROPLASTY Left 1/28/2019    LEFT  ROBOTIC KNEE TOTAL ARTHROPLASTY  ++MEREDITH- PXWSMOEV++   ++ADDUCTOR BLOCK++ performed by Tony Ervin MD at [address] [facility] N/A 10/10/2019    EGD BIOPSY performed by Scout Nava MD at [facility] ENDOSCOPY     Allergies:     Patient has no known allergies. Medications:     Prior to Admission medications    Medication Sig Start Date End Date Taking?  Authorizing Provider   hydrOXYzine (ATARAX) 10 MG tablet Take 10 mg by mouth 3 times daily as needed for Anxiety   Yes Historical Provider, MD   pantoprazole (PROTONIX) 20 MG tablet Take 20 mg by mouth daily   Yes Historical Provider, MD   venlafaxine (EFFEXOR XR) 75 MG extended release capsule  7/6/21  Yes Historical Provider, MD   hydroCHLOROthiazide (HYDRODIURIL) 12.5 MG tablet 1 qd 7/27/21  Yes Severo Villa DO   hydrOXYzine (ATARAX) 10 MG tablet  7/13/21 8/14/21 Yes Historical Provider, MD   amLODIPine (NORVASC) 10 MG tablet 1 qd 7/20/21  Yes Severo Villa DO   losartan (COZAAR) 100 MG tablet 1 QD 7/20/21 10/20/21 Yes David Villa DO   potassium chloride (KLOR-CON M) 20 MEQ extended release tablet Take 1 tablet by mouth daily 7/20/21  Yes Arronspencer Villa, DO   mirtazapine (REMERON) 15 MG tablet TAKE 1 TABLET BY MOUTH EVERY DAY AT BEDTIME 6/30/21  Yes Historical Provider, MD   famotidine (PEPCID) 40 MG tablet Take 1 tablet by mouth every evening 6/23/21  Yes Arron Harvey Villa DO   aspirin 81 MG tablet Take 81 mg by mouth daily   Yes Historical Provider, MD     Social History:     Social History     Tobacco Use    Smoking status: Never Smoker    Smokeless tobacco: Never Used   Vaping Use    Vaping Use: Never used   Substance Use Topics    Alcohol use: No    Drug use: No     Review of Systems:     No chest pain or palpitations  No coughing or wheezing    No vertigo, lightheadedness or loss of consciousness  No falls, tripping or stumbling  No incontinence of bowels or bladder  No itching or bruising appreciated  No numbness, tingling or focal arm/leg weakness    ROS otherwise negative     Family History:     No family history on file. History of Present Illness:     History obtained from patient and ER chart. Past medical history of HTN, HLD, right renal aneurysm, GERD, depression, colitis and superior mesenteric artery stenosis. Patient presented to ED with right facial droop and dysarthria from home. Last Wednesday patient woke up with no neurologic symptoms 1 about his day, but he states towards the end of the day he started feeling like himself. Friday he went out to eat with his friends and as he was leaving one of the waitresses pointed out that he should go to the hospital due to his slurred speech and right facial droop. And an NIH of 1 and did not receive tPA due to last known well out of window. CTA head/neck was negative for acute findings and no occlusions or significant stenosis, although incidental right thyroid nodules found. He was continued on home dose of aspirin 81 mg daily and loaded with Plavix 300 mg for 1 dose followed by 75 mg daily.     MRI brain was completed and shows an acute left-sided corona radiata stroke. Patient continues to have dysarthria with a right facial droop. He has no other neurological complaints such as numbness or tingling or weaknesses. He has no headache, dizziness or blurred vision. He has no history of strokes or seizures. Objective:   BP (!) 143/77   Pulse 82   Temp 98.7 °F (37.1 °C) (Temporal)   Resp 8   SpO2 94%     General appearance: alert, appears stated age, cooperative and no distress  Head: normocephalic, without obvious abnormality, atraumatic  Eyes: conjunctivae/corneas clear  Neck: supple, symmetrical, trachea midline  Lungs: Respirations nonlabored  Extremities:  normal, atraumatic, no cyanosis or edema  Skin: color, texture, turgor normal---no rashes or lesions       Mental Status: Alert to self, time and place. Follows all commands.     Appropriate attention/concentration  Intact fundus of knowledge  Intact memories  Repetition intact    Speech: Mild dysarthria  Language: No aphasias      Cranial Nerves:  I: smell NA   II: visual acuity  NA   II: visual fields Full to confrontation   II: pupils GERI   III,VII: ptosis None   III,IV,VI: extraocular muscles  Full ROM   V: mastication Normal   V: facial light touch sensation  Normal   V,VII: corneal reflex     VII: facial muscle function - upper  Normal   VII: facial muscle function - lower Right droop   VIII: hearing Normal   IX: soft palate elevation  Normal   IX,X: gag reflex    XI: trapezius strength  5/5   XI: sternocleidomastoid strength 5/5   XI: neck extension strength  5/5   XII: tongue strength  Normal        Motor:  5/5 throughout  Normal bulk and tone  No drift   No abnormal movements    Sensory:  LT normal    Coordination:   FN, FFM and MEDINA normal  HS normal    DTR:   2+ throughout    Laboratory/Radiology:     CBC with Differential:    Lab Results   Component Value Date    WBC 7.9 08/14/2021    RBC 4.24 08/14/2021    HGB 12.7 08/14/2021    HCT 37.4 2021     2021    MCV 88.2 2021    MCH 30.0 2021    MCHC 34.0 2021    RDW 13.2 2021    NRBC 0.0 2017    SEGSPCT 60 2012    LYMPHOPCT 11.6 2021    MONOPCT 8.1 2021    BASOPCT 0.5 2021    MONOSABS 0.61 2021    LYMPHSABS 0.88 2021    EOSABS 0.04 2021    BASOSABS 0.04 2021     CMP:    Lab Results   Component Value Date     2021    K 3.4 2021     2021    CO2 29 2021    BUN 11 2021    CREATININE 0.7 2021    GFRAA >60 2021    LABGLOM >60 2021    GLUCOSE 91 2021    PROT 6.8 2021    LABALBU 3.6 2021    CALCIUM 9.2 2021    BILITOT 0.5 2021    ALKPHOS 91 2021    AST 13 2021    ALT 9 2021     Hepatic Function Panel:    Lab Results   Component Value Date    ALKPHOS 91 2021    ALT 9 2021    AST 13 2021    PROT 6.8 2021    BILITOT 0.5 2021    BILIDIR 0.2 2021    IBILI 0.6 2021    LABALBU 3.6 2021     HgBA1c:    Lab Results   Component Value Date    LABA1C 5.4 2021     FLP:    Lab Results   Component Value Date    TRIG 70 2021    HDL 39 2021    LDLCALC 75 2021    LABVLDL 14 2021     MRI brain: Left corona radiata acute infarct    CTAs head/neck: negative for acute findings and no occlusions or significant stenosis, although incidental right thyroid nodules found. I independently reviewed the labs and imaging studies at today's appointment.      Assessment:     Acute stroke to left corona radiata likely due to   --- LDL 75 and A1c 5.4  --- Stroke risk factors include: HTN, HLD  --- Neuro exam mild dysarthria with right facial droop  --- possible dysphagia due to some coughing noted during exam although he did pass bedside swallow     Plan:     Continue DAPT  --- Plavix 75 mg for 21 days and aspirin thereafter  Continue statin  Stroke education  Echo is not necessary per Dr. Chandana Floyd  SLP needed due to some coughing noted during assessment   Follow-up with stroke clinic  Neurology to follow       DASHAWN Burk CNP  2:29 PM  8/14/2021

## 2021-08-15 VITALS
HEART RATE: 77 BPM | WEIGHT: 181 LBS | OXYGEN SATURATION: 97 % | SYSTOLIC BLOOD PRESSURE: 114 MMHG | TEMPERATURE: 98.8 F | DIASTOLIC BLOOD PRESSURE: 83 MMHG | RESPIRATION RATE: 16 BRPM | BODY MASS INDEX: 24.55 KG/M2

## 2021-08-15 LAB
ANION GAP SERPL CALCULATED.3IONS-SCNC: 10 MMOL/L (ref 7–16)
BUN BLDV-MCNC: 13 MG/DL (ref 6–23)
CALCIUM SERPL-MCNC: 9.6 MG/DL (ref 8.6–10.2)
CHLORIDE BLD-SCNC: 101 MMOL/L (ref 98–107)
CO2: 28 MMOL/L (ref 22–29)
CREAT SERPL-MCNC: 0.8 MG/DL (ref 0.7–1.2)
GFR AFRICAN AMERICAN: >60
GFR NON-AFRICAN AMERICAN: >60 ML/MIN/1.73
GLUCOSE BLD-MCNC: 99 MG/DL (ref 74–99)
POTASSIUM SERPL-SCNC: 4 MMOL/L (ref 3.5–5)
SODIUM BLD-SCNC: 139 MMOL/L (ref 132–146)

## 2021-08-15 PROCEDURE — 2580000003 HC RX 258: Performed by: FAMILY MEDICINE

## 2021-08-15 PROCEDURE — 6360000002 HC RX W HCPCS: Performed by: FAMILY MEDICINE

## 2021-08-15 PROCEDURE — 6370000000 HC RX 637 (ALT 250 FOR IP): Performed by: FAMILY MEDICINE

## 2021-08-15 PROCEDURE — 36415 COLL VENOUS BLD VENIPUNCTURE: CPT

## 2021-08-15 PROCEDURE — 80048 BASIC METABOLIC PNL TOTAL CA: CPT

## 2021-08-15 RX ORDER — ATORVASTATIN CALCIUM 80 MG/1
80 TABLET, FILM COATED ORAL NIGHTLY
Qty: 30 TABLET | Refills: 3 | Status: SHIPPED | OUTPATIENT
Start: 2021-08-15 | End: 2021-08-25

## 2021-08-15 RX ORDER — CLOPIDOGREL BISULFATE 75 MG/1
75 TABLET ORAL DAILY
Qty: 21 TABLET | Refills: 0 | Status: SHIPPED | OUTPATIENT
Start: 2021-08-16 | End: 2021-09-01 | Stop reason: SDUPTHER

## 2021-08-15 RX ADMIN — SODIUM CHLORIDE, PRESERVATIVE FREE 10 ML: 5 INJECTION INTRAVENOUS at 08:44

## 2021-08-15 RX ADMIN — AMLODIPINE BESYLATE 10 MG: 10 TABLET ORAL at 08:43

## 2021-08-15 RX ADMIN — ENOXAPARIN SODIUM 40 MG: 40 INJECTION SUBCUTANEOUS at 08:44

## 2021-08-15 RX ADMIN — VENLAFAXINE HYDROCHLORIDE 75 MG: 75 CAPSULE, EXTENDED RELEASE ORAL at 08:43

## 2021-08-15 RX ADMIN — LOSARTAN POTASSIUM 100 MG: 50 TABLET, FILM COATED ORAL at 08:43

## 2021-08-15 RX ADMIN — PANTOPRAZOLE SODIUM 20 MG: 20 TABLET, DELAYED RELEASE ORAL at 08:43

## 2021-08-15 RX ADMIN — HYDROCHLOROTHIAZIDE 12.5 MG: 12.5 TABLET ORAL at 08:43

## 2021-08-15 RX ADMIN — POTASSIUM CHLORIDE 20 MEQ: 1500 TABLET, EXTENDED RELEASE ORAL at 08:44

## 2021-08-15 RX ADMIN — CLOPIDOGREL BISULFATE 75 MG: 75 TABLET ORAL at 08:43

## 2021-08-15 RX ADMIN — ASPIRIN 81 MG: 81 TABLET, CHEWABLE ORAL at 08:43

## 2021-08-15 ASSESSMENT — PAIN SCALES - GENERAL
PAINLEVEL_OUTOF10: 0

## 2021-08-15 NOTE — PROGRESS NOTES
Patient is normally alert & oriented x 4, he was just up and walking around the hallway confused. He is following commands, but only alert to self. Messaged Dr. Jimenez Pride to notify.

## 2021-08-15 NOTE — PROGRESS NOTES
Hospitalist Progress Note      PCP: Davion Cross DO    Date of Admission: 8/13/2021    Chief Complaint: *slurred speech    Hospital Course: *Presented with slurred speech, found to have  Small acute infarct Left posterior frontal centrum semiovale, and chronic white matter ischemic changes. He was confused last pm, states he was looking for the bathroom. He is to be on plavix for 21 days. No echo needed per neurology. Subjective: **wants to go home      Medications:  Reviewed    Infusion Medications    sodium chloride       Scheduled Medications    amLODIPine  10 mg Oral Daily    aspirin  81 mg Oral Daily    hydroCHLOROthiazide  12.5 mg Oral Daily    losartan  100 mg Oral Daily    mirtazapine  15 mg Oral Nightly    pantoprazole  20 mg Oral Daily    potassium chloride  20 mEq Oral Daily    venlafaxine  75 mg Oral Daily with breakfast    sodium chloride flush  5-40 mL Intravenous 2 times per day    enoxaparin  40 mg Subcutaneous Daily    atorvastatin  80 mg Oral Nightly    clopidogrel  75 mg Oral Daily    fluticasone  1 spray Each Nostril Daily     PRN Meds: sodium chloride flush, sodium chloride, ondansetron **OR** ondansetron, polyethylene glycol, labetalol, hydrOXYzine      Intake/Output Summary (Last 24 hours) at 8/15/2021 1312  Last data filed at 8/14/2021 1909  Gross per 24 hour   Intake 580 ml   Output    Net 580 ml       Exam:    /83   Pulse 77   Temp 98.8 °F (37.1 °C) (Temporal)   Resp 16   Wt 181 lb (82.1 kg)   SpO2 97%   BMI 24.55 kg/m²           Gen: *well developed  HEENT: NC/AT, moist mucous membranes, no oropharyngeal erythema or exudate  Neck: supple, trachea midline, no anterior cervical or SC LAD right facial droop   Heart:  Normal s1/s2, RRR, no murmurs, gallops, or rubs.    Lungs:  *cta bilaterally, *  Abd: bowel sounds present, soft, nontender, nondistended, no masses  Extrem:  No clubbing, cyanosis,  *no * edema  Skin: no rashes or lesions  Psych: A & O x3  Neuro: grossly intact, moves all four extremities. Capillary Refill: Brisk,< 3 seconds   Peripheral Pulses: +2 palpable, equal bilaterally               Labs:   Recent Labs     08/13/21  1456 08/14/21  0538   WBC 7.6 7.9   HGB 13.6 12.7   HCT 41.4 37.4    400     Recent Labs     08/13/21  1456 08/14/21  0538 08/15/21  1118    140 139   K 3.8 3.4* 4.0    101 101   CO2 30* 29 28   BUN 10 11 13   CREATININE 0.9 0.7 0.8   CALCIUM 9.2 9.2 9.6     No results for input(s): AST, ALT, BILIDIR, BILITOT, ALKPHOS in the last 72 hours. No results for input(s): INR in the last 72 hours. No results for input(s): Cristobal Clap in the last 72 hours. No results for input(s): AST, ALT, ALB, BILIDIR, BILITOT, ALKPHOS in the last 72 hours. No results for input(s): LACTA in the last 72 hours. Lab Results   Component Value Date    URICACID 6.4 09/07/2013     No results found for: AMMONIA    Assessment:    Active Hospital Problems    Diagnosis Date Noted    [facility] [I63.9] 08/13/2021   htn  Depression  Thyroid nodule, Need workup as an OP     Plan:  *cont aspirin, plavix and statin  Dc home      DVT Prophylaxis: lovenox  Diet: ADULT DIET;  Regular; Low Fat/Low Chol/High Fiber/HILDA  Code Status: Full Code    PT/OT Eval Status: ordered    Dispo - *home      Electronically signed by Garland Fulton DO on 8/15/2021 at 1:12 PM [facility]

## 2021-08-16 ENCOUNTER — CARE COORDINATION (OUTPATIENT)
Dept: CARE COORDINATION | Age: 86
End: 2021-08-16

## 2021-08-16 ENCOUNTER — TELEPHONE (OUTPATIENT)
Dept: PRIMARY CARE CLINIC | Age: 86
End: 2021-08-16

## 2021-08-16 DIAGNOSIS — Z86.39 H/O THYROID NODULE: Primary | ICD-10-CM

## 2021-08-16 DIAGNOSIS — I63.9 ACUTE CEREBROVASCULAR ACCIDENT (CVA) (HCC): Primary | ICD-10-CM

## 2021-08-16 PROCEDURE — 1111F DSCHRG MED/CURRENT MED MERGE: CPT | Performed by: FAMILY MEDICINE

## 2021-08-16 NOTE — TELEPHONE ENCOUNTER
Pt was recently d/c from the hospital.  The doctors recommended that he have an [address] of his thyroid for thyroid nodules. Can you place order? They would like to come to [place] office to have that done.

## 2021-08-16 NOTE — DISCHARGE SUMMARY
08/15/2021    CO2 28 08/15/2021    BUN 13 08/15/2021    CREATININE 0.8 08/15/2021    CALCIUM 9.6 08/15/2021    BNP 18 12/03/2012    ALKPHOS 91 08/09/2021    ALT 9 08/09/2021    AST 13 08/09/2021    BILITOT 0.5 08/09/2021    BILIDIR 0.2 06/29/2021    LABALBU 3.6 08/09/2021    LDLCALC 75 08/14/2021    TRIG 70 08/14/2021     Lab Results   Component Value Date    INR 1.2 12/03/2012       Radiology:  MRI brain without contrast   Final Result   1. Small acute infarct in the left posterior frontal centrum semiovale. 2. Extensive chronic white matter ischemic change.          FL MODIFIED BARIUM SWALLOW W VIDEO    (Results Pending)       Discharge Medications:   Discharge Medication List as of 8/15/2021  1:37 PM      START taking these medications    Details   atorvastatin (LIPITOR) 80 MG tablet Take 1 tablet by mouth nightly, Disp-30 tablet, R-3Normal      clopidogrel (PLAVIX) 75 MG tablet Take 1 tablet by mouth daily for 21 days, Disp-21 tablet, R-0Normal           Discharge Medication List as of 8/15/2021  1:37 PM        Discharge Medication List as of 8/15/2021  1:37 PM      CONTINUE these medications which have NOT CHANGED    Details   hydrOXYzine (ATARAX) 10 MG tablet Take 10 mg by mouth 3 times daily as needed for AnxietyHistorical Med      pantoprazole (PROTONIX) 20 MG tablet Take 20 mg by mouth dailyHistorical Med      venlafaxine (EFFEXOR XR) 75 MG extended release capsule Historical Med      hydroCHLOROthiazide (HYDRODIURIL) 12.5 MG tablet 1 qd, Disp-30 tablet, R-2Normal      amLODIPine (NORVASC) 10 MG tablet 1 qd, Disp-90 tablet, R-3Normal      losartan (COZAAR) 100 MG tablet 1 QD, Disp-90 tablet, R-1Normal      potassium chloride (KLOR-CON M) 20 MEQ extended release tablet Take 1 tablet by mouth daily, Disp-30 tablet, R-5Normal      mirtazapine (REMERON) 15 MG tablet TAKE 1 TABLET BY MOUTH EVERY DAY AT BEDTIMEHistorical Med      famotidine (PEPCID) 40 MG tablet Take 1 tablet by mouth every evening, Disp-30 tablet, R-3Normal      aspirin 81 MG tablet Take 81 mg by mouth dailyHistorical Med           Discharge Medication List as of 8/15/2021  1:37 PM      STOP taking these medications       fluticasone (FLONASE) 50 MCG/ACT nasal spray Comments:   Reason for Stopping:         ibuprofen (ADVIL;MOTRIN) 800 MG tablet Comments:   Reason for Stopping: Follow-up appointments:  1 week    Provider Follow-up:    pmd    Condition at Discharge:  Stable    The patient was seen and examined on day of discharge and this discharge summary is in conjunction with any daily progress note from day of discharge. Time Spent on discharge is 45 minutes  in the examination, evaluation, counseling and review of medications and discharge plan. Signed:    LYLE Hurtado   8/16/2021      Thank you Davion Cross DO for the opportunity to be involved in this patient's care.  If you have any questions or concerns please feel free to contact me at [phone]

## 2021-08-16 NOTE — CARE COORDINATION
[facility] Transitions Initial Follow Up Call    Call within 2 business days of discharge: Yes    Patient: Kendra Kowalski Patient : 1935   MRN: <[id number]>  Reason for Admission: -8/15/21 Acute CVA  Discharge Date: 8/15/21 RARS: Readmission Risk Score: 15      Last Discharge [facility]       Complaint Diagnosis Description Type Department Provider    21   Admission (Discharged[facility] Junior[facility] raven    21 Aphasia; Facial Droop Cerebrovascular accident (CVA), unspecified mechanism [facility]) ED (TRANSFER) [facility] ED Jessee Hernandes DO        Patient returned call. Reports slight right sided facial droop, slurred speech. Pt states both are improving. Denies weakness, swallow deficit, visual changes. Pt states appetite is good, bowel/bladder WNL. Pt stated that he has been fighting depression and feels that the Effexor is finally helping. Pt states that he has proper safety measures in place (handrails, grab bars). Does not have DME. Pt's daughter to schedule PCP f/u and Thyroid US. Denies Transportation, Home, or Medication assistance. Spoke with: Attempted to contact patient for Initial Care Transition call. Left HIPPA compliant message requesting return call. Will attempt to reach again. LINDSEY Flores / [address]  [phone]    [place] [facility] [place]    Was this an external facility discharge? No Discharge Facility:     Challenges to be reviewed by the provider   Additional needs identified to be addressed with provider: No  none         Method of communication with provider : none      Advance Care Planning:   Does patient have an Advance Directive: reviewed and current. Was this a readmission?  No  Patient stated reason for admission: facial droop, slurred speech  Patients top risk factors for readmission: medical condition-CVA    Care Transition Nurse (CTN) contacted the patient by telephone to perform post hospital discharge assessment. Verified name and  with patient as identifiers. Provided introduction to self, and explanation of the CTN role. CTN reviewed discharge instructions, medical action plan and red flags with patient who verbalized understanding. Patient given an opportunity to ask questions and does not have any further questions or concerns at this time. Were discharge instructions available to patient? Yes. Reviewed appropriate site of care based on symptoms and resources available to patient including: PCP and When to call 911. The patient agrees to contact the PCP office for questions related to their healthcare. Medication reconciliation was performed with patient, who verbalizes understanding of administration of home medications. Advised obtaining a 90-day supply of all daily and as-needed medications. Covid Risk Education     Educated patient about risk for severe COVID-19 due to risk factors according to CDC guidelines. LPN CC reviewed discharge instructions, medical action plan and red flag symptoms with the patient who verbalized understanding. Discussed COVID vaccination status: Yes. Education provided on COVID-19 vaccination as appropriate. Discussed exposure protocols and quarantine with CDC Guidelines. Patient was given an opportunity to verbalize any questions and concerns and agrees to contact LPN CC or health care provider for questions related to their healthcare. Reviewed and educated patient on any new and changed medications related to discharge diagnosis. Was patient discharged with a pulse oximeter? No Discussed and confirmed pulse oximeter discharge instructions and when to notify provider or seek emergency care. LPN CC provided contact information. Plan for follow-up call in 5-7 days based on severity of symptoms and risk factors.   Plan for next call: symptom management-CVA        Care Transitions 24 Hour Call    Do you have support at home?: Alone  Care Transitions Interventions         Follow Up  Future Appointments   Date Time Provider Zelda Siddiqi   9/14/2021  1:30 PM DO KIKA Jolly [facility]   10/14/2021  1:00 PM Dominic Armenta MD [facility] [address], LPN

## 2021-08-17 ASSESSMENT — ENCOUNTER SYMPTOMS
EYE REDNESS: 0
EYE PAIN: 0
SHORTNESS OF BREATH: 0
VOMITING: 0
SINUS PRESSURE: 0
SINUS PAIN: 0
CHEST TIGHTNESS: 0
NAUSEA: 0
COUGH: 0
ABDOMINAL PAIN: 0
SORE THROAT: 0
CONSTIPATION: 0

## 2021-08-19 ENCOUNTER — OFFICE VISIT (OUTPATIENT)
Dept: FAMILY MEDICINE CLINIC | Age: 86
End: 2021-08-19
Payer: MEDICARE

## 2021-08-19 VITALS
OXYGEN SATURATION: 96 % | SYSTOLIC BLOOD PRESSURE: 120 MMHG | HEART RATE: 88 BPM | BODY MASS INDEX: 24.95 KG/M2 | WEIGHT: 184 LBS | TEMPERATURE: 97.6 F | DIASTOLIC BLOOD PRESSURE: 60 MMHG

## 2021-08-19 DIAGNOSIS — I63.9 ACUTE CEREBROVASCULAR ACCIDENT (CVA) (HCC): ICD-10-CM

## 2021-08-19 DIAGNOSIS — I10 ESSENTIAL HYPERTENSION: Primary | Chronic | ICD-10-CM

## 2021-08-19 DIAGNOSIS — E78.2 MIXED HYPERLIPIDEMIA: Chronic | ICD-10-CM

## 2021-08-19 DIAGNOSIS — F41.9 ANXIETY: ICD-10-CM

## 2021-08-19 DIAGNOSIS — R47.02 EXPRESSIVE DYSPHASIA: ICD-10-CM

## 2021-08-19 PROCEDURE — 99496 TRANSJ CARE MGMT HIGH F2F 7D: CPT | Performed by: FAMILY MEDICINE

## 2021-08-19 RX ORDER — PANTOPRAZOLE SODIUM 40 MG/1
TABLET, DELAYED RELEASE ORAL
COMMUNITY
Start: 2021-08-11 | End: 2021-09-24 | Stop reason: SDUPTHER

## 2021-08-19 NOTE — PROGRESS NOTES
21     Kendra Kowalski    : 1935 Sex: male   Age: 81 y.o. Chief Complaint   Patient presents with    Care Management     transition of care appointment Stroke doing better    Diabetes    Hypertension       Prior to Admission medications    Medication Sig Start Date End Date Taking? Authorizing Provider   pantoprazole (PROTONIX) 40 MG tablet TAKE 1 TABLET BY MOUTH EVERY DAY 21  Yes Historical Provider, MD   atorvastatin (LIPITOR) 80 MG tablet Take 1 tablet by mouth nightly 8/15/21  Yes Shravan Castillo, DO   clopidogrel (PLAVIX) 75 MG tablet Take 1 tablet by mouth daily for 21 days 21 Yes Shravan End, DO   hydrOXYzine (ATARAX) 10 MG tablet Take 10 mg by mouth 3 times daily as needed for Anxiety    Yes Historical Provider, MD   venlafaxine (EFFEXOR XR) 75 MG extended release capsule  21  Yes Historical Provider, MD   hydroCHLOROthiazide (HYDRODIURIL) 12.5 MG tablet 1 qd 21  Yes Toya Villa,    amLODIPine (NORVASC) 10 MG tablet 1 qd 21  Yes David Villa, DO   losartan (COZAAR) 100 MG tablet 1 QD 7/20/21 10/20/21 Yes David Villa, DO   potassium chloride (KLOR-CON M) 20 MEQ extended release tablet Take 1 tablet by mouth daily 21  Yes Toya Villa DO   mirtazapine (REMERON) 15 MG tablet TAKE 1 TABLET BY MOUTH EVERY DAY AT BEDTIME 21  Yes Historical Provider, MD   famotidine (PEPCID) 40 MG tablet Take 1 tablet by mouth every evening 21  Yes Toya Villa DO   aspirin 81 MG tablet Take 81 mg by mouth daily   Yes Historical Provider, MD          HPI: Patient was evaluated today with hypertension anxiety hyperlipidemia dysphagia and recent CVA. He is doing remarkably well at this time. Patient was admitted 2021 discharged home on 8/15/2021 satisfactory condition. Currently seems to be managing well with present meds. Managing well with activities of daily living at home without difficulty.  Mild expressive aphasia but overall excellent communication and comfortable with his present progress. Will be following up with neurology on 27 August. Continue baby aspirin Plavix daily. Review of Systems   Constitutional: Negative. HENT: Negative. Eyes: Negative. Respiratory: Negative. Gastrointestinal: Negative. Endocrine: Negative. Genitourinary: Negative. Musculoskeletal: Negative. Skin: Negative. Allergic/Immunologic: Negative. Neurological: Positive for speech difficulty. Hematological: Negative. Psychiatric/Behavioral: Negative. Overall systems review stable aside from the speech difficulties as noted.         Current Outpatient Medications:     pantoprazole (PROTONIX) 40 MG tablet, TAKE 1 TABLET BY MOUTH EVERY DAY, Disp: , Rfl:     atorvastatin (LIPITOR) 80 MG tablet, Take 1 tablet by mouth nightly, Disp: 30 tablet, Rfl: 3    clopidogrel (PLAVIX) 75 MG tablet, Take 1 tablet by mouth daily for 21 days, Disp: 21 tablet, Rfl: 0    hydrOXYzine (ATARAX) 10 MG tablet, Take 10 mg by mouth 3 times daily as needed for Anxiety , Disp: , Rfl:     venlafaxine (EFFEXOR XR) 75 MG extended release capsule, , Disp: , Rfl:     hydroCHLOROthiazide (HYDRODIURIL) 12.5 MG tablet, 1 qd, Disp: 30 tablet, Rfl: 2    amLODIPine (NORVASC) 10 MG tablet, 1 qd, Disp: 90 tablet, Rfl: 3    losartan (COZAAR) 100 MG tablet, 1 QD, Disp: 90 tablet, Rfl: 1    potassium chloride (KLOR-CON M) 20 MEQ extended release tablet, Take 1 tablet by mouth daily, Disp: 30 tablet, Rfl: 5    mirtazapine (REMERON) 15 MG tablet, TAKE 1 TABLET BY MOUTH EVERY DAY AT BEDTIME, Disp: , Rfl:     famotidine (PEPCID) 40 MG tablet, Take 1 tablet by mouth every evening, Disp: 30 tablet, Rfl: 3    aspirin 81 MG tablet, Take 81 mg by mouth daily, Disp: , Rfl:     No Known Allergies    Social History     Tobacco Use    Smoking status: Never Smoker    Smokeless tobacco: Never Used   Vaping Use    Vaping Use: Never used   Substance Use Topics    Alcohol use: No    Drug use: No      Past Surgical History:   Procedure Laterality Date    ANTERIOR CRUCIATE LIGAMENT REPAIR Left 11/21/2001    APPENDECTOMY      CHOLECYSTECTOMY  2007    Lap    ECHO COMPL W DOP COLOR FLOW  12/4/2012         HERNIA REPAIR Right 11/13/2002    double    SINUS SURGERY  2002,2003     done x 2    TONSILLECTOMY      TOTAL KNEE ARTHROPLASTY Left 1/28/2019    LEFT  ROBOTIC KNEE TOTAL ARTHROPLASTY  ++MEREDITH- [id number]++   ++ADDUCTOR BLOCK++ performed by Jaida Rae MD at [address] [place] [address] N/A 10/10/2019    EGD BIOPSY performed by Evelyn Johnston MD at [facility] ENDOSCOPY     No family history on file. Past Medical History:   Diagnosis Date    Aneurysm of right renal artery (HCC)     follows with Dr. Chin Humphries yearly (last visit 9/19)    Colitis     Depression     GERD (gastroesophageal reflux disease)     Hyperlipidemia     Hypertension     Superior mesenteric artery stenosis ([address] 11/4/2020    TIA (transient ischemic attack)        Vitals:    08/19/21 1329   BP: 120/60   Pulse: 88   Temp: 97.6 °F (36.4 °C)   SpO2: 96%   Weight: 184 lb (83.5 kg)     BP Readings from Last 3 Encounters:   08/19/21 120/60   08/15/21 114/83   08/13/21 (!) 153/85      122/64  Physical Exam  Vitals and nursing note reviewed. Constitutional:       Appearance: He is well-developed. HENT:      Head: Normocephalic. Right Ear: External ear normal.      Left Ear: External ear normal.      Nose: Nose normal.   Eyes:      Conjunctiva/sclera: Conjunctivae normal.      Pupils: Pupils are equal, round, and reactive to light. Cardiovascular:      Rate and Rhythm: Normal rate. Pulmonary:      Breath sounds: Normal breath sounds. Abdominal:      General: Bowel sounds are normal.      Palpations: Abdomen is soft. Musculoskeletal:         General: Normal range of motion. Cervical back: Normal range of motion and neck supple.    Skin:     General: Skin is warm and dry. Neurological:      Mental Status: He is alert and oriented to person, place, and time. Psychiatric:         Behavior: Behavior normal.        Physical exam findings are presently stable. Upper and lower extremity strength intact. Mild speech impediment. Able to open and close both eyes without difficulty. Smiles without difficulty. Tongue movement is without difficulty. Maintain present meds care. Follow-up visit with me on 14 September and sooner if problems. Plan Per Assessment:  Mary Ellen Ward was seen today for care management, diabetes and hypertension. Diagnoses and all orders for this visit:    Essential hypertension    Anxiety    Mixed hyperlipidemia    Acute cerebrovascular accident (CVA) [address] [address]    Expressive dysphasia            No follow-ups on file. Edson Simple, DO    Note was generated with the assistance of voice recognition software. Document was reviewed however may contain grammatical errors.

## 2021-08-20 DIAGNOSIS — E04.1 THYROID NODULE: Primary | ICD-10-CM

## 2021-08-24 ENCOUNTER — APPOINTMENT (OUTPATIENT)
Dept: GENERAL RADIOLOGY | Age: 86
End: 2021-08-24
Payer: MEDICARE

## 2021-08-24 ENCOUNTER — APPOINTMENT (OUTPATIENT)
Dept: CT IMAGING | Age: 86
End: 2021-08-24
Payer: MEDICARE

## 2021-08-24 ENCOUNTER — HOSPITAL ENCOUNTER (EMERGENCY)
Age: 86
Discharge: HOME | End: 2021-08-24
Attending: EMERGENCY MEDICINE
Payer: MEDICARE

## 2021-08-24 VITALS
HEIGHT: 72 IN | RESPIRATION RATE: 18 BRPM | TEMPERATURE: 97 F | OXYGEN SATURATION: 97 % | HEART RATE: 68 BPM | DIASTOLIC BLOOD PRESSURE: 72 MMHG | WEIGHT: 180 LBS | SYSTOLIC BLOOD PRESSURE: 118 MMHG | BODY MASS INDEX: 24.38 KG/M2

## 2021-08-24 DIAGNOSIS — R74.01 TRANSAMINITIS: ICD-10-CM

## 2021-08-24 DIAGNOSIS — M25.562 ACUTE PAIN OF LEFT KNEE: ICD-10-CM

## 2021-08-24 DIAGNOSIS — R55 SYNCOPE AND COLLAPSE: Primary | ICD-10-CM

## 2021-08-24 LAB
ALBUMIN SERPL-MCNC: 3 G/DL (ref 3.5–5.2)
ALP BLD-CCNC: 491 U/L (ref 40–129)
ALT SERPL-CCNC: 216 U/L (ref 0–40)
ANION GAP SERPL CALCULATED.3IONS-SCNC: 11 MMOL/L (ref 7–16)
AST SERPL-CCNC: 123 U/L (ref 0–39)
BASOPHILS ABSOLUTE: 0.03 E9/L (ref 0–0.2)
BASOPHILS RELATIVE PERCENT: 0.3 % (ref 0–2)
BILIRUB SERPL-MCNC: 1.1 MG/DL (ref 0–1.2)
BUN BLDV-MCNC: 18 MG/DL (ref 6–23)
CALCIUM SERPL-MCNC: 9.1 MG/DL (ref 8.6–10.2)
CHLORIDE BLD-SCNC: 99 MMOL/L (ref 98–107)
CO2: 27 MMOL/L (ref 22–29)
CREAT SERPL-MCNC: 1.6 MG/DL (ref 0.7–1.2)
EOSINOPHILS ABSOLUTE: 0.11 E9/L (ref 0.05–0.5)
EOSINOPHILS RELATIVE PERCENT: 1 % (ref 0–6)
GFR AFRICAN AMERICAN: 50
GFR NON-AFRICAN AMERICAN: 41 ML/MIN/1.73
GLUCOSE BLD-MCNC: 174 MG/DL (ref 74–99)
HCT VFR BLD CALC: 36.4 % (ref 37–54)
HEMOGLOBIN: 12.4 G/DL (ref 12.5–16.5)
IMMATURE GRANULOCYTES #: 0.05 E9/L
IMMATURE GRANULOCYTES %: 0.4 % (ref 0–5)
LACTIC ACID: 2.3 MMOL/L (ref 0.5–2.2)
LYMPHOCYTES ABSOLUTE: 0.33 E9/L (ref 1.5–4)
LYMPHOCYTES RELATIVE PERCENT: 2.9 % (ref 20–42)
MCH RBC QN AUTO: 30.2 PG (ref 26–35)
MCHC RBC AUTO-ENTMCNC: 34.1 % (ref 32–34.5)
MCV RBC AUTO: 88.8 FL (ref 80–99.9)
MONOCYTES ABSOLUTE: 0.92 E9/L (ref 0.1–0.95)
MONOCYTES RELATIVE PERCENT: 8.1 % (ref 2–12)
NEUTROPHILS ABSOLUTE: 9.9 E9/L (ref 1.8–7.3)
NEUTROPHILS RELATIVE PERCENT: 87.3 % (ref 43–80)
PDW BLD-RTO: 13.1 FL (ref 11.5–15)
PLATELET # BLD: 318 E9/L (ref 130–450)
PMV BLD AUTO: 9.6 FL (ref 7–12)
POTASSIUM SERPL-SCNC: 3.4 MMOL/L (ref 3.5–5)
RBC # BLD: 4.1 E12/L (ref 3.8–5.8)
SODIUM BLD-SCNC: 137 MMOL/L (ref 132–146)
TOTAL PROTEIN: 6.8 G/DL (ref 6.4–8.3)
TROPONIN, HIGH SENSITIVITY: 29 NG/L (ref 0–11)
WBC # BLD: 11.3 E9/L (ref 4.5–11.5)

## 2021-08-24 PROCEDURE — 85025 COMPLETE CBC W/AUTO DIFF WBC: CPT

## 2021-08-24 PROCEDURE — 93005 ELECTROCARDIOGRAM TRACING: CPT | Performed by: EMERGENCY MEDICINE

## 2021-08-24 PROCEDURE — 36415 COLL VENOUS BLD VENIPUNCTURE: CPT

## 2021-08-24 PROCEDURE — 83605 ASSAY OF LACTIC ACID: CPT

## 2021-08-24 PROCEDURE — 73560 X-RAY EXAM OF KNEE 1 OR 2: CPT

## 2021-08-24 PROCEDURE — 84484 ASSAY OF TROPONIN QUANT: CPT

## 2021-08-24 PROCEDURE — 70450 CT HEAD/BRAIN W/O DYE: CPT

## 2021-08-24 PROCEDURE — 71045 X-RAY EXAM CHEST 1 VIEW: CPT

## 2021-08-24 PROCEDURE — 80053 COMPREHEN METABOLIC PANEL: CPT

## 2021-08-24 PROCEDURE — 99284 EMERGENCY DEPT VISIT MOD MDM: CPT

## 2021-08-24 NOTE — ED PROVIDER NOTES
HPI:  8/24/21, Time: 11:12 AM EDT         Omar Maier is a 81 y.o. male presenting to the ED for syncope while in the shower this morning. Patient states he was at the end of taking her shower when he passed out. He woke up crumpled at the bottom of the tub. He reports he has left knee pain. He states he was able to get up and walk around and presents to the emergency department for evaluation. He denies being on blood thinners other than a baby dose of aspirin. He did have a stroke last week and was admitted but has no residual deficits. He states he has not had any recent medication changes. He denies headache, neck or back pain, chest pain, shortness of breath, cough or congestion, fever or chills, abdominal pain, focal paresthesias or focal weakness, or other complaints. His only complaint at this time is some aching pain of the left knee. The complaint has been persistent, moderate in severity, and worsened by nothing. Patient denies fever/chills, sore throat, cough, congestion, chest pain, shortness of breath, edema, headache, visual disturbances, focal paresthesias, focal weakness, abdominal pain, nausea, vomiting, diarrhea, constipation, dysuria, hematuria, neck or back pain, rash or other complaints. ROS:   A complete review of systems was performed and all pertinent positives and negatives are stated within HPI, all other systems reviewed and are negative.      --------------------------------------------- PAST HISTORY ---------------------------------------------  Past Medical History:  has a past medical history of Aneurysm of right renal artery ([address] Colitis, Depression, GERD (gastroesophageal reflux disease), Hyperlipidemia, Hypertension, Superior mesenteric artery stenosis ([address] and TIA (transient ischemic attack). Past Surgical History:  has a past surgical history that includes Appendectomy;  Tonsillectomy; ECHO Compl W Dop Color Flow (12/4/2012); sinus surgery (1498,6274); Anterior cruciate ligament repair (Left, 11/21/2001); Cholecystectomy (2007); hernia repair (Right, 11/13/2002); Total knee arthroplasty (Left, 1/28/2019); and Upper gastrointestinal endoscopy (N/A, 10/10/2019). Social History:  reports that he has never smoked. He has never used smokeless tobacco. He reports that he does not drink alcohol and does not use drugs. Family History: family history is not on file. The patients home medications have been reviewed. Allergies: Patient has no known allergies. ----------------------------------------PHYSICAL EXAM--------------------------------------  Constitutional:  Well developed, well nourished, no acute distress, non-toxic appearance   Eyes:  PERRL, conjunctiva normal, EOMI, no nystagmus  HENT:  Atraumatic, external ears normal, nose normal, oropharynx moist. Neck- normal range of motion, no nuchal rigidity. TMs clear and intact bilaterally. Airway patent. Respiratory:  No respiratory distress, normal breath sounds, no rales, no wheezing   Cardiovascular:  Normal rate, normal rhythm, no murmurs, no gallops, no rubs. Radial and DP pulses 2+ bilaterally. Compartments are soft. He is warm and well perfused. Cap refill less than 3 seconds. GI:  Soft, nondistended, normal bowel sounds, nontender, no organomegaly, no mass, no rebound, no guarding   :  No costovertebral angle tenderness   Musculoskeletal:  No edema, no tenderness, no deformities. Back-no midline or paraspinal cervical, thoracic or lumbar tenderness. No step-offs. Chest stable. Pelvis stable. Moves all 4 extremities without difficulty or pain. Left knee without swelling, redness or deformity. Negative anterior and posterior drawer test.  Of note there is a well-healed vertical surgical incision on left knee. Hips with internal and external rotation without difficulty or pain. Integument:  Well hydrated, no visible rash. Adequate perfusion.    Lymphatic:  No cervical lymphadenopathy noted   Neurologic:  Alert & oriented x 3, CN 2-12 normal,  no focal deficits noted. DTRs 2+ bilateral patellar. Normal gait. Psychiatric:  Speech and behavior appropriate       -------------------------------------------------- RESULTS -------------------------------------------------  I have personally reviewed all laboratory and imaging results for this patient. Results are listed below.      LABS:  Results for orders placed or performed during the hospital encounter of 08/24/21   CBC auto differential   Result Value Ref Range    WBC 11.3 4.5 - 11.5 E9/L    RBC 4.10 3.80 - 5.80 E12/L    Hemoglobin 12.4 (L) 12.5 - 16.5 g/dL    Hematocrit 36.4 (L) 37.0 - 54.0 %    MCV 88.8 80.0 - 99.9 fL    MCH 30.2 26.0 - 35.0 pg    MCHC 34.1 32.0 - 34.5 %    RDW 13.1 11.5 - 15.0 fL    Platelets 859 885 - 061 E9/L    MPV 9.6 7.0 - 12.0 fL    Neutrophils % 87.3 (H) 43.0 - 80.0 %    Immature Granulocytes % 0.4 0.0 - 5.0 %    Lymphocytes % 2.9 (L) 20.0 - 42.0 %    Monocytes % 8.1 2.0 - 12.0 %    Eosinophils % 1.0 0.0 - 6.0 %    Basophils % 0.3 0.0 - 2.0 %    Neutrophils Absolute 9.90 (H) 1.80 - 7.30 E9/L    Immature Granulocytes # 0.05 E9/L    Lymphocytes Absolute 0.33 (L) 1.50 - 4.00 E9/L    Monocytes Absolute 0.92 0.10 - 0.95 E9/L    Eosinophils Absolute 0.11 0.05 - 0.50 E9/L    Basophils Absolute 0.03 0.00 - 0.20 E9/L   Comprehensive Metabolic Panel   Result Value Ref Range    Sodium 137 132 - 146 mmol/L    Potassium 3.4 (L) 3.5 - 5.0 mmol/L    Chloride 99 98 - 107 mmol/L    CO2 27 22 - 29 mmol/L    Anion Gap 11 7 - 16 mmol/L    Glucose 174 (H) 74 - 99 mg/dL    BUN 18 6 - 23 mg/dL    CREATININE 1.6 (H) 0.7 - 1.2 mg/dL    GFR Non-African American 41 >=60 mL/min/1.73    GFR African American 50     Calcium 9.1 8.6 - 10.2 mg/dL    Total Protein 6.8 6.4 - 8.3 g/dL    Albumin 3.0 (L) 3.5 - 5.2 g/dL    Total Bilirubin 1.1 0.0 - 1.2 mg/dL    Alkaline Phosphatase 491 (H) 40 - 129 U/L     (H) 0 - 40 U/L     (H) 0 - 39 U/L   Troponin   Result Value Ref Range    Troponin, High Sensitivity 29 (H) 0 - 11 ng/L   Lactic Acid, Plasma   Result Value Ref Range    Lactic Acid 2.3 (H) 0.5 - 2.2 mmol/L   EKG 12 Lead   Result Value Ref Range    Ventricular Rate 75 BPM    Atrial Rate 75 BPM    P-R Interval 158 ms    QRS Duration 146 ms    Q-T Interval 414 ms    QTc Calculation (Bazett) 462 ms    P Axis -2 degrees    R Axis 39 degrees    T Axis 15 degrees       RADIOLOGY:  Interpreted by Radiologist.  CT HEAD WO CONTRAST   Final Result   1. There is no acute intracranial abnormality. Specifically, there is no   intracranial hemorrhage. 2. Atrophy and periventricular leukomalacia,         XR CHEST PORTABLE   Final Result   No acute process. XR KNEE LEFT (1-2 VIEWS)   Final Result   No acute osseous abnormality               EKG Interpretation  Time: 11:21 PM EDT  Rhythm: normal sinus   Rate: 75  Axis: normal  Conduction: right bundle branch block (complete)  ST Segments: no acute change  T Waves: no acute change  Clinical Impression: right bundle branch block  Comparison to prior EKG: stable as compared to patient's most recent EKG      ------------------------- NURSING NOTES AND VITALS REVIEWED ---------------------------  The nursing notes within the ED encounter and vital signs as below have been reviewed by myself. /72   Pulse 68   Temp 97 °F (36.1 °C)   Resp 18   Ht 6' (1.829 m)   Wt 180 lb (81.6 kg)   SpO2 97%   BMI 24.41 kg/m²   Oxygen Saturation Interpretation: Normal      The patients available past medical records and past encounters were reviewed. ------------------------------ ED COURSE/MEDICAL DECISION MAKING----------------------  Medications - No data to display        Procedures:   none      Medical Decision Making:    Weekly syncope while in hot shower. Patient does admit that he was taking a very hot shower. No injuries evident.   He will follow-up with his orthopedic surgeon and with his primary care physician this Friday. Discussed case with hospitalist, states that there are no other reasons at this time to admit him. Patient understands agrees with and is very happy with this plan and would like to go home. Patient was explicitly instructed on specific signs and symptoms on which to return to the emergency room for. Patient was instructed to return to the ER for any new or worsening symptoms. Additional discharge instructions were given verbally. All questions were answered. Patient is comfortable and agreeable with discharge plan. Patient in no acute distress and non-toxic in appearance. This patient's ED course included: re-evaluation prior to disposition, cardiac monitoring, continuous pulse oximetry and a personal history and physicial eaxmination    This patient has remained hemodynamically stable, improved and been closely monitored during their ED course. Re-Evaluations:  Time: 1400   Re-evaluation. Patients symptoms are improving  Repeat physical examination is not changed      Consultations:   Dr Stoney eRed, discussed case, no need to admit for isolated LFTs, may f/u in office with PCP by Friday. Likely syncope in hot shower    Critical Care: none    I, Gerhardt Lot, DO, am the Primary Provider of Record    Counseling: The emergency provider has spoken with the patient and discussed todays results, in addition to providing specific details for the plan of care and counseling regarding the diagnosis and prognosis. Questions are answered at this time and they are agreeable with the plan.    --------------------------- IMPRESSION AND DISPOSITION ---------------------------------    IMPRESSION  1. Syncope and collapse    2. Transaminitis    3.  Acute pain of left knee        DISPOSITION  Disposition: Discharge to home  Patient condition is stable             Bright La DO  08/29/21 5832

## 2021-08-25 ENCOUNTER — OFFICE VISIT (OUTPATIENT)
Dept: PRIMARY CARE CLINIC | Age: 86
End: 2021-08-25
Payer: MEDICARE

## 2021-08-25 VITALS
BODY MASS INDEX: 24.41 KG/M2 | OXYGEN SATURATION: 96 % | SYSTOLIC BLOOD PRESSURE: 114 MMHG | HEIGHT: 72 IN | TEMPERATURE: 96.9 F | HEART RATE: 91 BPM | DIASTOLIC BLOOD PRESSURE: 64 MMHG

## 2021-08-25 DIAGNOSIS — I10 ESSENTIAL HYPERTENSION: Chronic | ICD-10-CM

## 2021-08-25 DIAGNOSIS — R55 SYNCOPE, UNSPECIFIED SYNCOPE TYPE: ICD-10-CM

## 2021-08-25 DIAGNOSIS — I63.9 ACUTE CEREBROVASCULAR ACCIDENT (CVA) (HCC): Primary | ICD-10-CM

## 2021-08-25 LAB
EKG ATRIAL RATE: 75 BPM
EKG P AXIS: -2 DEGREES
EKG P-R INTERVAL: 158 MS
EKG Q-T INTERVAL: 414 MS
EKG QRS DURATION: 146 MS
EKG QTC CALCULATION (BAZETT): 462 MS
EKG R AXIS: 39 DEGREES
EKG T AXIS: 15 DEGREES
EKG VENTRICULAR RATE: 75 BPM

## 2021-08-25 PROCEDURE — 93010 ELECTROCARDIOGRAM REPORT: CPT | Performed by: INTERNAL MEDICINE

## 2021-08-25 PROCEDURE — 1123F ACP DISCUSS/DSCN MKR DOCD: CPT | Performed by: FAMILY MEDICINE

## 2021-08-25 PROCEDURE — G8420 CALC BMI NORM PARAMETERS: HCPCS | Performed by: FAMILY MEDICINE

## 2021-08-25 PROCEDURE — G8427 DOCREV CUR MEDS BY ELIG CLIN: HCPCS | Performed by: FAMILY MEDICINE

## 2021-08-25 PROCEDURE — 99214 OFFICE O/P EST MOD 30 MIN: CPT | Performed by: FAMILY MEDICINE

## 2021-08-25 PROCEDURE — 4040F PNEUMOC VAC/ADMIN/RCVD: CPT | Performed by: FAMILY MEDICINE

## 2021-08-25 PROCEDURE — 1036F TOBACCO NON-USER: CPT | Performed by: FAMILY MEDICINE

## 2021-08-25 PROCEDURE — 1111F DSCHRG MED/CURRENT MED MERGE: CPT | Performed by: FAMILY MEDICINE

## 2021-08-25 RX ORDER — HYDROCHLOROTHIAZIDE 12.5 MG/1
12.5 TABLET ORAL DAILY
Qty: 90 TABLET | Refills: 2 | Status: SHIPPED
Start: 2021-08-25 | End: 2021-10-27 | Stop reason: SDUPTHER

## 2021-08-25 ASSESSMENT — ENCOUNTER SYMPTOMS
RESPIRATORY NEGATIVE: 1
ALLERGIC/IMMUNOLOGIC NEGATIVE: 1
EYES NEGATIVE: 1
GASTROINTESTINAL NEGATIVE: 1

## 2021-08-25 NOTE — PROGRESS NOTES
21     Marie     : 1935 Sex: male   Age: 81 y.o. Chief Complaint   Patient presents with   Tracy Shoemaker     fell yesterday at home while getting out of shower, does not remember anything after       Prior to Admission medications    Medication Sig Start Date End Date Taking? Authorizing Provider   hydroCHLOROthiazide (HYDRODIURIL) 12.5 MG tablet Take 1 tablet by mouth daily 21  Yes David Villa, DO   pantoprazole (PROTONIX) 40 MG tablet TAKE 1 TABLET BY MOUTH EVERY DAY 21  Yes Historical Provider, MD   clopidogrel (PLAVIX) 75 MG tablet Take 1 tablet by mouth daily for 21 days 21 Yes Elvie Hurt, DO   hydrOXYzine (ATARAX) 10 MG tablet Take 10 mg by mouth 3 times daily as needed for Anxiety    Yes Historical Provider, MD   venlafaxine (EFFEXOR XR) 75 MG extended release capsule  21  Yes Historical Provider, MD   amLODIPine (NORVASC) 10 MG tablet 1 qd 21  Yes Nicko Villa, DO   losartan (COZAAR) 100 MG tablet 1 QD 7/20/21 10/20/21 Yes David Villa, DO   potassium chloride (KLOR-CON M) 20 MEQ extended release tablet Take 1 tablet by mouth daily 21  Yes Nicko Villa, DO   mirtazapine (REMERON) 15 MG tablet TAKE 1 TABLET BY MOUTH EVERY DAY AT BEDTIME 21  Yes Historical Provider, MD   famotidine (PEPCID) 40 MG tablet Take 1 tablet by mouth every evening 21  Yes Nicko Villa DO   aspirin 81 MG tablet Take 81 mg by mouth daily   Yes Historical Provider, MD          HPI: Erik Mckeoning is in today follow-up on recent CVA hypertension and fall at home secondary to syncopal episode. Medications reviewed and we are going to back off on amlodipine to just 5 mg a day. Ambulatory EKG monitor recommended and then will follow up once results are available. Recent problems with significant liver enzyme elevation after having been placed on Lipitor at 80 mg a day and this medication is currently discontinued.   Repeat blood work with visit in a week.          Review of Systems   Constitutional: Negative. HENT: Negative. Eyes: Negative. Respiratory: Negative. Gastrointestinal: Negative. Endocrine: Negative. Genitourinary: Negative. Musculoskeletal: Negative. Skin: Negative. Allergic/Immunologic: Negative. Neurological: Negative. Hematological: Negative. Psychiatric/Behavioral: Negative. Today systems review is stable at the moment. Syncopal episode as noted and adjustments being made including cardiac monitoring to be scheduled.         Current Outpatient Medications:     hydroCHLOROthiazide (HYDRODIURIL) 12.5 MG tablet, Take 1 tablet by mouth daily, Disp: 90 tablet, Rfl: 2    pantoprazole (PROTONIX) 40 MG tablet, TAKE 1 TABLET BY MOUTH EVERY DAY, Disp: , Rfl:     clopidogrel (PLAVIX) 75 MG tablet, Take 1 tablet by mouth daily for 21 days, Disp: 21 tablet, Rfl: 0    hydrOXYzine (ATARAX) 10 MG tablet, Take 10 mg by mouth 3 times daily as needed for Anxiety , Disp: , Rfl:     venlafaxine (EFFEXOR XR) 75 MG extended release capsule, , Disp: , Rfl:     amLODIPine (NORVASC) 10 MG tablet, 1 qd, Disp: 90 tablet, Rfl: 3    losartan (COZAAR) 100 MG tablet, 1 QD, Disp: 90 tablet, Rfl: 1    potassium chloride (KLOR-CON M) 20 MEQ extended release tablet, Take 1 tablet by mouth daily, Disp: 30 tablet, Rfl: 5    mirtazapine (REMERON) 15 MG tablet, TAKE 1 TABLET BY MOUTH EVERY DAY AT BEDTIME, Disp: , Rfl:     famotidine (PEPCID) 40 MG tablet, Take 1 tablet by mouth every evening, Disp: 30 tablet, Rfl: 3    aspirin 81 MG tablet, Take 81 mg by mouth daily, Disp: , Rfl:     No Known Allergies    Social History     Tobacco Use    Smoking status: Never Smoker    Smokeless tobacco: Never Used   Vaping Use    Vaping Use: Never used   Substance Use Topics    Alcohol use: No    Drug use: No      Past Surgical History:   Procedure Laterality Date    ANTERIOR CRUCIATE LIGAMENT REPAIR Left 11/21/2001    APPENDECTOMY      CHOLECYSTECTOMY  2007    Lap    ECHO COMPL W DOP COLOR FLOW  12/4/2012         HERNIA REPAIR Right 11/13/2002    double    SINUS SURGERY  [id number]     done x 2    TONSILLECTOMY      TOTAL KNEE ARTHROPLASTY Left 1/28/2019    LEFT  ROBOTIC KNEE TOTAL ARTHROPLASTY  ++MEREDITH- [id number]++   ++ADDUCTOR BLOCK++ performed by Tony Ervin MD at [address] [place] [address] 10/10/2019    EGD BIOPSY performed by Scout Nava MD at [facility] ENDOSCOPY     No family history on file. Past Medical History:   Diagnosis Date    Aneurysm of right renal artery (HCC)     follows with Dr. Anh Velasquez yearly (last visit 9/19)    Colitis     Depression     GERD (gastroesophageal reflux disease)     Hyperlipidemia     Hypertension     Superior mesenteric artery stenosis ([address] 11/4/2020    TIA (transient ischemic attack)        Vitals:    08/25/21 1102   BP: 114/64   Pulse: 91   Temp: 96.9 °F (36.1 °C)   SpO2: 96%   Height: 6' (1.829 m)     BP Readings from Last 3 Encounters:   08/25/21 114/64   08/24/21 118/72   08/19/21 120/60        Physical Exam  Vitals and nursing note reviewed. Constitutional:       Appearance: He is well-developed. HENT:      Head: Normocephalic. Right Ear: External ear normal.      Left Ear: External ear normal.      Nose: Nose normal.   Eyes:      Conjunctiva/sclera: Conjunctivae normal.      Pupils: Pupils are equal, round, and reactive to light. Cardiovascular:      Rate and Rhythm: Normal rate. Pulmonary:      Breath sounds: Normal breath sounds. Abdominal:      General: Bowel sounds are normal.      Palpations: Abdomen is soft. Musculoskeletal:         General: Normal range of motion. Cervical back: Normal range of motion and neck supple. Skin:     General: Skin is warm and dry. Neurological:      Mental Status: He is alert and oriented to person, place, and time.    Psychiatric:         Behavior: Behavior normal.        Vitals physical examination currently stable. Heart is controlled lungs are clear. Neurologically is intact at this time. Mild expressive aphasia persist.  Lab work to be completed as well as arrangements for ambulatory EKG monitor and then follow-up with me next week. Plan Per Assessment:  Tona Meredith was seen today for fall. Diagnoses and all orders for this visit:    Acute cerebrovascular accident (CVA) ([facility] [address]  -     CBC Auto Differential; Future  -     Comprehensive Metabolic Panel; Future    Essential hypertension  -     CBC Auto Differential; Future  -     Comprehensive Metabolic Panel; Future    Syncope, unspecified syncope type  -     CBC Auto Differential; Future  -     Comprehensive Metabolic Panel; Future  -     [place] TRANSITIONAL CARE MANAGE SERVICE 15 DAY DISCHRGE    Other orders  -     hydroCHLOROthiazide (HYDRODIURIL) 12.5 MG tablet; Take 1 tablet by mouth daily            Return in about 2 weeks (around 9/8/2021). Victorina Hope DO    Note was generated with the assistance of voice recognition software. Document was reviewed however may contain grammatical errors.

## 2021-08-27 ENCOUNTER — OFFICE VISIT (OUTPATIENT)
Dept: NEUROLOGY | Age: 86
End: 2021-08-27
Payer: MEDICARE

## 2021-08-27 ENCOUNTER — TELEPHONE (OUTPATIENT)
Dept: PRIMARY CARE CLINIC | Age: 86
End: 2021-08-27

## 2021-08-27 VITALS
OXYGEN SATURATION: 95 % | TEMPERATURE: 98.4 F | HEIGHT: 72 IN | DIASTOLIC BLOOD PRESSURE: 73 MMHG | SYSTOLIC BLOOD PRESSURE: 118 MMHG | HEART RATE: 89 BPM | WEIGHT: 180 LBS | BODY MASS INDEX: 24.38 KG/M2

## 2021-08-27 DIAGNOSIS — I63.9 ACUTE CEREBROVASCULAR ACCIDENT (CVA) (HCC): Primary | ICD-10-CM

## 2021-08-27 PROCEDURE — 99203 OFFICE O/P NEW LOW 30 MIN: CPT | Performed by: PHYSICIAN ASSISTANT

## 2021-08-27 PROCEDURE — 1123F ACP DISCUSS/DSCN MKR DOCD: CPT | Performed by: PHYSICIAN ASSISTANT

## 2021-08-27 PROCEDURE — G8427 DOCREV CUR MEDS BY ELIG CLIN: HCPCS | Performed by: PHYSICIAN ASSISTANT

## 2021-08-27 PROCEDURE — 1036F TOBACCO NON-USER: CPT | Performed by: PHYSICIAN ASSISTANT

## 2021-08-27 PROCEDURE — 4040F PNEUMOC VAC/ADMIN/RCVD: CPT | Performed by: PHYSICIAN ASSISTANT

## 2021-08-27 PROCEDURE — G8420 CALC BMI NORM PARAMETERS: HCPCS | Performed by: PHYSICIAN ASSISTANT

## 2021-08-27 PROCEDURE — 1111F DSCHRG MED/CURRENT MED MERGE: CPT | Performed by: PHYSICIAN ASSISTANT

## 2021-08-27 NOTE — PROGRESS NOTES
[address] [facility]. Willam Morris M.D., F.A.C.P. Lila Roth, DNP, APRN, ACNS-BC  Augustin Downey. Vee Terry, MSN, APRN-FNP-C  YIFAN Jones, PACristianC  Treasure James, MSN, APRN-FNP-C  [address], [place] [address]  [place], [address]  Phone: [phone]  Fax: [phone]       Aranza Richter is a 81 y.o. right handed male       Past Medical History:     Past Medical History:   Diagnosis Date    Aneurysm of right renal artery ([facility] [address]     follows with Dr. Leonardo Jordan yearly (last visit 9/19)    Colitis     Depression     GERD (gastroesophageal reflux disease)     Hyperlipidemia     Hypertension     Superior mesenteric artery stenosis ([facility] [address] 11/4/2020    TIA (transient ischemic attack)        Past Surgical History:       Past Surgical History:   Procedure Laterality Date    ANTERIOR CRUCIATE LIGAMENT REPAIR Left 11/21/2001    APPENDECTOMY      CHOLECYSTECTOMY  2007    Lap    ECHO COMPL W DOP COLOR FLOW  12/4/2012         HERNIA REPAIR Right 11/13/2002    double    SINUS SURGERY  2002,2003     done x 2    TONSILLECTOMY      TOTAL KNEE ARTHROPLASTY Left 1/28/2019    LEFT  ROBOTIC KNEE TOTAL ARTHROPLASTY  ++MEREDITH- UUMEDEHN++   ++ADDUCTOR BLOCK++ performed by Manju Gupta MD at [facility] N/A 10/10/2019    EGD BIOPSY performed by Crista Arreguin MD at [facility] ENDOSCOPY       Allergies:       Patient has no known allergies. Medications:     Prior to Admission medications    Medication Sig Start Date End Date Taking?  Authorizing Provider   hydroCHLOROthiazide (HYDRODIURIL) 12.5 MG tablet Take 1 tablet by mouth daily 8/25/21  Yes David Villa,    pantoprazole (PROTONIX) 40 MG tablet TAKE 1 TABLET BY MOUTH EVERY DAY 8/11/21  Yes Historical Provider, MD   clopidogrel (PLAVIX) 75 MG tablet Take 1 tablet by mouth daily for 21 days 8/16/21 9/6/21 Yes Joby Shaikh,    venlafaxine (EFFEXOR XR) 75 MG extended release capsule 7/6/21  Yes Historical Provider, MD   amLODIPine (NORVASC) 10 MG tablet 1 qd 7/20/21  Yes [employer] Trajuan franciscooff, DO   losartan (COZAAR) 100 MG tablet 1 QD 7/20/21 10/20/21 Yes [employer] Trajuan franciscooff, DO   potassium chloride (KLOR-CON M) 20 MEQ extended release tablet Take 1 tablet by mouth daily 7/20/21  Yes [employer] Trajuan franciscooff, DO   mirtazapine (REMERON) 15 MG tablet TAKE 1 TABLET BY MOUTH EVERY DAY AT BEDTIME 6/30/21  Yes Historical Provider, MD   famotidine (PEPCID) 40 MG tablet Take 1 tablet by mouth every evening 6/23/21  Yes [employer] Rayaoff, DO   aspirin 81 MG tablet Take 81 mg by mouth daily   Yes Historical Provider, MD   hydrOXYzine (ATARAX) 10 MG tablet Take 10 mg by mouth 3 times daily as needed for Anxiety   Patient not taking: Reported on 8/27/2021    Historical Provider, MD       Social History:        reports that he has never smoked. He has never used smokeless tobacco. He reports that he does not drink alcohol and does not use drugs. Review of Systems:     No chest pain or palpitations  No SOB  No vertigo, lightheadedness or loss of consciousness  No falls, tripping or stumbling  No incontinence of bowels or bladder  No itching or bruising appreciated  No numbness, tingling or focal arm/leg weakness    ROS is otherwise negative    Family History:     No family history on file. History of Present Illness:     Patient presents as a hospital follow-up for recent left corona radiata stroke on 8/14/2021. He had presented to the ED with complaints of right facial droop and dysarthria. At the time of presentation NIH was 1 and he was outside the window for TPA. CTA of the head and neck was negative for any significant stenosis or LVO. MRI of the brain revealed an acute left-sided corona radiata stroke. He was discharged on DAPT and high intensity statin. Due to elevated liver enzymes his PCP discontinued his Lipitor. His LDL 75. He continues on aspirin and Plavix without ill effect.     He denies having any remaining symptoms from the stroke, though his speech does still sound mildly dysarthric to me. On Tuesday he reports being in the shower and feeling like he was going to pass out. He did suffer a syncopal episode and had trouble getting up due to being wedged in the bathtub. His PCP adjusted his BP medications and plans to do some cardiac work-up. Otherwise, he is feeling fine and has no complaints at today's visit. Objective:       /73 (Site: Left Upper Arm, Position: Sitting)   Pulse 89   Temp 98.4 °F (36.9 °C) (Infrared)   Ht 6' (1.829 m)   Wt 180 lb (81.6 kg)   SpO2 95%   BMI 24.41 kg/m²     General appearance: alert, appears stated age, cooperative and in no distress  Head: normocephalic, without obvious abnormality, atraumatic  Eyes: conjunctivae/corneas clear; no drainage  Neck: no adenopathy, no carotid bruit, supple  Lungs: clear to auscultation bilaterally  Heart: regular rate and rhythm, S1, S2 normal, no murmur  Extremities: normal, atraumatic, no cyanosis or edema  Skin:  color, texture, turgor normal--no rashes or lesions-- some bruising noted over arms     Mental Status: alert and oriented. Thought content appropriate.  Pleasant     Appropriate attention/concentration  Intact fundus of knowledge  Repetition intact  Memories intact    Speech: Mild dysarthria  Language: no aphasias    Cranial Nerves:  I: smell    II: visual acuity     II: visual fields Full    II: pupils GERI   III,VII: ptosis None   III,IV,VI: extraocular muscles  EOMI without nystagmus   V: mastication Normal   V: facial light touch sensation  Normal   V,VII: corneal reflex     VII: facial muscle function - upper  Normal   VII: facial muscle function - lower Normal   VIII: hearing Normal   IX: soft palate elevation  Normal   IX,X: gag reflex    XI: trapezius strength  5/5   XI: sternocleidomastoid strength 5/5   XI: neck extension strength  5/5   XII: tongue strength  Normal     Motor:  5/5 throughout  Normal bulk and tone  No drift   No abnormal movements    Sensory:  LT  normal  Vibration absent ankles b/l     Coordination:   FN, FFM normal  HS normal    Gait:  Normal    DTR:   Barely elicited    No Graff's    No other pathological reflexes    Laboratory/Radiology:  ry/Radiology:     CBC with Differential:    Lab Results   Component Value Date    WBC 11.3 2021    RBC 4.10 2021    HGB 12.4 2021    HCT 36.4 2021     2021    MCV 88.8 2021    MCH 30.2 2021    MCHC 34.1 2021    RDW 13.1 2021    NRBC 0.0 2017    SEGSPCT 60 2012    LYMPHOPCT 2.9 2021    MONOPCT 8.1 2021    BASOPCT 0.3 2021    MONOSABS 0.92 2021    LYMPHSABS 0.33 2021    EOSABS 0.11 2021    BASOSABS 0.03 2021     CMP:    Lab Results   Component Value Date     2021    K 3.4 2021    K 3.4 2021    CL 99 2021    CO2 27 2021    BUN 18 2021    CREATININE 1.6 2021    GFRAA 50 2021    LABGLOM 41 2021    GLUCOSE 174 2021    PROT 6.8 2021    LABALBU 3.0 2021    CALCIUM 9.1 2021    BILITOT 1.1 2021    ALKPHOS 491 2021     2021     2021     HgBA1c:    Lab Results   Component Value Date    LABA1C 5.4 2021     FLP:    Lab Results   Component Value Date    TRIG 70 2021    HDL 39 2021    [address] 2021    LABVLDL 14 2021     MRI brain: Left corona radiata acute infarct     CTAs head/neck: negative for acute findings and no occlusions or significant stenosis, although incidental right thyroid nodules found.     All labs and images were personally reviewed at the time of this visit    Assessment:     Recent stroke to the L corona radiata 2/2  from vascular risk factors and     Recent syncopal episode    BP meds have been adjusted by PCP and patient is to have cardiac evaluation     Plan: DAPT x 21 days, then ASA monotherapy     LDL 75; elevated liver enzymes on Lipitor-- PCP stopped medication for now. Recommend diet to control cholesterol. Can consider low dose Pravachol but will defer to PCP     Stroke signs and symptoms reviewed     RTO 4 months     Call with questions or concerns     Ramin José PA-C  10:11 AM  8/27/2021    I spent 35 minutes with this patient obtaining the HPI and discussing the exam with greater than 50% of the time providing counseling and education on medications and other treatment plans. All questions were answered prior to leaving my office.

## 2021-08-31 ENCOUNTER — HOSPITAL ENCOUNTER (OUTPATIENT)
Age: 86
Discharge: HOME | End: 2021-08-31
Payer: MEDICARE

## 2021-08-31 DIAGNOSIS — R55 SYNCOPE, UNSPECIFIED SYNCOPE TYPE: ICD-10-CM

## 2021-08-31 DIAGNOSIS — I10 ESSENTIAL HYPERTENSION: Chronic | ICD-10-CM

## 2021-08-31 DIAGNOSIS — I63.9 ACUTE CEREBROVASCULAR ACCIDENT (CVA) (HCC): ICD-10-CM

## 2021-08-31 LAB
ALBUMIN SERPL-MCNC: 3.3 G/DL (ref 3.5–5.2)
ALP BLD-CCNC: 549 U/L (ref 40–129)
ALT SERPL-CCNC: 87 U/L (ref 0–40)
ANION GAP SERPL CALCULATED.3IONS-SCNC: 10 MMOL/L (ref 7–16)
AST SERPL-CCNC: 52 U/L (ref 0–39)
BASOPHILS ABSOLUTE: 0.06 E9/L (ref 0–0.2)
BASOPHILS RELATIVE PERCENT: 0.8 % (ref 0–2)
BILIRUB SERPL-MCNC: 0.8 MG/DL (ref 0–1.2)
BUN BLDV-MCNC: 13 MG/DL (ref 6–23)
CALCIUM SERPL-MCNC: 9.7 MG/DL (ref 8.6–10.2)
CHLORIDE BLD-SCNC: 102 MMOL/L (ref 98–107)
CO2: 31 MMOL/L (ref 22–29)
CREAT SERPL-MCNC: 1.1 MG/DL (ref 0.7–1.2)
EOSINOPHILS ABSOLUTE: 0.34 E9/L (ref 0.05–0.5)
EOSINOPHILS RELATIVE PERCENT: 4.6 % (ref 0–6)
GFR AFRICAN AMERICAN: >60
GFR NON-AFRICAN AMERICAN: >60 ML/MIN/1.73
GLUCOSE BLD-MCNC: 101 MG/DL (ref 74–99)
HCT VFR BLD CALC: 37.3 % (ref 37–54)
HEMOGLOBIN: 12.4 G/DL (ref 12.5–16.5)
IMMATURE GRANULOCYTES #: 0.08 E9/L
IMMATURE GRANULOCYTES %: 1.1 % (ref 0–5)
INR BLD: 1.2
LYMPHOCYTES ABSOLUTE: 1.36 E9/L (ref 1.5–4)
LYMPHOCYTES RELATIVE PERCENT: 18.3 % (ref 20–42)
MCH RBC QN AUTO: 30.2 PG (ref 26–35)
MCHC RBC AUTO-ENTMCNC: 33.2 % (ref 32–34.5)
MCV RBC AUTO: 90.8 FL (ref 80–99.9)
MONOCYTES ABSOLUTE: 0.76 E9/L (ref 0.1–0.95)
MONOCYTES RELATIVE PERCENT: 10.2 % (ref 2–12)
NEUTROPHILS ABSOLUTE: 4.85 E9/L (ref 1.8–7.3)
NEUTROPHILS RELATIVE PERCENT: 65 % (ref 43–80)
PDW BLD-RTO: 13.1 FL (ref 11.5–15)
PLATELET # BLD: 536 E9/L (ref 130–450)
PMV BLD AUTO: 9.4 FL (ref 7–12)
POTASSIUM SERPL-SCNC: 3.6 MMOL/L (ref 3.5–5)
PROTHROMBIN TIME: 13.3 SEC (ref 9.3–12.4)
RBC # BLD: 4.11 E12/L (ref 3.8–5.8)
SODIUM BLD-SCNC: 143 MMOL/L (ref 132–146)
TOTAL PROTEIN: 7.4 G/DL (ref 6.4–8.3)
WBC # BLD: 7.5 E9/L (ref 4.5–11.5)

## 2021-08-31 PROCEDURE — 80053 COMPREHEN METABOLIC PANEL: CPT

## 2021-08-31 PROCEDURE — 85610 PROTHROMBIN TIME: CPT

## 2021-08-31 PROCEDURE — 85025 COMPLETE CBC W/AUTO DIFF WBC: CPT

## 2021-08-31 PROCEDURE — 36415 COLL VENOUS BLD VENIPUNCTURE: CPT

## 2021-09-01 ENCOUNTER — OFFICE VISIT (OUTPATIENT)
Dept: PRIMARY CARE CLINIC | Age: 86
End: 2021-09-01
Payer: MEDICARE

## 2021-09-01 ENCOUNTER — CARE COORDINATION (OUTPATIENT)
Dept: CARE COORDINATION | Age: 86
End: 2021-09-01

## 2021-09-01 VITALS
HEART RATE: 78 BPM | OXYGEN SATURATION: 96 % | BODY MASS INDEX: 24.68 KG/M2 | WEIGHT: 182 LBS | TEMPERATURE: 98.2 F | SYSTOLIC BLOOD PRESSURE: 152 MMHG | DIASTOLIC BLOOD PRESSURE: 80 MMHG

## 2021-09-01 DIAGNOSIS — R11.0 NAUSEA: ICD-10-CM

## 2021-09-01 DIAGNOSIS — R63.0 DECREASED APPETITE: ICD-10-CM

## 2021-09-01 DIAGNOSIS — I63.9 ACUTE CEREBROVASCULAR ACCIDENT (CVA) (HCC): Primary | ICD-10-CM

## 2021-09-01 DIAGNOSIS — I10 ESSENTIAL HYPERTENSION: Chronic | ICD-10-CM

## 2021-09-01 DIAGNOSIS — J30.1 SEASONAL ALLERGIC RHINITIS DUE TO POLLEN: ICD-10-CM

## 2021-09-01 PROCEDURE — 1111F DSCHRG MED/CURRENT MED MERGE: CPT | Performed by: FAMILY MEDICINE

## 2021-09-01 PROCEDURE — 1036F TOBACCO NON-USER: CPT | Performed by: FAMILY MEDICINE

## 2021-09-01 PROCEDURE — 1123F ACP DISCUSS/DSCN MKR DOCD: CPT | Performed by: FAMILY MEDICINE

## 2021-09-01 PROCEDURE — 4040F PNEUMOC VAC/ADMIN/RCVD: CPT | Performed by: FAMILY MEDICINE

## 2021-09-01 PROCEDURE — G8420 CALC BMI NORM PARAMETERS: HCPCS | Performed by: FAMILY MEDICINE

## 2021-09-01 PROCEDURE — 99214 OFFICE O/P EST MOD 30 MIN: CPT | Performed by: FAMILY MEDICINE

## 2021-09-01 PROCEDURE — G8427 DOCREV CUR MEDS BY ELIG CLIN: HCPCS | Performed by: FAMILY MEDICINE

## 2021-09-01 RX ORDER — CLOPIDOGREL BISULFATE 75 MG/1
75 TABLET ORAL DAILY
Qty: 30 TABLET | Refills: 0 | Status: SHIPPED
Start: 2021-09-01 | End: 2021-09-01

## 2021-09-01 ASSESSMENT — ENCOUNTER SYMPTOMS
ABDOMINAL PAIN: 1
NAUSEA: 1
EYES NEGATIVE: 1
RESPIRATORY NEGATIVE: 1
ALLERGIC/IMMUNOLOGIC NEGATIVE: 1

## 2021-09-01 NOTE — PROGRESS NOTES
21     Gisela Raines    : 1935 Sex: male   Age: 81 y.o. Chief Complaint   Patient presents with   [address]       Prior to Admission medications    Medication Sig Start Date End Date Taking? Authorizing Provider   hydroCHLOROthiazide (HYDRODIURIL) 12.5 MG tablet Take 1 tablet by mouth daily 21  Yes David Villa DO   pantoprazole (PROTONIX) 40 MG tablet TAKE 1 TABLET BY MOUTH EVERY DAY 21  Yes Historical Provider, MD   venlafaxine (EFFEXOR XR) 75 MG extended release capsule  21  Yes Historical Provider, MD   amLODIPine (NORVASC) 10 MG tablet 1 qd  Patient taking differently: 1/2 qd 21  Yes David Villa DO   losartan (COZAAR) 100 MG tablet 1 QD 7/20/21 10/20/21 Yes David Villa DO   potassium chloride (KLOR-CON M) 20 MEQ extended release tablet Take 1 tablet by mouth daily 21  Yes Alva Villa DO   mirtazapine (REMERON) 15 MG tablet TAKE 1 TABLET BY MOUTH EVERY DAY AT BEDTIME 21  Yes Historical Provider, MD   famotidine (PEPCID) 40 MG tablet Take 1 tablet by mouth every evening 21  Yes Alva Villa DO   aspirin 81 MG tablet Take 81 mg by mouth daily   Yes Historical Provider, MD          HPI: Evaluated today with cerebrovascular disease hypertension seasonal allergies decreased appetite nausea. Elevated liver function studies. Some improvement AST ALT. Alk phos remains elevated. Recently started on Effexor and this may be a contributing factor and we will need to discontinue if continued abnormalities. Labs to be repeated prior to next visit. Currently abdomen is benign. Nausea has been longstanding and may continue with Zofran on a as needed basis only. Hypertension controlled. Gastritis stable with Pepcid 40 daily and maintain. Protonix is at 36 a day and will have him bring all medications for my review and make adjustments. Review of Systems   Constitutional: Negative. HENT: Negative. Eyes: Negative. Respiratory: Negative. Gastrointestinal: Positive for abdominal pain and nausea. Endocrine: Negative. Genitourinary: Negative. Musculoskeletal: Negative. Skin: Negative. Allergic/Immunologic: Negative. Neurological: Negative. Hematological: Negative. Psychiatric/Behavioral: Negative. Continued intermittent low-grade abdominal discomfort nausea. Worsening symptoms needs to be evaluated to emergency room. Labs to be completed prior to next visit. CT of the abdomen if necessary.         Current Outpatient Medications:     hydroCHLOROthiazide (HYDRODIURIL) 12.5 MG tablet, Take 1 tablet by mouth daily, Disp: 90 tablet, Rfl: 2    pantoprazole (PROTONIX) 40 MG tablet, TAKE 1 TABLET BY MOUTH EVERY DAY, Disp: , Rfl:     venlafaxine (EFFEXOR XR) 75 MG extended release capsule, , Disp: , Rfl:     amLODIPine (NORVASC) 10 MG tablet, 1 qd (Patient taking differently: 1/2 qd), Disp: 90 tablet, Rfl: 3    losartan (COZAAR) 100 MG tablet, 1 QD, Disp: 90 tablet, Rfl: 1    potassium chloride (KLOR-CON M) 20 MEQ extended release tablet, Take 1 tablet by mouth daily, Disp: 30 tablet, Rfl: 5    mirtazapine (REMERON) 15 MG tablet, TAKE 1 TABLET BY MOUTH EVERY DAY AT BEDTIME, Disp: , Rfl:     famotidine (PEPCID) 40 MG tablet, Take 1 tablet by mouth every evening, Disp: 30 tablet, Rfl: 3    aspirin 81 MG tablet, Take 81 mg by mouth daily, Disp: , Rfl:     No Known Allergies    Social History     Tobacco Use    Smoking status: Never Smoker    Smokeless tobacco: Never Used   Vaping Use    Vaping Use: Never used   Substance Use Topics    Alcohol use: No    Drug use: No      Past Surgical History:   Procedure Laterality Date    ANTERIOR CRUCIATE LIGAMENT REPAIR Left 11/21/2001    APPENDECTOMY      CHOLECYSTECTOMY  2007    Lap    ECHO COMPL W DOP COLOR FLOW  12/4/2012         HERNIA REPAIR Right 11/13/2002    double    SINUS SURGERY  2002,2003     done x 2    TONSILLECTOMY      TOTAL KNEE ARTHROPLASTY Left 1/28/2019    LEFT  ROBOTIC KNEE TOTAL ARTHROPLASTY  ++MEERDITH- [id number]++   ++ADDUCTOR BLOCK++ performed by Dl Ruvalcaba MD at [address] 10/10/2019    EGD BIOPSY performed by Shawn Tejada MD at [facility] ENDOSCOPY     No family history on file. Past Medical History:   Diagnosis Date    Aneurysm of right renal artery (HCC)     follows with Dr. Jonas Horne yearly (last visit 9/19)    Colitis     Depression     GERD (gastroesophageal reflux disease)     Hyperlipidemia     Hypertension     Superior mesenteric artery stenosis ([facility] [address] 11/4/2020    TIA (transient ischemic attack)        Vitals:    09/01/21 1307   BP: (!) 152/80   Pulse: 78   Temp: 98.2 °F (36.8 °C)   SpO2: 96%   Weight: 182 lb (82.6 kg)     BP Readings from Last 3 Encounters:   09/01/21 (!) 152/80   08/27/21 118/73   08/25/21 114/64        Physical Exam  Vitals and nursing note reviewed. Constitutional:       Appearance: He is well-developed. HENT:      Head: Normocephalic. Right Ear: External ear normal.      Left Ear: External ear normal.      Nose: Nose normal.   Eyes:      Conjunctiva/sclera: Conjunctivae normal.      Pupils: Pupils are equal, round, and reactive to light. Cardiovascular:      Rate and Rhythm: Normal rate. Pulmonary:      Breath sounds: Normal breath sounds. Abdominal:      General: Bowel sounds are normal.      Palpations: Abdomen is soft. Musculoskeletal:         General: Normal range of motion. Cervical back: Normal range of motion and neck supple. Skin:     General: Skin is warm and dry. Neurological:      Mental Status: He is alert and oriented to person, place, and time. Psychiatric:         Behavior: Behavior normal.        Mild blood pressure elevation today at 152/80. HEENT was unremarkable. Heart was regular lungs are clear. Abdomen benign.   Additional lab studies and follow-up in 2 weeks and then further adjustment with medications if needed. To bring all medications for my review at follow-up. Clinically actually appears better at this time but both he and his daughter understand if worsening symptoms immediate follow-up. Neurology consultation reviewed and off of Plavix after 3 weeks and now aspirin 81 mg daily. Plan Per Assessment:  Lazara Navas was seen today for discuss labs. Diagnoses and all orders for this visit:    Acute cerebrovascular accident (CVA) ([facility] [address]    Essential hypertension    Seasonal allergic rhinitis due to pollen    Decreased appetite  -     CBC Auto Differential; Future  -     Comprehensive Metabolic Panel; Future  -     Amylase; Future  -     Lipase; Future    Nausea  -     CBC Auto Differential; Future  -     Comprehensive Metabolic Panel; Future  -     Amylase; Future  -     Lipase; Future    Other orders  -     Discontinue: clopidogrel (PLAVIX) 75 MG tablet; Take 1 tablet by mouth daily for 21 days            No follow-ups on file. Floy Ice, DO    Note was generated with the assistance of voice recognition software. Document was reviewed however may contain grammatical errors.

## 2021-09-01 NOTE — CARE COORDINATION
[facility] Transitions Follow Up Call    2021    Patient: Briseida Crews  Patient : 1935   MRN: <[id number]>  Reason for Admission: -8/15/21 Acute CVA  Discharge Date: 21 RARS: Readmission Risk Score: 13         Spoke with: Attempted to contact patient for follow up Care Transition call. Left HIPPA compliant message requesting return call. Will attempt to reach again. Saadia Melgar LPN  [facility] / [address]  [phone]    Care Transitions Subsequent and Final Call    Subsequent and Final Calls  Care Transitions Interventions  Other Interventions:            Follow Up  Future Appointments   Date Time Provider Zelda Siddiqi   2021  9:00 AM SEB US RM 1 SEBZ US SEB Radiolog   2021  1:30 PM DO KIKA Gracia [facility]   10/14/2021  1:00 PM Holland Flores MD [facility]   2021  2:00 PM BRYON Briscoe [address] [facility] [address]

## 2021-09-08 ENCOUNTER — CARE COORDINATION (OUTPATIENT)
Dept: CASE MANAGEMENT | Age: 86
End: 2021-09-08

## 2021-09-08 NOTE — CARE COORDINATION
[facility] Transitions Follow Up Call    2021    Patient: Rock Peralta  Patient : 1935   MRN: <[id number]>  Reason for Admission: Acute CVA  Discharge Date: 21 RARS: Readmission Risk Score: 15    Attempted to contact patient for Transition Subsequent call. Left HIPAA Compliant message on Voice Mail to call [facility] (number given) with any questions and an update on patient's condition since discharge. Will continue to follow. Edie Cooper LPN    [phone]  [place] [employer] / Delphine [address]ia [address] Transitions Subsequent and Final Call    Subsequent and Final Calls  Care Transitions Interventions  Other Interventions:            Follow Up  Future Appointments   Date Time Provider Zelda Siddiqi   2021  9:00 AM SEB US RM 1 SEBZ US SEB Radiolog   2021  1:30 PM DO KIKA [facility]   10/14/2021  1:00 PM Soheila Ariza MD [facility]   2021  2:00 PM BRYON Nye [facility]       Edie Cooper LPN

## 2021-09-09 ENCOUNTER — HOSPITAL ENCOUNTER (OUTPATIENT)
Dept: ULTRASOUND IMAGING | Age: 86
Discharge: HOME | End: 2021-09-11
Payer: MEDICARE

## 2021-09-09 ENCOUNTER — HOSPITAL ENCOUNTER (OUTPATIENT)
Age: 86
Discharge: HOME | End: 2021-09-09
Payer: MEDICARE

## 2021-09-09 VITALS — OXYGEN SATURATION: 96 % | SYSTOLIC BLOOD PRESSURE: 173 MMHG | DIASTOLIC BLOOD PRESSURE: 79 MMHG | HEART RATE: 66 BPM

## 2021-09-09 DIAGNOSIS — E04.1 THYROID NODULE: ICD-10-CM

## 2021-09-09 DIAGNOSIS — R11.0 NAUSEA: ICD-10-CM

## 2021-09-09 DIAGNOSIS — R63.0 DECREASED APPETITE: ICD-10-CM

## 2021-09-09 LAB
ALBUMIN SERPL-MCNC: 3.9 G/DL (ref 3.5–5.2)
ALP BLD-CCNC: 316 U/L (ref 40–129)
ALT SERPL-CCNC: 25 U/L (ref 0–40)
AMYLASE: 75 U/L (ref 20–100)
ANION GAP SERPL CALCULATED.3IONS-SCNC: 10 MMOL/L (ref 7–16)
AST SERPL-CCNC: 24 U/L (ref 0–39)
BASOPHILS ABSOLUTE: 0.05 E9/L (ref 0–0.2)
BASOPHILS RELATIVE PERCENT: 0.7 % (ref 0–2)
BILIRUB SERPL-MCNC: 0.4 MG/DL (ref 0–1.2)
BUN BLDV-MCNC: 10 MG/DL (ref 6–23)
CALCIUM SERPL-MCNC: 9.2 MG/DL (ref 8.6–10.2)
CHLORIDE BLD-SCNC: 104 MMOL/L (ref 98–107)
CO2: 30 MMOL/L (ref 22–29)
CREAT SERPL-MCNC: 1 MG/DL (ref 0.7–1.2)
EOSINOPHILS ABSOLUTE: 0.2 E9/L (ref 0.05–0.5)
EOSINOPHILS RELATIVE PERCENT: 3 % (ref 0–6)
GFR AFRICAN AMERICAN: >60
GFR NON-AFRICAN AMERICAN: >60 ML/MIN/1.73
GLUCOSE BLD-MCNC: 92 MG/DL (ref 74–99)
HCT VFR BLD CALC: 40.3 % (ref 37–54)
HEMOGLOBIN: 13 G/DL (ref 12.5–16.5)
IMMATURE GRANULOCYTES #: 0.03 E9/L
IMMATURE GRANULOCYTES %: 0.4 % (ref 0–5)
LIPASE: 18 U/L (ref 13–60)
LYMPHOCYTES ABSOLUTE: 1.35 E9/L (ref 1.5–4)
LYMPHOCYTES RELATIVE PERCENT: 20.2 % (ref 20–42)
MCH RBC QN AUTO: 30.2 PG (ref 26–35)
MCHC RBC AUTO-ENTMCNC: 32.3 % (ref 32–34.5)
MCV RBC AUTO: 93.7 FL (ref 80–99.9)
MONOCYTES ABSOLUTE: 0.57 E9/L (ref 0.1–0.95)
MONOCYTES RELATIVE PERCENT: 8.5 % (ref 2–12)
NEUTROPHILS ABSOLUTE: 4.47 E9/L (ref 1.8–7.3)
NEUTROPHILS RELATIVE PERCENT: 67.2 % (ref 43–80)
PDW BLD-RTO: 13.2 FL (ref 11.5–15)
PLATELET # BLD: 554 E9/L (ref 130–450)
PMV BLD AUTO: 9.4 FL (ref 7–12)
POTASSIUM SERPL-SCNC: 3.8 MMOL/L (ref 3.5–5)
RBC # BLD: 4.3 E12/L (ref 3.8–5.8)
SODIUM BLD-SCNC: 144 MMOL/L (ref 132–146)
TOTAL PROTEIN: 7.4 G/DL (ref 6.4–8.3)
WBC # BLD: 6.7 E9/L (ref 4.5–11.5)

## 2021-09-09 PROCEDURE — 82150 ASSAY OF AMYLASE: CPT

## 2021-09-09 PROCEDURE — 85025 COMPLETE CBC W/AUTO DIFF WBC: CPT

## 2021-09-09 PROCEDURE — 83690 ASSAY OF LIPASE: CPT

## 2021-09-09 PROCEDURE — 88305 TISSUE EXAM BY PATHOLOGIST: CPT

## 2021-09-09 PROCEDURE — 88173 CYTOPATH EVAL FNA REPORT: CPT

## 2021-09-09 PROCEDURE — 80053 COMPREHEN METABOLIC PANEL: CPT

## 2021-09-09 PROCEDURE — 36415 COLL VENOUS BLD VENIPUNCTURE: CPT

## 2021-09-09 PROCEDURE — 10005 FNA BX W/US GDN 1ST LES: CPT

## 2021-09-14 ENCOUNTER — OFFICE VISIT (OUTPATIENT)
Dept: PRIMARY CARE CLINIC | Age: 86
End: 2021-09-14
Payer: MEDICARE

## 2021-09-14 VITALS
OXYGEN SATURATION: 96 % | SYSTOLIC BLOOD PRESSURE: 130 MMHG | DIASTOLIC BLOOD PRESSURE: 70 MMHG | BODY MASS INDEX: 25.23 KG/M2 | WEIGHT: 186 LBS | TEMPERATURE: 98 F | HEART RATE: 69 BPM

## 2021-09-14 DIAGNOSIS — I10 ESSENTIAL HYPERTENSION: Primary | Chronic | ICD-10-CM

## 2021-09-14 DIAGNOSIS — R74.8 ABNORMAL LIVER ENZYMES: ICD-10-CM

## 2021-09-14 DIAGNOSIS — F51.01 PRIMARY INSOMNIA: ICD-10-CM

## 2021-09-14 DIAGNOSIS — E78.2 MIXED HYPERLIPIDEMIA: Chronic | ICD-10-CM

## 2021-09-14 DIAGNOSIS — R35.1 NOCTURIA: ICD-10-CM

## 2021-09-14 DIAGNOSIS — R73.01 IMPAIRED FASTING GLUCOSE: ICD-10-CM

## 2021-09-14 PROCEDURE — 99214 OFFICE O/P EST MOD 30 MIN: CPT | Performed by: FAMILY MEDICINE

## 2021-09-14 PROCEDURE — 1123F ACP DISCUSS/DSCN MKR DOCD: CPT | Performed by: FAMILY MEDICINE

## 2021-09-14 PROCEDURE — 1111F DSCHRG MED/CURRENT MED MERGE: CPT | Performed by: FAMILY MEDICINE

## 2021-09-14 PROCEDURE — 1036F TOBACCO NON-USER: CPT | Performed by: FAMILY MEDICINE

## 2021-09-14 PROCEDURE — 4040F PNEUMOC VAC/ADMIN/RCVD: CPT | Performed by: FAMILY MEDICINE

## 2021-09-14 PROCEDURE — G8427 DOCREV CUR MEDS BY ELIG CLIN: HCPCS | Performed by: FAMILY MEDICINE

## 2021-09-14 PROCEDURE — G8417 CALC BMI ABV UP PARAM F/U: HCPCS | Performed by: FAMILY MEDICINE

## 2021-09-14 RX ORDER — POTASSIUM CHLORIDE 20 MEQ/1
20 TABLET, EXTENDED RELEASE ORAL DAILY
Qty: 90 TABLET | Refills: 3 | Status: SHIPPED
Start: 2021-09-14 | End: 2021-11-04 | Stop reason: SDUPTHER

## 2021-09-14 RX ORDER — FAMOTIDINE 40 MG/1
40 TABLET, FILM COATED ORAL EVERY EVENING
Qty: 90 TABLET | Refills: 3 | Status: SHIPPED
Start: 2021-09-14 | End: 2022-05-24

## 2021-09-14 RX ORDER — AMLODIPINE BESYLATE 5 MG/1
TABLET ORAL
COMMUNITY
Start: 2021-09-07 | End: 2021-09-24 | Stop reason: SDUPTHER

## 2021-09-14 RX ORDER — PROMETHAZINE HYDROCHLORIDE 25 MG/1
25 TABLET ORAL EVERY 6 HOURS PRN
COMMUNITY
End: 2021-12-07

## 2021-09-14 NOTE — PROGRESS NOTES
21     Doroteo Aguirre    : 1935 Sex: male   Age: 81 y.o. Chief Complaint   Patient presents with   [address]     and thyroid biopsy results    Hypertension    Nausea     improved       Prior to Admission medications    Medication Sig Start Date End Date Taking? Authorizing Provider   promethazine (PHENERGAN) 25 MG tablet Take 25 mg by mouth every 6 hours as needed for Nausea   Yes Historical Provider, MD   amLODIPine (NORVASC) 5 MG tablet TAKE 1 TABLET BY MOUTH DAILY 21  Yes Historical Provider, MD   famotidine (PEPCID) 40 MG tablet Take 1 tablet by mouth every evening 21  Yes Chetan Villa,    potassium chloride (KLOR-CON M) 20 MEQ extended release tablet Take 1 tablet by mouth daily 21  Yes Chetan Villa DO   hydroCHLOROthiazide (HYDRODIURIL) 12.5 MG tablet Take 1 tablet by mouth daily 21  Yes David Villa DO   pantoprazole (PROTONIX) 40 MG tablet TAKE 1 TABLET BY MOUTH EVERY DAY 21  Yes Historical Provider, MD   venlafaxine (EFFEXOR XR) 75 MG extended release capsule  21  Yes Historical Provider, MD   losartan (COZAAR) 100 MG tablet 1 QD 7/20/21 10/20/21 Yes David Villa DO   mirtazapine (REMERON) 15 MG tablet TAKE 1 TABLET BY MOUTH EVERY DAY AT BEDTIME 21  Yes Historical Provider, MD   aspirin 81 MG tablet Take 81 mg by mouth daily   Yes Historical Provider, MD          HPI: Andria Issa is in today medical follow-up hypertension hyperlipidemia impaired fasting glucose insomnia nocturia all of which is been fairly stable. Recent abdominal complaints nausea seem to be improving. Onset of symptoms after being treated with Effexor and significant liver enzyme elevation noted. I have asked him to go ahead and back off on the Effexor to every other day for the next 3 days and then twice weekly and then discontinue. Reassessment of CMP and CBC in the next 10 days. Follow-up visit at that time.   He will see psychiatry the following week.          Review of Systems   Constitutional: Negative. HENT: Negative. Eyes: Negative. Respiratory: Negative. Gastrointestinal: Negative. Endocrine: Negative. Genitourinary: Negative. Musculoskeletal: Negative. Skin: Negative. Allergic/Immunologic: Negative. Neurological: Negative. Hematological: Negative. Psychiatric/Behavioral: Negative. Systems review is overall stable. Nausea improving.           Current Outpatient Medications:     promethazine (PHENERGAN) 25 MG tablet, Take 25 mg by mouth every 6 hours as needed for Nausea, Disp: , Rfl:     amLODIPine (NORVASC) 5 MG tablet, TAKE 1 TABLET BY MOUTH DAILY, Disp: , Rfl:     famotidine (PEPCID) 40 MG tablet, Take 1 tablet by mouth every evening, Disp: 90 tablet, Rfl: 3    potassium chloride (KLOR-CON M) 20 MEQ extended release tablet, Take 1 tablet by mouth daily, Disp: 90 tablet, Rfl: 3    hydroCHLOROthiazide (HYDRODIURIL) 12.5 MG tablet, Take 1 tablet by mouth daily, Disp: 90 tablet, Rfl: 2    pantoprazole (PROTONIX) 40 MG tablet, TAKE 1 TABLET BY MOUTH EVERY DAY, Disp: , Rfl:     venlafaxine (EFFEXOR XR) 75 MG extended release capsule, , Disp: , Rfl:     losartan (COZAAR) 100 MG tablet, 1 QD, Disp: 90 tablet, Rfl: 1    mirtazapine (REMERON) 15 MG tablet, TAKE 1 TABLET BY MOUTH EVERY DAY AT BEDTIME, Disp: , Rfl:     aspirin 81 MG tablet, Take 81 mg by mouth daily, Disp: , Rfl:     No Known Allergies    Social History     Tobacco Use    Smoking status: Never Smoker    Smokeless tobacco: Never Used   Vaping Use    Vaping Use: Never used   Substance Use Topics    Alcohol use: No    Drug use: No      Past Surgical History:   Procedure Laterality Date    ANTERIOR CRUCIATE LIGAMENT REPAIR Left 11/21/2001    APPENDECTOMY      CHOLECYSTECTOMY  2007    Lap    ECHO COMPL W DOP COLOR FLOW  12/4/2012         HERNIA REPAIR Right 11/13/2002    double    SINUS SURGERY  2002,2003     done x 2    TONSILLECTOMY  TOTAL KNEE ARTHROPLASTY Left 1/28/2019    LEFT  ROBOTIC KNEE TOTAL ARTHROPLASTY  ++MEREDITH- SBHOPDEM++   ++ADDUCTOR BLOCK++ performed by Demetrio Bowling MD at [address] [place] [address] 10/10/2019    EGD BIOPSY performed by Willie Apley, MD at [facility] ENDOSCOPY     No family history on file. Past Medical History:   Diagnosis Date    Aneurysm of right renal artery (HCC)     follows with Dr. Nino Allen yearly (last visit 9/19)    Colitis     Depression     GERD (gastroesophageal reflux disease)     Hyperlipidemia     Hypertension     Superior mesenteric artery stenosis ([facility] [address] 11/4/2020    TIA (transient ischemic attack)        Vitals:    09/14/21 1334   BP: 130/70   Pulse: 69   Temp: 98 °F (36.7 °C)   SpO2: 96%   Weight: 186 lb (84.4 kg)     BP Readings from Last 3 Encounters:   09/14/21 130/70   09/09/21 (!) 173/79   09/01/21 (!) 152/80        Physical Exam  Vitals and nursing note reviewed. Constitutional:       Appearance: He is well-developed. HENT:      Head: Normocephalic. Right Ear: External ear normal.      Left Ear: External ear normal.      Nose: Nose normal.   Eyes:      Conjunctiva/sclera: Conjunctivae normal.      Pupils: Pupils are equal, round, and reactive to light. Cardiovascular:      Rate and Rhythm: Normal rate. Pulmonary:      Breath sounds: Normal breath sounds. Abdominal:      General: Bowel sounds are normal.      Palpations: Abdomen is soft. Musculoskeletal:         General: Normal range of motion. Cervical back: Normal range of motion and neck supple. Skin:     General: Skin is warm and dry. Neurological:      Mental Status: He is alert and oriented to person, place, and time. Psychiatric:         Behavior: Behavior normal.          Current vitals physical examination stable. Treatment as noted. Reassessment 1 to 2 weeks. Labs prior.   Lab Results   Component Value Date    TSH 1.920 06/22/2021    TSH 2.210 03/10/2021    [id number] 7.7 06/22/2021    S7XMKFY 7.6 03/10/2021    T4FREE 1.10 01/22/2017     Lab Results   Component Value Date    CHOL 128 08/14/2021    CHOL 147 03/10/2021     Lab Results   Component Value Date    TRIG 70 08/14/2021    TRIG 76 03/10/2021     Lab Results   Component Value Date    HDL 39 08/14/2021    HDL 39 03/10/2021     No results found for: [facility]  Lab Results   Component Value Date    LABVLDL 14 08/14/2021    LABVLDL 15 03/10/2021     No results found for: [facility]  Lab Results   Component Value Date    WBC 6.7 09/09/2021    HGB 13.0 09/09/2021    HCT 40.3 09/09/2021    MCV 93.7 09/09/2021     (H) 09/09/2021    LYMPHOPCT 20.2 09/09/2021    RBC 4.30 09/09/2021    MCH 30.2 09/09/2021    MCHC 32.3 09/09/2021    RDW 13.2 09/09/2021     Lab Results   Component Value Date     09/09/2021    K 3.8 09/09/2021     09/09/2021    CO2 30 (H) 09/09/2021    BUN 10 09/09/2021    CREATININE 1.0 09/09/2021    GLUCOSE 92 09/09/2021    CALCIUM 9.2 09/09/2021    PROT 7.4 09/09/2021    LABALBU 3.9 09/09/2021    BILITOT 0.4 09/09/2021    ALKPHOS 316 (H) 09/09/2021    AST 24 09/09/2021    ALT 25 09/09/2021    LABGLOM >60 09/09/2021    GFRAA >60 09/09/2021        Lab Results   Component Value Date    PSA 2.83 04/29/2020    PSA 2.86 04/24/2019      Lab Results   Component Value Date    LABA1C 5.4 08/14/2021    LABA1C 5.1 01/25/2021     No results found for: EAG       Plan Per Assessment:  Cady Quiros was seen today for discuss labs, hypertension and nausea. Diagnoses and all orders for this visit:    Essential hypertension    Other orders  -     famotidine (PEPCID) 40 MG tablet; Take 1 tablet by mouth every evening  -     potassium chloride (KLOR-CON M) 20 MEQ extended release tablet; Take 1 tablet by mouth daily            No follow-ups on file. Libby Christine,     Note was generated with the assistance of voice recognition software. Document was reviewed however may contain grammatical errors.

## 2021-09-23 ENCOUNTER — HOSPITAL ENCOUNTER (OUTPATIENT)
Age: 86
Discharge: HOME | End: 2021-09-23
Payer: MEDICARE

## 2021-09-23 DIAGNOSIS — I10 ESSENTIAL HYPERTENSION: Chronic | ICD-10-CM

## 2021-09-23 DIAGNOSIS — R74.8 ABNORMAL LIVER ENZYMES: ICD-10-CM

## 2021-09-23 LAB
ALBUMIN SERPL-MCNC: 3.8 G/DL (ref 3.5–5.2)
ALP BLD-CCNC: 162 U/L (ref 40–129)
ALT SERPL-CCNC: 8 U/L (ref 0–40)
ANION GAP SERPL CALCULATED.3IONS-SCNC: 9 MMOL/L (ref 7–16)
AST SERPL-CCNC: 15 U/L (ref 0–39)
BASOPHILS ABSOLUTE: 0.04 E9/L (ref 0–0.2)
BASOPHILS RELATIVE PERCENT: 0.6 % (ref 0–2)
BILIRUB SERPL-MCNC: 0.5 MG/DL (ref 0–1.2)
BUN BLDV-MCNC: 13 MG/DL (ref 6–23)
CALCIUM SERPL-MCNC: 9.1 MG/DL (ref 8.6–10.2)
CHLORIDE BLD-SCNC: 103 MMOL/L (ref 98–107)
CO2: 30 MMOL/L (ref 22–29)
CREAT SERPL-MCNC: 0.9 MG/DL (ref 0.7–1.2)
EOSINOPHILS ABSOLUTE: 0.22 E9/L (ref 0.05–0.5)
EOSINOPHILS RELATIVE PERCENT: 3.3 % (ref 0–6)
GFR AFRICAN AMERICAN: >60
GFR NON-AFRICAN AMERICAN: >60 ML/MIN/1.73
GLUCOSE BLD-MCNC: 96 MG/DL (ref 74–99)
HCT VFR BLD CALC: 39 % (ref 37–54)
HEMOGLOBIN: 12.9 G/DL (ref 12.5–16.5)
IMMATURE GRANULOCYTES #: 0.03 E9/L
IMMATURE GRANULOCYTES %: 0.5 % (ref 0–5)
LYMPHOCYTES ABSOLUTE: 1.39 E9/L (ref 1.5–4)
LYMPHOCYTES RELATIVE PERCENT: 21.1 % (ref 20–42)
MCH RBC QN AUTO: 29.7 PG (ref 26–35)
MCHC RBC AUTO-ENTMCNC: 33.1 % (ref 32–34.5)
MCV RBC AUTO: 89.9 FL (ref 80–99.9)
MONOCYTES ABSOLUTE: 0.65 E9/L (ref 0.1–0.95)
MONOCYTES RELATIVE PERCENT: 9.9 % (ref 2–12)
NEUTROPHILS ABSOLUTE: 4.26 E9/L (ref 1.8–7.3)
NEUTROPHILS RELATIVE PERCENT: 64.6 % (ref 43–80)
PDW BLD-RTO: 13.1 FL (ref 11.5–15)
PLATELET # BLD: 343 E9/L (ref 130–450)
PMV BLD AUTO: 9.6 FL (ref 7–12)
POTASSIUM SERPL-SCNC: 3.4 MMOL/L (ref 3.5–5)
RBC # BLD: 4.34 E12/L (ref 3.8–5.8)
SODIUM BLD-SCNC: 142 MMOL/L (ref 132–146)
TOTAL PROTEIN: 7.1 G/DL (ref 6.4–8.3)
WBC # BLD: 6.6 E9/L (ref 4.5–11.5)

## 2021-09-23 PROCEDURE — 80053 COMPREHEN METABOLIC PANEL: CPT

## 2021-09-23 PROCEDURE — 85025 COMPLETE CBC W/AUTO DIFF WBC: CPT

## 2021-09-23 PROCEDURE — 36415 COLL VENOUS BLD VENIPUNCTURE: CPT

## 2021-09-24 ENCOUNTER — OFFICE VISIT (OUTPATIENT)
Dept: PRIMARY CARE CLINIC | Age: 86
End: 2021-09-24
Payer: MEDICARE

## 2021-09-24 VITALS
DIASTOLIC BLOOD PRESSURE: 82 MMHG | BODY MASS INDEX: 25.47 KG/M2 | TEMPERATURE: 97.1 F | OXYGEN SATURATION: 96 % | WEIGHT: 188 LBS | HEIGHT: 72 IN | HEART RATE: 82 BPM | SYSTOLIC BLOOD PRESSURE: 138 MMHG

## 2021-09-24 DIAGNOSIS — R74.8 ABNORMAL LIVER ENZYMES: Primary | ICD-10-CM

## 2021-09-24 DIAGNOSIS — I10 ESSENTIAL HYPERTENSION: Chronic | ICD-10-CM

## 2021-09-24 DIAGNOSIS — J01.00 ACUTE NON-RECURRENT MAXILLARY SINUSITIS: ICD-10-CM

## 2021-09-24 PROCEDURE — G8427 DOCREV CUR MEDS BY ELIG CLIN: HCPCS | Performed by: FAMILY MEDICINE

## 2021-09-24 PROCEDURE — G8417 CALC BMI ABV UP PARAM F/U: HCPCS | Performed by: FAMILY MEDICINE

## 2021-09-24 PROCEDURE — 1123F ACP DISCUSS/DSCN MKR DOCD: CPT | Performed by: FAMILY MEDICINE

## 2021-09-24 PROCEDURE — 99214 OFFICE O/P EST MOD 30 MIN: CPT | Performed by: FAMILY MEDICINE

## 2021-09-24 PROCEDURE — 1036F TOBACCO NON-USER: CPT | Performed by: FAMILY MEDICINE

## 2021-09-24 PROCEDURE — 4040F PNEUMOC VAC/ADMIN/RCVD: CPT | Performed by: FAMILY MEDICINE

## 2021-09-24 RX ORDER — AMLODIPINE BESYLATE 5 MG/1
TABLET ORAL
Qty: 90 TABLET | Refills: 1 | Status: SHIPPED
Start: 2021-09-24 | End: 2022-01-05 | Stop reason: SDUPTHER

## 2021-09-24 RX ORDER — PANTOPRAZOLE SODIUM 40 MG/1
TABLET, DELAYED RELEASE ORAL
Qty: 90 TABLET | Refills: 1 | Status: SHIPPED
Start: 2021-09-24 | End: 2022-03-15 | Stop reason: SDUPTHER

## 2021-09-24 RX ORDER — AZITHROMYCIN 250 MG/1
TABLET, FILM COATED ORAL
Qty: 1 PACKET | Refills: 0 | Status: SHIPPED
Start: 2021-09-24 | End: 2021-10-07 | Stop reason: ALTCHOICE

## 2021-09-24 RX ORDER — PREDNISONE 1 MG/1
TABLET ORAL
Qty: 30 TABLET | Refills: 0 | Status: SHIPPED
Start: 2021-09-24 | End: 2021-10-07 | Stop reason: ALTCHOICE

## 2021-09-24 ASSESSMENT — ENCOUNTER SYMPTOMS
ALLERGIC/IMMUNOLOGIC NEGATIVE: 1
EYES NEGATIVE: 1
RESPIRATORY NEGATIVE: 1
NAUSEA: 1

## 2021-09-24 NOTE — PROGRESS NOTES
21     Natalia Johnson    : 1935 Sex: male   Age: 81 y.o. Chief Complaint   Patient presents with    Headache    Hand Pain     right hand cramping    Leg Pain     both knees up are hurting       Prior to Admission medications    Medication Sig Start Date End Date Taking? Authorizing Provider   amLODIPine (NORVASC) 5 MG tablet TAKE 1 TABLET BY MOUTH DAILY 21  Yes Namrata Villa DO   pantoprazole (PROTONIX) 40 MG tablet TAKE 1 TABLET BY MOUTH EVERY DAY 21  Yes Namrata Villa DO   promethazine (PHENERGAN) 25 MG tablet Take 25 mg by mouth every 6 hours as needed for Nausea   Yes Historical Provider, MD   famotidine (PEPCID) 40 MG tablet Take 1 tablet by mouth every evening 21  Yes Namrata Villa DO   potassium chloride (KLOR-CON M) 20 MEQ extended release tablet Take 1 tablet by mouth daily 21  Yes Namrata Villa DO   hydroCHLOROthiazide (HYDRODIURIL) 12.5 MG tablet Take 1 tablet by mouth daily 21  Yes Namrata Villa DO   losartan (COZAAR) 100 MG tablet 1 QD 7/20/21 10/20/21 Yes David Villa DO   mirtazapine (REMERON) 15 MG tablet TAKE 1 TABLET BY MOUTH EVERY DAY AT BEDTIME 21  Yes Historical Provider, MD   aspirin 81 MG tablet Take 81 mg by mouth daily   Yes Historical Provider, MD   venlafaxine (EFFEXOR XR) 75 MG extended release capsule  21   Historical Provider, MD          HPI: Patient seen today problems with hypertension abnormal liver enzymes nausea and today with some upper respiratory congestion drainage. Addition of a Z-Sanjeev and some prednisone Mucinex and then reassess here in the next 2 weeks. Lab studies improving nicely and much better off the Effexor so we will continue with current recommended medical regimen. I will see him back again in 2 weeks. Blood work prior. Review of Systems   Constitutional: Negative. HENT: Positive for congestion and postnasal drip. Eyes: Negative. Respiratory: Negative. Gastrointestinal: Positive for nausea. Endocrine: Negative. Genitourinary: Negative. Musculoskeletal: Negative. Skin: Negative. Allergic/Immunologic: Negative. Neurological: Negative. Hematological: Negative. Psychiatric/Behavioral: Negative.                Current Outpatient Medications:     amLODIPine (NORVASC) 5 MG tablet, TAKE 1 TABLET BY MOUTH DAILY, Disp: 90 tablet, Rfl: 1    pantoprazole (PROTONIX) 40 MG tablet, TAKE 1 TABLET BY MOUTH EVERY DAY, Disp: 90 tablet, Rfl: 1    promethazine (PHENERGAN) 25 MG tablet, Take 25 mg by mouth every 6 hours as needed for Nausea, Disp: , Rfl:     famotidine (PEPCID) 40 MG tablet, Take 1 tablet by mouth every evening, Disp: 90 tablet, Rfl: 3    potassium chloride (KLOR-CON M) 20 MEQ extended release tablet, Take 1 tablet by mouth daily, Disp: 90 tablet, Rfl: 3    hydroCHLOROthiazide (HYDRODIURIL) 12.5 MG tablet, Take 1 tablet by mouth daily, Disp: 90 tablet, Rfl: 2    losartan (COZAAR) 100 MG tablet, 1 QD, Disp: 90 tablet, Rfl: 1    mirtazapine (REMERON) 15 MG tablet, TAKE 1 TABLET BY MOUTH EVERY DAY AT BEDTIME, Disp: , Rfl:     aspirin 81 MG tablet, Take 81 mg by mouth daily, Disp: , Rfl:     venlafaxine (EFFEXOR XR) 75 MG extended release capsule, , Disp: , Rfl:     No Known Allergies    Social History     Tobacco Use    Smoking status: Never Smoker    Smokeless tobacco: Never Used   Vaping Use    Vaping Use: Never used   Substance Use Topics    Alcohol use: No    Drug use: No      Past Surgical History:   Procedure Laterality Date    ANTERIOR CRUCIATE LIGAMENT REPAIR Left 11/21/2001    APPENDECTOMY      CHOLECYSTECTOMY  2007    Lap    ECHO COMPL W DOP COLOR FLOW  12/4/2012         HERNIA REPAIR Right 11/13/2002    double    SINUS SURGERY  2002,2003     done x 2    TONSILLECTOMY      TOTAL KNEE ARTHROPLASTY Left 1/28/2019    LEFT  ROBOTIC KNEE TOTAL ARTHROPLASTY  ++MEREDITH- BCYLSQZY++   ++ADDUCTOR BLOCK++ performed by William Renee Rich Ureña MD at [address] [place] [address] 10/10/2019    EGD BIOPSY performed by Naomi Jj MD at [facility] ENDOSCOPY     No family history on file. Past Medical History:   Diagnosis Date    Aneurysm of right renal artery (HCC)     follows with Dr. Gamez Congress yearly (last visit 9/19)    Colitis     Depression     GERD (gastroesophageal reflux disease)     Hyperlipidemia     Hypertension     Superior mesenteric artery stenosis ([address] 11/4/2020    TIA (transient ischemic attack)        Vitals:    09/24/21 1327   BP: 138/82   Pulse: 82   Temp: 97.1 °F (36.2 °C)   SpO2: 96%   Weight: 188 lb (85.3 kg)   Height: 6' (1.829 m)     BP Readings from Last 3 Encounters:   09/24/21 138/82   09/14/21 130/70   09/09/21 (!) 173/79        Physical Exam  Vitals and nursing note reviewed. Constitutional:       Appearance: He is well-developed. HENT:      Head: Normocephalic. Right Ear: External ear normal.      Left Ear: External ear normal.      Nose: Congestion present. Eyes:      Conjunctiva/sclera: Conjunctivae normal.      Pupils: Pupils are equal, round, and reactive to light. Cardiovascular:      Rate and Rhythm: Normal rate. Pulmonary:      Breath sounds: Normal breath sounds. Abdominal:      General: Bowel sounds are normal.      Palpations: Abdomen is soft. Musculoskeletal:         General: Normal range of motion. Cervical back: Normal range of motion and neck supple. Skin:     General: Skin is warm and dry. Neurological:      Mental Status: He is alert and oriented to person, place, and time. Psychiatric:         Behavior: Behavior normal.     Exam findings are currently stable aside from the respiratory congestion. Abdomen today is benign. Labs reviewed and improved. Reassessment with me 2 weeks sooner if problems blood work prior.   Lab Results   Component Value Date    TSH 1.920 06/22/2021    TSH 2.210 03/10/2021    [id number] 7.7 06/22/2021    U2VIHFP 7.6 03/10/2021    T4FREE 1.10 01/22/2017     Lab Results   Component Value Date    CHOL 128 08/14/2021    CHOL 147 03/10/2021     Lab Results   Component Value Date    TRIG 70 08/14/2021    TRIG 76 03/10/2021     Lab Results   Component Value Date    HDL 39 08/14/2021    HDL 39 03/10/2021     No results found for: [facility]  Lab Results   Component Value Date    LABVLDL 14 08/14/2021    LABVLDL 15 03/10/2021     No results found for: [facility]  Lab Results   Component Value Date    WBC 6.6 09/23/2021    HGB 12.9 09/23/2021    HCT 39.0 09/23/2021    MCV 89.9 09/23/2021     09/23/2021    LYMPHOPCT 21.1 09/23/2021    RBC 4.34 09/23/2021    MCH 29.7 09/23/2021    MCHC 33.1 09/23/2021    RDW 13.1 09/23/2021     Lab Results   Component Value Date     09/23/2021    K 3.4 (L) 09/23/2021     09/23/2021    CO2 30 (H) 09/23/2021    BUN 13 09/23/2021    CREATININE 0.9 09/23/2021    GLUCOSE 96 09/23/2021    CALCIUM 9.1 09/23/2021    PROT 7.1 09/23/2021    LABALBU 3.8 09/23/2021    BILITOT 0.5 09/23/2021    ALKPHOS 162 (H) 09/23/2021    AST 15 09/23/2021    ALT 8 09/23/2021    LABGLOM >60 09/23/2021    GFRAA >60 09/23/2021        Lab Results   Component Value Date    PSA 2.83 04/29/2020    PSA 2.86 04/24/2019      Lab Results   Component Value Date    LABA1C 5.4 08/14/2021    LABA1C 5.1 01/25/2021     No results found for: EAG           Plan Per Assessment:  There are no diagnoses linked to this encounter. No follow-ups on file. Inga López DO    Note was generated with the assistance of voice recognition software. Document was reviewed however may contain grammatical errors.

## 2021-10-06 ENCOUNTER — HOSPITAL ENCOUNTER (OUTPATIENT)
Age: 86
Discharge: HOME | End: 2021-10-06
Payer: MEDICARE

## 2021-10-06 DIAGNOSIS — J01.00 ACUTE NON-RECURRENT MAXILLARY SINUSITIS: ICD-10-CM

## 2021-10-06 LAB
ALBUMIN SERPL-MCNC: 3.9 G/DL (ref 3.5–5.2)
ALP BLD-CCNC: 106 U/L (ref 40–129)
ALT SERPL-CCNC: 13 U/L (ref 0–40)
ANION GAP SERPL CALCULATED.3IONS-SCNC: 9 MMOL/L (ref 7–16)
AST SERPL-CCNC: 15 U/L (ref 0–39)
BASOPHILS ABSOLUTE: 0.04 E9/L (ref 0–0.2)
BASOPHILS RELATIVE PERCENT: 0.5 % (ref 0–2)
BILIRUB SERPL-MCNC: 0.4 MG/DL (ref 0–1.2)
BUN BLDV-MCNC: 18 MG/DL (ref 6–23)
CALCIUM SERPL-MCNC: 9.3 MG/DL (ref 8.6–10.2)
CHLORIDE BLD-SCNC: 103 MMOL/L (ref 98–107)
CO2: 31 MMOL/L (ref 22–29)
CREAT SERPL-MCNC: 1 MG/DL (ref 0.7–1.2)
EOSINOPHILS ABSOLUTE: 0.17 E9/L (ref 0.05–0.5)
EOSINOPHILS RELATIVE PERCENT: 2 % (ref 0–6)
GFR AFRICAN AMERICAN: >60
GFR NON-AFRICAN AMERICAN: >60 ML/MIN/1.73
GLUCOSE BLD-MCNC: 92 MG/DL (ref 74–99)
HCT VFR BLD CALC: 42 % (ref 37–54)
HEMOGLOBIN: 13.5 G/DL (ref 12.5–16.5)
IMMATURE GRANULOCYTES #: 0.08 E9/L
IMMATURE GRANULOCYTES %: 1 % (ref 0–5)
LYMPHOCYTES ABSOLUTE: 2.06 E9/L (ref 1.5–4)
LYMPHOCYTES RELATIVE PERCENT: 24.5 % (ref 20–42)
MCH RBC QN AUTO: 29.5 PG (ref 26–35)
MCHC RBC AUTO-ENTMCNC: 32.1 % (ref 32–34.5)
MCV RBC AUTO: 91.7 FL (ref 80–99.9)
MONOCYTES ABSOLUTE: 0.76 E9/L (ref 0.1–0.95)
MONOCYTES RELATIVE PERCENT: 9 % (ref 2–12)
NEUTROPHILS ABSOLUTE: 5.31 E9/L (ref 1.8–7.3)
NEUTROPHILS RELATIVE PERCENT: 63 % (ref 43–80)
PDW BLD-RTO: 13.2 FL (ref 11.5–15)
PLATELET # BLD: 376 E9/L (ref 130–450)
PMV BLD AUTO: 9 FL (ref 7–12)
POTASSIUM SERPL-SCNC: 4 MMOL/L (ref 3.5–5)
RBC # BLD: 4.58 E12/L (ref 3.8–5.8)
SODIUM BLD-SCNC: 143 MMOL/L (ref 132–146)
TOTAL PROTEIN: 6.9 G/DL (ref 6.4–8.3)
WBC # BLD: 8.4 E9/L (ref 4.5–11.5)

## 2021-10-06 PROCEDURE — 36415 COLL VENOUS BLD VENIPUNCTURE: CPT

## 2021-10-06 PROCEDURE — 80053 COMPREHEN METABOLIC PANEL: CPT

## 2021-10-06 PROCEDURE — 85025 COMPLETE CBC W/AUTO DIFF WBC: CPT

## 2021-10-07 ENCOUNTER — OFFICE VISIT (OUTPATIENT)
Dept: PRIMARY CARE CLINIC | Age: 86
End: 2021-10-07
Payer: MEDICARE

## 2021-10-07 VITALS
HEART RATE: 82 BPM | TEMPERATURE: 96.9 F | OXYGEN SATURATION: 97 % | HEIGHT: 72 IN | SYSTOLIC BLOOD PRESSURE: 132 MMHG | BODY MASS INDEX: 25.5 KG/M2 | DIASTOLIC BLOOD PRESSURE: 74 MMHG

## 2021-10-07 DIAGNOSIS — I10 ESSENTIAL HYPERTENSION: Primary | Chronic | ICD-10-CM

## 2021-10-07 DIAGNOSIS — Z23 NEED FOR INFLUENZA VACCINATION: ICD-10-CM

## 2021-10-07 DIAGNOSIS — F32.A DEPRESSION, UNSPECIFIED DEPRESSION TYPE: ICD-10-CM

## 2021-10-07 DIAGNOSIS — R53.83 FATIGUE, UNSPECIFIED TYPE: ICD-10-CM

## 2021-10-07 DIAGNOSIS — R74.8 ABNORMAL LIVER ENZYMES: ICD-10-CM

## 2021-10-07 DIAGNOSIS — I63.9 ACUTE CEREBROVASCULAR ACCIDENT (CVA) (HCC): ICD-10-CM

## 2021-10-07 PROCEDURE — G8484 FLU IMMUNIZE NO ADMIN: HCPCS | Performed by: FAMILY MEDICINE

## 2021-10-07 PROCEDURE — 4040F PNEUMOC VAC/ADMIN/RCVD: CPT | Performed by: FAMILY MEDICINE

## 2021-10-07 PROCEDURE — G8427 DOCREV CUR MEDS BY ELIG CLIN: HCPCS | Performed by: FAMILY MEDICINE

## 2021-10-07 PROCEDURE — G8417 CALC BMI ABV UP PARAM F/U: HCPCS | Performed by: FAMILY MEDICINE

## 2021-10-07 PROCEDURE — 90694 VACC AIIV4 NO PRSRV 0.5ML IM: CPT | Performed by: FAMILY MEDICINE

## 2021-10-07 PROCEDURE — 1036F TOBACCO NON-USER: CPT | Performed by: FAMILY MEDICINE

## 2021-10-07 PROCEDURE — 99214 OFFICE O/P EST MOD 30 MIN: CPT | Performed by: FAMILY MEDICINE

## 2021-10-07 PROCEDURE — 1123F ACP DISCUSS/DSCN MKR DOCD: CPT | Performed by: FAMILY MEDICINE

## 2021-10-07 PROCEDURE — G0008 ADMIN INFLUENZA VIRUS VAC: HCPCS | Performed by: FAMILY MEDICINE

## 2021-10-07 ASSESSMENT — ENCOUNTER SYMPTOMS
ALLERGIC/IMMUNOLOGIC NEGATIVE: 1
RESPIRATORY NEGATIVE: 1
GASTROINTESTINAL NEGATIVE: 1
EYES NEGATIVE: 1

## 2021-10-07 NOTE — PROGRESS NOTES
10/7/21     Caffie Form    : 1935 Sex: male   Age: 81 y.o. Chief Complaint   [facility]   [address]       Prior to Admission medications    Medication Sig Start Date End Date Taking? Authorizing Provider   amLODIPine (NORVASC) 5 MG tablet TAKE 1 TABLET BY MOUTH DAILY 21  Yes Rj Villa DO   pantoprazole (PROTONIX) 40 MG tablet TAKE 1 TABLET BY MOUTH EVERY DAY 21  Yes Rj Villa DO   promethazine (PHENERGAN) 25 MG tablet Take 25 mg by mouth every 6 hours as needed for Nausea   Yes Historical Provider, MD   famotidine (PEPCID) 40 MG tablet Take 1 tablet by mouth every evening 21  Yes Rj Villa DO   potassium chloride (KLOR-CON M) 20 MEQ extended release tablet Take 1 tablet by mouth daily 21  Yes Rj Villa DO   hydroCHLOROthiazide (HYDRODIURIL) 12.5 MG tablet Take 1 tablet by mouth daily 21  Yes Rj Villa DO   losartan (COZAAR) 100 MG tablet 1 QD 7/20/21 10/20/21 Yes David Villa DO   mirtazapine (REMERON) 15 MG tablet TAKE 1 TABLET BY MOUTH EVERY DAY AT BEDTIME 21  Yes Historical Provider, MD   aspirin 81 MG tablet Take 81 mg by mouth daily   Yes Historical Provider, MD          HPI: Patient evaluated today with health maintenance flu shot provided today. Recent problems CVA hypertensive disease abnormal liver enzymes all of which have been stable. Medications well-tolerated. Review of Systems   Constitutional: Positive for fatigue. HENT: Negative. Eyes: Negative. Respiratory: Negative. Gastrointestinal: Negative. Endocrine: Negative. Genitourinary: Negative. Musculoskeletal: Negative. Skin: Negative. Allergic/Immunologic: Negative. Neurological: Negative. Hematological: Negative. Psychiatric/Behavioral: Negative.                Current Outpatient Medications:     amLODIPine (NORVASC) 5 MG tablet, TAKE 1 TABLET BY MOUTH DAILY, Disp: 90 tablet, Rfl: 1   pantoprazole (PROTONIX) 40 MG tablet, TAKE 1 TABLET BY MOUTH EVERY DAY, Disp: 90 tablet, Rfl: 1    promethazine (PHENERGAN) 25 MG tablet, Take 25 mg by mouth every 6 hours as needed for Nausea, Disp: , Rfl:     famotidine (PEPCID) 40 MG tablet, Take 1 tablet by mouth every evening, Disp: 90 tablet, Rfl: 3    potassium chloride (KLOR-CON M) 20 MEQ extended release tablet, Take 1 tablet by mouth daily, Disp: 90 tablet, Rfl: 3    hydroCHLOROthiazide (HYDRODIURIL) 12.5 MG tablet, Take 1 tablet by mouth daily, Disp: 90 tablet, Rfl: 2    losartan (COZAAR) 100 MG tablet, 1 QD, Disp: 90 tablet, Rfl: 1    mirtazapine (REMERON) 15 MG tablet, TAKE 1 TABLET BY MOUTH EVERY DAY AT BEDTIME, Disp: , Rfl:     aspirin 81 MG tablet, Take 81 mg by mouth daily, Disp: , Rfl:     No Known Allergies    Social History     Tobacco Use    Smoking status: Never Smoker    Smokeless tobacco: Never Used   Vaping Use    Vaping Use: Never used   Substance Use Topics    Alcohol use: No    Drug use: No      Past Surgical History:   Procedure Laterality Date    ANTERIOR CRUCIATE LIGAMENT REPAIR Left 11/21/2001    APPENDECTOMY      CHOLECYSTECTOMY  2007    Lap    ECHO COMPL W DOP COLOR FLOW  12/4/2012         HERNIA REPAIR Right 11/13/2002    double    SINUS SURGERY  2002,2003     done x 2    TONSILLECTOMY      TOTAL KNEE ARTHROPLASTY Left 1/28/2019    LEFT  ROBOTIC KNEE TOTAL ARTHROPLASTY  ++MEREDITH- ENDOOKJN++   ++ADDUCTOR BLOCK++ performed by Jesica Lee MD at [address] [place] [address] N/A 10/10/2019    EGD BIOPSY performed by Fiona Morales MD at [facility] ENDOSCOPY     No family history on file.   Past Medical History:   Diagnosis Date    Aneurysm of right renal artery (HCC)     follows with Dr. Aleena Curiel yearly (last visit 9/19)    Colitis     Depression     GERD (gastroesophageal reflux disease)     Hyperlipidemia     Hypertension     Superior mesenteric artery stenosis ([address] 11/4/2020    TIA (transient ischemic attack)        Vitals:    10/07/21 1446   BP: 132/74   Pulse: 82   Temp: 96.9 °F (36.1 °C)   SpO2: 97%   Height: 6' (1.829 m)     BP Readings from Last 3 Encounters:   10/07/21 132/74   09/24/21 138/82   09/14/21 130/70    124/62    Physical Exam  Vitals and nursing note reviewed. Constitutional:       Appearance: He is well-developed. HENT:      Head: Normocephalic. Right Ear: External ear normal.      Left Ear: External ear normal.      Nose: Nose normal.   Eyes:      Conjunctiva/sclera: Conjunctivae normal.      Pupils: Pupils are equal, round, and reactive to light. Cardiovascular:      Rate and Rhythm: Normal rate. Pulmonary:      Breath sounds: Normal breath sounds. Abdominal:      General: Bowel sounds are normal.      Palpations: Abdomen is soft. Musculoskeletal:         General: Normal range of motion. Cervical back: Normal range of motion and neck supple. Skin:     General: Skin is warm and dry. Neurological:      Mental Status: He is alert and oriented to person, place, and time. Psychiatric:         Behavior: Behavior normal.     Systems review stable. Medications well-tolerated. We will sit tight with present meds and care. Reassessment again 1 month and sooner if problems. Repeat CBC CMP at that time. Current labs are excellent.       Lab Results   Component Value Date    TSH 1.920 06/22/2021    TSH 2.210 03/10/2021    [id number] 7.7 06/22/2021    G1NJWOX 7.6 03/10/2021    T4FREE 1.10 01/22/2017     Lab Results   Component Value Date    CHOL 128 08/14/2021    CHOL 147 03/10/2021     Lab Results   Component Value Date    TRIG 70 08/14/2021    TRIG 76 03/10/2021     Lab Results   Component Value Date    HDL 39 08/14/2021    HDL 39 03/10/2021     No results found for: [facility]  Lab Results   Component Value Date    LABVLDL 14 08/14/2021    LABVLDL 15 03/10/2021     No results found for: [facility]  Lab Results   Component Value Date    WBC 8.4 10/06/2021    HGB 13.5 10/06/2021    HCT 42.0 10/06/2021    MCV 91.7 10/06/2021     10/06/2021    LYMPHOPCT 24.5 10/06/2021    RBC 4.58 10/06/2021    MCH 29.5 10/06/2021    MCHC 32.1 10/06/2021    RDW 13.2 10/06/2021     Lab Results   Component Value Date     10/06/2021    K 4.0 10/06/2021     10/06/2021    CO2 31 (H) 10/06/2021    BUN 18 10/06/2021    CREATININE 1.0 10/06/2021    GLUCOSE 92 10/06/2021    CALCIUM 9.3 10/06/2021    PROT 6.9 10/06/2021    LABALBU 3.9 10/06/2021    BILITOT 0.4 10/06/2021    ALKPHOS 106 10/06/2021    AST 15 10/06/2021    ALT 13 10/06/2021    LABGLOM >60 10/06/2021    GFRAA >60 10/06/2021        Lab Results   Component Value Date    PSA 2.83 04/29/2020    PSA 2.86 04/24/2019      Lab Results   Component Value Date    LABA1C 5.4 08/14/2021    LABA1C 5.1 01/25/2021     No results found for: EAG     Plan Per Assessment:  Maryellen Aggarwal was seen today for discuss labs. Diagnoses and all orders for this visit:    Essential hypertension  -     CBC Auto Differential; Future  -     Comprehensive Metabolic Panel; Future    Need for influenza vaccination  -     INFLUENZA, QUADV, ADJUVANTED, 72 YRS =, IM, PF, PREFILL SYR, 0.5ML (FLUAD)    Acute cerebrovascular accident (CVA) (HCC)    Abnormal liver enzymes  -     CBC Auto Differential; Future  -     Comprehensive Metabolic Panel; Future    Depression, unspecified depression type    Fatigue, unspecified type  -     CBC Auto Differential; Future  -     Comprehensive Metabolic Panel; Future            Return in about 4 weeks (around 11/4/2021). Sasha Blanca DO    Note was generated with the assistance of voice recognition software. Document was reviewed however may contain grammatical errors.

## 2021-10-13 ENCOUNTER — TELEPHONE (OUTPATIENT)
Dept: VASCULAR SURGERY | Age: 86
End: 2021-10-13

## 2021-10-14 ENCOUNTER — OFFICE VISIT (OUTPATIENT)
Dept: VASCULAR SURGERY | Age: 86
End: 2021-10-14
Payer: MEDICARE

## 2021-10-14 ENCOUNTER — HOSPITAL ENCOUNTER (OUTPATIENT)
Age: 86
Discharge: HOME | End: 2021-10-16
Payer: MEDICARE

## 2021-10-14 ENCOUNTER — HOSPITAL ENCOUNTER (OUTPATIENT)
Dept: GENERAL RADIOLOGY | Age: 86
Discharge: HOME | End: 2021-10-16
Payer: MEDICARE

## 2021-10-14 VITALS — WEIGHT: 185 LBS | BODY MASS INDEX: 25.06 KG/M2 | HEIGHT: 72 IN

## 2021-10-14 DIAGNOSIS — K55.1 SUPERIOR MESENTERIC ARTERY STENOSIS (HCC): ICD-10-CM

## 2021-10-14 DIAGNOSIS — I72.2 ANEURYSM OF RIGHT RENAL ARTERY (HCC): ICD-10-CM

## 2021-10-14 DIAGNOSIS — Z86.73 H/O: CVA (CEREBROVASCULAR ACCIDENT): ICD-10-CM

## 2021-10-14 DIAGNOSIS — I72.2 ANEURYSM OF RIGHT RENAL ARTERY (HCC): Primary | ICD-10-CM

## 2021-10-14 PROBLEM — I63.9 ACUTE CEREBROVASCULAR ACCIDENT (CVA) (HCC): Status: RESOLVED | Noted: 2021-08-13 | Resolved: 2021-10-14

## 2021-10-14 PROBLEM — B00.1 COLD SORE: Status: RESOLVED | Noted: 2019-08-08 | Resolved: 2021-10-14

## 2021-10-14 PROBLEM — R60.0 EDEMA OF EXTREMITIES: Status: RESOLVED | Noted: 2021-03-10 | Resolved: 2021-10-14

## 2021-10-14 PROBLEM — R47.02 EXPRESSIVE DYSPHASIA: Status: RESOLVED | Noted: 2021-08-19 | Resolved: 2021-10-14

## 2021-10-14 PROBLEM — R11.0 NAUSEA: Status: RESOLVED | Noted: 2017-01-22 | Resolved: 2021-10-14

## 2021-10-14 PROBLEM — R10.84 GENERALIZED ABDOMINAL PAIN: Status: RESOLVED | Noted: 2021-06-23 | Resolved: 2021-10-14

## 2021-10-14 PROBLEM — R10.11 RIGHT UPPER QUADRANT ABDOMINAL PAIN: Status: RESOLVED | Noted: 2020-11-02 | Resolved: 2021-10-14

## 2021-10-14 PROBLEM — E87.1 HYPONATREMIA: Status: RESOLVED | Noted: 2021-08-10 | Resolved: 2021-10-14

## 2021-10-14 PROBLEM — K29.50 CHRONIC GASTRITIS WITHOUT BLEEDING: Status: RESOLVED | Noted: 2019-09-04 | Resolved: 2021-10-14

## 2021-10-14 PROBLEM — R10.31 ACUTE ABDOMINAL PAIN IN RIGHT LOWER QUADRANT: Status: RESOLVED | Noted: 2019-07-15 | Resolved: 2021-10-14

## 2021-10-14 PROBLEM — R42 VERTIGO: Status: RESOLVED | Noted: 2019-12-26 | Resolved: 2021-10-14

## 2021-10-14 PROBLEM — R53.83 FATIGUE: Status: RESOLVED | Noted: 2020-09-17 | Resolved: 2021-10-14

## 2021-10-14 PROCEDURE — G8484 FLU IMMUNIZE NO ADMIN: HCPCS | Performed by: SURGERY

## 2021-10-14 PROCEDURE — 1123F ACP DISCUSS/DSCN MKR DOCD: CPT | Performed by: SURGERY

## 2021-10-14 PROCEDURE — 1036F TOBACCO NON-USER: CPT | Performed by: SURGERY

## 2021-10-14 PROCEDURE — 99214 OFFICE O/P EST MOD 30 MIN: CPT | Performed by: SURGERY

## 2021-10-14 PROCEDURE — 74018 RADEX ABDOMEN 1 VIEW: CPT

## 2021-10-14 PROCEDURE — G8427 DOCREV CUR MEDS BY ELIG CLIN: HCPCS | Performed by: SURGERY

## 2021-10-14 PROCEDURE — 4040F PNEUMOC VAC/ADMIN/RCVD: CPT | Performed by: SURGERY

## 2021-10-14 PROCEDURE — G8417 CALC BMI ABV UP PARAM F/U: HCPCS | Performed by: SURGERY

## 2021-10-14 NOTE — PROGRESS NOTES
Chief Complaint:   Chief Complaint   Patient presents with    Follow-up     aneurysm of rt. 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 plus documented with CT of the abdomen last year, at that time also revealed a possible 40% superior mesenteric artery stenosis due to soft plaque and mild celiac stenosis, tells me his gastric symptoms and abdominal all completely went away, now his appetite is good, can eat very well without problems and denies any abdominal pain    Patient has recently had a stroke, with some slurring of speech without any weakness in the arm or leg is loss of vision, underwent work-up, was told that he has a small stroke      Patient denies any new focal lateralizing neurological symptoms like loss of speech, vision or loss of function of extremity    Patient can walk a few blocks , and denies any symptoms of rest pain    No Known Allergies    Current Outpatient Medications   Medication Sig Dispense Refill    amLODIPine (NORVASC) 5 MG tablet TAKE 1 TABLET BY MOUTH DAILY (Patient taking differently: 10 mg TAKE 1 TABLET BY MOUTH DAILY) 90 tablet 1    pantoprazole (PROTONIX) 40 MG tablet TAKE 1 TABLET BY MOUTH EVERY DAY 90 tablet 1    promethazine (PHENERGAN) 25 MG tablet Take 25 mg by mouth every 6 hours as needed for Nausea      famotidine (PEPCID) 40 MG tablet Take 1 tablet by mouth every evening 90 tablet 3    potassium chloride (KLOR-CON M) 20 MEQ extended release tablet Take 1 tablet by mouth daily 90 tablet 3    hydroCHLOROthiazide (HYDRODIURIL) 12.5 MG tablet Take 1 tablet by mouth daily 90 tablet 2    losartan (COZAAR) 100 MG tablet 1 QD (Patient taking differently: 100 mg 1 QD) 90 tablet 1    mirtazapine (REMERON) 15 MG tablet TAKE 1 TABLET BY MOUTH EVERY DAY AT BEDTIME      aspirin 81 MG tablet Take 81 mg by mouth daily       No current facility-administered medications for this visit. Past Medical History:   Diagnosis Date    Aneurysm of right renal artery (HCC)     follows with Dr. Robert Rojas yearly (last visit 9/19)    Colitis     Depression     GERD (gastroesophageal reflux disease)     H/O: CVA (cerebrovascular accident) 10/14/2021    Hyperlipidemia     Hypertension     Stroke [facility])     Superior mesenteric artery stenosis ([address] 11/4/2020    TIA (transient ischemic attack)        Past Surgical History:   Procedure Laterality Date    ANTERIOR CRUCIATE LIGAMENT REPAIR Left 11/21/2001    APPENDECTOMY      CHOLECYSTECTOMY  2007    Lap    ECHO COMPL W DOP COLOR FLOW  12/4/2012         HERNIA REPAIR Right 11/13/2002    double    SINUS SURGERY  2002,2003     done x 2    TONSILLECTOMY      TOTAL KNEE ARTHROPLASTY Left 1/28/2019    LEFT  ROBOTIC KNEE TOTAL ARTHROPLASTY  ++MEREDITH- [id number]++   ++ADDUCTOR BLOCK++ performed by Ashley Gabriel MD at [facility] 10/10/2019    EGD BIOPSY performed by Marleen Dangelo MD at [facility]       History reviewed. No pertinent family history. Social History     Socioeconomic History    Marital status:       Spouse name: Not on file    Number of children: Not on file    Years of education: Not on file    Highest education level: Not on file   Occupational History    Not on file   Tobacco Use    Smoking status: Never Smoker    Smokeless tobacco: Never Used   Vaping Use    Vaping Use: Never used   Substance and Sexual Activity    Alcohol use: No    Drug use: No    Sexual activity: Not on file   Other Topics Concern    Not on file   Social History Narrative    Not on file     Social Determinants of Health     Financial Resource Strain: Low Risk     Difficulty of Paying Living Expenses: Not hard at all   Food Insecurity: No Food Insecurity    Worried About [facility] in the Last Year: Never true    Iesha of Food in the Last Year: Never true   Transportation Needs:     Lack of Transportation (Medical):  Lack of Transportation (Non-Medical):    Physical Activity:     Days of Exercise per Week:     Minutes of Exercise per Session:    Stress:     Feeling of Stress :    Social Connections:     Frequency of Communication with Friends and Family:     Frequency of Social Gatherings with Friends and Family:     Attends Jewish Services:     Active Member of Clubs or Organizations:     Attends Club or Organization Meetings:     Marital Status:    Intimate Partner Violence:     Fear of Current or Ex-Partner:     Emotionally Abused:     Physically Abused:     Sexually Abused:        Review of Systems:    Eyes:  No blurring, diplopia or vision loss. Respiratory:  No cough, pleuritic chest pain, dyspnea, or wheezing. Cardiovascular: No angina, palpitations . Hypertension, hyperlipidemia  Musculoskeletal:  No arthritis or weakness. Neurologic:  No paralysis, paresis, paresthesia, seizures or headache. History of stroke on the left side with transient slurring of speech  Endocrinology:     Gastroenterology: History of gastritis, history of mild superior mesenteric artery stenosis of 40%      Physical Exam:  General appearance:  Alert, awake, oriented x 3. No distress. Eyes:  Conjunctivae appear normal; PERRL  Neck:  No jugular venous distention, lymphadenopathy or thyromegaly. No evidence of carotid bruit  Lungs:  Clear to ausculation bilaterally. No rhonchi, crackles, wheezes  Heart:  Regular rate and rhythm. No rub or murmur  Abdomen:  Soft, non-tender. No masses, organomegaly. Musculoskeletal : No joint effusions, tenderness swelling    Neuro: Speech is intact. Moving all extremities. No focal motor or sensory deficits      Extremities:  Both feet are warm to touch.  The color of both feet is normal.        Pulses Right  Left    Brachial 3 3    Radial    3=normal   Femoral 2 2  2=diminished   Popliteal    1=barely palpable   Dorsalis pedis    0=absent   Posterior tibial    4=aneurysmal             Other pertinent information:1. The past medical records were reviewed. 2 The CT of the carotids revealed only minimal plaque on the right side, less than 20 to 30%, report of intracranial middle cerebral artery stenosis noted    3. MRI of the brain revealed evidence of stroke on the left side, deep white matter    4. The last CTA was personally reviewed, patient has a densely calcified right renal artery aneurysm      Assessment:    1. Aneurysm of right renal artery (HCC)    2. H/O: CVA (cerebrovascular accident)    3. Superior mesenteric artery stenosis (HCC)              Plan:       Discussed with the patient, informed him that I have personally reviewed the last CT of the abdomen, has densely calcified renal artery syndrome, recommend plain x-ray rather than CAT scan again to decrease the amount of radiation    Patient was informed that have personally reviewed the CT of the carotid and MR of the brain also risk factor modification with control of blood glucose, lipids and blood pressure etc.              Patient was instructed to continue walking program and to call if any worsening of symptoms and to call if any focal lateralizing neurological symptoms like loss of speech, vision or loss of function of extremity. All the questions were answered. Orders Placed This Encounter   Procedures    XR ABDOMEN (KUB) (SINGLE AP VIEW)             Indicated follow-up: Return in about 1 year (around 10/14/2022), or if symptoms worsen or fail to improve.

## 2021-10-18 ENCOUNTER — TELEPHONE (OUTPATIENT)
Dept: VASCULAR SURGERY | Age: 86
End: 2021-10-18

## 2021-10-18 NOTE — TELEPHONE ENCOUNTER
Discussed the patient, no change in the calcified right renal artery aneurysm 1.5 x 1.7 cm call as needed if any questions or concerns or symptoms, keep the next appointment

## 2021-10-27 RX ORDER — HYDROCHLOROTHIAZIDE 12.5 MG/1
12.5 TABLET ORAL DAILY
Qty: 90 TABLET | Refills: 2 | Status: SHIPPED
Start: 2021-10-27 | End: 2022-04-11

## 2021-10-27 NOTE — TELEPHONE ENCOUNTER
Name of Medication(s) Requested:  hctz 12.5mg qd    Pharmacy Requested:   [facility]    Medication(s) pended? [x] Yes  [] No    Last Appointment:  10/7/2021    Future appts:  Future Appointments   Date Time Provider Zelda Echavarriai   11/4/2021  2:45 PM DO KIKA Graham [facility]   12/6/2021  2:00 PM BRYON Seth [facility]   10/20/2022  1:30 PM Tello Veliz MD [facility]          Does patient need call back?   [] Yes  [x] No

## 2021-11-03 ENCOUNTER — HOSPITAL ENCOUNTER (OUTPATIENT)
Age: 86
Discharge: HOME | End: 2021-11-03
Payer: MEDICARE

## 2021-11-03 DIAGNOSIS — R74.8 ABNORMAL LIVER ENZYMES: ICD-10-CM

## 2021-11-03 DIAGNOSIS — R53.83 FATIGUE, UNSPECIFIED TYPE: ICD-10-CM

## 2021-11-03 DIAGNOSIS — I10 ESSENTIAL HYPERTENSION: Chronic | ICD-10-CM

## 2021-11-03 LAB
ALBUMIN SERPL-MCNC: 4.4 G/DL (ref 3.5–5.2)
ALP BLD-CCNC: 99 U/L (ref 40–129)
ALT SERPL-CCNC: 9 U/L (ref 0–40)
ANION GAP SERPL CALCULATED.3IONS-SCNC: 8 MMOL/L (ref 7–16)
AST SERPL-CCNC: 16 U/L (ref 0–39)
BASOPHILS ABSOLUTE: 0.03 E9/L (ref 0–0.2)
BASOPHILS RELATIVE PERCENT: 0.5 % (ref 0–2)
BILIRUB SERPL-MCNC: 0.5 MG/DL (ref 0–1.2)
BUN BLDV-MCNC: 14 MG/DL (ref 6–23)
CALCIUM SERPL-MCNC: 9.8 MG/DL (ref 8.6–10.2)
CHLORIDE BLD-SCNC: 104 MMOL/L (ref 98–107)
CO2: 31 MMOL/L (ref 22–29)
CREAT SERPL-MCNC: 1.1 MG/DL (ref 0.7–1.2)
EOSINOPHILS ABSOLUTE: 0.09 E9/L (ref 0.05–0.5)
EOSINOPHILS RELATIVE PERCENT: 1.4 % (ref 0–6)
GFR AFRICAN AMERICAN: >60
GFR NON-AFRICAN AMERICAN: >60 ML/MIN/1.73
GLUCOSE BLD-MCNC: 103 MG/DL (ref 74–99)
HCT VFR BLD CALC: 43 % (ref 37–54)
HEMOGLOBIN: 14.1 G/DL (ref 12.5–16.5)
IMMATURE GRANULOCYTES #: 0.02 E9/L
IMMATURE GRANULOCYTES %: 0.3 % (ref 0–5)
LYMPHOCYTES ABSOLUTE: 1.55 E9/L (ref 1.5–4)
LYMPHOCYTES RELATIVE PERCENT: 24.8 % (ref 20–42)
MCH RBC QN AUTO: 29.4 PG (ref 26–35)
MCHC RBC AUTO-ENTMCNC: 32.8 % (ref 32–34.5)
MCV RBC AUTO: 89.6 FL (ref 80–99.9)
MONOCYTES ABSOLUTE: 0.65 E9/L (ref 0.1–0.95)
MONOCYTES RELATIVE PERCENT: 10.4 % (ref 2–12)
NEUTROPHILS ABSOLUTE: 3.91 E9/L (ref 1.8–7.3)
NEUTROPHILS RELATIVE PERCENT: 62.6 % (ref 43–80)
PDW BLD-RTO: 12.9 FL (ref 11.5–15)
PLATELET # BLD: 391 E9/L (ref 130–450)
PMV BLD AUTO: 9.3 FL (ref 7–12)
POTASSIUM SERPL-SCNC: 4.2 MMOL/L (ref 3.5–5)
RBC # BLD: 4.8 E12/L (ref 3.8–5.8)
SODIUM BLD-SCNC: 143 MMOL/L (ref 132–146)
TOTAL PROTEIN: 7.4 G/DL (ref 6.4–8.3)
WBC # BLD: 6.3 E9/L (ref 4.5–11.5)

## 2021-11-03 PROCEDURE — 80053 COMPREHEN METABOLIC PANEL: CPT

## 2021-11-03 PROCEDURE — 85025 COMPLETE CBC W/AUTO DIFF WBC: CPT

## 2021-11-03 PROCEDURE — 36415 COLL VENOUS BLD VENIPUNCTURE: CPT

## 2021-11-04 ENCOUNTER — OFFICE VISIT (OUTPATIENT)
Dept: PRIMARY CARE CLINIC | Age: 86
End: 2021-11-04
Payer: MEDICARE

## 2021-11-04 VITALS
OXYGEN SATURATION: 98 % | WEIGHT: 192 LBS | HEART RATE: 74 BPM | DIASTOLIC BLOOD PRESSURE: 60 MMHG | SYSTOLIC BLOOD PRESSURE: 126 MMHG | HEIGHT: 72 IN | BODY MASS INDEX: 26.01 KG/M2 | TEMPERATURE: 98.4 F

## 2021-11-04 DIAGNOSIS — Z23 NEED FOR PROPHYLACTIC VACCINATION AGAINST DIPHTHERIA-TETANUS-PERTUSSIS (DTP): ICD-10-CM

## 2021-11-04 DIAGNOSIS — Z00.00 ROUTINE GENERAL MEDICAL EXAMINATION AT A HEALTH CARE FACILITY: ICD-10-CM

## 2021-11-04 DIAGNOSIS — E78.2 MIXED HYPERLIPIDEMIA: Chronic | ICD-10-CM

## 2021-11-04 DIAGNOSIS — R73.01 IMPAIRED FASTING GLUCOSE: ICD-10-CM

## 2021-11-04 DIAGNOSIS — I10 ESSENTIAL HYPERTENSION: Primary | Chronic | ICD-10-CM

## 2021-11-04 DIAGNOSIS — Z86.73 H/O: CVA (CEREBROVASCULAR ACCIDENT): ICD-10-CM

## 2021-11-04 PROCEDURE — 4040F PNEUMOC VAC/ADMIN/RCVD: CPT | Performed by: FAMILY MEDICINE

## 2021-11-04 PROCEDURE — 1123F ACP DISCUSS/DSCN MKR DOCD: CPT | Performed by: FAMILY MEDICINE

## 2021-11-04 PROCEDURE — 1036F TOBACCO NON-USER: CPT | Performed by: FAMILY MEDICINE

## 2021-11-04 PROCEDURE — G8427 DOCREV CUR MEDS BY ELIG CLIN: HCPCS | Performed by: FAMILY MEDICINE

## 2021-11-04 PROCEDURE — G0438 PPPS, INITIAL VISIT: HCPCS | Performed by: FAMILY MEDICINE

## 2021-11-04 PROCEDURE — 99214 OFFICE O/P EST MOD 30 MIN: CPT | Performed by: FAMILY MEDICINE

## 2021-11-04 PROCEDURE — G8484 FLU IMMUNIZE NO ADMIN: HCPCS | Performed by: FAMILY MEDICINE

## 2021-11-04 PROCEDURE — G8417 CALC BMI ABV UP PARAM F/U: HCPCS | Performed by: FAMILY MEDICINE

## 2021-11-04 RX ORDER — MIRTAZAPINE 30 MG/1
TABLET, FILM COATED ORAL
Qty: 30 TABLET | Refills: 2 | Status: SHIPPED
Start: 2021-11-04 | End: 2021-11-23

## 2021-11-04 RX ORDER — POTASSIUM CHLORIDE 20 MEQ/1
20 TABLET, EXTENDED RELEASE ORAL 2 TIMES DAILY
Qty: 180 TABLET | Refills: 3 | Status: SHIPPED
Start: 2021-11-04 | End: 2022-04-11

## 2021-11-04 ASSESSMENT — PATIENT HEALTH QUESTIONNAIRE - PHQ9
SUM OF ALL RESPONSES TO PHQ QUESTIONS 1-9: 0
1. LITTLE INTEREST OR PLEASURE IN DOING THINGS: 0
SUM OF ALL RESPONSES TO PHQ9 QUESTIONS 1 & 2: 0
2. FEELING DOWN, DEPRESSED OR HOPELESS: 0

## 2021-11-04 ASSESSMENT — LIFESTYLE VARIABLES: HOW OFTEN DO YOU HAVE A DRINK CONTAINING ALCOHOL: 0

## 2021-11-04 NOTE — PROGRESS NOTES
Medicare Annual Wellness Visit  Name: Abelardo Ziegler Date: 2021   MRN: [id number] Sex: Male   Age: 81 y.o. Ethnicity: Non- / Non    : 1935 Race: White (non-)      Nai Hoyt is here for Medicare AWV    Screenings for behavioral, psychosocial and functional/safety risks, and cognitive dysfunction are all negative except as indicated below. These results, as well as other patient data from the [address] form, are documented in Flowsheets linked to this Encounter. No Known Allergies    Prior to Visit Medications    Medication Sig Taking?  Authorizing Provider   potassium chloride (KLOR-CON M) 20 MEQ extended release tablet Take 1 tablet by mouth 2 times daily  Catrachito Villa, DO   mirtazapine (REMERON) 30 MG tablet 1 hs  Catrachito Villa,    hydroCHLOROthiazide (HYDRODIURIL) 12.5 MG tablet Take 1 tablet by mouth daily  Catrachito Villa, DO   amLODIPine (NORVASC) 5 MG tablet TAKE 1 TABLET BY MOUTH DAILY  Patient taking differently: 10 mg TAKE 1 TABLET BY MOUTH DAILY  Catrachito Villa,    pantoprazole (PROTONIX) 40 MG tablet TAKE 1 TABLET BY MOUTH EVERY DAY  Catrachito Villa, DO   promethazine (PHENERGAN) 25 MG tablet Take 25 mg by mouth every 6 hours as needed for Nausea  Historical Provider, MD   famotidine (PEPCID) 40 MG tablet Take 1 tablet by mouth every evening  Catrachito Villa, DO   losartan (COZAAR) 100 MG tablet 1 QD  Catrachito Villa,    aspirin 81 MG tablet Take 81 mg by mouth daily  Historical Provider, MD       Past Medical History:   Diagnosis Date    Aneurysm of right renal artery ([facility]     follows with Dr. Simin Chowdary yearly (last visit )    Colitis     Depression     GERD (gastroesophageal reflux disease)     H/O: CVA (cerebrovascular accident) 10/14/2021    Hyperlipidemia     Hypertension     Stroke [facility])     Superior mesenteric artery stenosis ([facility] 2020    TIA (transient ischemic attack)        Past Surgical History:   Procedure Laterality Date    ANTERIOR CRUCIATE LIGAMENT REPAIR Left 11/21/2001    APPENDECTOMY      CHOLECYSTECTOMY  2007    Lap    ECHO COMPL W DOP COLOR FLOW  12/4/2012         HERNIA REPAIR Right 11/13/2002    double    SINUS SURGERY  2002,2003     done x 2    TONSILLECTOMY      TOTAL KNEE ARTHROPLASTY Left 1/28/2019    LEFT  ROBOTIC KNEE TOTAL ARTHROPLASTY  ++MEREDITH- [id number]++   ++ADDUCTOR BLOCK++ performed by Edward Cai MD at [address] [place] [address] 10/10/2019    EGD BIOPSY performed by Lyudmila Kennedy MD at [facility] ENDOSCOPY       No family history on file. CareTeam (Including outside providers/suppliers regularly involved in providing care):   Patient Care Team:  Meng Ribeiro DO as PCP - Angelic Gibbs DO as PCP - [facility] [place] Provider  Meng Ribeiro DO as Consulting Physician (Family Medicine)    Wt Readings from Last 3 Encounters:   11/04/21 192 lb (87.1 kg)   10/14/21 185 lb (83.9 kg)   09/24/21 188 lb (85.3 kg)     There were no vitals filed for this visit. There is no height or weight on file to calculate BMI. Based upon direct observation of the patient, evaluation of cognition reveals recent and remote memory intact. Patient's complete Health Risk Assessment and screening values have been reviewed and are found in Flowsheets. The following problems were reviewed today and where indicated follow up appointments were made and/or referrals ordered. Positive Risk Factor Screenings with Interventions:          General Health and ACP:  General  In general, how would you say your health is?: Good  In the past 7 days, have you experienced any of the following?  New or Increased Pain, New or Increased Fatigue, Loneliness, Social Isolation, Stress or Anger?: (!) Loneliness  Do you get the social and emotional support that you need?: Yes  Do you have a Living Will?: Yes  Advance Directives     Power of [address] Will ACP-Advance Directive ACP-Power of     Not on File Filed on 01/28/19 Filed Not on File      General Health Risk Interventions:  · in place    Health Habits/Nutrition:  Health Habits/Nutrition  Do you exercise for at least 20 minutes 2-3 times per week?: (!) No  Have you lost any weight without trying in the past 3 months?: No  Do you eat only one meal per day?: No  Have you seen the dentist within the past year?: Yes     Health Habits/Nutrition Interventions:  · no c/o       Personalized Preventive Plan   Current Health Maintenance Status  Immunization History   Administered Date(s) Administered    COVID-19, [place], Primary or Immunocompromised, PF, 100mcg/0.5mL 01/28/2021, 02/25/2021    Influenza Virus Vaccine 10/26/2006, 10/15/2007, 10/06/2008, 09/23/2010, 09/01/2011, 09/09/2013, 09/01/2015, 10/07/2016, 10/04/2017, 09/21/2018    Influenza, Quadv, adjuvanted, 65 yrs +, IM, PF (Fluad) 10/01/2020, 10/07/2021    Influenza, Triv, inactivated, subunit, adjuvanted, IM (Fluad 65 yrs and older) 10/15/2019    Pneumococcal Conjugate 13-valent (Jewel Keyon) 10/15/2019    Pneumococcal Polysaccharide ([id number]) 12/15/2005, 12/16/2005, 09/23/2010    Zoster Recombinant (Shingrix) 01/28/2020, 08/15/2020        Health Maintenance   Topic Date Due    DTaP/Tdap/Td vaccine (1 - Tdap) Never done    Annual Wellness Visit (AWV)  Never done    Potassium monitoring  11/03/2022    Creatinine monitoring  11/03/2022    Flu vaccine  Completed    Shingles Vaccine  Completed    Pneumococcal 65+ years Vaccine  Completed    COVID-19 Vaccine  Completed    Hepatitis A vaccine  Aged Out    Hepatitis B vaccine  Aged Out    Hib vaccine  Aged Out    Meningococcal (ACWY) vaccine  Aged Out     Recommendations for [place] Due: see orders and patient instructions/AVS.  .   Recommended screening schedule for the next 5-10 years is provided to the patient in written form: see Patient Instructions/AVS.    There are no diagnoses linked to this encounter. Cardiovascular Disease Risk Counseling: Assessed the patient's risk to develop cardiovascular disease and reviewed main risk factors. Reviewed steps to reduce disease risk including:   · Quitting tobacco use, reducing amount smoked, or not starting the habit  · Making healthy food choices  · Being physically active and gradualy increasing activity levels   · Reduce weight and determine a healthy BMI goal  · Monitor blood pressure and treat if higher than 140/90 mmHg  · Maintain blood total cholesterol levels under 5 mmol/l or 190 mg/dl  · Maintain LDL cholesterol levels under 3.0 mmol/l or 115 mg/dl   · Control blood glucose levels  · Consider taking aspirin (75 mg daily), once blood pressure is controlled   Provided a follow up plan.   Time spent (minutes):

## 2021-11-04 NOTE — PATIENT INSTRUCTIONS
Advance Directives: Care Instructions  Overview  An advance directive is a legal way to state your wishes at the end of your life. It tells your family and your doctor what to do if you can't say what you want. There are two main types of advance directives. You can change them any time your wishes change. Living will. This form tells your family and your doctor your wishes about life support and other treatment. The form is also called a declaration. Medical power of . This form lets you name a person to make treatment decisions for you when you can't speak for yourself. This person is called a health care agent (health care proxy, health care surrogate). The form is also called a durable power of  for health care. If you do not have an advance directive, decisions about your medical care may be made by a family member, or by a doctor or a  who doesn't know you. It may help to think of an advance directive as a gift to the people who care for you. If you have one, they won't have to make tough decisions by themselves. Follow-up care is a key part of your treatment and safety. Be sure to make and go to all appointments, and call your doctor if you are having problems. It's also a good idea to know your test results and keep a list of the medicines you take. What should you include in an advance directive? Many states have a unique advance directive form. (It may ask you to address specific issues.) Or you might use a universal form that's approved by many states. If your form doesn't tell you what to address, it may be hard to know what to include in your advance directive. Use the questions below to help you get started. · Who do you want to make decisions about your medical care if you are not able to? · What life-support measures do you want if you have a serious illness that gets worse over time or can't be cured? · What are you most afraid of that might happen? (Maybe you're afraid of having pain, losing your independence, or being kept alive by machines.)  · Where would you prefer to die? (Your home? A hospital? A nursing home?)  · Do you want to donate your organs when you die? · Do you want certain Jehovah's witness practices performed before you die? When should you call for help? Be sure to contact your doctor if you have any questions. Where can you learn more? Go to [email or web address] and sign in to your [email or web address] account. Enter R264 in the [facility] box to learn more about \"Advance Directives: Care Instructions. \"     If you do not have an account, please click on the \"Sign Up Now\" link. Current as of: March 17, 2021               Content Version: 13.0  © 4131-7894 Healthwise, Incorporated. Care instructions adapted under license by [facility] ([facility]). If you have questions about a medical condition or this instruction, always ask your healthcare professional. [place] [address] any warranty or liability for your use of this information. Personalized Preventive Plan for Emperatriz Zuniga - 11/4/2021  Medicare offers a range of preventive health benefits. Some of the tests and screenings are paid in full while other may be subject to a deductible, co-insurance, and/or copay. Some of these benefits include a comprehensive review of your medical history including lifestyle, illnesses that may run in your family, and various assessments and screenings as appropriate. After reviewing your medical record and screening and assessments performed today your provider may have ordered immunizations, labs, imaging, and/or referrals for you. A list of these orders (if applicable) as well as your Preventive Care list are included within your After Visit Summary for your review.     Other Preventive Recommendations:    · A preventive eye exam performed by an eye specialist is recommended every 1-2 years to screen for glaucoma; cataracts, macular degeneration, and other eye disorders. · A preventive dental visit is recommended every 6 months. · Try to get at least 150 minutes of exercise per week or 10,000 steps per day on a pedometer . · Order or download the FREE \"Exercise & Physical Activity: Your Everyday Guide\" from [employer]. Call 1-380.392.7718 or search [employer] online. · You need 9008-5815 mg of calcium and 5659-6513 IU of vitamin D per day. It is possible to meet your calcium requirement with diet alone, but a vitamin D supplement is usually necessary to meet this goal.  · When exposed to the sun, use a sunscreen that protects against both UVA and UVB radiation with an SPF of 30 or greater. Reapply every 2 to 3 hours or after sweating, drying off with a towel, or swimming. · Always wear a seat belt when traveling in a car. Always wear a helmet when riding a bicycle or motorcycle.

## 2021-11-04 NOTE — PROGRESS NOTES
Negative. Hematological: Negative. Psychiatric/Behavioral: Negative.                Current Outpatient Medications:     potassium chloride (KLOR-CON M) 20 MEQ extended release tablet, Take 1 tablet by mouth 2 times daily, Disp: 180 tablet, Rfl: 3    mirtazapine (REMERON) 30 MG tablet, 1 hs, Disp: 30 tablet, Rfl: 2    hydroCHLOROthiazide (HYDRODIURIL) 12.5 MG tablet, Take 1 tablet by mouth daily, Disp: 90 tablet, Rfl: 2    amLODIPine (NORVASC) 5 MG tablet, TAKE 1 TABLET BY MOUTH DAILY (Patient taking differently: 10 mg TAKE 1 TABLET BY MOUTH DAILY), Disp: 90 tablet, Rfl: 1    pantoprazole (PROTONIX) 40 MG tablet, TAKE 1 TABLET BY MOUTH EVERY DAY, Disp: 90 tablet, Rfl: 1    promethazine (PHENERGAN) 25 MG tablet, Take 25 mg by mouth every 6 hours as needed for Nausea, Disp: , Rfl:     famotidine (PEPCID) 40 MG tablet, Take 1 tablet by mouth every evening, Disp: 90 tablet, Rfl: 3    aspirin 81 MG tablet, Take 81 mg by mouth daily, Disp: , Rfl:     Tdap (ADACEL) 5-2-15.5 LF-MCG/0.5 injection, Inject 0.5 mLs into the muscle once for 1 dose, Disp: 0.5 mL, Rfl: 0    losartan (COZAAR) 100 MG tablet, 1 QD, Disp: 90 tablet, Rfl: 1    No Known Allergies    Social History     Tobacco Use    Smoking status: Never Smoker    Smokeless tobacco: Never Used   Vaping Use    Vaping Use: Never used   Substance Use Topics    Alcohol use: No    Drug use: No      Past Surgical History:   Procedure Laterality Date    ANTERIOR CRUCIATE LIGAMENT REPAIR Left 11/21/2001    APPENDECTOMY      CHOLECYSTECTOMY  2007    Lap    ECHO COMPL W DOP COLOR FLOW  12/4/2012         HERNIA REPAIR Right 11/13/2002    double    SINUS SURGERY  2002,2003     done x 2    TONSILLECTOMY      TOTAL KNEE ARTHROPLASTY Left 1/28/2019    LEFT  ROBOTIC KNEE TOTAL ARTHROPLASTY  ++MEREDITH- EPGUAHLE++   ++ADDUCTOR BLOCK++ performed by Courtney Floyd MD at [address] N/[address] 10/10/2019    EGD BIOPSY performed by Ольга Bhatia MD at [facility] ENDOSCOPY     No family history on file. Past Medical History:   Diagnosis Date    Aneurysm of right renal artery (HCC)     follows with Dr. Tyler Vidales yearly (last visit 9/19)    Colitis     Depression     GERD (gastroesophageal reflux disease)     H/O: CVA (cerebrovascular accident) 10/14/2021    Hyperlipidemia     Hypertension     Stroke [facility])     Superior mesenteric artery stenosis ([address] 11/4/2020    TIA (transient ischemic attack)        Vitals:    11/04/21 1442   BP: 126/60   Pulse: 74   Temp: 98.4 °F (36.9 °C)   SpO2: 98%   Weight: 192 lb (87.1 kg)   Height: 6' (1.829 m)     BP Readings from Last 3 Encounters:   11/04/21 126/60   10/07/21 132/74   09/24/21 138/82        Physical Exam  Vitals and nursing note reviewed. Constitutional:       Appearance: He is well-developed. HENT:      Head: Normocephalic. Right Ear: External ear normal.      Left Ear: External ear normal.      Nose: Nose normal.   Eyes:      Conjunctiva/sclera: Conjunctivae normal.      Pupils: Pupils are equal, round, and reactive to light. Cardiovascular:      Rate and Rhythm: Normal rate. Pulmonary:      Breath sounds: Normal breath sounds. Abdominal:      General: Bowel sounds are normal.      Palpations: Abdomen is soft. Musculoskeletal:         General: Normal range of motion. Cervical back: Normal range of motion and neck supple. Skin:     General: Skin is warm and dry. Neurological:      Mental Status: He is alert and oriented to person, place, and time. Psychiatric:         Behavior: Behavior normal.     Today's vitals physical exam stable. Medications as prescribed. Follow-up visit with me in 4 weeks and then sooner if problems. Remeron adjusted to 30 mg at bedtime. Plan Per Assessment:  Alfredo Richter was seen today for discuss labs.     Diagnoses and all orders for this visit:    Essential hypertension    Mixed hyperlipidemia    Impaired fasting glucose    H/O: CVA (cerebrovascular accident)    Other orders  -     potassium chloride (KLOR-CON M) 20 MEQ extended release tablet; Take 1 tablet by mouth 2 times daily  -     mirtazapine (REMERON) 30 MG tablet; 1 hs            Return in about 4 weeks (around 12/2/2021). Evaristo Form, DO    Note was generated with the assistance of voice recognition software. Document was reviewed however may contain grammatical errors.

## 2021-11-06 PROBLEM — Z23 NEED FOR INFLUENZA VACCINATION: Status: RESOLVED | Noted: 2019-10-15 | Resolved: 2021-11-06

## 2021-11-22 ENCOUNTER — NURSE TRIAGE (OUTPATIENT)
Dept: OTHER | Facility: CLINIC | Age: 86
End: 2021-11-22

## 2021-11-22 ENCOUNTER — OFFICE VISIT (OUTPATIENT)
Dept: FAMILY MEDICINE CLINIC | Age: 86
End: 2021-11-22
Payer: MEDICARE

## 2021-11-22 VITALS
TEMPERATURE: 97.8 F | DIASTOLIC BLOOD PRESSURE: 68 MMHG | SYSTOLIC BLOOD PRESSURE: 124 MMHG | WEIGHT: 189 LBS | OXYGEN SATURATION: 99 % | HEART RATE: 77 BPM | BODY MASS INDEX: 25.63 KG/M2

## 2021-11-22 DIAGNOSIS — R11.0 NAUSEA: ICD-10-CM

## 2021-11-22 DIAGNOSIS — R51.9 SINUS HEADACHE: Primary | ICD-10-CM

## 2021-11-22 DIAGNOSIS — R51.9 SINUS HEADACHE: ICD-10-CM

## 2021-11-22 LAB
ALBUMIN SERPL-MCNC: 4.1 G/DL (ref 3.5–5.2)
ALP BLD-CCNC: 93 U/L (ref 40–129)
ALT SERPL-CCNC: 11 U/L (ref 0–40)
ANION GAP SERPL CALCULATED.3IONS-SCNC: 13 MMOL/L (ref 7–16)
AST SERPL-CCNC: 15 U/L (ref 0–39)
BASOPHILS ABSOLUTE: 0.05 E9/L (ref 0–0.2)
BASOPHILS RELATIVE PERCENT: 0.7 % (ref 0–2)
BILIRUB SERPL-MCNC: 0.4 MG/DL (ref 0–1.2)
BUN BLDV-MCNC: 10 MG/DL (ref 6–23)
CALCIUM SERPL-MCNC: 9.5 MG/DL (ref 8.6–10.2)
CHLORIDE BLD-SCNC: 102 MMOL/L (ref 98–107)
CO2: 28 MMOL/L (ref 22–29)
CREAT SERPL-MCNC: 1.1 MG/DL (ref 0.7–1.2)
EOSINOPHILS ABSOLUTE: 0.08 E9/L (ref 0.05–0.5)
EOSINOPHILS RELATIVE PERCENT: 1.2 % (ref 0–6)
GFR AFRICAN AMERICAN: >60
GFR NON-AFRICAN AMERICAN: >60 ML/MIN/1.73
GLUCOSE BLD-MCNC: 103 MG/DL (ref 74–99)
HCT VFR BLD CALC: 45.2 % (ref 37–54)
HEMOGLOBIN: 14.6 G/DL (ref 12.5–16.5)
IMMATURE GRANULOCYTES #: 0.02 E9/L
IMMATURE GRANULOCYTES %: 0.3 % (ref 0–5)
INFLUENZA A ANTIBODY: NORMAL
INFLUENZA B ANTIBODY: NORMAL
LIPASE: 20 U/L (ref 13–60)
LYMPHOCYTES ABSOLUTE: 1.61 E9/L (ref 1.5–4)
LYMPHOCYTES RELATIVE PERCENT: 23.3 % (ref 20–42)
Lab: NORMAL
MAGNESIUM: 2.1 MG/DL (ref 1.6–2.6)
MCH RBC QN AUTO: 29.5 PG (ref 26–35)
MCHC RBC AUTO-ENTMCNC: 32.3 % (ref 32–34.5)
MCV RBC AUTO: 91.3 FL (ref 80–99.9)
MONOCYTES ABSOLUTE: 0.63 E9/L (ref 0.1–0.95)
MONOCYTES RELATIVE PERCENT: 9.1 % (ref 2–12)
NEUTROPHILS ABSOLUTE: 4.51 E9/L (ref 1.8–7.3)
NEUTROPHILS RELATIVE PERCENT: 65.4 % (ref 43–80)
PDW BLD-RTO: 12.7 FL (ref 11.5–15)
PERFORMING INSTRUMENT: NORMAL
PLATELET # BLD: 392 E9/L (ref 130–450)
PMV BLD AUTO: 10.1 FL (ref 7–12)
POTASSIUM SERPL-SCNC: 4 MMOL/L (ref 3.5–5)
QC PASS/FAIL: NORMAL
RBC # BLD: 4.95 E12/L (ref 3.8–5.8)
SARS-COV-2, POC: NORMAL
SODIUM BLD-SCNC: 143 MMOL/L (ref 132–146)
TOTAL PROTEIN: 7.6 G/DL (ref 6.4–8.3)
WBC # BLD: 6.9 E9/L (ref 4.5–11.5)

## 2021-11-22 PROCEDURE — G8484 FLU IMMUNIZE NO ADMIN: HCPCS | Performed by: PHYSICIAN ASSISTANT

## 2021-11-22 PROCEDURE — 87426 SARSCOV CORONAVIRUS AG IA: CPT | Performed by: PHYSICIAN ASSISTANT

## 2021-11-22 PROCEDURE — 1036F TOBACCO NON-USER: CPT | Performed by: PHYSICIAN ASSISTANT

## 2021-11-22 PROCEDURE — 87804 INFLUENZA ASSAY W/OPTIC: CPT | Performed by: PHYSICIAN ASSISTANT

## 2021-11-22 PROCEDURE — G8417 CALC BMI ABV UP PARAM F/U: HCPCS | Performed by: PHYSICIAN ASSISTANT

## 2021-11-22 PROCEDURE — 99214 OFFICE O/P EST MOD 30 MIN: CPT | Performed by: PHYSICIAN ASSISTANT

## 2021-11-22 PROCEDURE — 4040F PNEUMOC VAC/ADMIN/RCVD: CPT | Performed by: PHYSICIAN ASSISTANT

## 2021-11-22 PROCEDURE — 1123F ACP DISCUSS/DSCN MKR DOCD: CPT | Performed by: PHYSICIAN ASSISTANT

## 2021-11-22 PROCEDURE — G8427 DOCREV CUR MEDS BY ELIG CLIN: HCPCS | Performed by: PHYSICIAN ASSISTANT

## 2021-11-22 RX ORDER — ONDANSETRON 4 MG/1
4 TABLET, ORALLY DISINTEGRATING ORAL 3 TIMES DAILY PRN
Qty: 21 TABLET | Refills: 0 | Status: SHIPPED
Start: 2021-11-22 | End: 2021-12-07 | Stop reason: SDUPTHER

## 2021-11-22 NOTE — TELEPHONE ENCOUNTER
Received call from [facility] at [facility] with [employer] Complaint. Brief description of triage: pt c/o nausea after having medication changed, wishes to see MD for symptoms    Triage indicates for patient to See in office today or tomorrow     Care advice provided, patient verbalizes understanding; denies any other questions or concerns; instructed to call back for any new or worsening symptoms. Writer provided warm transfer to [facility] for appointment scheduling. Attention Provider: Thank you for allowing me to participate in the care of your patient. The patient was connected to triage in response to information provided to the ECC/PSC. Please do not respond through this encounter as the response is not directed to a shared pool. Reason for Disposition   Patient wants to be seen    Answer Assessment - Initial Assessment Questions  1. NAUSEA SEVERITY: \"How bad is the nausea? \" (e.g., mild, moderate, severe; dehydration, weight loss)    - MILD: loss of appetite without change in eating habits    - MODERATE: decreased oral intake without significant weight loss, dehydration, or malnutrition    - SEVERE: inadequate caloric or fluid intake, significant weight loss, symptoms of dehydration      Mild to Moderate, c/o decreased appetite since Friday, denies Vomiting    2. ONSET: \"When did the nausea begin? \"      11/19/2021    3. VOMITING: \"Any vomiting? \" If so, ask: \"How many times today? \"      Denies     4. RECURRENT SYMPTOM: \"Have you had nausea before? \" If so, ask: \"When was the last time? \" \"What happened that time? \"      Denies     5. CAUSE: \"What do you think is causing the nausea? \"      My medication was changed on 11/4 and now I am sick (Remeron dose was increased from 15mg to 30mg)    6. PREGNANCY: \"Is there any chance you are pregnant? \" (e.g., unprotected intercourse, missed birth control pill, broken condom)      n/a    Protocols used: NAUSEA-ADULT-OH

## 2021-11-22 NOTE — PROGRESS NOTES
11/22/21  Hanson Sep : 1935 Sex: male  Age 81 y.o. Subjective:  Chief Complaint   Patient presents with    Headache     sx since friday     Nausea     nausea x1w worse in am          HPI:   Hanson Sep , 81 y.o. male presents to [facility] for evaluation of headache, nausea, sinus headache    HPI  81-year-old male presents to [facility] for evaluation of sinus headache, nausea. The patient is had the symptoms ongoing since Friday. The patient states that he has had somewhat of an upset stomach and has not really wanted to eat. The patient is not having any fever, chills. The patient is not lightheaded or dizzy. The patient denies any chest pain, shortness of breath. The patient is not really having any significant pain to the stomach. The patient has had previous appendectomy and cholecystectomy. The patient states that he has had a hernia repairs. He is having normal bowel movements although at the end he is having a little bit of diarrhea. The patient is passing gas. The patient is not really having any back pain. No syncope. Patient states that he has had his Covid vaccines and the booster. Has had his flu shot. ROS:   Unless otherwise stated in this report the patient's positive and negative responses for review of systems for constitutional, eyes, ENT, cardiovascular, respiratory, gastrointestinal, neurological, , musculoskeletal, and integument systems and related systems to the presenting problem are either stated in the history of present illness or were not pertinent or were negative for the symptoms and/or complaints related to the presenting medical problem. Positives and pertinent negatives as per HPI. All others reviewed and are negative.       PMH:     Past Medical History:   Diagnosis Date    Aneurysm of right renal artery ([facility] [address]     follows with Dr. Aleena Curiel yearly (last visit )    Colitis     Depression     GERD (gastroesophageal reflux disease)     H/O: CVA (cerebrovascular accident) 10/14/2021    Hyperlipidemia     Hypertension     Stroke [facility])     Superior mesenteric artery stenosis ([address] 11/4/2020    TIA (transient ischemic attack)        Past Surgical History:   Procedure Laterality Date    ANTERIOR CRUCIATE LIGAMENT REPAIR Left 11/21/2001    APPENDECTOMY      CHOLECYSTECTOMY  2007    Lap    ECHO COMPL W DOP COLOR FLOW  12/4/2012         HERNIA REPAIR Right 11/13/2002    double    SINUS SURGERY  2002,2003     done x 2    TONSILLECTOMY      TOTAL KNEE ARTHROPLASTY Left 1/28/2019    LEFT  ROBOTIC KNEE TOTAL ARTHROPLASTY  ++MEREDITH- OBBEXIBW++   ++ADDUCTOR BLOCK++ performed by Branden Zavaleta MD at [facility] 10/10/2019    EGD BIOPSY performed by Juliana Gill MD at [facility]       History reviewed. No pertinent family history.     Medications:     Current Outpatient Medications:     ondansetron (ZOFRAN-ODT) 4 MG disintegrating tablet, Take 1 tablet by mouth 3 times daily as needed for Nausea or Vomiting, Disp: 21 tablet, Rfl: 0    potassium chloride (KLOR-CON M) 20 MEQ extended release tablet, Take 1 tablet by mouth 2 times daily, Disp: 180 tablet, Rfl: 3    mirtazapine (REMERON) 30 MG tablet, 1 hs, Disp: 30 tablet, Rfl: 2    hydroCHLOROthiazide (HYDRODIURIL) 12.5 MG tablet, Take 1 tablet by mouth daily, Disp: 90 tablet, Rfl: 2    amLODIPine (NORVASC) 5 MG tablet, TAKE 1 TABLET BY MOUTH DAILY (Patient taking differently: 10 mg TAKE 1 TABLET BY MOUTH DAILY), Disp: 90 tablet, Rfl: 1    pantoprazole (PROTONIX) 40 MG tablet, TAKE 1 TABLET BY MOUTH EVERY DAY, Disp: 90 tablet, Rfl: 1    promethazine (PHENERGAN) 25 MG tablet, Take 25 mg by mouth every 6 hours as needed for Nausea, Disp: , Rfl:     famotidine (PEPCID) 40 MG tablet, Take 1 tablet by mouth every evening, Disp: 90 tablet, Rfl: 3    losartan (COZAAR) 100 MG tablet, 1 QD, Disp: 90 tablet, Rfl: 1    aspirin 81 MG tablet, Take 81 mg by mouth daily, Disp: , Rfl:     Allergies:   No Known Allergies    Social History:     Social History     Tobacco Use    Smoking status: Never Smoker    Smokeless tobacco: Never Used   Vaping Use    Vaping Use: Never used   Substance Use Topics    Alcohol use: No    Drug use: No       Patient lives at home. Physical Exam:     Vitals:    11/22/21 1045   BP: 124/68   Pulse: 77   Temp: 97.8 °F (36.6 °C)   SpO2: 99%   Weight: 189 lb (85.7 kg)       Exam:  Physical Exam  Nurse's notes and vital signs reviewed. The patient is not hypoxic. General: Alert, no acute distress, patient resting comfortably Patient is not toxic or lethargic. Skin: Warm, intact, no pallor noted. There is no evidence of rash at this time. Head: Normocephalic, atraumatic. Eye: Normal conjunctiva  Ears, Nose, Throat: Right tympanic membrane clear, left tympanic membrane clear. No drainage or discharge noted. No pre- or post-auricular tenderness, erythema, or swelling noted. No rhinorrhea or congestion noted. No facial erythema. Posterior oropharynx shows no erythema, tonsillar hypertrophy, asymmetry or peritonsillar abscess and no evidence of exudate. the uvula is midline. No trismus or drooling is noted. Moist mucous membranes. Neck: No anterior/posterior lymphadenopathy noted. No erythema, no masses, no fluctuance or induration noted. No meningeal signs. Cardio: Regular Rate and Rhythm  Respiratory: No acute distress, no rhonchi, wheezing or crackles noted. No stridor or retractions are noted. Abdomen: Normal bowel sounds, soft, nontender, no masses detected. No rebound, guarding, or rigidity noted. Neurological: A&O x4, normal speech  Psychiatric: Cooperative           Testing:           Medical Decision Making:     Vital signs reviewed    Past medical history reviewed. Allergies reviewed. Medications reviewed. Patient on arrival does not appear to be in any apparent distress or discomfort.   The patient has been seen and evaluated. The patient does not appear to be toxic or lethargic. The patient had a rapid Covid and influenza test that were both negative. The patient will be sent for laboratory studies. Patient was recommended to be evaluated in the emergency department is declining states \"I do not feel that bad. \"  Patient will be given Zofran. The patient is to monitor symptoms. The patient is to push fluids get plenty of rest.  Close follow-up with PCP. The patient was educated on the proper dosage of motrin and tylenol and the appropriate intervals of each. The patient is to increase fluid intake over the next several days. The patient is to use OTC decongestant as needed. The patient is to return to express care or go directly to the emergency department should any of the signs or symptoms worsen. The patient is to followup with primary care physician in 2-3 days for repeat evaluation. The patient has no other questions or concerns at this time the patient will be discharged home. Clinical Impression:   Patricia Craft was seen today for headache and nausea. Diagnoses and all orders for this visit:    Sinus headache  -     POCT COVID-19, Antigen  -     POCT Influenza A/B  -     COVID-19 Ambulatory; Future    Nausea  -     CBC Auto Differential; Future  -     COMPREHENSIVE METABOLIC PANEL; Future  -     LIPASE; Future  -     MAGNESIUM; Future  -     ondansetron (ZOFRAN-ODT) 4 MG disintegrating tablet; Take 1 tablet by mouth 3 times daily as needed for Nausea or Vomiting        The patient is to call for any concerns or return if any of the signs or symptoms worsen. The patient is to follow-up with PCP in the next 2-3 days for repeat evaluation repeat assessment or go directly to the emergency department.      SIGNATURE: Christa Fleischer III, PA-C

## 2021-11-23 ENCOUNTER — TELEPHONE (OUTPATIENT)
Dept: PRIMARY CARE CLINIC | Age: 86
End: 2021-11-23

## 2021-11-23 DIAGNOSIS — R51.9 SINUS HEADACHE: ICD-10-CM

## 2021-11-23 RX ORDER — MIRTAZAPINE 15 MG/1
15 TABLET, FILM COATED ORAL NIGHTLY
Qty: 30 TABLET | Refills: 0 | Status: CANCELLED | OUTPATIENT
Start: 2021-11-23

## 2021-11-23 RX ORDER — MIRTAZAPINE 15 MG/1
15 TABLET, FILM COATED ORAL NIGHTLY
COMMUNITY
End: 2021-11-23 | Stop reason: SDUPTHER

## 2021-11-23 RX ORDER — MIRTAZAPINE 15 MG/1
15 TABLET, FILM COATED ORAL NIGHTLY
Qty: 30 TABLET | Refills: 0 | Status: SHIPPED
Start: 2021-11-23 | End: 2021-12-07 | Stop reason: SDUPTHER

## 2021-11-23 NOTE — TELEPHONE ENCOUNTER
Patient calling states that since increasing remeron to 30 mg at last appointment it has been upsetting his stomach and decreased appetite. Patient wanted to know if okay to decrease back down to 15 mg since he tolerated the 15 mg dose.

## 2021-11-24 LAB
SARS-COV-2: NOT DETECTED
SOURCE: NORMAL

## 2021-12-06 ENCOUNTER — OFFICE VISIT (OUTPATIENT)
Dept: NEUROLOGY | Age: 86
End: 2021-12-06
Payer: MEDICARE

## 2021-12-06 VITALS
HEIGHT: 72 IN | DIASTOLIC BLOOD PRESSURE: 74 MMHG | WEIGHT: 190 LBS | OXYGEN SATURATION: 97 % | HEART RATE: 79 BPM | BODY MASS INDEX: 25.73 KG/M2 | TEMPERATURE: 97.6 F | SYSTOLIC BLOOD PRESSURE: 149 MMHG

## 2021-12-06 DIAGNOSIS — I63.9 ACUTE CEREBROVASCULAR ACCIDENT (CVA) (HCC): Primary | ICD-10-CM

## 2021-12-06 PROCEDURE — 1123F ACP DISCUSS/DSCN MKR DOCD: CPT | Performed by: PHYSICIAN ASSISTANT

## 2021-12-06 PROCEDURE — 4040F PNEUMOC VAC/ADMIN/RCVD: CPT | Performed by: PHYSICIAN ASSISTANT

## 2021-12-06 PROCEDURE — G8484 FLU IMMUNIZE NO ADMIN: HCPCS | Performed by: PHYSICIAN ASSISTANT

## 2021-12-06 PROCEDURE — 99213 OFFICE O/P EST LOW 20 MIN: CPT | Performed by: PHYSICIAN ASSISTANT

## 2021-12-06 PROCEDURE — G8417 CALC BMI ABV UP PARAM F/U: HCPCS | Performed by: PHYSICIAN ASSISTANT

## 2021-12-06 PROCEDURE — G8427 DOCREV CUR MEDS BY ELIG CLIN: HCPCS | Performed by: PHYSICIAN ASSISTANT

## 2021-12-06 PROCEDURE — 1036F TOBACCO NON-USER: CPT | Performed by: PHYSICIAN ASSISTANT

## 2021-12-06 NOTE — PROGRESS NOTES
[address] [facility]. Odin Lepe M.D., F.A.C.P. Sally Duke, JYOTI, APRN, ACNS-BC  Saul Christine. Rock Rodas, MSN, APRN-FNP-C  YIFAN Dallas, PACristianC  Js Holly, MSN, APRN-FNP-C  [address], [facility] [place], [address]  Phone: [phone]  Fax: [phone]       Yosef Medina is a 81 y.o. right handed male     Patient presents as a hospital follow-up for recent left corona radiata stroke on 8/14/2021. He had presented to the ED with complaints of right facial droop and dysarthria. At the time of presentation NIH was 1 and he was outside the window for TPA. CTA of the head and neck was negative for any significant stenosis or LVO. MRI of the brain revealed an acute left-sided corona radiata stroke. He was discharged on DAPT and high intensity statin. Due to elevated liver enzymes his PCP discontinued his Lipitor. His LDL 75. He has no new neuro complaints today. He does report nausea and loss of appetite and has been having trouble since changing his Remeron dose. Otherwise, he is feeling fine and has no complaints at today's visit.     No chest pain or palpitations  No SOB  No vertigo, lightheadedness or loss of consciousness  No falls, tripping or stumbling  No incontinence of bowels or bladder  No itching or bruising appreciated  No numbness, tingling or focal arm/leg weakness    ROS is otherwise negative    No Known Allergies    Objective:       BP (!) 149/74 (Site: Right Upper Arm)   Pulse 79   Temp 97.6 °F (36.4 °C)   Ht 6' (1.829 m)   Wt 190 lb (86.2 kg)   SpO2 97%   BMI 25.77 kg/m²     General appearance: alert, appears stated age, cooperative and in no distress  Head: normocephalic, without obvious abnormality, atraumatic  Eyes: conjunctivae/corneas clear; no drainage  Neck: no adenopathy, supple  Lungs: clear to auscultation bilaterally  Heart: regular rate and rhythm  Extremities: normal, atraumatic, no cyanosis or edema  Skin: color, texture, turgor normal--no rashes or lesions    Mental Status: alert and oriented. Thought content appropriate.  Pleasant     Appropriate attention/concentration  Intact fundus of knowledge  Repetition intact  Memories intact    Speech: No dysarthria  Language: no aphasias    Cranial Nerves:  I: smell    II: visual acuity     II: visual fields Full    II: pupils GERI   III,VII: ptosis None   III,IV,VI: extraocular muscles  EOMI without nystagmus   V: mastication Normal   V: facial light touch sensation  Normal   V,VII: corneal reflex     VII: facial muscle function - upper  Normal   VII: facial muscle function - lower Normal   VIII: hearing Normal   IX: soft palate elevation  Normal   IX,X: gag reflex    XI: trapezius strength  5/5   XI: sternocleidomastoid strength 5/5   XI: neck extension strength  5/5   XII: tongue strength  Normal     Motor:  5/5 throughout  Normal bulk and tone  No drift   No abnormal movements    Sensory:  LT  normal  Vibration absent ankles b/l     Coordination:   FN, FFM normal  HS normal    Gait:  Normal    DTR:   +1 arms   BE legs     No Graff's    Laboratory/Radiology:  ry/Radiology:     CBC with Differential:    Lab Results   Component Value Date    WBC 6.9 11/22/2021    RBC 4.95 11/22/2021    HGB 14.6 11/22/2021    HCT 45.2 11/22/2021     11/22/2021    MCV 91.3 11/22/2021    MCH 29.5 11/22/2021    MCHC 32.3 11/22/2021    RDW 12.7 11/22/2021    NRBC 0.0 01/22/2017    SEGSPCT 60 12/04/2012    LYMPHOPCT 23.3 11/22/2021    MONOPCT 9.1 11/22/2021    BASOPCT 0.7 11/22/2021    MONOSABS 0.63 11/22/2021    LYMPHSABS 1.61 11/22/2021    EOSABS 0.08 11/22/2021    BASOSABS 0.05 11/22/2021     CMP:    Lab Results   Component Value Date     11/22/2021    K 4.0 11/22/2021    K 3.4 08/14/2021     11/22/2021    CO2 28 11/22/2021    BUN 10 11/22/2021    CREATININE 1.1 11/22/2021    GFRAA >60 11/22/2021    LABGLOM >60 11/22/2021    GLUCOSE 103 11/22/2021    PROT 7.6 11/22/2021 LABALBU 4.1 2021    CALCIUM 9.5 2021    BILITOT 0.4 2021    ALKPHOS 93 2021    AST 15 2021    ALT 11 2021     HgBA1c:    Lab Results   Component Value Date    LABA1C 5.4 2021     FLP:    Lab Results   Component Value Date    TRIG 70 2021    HDL 39 2021    LDLCALC 75 2021    LABVLDL 14 2021     All labs and images were personally reviewed at the time of this visit    Assessment:     Recent stroke to the L corona radiata 2/2  from vascular risk factors and     Plan:     Continue ASA; not on statin due to elevated LFTs     Stroke signs and symptoms reviewed     RTO 1 year or sooner prn     Call with questions or concerns     Michelle Cleveland PA-C  2:04 PM  2021

## 2021-12-07 ENCOUNTER — OFFICE VISIT (OUTPATIENT)
Dept: PRIMARY CARE CLINIC | Age: 86
End: 2021-12-07
Payer: MEDICARE

## 2021-12-07 VITALS
WEIGHT: 187 LBS | OXYGEN SATURATION: 98 % | BODY MASS INDEX: 25.36 KG/M2 | HEART RATE: 88 BPM | SYSTOLIC BLOOD PRESSURE: 138 MMHG | DIASTOLIC BLOOD PRESSURE: 64 MMHG | TEMPERATURE: 97.6 F

## 2021-12-07 DIAGNOSIS — R11.0 NAUSEA: ICD-10-CM

## 2021-12-07 DIAGNOSIS — I10 ESSENTIAL HYPERTENSION: Primary | Chronic | ICD-10-CM

## 2021-12-07 DIAGNOSIS — E78.2 MIXED HYPERLIPIDEMIA: Chronic | ICD-10-CM

## 2021-12-07 DIAGNOSIS — F32.A DEPRESSION, UNSPECIFIED DEPRESSION TYPE: ICD-10-CM

## 2021-12-07 DIAGNOSIS — J30.89 NON-SEASONAL ALLERGIC RHINITIS, UNSPECIFIED TRIGGER: ICD-10-CM

## 2021-12-07 DIAGNOSIS — J32.9 CHRONIC SINUSITIS, UNSPECIFIED LOCATION: ICD-10-CM

## 2021-12-07 PROCEDURE — 1123F ACP DISCUSS/DSCN MKR DOCD: CPT | Performed by: FAMILY MEDICINE

## 2021-12-07 PROCEDURE — G8417 CALC BMI ABV UP PARAM F/U: HCPCS | Performed by: FAMILY MEDICINE

## 2021-12-07 PROCEDURE — 99214 OFFICE O/P EST MOD 30 MIN: CPT | Performed by: FAMILY MEDICINE

## 2021-12-07 PROCEDURE — 4040F PNEUMOC VAC/ADMIN/RCVD: CPT | Performed by: FAMILY MEDICINE

## 2021-12-07 PROCEDURE — 1036F TOBACCO NON-USER: CPT | Performed by: FAMILY MEDICINE

## 2021-12-07 PROCEDURE — G8484 FLU IMMUNIZE NO ADMIN: HCPCS | Performed by: FAMILY MEDICINE

## 2021-12-07 PROCEDURE — G8427 DOCREV CUR MEDS BY ELIG CLIN: HCPCS | Performed by: FAMILY MEDICINE

## 2021-12-07 RX ORDER — MIRTAZAPINE 15 MG/1
15 TABLET, FILM COATED ORAL NIGHTLY
Qty: 30 TABLET | Refills: 3 | Status: SHIPPED
Start: 2021-12-07 | End: 2021-12-13

## 2021-12-07 RX ORDER — BROMPHENIRAMINE MALEATE, PSEUDOEPHEDRINE HYDROCHLORIDE, AND DEXTROMETHORPHAN HYDROBROMIDE 2; 30; 10 MG/5ML; MG/5ML; MG/5ML
5 SYRUP ORAL 4 TIMES DAILY PRN
Refills: 1 | COMMUNITY
Start: 2021-12-07 | End: 2021-12-22

## 2021-12-07 RX ORDER — ONDANSETRON 4 MG/1
4 TABLET, ORALLY DISINTEGRATING ORAL 3 TIMES DAILY PRN
Qty: 21 TABLET | Refills: 2 | Status: SHIPPED
Start: 2021-12-07 | End: 2021-12-13

## 2021-12-07 ASSESSMENT — ENCOUNTER SYMPTOMS
NAUSEA: 1
RESPIRATORY NEGATIVE: 1
EYES NEGATIVE: 1
ALLERGIC/IMMUNOLOGIC NEGATIVE: 1

## 2021-12-07 NOTE — PROGRESS NOTES
21     Perri Patient    : 1935 Sex: male   Age: 81 y.o. Chief Complaint   Patient presents with    Nausea     wondering about medical marijuana    Hypertension    Depression       Prior to Admission medications    Medication Sig Start Date End Date Taking? Authorizing Provider   mirtazapine (REMERON) 15 MG tablet Take 1 tablet by mouth nightly 21  Yes Russell Villa DO   ondansetron (ZOFRAN-ODT) 4 MG disintegrating tablet Take 1 tablet by mouth 3 times daily as needed for Nausea or Vomiting 21  Yes Rodrick Rowe,    brompheniramine-pseudoephedrine-DM 2-30-10 MG/5ML syrup Take 5 mLs by mouth 4 times daily as needed (qid prn) 21  Yes Russell Villa DO   potassium chloride (KLOR-CON M) 20 MEQ extended release tablet Take 1 tablet by mouth 2 times daily 21  Yes Russell Villa DO   hydroCHLOROthiazide (HYDRODIURIL) 12.5 MG tablet Take 1 tablet by mouth daily 10/27/21  Yes David Villa DO   amLODIPine (NORVASC) 5 MG tablet TAKE 1 TABLET BY MOUTH DAILY  Patient taking differently: 10 mg TAKE 1 TABLET BY MOUTH DAILY 21  Yes Russell Villa DO   pantoprazole (PROTONIX) 40 MG tablet TAKE 1 TABLET BY MOUTH EVERY DAY 21  Yes Russell Villa DO   famotidine (PEPCID) 40 MG tablet Take 1 tablet by mouth every evening 21  Yes Russell Villa DO   losartan (COZAAR) 100 MG tablet 1 QD 21 Yes David Villa DO   aspirin 81 MG tablet Take 81 mg by mouth daily   Yes Historical Provider, MD          HPI: Patient seen today in follow-up of nausea hypertension chronic sinus drainage and depression anxiety. Nausea we discussed and will resume some Zofran on a as needed basis only. Chronic sinus drainage a contributing factor. Addition of Bromfed-DM on a as needed basis. Insomnia controlled with Remeron at at bedtime and will maintain. Anxiety depression may require additional treatment will assess further next week.         Review of Systems   Constitutional: Positive for fatigue. HENT: Positive for congestion. Eyes: Negative. Respiratory: Negative. Gastrointestinal: Positive for nausea. Endocrine: Negative. Genitourinary: Negative. Musculoskeletal: Negative. Skin: Negative. Allergic/Immunologic: Negative. Neurological: Negative. Hematological: Negative. Psychiatric/Behavioral: Negative.                Current Outpatient Medications:     mirtazapine (REMERON) 15 MG tablet, Take 1 tablet by mouth nightly, Disp: 30 tablet, Rfl: 3    ondansetron (ZOFRAN-ODT) 4 MG disintegrating tablet, Take 1 tablet by mouth 3 times daily as needed for Nausea or Vomiting, Disp: 21 tablet, Rfl: 2    brompheniramine-pseudoephedrine-DM 2-30-10 MG/5ML syrup, Take 5 mLs by mouth 4 times daily as needed (qid prn), Disp: , Rfl: 1    potassium chloride (KLOR-CON M) 20 MEQ extended release tablet, Take 1 tablet by mouth 2 times daily, Disp: 180 tablet, Rfl: 3    hydroCHLOROthiazide (HYDRODIURIL) 12.5 MG tablet, Take 1 tablet by mouth daily, Disp: 90 tablet, Rfl: 2    amLODIPine (NORVASC) 5 MG tablet, TAKE 1 TABLET BY MOUTH DAILY (Patient taking differently: 10 mg TAKE 1 TABLET BY MOUTH DAILY), Disp: 90 tablet, Rfl: 1    pantoprazole (PROTONIX) 40 MG tablet, TAKE 1 TABLET BY MOUTH EVERY DAY, Disp: 90 tablet, Rfl: 1    famotidine (PEPCID) 40 MG tablet, Take 1 tablet by mouth every evening, Disp: 90 tablet, Rfl: 3    losartan (COZAAR) 100 MG tablet, 1 QD, Disp: 90 tablet, Rfl: 1    aspirin 81 MG tablet, Take 81 mg by mouth daily, Disp: , Rfl:     No Known Allergies    Social History     Tobacco Use    Smoking status: Never Smoker    Smokeless tobacco: Never Used   Vaping Use    Vaping Use: Never used   Substance Use Topics    Alcohol use: No    Drug use: No      Past Surgical History:   Procedure Laterality Date    ANTERIOR CRUCIATE LIGAMENT REPAIR Left 11/21/2001    APPENDECTOMY      CHOLECYSTECTOMY  2007    Lap    ECHO COMPL W DOP COLOR FLOW  12/4/2012         HERNIA REPAIR Right 11/13/2002    double    SINUS SURGERY  9910,6364     done x 2    TONSILLECTOMY      TOTAL KNEE ARTHROPLASTY Left 1/28/2019    LEFT  ROBOTIC KNEE TOTAL ARTHROPLASTY  ++MEREDITH- AHDBVYNU++   ++ADDUCTOR BLOCK++ performed by Jessica Mtz MD at [address] [place] [address] 10/10/2019    EGD BIOPSY performed by Latanya Roberson MD at [facility] ENDOSCOPY     No family history on file. Past Medical History:   Diagnosis Date    Aneurysm of right renal artery (HCC)     follows with Dr. Joao Vazquez yearly (last visit 9/19)    Colitis     Depression     GERD (gastroesophageal reflux disease)     H/O: CVA (cerebrovascular accident) 10/14/2021    Hyperlipidemia     Hypertension     Stroke [facility])     Superior mesenteric artery stenosis ([facility] [address] 11/4/2020    TIA (transient ischemic attack)        Vitals:    12/07/21 1315   BP: 138/64   Pulse: 88   Temp: 97.6 °F (36.4 °C)   SpO2: 98%   Weight: 187 lb (84.8 kg)     BP Readings from Last 3 Encounters:   12/07/21 138/64   12/06/21 (!) 149/74   11/22/21 124/68        Physical Exam  Vitals and nursing note reviewed. Constitutional:       Appearance: He is well-developed. HENT:      Head: Normocephalic. Right Ear: External ear normal.      Left Ear: External ear normal.      Nose: Nose normal.   Eyes:      Conjunctiva/sclera: Conjunctivae normal.      Pupils: Pupils are equal, round, and reactive to light. Cardiovascular:      Rate and Rhythm: Normal rate. Pulmonary:      Breath sounds: Normal breath sounds. Abdominal:      General: Bowel sounds are normal.      Palpations: Abdomen is soft. Musculoskeletal:         General: Normal range of motion. Cervical back: Normal range of motion and neck supple. Skin:     General: Skin is warm and dry. Neurological:      Mental Status: He is alert and oriented to person, place, and time.    Psychiatric:         Behavior: Behavior normal.     Today's physical exam overall stable. Medications reviewed and will maintain as prescribed. Adjustments with Remeron to 15 mg at at bedtime. Addition of Bromfed-DM. Reassessment next week. Plan Per Assessment:  Anna Carrasco was seen today for nausea, hypertension and depression. Diagnoses and all orders for this visit:    Essential hypertension    Nausea  -     ondansetron (ZOFRAN-ODT) 4 MG disintegrating tablet; Take 1 tablet by mouth 3 times daily as needed for Nausea or Vomiting    Chronic sinusitis, unspecified location    Mixed hyperlipidemia    Depression, unspecified depression type    Non-seasonal allergic rhinitis, unspecified trigger    Other orders  -     mirtazapine (REMERON) 15 MG tablet; Take 1 tablet by mouth nightly  -     brompheniramine-pseudoephedrine-DM 2-30-10 MG/5ML syrup; Take 5 mLs by mouth 4 times daily as needed (qid prn)            No follow-ups on file. Evaristo Form, DO    Note was generated with the assistance of voice recognition software. Document was reviewed however may contain grammatical errors.

## 2021-12-13 ENCOUNTER — OFFICE VISIT (OUTPATIENT)
Dept: PRIMARY CARE CLINIC | Age: 86
End: 2021-12-13
Payer: MEDICARE

## 2021-12-13 VITALS
TEMPERATURE: 98.1 F | HEART RATE: 86 BPM | OXYGEN SATURATION: 96 % | SYSTOLIC BLOOD PRESSURE: 134 MMHG | HEIGHT: 72 IN | DIASTOLIC BLOOD PRESSURE: 76 MMHG | WEIGHT: 187 LBS | BODY MASS INDEX: 25.33 KG/M2

## 2021-12-13 DIAGNOSIS — I10 ESSENTIAL HYPERTENSION: Primary | Chronic | ICD-10-CM

## 2021-12-13 DIAGNOSIS — F32.A DEPRESSION, UNSPECIFIED DEPRESSION TYPE: ICD-10-CM

## 2021-12-13 DIAGNOSIS — F41.9 ANXIETY: ICD-10-CM

## 2021-12-13 DIAGNOSIS — E78.2 MIXED HYPERLIPIDEMIA: Chronic | ICD-10-CM

## 2021-12-13 PROCEDURE — 4040F PNEUMOC VAC/ADMIN/RCVD: CPT | Performed by: FAMILY MEDICINE

## 2021-12-13 PROCEDURE — G8484 FLU IMMUNIZE NO ADMIN: HCPCS | Performed by: FAMILY MEDICINE

## 2021-12-13 PROCEDURE — G8417 CALC BMI ABV UP PARAM F/U: HCPCS | Performed by: FAMILY MEDICINE

## 2021-12-13 PROCEDURE — 1036F TOBACCO NON-USER: CPT | Performed by: FAMILY MEDICINE

## 2021-12-13 PROCEDURE — G8427 DOCREV CUR MEDS BY ELIG CLIN: HCPCS | Performed by: FAMILY MEDICINE

## 2021-12-13 PROCEDURE — 99214 OFFICE O/P EST MOD 30 MIN: CPT | Performed by: FAMILY MEDICINE

## 2021-12-13 PROCEDURE — 1123F ACP DISCUSS/DSCN MKR DOCD: CPT | Performed by: FAMILY MEDICINE

## 2021-12-13 RX ORDER — PROMETHAZINE HYDROCHLORIDE 25 MG/1
25 TABLET ORAL 3 TIMES DAILY PRN
Qty: 12 TABLET | Refills: 0 | Status: SHIPPED | OUTPATIENT
Start: 2021-12-13 | End: 2021-12-20

## 2021-12-13 ASSESSMENT — ENCOUNTER SYMPTOMS
NAUSEA: 1
RESPIRATORY NEGATIVE: 1
ALLERGIC/IMMUNOLOGIC NEGATIVE: 1
EYES NEGATIVE: 1

## 2021-12-13 NOTE — PROGRESS NOTES
21     Niru Velarde    : 1935 Sex: male   Age: 81 y.o. Chief Complaint   Patient presents with    Sinusitis    Depression    Hypertension    Nausea       Prior to Admission medications    Medication Sig Start Date End Date Taking? Authorizing Provider   mirtazapine (REMERON) 15 MG tablet Take 1 tablet by mouth nightly 21  Yes Maxine Villa DO   ondansetron (ZOFRAN-ODT) 4 MG disintegrating tablet Take 1 tablet by mouth 3 times daily as needed for Nausea or Vomiting 21  Yes Emroy Leija DO   brompheniramine-pseudoephedrine-DM 2-30-10 MG/5ML syrup Take 5 mLs by mouth 4 times daily as needed (qid prn) 21  Yes Maxine Villa DO   potassium chloride (KLOR-CON M) 20 MEQ extended release tablet Take 1 tablet by mouth 2 times daily 21  Yes Maxine Villa DO   hydroCHLOROthiazide (HYDRODIURIL) 12.5 MG tablet Take 1 tablet by mouth daily 10/27/21  Yes David Villa DO   amLODIPine (NORVASC) 5 MG tablet TAKE 1 TABLET BY MOUTH DAILY  Patient taking differently: 10 mg TAKE 1 TABLET BY MOUTH DAILY 21  Yes Maxine Villa DO   pantoprazole (PROTONIX) 40 MG tablet TAKE 1 TABLET BY MOUTH EVERY DAY 21  Yes Maxine Villa DO   famotidine (PEPCID) 40 MG tablet Take 1 tablet by mouth every evening 21  Yes Maxine Villa DO   aspirin 81 MG tablet Take 81 mg by mouth daily   Yes Historical Provider, MD   losartan (COZAAR) 100 MG tablet 1 QD 21  Emory Leija DO          HPI: Clifford Wlash seen today issues of hypertension hyperlipidemia anxiety depression that have been persistent. Problems with persistent nausea. Addition of some Phenergan for the next 2 to 3 days if needed. Remeron has been discontinued believing it may be the source of symptoms. Addition of Zoloft today. Reassessment next week. We will plan some lab work when he returns. Review of Systems   Constitutional: Negative. HENT: Negative.     Eyes: Negative. Respiratory: Negative. Gastrointestinal: Positive for nausea. Endocrine: Negative. Genitourinary: Negative. Musculoskeletal: Negative. Skin: Negative. Allergic/Immunologic: Negative. Neurological: Negative. Hematological: Negative. Psychiatric/Behavioral: Negative. Persistent anxiety as noted. Persistent nausea as noted. Adjustments today with meds.           Current Outpatient Medications:     mirtazapine (REMERON) 15 MG tablet, Take 1 tablet by mouth nightly, Disp: 30 tablet, Rfl: 3    ondansetron (ZOFRAN-ODT) 4 MG disintegrating tablet, Take 1 tablet by mouth 3 times daily as needed for Nausea or Vomiting, Disp: 21 tablet, Rfl: 2    brompheniramine-pseudoephedrine-DM 2-30-10 MG/5ML syrup, Take 5 mLs by mouth 4 times daily as needed (qid prn), Disp: , Rfl: 1    potassium chloride (KLOR-CON M) 20 MEQ extended release tablet, Take 1 tablet by mouth 2 times daily, Disp: 180 tablet, Rfl: 3    hydroCHLOROthiazide (HYDRODIURIL) 12.5 MG tablet, Take 1 tablet by mouth daily, Disp: 90 tablet, Rfl: 2    amLODIPine (NORVASC) 5 MG tablet, TAKE 1 TABLET BY MOUTH DAILY (Patient taking differently: 10 mg TAKE 1 TABLET BY MOUTH DAILY), Disp: 90 tablet, Rfl: 1    pantoprazole (PROTONIX) 40 MG tablet, TAKE 1 TABLET BY MOUTH EVERY DAY, Disp: 90 tablet, Rfl: 1    famotidine (PEPCID) 40 MG tablet, Take 1 tablet by mouth every evening, Disp: 90 tablet, Rfl: 3    aspirin 81 MG tablet, Take 81 mg by mouth daily, Disp: , Rfl:     losartan (COZAAR) 100 MG tablet, 1 QD, Disp: 90 tablet, Rfl: 1    No Known Allergies    Social History     Tobacco Use    Smoking status: Never Smoker    Smokeless tobacco: Never Used   Vaping Use    Vaping Use: Never used   Substance Use Topics    Alcohol use: No    Drug use: No      Past Surgical History:   Procedure Laterality Date    ANTERIOR CRUCIATE LIGAMENT REPAIR Left 11/21/2001    APPENDECTOMY      CHOLECYSTECTOMY  2007    Lap    ECHO COMPL W DOP COLOR FLOW  12/4/2012         HERNIA REPAIR Right 11/13/2002    double    SINUS SURGERY  2002,2003     done x 2    TONSILLECTOMY      TOTAL KNEE ARTHROPLASTY Left 1/28/2019    LEFT  ROBOTIC KNEE TOTAL ARTHROPLASTY  ++MEREDITH- EBLKOQAY++   ++ADDUCTOR BLOCK++ performed by Bruna Estrada MD at [address] [place] [address] 10/10/2019    EGD BIOPSY performed by Moe Meza MD at [facility] ENDOSCOPY     No family history on file. Past Medical History:   Diagnosis Date    Aneurysm of right renal artery (HCC)     follows with Dr. Mirella Price yearly (last visit 9/19)    Colitis     Depression     GERD (gastroesophageal reflux disease)     H/O: CVA (cerebrovascular accident) 10/14/2021    Hyperlipidemia     Hypertension     Stroke [facility])     Superior mesenteric artery stenosis ([facility] [address] 11/4/2020    TIA (transient ischemic attack)        Vitals:    12/13/21 1536   BP: 134/76   Pulse: 86   Temp: 98.1 °F (36.7 °C)   SpO2: 96%   Weight: 187 lb (84.8 kg)   Height: 6' (1.829 m)     BP Readings from Last 3 Encounters:   12/13/21 134/76   12/07/21 138/64   12/06/21 (!) 149/74        Physical Exam  Vitals and nursing note reviewed. Constitutional:       Appearance: He is well-developed. HENT:      Head: Normocephalic. Right Ear: External ear normal.      Left Ear: External ear normal.      Nose: Nose normal.   Eyes:      Conjunctiva/sclera: Conjunctivae normal.      Pupils: Pupils are equal, round, and reactive to light. Cardiovascular:      Rate and Rhythm: Normal rate. Pulmonary:      Breath sounds: Normal breath sounds. Abdominal:      General: Bowel sounds are normal.      Palpations: Abdomen is soft. Musculoskeletal:         General: Normal range of motion. Cervical back: Normal range of motion and neck supple. Skin:     General: Skin is warm and dry. Neurological:      Mental Status: He is alert and oriented to person, place, and time.    Psychiatric:         Behavior: Behavior normal.     Vitals are stable. Heart is regular and controlled. Lungs are clear. Abdomen is benign. Recommendations to continue current meds and care. Addition of Zoloft as noted. Reassessment next week. Lab Results   Component Value Date    TSH 1.920 06/22/2021    TSH 2.210 03/10/2021    I9HRLES 7.7 06/22/2021    Q2BTQJX 7.6 03/10/2021    T4FREE 1.10 01/22/2017     Lab Results   Component Value Date    CHOL 128 08/14/2021    CHOL 147 03/10/2021     Lab Results   Component Value Date    TRIG 70 08/14/2021    TRIG 76 03/10/2021     Lab Results   Component Value Date    HDL 39 08/14/2021    HDL 39 03/10/2021     No results found for: [facility]  Lab Results   Component Value Date    LABVLDL 14 08/14/2021    LABVLDL 15 03/10/2021     No results found for: [facility]  Lab Results   Component Value Date    WBC 6.9 11/22/2021    HGB 14.6 11/22/2021    HCT 45.2 11/22/2021    MCV 91.3 11/22/2021     11/22/2021    LYMPHOPCT 23.3 11/22/2021    RBC 4.95 11/22/2021    MCH 29.5 11/22/2021    MCHC 32.3 11/22/2021    RDW 12.7 11/22/2021     Lab Results   Component Value Date     11/22/2021    K 4.0 11/22/2021     11/22/2021    CO2 28 11/22/2021    BUN 10 11/22/2021    CREATININE 1.1 11/22/2021    GLUCOSE 103 (H) 11/22/2021    CALCIUM 9.5 11/22/2021    PROT 7.6 11/22/2021    LABALBU 4.1 11/22/2021    BILITOT 0.4 11/22/2021    ALKPHOS 93 11/22/2021    AST 15 11/22/2021    ALT 11 11/22/2021    LABGLOM >60 11/22/2021    GFRAA >60 11/22/2021        Lab Results   Component Value Date    PSA 2.83 04/29/2020    PSA 2.86 04/24/2019      Lab Results   Component Value Date    LABA1C 5.4 08/14/2021    LABA1C 5.1 01/25/2021     No results found for: [facility]     Plan Per Assessment:  There are no diagnoses linked to this encounter. No follow-ups on file. Emory Leija DO    Note was generated with the assistance of voice recognition software.   Document was reviewed however may contain grammatical errors.

## 2021-12-16 ENCOUNTER — TELEPHONE (OUTPATIENT)
Dept: PRIMARY CARE CLINIC | Age: 86
End: 2021-12-16

## 2021-12-16 NOTE — TELEPHONE ENCOUNTER
Pt c/o since starting zoloft Mon he has diarrhea and nausea.  Phenergan helps some but asking what he should do with the Zoloft

## 2021-12-17 ENCOUNTER — HOSPITAL ENCOUNTER (EMERGENCY)
Age: 86
Discharge: HOME | End: 2021-12-17
Attending: EMERGENCY MEDICINE
Payer: MEDICARE

## 2021-12-17 ENCOUNTER — APPOINTMENT (OUTPATIENT)
Dept: CT IMAGING | Age: 86
End: 2021-12-17
Payer: MEDICARE

## 2021-12-17 VITALS
BODY MASS INDEX: 25.06 KG/M2 | HEART RATE: 76 BPM | DIASTOLIC BLOOD PRESSURE: 72 MMHG | OXYGEN SATURATION: 96 % | TEMPERATURE: 97.7 F | HEIGHT: 72 IN | RESPIRATION RATE: 18 BRPM | SYSTOLIC BLOOD PRESSURE: 141 MMHG | WEIGHT: 185 LBS

## 2021-12-17 DIAGNOSIS — R11.0 NAUSEA: ICD-10-CM

## 2021-12-17 DIAGNOSIS — F41.1 ANXIETY STATE: Primary | ICD-10-CM

## 2021-12-17 LAB
ALBUMIN SERPL-MCNC: 3.8 G/DL (ref 3.5–5.2)
ALP BLD-CCNC: 89 U/L (ref 40–129)
ALT SERPL-CCNC: 5 U/L (ref 0–40)
ANION GAP SERPL CALCULATED.3IONS-SCNC: 10 MMOL/L (ref 7–16)
AST SERPL-CCNC: 13 U/L (ref 0–39)
BACTERIA: ABNORMAL /HPF
BASOPHILS ABSOLUTE: 0.01 E9/L (ref 0–0.2)
BASOPHILS RELATIVE PERCENT: 0.2 % (ref 0–2)
BILIRUB SERPL-MCNC: 0.7 MG/DL (ref 0–1.2)
BILIRUBIN URINE: NEGATIVE
BLOOD, URINE: NORMAL
BUN BLDV-MCNC: 8 MG/DL (ref 6–23)
CALCIUM SERPL-MCNC: 9.4 MG/DL (ref 8.6–10.2)
CHLORIDE BLD-SCNC: 95 MMOL/L (ref 98–107)
CLARITY: CLEAR
CO2: 29 MMOL/L (ref 22–29)
COLOR: YELLOW
CREAT SERPL-MCNC: 1 MG/DL (ref 0.7–1.2)
EOSINOPHILS ABSOLUTE: 0.01 E9/L (ref 0.05–0.5)
EOSINOPHILS RELATIVE PERCENT: 0.2 % (ref 0–6)
GFR AFRICAN AMERICAN: >60
GFR NON-AFRICAN AMERICAN: >60 ML/MIN/1.73
GLUCOSE BLD-MCNC: 116 MG/DL (ref 74–99)
GLUCOSE URINE: NEGATIVE MG/DL
HCT VFR BLD CALC: 40.4 % (ref 37–54)
HEMOGLOBIN: 13.8 G/DL (ref 12.5–16.5)
IMMATURE GRANULOCYTES #: 0.02 E9/L
IMMATURE GRANULOCYTES %: 0.4 % (ref 0–5)
KETONES, URINE: NEGATIVE MG/DL
LACTIC ACID, SEPSIS: 1.4 MMOL/L (ref 0.5–1.9)
LEUKOCYTE ESTERASE, URINE: NEGATIVE
LIPASE: 14 U/L (ref 13–60)
LYMPHOCYTES ABSOLUTE: 0.89 E9/L (ref 1.5–4)
LYMPHOCYTES RELATIVE PERCENT: 15.7 % (ref 20–42)
MAGNESIUM: 1.8 MG/DL (ref 1.6–2.6)
MCH RBC QN AUTO: 29.4 PG (ref 26–35)
MCHC RBC AUTO-ENTMCNC: 34.2 % (ref 32–34.5)
MCV RBC AUTO: 86.1 FL (ref 80–99.9)
MONOCYTES ABSOLUTE: 0.46 E9/L (ref 0.1–0.95)
MONOCYTES RELATIVE PERCENT: 8.1 % (ref 2–12)
NEUTROPHILS ABSOLUTE: 4.29 E9/L (ref 1.8–7.3)
NEUTROPHILS RELATIVE PERCENT: 75.4 % (ref 43–80)
NITRITE, URINE: NEGATIVE
PDW BLD-RTO: 12.7 FL (ref 11.5–15)
PH UA: 6.5 (ref 5–9)
PLATELET # BLD: 340 E9/L (ref 130–450)
PMV BLD AUTO: 9.3 FL (ref 7–12)
POTASSIUM REFLEX MAGNESIUM: 3.4 MMOL/L (ref 3.5–5)
PROTEIN UA: NORMAL MG/DL
RBC # BLD: 4.69 E12/L (ref 3.8–5.8)
RBC UA: ABNORMAL /HPF (ref 0–2)
SODIUM BLD-SCNC: 134 MMOL/L (ref 132–146)
SPECIFIC GRAVITY UA: 1.01 (ref 1–1.03)
TOTAL PROTEIN: 6.9 G/DL (ref 6.4–8.3)
TROPONIN, HIGH SENSITIVITY: 19 NG/L (ref 0–11)
TROPONIN, HIGH SENSITIVITY: 20 NG/L (ref 0–11)
UROBILINOGEN, URINE: 1 E.U./DL
WBC # BLD: 5.7 E9/L (ref 4.5–11.5)
WBC UA: ABNORMAL /HPF (ref 0–5)

## 2021-12-17 PROCEDURE — 80053 COMPREHEN METABOLIC PANEL: CPT

## 2021-12-17 PROCEDURE — 6370000000 HC RX 637 (ALT 250 FOR IP): Performed by: EMERGENCY MEDICINE

## 2021-12-17 PROCEDURE — 83735 ASSAY OF MAGNESIUM: CPT

## 2021-12-17 PROCEDURE — 99284 EMERGENCY DEPT VISIT MOD MDM: CPT

## 2021-12-17 PROCEDURE — 96374 THER/PROPH/DIAG INJ IV PUSH: CPT

## 2021-12-17 PROCEDURE — 84484 ASSAY OF TROPONIN QUANT: CPT

## 2021-12-17 PROCEDURE — 96372 THER/PROPH/DIAG INJ SC/IM: CPT

## 2021-12-17 PROCEDURE — 74176 CT ABD & PELVIS W/O CONTRAST: CPT

## 2021-12-17 PROCEDURE — 83605 ASSAY OF LACTIC ACID: CPT

## 2021-12-17 PROCEDURE — 2580000003 HC RX 258: Performed by: EMERGENCY MEDICINE

## 2021-12-17 PROCEDURE — 93005 ELECTROCARDIOGRAM TRACING: CPT | Performed by: EMERGENCY MEDICINE

## 2021-12-17 PROCEDURE — 96375 TX/PRO/DX INJ NEW DRUG ADDON: CPT

## 2021-12-17 PROCEDURE — 85025 COMPLETE CBC W/AUTO DIFF WBC: CPT

## 2021-12-17 PROCEDURE — 36415 COLL VENOUS BLD VENIPUNCTURE: CPT

## 2021-12-17 PROCEDURE — 96376 TX/PRO/DX INJ SAME DRUG ADON: CPT

## 2021-12-17 PROCEDURE — 81001 URINALYSIS AUTO W/SCOPE: CPT

## 2021-12-17 PROCEDURE — 6360000002 HC RX W HCPCS: Performed by: EMERGENCY MEDICINE

## 2021-12-17 PROCEDURE — 83690 ASSAY OF LIPASE: CPT

## 2021-12-17 RX ORDER — LORAZEPAM 0.5 MG/1
0.5 TABLET ORAL 3 TIMES DAILY PRN
Qty: 10 TABLET | Refills: 0 | Status: SHIPPED | OUTPATIENT
Start: 2021-12-17 | End: 2021-12-22

## 2021-12-17 RX ORDER — ONDANSETRON 2 MG/ML
4 INJECTION INTRAMUSCULAR; INTRAVENOUS ONCE
Status: COMPLETED | OUTPATIENT
Start: 2021-12-17 | End: 2021-12-17

## 2021-12-17 RX ORDER — 0.9 % SODIUM CHLORIDE 0.9 %
1000 INTRAVENOUS SOLUTION INTRAVENOUS ONCE
Status: COMPLETED | OUTPATIENT
Start: 2021-12-17 | End: 2021-12-17

## 2021-12-17 RX ORDER — LORAZEPAM 1 MG/1
0.5 TABLET ORAL ONCE
Status: COMPLETED | OUTPATIENT
Start: 2021-12-17 | End: 2021-12-17

## 2021-12-17 RX ORDER — KETOROLAC TROMETHAMINE 30 MG/ML
15 INJECTION, SOLUTION INTRAMUSCULAR; INTRAVENOUS ONCE
Status: COMPLETED | OUTPATIENT
Start: 2021-12-17 | End: 2021-12-17

## 2021-12-17 RX ORDER — PROMETHAZINE HYDROCHLORIDE 25 MG/ML
25 INJECTION, SOLUTION INTRAMUSCULAR; INTRAVENOUS ONCE
Status: COMPLETED | OUTPATIENT
Start: 2021-12-17 | End: 2021-12-17

## 2021-12-17 RX ADMIN — ONDANSETRON 4 MG: 2 INJECTION INTRAMUSCULAR; INTRAVENOUS at 14:31

## 2021-12-17 RX ADMIN — PROMETHAZINE HYDROCHLORIDE 25 MG: 25 INJECTION INTRAMUSCULAR; INTRAVENOUS at 12:49

## 2021-12-17 RX ADMIN — ONDANSETRON 4 MG: 2 INJECTION INTRAMUSCULAR; INTRAVENOUS at 11:46

## 2021-12-17 RX ADMIN — KETOROLAC TROMETHAMINE 15 MG: 30 INJECTION, SOLUTION INTRAMUSCULAR; INTRAVENOUS at 11:37

## 2021-12-17 RX ADMIN — LORAZEPAM 0.5 MG: 1 TABLET ORAL at 15:54

## 2021-12-17 RX ADMIN — SODIUM CHLORIDE 1000 ML: 9 INJECTION, SOLUTION INTRAVENOUS at 11:23

## 2021-12-17 ASSESSMENT — PAIN SCALES - GENERAL
PAINLEVEL_OUTOF10: 0
PAINLEVEL_OUTOF10: 6

## 2021-12-17 ASSESSMENT — PAIN DESCRIPTION - PAIN TYPE: TYPE: ACUTE PAIN

## 2021-12-17 NOTE — ED NOTES
Pt alert and oriented (x) 4 and given discharge instructions and pt aware to  prescription at assigned pharmacy and to schedule a follow up appointment with primary care physician     Juan Alonso RN  12/17/21 0884

## 2021-12-17 NOTE — ED PROVIDER NOTES
Department of Emergency Medicine   ED  Provider Note  Admit Date/RoomTime: 12/17/2021 10:39 AM  ED Room: 08/08          History of Present Illness:  12/17/21, Time: 10:48 AM EST  Chief Complaint   Patient presents with    Nausea     nausea started a couple weeks ago, saw PMD  Tuesday. Denies abdominal pain    Diarrhea     started yesterday                Darrin Acevedo is a 81 y.o. male presenting to the ED for nausea, vomiting, diarrhea, beginning several days ago. The complaint has been persistent, moderate in severity, and worsened by nothing. Patient reports he is feeling very anxious. He reports that he has bad anxiety and was recently started on Zoloft. He reports that since then he has been anxious and feeling nauseated with some vomiting and diarrhea. He thinks it is related to anxiety but wants to make sure there is nothing more going on. He denies abdominal pain. He reports his emesis and diarrhea are both nonbloody. The emesis is nonbilious. He denies chest pain or shortness of breath. He says he feels shaky and jittery. He says some nausea medications are not helping. Again he denies abdominal pain. He denies suicidal homicidal thoughts. Review of Systems:   A complete review of systems was performed and pertinent positives and negatives are stated within HPI, all other systems reviewed and are negative.        --------------------------------------------- PAST HISTORY ---------------------------------------------  Past Medical History:  has a past medical history of Aneurysm of right renal artery ([address] Colitis, Depression, GERD (gastroesophageal reflux disease), H/O: CVA (cerebrovascular accident), Hyperlipidemia, Hypertension, Stroke [facility]), Superior mesenteric artery stenosis ([address] and TIA (transient ischemic attack). Past Surgical History:  has a past surgical history that includes Appendectomy;  Tonsillectomy; ECHO Compl W Dop Color Flow (12/4/2012); sinus surgery (4721,1163); Anterior cruciate ligament repair (Left, 11/21/2001); Cholecystectomy (2007); hernia repair (Right, 11/13/2002); Total knee arthroplasty (Left, 1/28/2019); and Upper gastrointestinal endoscopy (N/A, 10/10/2019). Social History:  reports that he has never smoked. He has never used smokeless tobacco. He reports that he does not drink alcohol and does not use drugs. Family History: family history is not on file. . Unless otherwise noted, family history is non contributory    The patients home medications have been reviewed. Allergies: Patient has no known allergies. I have reviewed the past medical history, past surgical history, social history, and family history    ---------------------------------------------------PHYSICAL EXAM--------------------------------------    Constitutional/General: Alert and oriented x3  Head: Normocephalic and atraumatic  Eyes: PERRL, EOMI, sclera non icteric  ENT: Oropharynx clear, handling secretions, no trismus, no asymmetry of the posterior oropharynx or uvular edema  Neck: Supple, full ROM, no stridor, no meningeal signs  Respiratory: Lungs clear to auscultation bilaterally, no wheezes, rales, or rhonchi. Not in respiratory distress  Cardiovascular:  Tachycardic but Regular rhythm. No murmurs, no gallops, no rubs. 2+ distal pulses. Equal extremity pulses. Gastrointestinal:  Abdomen Soft, Non tender, Non distended. No rebound, guarding, or rigidity. No pulsatile masses. Musculoskeletal: Moves all extremities x 4. Warm and well perfused, no clubbing, no cyanosis, no edema. Capillary refill <3 seconds  Skin: skin warm and dry. No rashes.    Neurologic: GCS 15, no focal deficits, symmetric strength 5/5 in the upper and lower extremities bilaterally  Psychiatric:  he appears to be anxious  Affect          -------------------------------------------------- RESULTS -------------------------------------------------  I have personally reviewed all laboratory and imaging results for this patient. Results are listed below.      LABS: (Lab results interpreted by me)  Results for orders placed or performed during the hospital encounter of 12/17/21   Comprehensive Metabolic Panel w/ Reflex to MG   Result Value Ref Range    Sodium 134 132 - 146 mmol/L    Potassium reflex Magnesium 3.4 (L) 3.5 - 5.0 mmol/L    Chloride 95 (L) 98 - 107 mmol/L    CO2 29 22 - 29 mmol/L    Anion Gap 10 7 - 16 mmol/L    Glucose 116 (H) 74 - 99 mg/dL    BUN 8 6 - 23 mg/dL    CREATININE 1.0 0.7 - 1.2 mg/dL    GFR Non-African American >60 >=60 mL/min/1.73    GFR African American >60     Calcium 9.4 8.6 - 10.2 mg/dL    Total Protein 6.9 6.4 - 8.3 g/dL    Albumin 3.8 3.5 - 5.2 g/dL    Total Bilirubin 0.7 0.0 - 1.2 mg/dL    Alkaline Phosphatase 89 40 - 129 U/L    ALT 5 0 - 40 U/L    AST 13 0 - 39 U/L   CBC Auto Differential   Result Value Ref Range    WBC 5.7 4.5 - 11.5 E9/L    RBC 4.69 3.80 - 5.80 E12/L    Hemoglobin 13.8 12.5 - 16.5 g/dL    Hematocrit 40.4 37.0 - 54.0 %    MCV 86.1 80.0 - 99.9 fL    MCH 29.4 26.0 - 35.0 pg    MCHC 34.2 32.0 - 34.5 %    RDW 12.7 11.5 - 15.0 fL    Platelets 465 273 - 764 E9/L    MPV 9.3 7.0 - 12.0 fL    Neutrophils % 75.4 43.0 - 80.0 %    Immature Granulocytes % 0.4 0.0 - 5.0 %    Lymphocytes % 15.7 (L) 20.0 - 42.0 %    Monocytes % 8.1 2.0 - 12.0 %    Eosinophils % 0.2 0.0 - 6.0 %    Basophils % 0.2 0.0 - 2.0 %    Neutrophils Absolute 4.29 1.80 - 7.30 E9/L    Immature Granulocytes # 0.02 E9/L    Lymphocytes Absolute 0.89 (L) 1.50 - 4.00 E9/L    Monocytes Absolute 0.46 0.10 - 0.95 E9/L    Eosinophils Absolute 0.01 (L) 0.05 - 0.50 E9/L    Basophils Absolute 0.01 0.00 - 0.20 E9/L   Lipase   Result Value Ref Range    Lipase 14 13 - 60 U/L   Urinalysis   Result Value Ref Range    Color, UA Yellow Straw/Yellow    Clarity, UA Clear Clear    Glucose, Ur Negative Negative mg/dL    Bilirubin Urine Negative Negative    Ketones, Urine Negative Negative mg/dL    Specific Gravity, UA 1.015 1.005 - 1.030 Blood, Urine TRACE-INTACT Negative    pH, UA 6.5 5.0 - 9.0    Protein, UA TRACE Negative mg/dL    Urobilinogen, Urine 1.0 <2.0 E.U./dL    Nitrite, Urine Negative Negative    Leukocyte Esterase, Urine Negative Negative   Troponin   Result Value Ref Range    Troponin, High Sensitivity 20 (H) 0 - 11 ng/L   Lactate, Sepsis   Result Value Ref Range    Lactic Acid, Sepsis 1.4 0.5 - 1.9 mmol/L   Microscopic Urinalysis   Result Value Ref Range    WBC, UA NONE 0 - 5 /HPF    RBC, UA 1-3 0 - 2 /HPF    Bacteria, UA RARE (A) None Seen /HPF   Magnesium   Result Value Ref Range    Magnesium 1.8 1.6 - 2.6 mg/dL   Troponin   Result Value Ref Range    Troponin, High Sensitivity 19 (H) 0 - 11 ng/L   EKG 12 Lead   Result Value Ref Range    Ventricular Rate 64 BPM    Atrial Rate 64 BPM    P-R Interval 188 ms    QRS Duration 146 ms    Q-T Interval 438 ms    QTc Calculation (Bazett) 451 ms    P Axis 48 degrees    R Axis 54 degrees    T Axis 36 degrees   ,       RADIOLOGY:  Interpreted by Radiologist unless otherwise specified  CT ABDOMEN PELVIS WO CONTRAST Additional Contrast? None   Final Result   No bowel obstruction or inflammation. Small hiatal hernia. Mild urinary bladder wall thickening appears similar to that seen on the   prior study. Correlate with urinalysis.       Prominent prostate gland      RECOMMENDATIONS:   Unavailable               EKG Interpretation  Interpreted by emergency department physician, Dr. Jaswant Nieto     Date of EK21  Time: 1101    Rhythm: normal sinus   Rate: normal  Axis: normal  Conduction: right bundle branch block (incomplete)  ST Segments: no acute change  T Waves: no acute change    Clinical Impression: Sinus rhythm, no acute ischemic changes    Comparison to prior EKG: stable as compared to patient's most recent EKG      ------------------------- NURSING NOTES AND VITALS REVIEWED ---------------------------   The nursing notes within the ED encounter and vital signs as below have been reviewed by myself  BP (!) 141/72   Pulse 76   Temp 97.7 °F (36.5 °C) (Oral)   Resp 18   Ht 6' (1.829 m)   Wt 185 lb (83.9 kg)   SpO2 96%   BMI 25.09 kg/m²     Oxygen Saturation Interpretation: Normal    The patients available past medical records and past encounters were reviewed. ------------------------------ ED COURSE/MEDICAL DECISION MAKING----------------------  Medications   0.9 % sodium chloride bolus (0 mLs IntraVENous Stopped 12/17/21 1248)   ketorolac (TORADOL) injection 15 mg (15 mg IntraVENous Given 12/17/21 1137)   ondansetron (ZOFRAN) injection 4 mg (4 mg IntraVENous Given 12/17/21 1146)   promethazine (PHENERGAN) injection 25 mg (25 mg IntraMUSCular Given 12/17/21 1249)   ondansetron (ZOFRAN) injection 4 mg (4 mg IntraVENous Given 12/17/21 1431)   LORazepam (ATIVAN) tablet 0.5 mg (0.5 mg Oral Given 12/17/21 1554)                 I, Dr. Drew Charles, am the primary provider of record    Medical Decision Making:   His testing was reassuring. Antiemetics given and then patient requested Ativan/anxiolytic to help as he believed this was mostly anxiety. He said he had Ativan in the past and this helped. I reviewed his OARRS report and this is consistent. He was given some in the ED with some improvement in a prescription for a few pills until he could follow back up with his doctor and psychiatrist for further medication. He is well-appearing and nontoxic. Has no abdominal pain on exam.    Oxygen Saturation Interpretation: 96 % on RA. Re-Evaluations:       improving      This patient's ED course included: a personal history and physicial examination, re-evaluation prior to disposition, multiple bedside re-evaluations, IV medications, cardiac monitoring, continuous pulse oximetry and complex medical decision making and emergency management    This patient has remained hemodynamically stable during their ED course. Consultations:              Counseling:    The emergency provider has spoken with the patient and discussed todays results, in addition to providing specific details for the plan of care and counseling regarding the diagnosis and prognosis. Questions are answered at this time and they are agreeable with the plan.       --------------------------------- IMPRESSION AND DISPOSITION ---------------------------------    IMPRESSION  1. Anxiety state    2. Nausea        DISPOSITION  Disposition: Discharge to home  Patient condition is good        NOTE: This report was transcribed using voice recognition software.  Every effort was made to ensure accuracy; however, inadvertent computerized transcription errors may be present        Leanna Giron MD  12/17/21 4027

## 2021-12-19 LAB
EKG ATRIAL RATE: 64 BPM
EKG P AXIS: 48 DEGREES
EKG P-R INTERVAL: 188 MS
EKG Q-T INTERVAL: 438 MS
EKG QRS DURATION: 146 MS
EKG QTC CALCULATION (BAZETT): 451 MS
EKG R AXIS: 54 DEGREES
EKG T AXIS: 36 DEGREES
EKG VENTRICULAR RATE: 64 BPM

## 2021-12-19 PROCEDURE — 93010 ELECTROCARDIOGRAM REPORT: CPT | Performed by: INTERNAL MEDICINE

## 2021-12-22 ENCOUNTER — OFFICE VISIT (OUTPATIENT)
Dept: PRIMARY CARE CLINIC | Age: 86
End: 2021-12-22
Payer: MEDICARE

## 2021-12-22 VITALS
OXYGEN SATURATION: 98 % | TEMPERATURE: 96.6 F | HEIGHT: 72 IN | SYSTOLIC BLOOD PRESSURE: 124 MMHG | DIASTOLIC BLOOD PRESSURE: 68 MMHG | BODY MASS INDEX: 25.09 KG/M2 | HEART RATE: 82 BPM

## 2021-12-22 DIAGNOSIS — I10 ESSENTIAL HYPERTENSION: Primary | Chronic | ICD-10-CM

## 2021-12-22 DIAGNOSIS — E78.2 MIXED HYPERLIPIDEMIA: Chronic | ICD-10-CM

## 2021-12-22 DIAGNOSIS — R74.8 ABNORMAL LIVER ENZYMES: ICD-10-CM

## 2021-12-22 DIAGNOSIS — F32.A DEPRESSION, UNSPECIFIED DEPRESSION TYPE: ICD-10-CM

## 2021-12-22 DIAGNOSIS — R73.01 IMPAIRED FASTING GLUCOSE: ICD-10-CM

## 2021-12-22 PROCEDURE — 1123F ACP DISCUSS/DSCN MKR DOCD: CPT | Performed by: FAMILY MEDICINE

## 2021-12-22 PROCEDURE — G8427 DOCREV CUR MEDS BY ELIG CLIN: HCPCS | Performed by: FAMILY MEDICINE

## 2021-12-22 PROCEDURE — G8417 CALC BMI ABV UP PARAM F/U: HCPCS | Performed by: FAMILY MEDICINE

## 2021-12-22 PROCEDURE — 99214 OFFICE O/P EST MOD 30 MIN: CPT | Performed by: FAMILY MEDICINE

## 2021-12-22 PROCEDURE — G8484 FLU IMMUNIZE NO ADMIN: HCPCS | Performed by: FAMILY MEDICINE

## 2021-12-22 PROCEDURE — 4040F PNEUMOC VAC/ADMIN/RCVD: CPT | Performed by: FAMILY MEDICINE

## 2021-12-22 PROCEDURE — 1036F TOBACCO NON-USER: CPT | Performed by: FAMILY MEDICINE

## 2021-12-22 RX ORDER — MIRTAZAPINE 15 MG/1
TABLET, FILM COATED ORAL
COMMUNITY
Start: 2021-12-20 | End: 2022-01-19 | Stop reason: SDUPTHER

## 2021-12-22 RX ORDER — BUSPIRONE HYDROCHLORIDE 5 MG/1
TABLET ORAL
COMMUNITY
Start: 2021-12-20 | End: 2022-01-05

## 2021-12-22 NOTE — PROGRESS NOTES
21     Marion Whittaker    : 1935 Sex: male   Age: 81 y.o. Chief Complaint   Patient presents with    Discuss Medications     stopped zoloft    Anxiety       Prior to Admission medications    Medication Sig Start Date End Date Taking? Authorizing Provider   mirtazapine (REMERON) 15 MG tablet TAKE 1 TABLET BY MOUTH AT BEDTIME 21  Yes Historical Provider, MD   busPIRone (BUSPAR) 5 MG tablet  21  Yes Historical Provider, MD   potassium chloride (KLOR-CON M) 20 MEQ extended release tablet Take 1 tablet by mouth 2 times daily 21  Yes Raina Villa DO   hydroCHLOROthiazide (HYDRODIURIL) 12.5 MG tablet Take 1 tablet by mouth daily 10/27/21  Yes David Villa DO   amLODIPine (NORVASC) 5 MG tablet TAKE 1 TABLET BY MOUTH DAILY  Patient taking differently: 10 mg TAKE 1 TABLET BY MOUTH DAILY 21  Yes Raina Villa DO   pantoprazole (PROTONIX) 40 MG tablet TAKE 1 TABLET BY MOUTH EVERY DAY 21  Yes Raina Villa DO   famotidine (PEPCID) 40 MG tablet Take 1 tablet by mouth every evening 21  Yes Raina Villa DO   aspirin 81 MG tablet Take 81 mg by mouth daily   Yes Historical Provider, MD   losartan (COZAAR) 100 MG tablet 1 QD 21  Aysha Montelongo DO          HPI: Patient seen today in follow-up on hypertension hyperlipidemia impaired fasting glucose depression anxiety. Recently in the hospital and all work-up was negative for recurrent problems chest discomfort. Work-up was all negative and medications adjusted at home. He has resumed Remeron which has been helpful at at bedtime. Now on some BuSpar 5 mg twice a day and will continue. Also close follow-up with psychiatry. Review of Systems   Constitutional: Negative. HENT: Negative. Eyes: Negative. Respiratory: Negative. Gastrointestinal: Negative. Endocrine: Negative. Genitourinary: Negative. Musculoskeletal: Negative. Skin: Negative. Allergic/Immunologic: Negative. Neurological: Negative. Hematological: Negative. Psychiatric/Behavioral: Negative. Today systems review stable meds as prescribed.           Current Outpatient Medications:     mirtazapine (REMERON) 15 MG tablet, TAKE 1 TABLET BY MOUTH AT BEDTIME, Disp: , Rfl:     busPIRone (BUSPAR) 5 MG tablet, , Disp: , Rfl:     potassium chloride (KLOR-CON M) 20 MEQ extended release tablet, Take 1 tablet by mouth 2 times daily, Disp: 180 tablet, Rfl: 3    hydroCHLOROthiazide (HYDRODIURIL) 12.5 MG tablet, Take 1 tablet by mouth daily, Disp: 90 tablet, Rfl: 2    amLODIPine (NORVASC) 5 MG tablet, TAKE 1 TABLET BY MOUTH DAILY (Patient taking differently: 10 mg TAKE 1 TABLET BY MOUTH DAILY), Disp: 90 tablet, Rfl: 1    pantoprazole (PROTONIX) 40 MG tablet, TAKE 1 TABLET BY MOUTH EVERY DAY, Disp: 90 tablet, Rfl: 1    famotidine (PEPCID) 40 MG tablet, Take 1 tablet by mouth every evening, Disp: 90 tablet, Rfl: 3    aspirin 81 MG tablet, Take 81 mg by mouth daily, Disp: , Rfl:     losartan (COZAAR) 100 MG tablet, 1 QD, Disp: 90 tablet, Rfl: 1    No Known Allergies    Social History     Tobacco Use    Smoking status: Never Smoker    Smokeless tobacco: Never Used   Vaping Use    Vaping Use: Never used   Substance Use Topics    Alcohol use: No    Drug use: No      Past Surgical History:   Procedure Laterality Date    ANTERIOR CRUCIATE LIGAMENT REPAIR Left 11/21/2001    APPENDECTOMY      CHOLECYSTECTOMY  2007    Lap    ECHO COMPL W DOP COLOR FLOW  12/4/2012         HERNIA REPAIR Right 11/13/2002    double    SINUS SURGERY  2002,2003     done x 2    TONSILLECTOMY      TOTAL KNEE ARTHROPLASTY Left 1/28/2019    LEFT  ROBOTIC KNEE TOTAL ARTHROPLASTY  ++MEREDITH- [id number]++   ++ADDUCTOR BLOCK++ performed by Branden Zavaleta MD at [facility] 26 N/A 10/10/2019    EGD BIOPSY performed by Juliana Gill MD at [facility] ENDOSCOPY     No family history on file.  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 Gonzalo Fiore yearly (last visit 9/19)    Colitis     Depression     GERD (gastroesophageal reflux disease)     H/O: CVA (cerebrovascular accident) 10/14/2021    Hyperlipidemia     Hypertension     Stroke [facility])     Superior mesenteric artery stenosis ([facility] [address] 11/4/2020    TIA (transient ischemic attack)        Vitals:    12/22/21 1501   BP: 124/68   Pulse: 82   Temp: 96.6 °F (35.9 °C)   SpO2: 98%   Height: 6' (1.829 m)     BP Readings from Last 3 Encounters:   12/22/21 124/68   12/17/21 (!) 141/72   12/13/21 134/76        Physical Exam  Vitals and nursing note reviewed. Constitutional:       Appearance: He is well-developed. HENT:      Head: Normocephalic. Right Ear: External ear normal.      Left Ear: External ear normal.      Nose: Nose normal.   Eyes:      Conjunctiva/sclera: Conjunctivae normal.      Pupils: Pupils are equal, round, and reactive to light. Cardiovascular:      Rate and Rhythm: Normal rate. Pulmonary:      Breath sounds: Normal breath sounds. Abdominal:      General: Bowel sounds are normal.      Palpations: Abdomen is soft. Musculoskeletal:         General: Normal range of motion. Cervical back: Normal range of motion and neck supple. Skin:     General: Skin is warm and dry. Neurological:      Mental Status: He is alert and oriented to person, place, and time. Psychiatric:         Behavior: Behavior normal.        Current vitals physical exam stable. I will sit tight with present meds and care. Lab studies on follow-up. 2 weeks reevaluation          Plan Per Assessment:  Dragan Miguel was seen today for discuss medications and anxiety. Diagnoses and all orders for this visit:    Essential hypertension  -     CBC Auto Differential; Future  -     Comprehensive Metabolic Panel; Future  -     Hemoglobin A1C; Future  -     Lipid Panel;  Future  -     T4; Future  - TSH without Reflex; Future    Mixed hyperlipidemia  -     CBC Auto Differential; Future  -     Comprehensive Metabolic Panel; Future  -     Hemoglobin A1C; Future  -     Lipid Panel; Future  -     T4; Future  -     TSH without Reflex; Future    Impaired fasting glucose  -     CBC Auto Differential; Future  -     Comprehensive Metabolic Panel; Future  -     Hemoglobin A1C; Future  -     Lipid Panel; Future  -     T4; Future  -     TSH without Reflex; Future    Depression, unspecified depression type  -     CBC Auto Differential; Future  -     Comprehensive Metabolic Panel; Future  -     Hemoglobin A1C; Future  -     Lipid Panel; Future  -     T4; Future  -     TSH without Reflex; Future    Abnormal liver enzymes  -     CBC Auto Differential; Future  -     Comprehensive Metabolic Panel; Future  -     Hemoglobin A1C; Future  -     Lipid Panel; Future  -     T4; Future  -     TSH without Reflex; Future            Return in about 2 weeks (around 1/5/2022). Elvis Calzada,     Note was generated with the assistance of voice recognition software. Document was reviewed however may contain grammatical errors.

## 2021-12-28 ENCOUNTER — TELEPHONE (OUTPATIENT)
Dept: PRIMARY CARE CLINIC | Age: 86
End: 2021-12-28

## 2021-12-28 NOTE — TELEPHONE ENCOUNTER
Dr Jessica Velazquez prescribed buspar 2 tablets in the morning. Patient has been nauseous since starting 2 tabs in the morning on 12/23. He would like rx for nausea. He uses [facility] on [address].

## 2022-01-03 ENCOUNTER — HOSPITAL ENCOUNTER (OUTPATIENT)
Age: 87
Discharge: HOME | End: 2022-01-03
Payer: MEDICARE

## 2022-01-03 DIAGNOSIS — R74.8 ABNORMAL LIVER ENZYMES: ICD-10-CM

## 2022-01-03 DIAGNOSIS — F32.A DEPRESSION, UNSPECIFIED DEPRESSION TYPE: ICD-10-CM

## 2022-01-03 DIAGNOSIS — R73.01 IMPAIRED FASTING GLUCOSE: ICD-10-CM

## 2022-01-03 DIAGNOSIS — E78.2 MIXED HYPERLIPIDEMIA: Chronic | ICD-10-CM

## 2022-01-03 DIAGNOSIS — I10 ESSENTIAL HYPERTENSION: Chronic | ICD-10-CM

## 2022-01-03 LAB
ALBUMIN SERPL-MCNC: 4.2 G/DL (ref 3.5–5.2)
ALP BLD-CCNC: 83 U/L (ref 40–129)
ALT SERPL-CCNC: 10 U/L (ref 0–40)
ANION GAP SERPL CALCULATED.3IONS-SCNC: 10 MMOL/L (ref 7–16)
AST SERPL-CCNC: 14 U/L (ref 0–39)
BASOPHILS ABSOLUTE: 0.03 E9/L (ref 0–0.2)
BASOPHILS RELATIVE PERCENT: 0.5 % (ref 0–2)
BILIRUB SERPL-MCNC: 0.7 MG/DL (ref 0–1.2)
BUN BLDV-MCNC: 9 MG/DL (ref 6–23)
CALCIUM SERPL-MCNC: 9.9 MG/DL (ref 8.6–10.2)
CHLORIDE BLD-SCNC: 104 MMOL/L (ref 98–107)
CHOLESTEROL, TOTAL: 148 MG/DL (ref 0–199)
CO2: 29 MMOL/L (ref 22–29)
CREAT SERPL-MCNC: 1.2 MG/DL (ref 0.7–1.2)
EOSINOPHILS ABSOLUTE: 0.1 E9/L (ref 0.05–0.5)
EOSINOPHILS RELATIVE PERCENT: 1.7 % (ref 0–6)
GFR AFRICAN AMERICAN: >60
GFR NON-AFRICAN AMERICAN: 58 ML/MIN/1.73
GLUCOSE BLD-MCNC: 99 MG/DL (ref 74–99)
HBA1C MFR BLD: 5.6 % (ref 4–5.6)
HCT VFR BLD CALC: 41.9 % (ref 37–54)
HDLC SERPL-MCNC: 44 MG/DL
HEMOGLOBIN: 13.9 G/DL (ref 12.5–16.5)
IMMATURE GRANULOCYTES #: 0.03 E9/L
IMMATURE GRANULOCYTES %: 0.5 % (ref 0–5)
LDL CHOLESTEROL CALCULATED: 92 MG/DL (ref 0–99)
LYMPHOCYTES ABSOLUTE: 1.27 E9/L (ref 1.5–4)
LYMPHOCYTES RELATIVE PERCENT: 21.1 % (ref 20–42)
MCH RBC QN AUTO: 29.5 PG (ref 26–35)
MCHC RBC AUTO-ENTMCNC: 33.2 % (ref 32–34.5)
MCV RBC AUTO: 89 FL (ref 80–99.9)
MONOCYTES ABSOLUTE: 0.55 E9/L (ref 0.1–0.95)
MONOCYTES RELATIVE PERCENT: 9.1 % (ref 2–12)
NEUTROPHILS ABSOLUTE: 4.05 E9/L (ref 1.8–7.3)
NEUTROPHILS RELATIVE PERCENT: 67.1 % (ref 43–80)
PDW BLD-RTO: 13.3 FL (ref 11.5–15)
PLATELET # BLD: 346 E9/L (ref 130–450)
PMV BLD AUTO: 8.8 FL (ref 7–12)
POTASSIUM SERPL-SCNC: 4.2 MMOL/L (ref 3.5–5)
RBC # BLD: 4.71 E12/L (ref 3.8–5.8)
SODIUM BLD-SCNC: 143 MMOL/L (ref 132–146)
T4 TOTAL: 6.5 MCG/DL (ref 4.5–11.7)
TOTAL PROTEIN: 7.4 G/DL (ref 6.4–8.3)
TRIGL SERPL-MCNC: 62 MG/DL (ref 0–149)
TSH SERPL DL<=0.05 MIU/L-ACNC: 1.59 UIU/ML (ref 0.27–4.2)
VLDLC SERPL CALC-MCNC: 12 MG/DL
WBC # BLD: 6 E9/L (ref 4.5–11.5)

## 2022-01-03 PROCEDURE — 80061 LIPID PANEL: CPT

## 2022-01-03 PROCEDURE — 84443 ASSAY THYROID STIM HORMONE: CPT

## 2022-01-03 PROCEDURE — 36415 COLL VENOUS BLD VENIPUNCTURE: CPT

## 2022-01-03 PROCEDURE — 85025 COMPLETE CBC W/AUTO DIFF WBC: CPT

## 2022-01-03 PROCEDURE — 83036 HEMOGLOBIN GLYCOSYLATED A1C: CPT

## 2022-01-03 PROCEDURE — 80053 COMPREHEN METABOLIC PANEL: CPT

## 2022-01-03 PROCEDURE — 84436 ASSAY OF TOTAL THYROXINE: CPT

## 2022-01-05 ENCOUNTER — OFFICE VISIT (OUTPATIENT)
Dept: PRIMARY CARE CLINIC | Age: 87
End: 2022-01-05
Payer: MEDICARE

## 2022-01-05 VITALS
TEMPERATURE: 97.9 F | HEART RATE: 80 BPM | DIASTOLIC BLOOD PRESSURE: 64 MMHG | SYSTOLIC BLOOD PRESSURE: 134 MMHG | OXYGEN SATURATION: 97 %

## 2022-01-05 DIAGNOSIS — I10 ESSENTIAL HYPERTENSION: Primary | Chronic | ICD-10-CM

## 2022-01-05 DIAGNOSIS — F41.9 ANXIETY: ICD-10-CM

## 2022-01-05 DIAGNOSIS — M79.10 MYALGIA: ICD-10-CM

## 2022-01-05 DIAGNOSIS — F32.A DEPRESSION, UNSPECIFIED DEPRESSION TYPE: ICD-10-CM

## 2022-01-05 LAB
Lab: NORMAL
PERFORMING INSTRUMENT: NORMAL
QC PASS/FAIL: NORMAL
SARS-COV-2, POC: NORMAL

## 2022-01-05 PROCEDURE — 87426 SARSCOV CORONAVIRUS AG IA: CPT | Performed by: FAMILY MEDICINE

## 2022-01-05 PROCEDURE — 4040F PNEUMOC VAC/ADMIN/RCVD: CPT | Performed by: FAMILY MEDICINE

## 2022-01-05 PROCEDURE — G8417 CALC BMI ABV UP PARAM F/U: HCPCS | Performed by: FAMILY MEDICINE

## 2022-01-05 PROCEDURE — 1123F ACP DISCUSS/DSCN MKR DOCD: CPT | Performed by: FAMILY MEDICINE

## 2022-01-05 PROCEDURE — G8484 FLU IMMUNIZE NO ADMIN: HCPCS | Performed by: FAMILY MEDICINE

## 2022-01-05 PROCEDURE — G8427 DOCREV CUR MEDS BY ELIG CLIN: HCPCS | Performed by: FAMILY MEDICINE

## 2022-01-05 PROCEDURE — 99214 OFFICE O/P EST MOD 30 MIN: CPT | Performed by: FAMILY MEDICINE

## 2022-01-05 PROCEDURE — 1036F TOBACCO NON-USER: CPT | Performed by: FAMILY MEDICINE

## 2022-01-05 RX ORDER — AMLODIPINE BESYLATE 10 MG/1
10 TABLET ORAL DAILY
Qty: 90 TABLET | Refills: 3 | Status: SHIPPED
Start: 2022-01-05 | End: 2022-06-08 | Stop reason: SINTOL

## 2022-01-05 RX ORDER — BUPROPION HYDROCHLORIDE 150 MG/1
150 TABLET ORAL EVERY MORNING
Qty: 30 TABLET | Refills: 3 | Status: SHIPPED
Start: 2022-01-05 | End: 2022-01-19 | Stop reason: SDUPTHER

## 2022-01-05 NOTE — PROGRESS NOTES
22     Eva Allison    : 1935 Sex: male   Age: 81 y.o. Chief Complaint   Patient presents with    Discuss Labs    Hypertension    Depression       Prior to Admission medications    Medication Sig Start Date End Date Taking? Authorizing Provider   amLODIPine (NORVASC) 10 MG tablet Take 1 tablet by mouth daily TAKE 1 TABLET BY MOUTH DAILY 22  Yes Eliana Villa DO   mirtazapine (REMERON) 15 MG tablet TAKE 1 TABLET BY MOUTH AT BEDTIME 21  Yes Historical Provider, MD   potassium chloride (KLOR-CON M) 20 MEQ extended release tablet Take 1 tablet by mouth 2 times daily 21  Yes Eliana Villa DO   hydroCHLOROthiazide (HYDRODIURIL) 12.5 MG tablet Take 1 tablet by mouth daily 10/27/21  Yes Eliana Villa DO   pantoprazole (PROTONIX) 40 MG tablet TAKE 1 TABLET BY MOUTH EVERY DAY 21  Yes Eliana Villa DO   famotidine (PEPCID) 40 MG tablet Take 1 tablet by mouth every evening 21  Yes Eliana Villa DO   losartan (COZAAR) 100 MG tablet 1 QD 21 Yes David Villa DO   aspirin 81 MG tablet Take 81 mg by mouth daily   Yes Historical Provider, MD          HPI: Patient evaluated today with hypertension anxiety depression myalgias. Requesting COVID testing which was negative patient reassured. High levels of anxiety discussed at length. We have had multiple medications that he is not tolerated. He would like to resume Wellbutrin which he has tolerated in the past and we will go ahead and reinitiate 150 mg daily. Maintain Remeron at at bedtime which has been helpful for rest.  BuSpar was not tolerated and has been discontinued. Effexor resulted in abnormal liver enzymes and also was discontinued. Remainder meds reviewed maintain as prescribed. Review of Systems   Constitutional: Positive for fatigue. HENT: Negative. Eyes: Negative. Respiratory: Negative. Gastrointestinal: Negative. Endocrine: Negative.     Genitourinary: Negative. Musculoskeletal: Positive for myalgias. Skin: Negative. Allergic/Immunologic: Negative. Neurological: Negative. Hematological: Negative. Psychiatric/Behavioral: The patient is nervous/anxious. Today systems review overall stable aside from anxiety as noted. Complaints of myalgias.         Current Outpatient Medications:     amLODIPine (NORVASC) 10 MG tablet, Take 1 tablet by mouth daily TAKE 1 TABLET BY MOUTH DAILY, Disp: 90 tablet, Rfl: 3    mirtazapine (REMERON) 15 MG tablet, TAKE 1 TABLET BY MOUTH AT BEDTIME, Disp: , Rfl:     potassium chloride (KLOR-CON M) 20 MEQ extended release tablet, Take 1 tablet by mouth 2 times daily, Disp: 180 tablet, Rfl: 3    hydroCHLOROthiazide (HYDRODIURIL) 12.5 MG tablet, Take 1 tablet by mouth daily, Disp: 90 tablet, Rfl: 2    pantoprazole (PROTONIX) 40 MG tablet, TAKE 1 TABLET BY MOUTH EVERY DAY, Disp: 90 tablet, Rfl: 1    famotidine (PEPCID) 40 MG tablet, Take 1 tablet by mouth every evening, Disp: 90 tablet, Rfl: 3    losartan (COZAAR) 100 MG tablet, 1 QD, Disp: 90 tablet, Rfl: 1    aspirin 81 MG tablet, Take 81 mg by mouth daily, Disp: , Rfl:     No Known Allergies    Social History     Tobacco Use    Smoking status: Never Smoker    Smokeless tobacco: Never Used   Vaping Use    Vaping Use: Never used   Substance Use Topics    Alcohol use: No    Drug use: No      Past Surgical History:   Procedure Laterality Date    ANTERIOR CRUCIATE LIGAMENT REPAIR Left 11/21/2001    APPENDECTOMY      CHOLECYSTECTOMY  2007    Lap    ECHO COMPL W DOP COLOR FLOW  12/4/2012         HERNIA REPAIR Right 11/13/2002    double    SINUS SURGERY  2002,2003     done x 2    TONSILLECTOMY      TOTAL KNEE ARTHROPLASTY Left 1/28/2019    LEFT  ROBOTIC KNEE TOTAL ARTHROPLASTY  ++MEREDITH- [id number]++   ++ADDUCTOR BLOCK++ performed by Giancarlo Doe MD at [address] N/A 10/10/2019    EGD BIOPSY performed by Molly Luu MD at [facility] ENDOSCOPY     No family history on file. Past Medical History:   Diagnosis Date    Aneurysm of right renal artery (HCC)     follows with Dr. Ravi Kramer yearly (last visit 9/19)    Colitis     Depression     GERD (gastroesophageal reflux disease)     H/O: CVA (cerebrovascular accident) 10/14/2021    Hyperlipidemia     Hypertension     Stroke [facility])     Superior mesenteric artery stenosis ([address] 11/4/2020    TIA (transient ischemic attack)        Vitals:    01/05/22 1432   BP: 134/64   Pulse: 80   Temp: 97.9 °F (36.6 °C)   SpO2: 97%     BP Readings from Last 3 Encounters:   01/05/22 134/64   12/22/21 124/68   12/17/21 (!) 141/72        Physical Exam  Vitals and nursing note reviewed. Constitutional:       Appearance: He is well-developed. HENT:      Head: Normocephalic. Right Ear: External ear normal.      Left Ear: External ear normal.      Nose: Nose normal.   Eyes:      Conjunctiva/sclera: Conjunctivae normal.      Pupils: Pupils are equal, round, and reactive to light. Cardiovascular:      Rate and Rhythm: Normal rate. Pulmonary:      Breath sounds: Normal breath sounds. Abdominal:      General: Bowel sounds are normal.      Palpations: Abdomen is soft. Musculoskeletal:         General: Normal range of motion. Cervical back: Normal range of motion and neck supple. Skin:     General: Skin is warm and dry. Neurological:      Mental Status: He is alert and oriented to person, place, and time. Psychiatric:         Behavior: Behavior normal.        Physical exam findings overall stable. Pressure is controlled. Heart regular lungs are clear. Medications as prescribed. Reassessment 2 weeks and sooner if problems.       Lab Results   Component Value Date    TSH 1.590 01/03/2022    TSH 1.920 06/22/2021    P3NHQYK 6.5 01/03/2022    E3JCJHY 7.7 06/22/2021    T4FREE 1.10 01/22/2017     Lab Results   Component Value Date    CHOL 148 01/03/2022    CHOL 128 08/14/2021     Lab

## 2022-01-10 ENCOUNTER — TELEPHONE (OUTPATIENT)
Dept: PRIMARY CARE CLINIC | Age: 87
End: 2022-01-10

## 2022-01-10 RX ORDER — HYDROXYZINE HYDROCHLORIDE 25 MG/1
25 TABLET, FILM COATED ORAL EVERY 8 HOURS PRN
Qty: 30 TABLET | Refills: 0 | Status: SHIPPED
Start: 2022-01-10 | End: 2022-01-19 | Stop reason: SDUPTHER

## 2022-01-10 NOTE — TELEPHONE ENCOUNTER
Continue Wellbutrin and Remeron at present dose. May try Atarax 25 mg every 8 hours as needed. 30 pills.

## 2022-01-10 NOTE — TELEPHONE ENCOUNTER
Patient calling stating he is currently on Wellbutrin and having some anxiety issues. Does not want to go off the Wellbutrin, but states that when he was on Effexor he was also given Hydroxyzine to help with his anxiety. States he just has to much anxiety and just needs something to help it. Also on Remeron and states his sleep pattern has changed. Only sleeping 6 hours now and then up for the day. Started on the Remeron 15mg in December.  Was taking the 30mg

## 2022-01-19 ENCOUNTER — OFFICE VISIT (OUTPATIENT)
Dept: PRIMARY CARE CLINIC | Age: 87
End: 2022-01-19
Payer: MEDICARE

## 2022-01-19 VITALS
TEMPERATURE: 98.1 F | HEART RATE: 75 BPM | OXYGEN SATURATION: 98 % | DIASTOLIC BLOOD PRESSURE: 72 MMHG | SYSTOLIC BLOOD PRESSURE: 132 MMHG

## 2022-01-19 DIAGNOSIS — E87.6 HYPOKALEMIA: ICD-10-CM

## 2022-01-19 DIAGNOSIS — J30.89 NON-SEASONAL ALLERGIC RHINITIS, UNSPECIFIED TRIGGER: ICD-10-CM

## 2022-01-19 DIAGNOSIS — F51.01 PRIMARY INSOMNIA: ICD-10-CM

## 2022-01-19 DIAGNOSIS — F32.A DEPRESSION, UNSPECIFIED DEPRESSION TYPE: ICD-10-CM

## 2022-01-19 DIAGNOSIS — I10 ESSENTIAL HYPERTENSION: Primary | Chronic | ICD-10-CM

## 2022-01-19 DIAGNOSIS — F41.9 ANXIETY: ICD-10-CM

## 2022-01-19 DIAGNOSIS — I10 ESSENTIAL HYPERTENSION: Chronic | ICD-10-CM

## 2022-01-19 LAB
ANION GAP SERPL CALCULATED.3IONS-SCNC: 17 MMOL/L (ref 7–16)
BUN BLDV-MCNC: 12 MG/DL (ref 6–23)
CALCIUM SERPL-MCNC: 10.4 MG/DL (ref 8.6–10.2)
CHLORIDE BLD-SCNC: 101 MMOL/L (ref 98–107)
CO2: 26 MMOL/L (ref 22–29)
CREAT SERPL-MCNC: 1.2 MG/DL (ref 0.7–1.2)
GFR AFRICAN AMERICAN: >60
GFR NON-AFRICAN AMERICAN: 58 ML/MIN/1.73
GLUCOSE BLD-MCNC: 111 MG/DL (ref 74–99)
POTASSIUM SERPL-SCNC: 3.8 MMOL/L (ref 3.5–5)
SODIUM BLD-SCNC: 144 MMOL/L (ref 132–146)

## 2022-01-19 PROCEDURE — 99214 OFFICE O/P EST MOD 30 MIN: CPT | Performed by: FAMILY MEDICINE

## 2022-01-19 PROCEDURE — G8417 CALC BMI ABV UP PARAM F/U: HCPCS | Performed by: FAMILY MEDICINE

## 2022-01-19 PROCEDURE — G8427 DOCREV CUR MEDS BY ELIG CLIN: HCPCS | Performed by: FAMILY MEDICINE

## 2022-01-19 PROCEDURE — 1123F ACP DISCUSS/DSCN MKR DOCD: CPT | Performed by: FAMILY MEDICINE

## 2022-01-19 PROCEDURE — 4040F PNEUMOC VAC/ADMIN/RCVD: CPT | Performed by: FAMILY MEDICINE

## 2022-01-19 PROCEDURE — 1036F TOBACCO NON-USER: CPT | Performed by: FAMILY MEDICINE

## 2022-01-19 PROCEDURE — G8484 FLU IMMUNIZE NO ADMIN: HCPCS | Performed by: FAMILY MEDICINE

## 2022-01-19 RX ORDER — MIRTAZAPINE 15 MG/1
TABLET, FILM COATED ORAL
Qty: 90 TABLET | Refills: 1 | Status: SHIPPED
Start: 2022-01-19 | End: 2022-03-03 | Stop reason: SDUPTHER

## 2022-01-19 RX ORDER — BUPROPION HYDROCHLORIDE 150 MG/1
150 TABLET ORAL EVERY MORNING
Qty: 30 TABLET | Refills: 3 | Status: SHIPPED
Start: 2022-01-19 | End: 2022-03-15 | Stop reason: SDUPTHER

## 2022-01-19 RX ORDER — HYDROXYZINE HYDROCHLORIDE 25 MG/1
25 TABLET, FILM COATED ORAL EVERY 8 HOURS PRN
Qty: 30 TABLET | Refills: 1 | Status: SHIPPED | OUTPATIENT
Start: 2022-01-19 | End: 2022-01-29

## 2022-01-19 NOTE — PROGRESS NOTES
22     Alissa Apt    : 1935 Sex: male   Age: 81 y.o. Chief Complaint   Patient presents with    Anxiety     thinks he feels somewhat better    Depression       Prior to Admission medications    Medication Sig Start Date End Date Taking? Authorizing Provider   mirtazapine (REMERON) 15 MG tablet TAKE 1 TABLET BY MOUTH AT BEDTIME 22  Yes Adelso Villa DO   buPROPion (WELLBUTRIN XL) 150 MG extended release tablet Take 1 tablet by mouth every morning 22  Yes Adelso Villa DO   hydrOXYzine (ATARAX) 25 MG tablet Take 1 tablet by mouth every 8 hours as needed for Itching 1/10/22 1/20/22 Yes David Villa DO   amLODIPine (NORVASC) 10 MG tablet Take 1 tablet by mouth daily TAKE 1 TABLET BY MOUTH DAILY 22  Yes Adelso Villa DO   potassium chloride (KLOR-CON M) 20 MEQ extended release tablet Take 1 tablet by mouth 2 times daily 21  Yes Adelso Villa DO   hydroCHLOROthiazide (HYDRODIURIL) 12.5 MG tablet Take 1 tablet by mouth daily 10/27/21  Yes Adelso Villa DO   pantoprazole (PROTONIX) 40 MG tablet TAKE 1 TABLET BY MOUTH EVERY DAY 21  Yes Adelso Villa DO   famotidine (PEPCID) 40 MG tablet Take 1 tablet by mouth every evening 21  Yes Adelso Villa DO   losartan (COZAAR) 100 MG tablet 1 QD 21 Yes David Villa DO   aspirin 81 MG tablet Take 81 mg by mouth daily   Yes Historical Provider, MD          HPI: Patient seen today in follow-up on hypertension seasonal allergies anxiety depression primary insomnia hypokalemia all of which is currently stable. Medications reviewed and will continue as prescribed. Wellbutrin seems to be well-tolerated so we will maintain current dose. Remeron well-tolerated sleep is improved. Review of Systems   Constitutional: Negative. HENT: Negative. Eyes: Negative. Respiratory: Negative. Gastrointestinal: Negative. Endocrine: Negative. Genitourinary: Negative. Musculoskeletal: Negative. Skin: Negative. Allergic/Immunologic: Negative. Neurological: Negative. Hematological: Negative. Psychiatric/Behavioral: Negative. Today systems review improved. Feeling wellbeing improved.         Current Outpatient Medications:     mirtazapine (REMERON) 15 MG tablet, TAKE 1 TABLET BY MOUTH AT BEDTIME, Disp: 90 tablet, Rfl: 1    buPROPion (WELLBUTRIN XL) 150 MG extended release tablet, Take 1 tablet by mouth every morning, Disp: 30 tablet, Rfl: 3    hydrOXYzine (ATARAX) 25 MG tablet, Take 1 tablet by mouth every 8 hours as needed for Itching, Disp: 30 tablet, Rfl: 0    amLODIPine (NORVASC) 10 MG tablet, Take 1 tablet by mouth daily TAKE 1 TABLET BY MOUTH DAILY, Disp: 90 tablet, Rfl: 3    potassium chloride (KLOR-CON M) 20 MEQ extended release tablet, Take 1 tablet by mouth 2 times daily, Disp: 180 tablet, Rfl: 3    hydroCHLOROthiazide (HYDRODIURIL) 12.5 MG tablet, Take 1 tablet by mouth daily, Disp: 90 tablet, Rfl: 2    pantoprazole (PROTONIX) 40 MG tablet, TAKE 1 TABLET BY MOUTH EVERY DAY, Disp: 90 tablet, Rfl: 1    famotidine (PEPCID) 40 MG tablet, Take 1 tablet by mouth every evening, Disp: 90 tablet, Rfl: 3    losartan (COZAAR) 100 MG tablet, 1 QD, Disp: 90 tablet, Rfl: 1    aspirin 81 MG tablet, Take 81 mg by mouth daily, Disp: , Rfl:     No Known Allergies    Social History     Tobacco Use    Smoking status: Never Smoker    Smokeless tobacco: Never Used   Vaping Use    Vaping Use: Never used   Substance Use Topics    Alcohol use: No    Drug use: No      Past Surgical History:   Procedure Laterality Date    ANTERIOR CRUCIATE LIGAMENT REPAIR Left 11/21/2001    APPENDECTOMY      CHOLECYSTECTOMY  2007    Lap    ECHO COMPL W DOP COLOR FLOW  12/4/2012         HERNIA REPAIR Right 11/13/2002    double    SINUS SURGERY  2002,2003     done x 2    TONSILLECTOMY      TOTAL KNEE ARTHROPLASTY Left 1/28/2019    LEFT  ROBOTIC KNEE TOTAL ARTHROPLASTY ++MEREDITH- [id number]++   ++ADDUCTOR BLOCK++ performed by Anthony Osorio MD at [address] [place] [address] 10/10/2019    EGD BIOPSY performed by Eliana Velazco MD at [facility] ENDOSCOPY     No family history on file. Past Medical History:   Diagnosis Date    Aneurysm of right renal artery (HCC)     follows with Dr. Harrison Carmichael yearly (last visit 9/19)    Colitis     Depression     GERD (gastroesophageal reflux disease)     H/O: CVA (cerebrovascular accident) 10/14/2021    Hyperlipidemia     Hypertension     Stroke [facility])     Superior mesenteric artery stenosis ([facility] [address] 11/4/2020    TIA (transient ischemic attack)        Vitals:    01/19/22 1447   BP: 132/72   Pulse: 75   Temp: 98.1 °F (36.7 °C)   SpO2: 98%     BP Readings from Last 3 Encounters:   01/19/22 132/72   01/05/22 134/64   12/22/21 124/68    120/58    Physical Exam  Vitals and nursing note reviewed. Constitutional:       Appearance: He is well-developed. HENT:      Head: Normocephalic. Right Ear: External ear normal.      Left Ear: External ear normal.      Nose: Nose normal.   Eyes:      Conjunctiva/sclera: Conjunctivae normal.      Pupils: Pupils are equal, round, and reactive to light. Cardiovascular:      Rate and Rhythm: Normal rate. Pulmonary:      Breath sounds: Normal breath sounds. Abdominal:      General: Bowel sounds are normal.      Palpations: Abdomen is soft. Musculoskeletal:         General: Normal range of motion. Cervical back: Normal range of motion and neck supple. Skin:     General: Skin is warm and dry. Neurological:      Mental Status: He is alert and oriented to person, place, and time. Psychiatric:         Behavior: Behavior normal.     Today's vitals physical exam stable. We will maintain current meds and care. Reassessment 2 weeks and sooner if problems. Plan Per Assessment:  Judith Gustafson was seen today for anxiety and depression.     Diagnoses and all orders for this visit:    Essential hypertension    Non-seasonal allergic rhinitis, unspecified trigger    Anxiety    Depression, unspecified depression type    Primary insomnia    Hypokalemia    Other orders  -     mirtazapine (REMERON) 15 MG tablet; TAKE 1 TABLET BY MOUTH AT BEDTIME  -     buPROPion (WELLBUTRIN XL) 150 MG extended release tablet; Take 1 tablet by mouth every morning            Return in about 3 weeks (around 2/9/2022). Telma Mccormick DO    Note was generated with the assistance of voice recognition software. Document was reviewed however may contain grammatical errors.

## 2022-02-16 ENCOUNTER — OFFICE VISIT (OUTPATIENT)
Dept: PRIMARY CARE CLINIC | Age: 87
End: 2022-02-16
Payer: MEDICARE

## 2022-02-16 VITALS
WEIGHT: 187 LBS | OXYGEN SATURATION: 98 % | BODY MASS INDEX: 25.36 KG/M2 | HEART RATE: 89 BPM | TEMPERATURE: 97.4 F | DIASTOLIC BLOOD PRESSURE: 70 MMHG | SYSTOLIC BLOOD PRESSURE: 122 MMHG

## 2022-02-16 DIAGNOSIS — G44.89 OTHER HEADACHE SYNDROME: ICD-10-CM

## 2022-02-16 DIAGNOSIS — F41.9 ANXIETY: ICD-10-CM

## 2022-02-16 DIAGNOSIS — R11.0 NAUSEA: ICD-10-CM

## 2022-02-16 DIAGNOSIS — M17.12 PRIMARY OSTEOARTHRITIS OF LEFT KNEE: ICD-10-CM

## 2022-02-16 DIAGNOSIS — I10 ESSENTIAL HYPERTENSION: Primary | Chronic | ICD-10-CM

## 2022-02-16 DIAGNOSIS — E78.2 MIXED HYPERLIPIDEMIA: Chronic | ICD-10-CM

## 2022-02-16 PROCEDURE — 99214 OFFICE O/P EST MOD 30 MIN: CPT | Performed by: FAMILY MEDICINE

## 2022-02-16 PROCEDURE — G8427 DOCREV CUR MEDS BY ELIG CLIN: HCPCS | Performed by: FAMILY MEDICINE

## 2022-02-16 PROCEDURE — 1036F TOBACCO NON-USER: CPT | Performed by: FAMILY MEDICINE

## 2022-02-16 PROCEDURE — 4040F PNEUMOC VAC/ADMIN/RCVD: CPT | Performed by: FAMILY MEDICINE

## 2022-02-16 PROCEDURE — G8417 CALC BMI ABV UP PARAM F/U: HCPCS | Performed by: FAMILY MEDICINE

## 2022-02-16 PROCEDURE — G8484 FLU IMMUNIZE NO ADMIN: HCPCS | Performed by: FAMILY MEDICINE

## 2022-02-16 PROCEDURE — 1123F ACP DISCUSS/DSCN MKR DOCD: CPT | Performed by: FAMILY MEDICINE

## 2022-02-16 RX ORDER — PROMETHAZINE HYDROCHLORIDE 25 MG/1
25 TABLET ORAL 3 TIMES DAILY PRN
Qty: 12 TABLET | Refills: 1 | Status: SHIPPED | OUTPATIENT
Start: 2022-02-16 | End: 2022-02-23

## 2022-02-16 RX ORDER — IBUPROFEN 800 MG/1
800 TABLET ORAL 2 TIMES DAILY PRN
Qty: 60 TABLET | Refills: 1 | Status: SHIPPED
Start: 2022-02-16 | End: 2022-05-10 | Stop reason: SDUPTHER

## 2022-02-16 NOTE — PROGRESS NOTES
22     [facility]    : 1935 Sex: male   Age: 81 y.o. Chief Complaint   Patient presents with    Hypertension    Hyperlipidemia       Prior to Admission medications    Medication Sig Start Date End Date Taking? Authorizing Provider   promethazine (PHENERGAN) 25 MG tablet Take 1 tablet by mouth 3 times daily as needed for Nausea 22 Yes Michael Villa, DO   ibuprofen (ADVIL;MOTRIN) 800 MG tablet Take 1 tablet by mouth 2 times daily as needed for Pain 22  Yes Michael Dos Santosoff, DO   mirtazapine (REMERON) 15 MG tablet TAKE 1 TABLET BY MOUTH AT BEDTIME 22   Marthenia Class, DO   buPROPion (WELLBUTRIN XL) 150 MG extended release tablet Take 1 tablet by mouth every morning 22   Michael Dos Santosoff, DO   amLODIPine (NORVASC) 10 MG tablet Take 1 tablet by mouth daily TAKE 1 TABLET BY MOUTH DAILY 22   Marthenia Class, DO   potassium chloride (KLOR-CON M) 20 MEQ extended release tablet Take 1 tablet by mouth 2 times daily 21   Marthenia Class, DO   hydroCHLOROthiazide (HYDRODIURIL) 12.5 MG tablet Take 1 tablet by mouth daily 10/27/21   Michael Dos Santosoff, DO   pantoprazole (PROTONIX) 40 MG tablet TAKE 1 TABLET BY MOUTH EVERY DAY 21   Marthenia Class, DO   famotidine (PEPCID) 40 MG tablet Take 1 tablet by mouth every evening 21   Marthenia Class, DO   losartan (COZAAR) 100 MG tablet 1 QD 21  Marthenia Class, DO   aspirin 81 MG tablet Take 81 mg by mouth daily    Historical Provider, MD          HPI: Patient evaluated today with hypertension hyperlipidemia osteoarthritic disease anxiety headache syndrome nausea all of which is improved at this time. Meds seem to be well-tolerated and feeling wellbeing improved. We are going to continue with current meds and care for week follow-up but pleased with current status. Review of Systems   Constitutional: Negative. HENT: Negative. Eyes: Negative. Respiratory: Negative. Gastrointestinal: Negative. Endocrine: Negative. Genitourinary: Negative. Musculoskeletal: Negative. Skin: Negative. Allergic/Immunologic: Negative. Neurological: Negative. Hematological: Negative. Psychiatric/Behavioral: Negative. Today systems reviewed and stable abdominal pain improved nausea is stable. Headaches have improved.         Current Outpatient Medications:     promethazine (PHENERGAN) 25 MG tablet, Take 1 tablet by mouth 3 times daily as needed for Nausea, Disp: 12 tablet, Rfl: 1    ibuprofen (ADVIL;MOTRIN) 800 MG tablet, Take 1 tablet by mouth 2 times daily as needed for Pain, Disp: 60 tablet, Rfl: 1    mirtazapine (REMERON) 15 MG tablet, TAKE 1 TABLET BY MOUTH AT BEDTIME, Disp: 90 tablet, Rfl: 1    buPROPion (WELLBUTRIN XL) 150 MG extended release tablet, Take 1 tablet by mouth every morning, Disp: 30 tablet, Rfl: 3    amLODIPine (NORVASC) 10 MG tablet, Take 1 tablet by mouth daily TAKE 1 TABLET BY MOUTH DAILY, Disp: 90 tablet, Rfl: 3    potassium chloride (KLOR-CON M) 20 MEQ extended release tablet, Take 1 tablet by mouth 2 times daily, Disp: 180 tablet, Rfl: 3    hydroCHLOROthiazide (HYDRODIURIL) 12.5 MG tablet, Take 1 tablet by mouth daily, Disp: 90 tablet, Rfl: 2    pantoprazole (PROTONIX) 40 MG tablet, TAKE 1 TABLET BY MOUTH EVERY DAY, Disp: 90 tablet, Rfl: 1    famotidine (PEPCID) 40 MG tablet, Take 1 tablet by mouth every evening, Disp: 90 tablet, Rfl: 3    losartan (COZAAR) 100 MG tablet, 1 QD, Disp: 90 tablet, Rfl: 1    aspirin 81 MG tablet, Take 81 mg by mouth daily, Disp: , Rfl:     No Known Allergies    Social History     Tobacco Use    Smoking status: Never Smoker    Smokeless tobacco: Never Used   Vaping Use    Vaping Use: Never used   Substance Use Topics    Alcohol use: No    Drug use: No      Past Surgical History:   Procedure Laterality Date    ANTERIOR CRUCIATE LIGAMENT REPAIR Left 11/21/2001    APPENDECTOMY      CHOLECYSTECTOMY 2007    Lap    ECHO COMPL W DOP COLOR FLOW  12/4/2012         HERNIA REPAIR Right 11/13/2002    double    SINUS SURGERY  4636,9815     done x 2    TONSILLECTOMY      TOTAL KNEE ARTHROPLASTY Left 1/28/2019    LEFT  ROBOTIC KNEE TOTAL ARTHROPLASTY  ++MEREDITH- [id number]++   ++ADDUCTOR BLOCK++ [facility]    EGD BIOPSY [facility] by Shannon Lobo MD at [facility] ENDOSCOPY     No family history on file. Past Medical History:   Diagnosis Date    Aneurysm of right renal artery (HCC)     follows with Dr. Radha Lugo yearly (last visit 9/19)    Colitis     Depression     GERD (gastroesophageal reflux disease)     H/O: CVA (cerebrovascular accident) 10/14/2021    Hyperlipidemia     Hypertension     Stroke [facility])     Superior mesenteric artery stenosis ([facility] [address] 11/4/2020    TIA (transient ischemic attack)        Vitals:    02/16/22 1422 02/16/22 1448   BP: (!) 140/78 122/70   Site: Left Upper Arm    Position: Sitting    Pulse: 89    Temp: 97.4 °F (36.3 °C)    TempSrc: Temporal    SpO2: 98%    Weight: 187 lb (84.8 kg)      BP Readings from Last 3 Encounters:   02/16/22 122/70   01/19/22 132/72   01/05/22 134/64    122/70    Physical Exam  Vitals and nursing note reviewed. Constitutional:       Appearance: He is well-developed. HENT:      Head: Normocephalic. Right Ear: External ear normal.      Left Ear: External ear normal.      Nose: Nose normal.   Eyes:      Conjunctiva/sclera: Conjunctivae normal.      Pupils: Pupils are equal, round, and reactive to light. Cardiovascular:      Rate and Rhythm: Normal rate. Pulmonary:      Breath sounds: Normal breath sounds. Abdominal:      General: Bowel sounds are normal.      Palpations: Abdomen is soft. Musculoskeletal:         General: Normal range of motion. Cervical back: Normal range of motion and neck supple. Skin:     General: Skin is warm and dry.    Neurological: Mental Status: He is alert and oriented to person, place, and time. Psychiatric:         Behavior: Behavior normal.     Today's vitals and physical examination are stable. We will sit tight with current medications and care. Reassessment with me next 3 to 4 weeks and sooner if need be. Plan Per Assessment:  Jerel Parson was seen today for hypertension and hyperlipidemia. Diagnoses and all orders for this visit:    Essential hypertension    Mixed hyperlipidemia    Primary osteoarthritis of left knee    Anxiety    Other headache syndrome    Nausea    Other orders  -     promethazine (PHENERGAN) 25 MG tablet; Take 1 tablet by mouth 3 times daily as needed for Nausea  -     ibuprofen (ADVIL;MOTRIN) 800 MG tablet; Take 1 tablet by mouth 2 times daily as needed for Pain            Return in about 4 weeks (around 3/16/2022). Torrey Vaca,     Note was generated with the assistance of voice recognition software. Document was reviewed however may contain grammatical errors.

## 2022-03-03 ENCOUNTER — OFFICE VISIT (OUTPATIENT)
Dept: PRIMARY CARE CLINIC | Age: 87
End: 2022-03-03
Payer: MEDICARE

## 2022-03-03 VITALS
OXYGEN SATURATION: 99 % | DIASTOLIC BLOOD PRESSURE: 72 MMHG | BODY MASS INDEX: 25.36 KG/M2 | TEMPERATURE: 97.1 F | HEIGHT: 72 IN | SYSTOLIC BLOOD PRESSURE: 130 MMHG | HEART RATE: 85 BPM

## 2022-03-03 DIAGNOSIS — J30.1 SEASONAL ALLERGIC RHINITIS DUE TO POLLEN: ICD-10-CM

## 2022-03-03 DIAGNOSIS — F32.A DEPRESSION, UNSPECIFIED DEPRESSION TYPE: ICD-10-CM

## 2022-03-03 DIAGNOSIS — R63.0 DECREASED APPETITE: ICD-10-CM

## 2022-03-03 DIAGNOSIS — I10 ESSENTIAL HYPERTENSION: Primary | Chronic | ICD-10-CM

## 2022-03-03 DIAGNOSIS — F41.9 ANXIETY: ICD-10-CM

## 2022-03-03 PROCEDURE — G8417 CALC BMI ABV UP PARAM F/U: HCPCS | Performed by: FAMILY MEDICINE

## 2022-03-03 PROCEDURE — G8427 DOCREV CUR MEDS BY ELIG CLIN: HCPCS | Performed by: FAMILY MEDICINE

## 2022-03-03 PROCEDURE — 1036F TOBACCO NON-USER: CPT | Performed by: FAMILY MEDICINE

## 2022-03-03 PROCEDURE — 99214 OFFICE O/P EST MOD 30 MIN: CPT | Performed by: FAMILY MEDICINE

## 2022-03-03 PROCEDURE — 1123F ACP DISCUSS/DSCN MKR DOCD: CPT | Performed by: FAMILY MEDICINE

## 2022-03-03 PROCEDURE — G8484 FLU IMMUNIZE NO ADMIN: HCPCS | Performed by: FAMILY MEDICINE

## 2022-03-03 PROCEDURE — 4040F PNEUMOC VAC/ADMIN/RCVD: CPT | Performed by: FAMILY MEDICINE

## 2022-03-03 RX ORDER — MIRTAZAPINE 30 MG/1
TABLET, FILM COATED ORAL
Qty: 90 TABLET | Refills: 0 | Status: SHIPPED
Start: 2022-03-03 | End: 2022-04-11

## 2022-03-03 NOTE — PROGRESS NOTES
Endocrine: Negative. Genitourinary: Negative. Musculoskeletal: Negative. Skin: Negative. Allergic/Immunologic: Negative. Neurological: Negative. Hematological: Negative. Psychiatric/Behavioral: Negative. Today systems review stable meds as prescribed.         Current Outpatient Medications:     mirtazapine (REMERON) 30 MG tablet, TAKE 1 TABLET BY MOUTH AT BEDTIME, Disp: 90 tablet, Rfl: 0    ibuprofen (ADVIL;MOTRIN) 800 MG tablet, Take 1 tablet by mouth 2 times daily as needed for Pain, Disp: 60 tablet, Rfl: 1    buPROPion (WELLBUTRIN XL) 150 MG extended release tablet, Take 1 tablet by mouth every morning, Disp: 30 tablet, Rfl: 3    amLODIPine (NORVASC) 10 MG tablet, Take 1 tablet by mouth daily TAKE 1 TABLET BY MOUTH DAILY, Disp: 90 tablet, Rfl: 3    potassium chloride (KLOR-CON M) 20 MEQ extended release tablet, Take 1 tablet by mouth 2 times daily, Disp: 180 tablet, Rfl: 3    hydroCHLOROthiazide (HYDRODIURIL) 12.5 MG tablet, Take 1 tablet by mouth daily, Disp: 90 tablet, Rfl: 2    pantoprazole (PROTONIX) 40 MG tablet, TAKE 1 TABLET BY MOUTH EVERY DAY, Disp: 90 tablet, Rfl: 1    famotidine (PEPCID) 40 MG tablet, Take 1 tablet by mouth every evening, Disp: 90 tablet, Rfl: 3    aspirin 81 MG tablet, Take 81 mg by mouth daily, Disp: , Rfl:     losartan (COZAAR) 100 MG tablet, 1 QD, Disp: 90 tablet, Rfl: 1    No Known Allergies    Social History     Tobacco Use    Smoking status: Never Smoker    Smokeless tobacco: Never Used   Vaping Use    Vaping Use: Never used   Substance Use Topics    Alcohol use: No    Drug use: No      Past Surgical History:   Procedure Laterality Date    ANTERIOR CRUCIATE LIGAMENT REPAIR Left 11/21/2001    APPENDECTOMY      CHOLECYSTECTOMY  2007    Lap    ECHO COMPL W DOP COLOR FLOW  12/4/2012         HERNIA REPAIR Right 11/13/2002    double    SINUS SURGERY  2002,2003     done x 2    TONSILLECTOMY      TOTAL KNEE ARTHROPLASTY Left 1/28/2019 LEFT  ROBOTIC KNEE TOTAL ARTHROPLASTY  ++MEREDITH- [id number]++   ++ADDUCTOR BLOCK++ performed by Ailyn Eddy MD at [address] [place] [address] 10/10/2019    EGD BIOPSY performed by Ashley Desai MD at [facility] ENDOSCOPY     No family history on file. Past Medical History:   Diagnosis Date    Aneurysm of right renal artery (HCC)     follows with Dr. Tai Dowd yearly (last visit 9/19)    Colitis     Depression     GERD (gastroesophageal reflux disease)     H/O: CVA (cerebrovascular accident) 10/14/2021    Hyperlipidemia     Hypertension     [facility])     Superior mesenteric artery stenosis ([address] 11/4/2020    TIA (transient ischemic attack)        Vitals:    03/03/22 1122   BP: 130/72   Pulse: 85   Temp: 97.1 °F (36.2 °C)   SpO2: 99%   Height: 6' (1.829 m)     BP Readings from Last 3 Encounters:   03/03/22 130/72   02/16/22 122/70   01/19/22 132/72        Physical Exam  Vitals and nursing note reviewed. Constitutional:       Appearance: He is well-developed. HENT:      Head: Normocephalic. Right Ear: External ear normal.      Left Ear: External ear normal.      Nose: Nose normal.   Eyes:      Conjunctiva/sclera: Conjunctivae normal.      Pupils: Pupils are equal, round, and reactive to light. Cardiovascular:      Rate and Rhythm: Normal rate. Pulmonary:      Breath sounds: Normal breath sounds. Abdominal:      General: Bowel sounds are normal.      Palpations: Abdomen is soft. Musculoskeletal:         General: Normal range of motion. Cervical back: Normal range of motion and neck supple. Skin:     General: Skin is warm and dry. Neurological:      Mental Status: He is alert and oriented to person, place, and time. Psychiatric:         Behavior: Behavior normal.     Today's vitals physical exam stable. Heart was regular lungs are clear. Medically he looks well.   Adjustment as noted and then reassess with me in the next 1 to 2 weeks sooner if need be.            Plan Per Assessment:  Loren Linares was seen today for nausea. Diagnoses and all orders for this visit:    Essential hypertension    Seasonal allergic rhinitis due to pollen    Anxiety    Decreased appetite    Depression, unspecified depression type    Other orders  -     mirtazapine (REMERON) 30 MG tablet; TAKE 1 TABLET BY MOUTH AT BEDTIME            No follow-ups on file. Laurel Barger,     Note was generated with the assistance of voice recognition software. Document was reviewed however may contain grammatical errors.

## 2022-03-11 ENCOUNTER — TELEPHONE (OUTPATIENT)
Dept: PRIMARY CARE CLINIC | Age: 87
End: 2022-03-11

## 2022-03-11 NOTE — TELEPHONE ENCOUNTER
The pt came to see you 03/03 because he had been dealing with some on and off nausea, and during the appointment you adjusted his remeron 15 mg to remeron 30 mg. He is calling today because ever since switching from to the 30 mg his nausea has been more persistent, every morning he wakes up and is nauseated.  He is asking if he can go back to the 15 mg

## 2022-03-15 ENCOUNTER — OFFICE VISIT (OUTPATIENT)
Dept: PRIMARY CARE CLINIC | Age: 87
End: 2022-03-15
Payer: MEDICARE

## 2022-03-15 VITALS
HEART RATE: 88 BPM | OXYGEN SATURATION: 98 % | TEMPERATURE: 98.7 F | SYSTOLIC BLOOD PRESSURE: 126 MMHG | WEIGHT: 183 LBS | DIASTOLIC BLOOD PRESSURE: 72 MMHG | BODY MASS INDEX: 24.82 KG/M2

## 2022-03-15 DIAGNOSIS — R11.0 NAUSEA: ICD-10-CM

## 2022-03-15 DIAGNOSIS — F32.A DEPRESSION, UNSPECIFIED DEPRESSION TYPE: Primary | ICD-10-CM

## 2022-03-15 DIAGNOSIS — F41.9 ANXIETY: ICD-10-CM

## 2022-03-15 DIAGNOSIS — I10 ESSENTIAL HYPERTENSION: Chronic | ICD-10-CM

## 2022-03-15 PROCEDURE — G8484 FLU IMMUNIZE NO ADMIN: HCPCS | Performed by: FAMILY MEDICINE

## 2022-03-15 PROCEDURE — 99214 OFFICE O/P EST MOD 30 MIN: CPT | Performed by: FAMILY MEDICINE

## 2022-03-15 PROCEDURE — G8427 DOCREV CUR MEDS BY ELIG CLIN: HCPCS | Performed by: FAMILY MEDICINE

## 2022-03-15 PROCEDURE — G8420 CALC BMI NORM PARAMETERS: HCPCS | Performed by: FAMILY MEDICINE

## 2022-03-15 PROCEDURE — 1123F ACP DISCUSS/DSCN MKR DOCD: CPT | Performed by: FAMILY MEDICINE

## 2022-03-15 PROCEDURE — 4040F PNEUMOC VAC/ADMIN/RCVD: CPT | Performed by: FAMILY MEDICINE

## 2022-03-15 PROCEDURE — 1036F TOBACCO NON-USER: CPT | Performed by: FAMILY MEDICINE

## 2022-03-15 RX ORDER — LOSARTAN POTASSIUM 100 MG/1
TABLET ORAL
Qty: 90 TABLET | Refills: 3 | Status: SHIPPED
Start: 2022-03-15 | End: 2022-09-26 | Stop reason: SDUPTHER

## 2022-03-15 RX ORDER — PANTOPRAZOLE SODIUM 40 MG/1
TABLET, DELAYED RELEASE ORAL
Qty: 90 TABLET | Refills: 3 | Status: SHIPPED
Start: 2022-03-15 | End: 2022-04-25

## 2022-03-15 RX ORDER — BUPROPION HYDROCHLORIDE 150 MG/1
TABLET ORAL
Qty: 60 TABLET | Refills: 1 | Status: SHIPPED
Start: 2022-03-15 | End: 2022-03-30

## 2022-03-15 NOTE — PROGRESS NOTES
3/15/22     Selina Gómez    : 1935 Sex: male   Age: 81 y.o. Chief Complaint   Patient presents with    Nausea    Anxiety    Depression       Prior to Admission medications    Medication Sig Start Date End Date Taking? Authorizing Provider   losartan (COZAAR) 100 MG tablet 1 QD 3/15/22 9/14/22 Yes David Villa DO   pantoprazole (PROTONIX) 40 MG tablet TAKE 1 TABLET BY MOUTH EVERY DAY 3/15/22  Yes Alivia Villa DO   buPROPion (WELLBUTRIN XL) 150 MG extended release tablet 1 q 12 3/15/22  Yes David Villa DO   mirtazapine (REMERON) 30 MG tablet TAKE 1 TABLET BY MOUTH AT BEDTIME  Patient taking differently: 15 mg TAKE 1 TABLET BY MOUTH AT BEDTIME 3/3/22 6/3/22 Yes David Villa DO   ibuprofen (ADVIL;MOTRIN) 800 MG tablet Take 1 tablet by mouth 2 times daily as needed for Pain 22  Yes Alivia Villa DO   amLODIPine (NORVASC) 10 MG tablet Take 1 tablet by mouth daily TAKE 1 TABLET BY MOUTH DAILY 22  Yes Alivia Villa DO   potassium chloride (KLOR-CON M) 20 MEQ extended release tablet Take 1 tablet by mouth 2 times daily 21  Yes Alivia Villa DO   hydroCHLOROthiazide (HYDRODIURIL) 12.5 MG tablet Take 1 tablet by mouth daily 10/27/21  Yes Alivia Villa DO   famotidine (PEPCID) 40 MG tablet Take 1 tablet by mouth every evening 21  Yes Alivia Villa DO   aspirin 81 MG tablet Take 81 mg by mouth daily   Yes Historical Provider, MD          HPI: Domenico Toro presents today in follow-up on depression hypertension anxiety nausea. Medications well-tolerated. Systems review stable. Adjustment with Wellbutrin to twice a day and maintain Remeron at at bedtime. Remeron reduced to the 15 mg dose. Reassessment again in 2 weeks sooner if need be. Nausea hopefully will improve. Weight remains stable. Lab studies to be completed. Review of Systems   Constitutional: Negative. HENT: Negative. Eyes: Negative. Respiratory: Negative. Gastrointestinal: Negative. Endocrine: Negative. Genitourinary: Negative. Musculoskeletal: Negative. Skin: Negative. Allergic/Immunologic: Negative. Neurological: Negative. Hematological: Negative. Psychiatric/Behavioral: Negative. Mild nausea as per HPI. Anxiety depression has been present and medications adjusted.       Current Outpatient Medications:     losartan (COZAAR) 100 MG tablet, 1 QD, Disp: 90 tablet, Rfl: 3    pantoprazole (PROTONIX) 40 MG tablet, TAKE 1 TABLET BY MOUTH EVERY DAY, Disp: 90 tablet, Rfl: 3    buPROPion (WELLBUTRIN XL) 150 MG extended release tablet, 1 q 12, Disp: 60 tablet, Rfl: 1    mirtazapine (REMERON) 30 MG tablet, TAKE 1 TABLET BY MOUTH AT BEDTIME (Patient taking differently: 15 mg TAKE 1 TABLET BY MOUTH AT BEDTIME), Disp: 90 tablet, Rfl: 0    ibuprofen (ADVIL;MOTRIN) 800 MG tablet, Take 1 tablet by mouth 2 times daily as needed for Pain, Disp: 60 tablet, Rfl: 1    amLODIPine (NORVASC) 10 MG tablet, Take 1 tablet by mouth daily TAKE 1 TABLET BY MOUTH DAILY, Disp: 90 tablet, Rfl: 3    potassium chloride (KLOR-CON M) 20 MEQ extended release tablet, Take 1 tablet by mouth 2 times daily, Disp: 180 tablet, Rfl: 3    hydroCHLOROthiazide (HYDRODIURIL) 12.5 MG tablet, Take 1 tablet by mouth daily, Disp: 90 tablet, Rfl: 2    famotidine (PEPCID) 40 MG tablet, Take 1 tablet by mouth every evening, Disp: 90 tablet, Rfl: 3    aspirin 81 MG tablet, Take 81 mg by mouth daily, Disp: , Rfl:     No Known Allergies    Social History     Tobacco Use    Smoking status: Never Smoker    Smokeless tobacco: Never Used   Vaping Use    Vaping Use: Never used   Substance Use Topics    Alcohol use: No    Drug use: No      Past Surgical History:   Procedure Laterality Date    ANTERIOR CRUCIATE LIGAMENT REPAIR Left 11/21/2001    APPENDECTOMY      CHOLECYSTECTOMY  2007    Lap    ECHO COMPL W DOP COLOR FLOW  12/4/2012         HERNIA REPAIR Right 11/13/2002    double  SINUS SURGERY  2002,2003     done x 2    TONSILLECTOMY      TOTAL KNEE ARTHROPLASTY Left 1/28/2019    LEFT  ROBOTIC KNEE TOTAL ARTHROPLASTY  ++MEREDITH- [id number]++   ++ADDUCTOR BLOCK++ performed by Lady Barajas MD at [address] 10/10/2019    EGD BIOPSY performed by Isiah Echeverria MD at [facility] ENDOSCOPY     No family history on file. Past Medical History:   Diagnosis Date    Aneurysm of right renal artery (HCC)     follows with Dr. Romana Noble yearly (last visit 9/19)    Colitis     Depression     GERD (gastroesophageal reflux disease)     H/O: CVA (cerebrovascular accident) 10/14/2021    Hyperlipidemia     Hypertension     Stroke [facility])     Superior mesenteric artery stenosis ([facility] [address] 11/4/2020    TIA (transient ischemic attack)        Vitals:    03/15/22 1512   BP: 126/72   Pulse: 88   Temp: 98.7 °F (37.1 °C)   SpO2: 98%   Weight: 183 lb (83 kg)     BP Readings from Last 3 Encounters:   03/15/22 126/72   03/03/22 130/72   02/16/22 122/70        Physical Exam  Vitals and nursing note reviewed. Constitutional:       Appearance: He is well-developed. HENT:      Head: Normocephalic. Right Ear: External ear normal.      Left Ear: External ear normal.      Nose: Nose normal.   Eyes:      Conjunctiva/sclera: Conjunctivae normal.      Pupils: Pupils are equal, round, and reactive to light. Cardiovascular:      Rate and Rhythm: Normal rate. Pulmonary:      Breath sounds: Normal breath sounds. Abdominal:      General: Bowel sounds are normal.      Palpations: Abdomen is soft. Musculoskeletal:         General: Normal range of motion. Cervical back: Normal range of motion and neck supple. Skin:     General: Skin is warm and dry. Neurological:      Mental Status: He is alert and oriented to person, place, and time. Psychiatric:         Behavior: Behavior normal.     Vitals physical examination stable. Medications as prescribed.   He will return to me in the next 2 weeks and labs to be completed prior. Plan Per Assessment:  Elvis Ohara was seen today for nausea, anxiety and depression. Diagnoses and all orders for this visit:    Depression, unspecified depression type  -     CBC with Auto Differential; Future  -     Comprehensive Metabolic Panel; Future  -     T4; Future  -     TSH; Future  -     Lipid Panel; Future  -     C-Reactive Protein; Future    Essential hypertension  -     CBC with Auto Differential; Future  -     Comprehensive Metabolic Panel; Future  -     T4; Future  -     TSH; Future  -     Lipid Panel; Future  -     C-Reactive Protein; Future    Anxiety  -     CBC with Auto Differential; Future  -     Comprehensive Metabolic Panel; Future  -     T4; Future  -     TSH; Future  -     Lipid Panel; Future  -     C-Reactive Protein; Future    Nausea  -     CBC with Auto Differential; Future  -     Comprehensive Metabolic Panel; Future  -     T4; Future  -     TSH; Future  -     Lipid Panel; Future  -     C-Reactive Protein; Future    Other orders  -     losartan (COZAAR) 100 MG tablet; 1 QD  -     pantoprazole (PROTONIX) 40 MG tablet; TAKE 1 TABLET BY MOUTH EVERY DAY  -     buPROPion (WELLBUTRIN XL) 150 MG extended release tablet; 1 q 12            Return in about 2 weeks (around 3/29/2022). Jona Denton DO    Note was generated with the assistance of voice recognition software. Document was reviewed however may contain grammatical errors.

## 2022-03-18 ENCOUNTER — TELEPHONE (OUTPATIENT)
Dept: PRIMARY CARE CLINIC | Age: 87
End: 2022-03-18

## 2022-03-18 NOTE — TELEPHONE ENCOUNTER
You placed the pt on bupropion 150 mg once in the morning and once at night. He is calling because for some reason since starting to take it at night with the remeron he has been getting up almost 4 times at night to use the restroom.  He is asking if it is okay if he just takes the 2 150 mg of bupropion in the morning

## 2022-03-23 ENCOUNTER — TELEPHONE (OUTPATIENT)
Dept: PRIMARY CARE CLINIC | Age: 87
End: 2022-03-23

## 2022-03-23 NOTE — TELEPHONE ENCOUNTER
The pt says he is wondering if he is on 2 much bupropion because he just don't feel right.  His anxiety is very bad, and he hasn't eaten since lunch yesterday because he is really nauseated

## 2022-03-25 ENCOUNTER — OFFICE VISIT (OUTPATIENT)
Dept: PRIMARY CARE CLINIC | Age: 87
End: 2022-03-25
Payer: MEDICARE

## 2022-03-25 VITALS
TEMPERATURE: 97.9 F | HEART RATE: 79 BPM | SYSTOLIC BLOOD PRESSURE: 130 MMHG | DIASTOLIC BLOOD PRESSURE: 82 MMHG | OXYGEN SATURATION: 97 %

## 2022-03-25 DIAGNOSIS — F32.A DEPRESSION, UNSPECIFIED DEPRESSION TYPE: ICD-10-CM

## 2022-03-25 DIAGNOSIS — K55.1 SUPERIOR MESENTERIC ARTERY STENOSIS (HCC): ICD-10-CM

## 2022-03-25 DIAGNOSIS — I10 ESSENTIAL HYPERTENSION: Primary | Chronic | ICD-10-CM

## 2022-03-25 DIAGNOSIS — R11.0 NAUSEA: ICD-10-CM

## 2022-03-25 DIAGNOSIS — F41.9 ANXIETY: ICD-10-CM

## 2022-03-25 PROCEDURE — G8420 CALC BMI NORM PARAMETERS: HCPCS | Performed by: FAMILY MEDICINE

## 2022-03-25 PROCEDURE — 1036F TOBACCO NON-USER: CPT | Performed by: FAMILY MEDICINE

## 2022-03-25 PROCEDURE — 99214 OFFICE O/P EST MOD 30 MIN: CPT | Performed by: FAMILY MEDICINE

## 2022-03-25 PROCEDURE — G8484 FLU IMMUNIZE NO ADMIN: HCPCS | Performed by: FAMILY MEDICINE

## 2022-03-25 PROCEDURE — G8427 DOCREV CUR MEDS BY ELIG CLIN: HCPCS | Performed by: FAMILY MEDICINE

## 2022-03-25 PROCEDURE — 1123F ACP DISCUSS/DSCN MKR DOCD: CPT | Performed by: FAMILY MEDICINE

## 2022-03-25 PROCEDURE — 4040F PNEUMOC VAC/ADMIN/RCVD: CPT | Performed by: FAMILY MEDICINE

## 2022-03-25 RX ORDER — PROMETHAZINE HYDROCHLORIDE 25 MG/1
25 TABLET ORAL EVERY 6 HOURS PRN
Qty: 30 TABLET | Refills: 1 | Status: SHIPPED
Start: 2022-03-25 | End: 2022-04-11

## 2022-03-25 RX ORDER — PROMETHAZINE HYDROCHLORIDE 25 MG/1
25 TABLET ORAL EVERY 6 HOURS PRN
COMMUNITY
End: 2022-03-25 | Stop reason: SDUPTHER

## 2022-03-25 RX ORDER — HYDROXYZINE HYDROCHLORIDE 25 MG/1
TABLET, FILM COATED ORAL
COMMUNITY
Start: 2022-03-16 | End: 2022-04-18

## 2022-03-25 ASSESSMENT — ENCOUNTER SYMPTOMS
RESPIRATORY NEGATIVE: 1
ALLERGIC/IMMUNOLOGIC NEGATIVE: 1
NAUSEA: 1
EYES NEGATIVE: 1

## 2022-03-25 NOTE — PROGRESS NOTES
3/25/22     Eva Allison    : 1935 Sex: male   Age: 81 y.o. Chief Complaint   Patient presents with    Nausea    Depression    Anxiety       Prior to Admission medications    Medication Sig Start Date End Date Taking? Authorizing Provider   hydrOXYzine (ATARAX) 25 MG tablet TAKE 1 TABLET BY MOUTH EVERY 8 HOURS AS NEEDED FOR ITCHING 3/16/22  Yes Historical Provider, MD   promethazine (PHENERGAN) 25 MG tablet Take 1 tablet by mouth every 6 hours as needed for Nausea 3/25/22  Yes Eliana Villa DO   losartan (COZAAR) 100 MG tablet 1 QD 3/15/22 9/14/22 Yes David Villa DO   pantoprazole (PROTONIX) 40 MG tablet TAKE 1 TABLET BY MOUTH EVERY DAY 3/15/22  Yes Eliana Villa DO   buPROPion (WELLBUTRIN XL) 150 MG extended release tablet 1 q 12  Patient taking differently: daily  3/15/22  Yes David Villa DO   mirtazapine (REMERON) 30 MG tablet TAKE 1 TABLET BY MOUTH AT BEDTIME  Patient taking differently: 15 mg TAKE 1 TABLET BY MOUTH AT BEDTIME 3/3/22 6/3/22 Yes David Villa DO   ibuprofen (ADVIL;MOTRIN) 800 MG tablet Take 1 tablet by mouth 2 times daily as needed for Pain 22  Yes Eliana Villa DO   amLODIPine (NORVASC) 10 MG tablet Take 1 tablet by mouth daily TAKE 1 TABLET BY MOUTH DAILY 22  Yes Eliana Villa DO   potassium chloride (KLOR-CON M) 20 MEQ extended release tablet Take 1 tablet by mouth 2 times daily 21  Yes Eliana Villa DO   hydroCHLOROthiazide (HYDRODIURIL) 12.5 MG tablet Take 1 tablet by mouth daily 10/27/21  Yes Eliana Villa DO   famotidine (PEPCID) 40 MG tablet Take 1 tablet by mouth every evening 21  Yes Eliana Villa DO   aspirin 81 MG tablet Take 81 mg by mouth daily   Yes Historical Provider, MD          HPI: Patient seen today with hypertension gastritis intermittent nausea insomnia anxiety depression. Continued difficulties with medication management.   Does not appear to be tolerating Wellbutrin well so has daily, Disp: 90 tablet, Rfl: 2    famotidine (PEPCID) 40 MG tablet, Take 1 tablet by mouth every evening, Disp: 90 tablet, Rfl: 3    aspirin 81 MG tablet, Take 81 mg by mouth daily, Disp: , Rfl:     No Known Allergies    Social History     Tobacco Use    Smoking status: Never Smoker    Smokeless tobacco: Never Used   Vaping Use    Vaping Use: Never used   Substance Use Topics    Alcohol use: No    Drug use: No      Past Surgical History:   Procedure Laterality Date    ANTERIOR CRUCIATE LIGAMENT REPAIR Left 11/21/2001    APPENDECTOMY      CHOLECYSTECTOMY  2007    Lap    ECHO COMPL W DOP COLOR FLOW  12/4/2012         HERNIA REPAIR Right 11/13/2002    double    SINUS SURGERY  2002,2003     done x 2    TONSILLECTOMY      TOTAL KNEE ARTHROPLASTY Left 1/28/2019    LEFT  ROBOTIC KNEE TOTAL ARTHROPLASTY  ++MEREDITH- [id number]++   ++ADDUCTOR BLOCK++ performed by Ailyn Eddy MD at [address] [place] [address] N/A 10/10/2019    EGD BIOPSY performed by Ashley Desai MD at [facility] ENDOSCOPY     No family history on file. Past Medical History:   Diagnosis Date    Aneurysm of right renal artery (HCC)     follows with Dr. Tai Dowd yearly (last visit 9/19)    Colitis     Depression     GERD (gastroesophageal reflux disease)     H/O: CVA (cerebrovascular accident) 10/14/2021    Hyperlipidemia     Hypertension     Stroke [facility])     Superior mesenteric artery stenosis ([facility] [address] 11/4/2020    TIA (transient ischemic attack)        Vitals:    03/25/22 1125   BP: 130/82   Pulse: 79   Temp: 97.9 °F (36.6 °C)   SpO2: 97%     BP Readings from Last 3 Encounters:   03/25/22 130/82   03/15/22 126/72   03/03/22 130/72        Physical Exam  Vitals and nursing note reviewed. Constitutional:       Appearance: He is well-developed. HENT:      Head: Normocephalic.       Right Ear: External ear normal.      Left Ear: External ear normal.      Nose: Nose normal.   Eyes:      Conjunctiva/sclera: Conjunctivae normal.      Pupils: Pupils are equal, round, and reactive to light. Cardiovascular:      Rate and Rhythm: Normal rate. Pulmonary:      Breath sounds: Normal breath sounds. Abdominal:      General: Bowel sounds are normal.      Palpations: Abdomen is soft. Musculoskeletal:         General: Normal range of motion. Cervical back: Normal range of motion and neck supple. Skin:     General: Skin is warm and dry. Neurological:      Mental Status: He is alert and oriented to person, place, and time. Psychiatric:         Behavior: Behavior normal.        Afebrile pressures controlled. Heart is controlled lungs are clear. Abdomen was soft no rebound no guarding. Treatment as noted and reassessment with me next week. Plan Per Assessment:  Irwin Donald was seen today for nausea, depression and anxiety. Diagnoses and all orders for this visit:    Essential hypertension    Superior mesenteric artery stenosis (HCC)    Nausea    Depression, unspecified depression type    Anxiety    Other orders  -     promethazine (PHENERGAN) 25 MG tablet; Take 1 tablet by mouth every 6 hours as needed for Nausea            No follow-ups on file. Russell Agosto DO    Note was generated with the assistance of voice recognition software. Document was reviewed however may contain grammatical errors.

## 2022-03-29 ENCOUNTER — HOSPITAL ENCOUNTER (OUTPATIENT)
Age: 87
Discharge: HOME | End: 2022-03-29
Payer: MEDICARE

## 2022-03-29 DIAGNOSIS — I10 ESSENTIAL HYPERTENSION: Chronic | ICD-10-CM

## 2022-03-29 DIAGNOSIS — F32.A DEPRESSION, UNSPECIFIED DEPRESSION TYPE: ICD-10-CM

## 2022-03-29 DIAGNOSIS — R11.0 NAUSEA: ICD-10-CM

## 2022-03-29 DIAGNOSIS — F41.9 ANXIETY: ICD-10-CM

## 2022-03-29 LAB
ALBUMIN SERPL-MCNC: 4.2 G/DL (ref 3.5–5.2)
ALP BLD-CCNC: 97 U/L (ref 40–129)
ALT SERPL-CCNC: 7 U/L (ref 0–40)
ANION GAP SERPL CALCULATED.3IONS-SCNC: 10 MMOL/L (ref 7–16)
AST SERPL-CCNC: 13 U/L (ref 0–39)
BASOPHILS ABSOLUTE: 0.03 E9/L (ref 0–0.2)
BASOPHILS RELATIVE PERCENT: 0.5 % (ref 0–2)
BILIRUB SERPL-MCNC: 0.6 MG/DL (ref 0–1.2)
BUN BLDV-MCNC: 9 MG/DL (ref 6–23)
C-REACTIVE PROTEIN: 0.3 MG/DL (ref 0–0.4)
CALCIUM SERPL-MCNC: 9.9 MG/DL (ref 8.6–10.2)
CHLORIDE BLD-SCNC: 101 MMOL/L (ref 98–107)
CHOLESTEROL, TOTAL: 150 MG/DL (ref 0–199)
CO2: 31 MMOL/L (ref 22–29)
CREAT SERPL-MCNC: 1.2 MG/DL (ref 0.7–1.2)
EOSINOPHILS ABSOLUTE: 0.06 E9/L (ref 0.05–0.5)
EOSINOPHILS RELATIVE PERCENT: 0.9 % (ref 0–6)
GFR AFRICAN AMERICAN: >60
GFR NON-AFRICAN AMERICAN: 58 ML/MIN/1.73
GLUCOSE BLD-MCNC: 102 MG/DL (ref 74–99)
HCT VFR BLD CALC: 40.9 % (ref 37–54)
HDLC SERPL-MCNC: 44 MG/DL
HEMOGLOBIN: 14 G/DL (ref 12.5–16.5)
IMMATURE GRANULOCYTES #: 0.03 E9/L
IMMATURE GRANULOCYTES %: 0.5 % (ref 0–5)
LDL CHOLESTEROL CALCULATED: 93 MG/DL (ref 0–99)
LYMPHOCYTES ABSOLUTE: 1.17 E9/L (ref 1.5–4)
LYMPHOCYTES RELATIVE PERCENT: 18.5 % (ref 20–42)
MCH RBC QN AUTO: 30.4 PG (ref 26–35)
MCHC RBC AUTO-ENTMCNC: 34.2 % (ref 32–34.5)
MCV RBC AUTO: 88.7 FL (ref 80–99.9)
MONOCYTES ABSOLUTE: 0.52 E9/L (ref 0.1–0.95)
MONOCYTES RELATIVE PERCENT: 8.2 % (ref 2–12)
NEUTROPHILS ABSOLUTE: 4.51 E9/L (ref 1.8–7.3)
NEUTROPHILS RELATIVE PERCENT: 71.4 % (ref 43–80)
PDW BLD-RTO: 12.5 FL (ref 11.5–15)
PLATELET # BLD: 365 E9/L (ref 130–450)
PMV BLD AUTO: 8.7 FL (ref 7–12)
POTASSIUM SERPL-SCNC: 3.6 MMOL/L (ref 3.5–5)
RBC # BLD: 4.61 E12/L (ref 3.8–5.8)
SODIUM BLD-SCNC: 142 MMOL/L (ref 132–146)
T4 TOTAL: 6.9 MCG/DL (ref 4.5–11.7)
TOTAL PROTEIN: 7.4 G/DL (ref 6.4–8.3)
TRIGL SERPL-MCNC: 64 MG/DL (ref 0–149)
TSH SERPL DL<=0.05 MIU/L-ACNC: 1.7 UIU/ML (ref 0.27–4.2)
VLDLC SERPL CALC-MCNC: 13 MG/DL
WBC # BLD: 6.3 E9/L (ref 4.5–11.5)

## 2022-03-29 PROCEDURE — 85025 COMPLETE CBC W/AUTO DIFF WBC: CPT

## 2022-03-29 PROCEDURE — 84436 ASSAY OF TOTAL THYROXINE: CPT

## 2022-03-29 PROCEDURE — 80061 LIPID PANEL: CPT

## 2022-03-29 PROCEDURE — 36415 COLL VENOUS BLD VENIPUNCTURE: CPT

## 2022-03-29 PROCEDURE — 84443 ASSAY THYROID STIM HORMONE: CPT

## 2022-03-29 PROCEDURE — 80053 COMPREHEN METABOLIC PANEL: CPT

## 2022-03-29 PROCEDURE — 86140 C-REACTIVE PROTEIN: CPT

## 2022-03-30 ENCOUNTER — OFFICE VISIT (OUTPATIENT)
Dept: PRIMARY CARE CLINIC | Age: 87
End: 2022-03-30
Payer: MEDICARE

## 2022-03-30 VITALS
BODY MASS INDEX: 24.82 KG/M2 | OXYGEN SATURATION: 98 % | TEMPERATURE: 97.5 F | DIASTOLIC BLOOD PRESSURE: 70 MMHG | HEART RATE: 79 BPM | HEIGHT: 72 IN | SYSTOLIC BLOOD PRESSURE: 122 MMHG

## 2022-03-30 DIAGNOSIS — F41.9 ANXIETY: ICD-10-CM

## 2022-03-30 DIAGNOSIS — F51.01 PRIMARY INSOMNIA: ICD-10-CM

## 2022-03-30 DIAGNOSIS — E78.2 MIXED HYPERLIPIDEMIA: Chronic | ICD-10-CM

## 2022-03-30 DIAGNOSIS — I10 ESSENTIAL HYPERTENSION: Primary | Chronic | ICD-10-CM

## 2022-03-30 PROCEDURE — G8420 CALC BMI NORM PARAMETERS: HCPCS | Performed by: FAMILY MEDICINE

## 2022-03-30 PROCEDURE — 1036F TOBACCO NON-USER: CPT | Performed by: FAMILY MEDICINE

## 2022-03-30 PROCEDURE — G8484 FLU IMMUNIZE NO ADMIN: HCPCS | Performed by: FAMILY MEDICINE

## 2022-03-30 PROCEDURE — 99214 OFFICE O/P EST MOD 30 MIN: CPT | Performed by: FAMILY MEDICINE

## 2022-03-30 PROCEDURE — 4040F PNEUMOC VAC/ADMIN/RCVD: CPT | Performed by: FAMILY MEDICINE

## 2022-03-30 PROCEDURE — 1123F ACP DISCUSS/DSCN MKR DOCD: CPT | Performed by: FAMILY MEDICINE

## 2022-03-30 PROCEDURE — G8427 DOCREV CUR MEDS BY ELIG CLIN: HCPCS | Performed by: FAMILY MEDICINE

## 2022-03-30 RX ORDER — SERTRALINE HYDROCHLORIDE 25 MG/1
25 TABLET, FILM COATED ORAL DAILY
Qty: 30 TABLET | Refills: 0 | Status: SHIPPED
Start: 2022-03-30 | End: 2022-04-06

## 2022-03-30 NOTE — PROGRESS NOTES
3/30/22     Suzanne Hope    : 1935 Sex: male   Age: 81 y.o. Chief Complaint   Patient presents with    Discuss Medications       Prior to Admission medications    Medication Sig Start Date End Date Taking? Authorizing Provider   sertraline (ZOLOFT) 25 MG tablet Take 1 tablet by mouth daily 3/30/22  Yes David Villa DO   hydrOXYzine (ATARAX) 25 MG tablet TAKE 1 TABLET BY MOUTH EVERY 8 HOURS AS NEEDED FOR ITCHING 3/16/22  Yes Historical Provider, MD   promethazine (PHENERGAN) 25 MG tablet Take 1 tablet by mouth every 6 hours as needed for Nausea 3/25/22  Yes David Villa DO   losartan (COZAAR) 100 MG tablet 1 QD 3/15/22 9/14/22 Yes David Villa DO   pantoprazole (PROTONIX) 40 MG tablet TAKE 1 TABLET BY MOUTH EVERY DAY 3/15/22  Yes David Villa DO   mirtazapine (REMERON) 30 MG tablet TAKE 1 TABLET BY MOUTH AT BEDTIME  Patient taking differently: 15 mg TAKE 1 TABLET BY MOUTH AT BEDTIME 3/3/22 6/3/22 Yes David Villa DO   ibuprofen (ADVIL;MOTRIN) 800 MG tablet Take 1 tablet by mouth 2 times daily as needed for Pain 22  Yes David Villa DO   amLODIPine (NORVASC) 10 MG tablet Take 1 tablet by mouth daily TAKE 1 TABLET BY MOUTH DAILY 22  Yes David Villa DO   potassium chloride (KLOR-CON M) 20 MEQ extended release tablet Take 1 tablet by mouth 2 times daily 21  Yes David Villa DO   hydroCHLOROthiazide (HYDRODIURIL) 12.5 MG tablet Take 1 tablet by mouth daily 10/27/21  Yes David Villa DO   famotidine (PEPCID) 40 MG tablet Take 1 tablet by mouth every evening 21  Yes David Villa DO   aspirin 81 MG tablet Take 81 mg by mouth daily   Yes Historical Provider, MD          HPI: Patient seen today hypertension insomnia anxiety hyperlipidemia. Anxiety persists. Did not do well with Wellbutrin so this has been discontinued. Remeron continues 50 mg at at bedtime and then addition of 25 mg Zoloft today and reassess next week. Remainder meds reviewed and continue as prescribed. Review of Systems   Constitutional: Negative. HENT: Negative. Eyes: Negative. Respiratory: Negative. Gastrointestinal: Negative. Endocrine: Negative. Genitourinary: Negative. Musculoskeletal: Negative. Skin: Negative. Allergic/Immunologic: Negative. Neurological: Negative. Hematological: Negative. Psychiatric/Behavioral: Positive for agitation. The patient is nervous/anxious. Today systems review overall stable aside from the intermittent agitation anxiety as noted.         Current Outpatient Medications:     sertraline (ZOLOFT) 25 MG tablet, Take 1 tablet by mouth daily, Disp: 30 tablet, Rfl: 0    hydrOXYzine (ATARAX) 25 MG tablet, TAKE 1 TABLET BY MOUTH EVERY 8 HOURS AS NEEDED FOR ITCHING, Disp: , Rfl:     promethazine (PHENERGAN) 25 MG tablet, Take 1 tablet by mouth every 6 hours as needed for Nausea, Disp: 30 tablet, Rfl: 1    losartan (COZAAR) 100 MG tablet, 1 QD, Disp: 90 tablet, Rfl: 3    pantoprazole (PROTONIX) 40 MG tablet, TAKE 1 TABLET BY MOUTH EVERY DAY, Disp: 90 tablet, Rfl: 3    mirtazapine (REMERON) 30 MG tablet, TAKE 1 TABLET BY MOUTH AT BEDTIME (Patient taking differently: 15 mg TAKE 1 TABLET BY MOUTH AT BEDTIME), Disp: 90 tablet, Rfl: 0    ibuprofen (ADVIL;MOTRIN) 800 MG tablet, Take 1 tablet by mouth 2 times daily as needed for Pain, Disp: 60 tablet, Rfl: 1    amLODIPine (NORVASC) 10 MG tablet, Take 1 tablet by mouth daily TAKE 1 TABLET BY MOUTH DAILY, Disp: 90 tablet, Rfl: 3    potassium chloride (KLOR-CON M) 20 MEQ extended release tablet, Take 1 tablet by mouth 2 times daily, Disp: 180 tablet, Rfl: 3    hydroCHLOROthiazide (HYDRODIURIL) 12.5 MG tablet, Take 1 tablet by mouth daily, Disp: 90 tablet, Rfl: 2    famotidine (PEPCID) 40 MG tablet, Take 1 tablet by mouth every evening, Disp: 90 tablet, Rfl: 3    aspirin 81 MG tablet, Take 81 mg by mouth daily, Disp: , Rfl:     No Known Allergies    Social History     Tobacco Use    Smoking status: Never Smoker    Smokeless tobacco: Never Used   Vaping Use    Vaping Use: Never used   Substance Use Topics    Alcohol use: No    Drug use: No      Past Surgical History:   Procedure Laterality Date    ANTERIOR CRUCIATE LIGAMENT REPAIR Left 11/21/2001    APPENDECTOMY      CHOLECYSTECTOMY  2007    Lap    ECHO COMPL W DOP COLOR FLOW  12/4/2012         HERNIA REPAIR Right 11/13/2002    double    SINUS SURGERY  2002,2003     done x 2    TONSILLECTOMY      TOTAL KNEE ARTHROPLASTY Left 1/28/2019    LEFT  ROBOTIC KNEE TOTAL ARTHROPLASTY  ++MEREDITH- [id number]++   ++ADDUCTOR BLOCK++ performed by Parker Kirk MD at [facility] N/A 10/10/2019    EGD BIOPSY performed by Marcela Castañeda MD at [facility] ENDOSCOPY     No family history on file. Past Medical History:   Diagnosis Date    Aneurysm of right renal artery (HCC)     follows with Dr. Sindhu Decker yearly (last visit 9/19)    Colitis     Depression     GERD (gastroesophageal reflux disease)     H/O: CVA (cerebrovascular accident) 10/14/2021    Hyperlipidemia     Hypertension     Stroke [facility])     Superior mesenteric artery stenosis ([address] 11/4/2020    TIA (transient ischemic attack)        Vitals:    03/30/22 1407   BP: 122/70   Pulse: 79   Temp: 97.5 °F (36.4 °C)   SpO2: 98%   Height: 6' (1.829 m)     BP Readings from Last 3 Encounters:   03/30/22 122/70   03/25/22 130/82   03/15/22 126/72        Physical Exam  Vitals and nursing note reviewed. Constitutional:       Appearance: He is well-developed. HENT:      Head: Normocephalic. Right Ear: External ear normal.      Left Ear: External ear normal.      Nose: Nose normal.   Eyes:      Conjunctiva/sclera: Conjunctivae normal.      Pupils: Pupils are equal, round, and reactive to light. Cardiovascular:      Rate and Rhythm: Normal rate. Pulmonary:      Breath sounds: Normal breath sounds.    Abdominal: General: Bowel sounds are normal.      Palpations: Abdomen is soft. Musculoskeletal:         General: Normal range of motion. Cervical back: Normal range of motion and neck supple. Skin:     General: Skin is warm and dry. Neurological:      Mental Status: He is alert and oriented to person, place, and time. Psychiatric:         Behavior: Behavior normal.        Today's vitals physical examination overall stable. Medications as prescribed. Follow-up visit next week sooner if need be. Medications as prescribed.       Lab Results   Component Value Date    TSH 1.700 03/29/2022    TSH 1.590 01/03/2022    I4REKVC 6.9 03/29/2022    K0JCURI 6.5 01/03/2022    T4FREE 1.10 01/22/2017     Lab Results   Component Value Date    CHOL 150 03/29/2022    CHOL 148 01/03/2022     Lab Results   Component Value Date    TRIG 64 03/29/2022    TRIG 62 01/03/2022     Lab Results   Component Value Date    HDL 44 03/29/2022    HDL 44 01/03/2022     No results found for: [facility]  Lab Results   Component Value Date    LABVLDL 13 03/29/2022    LABVLDL 12 01/03/2022     No results found for: [facility]  Lab Results   Component Value Date    WBC 6.3 03/29/2022    HGB 14.0 03/29/2022    HCT 40.9 03/29/2022    MCV 88.7 03/29/2022     03/29/2022    LYMPHOPCT 18.5 (L) 03/29/2022    RBC 4.61 03/29/2022    MCH 30.4 03/29/2022    MCHC 34.2 03/29/2022    RDW 12.5 03/29/2022     Lab Results   Component Value Date     03/29/2022    K 3.6 03/29/2022     03/29/2022    CO2 31 (H) 03/29/2022    BUN 9 03/29/2022    CREATININE 1.2 03/29/2022    GLUCOSE 102 (H) 03/29/2022    CALCIUM 9.9 03/29/2022    PROT 7.4 03/29/2022    LABALBU 4.2 03/29/2022    BILITOT 0.6 03/29/2022    ALKPHOS 97 03/29/2022    AST 13 03/29/2022    ALT 7 03/29/2022    LABGLOM 58 03/29/2022    GFRAA >60 03/29/2022        Lab Results   Component Value Date    PSA 2.83 04/29/2020    PSA 2.86 04/24/2019      Lab Results   Component Value Date LABA1C 5.6 01/03/2022    LABA1C 5.4 08/14/2021    LABA1C 5.1 01/25/2021     No results found for: EAG     Plan Per Assessment:  Iker Valle was seen today for discuss medications. Diagnoses and all orders for this visit:    Essential hypertension    Primary insomnia    Anxiety    Mixed hyperlipidemia    Other orders  -     sertraline (ZOLOFT) 25 MG tablet; Take 1 tablet by mouth daily            Return in about 1 week (around 4/6/2022). Carl Lacey DO    Note was generated with the assistance of voice recognition software. Document was reviewed however may contain grammatical errors.

## 2022-03-31 ENCOUNTER — CARE COORDINATION (OUTPATIENT)
Dept: CARE COORDINATION | Age: 87
End: 2022-03-31

## 2022-03-31 SDOH — HEALTH STABILITY: PHYSICAL HEALTH: ON AVERAGE, HOW MANY DAYS PER WEEK DO YOU ENGAGE IN MODERATE TO STRENUOUS EXERCISE (LIKE A BRISK WALK)?: 0 DAYS

## 2022-03-31 SDOH — ECONOMIC STABILITY: TRANSPORTATION INSECURITY
IN THE PAST 12 MONTHS, HAS THE LACK OF TRANSPORTATION KEPT YOU FROM MEDICAL APPOINTMENTS OR FROM GETTING MEDICATIONS?: NO

## 2022-03-31 SDOH — HEALTH STABILITY: PHYSICAL HEALTH: ON AVERAGE, HOW MANY MINUTES DO YOU ENGAGE IN EXERCISE AT THIS LEVEL?: 0 MIN

## 2022-03-31 SDOH — ECONOMIC STABILITY: TRANSPORTATION INSECURITY
IN THE PAST 12 MONTHS, HAS LACK OF TRANSPORTATION KEPT YOU FROM MEETINGS, WORK, OR FROM GETTING THINGS NEEDED FOR DAILY LIVING?: NO

## 2022-03-31 ASSESSMENT — LIFESTYLE VARIABLES: HOW OFTEN DO YOU HAVE A DRINK CONTAINING ALCOHOL: NEVER

## 2022-03-31 ASSESSMENT — SOCIAL DETERMINANTS OF HEALTH (SDOH)
HOW OFTEN DO YOU ATTENT MEETINGS OF THE CLUB OR ORGANIZATION YOU BELONG TO?: NEVER
HOW OFTEN DO YOU ATTEND CHURCH OR RELIGIOUS SERVICES?: NEVER
IN A TYPICAL WEEK, HOW MANY TIMES DO YOU TALK ON THE PHONE WITH FAMILY, FRIENDS, OR NEIGHBORS?: MORE THAN THREE TIMES A WEEK
HOW OFTEN DO YOU GET TOGETHER WITH FRIENDS OR RELATIVES?: TWICE A WEEK
DO YOU BELONG TO ANY CLUBS OR ORGANIZATIONS SUCH AS CHURCH GROUPS UNIONS, FRATERNAL OR ATHLETIC GROUPS, OR SCHOOL GROUPS?: NO

## 2022-03-31 NOTE — CARE COORDINATION
really thought about. Talked about writing how he feels and then always making a positive comment on how to feel better. Pt said he has a new journal and he is considering journaling.   -Pt reports he doesn't have issues sleeping. He said he takes the Remeron at bedtime and goes right to sleep. The only time he gets up is to urinate.   -Pt reports his son is a motorcycle instructor and on Saturdays, Pt goes there to help out. Pt really enjoys being there. Appetite  -Pt reports he doesn't have much of an appetite when he wakes up in the morning. Then later that evening, his appetite improves. -Pt like to take his medications by 9:30am and he eats some breakfast then. He intermittently becomes nauseated. Today he took a Phenergan at 12:15 pm for nausea/anxiety with some relief.   -Pt reports he keeps hydrated. He drinks protein shakes, green tea with honey and milk.   -Pt reports he frequently has a cookie and glass of milk at bedtime.  -Discussed home delivered meals, Pt declined. Pt said he is capable of fixing meals and going out for food. HTN  -Pt has a scale and a BP monitor but he doesn't feel the BP monitor is accurate.   -Pt reports he weights himself often. Yesterdays, 3- weight 180 lbs. Pt said a normal weight is 185 lbs for him.   -Pt reports he is on a low sodium diet. Pt said he does not add salt to his food. However, Pt does not monitor sodium and does not read labels.   -Pt reports no cough, SOB or wheezing.   -Pt reports no ankle/feet edema.    -Will mail education for HTN, monitoring sodium, reading labels. Resources  -Pt reports he has an appt with a Psychologist today at [address] [place] [address]. dietitian, Social Work, ACP & PAP, Pt declined. PLAN:  -Continue Care Coordination  -Psychology appt 3-. -PCP appt 4-6-2022  -F/u anxiety/panic attacks, depression. -F/u appetite  -F/u on weight  F/u monitor sodium.   -Will mail education on HTN, monitoring sodium, reading labels. -Next anticipated outreach by [address] Team: one weeks. Ambulatory Care Coordination Assessment    Care Coordination Protocol  Program Enrollment: Complex Care  Referral from Primary Care Provider: No  Week 1 - Initial Assessment     Do you have all of your prescriptions and are they filled?: Yes  Barriers to medication adherence: None  Are you able to afford your medications?: Yes  How often do you have trouble taking your medications the way you have been told to take them?: I always take them as prescribed. Do you have Home O2 Therapy?: No      Ability to seek help/take action for Emergent Urgent situations i.e. fire, crime, inclement weather or health crisis. : Independent  Ability to ambulate to restroom: Independent  Ability handle personal hygeine needs (bathing/dressing/grooming): Independent  Ability to manage Medications: Independent  Ability to prepare Food Preparation: Independent  Ability to maintain home (clean home, laundry): Independent  Ability to drive and/or has transportation: Independent  Ability to do shopping: Independent  Ability to manage finances: Independent  Is patient able to live independently?: Yes     Current Housing: Private Residence        Per the Fall Risk Screening, did the patient have 2 or more falls or 1 fall with injury in the past year?: No     Frequent urination at night?: No  Do you use rails/bars?: Yes  Do you have a non-slip tub mat?: Yes     Are you experiencing loss of meaning?: Yes (Comment: 3/31/22 Pt has anxiety/depression, panic attacks. Pt has appt with Psychologist today. )  Are you experiencing loss of hope and peace?: Yes (Comment: 3/31/22 Pt has anxiety/depression, panic attacks.  Pt has appt with Psychologist today.)     Thinking about your patient's physical health needs, are there any symptoms or problems (risk indicators) you are unsure about that require further investigation?: No identified areas of uncertainly or problems already being investigated   Are the patients physical health problems impacting on their mental well-being?: Mild impact on mental well-being e.g. \"\"feeling fed-up\"\", \"\"reduced enjoyment\"\"   Are there any problems with your patients lifestyle behaviors (alcohol, drugs, diet, exercise) that are impacting on physical or mental well-being?: Some mild concern of potential negative impact on well-being   Do you have any other concerns about your patients mental well-being? How would you rate their severity and impact on the patient?: Moderate to severe problems that interfere with function   How would you rate their home environment in terms of safety and stability (including domestic violence, insecure housing, neighbor harassment)?: Consistently safe, supportive, stable, no identified problems   How do daily activities impact on the patient's well-being? (include current or anticipated unemployment, work, caregiving, access to transportation or other): No identified problems or perceived positive benefits   How would you rate their social network (family, work, friends)?: Good participation with social networks   How would you rate their financial resources (including ability to afford all required medical care)?: Financially secure, resources adequate, no identified problems   How wells does the patient now understand their health and well-being (symptoms, signs or risk factors) and what they need to do to manage their health?: Reasonable to good understanding and already engages in managing health or is willing to undertake better management   How well do you think your patient can engage in healthcare discussions?  (Barriers include language, deafness, aphasia, alcohol or drug problems, learning difficulties, concentration): Clear and open communication, no identified barriers   Do other services need to be involved to help this patient?: Other care/services in place and adequate   Are current services involved with this patient well-coordinated? (Include coordination with other services you are now recommendation): All required care/services in place and well-coordinated   Suggested Interventions and [address]: Completed (Comment: 3/31/22 Patient has appt with a Psychologist today. )     Meals on Wheels: Declined   Other Services or Interventions: Will mail education regarding HTN, monitoring sodium, reading labels. Medication Assistance Program: Declined   Pharmacist: Declined   Registered Dietician: Declined   Social Work: Declined         Set up/Review an Education Plan, Set up/Review Goals              Prior to Admission medications    Medication Sig Start Date End Date Taking?  Authorizing Provider   sertraline (ZOLOFT) 25 MG tablet Take 1 tablet by mouth daily 3/30/22  Yes Eden Villa DO   hydrOXYzine (ATARAX) 25 MG tablet TAKE 1 TABLET BY MOUTH EVERY 8 HOURS AS NEEDED FOR ITCHING 3/16/22  Yes Historical Provider, MD   promethazine (PHENERGAN) 25 MG tablet Take 1 tablet by mouth every 6 hours as needed for Nausea 3/25/22  Yes Eden Villa DO   losartan (COZAAR) 100 MG tablet 1 QD 3/15/22 9/14/22 Yes David Villa DO   pantoprazole (PROTONIX) 40 MG tablet TAKE 1 TABLET BY MOUTH EVERY DAY 3/15/22  Yes Eden Villa DO   mirtazapine (REMERON) 30 MG tablet TAKE 1 TABLET BY MOUTH AT BEDTIME  Patient taking differently: 15 mg TAKE 1 TABLET BY MOUTH AT BEDTIME 3/3/22 6/3/22 Yes David Villa DO   ibuprofen (ADVIL;MOTRIN) 800 MG tablet Take 1 tablet by mouth 2 times daily as needed for Pain 2/16/22  Yes Eden Villa DO   amLODIPine (NORVASC) 10 MG tablet Take 1 tablet by mouth daily TAKE 1 TABLET BY MOUTH DAILY 1/5/22  Yes Eden Villa DO   hydroCHLOROthiazide (HYDRODIURIL) 12.5 MG tablet Take 1 tablet by mouth daily 10/27/21  Yes Eden Vlila DO   famotidine (PEPCID) 40 MG tablet Take 1 tablet by mouth every evening 9/14/21  Yes Tucker Solano, DO aspirin 81 MG tablet Take 81 mg by mouth daily   Yes Historical Provider, MD   potassium chloride (KLOR-CON M) 20 MEQ extended release tablet Take 1 tablet by mouth 2 times daily 11/4/21   DO Myesha Moody Appointments   Date Time Provider Zelda Siddiqi   4/6/2022  2:15 PM DO KIKA Moody [facility]   10/20/2022  1:30 PM Reese Oneill MD [facility]

## 2022-04-03 ENCOUNTER — HOSPITAL ENCOUNTER (EMERGENCY)
Age: 87
Discharge: HOME | End: 2022-04-03
Attending: EMERGENCY MEDICINE
Payer: MEDICARE

## 2022-04-03 VITALS
HEART RATE: 72 BPM | TEMPERATURE: 97.7 F | OXYGEN SATURATION: 97 % | SYSTOLIC BLOOD PRESSURE: 166 MMHG | RESPIRATION RATE: 16 BRPM | DIASTOLIC BLOOD PRESSURE: 81 MMHG

## 2022-04-03 DIAGNOSIS — N30.00 ACUTE CYSTITIS WITHOUT HEMATURIA: Primary | ICD-10-CM

## 2022-04-03 DIAGNOSIS — R11.0 NAUSEA: ICD-10-CM

## 2022-04-03 LAB
ALBUMIN SERPL-MCNC: 4.2 G/DL (ref 3.5–5.2)
ALP BLD-CCNC: 93 U/L (ref 40–129)
ALT SERPL-CCNC: 5 U/L (ref 0–40)
ANION GAP SERPL CALCULATED.3IONS-SCNC: 13 MMOL/L (ref 7–16)
AST SERPL-CCNC: 14 U/L (ref 0–39)
BACTERIA: ABNORMAL /HPF
BASOPHILS ABSOLUTE: 0.03 E9/L (ref 0–0.2)
BASOPHILS RELATIVE PERCENT: 0.5 % (ref 0–2)
BILIRUB SERPL-MCNC: 0.4 MG/DL (ref 0–1.2)
BILIRUBIN URINE: NEGATIVE
BLOOD, URINE: ABNORMAL
BUN BLDV-MCNC: 8 MG/DL (ref 6–23)
CALCIUM SERPL-MCNC: 9.7 MG/DL (ref 8.6–10.2)
CHLORIDE BLD-SCNC: 98 MMOL/L (ref 98–107)
CLARITY: CLEAR
CO2: 29 MMOL/L (ref 22–29)
COLOR: YELLOW
CREAT SERPL-MCNC: 1 MG/DL (ref 0.7–1.2)
EOSINOPHILS ABSOLUTE: 0.08 E9/L (ref 0.05–0.5)
EOSINOPHILS RELATIVE PERCENT: 1.2 % (ref 0–6)
GFR AFRICAN AMERICAN: >60
GFR NON-AFRICAN AMERICAN: >60 ML/MIN/1.73
GLUCOSE BLD-MCNC: 103 MG/DL (ref 74–99)
GLUCOSE URINE: NEGATIVE MG/DL
HCT VFR BLD CALC: 40.7 % (ref 37–54)
HEMOGLOBIN: 13.7 G/DL (ref 12.5–16.5)
IMMATURE GRANULOCYTES #: 0.02 E9/L
IMMATURE GRANULOCYTES %: 0.3 % (ref 0–5)
KETONES, URINE: NEGATIVE MG/DL
LEUKOCYTE ESTERASE, URINE: ABNORMAL
LIPASE: 18 U/L (ref 13–60)
LYMPHOCYTES ABSOLUTE: 1.5 E9/L (ref 1.5–4)
LYMPHOCYTES RELATIVE PERCENT: 23.2 % (ref 20–42)
MAGNESIUM: 1.7 MG/DL (ref 1.6–2.6)
MCH RBC QN AUTO: 29.8 PG (ref 26–35)
MCHC RBC AUTO-ENTMCNC: 33.7 % (ref 32–34.5)
MCV RBC AUTO: 88.7 FL (ref 80–99.9)
MONOCYTES ABSOLUTE: 0.62 E9/L (ref 0.1–0.95)
MONOCYTES RELATIVE PERCENT: 9.6 % (ref 2–12)
NEUTROPHILS ABSOLUTE: 4.22 E9/L (ref 1.8–7.3)
NEUTROPHILS RELATIVE PERCENT: 65.2 % (ref 43–80)
NITRITE, URINE: NEGATIVE
PDW BLD-RTO: 12.4 FL (ref 11.5–15)
PH UA: 6.5 (ref 5–9)
PLATELET # BLD: 354 E9/L (ref 130–450)
PMV BLD AUTO: 9.2 FL (ref 7–12)
POTASSIUM REFLEX MAGNESIUM: 3.4 MMOL/L (ref 3.5–5)
PROTEIN UA: NEGATIVE MG/DL
RBC # BLD: 4.59 E12/L (ref 3.8–5.8)
RBC UA: ABNORMAL /HPF (ref 0–2)
SODIUM BLD-SCNC: 140 MMOL/L (ref 132–146)
SPECIFIC GRAVITY UA: <=1.005 (ref 1–1.03)
TOTAL PROTEIN: 7.6 G/DL (ref 6.4–8.3)
UROBILINOGEN, URINE: 0.2 E.U./DL
WBC # BLD: 6.5 E9/L (ref 4.5–11.5)
WBC UA: ABNORMAL /HPF (ref 0–5)

## 2022-04-03 PROCEDURE — 85025 COMPLETE CBC W/AUTO DIFF WBC: CPT

## 2022-04-03 PROCEDURE — 36415 COLL VENOUS BLD VENIPUNCTURE: CPT

## 2022-04-03 PROCEDURE — 83690 ASSAY OF LIPASE: CPT

## 2022-04-03 PROCEDURE — 99284 EMERGENCY DEPT VISIT MOD MDM: CPT

## 2022-04-03 PROCEDURE — 6370000000 HC RX 637 (ALT 250 FOR IP): Performed by: EMERGENCY MEDICINE

## 2022-04-03 PROCEDURE — 80053 COMPREHEN METABOLIC PANEL: CPT

## 2022-04-03 PROCEDURE — 93005 ELECTROCARDIOGRAM TRACING: CPT | Performed by: EMERGENCY MEDICINE

## 2022-04-03 PROCEDURE — 83735 ASSAY OF MAGNESIUM: CPT

## 2022-04-03 PROCEDURE — 81001 URINALYSIS AUTO W/SCOPE: CPT

## 2022-04-03 RX ORDER — CEFDINIR 300 MG/1
300 CAPSULE ORAL 2 TIMES DAILY
Qty: 14 CAPSULE | Refills: 0 | Status: SHIPPED | OUTPATIENT
Start: 2022-04-03 | End: 2022-04-06

## 2022-04-03 RX ORDER — CEFDINIR 300 MG/1
300 CAPSULE ORAL ONCE
Status: COMPLETED | OUTPATIENT
Start: 2022-04-03 | End: 2022-04-03

## 2022-04-03 RX ADMIN — CEFDINIR 300 MG: 300 CAPSULE ORAL at 22:00

## 2022-04-04 ENCOUNTER — APPOINTMENT (OUTPATIENT)
Dept: GENERAL RADIOLOGY | Age: 87
End: 2022-04-04
Payer: MEDICARE

## 2022-04-04 ENCOUNTER — APPOINTMENT (OUTPATIENT)
Dept: CT IMAGING | Age: 87
End: 2022-04-04
Payer: MEDICARE

## 2022-04-04 ENCOUNTER — HOSPITAL ENCOUNTER (EMERGENCY)
Age: 87
Discharge: HOME | End: 2022-04-04
Attending: EMERGENCY MEDICINE
Payer: MEDICARE

## 2022-04-04 VITALS
DIASTOLIC BLOOD PRESSURE: 74 MMHG | WEIGHT: 183 LBS | RESPIRATION RATE: 16 BRPM | SYSTOLIC BLOOD PRESSURE: 144 MMHG | OXYGEN SATURATION: 96 % | HEIGHT: 72 IN | TEMPERATURE: 98.5 F | BODY MASS INDEX: 24.79 KG/M2 | HEART RATE: 93 BPM

## 2022-04-04 DIAGNOSIS — R11.0 NAUSEA: ICD-10-CM

## 2022-04-04 DIAGNOSIS — F41.9 ANXIETY: ICD-10-CM

## 2022-04-04 DIAGNOSIS — R51.9 NONINTRACTABLE HEADACHE, UNSPECIFIED CHRONICITY PATTERN, UNSPECIFIED HEADACHE TYPE: Primary | ICD-10-CM

## 2022-04-04 LAB
ALBUMIN SERPL-MCNC: 4.1 G/DL (ref 3.5–5.2)
ALP BLD-CCNC: 93 U/L (ref 40–129)
ALT SERPL-CCNC: 9 U/L (ref 0–40)
ANION GAP SERPL CALCULATED.3IONS-SCNC: 12 MMOL/L (ref 7–16)
AST SERPL-CCNC: 16 U/L (ref 0–39)
BASOPHILS ABSOLUTE: 0.03 E9/L (ref 0–0.2)
BASOPHILS RELATIVE PERCENT: 0.5 % (ref 0–2)
BILIRUB SERPL-MCNC: 0.6 MG/DL (ref 0–1.2)
BUN BLDV-MCNC: 9 MG/DL (ref 6–23)
CALCIUM SERPL-MCNC: 9.7 MG/DL (ref 8.6–10.2)
CHLORIDE BLD-SCNC: 99 MMOL/L (ref 98–107)
CO2: 29 MMOL/L (ref 22–29)
CREAT SERPL-MCNC: 0.9 MG/DL (ref 0.7–1.2)
EKG ATRIAL RATE: 67 BPM
EKG P AXIS: 90 DEGREES
EKG P-R INTERVAL: 174 MS
EKG Q-T INTERVAL: 454 MS
EKG QRS DURATION: 144 MS
EKG QTC CALCULATION (BAZETT): 479 MS
EKG R AXIS: 33 DEGREES
EKG T AXIS: 6 DEGREES
EKG VENTRICULAR RATE: 67 BPM
EOSINOPHILS ABSOLUTE: 0.03 E9/L (ref 0.05–0.5)
EOSINOPHILS RELATIVE PERCENT: 0.5 % (ref 0–6)
GFR AFRICAN AMERICAN: >60
GFR NON-AFRICAN AMERICAN: >60 ML/MIN/1.73
GLUCOSE BLD-MCNC: 96 MG/DL (ref 74–99)
HCT VFR BLD CALC: 38.9 % (ref 37–54)
HEMOGLOBIN: 13.5 G/DL (ref 12.5–16.5)
IMMATURE GRANULOCYTES #: 0.02 E9/L
IMMATURE GRANULOCYTES %: 0.3 % (ref 0–5)
LIPASE: 14 U/L (ref 13–60)
LYMPHOCYTES ABSOLUTE: 1.17 E9/L (ref 1.5–4)
LYMPHOCYTES RELATIVE PERCENT: 18 % (ref 20–42)
MAGNESIUM: 1.6 MG/DL (ref 1.6–2.6)
MCH RBC QN AUTO: 30.5 PG (ref 26–35)
MCHC RBC AUTO-ENTMCNC: 34.7 % (ref 32–34.5)
MCV RBC AUTO: 88 FL (ref 80–99.9)
MONOCYTES ABSOLUTE: 0.58 E9/L (ref 0.1–0.95)
MONOCYTES RELATIVE PERCENT: 8.9 % (ref 2–12)
NEUTROPHILS ABSOLUTE: 4.66 E9/L (ref 1.8–7.3)
NEUTROPHILS RELATIVE PERCENT: 71.8 % (ref 43–80)
PDW BLD-RTO: 12.2 FL (ref 11.5–15)
PLATELET # BLD: 335 E9/L (ref 130–450)
PMV BLD AUTO: 9.4 FL (ref 7–12)
POTASSIUM REFLEX MAGNESIUM: 3.4 MMOL/L (ref 3.5–5)
RBC # BLD: 4.42 E12/L (ref 3.8–5.8)
SODIUM BLD-SCNC: 140 MMOL/L (ref 132–146)
TOTAL PROTEIN: 7.2 G/DL (ref 6.4–8.3)
TROPONIN, HIGH SENSITIVITY: 16 NG/L (ref 0–11)
TROPONIN, HIGH SENSITIVITY: 21 NG/L (ref 0–11)
WBC # BLD: 6.5 E9/L (ref 4.5–11.5)

## 2022-04-04 PROCEDURE — 2580000003 HC RX 258: Performed by: EMERGENCY MEDICINE

## 2022-04-04 PROCEDURE — 70450 CT HEAD/BRAIN W/O DYE: CPT

## 2022-04-04 PROCEDURE — 96361 HYDRATE IV INFUSION ADD-ON: CPT

## 2022-04-04 PROCEDURE — 36415 COLL VENOUS BLD VENIPUNCTURE: CPT

## 2022-04-04 PROCEDURE — 83735 ASSAY OF MAGNESIUM: CPT

## 2022-04-04 PROCEDURE — 96374 THER/PROPH/DIAG INJ IV PUSH: CPT

## 2022-04-04 PROCEDURE — 6360000002 HC RX W HCPCS: Performed by: STUDENT IN AN ORGANIZED HEALTH CARE EDUCATION/TRAINING PROGRAM

## 2022-04-04 PROCEDURE — 83690 ASSAY OF LIPASE: CPT

## 2022-04-04 PROCEDURE — 96372 THER/PROPH/DIAG INJ SC/IM: CPT

## 2022-04-04 PROCEDURE — 6360000002 HC RX W HCPCS: Performed by: EMERGENCY MEDICINE

## 2022-04-04 PROCEDURE — 71045 X-RAY EXAM CHEST 1 VIEW: CPT

## 2022-04-04 PROCEDURE — 80053 COMPREHEN METABOLIC PANEL: CPT

## 2022-04-04 PROCEDURE — 85025 COMPLETE CBC W/AUTO DIFF WBC: CPT

## 2022-04-04 PROCEDURE — 99285 EMERGENCY DEPT VISIT HI MDM: CPT

## 2022-04-04 PROCEDURE — 96375 TX/PRO/DX INJ NEW DRUG ADDON: CPT

## 2022-04-04 PROCEDURE — 6370000000 HC RX 637 (ALT 250 FOR IP): Performed by: EMERGENCY MEDICINE

## 2022-04-04 PROCEDURE — 84484 ASSAY OF TROPONIN QUANT: CPT

## 2022-04-04 PROCEDURE — 93005 ELECTROCARDIOGRAM TRACING: CPT | Performed by: STUDENT IN AN ORGANIZED HEALTH CARE EDUCATION/TRAINING PROGRAM

## 2022-04-04 RX ORDER — ONDANSETRON 4 MG/1
4 TABLET, FILM COATED ORAL 3 TIMES DAILY PRN
Qty: 15 TABLET | Refills: 0 | Status: SHIPPED | OUTPATIENT
Start: 2022-04-04 | End: 2022-04-11

## 2022-04-04 RX ORDER — HYDROXYZINE HYDROCHLORIDE 50 MG/ML
50 INJECTION, SOLUTION INTRAMUSCULAR ONCE
Status: COMPLETED | OUTPATIENT
Start: 2022-04-04 | End: 2022-04-04

## 2022-04-04 RX ORDER — 0.9 % SODIUM CHLORIDE 0.9 %
1000 INTRAVENOUS SOLUTION INTRAVENOUS ONCE
Status: COMPLETED | OUTPATIENT
Start: 2022-04-04 | End: 2022-04-04

## 2022-04-04 RX ORDER — PROCHLORPERAZINE EDISYLATE 5 MG/ML
10 INJECTION INTRAMUSCULAR; INTRAVENOUS ONCE
Status: COMPLETED | OUTPATIENT
Start: 2022-04-04 | End: 2022-04-04

## 2022-04-04 RX ORDER — ACETAMINOPHEN 500 MG
1000 TABLET ORAL ONCE
Status: COMPLETED | OUTPATIENT
Start: 2022-04-04 | End: 2022-04-04

## 2022-04-04 RX ORDER — DIPHENHYDRAMINE HYDROCHLORIDE 50 MG/ML
12.5 INJECTION INTRAMUSCULAR; INTRAVENOUS ONCE
Status: COMPLETED | OUTPATIENT
Start: 2022-04-04 | End: 2022-04-04

## 2022-04-04 RX ADMIN — ACETAMINOPHEN 1000 MG: 500 TABLET ORAL at 19:55

## 2022-04-04 RX ADMIN — DIPHENHYDRAMINE HYDROCHLORIDE 12.5 MG: 50 INJECTION INTRAMUSCULAR; INTRAVENOUS at 18:54

## 2022-04-04 RX ADMIN — HYDROXYZINE HYDROCHLORIDE 50 MG: 50 INJECTION, SOLUTION INTRAMUSCULAR at 20:24

## 2022-04-04 RX ADMIN — PROCHLORPERAZINE EDISYLATE 10 MG: 5 INJECTION INTRAMUSCULAR; INTRAVENOUS at 18:54

## 2022-04-04 RX ADMIN — SODIUM CHLORIDE 1000 ML: 9 INJECTION, SOLUTION INTRAVENOUS at 19:55

## 2022-04-04 ASSESSMENT — PAIN DESCRIPTION - FREQUENCY: FREQUENCY: CONTINUOUS

## 2022-04-04 ASSESSMENT — ENCOUNTER SYMPTOMS
APNEA: 0
EYE REDNESS: 0
SHORTNESS OF BREATH: 0
WHEEZING: 0
SORE THROAT: 0
CONSTIPATION: 0
BACK PAIN: 0
VOMITING: 0
DIARRHEA: 0
CHEST TIGHTNESS: 0
TROUBLE SWALLOWING: 0
NAUSEA: 1
EYE PAIN: 0
ABDOMINAL PAIN: 0
RHINORRHEA: 0
PHOTOPHOBIA: 0
COUGH: 0

## 2022-04-04 ASSESSMENT — PAIN DESCRIPTION - PROGRESSION: CLINICAL_PROGRESSION: GRADUALLY WORSENING

## 2022-04-04 ASSESSMENT — PAIN DESCRIPTION - ONSET: ONSET: ON-GOING

## 2022-04-04 ASSESSMENT — PAIN DESCRIPTION - LOCATION: LOCATION: ABDOMEN;HEAD

## 2022-04-04 ASSESSMENT — PAIN SCALES - GENERAL
PAINLEVEL_OUTOF10: 6
PAINLEVEL_OUTOF10: 8

## 2022-04-04 ASSESSMENT — PAIN DESCRIPTION - PAIN TYPE: TYPE: ACUTE PAIN

## 2022-04-04 ASSESSMENT — PAIN DESCRIPTION - DESCRIPTORS: DESCRIPTORS: ACHING;HEADACHE

## 2022-04-04 NOTE — ED PROVIDER NOTES
[address] ENCOUNTER      Pt Name: Julia Sorto  MRN: [id number]  [place] 1935  Date of evaluation: 4/4/2022      CHIEF COMPLAINT       Chief Complaint   Patient presents with    Abdominal Pain     with anxiety,nausea and headache. Was seen here for this last night but it is worse today. Diagnosed with a UTI. HPI  Julia Sorto is a 81 y.o. male with history of anxiety, hyperlipidemia, hypertension who presents to the emergency department with nausea, headache anxiety. He was evaluated yesterday for this diagnosed with UTI and sent home. He states that he continued to have worsening headache and nausea. States he feels very anxious. He states that he recently was started on Zoloft and feels like this might be contributing to some of his anxiety symptoms. He describes symptoms as mild to moderate, worse with time, nothing makes it better. Review of Systems   Constitutional: Negative for activity change, chills, diaphoresis, fatigue and fever. HENT: Negative for rhinorrhea, sore throat and trouble swallowing. Eyes: Negative for photophobia, pain and redness. Respiratory: Negative for apnea, cough, chest tightness, shortness of breath and wheezing. Cardiovascular: Negative for chest pain, palpitations and leg swelling. Gastrointestinal: Positive for nausea. Negative for abdominal pain, constipation, diarrhea and vomiting. Endocrine: Negative for polyuria. Genitourinary: Negative for difficulty urinating and dysuria. Musculoskeletal: Negative for back pain, neck pain and neck stiffness. Skin: Negative for pallor and rash. Neurological: Positive for headaches. Negative for dizziness, syncope, weakness, light-headedness and numbness. Psychiatric/Behavioral: Negative for confusion. The patient is nervous/anxious. Physical Exam  Vitals and nursing note reviewed.    Constitutional: General: He is not in acute distress. Appearance: He is well-developed. Comments: Awake and alert. Sitting in the gurney in no obvious distress. HENT:      Head: Normocephalic and atraumatic. Mouth/Throat:      Mouth: Mucous membranes are moist.      Pharynx: No oropharyngeal exudate. Eyes:      General: No scleral icterus. Pupils: Pupils are equal, round, and reactive to light. Cardiovascular:      Rate and Rhythm: Normal rate and regular rhythm. Heart sounds: Normal heart sounds. No murmur heard. Comments: 2+ radial and dorsal pedis pulses bilaterally  Pulmonary:      Effort: Pulmonary effort is normal. No respiratory distress. Breath sounds: Normal breath sounds. No wheezing. Abdominal:      Palpations: Abdomen is soft. Tenderness: There is no abdominal tenderness. There is no guarding or rebound. Musculoskeletal:         General: No tenderness or deformity. Normal range of motion. Cervical back: Normal range of motion and neck supple. Right lower leg: No edema. Left lower leg: No edema. Skin:     General: Skin is warm and dry. Capillary Refill: Capillary refill takes less than 2 seconds. Findings: No rash. Neurological:      General: No focal deficit present. Mental Status: He is alert and oriented to person, place, and time. Cranial Nerves: No cranial nerve deficit. Sensory: No sensory deficit. Motor: No weakness or abnormal muscle tone. Psychiatric:         Behavior: Behavior normal.      Comments: Somewhat anxious appearing. Procedures     MDM     This is an 72-year-old male with history of anxiety who presents to the emergency department with anxiety nausea and headache. In the emergency department patient is awake and alert, hemodynamically stable, afebrile and in no respiratory distress. Neurologic exam shows no focal deficits. CT imaging of the head showed no acute intracranial abnormality. Patient recently started on Zoloft and felt like his symptoms started after this. Discussed with him to stop taking the Zoloft until he sees his PCP on Wednesday. Headache improved after supportive therapy in ED. Administered hydroxyzine some improvement in his anxiety. No SI or HI no hallucinations. Metabolic panel showed normal electrolytes normal renal function. No leukocytosis. No signs of underlying bacterial etiology of his symptoms. EKG showed no ischemic changes troponin delta troponin reassuring, patient not complaining of chest pain less likely acute cardiac etiology of his symptoms such as ACS. Discussed plan for discharge home as well as return precautions and plan for close outpatient follow-up and the patient understands and agrees to plan. ED Course as of 04/04/22 2154 Mon Apr 04, 2022 1924 Patient is an 81-year-old male presenting with nausea which he states has been going on for some time but has been exacerbated after being recently started on Zoloft. He states he has been taking the medications without relief. He states he has been having a decreased appetite and also headaches. He said no falls or trauma. No focal neurologic deficits. No fevers, chest pain, abdominal pain, emesis, diarrhea, dysuria, or black or bloody stools. He is not suicidal or homicidal. [JA]   1924 PHYSICAL EXAM:  Vitals Reviewed  Constitutional/General: Alert and oriented x3, well appearing, non toxic in NAD  Head: Normocephalic and atraumatic  Eyes: PERRL, EOMI  Mouth: Oropharynx clear, handling secretions, no trismus  Neck: Supple, full ROM, no nuchal rigidity or meningeal signs  Pulmonary: Lungs clear to auscultation bilaterally, no wheezes, rales, or rhonchi. Not in respiratory distress  Cardiovascular:  Regular rate and rhythm, no murmurs, gallops, or rubs. 2+ distal pulses  Abdomen: Soft, non tender, non distended,   Extremities: Moves all extremities x 4.  Warm and well perfused  Skin: warm and dry without rash  Neurologic: GCS 15, no focal neurologic deficit  Psych: Normal Affect. No SI. No HI.     [JA]   1946 EKG: This EKG is signed and interpreted by me. Rate: 72  Rhythm: Sinus  Interpretation: NSR, normal VA interval, nonspecific intraventricular block, normal QTc  Comparison: stable as compared to patient's most recent EKG   [JA]      ED Course User Index  [JA] Rodrick Almeida MD           ED Course as of 04/04/22 2155 Mon Apr 04, 2022 1924 Patient is an 43-year-old male presenting with nausea which he states has been going on for some time but has been exacerbated after being recently started on Zoloft. He states he has been taking the medications without relief. He states he has been having a decreased appetite and also headaches. He said no falls or trauma. No focal neurologic deficits. No fevers, chest pain, abdominal pain, emesis, diarrhea, dysuria, or black or bloody stools. He is not suicidal or homicidal. [JA]   1924 PHYSICAL EXAM:  Vitals Reviewed  Constitutional/General: Alert and oriented x3, well appearing, non toxic in NAD  Head: Normocephalic and atraumatic  Eyes: PERRL, EOMI  Mouth: Oropharynx clear, handling secretions, no trismus  Neck: Supple, full ROM, no nuchal rigidity or meningeal signs  Pulmonary: Lungs clear to auscultation bilaterally, no wheezes, rales, or rhonchi. Not in respiratory distress  Cardiovascular:  Regular rate and rhythm, no murmurs, gallops, or rubs. 2+ distal pulses  Abdomen: Soft, non tender, non distended,   Extremities: Moves all extremities x 4. Warm and well perfused  Skin: warm and dry without rash  Neurologic: GCS 15, no focal neurologic deficit  Psych: Normal Affect. No SI. No HI.     [JA]   1946 EKG: This EKG is signed and interpreted by me.     Rate: 72  Rhythm: Sinus  Interpretation: NSR, normal VA interval, nonspecific intraventricular block, normal QTc  Comparison: stable as compared to patient's most recent EKG   [JA]      ED Course User Index  [JA] Sally Chavira MD       --------------------------------------------- PAST HISTORY ---------------------------------------------  Past Medical History:  has a past medical history of Aneurysm of right renal artery ([facility] [address] Colitis, Depression, GERD (gastroesophageal reflux disease), H/O: CVA (cerebrovascular accident), Hyperlipidemia, Hypertension, Stroke [facility]), Superior mesenteric artery stenosis ([facility] [address] and TIA (transient ischemic attack). Past Surgical History:  has a past surgical history that includes Appendectomy; Tonsillectomy; ECHO Compl W Dop Color Flow (12/4/2012); sinus surgery (7283,2620); Anterior cruciate ligament repair (Left, 11/21/2001); Cholecystectomy (2007); hernia repair (Right, 11/13/2002); Total knee arthroplasty (Left, 1/28/2019); and Upper gastrointestinal endoscopy (N/A, 10/10/2019). Social History:  reports that he has never smoked. He has never used smokeless tobacco. He reports that he does not drink alcohol and does not use drugs. Family History: family history is not on file. The patients home medications have been reviewed. Allergies: Patient has no known allergies.     -------------------------------------------------- RESULTS -------------------------------------------------  Labs:  Results for orders placed or performed during the hospital encounter of 04/04/22   CBC with Auto Differential   Result Value Ref Range    WBC 6.5 4.5 - 11.5 E9/L    RBC 4.42 3.80 - 5.80 E12/L    Hemoglobin 13.5 12.5 - 16.5 g/dL    Hematocrit 38.9 37.0 - 54.0 %    MCV 88.0 80.0 - 99.9 fL    MCH 30.5 26.0 - 35.0 pg    MCHC 34.7 (H) 32.0 - 34.5 %    RDW 12.2 11.5 - 15.0 fL    Platelets 860 985 - 526 E9/L    MPV 9.4 7.0 - 12.0 fL    Neutrophils % 71.8 43.0 - 80.0 %    Immature Granulocytes % 0.3 0.0 - 5.0 %    Lymphocytes % 18.0 (L) 20.0 - 42.0 %    Monocytes % 8.9 2.0 - 12.0 %    Eosinophils % 0.5 0.0 - 6.0 %    Basophils % 0.5 0.0 - 2.0 %    Neutrophils Absolute 4. 66 1.80 - 7.30 E9/L    Immature Granulocytes # 0.02 E9/L    Lymphocytes Absolute 1.17 (L) 1.50 - 4.00 E9/L    Monocytes Absolute 0.58 0.10 - 0.95 E9/L    Eosinophils Absolute 0.03 (L) 0.05 - 0.50 E9/L    Basophils Absolute 0.03 0.00 - 0.20 E9/L   Comprehensive Metabolic Panel w/ Reflex to MG   Result Value Ref Range    Sodium 140 132 - 146 mmol/L    Potassium reflex Magnesium 3.4 (L) 3.5 - 5.0 mmol/L    Chloride 99 98 - 107 mmol/L    CO2 29 22 - 29 mmol/L    Anion Gap 12 7 - 16 mmol/L    Glucose 96 74 - 99 mg/dL    BUN 9 6 - 23 mg/dL    CREATININE 0.9 0.7 - 1.2 mg/dL    GFR Non-African American >60 >=60 mL/min/1.73    GFR African American >60     Calcium 9.7 8.6 - 10.2 mg/dL    Total Protein 7.2 6.4 - 8.3 g/dL    Albumin 4.1 3.5 - 5.2 g/dL    Total Bilirubin 0.6 0.0 - 1.2 mg/dL    Alkaline Phosphatase 93 40 - 129 U/L    ALT 9 0 - 40 U/L    AST 16 0 - 39 U/L   Lipase   Result Value Ref Range    Lipase 14 13 - 60 U/L   Troponin   Result Value Ref Range    Troponin, High Sensitivity 16 (H) 0 - 11 ng/L   Magnesium   Result Value Ref Range    Magnesium 1.6 1.6 - 2.6 mg/dL   Troponin   Result Value Ref Range    Troponin, High Sensitivity 21 (H) 0 - 11 ng/L   EKG 12 Lead   Result Value Ref Range    Ventricular Rate 69 BPM    Atrial Rate 69 BPM    P-R Interval 180 ms    QRS Duration 140 ms    Q-T Interval 412 ms    QTc Calculation (Bazett) 441 ms    P Axis 109 degrees    R Axis 61 degrees    T Axis 10 degrees       Radiology:  CT Head WO Contrast   Final Result   No acute intracranial abnormality or hemorrhage. Remote changes as noted above. XR CHEST PORTABLE   Final Result   No acute process. ------------------------- NURSING NOTES AND VITALS REVIEWED ---------------------------  Date / Time Roomed:  4/4/2022  5:58 PM  ED Bed Assignment:  12/12    The nursing notes within the ED encounter and vital signs as below have been reviewed.    BP (!) 144/74   Pulse 93   Temp 98.5 °F (36.9 °C) (Temporal) Resp 16   Ht 6' (1.829 m)   Wt 183 lb (83 kg)   SpO2 96%   BMI 24.82 kg/m²   Oxygen Saturation Interpretation: Normal      ------------------------------------------ PROGRESS NOTES ------------------------------------------    I have spoken with the patient and discussed todays results, in addition to providing specific details for the plan of care and counseling regarding the diagnosis and prognosis. Their questions are answered at this time and they are agreeable with the plan. I discussed at length with them reasons for immediate return here for re evaluation. They will followup with their primary care physician by calling their office tomorrow. --------------------------------- ADDITIONAL PROVIDER NOTES ---------------------------------  At this time the patient is without objective evidence of an acute process requiring hospitalization or inpatient management. They have remained hemodynamically stable throughout their entire ED visit and are stable for discharge with outpatient follow-up. The plan has been discussed in detail and they are aware of the specific conditions for emergent return, as well as the importance of follow-up. Discharge Medication List as of 4/4/2022  9:29 PM      START taking these medications    Details   ondansetron (ZOFRAN) 4 MG tablet Take 1 tablet by mouth 3 times daily as needed for Nausea or Vomiting, Disp-15 tablet, R-0Print             Diagnosis:  1. Nonintractable headache, unspecified chronicity pattern, unspecified headache type    2. Anxiety    3. Nausea        Disposition:  Patient's disposition: Discharge to home  Patient's condition is stable.        Braydon Fernandez DO  Resident  04/04/22 4752

## 2022-04-04 NOTE — ED PROVIDER NOTES
HPI:  4/3/22, Time: 9:48 PM EDT        Vero Davis is a 81 y.o. male presenting to the ED for nausea and headache ongoing for the last few days, beginning 2 to 3 days ago. The complaint has been persistent, mild in severity, and worsened by nothing. Patient states he has decreased appetite denies any loss of taste or smell states he just has not felt like eating last few days. He also states he has a mild headache. Denies any falls. States he is still able to maintain oral intake however has overriding sense of nausea. He does take Phenergan for nausea chronically. .      Patient denies fever/chills, sore throat, cough, congestion, chest pain, shortness of breath, edema, visual disturbances, focal paresthesias, focal weakness, abdominal pain,  vomiting, diarrhea, constipation, dysuria, hematuria, trauma, neck or back pain, rash or other complaints. ROS:   A complete review of systems was performed and all pertinent positives and negatives are stated within HPI, all other systems reviewed and are negative.            --------------------------------------------- PAST HISTORY ---------------------------------------------  Past Medical History:  has a past medical history of Aneurysm of right renal artery ([facility] [address] Colitis, Depression, GERD (gastroesophageal reflux disease), H/O: CVA (cerebrovascular accident), Hyperlipidemia, Hypertension, Stroke [facility]), Superior mesenteric artery stenosis ([facility] [address] and TIA (transient ischemic attack). Past Surgical History:  has a past surgical history that includes Appendectomy; Tonsillectomy; ECHO Compl W Dop Color Flow (12/4/2012); sinus surgery ([id number]); Anterior cruciate ligament repair (Left, 11/21/2001); Cholecystectomy (2007); hernia repair (Right, 11/13/2002); Total knee arthroplasty (Left, 1/28/2019); and Upper gastrointestinal endoscopy (N/A, 10/10/2019). Social History:  reports that he has never smoked.  He has never used smokeless tobacco. He reports that he does not drink alcohol and does not use drugs. Family History: family history is not on file. The patients home medications have been reviewed. Allergies: Patient has no known allergies. ----------------------------------------PHYSICAL EXAM--------------------------------------    Constitutional:  Well developed, well nourished, no acute distress, non-toxic appearance   Eyes:  PERRL, conjunctiva normal, EOMI  HENT:  Atraumatic, external ears normal, nose normal, oropharynx moist, no pharyngeal exudates. Neck - normal range of motion, no nuchal rigidity    Respiratory:  No respiratory distress, normal breath sounds, no rales, no wheezing   Cardiovascular:  Normal rate, normal rhythm, no murmurs, no gallops, no rubs. Radial and DP pulses 2+ bilaterally. GI:  Soft, nondistended, normal bowel sounds, nontender, no organomegaly, no mass, no rebound, no guarding   :  No costovertebral angle tenderness. Musculoskeletal:  No edema, no tenderness, no deformities. Back- no tenderness  Integument:  Well hydrated, no rash. Adequate perfusion. Lymphatic:  No cervical lymphadenopathy noted   Neurologic:  Alert & oriented x 3, CN 2-12 normal, normal motor function, normal sensory function, no focal deficits noted. Normal gait. Psychiatric:  Speech and behavior appropriate       -------------------------------------------------- RESULTS -------------------------------------------------  I have personally reviewed all laboratory and imaging results for this patient. Results are listed below.      LABS:  Results for orders placed or performed during the hospital encounter of 04/03/22   CBC with Auto Differential   Result Value Ref Range    WBC 6.5 4.5 - 11.5 E9/L    RBC 4.59 3.80 - 5.80 E12/L    Hemoglobin 13.7 12.5 - 16.5 g/dL    Hematocrit 40.7 37.0 - 54.0 %    MCV 88.7 80.0 - 99.9 fL    MCH 29.8 26.0 - 35.0 pg    MCHC 33.7 32.0 - 34.5 %    RDW 12.4 11.5 - 15.0 fL    Platelets 486 657 - 495 E9/L MPV 9.2 7.0 - 12.0 fL    Neutrophils % 65.2 43.0 - 80.0 %    Immature Granulocytes % 0.3 0.0 - 5.0 %    Lymphocytes % 23.2 20.0 - 42.0 %    Monocytes % 9.6 2.0 - 12.0 %    Eosinophils % 1.2 0.0 - 6.0 %    Basophils % 0.5 0.0 - 2.0 %    Neutrophils Absolute 4.22 1.80 - 7.30 E9/L    Immature Granulocytes # 0.02 E9/L    Lymphocytes Absolute 1.50 1.50 - 4.00 E9/L    Monocytes Absolute 0.62 0.10 - 0.95 E9/L    Eosinophils Absolute 0.08 0.05 - 0.50 E9/L    Basophils Absolute 0.03 0.00 - 0.20 E9/L   Comprehensive Metabolic Panel w/ Reflex to MG   Result Value Ref Range    Sodium 140 132 - 146 mmol/L    Potassium reflex Magnesium 3.4 (L) 3.5 - 5.0 mmol/L    Chloride 98 98 - 107 mmol/L    CO2 29 22 - 29 mmol/L    Anion Gap 13 7 - 16 mmol/L    Glucose 103 (H) 74 - 99 mg/dL    BUN 8 6 - 23 mg/dL    CREATININE 1.0 0.7 - 1.2 mg/dL    GFR Non-African American >60 >=60 mL/min/1.73    GFR African American >60     Calcium 9.7 8.6 - 10.2 mg/dL    Total Protein 7.6 6.4 - 8.3 g/dL    Albumin 4.2 3.5 - 5.2 g/dL    Total Bilirubin 0.4 0.0 - 1.2 mg/dL    Alkaline Phosphatase 93 40 - 129 U/L    ALT 5 0 - 40 U/L    AST 14 0 - 39 U/L   Lipase   Result Value Ref Range    Lipase 18 13 - 60 U/L   Urinalysis with Microscopic   Result Value Ref Range    Color, UA Yellow Straw/Yellow    Clarity, UA Clear Clear    Glucose, Ur Negative Negative mg/dL    Bilirubin Urine Negative Negative    Ketones, Urine Negative Negative mg/dL    Specific Gravity, UA <=1.005 1.005 - 1.030    Blood, Urine TRACE-INTACT Negative    pH, UA 6.5 5.0 - 9.0    Protein, UA Negative Negative mg/dL    Urobilinogen, Urine 0.2 <2.0 E.U./dL    Nitrite, Urine Negative Negative    Leukocyte Esterase, Urine TRACE (A) Negative    WBC, UA 0-1 0 - 5 /HPF    RBC, UA 0-1 0 - 2 /HPF    Bacteria, UA RARE (A) None Seen /HPF   Magnesium   Result Value Ref Range    Magnesium 1.7 1.6 - 2.6 mg/dL   EKG 12 Lead   Result Value Ref Range    Ventricular Rate 67 BPM    Atrial Rate 67 BPM    P-R Interval 174 ms    QRS Duration 144 ms    Q-T Interval 454 ms    QTc Calculation (Bazett) 479 ms    P Axis 90 degrees    R Axis 33 degrees    T Axis 6 degrees       RADIOLOGY:  Interpreted by Radiologist.  No orders to display           EKG Interpretation by Me  Time: 2006  Rhythm: normal sinus   Rate: normal  Axis: normal  Conduction: nonspecific interventricular conduction block  ST Segments: no acute change  T Waves: no acute change  Clinical Impression: no acute changes  Comparison to prior EKG: stable as compared to patient's most recent EKG      ------------------------- NURSING NOTES AND VITALS REVIEWED ---------------------------  The nursing notes within the ED encounter and vital signs as below have been reviewed by myself. BP (!) 166/81   Pulse 72   Temp 97.7 °F (36.5 °C) (Oral)   Resp 16   SpO2 97%   Oxygen Saturation Interpretation: Normal      The patients available past medical records and past encounters were reviewed. ------------------------------ ED COURSE/MEDICAL DECISION MAKING----------------------  Medications   cefdinir (OMNICEF) capsule 300 mg (300 mg Oral Given 4/3/22 2200)          MEDICATIONS  Discharge Medication List as of 4/3/2022  9:46 PM      START taking these medications    Details   cefdinir (OMNICEF) 300 MG capsule Take 1 capsule by mouth 2 times daily for 7 days, Disp-14 capsule, R-0Normal                   Medical Decision Making:    Patient presents emergency department due to nausea and headache ongoing for last 2 weeks. He does have chronic nausea for which he takes Phenergan for at home. He was found to have a urinary tract infection will be treated for this. In addition patient is exam is reassuring. Laboratory evaluation is also reassuring and patient will be given follow-up to his PCP. Patient was explicitly instructed on specific signs and symptoms on which to return to the emergency room for.  Patient was instructed to return to the ER for any new or worsening symptoms. Additional discharge instructions were given verbally. All questions were answered. Patient is comfortable and agreeable with discharge plan. Patient in no acute distress and non-toxic in appearance. This patient's ED course included: re-evaluation prior to disposition, multiple bedside re-evaluations, IV medications, cardiac monitoring, continuous pulse oximetry, complex medical decision making and emergency management and a personal history and physicial eaxmination    This patient has remained hemodynamically stable and been closely monitored during their ED course. Re-Evaluations:  Time: 2040   Re-evaluation. Patients symptoms show no change  Repeat physical examination is not changed          Counseling: The emergency provider has spoken with the patient and discussed todays results, in addition to providing specific details for the plan of care and counseling regarding the diagnosis and prognosis. Questions are answered at this time and they are agreeable with the plan.    --------------------------- IMPRESSION AND DISPOSITION ---------------------------------    IMPRESSION  1. Acute cystitis without hematuria    2.  Nausea        DISPOSITION  Disposition: Discharge to home  Patient condition is stable              Conrado Greenwood DO  Resident  04/05/22 [id number]

## 2022-04-05 LAB
EKG ATRIAL RATE: 72 BPM
EKG P AXIS: 63 DEGREES
EKG P-R INTERVAL: 188 MS
EKG Q-T INTERVAL: 438 MS
EKG QRS DURATION: 144 MS
EKG QTC CALCULATION (BAZETT): 479 MS
EKG R AXIS: 53 DEGREES
EKG T AXIS: 15 DEGREES
EKG VENTRICULAR RATE: 72 BPM

## 2022-04-05 PROCEDURE — 93010 ELECTROCARDIOGRAM REPORT: CPT | Performed by: INTERNAL MEDICINE

## 2022-04-06 ENCOUNTER — OFFICE VISIT (OUTPATIENT)
Dept: PRIMARY CARE CLINIC | Age: 87
End: 2022-04-06
Payer: MEDICARE

## 2022-04-06 VITALS
HEART RATE: 78 BPM | HEIGHT: 72 IN | DIASTOLIC BLOOD PRESSURE: 62 MMHG | SYSTOLIC BLOOD PRESSURE: 136 MMHG | OXYGEN SATURATION: 97 % | WEIGHT: 184 LBS | BODY MASS INDEX: 24.92 KG/M2 | TEMPERATURE: 98.1 F

## 2022-04-06 DIAGNOSIS — I10 ESSENTIAL HYPERTENSION: Chronic | ICD-10-CM

## 2022-04-06 DIAGNOSIS — E87.6 HYPOKALEMIA: ICD-10-CM

## 2022-04-06 DIAGNOSIS — F41.9 ANXIETY: Primary | ICD-10-CM

## 2022-04-06 DIAGNOSIS — R73.01 IMPAIRED FASTING GLUCOSE: ICD-10-CM

## 2022-04-06 DIAGNOSIS — F32.A DEPRESSION, UNSPECIFIED DEPRESSION TYPE: ICD-10-CM

## 2022-04-06 DIAGNOSIS — E78.2 MIXED HYPERLIPIDEMIA: Chronic | ICD-10-CM

## 2022-04-06 PROCEDURE — G8427 DOCREV CUR MEDS BY ELIG CLIN: HCPCS | Performed by: FAMILY MEDICINE

## 2022-04-06 PROCEDURE — 99214 OFFICE O/P EST MOD 30 MIN: CPT | Performed by: FAMILY MEDICINE

## 2022-04-06 PROCEDURE — 4040F PNEUMOC VAC/ADMIN/RCVD: CPT | Performed by: FAMILY MEDICINE

## 2022-04-06 PROCEDURE — 1036F TOBACCO NON-USER: CPT | Performed by: FAMILY MEDICINE

## 2022-04-06 PROCEDURE — 1123F ACP DISCUSS/DSCN MKR DOCD: CPT | Performed by: FAMILY MEDICINE

## 2022-04-06 PROCEDURE — G8420 CALC BMI NORM PARAMETERS: HCPCS | Performed by: FAMILY MEDICINE

## 2022-04-06 RX ORDER — AMLODIPINE BESYLATE 5 MG/1
TABLET ORAL
Qty: 90 TABLET | Refills: 1 | OUTPATIENT
Start: 2022-04-06

## 2022-04-06 ASSESSMENT — ENCOUNTER SYMPTOMS
RESPIRATORY NEGATIVE: 1
EYES NEGATIVE: 1
GASTROINTESTINAL NEGATIVE: 1
ALLERGIC/IMMUNOLOGIC NEGATIVE: 1

## 2022-04-06 NOTE — PROGRESS NOTES
22     Ephriam Pencil    : 1935 Sex: male   Age: 81 y.o. Chief Complaint   Patient presents with    Nausea     was in ER two times     Anxiety    Depression     stopped zoloft last week       Prior to Admission medications    Medication Sig Start Date End Date Taking? Authorizing Provider   ondansetron (ZOFRAN) 4 MG tablet Take 1 tablet by mouth 3 times daily as needed for Nausea or Vomiting 22  Yes Carlus Bowels, DO   hydrOXYzine (ATARAX) 25 MG tablet TAKE 1 TABLET BY MOUTH EVERY 8 HOURS AS NEEDED FOR ITCHING 3/16/22  Yes Historical Provider, MD   promethazine (PHENERGAN) 25 MG tablet Take 1 tablet by mouth every 6 hours as needed for Nausea 3/25/22  Yes Koby Villa DO   losartan (COZAAR) 100 MG tablet 1 QD 3/15/22 9/14/22 Yes David Villa DO   pantoprazole (PROTONIX) 40 MG tablet TAKE 1 TABLET BY MOUTH EVERY DAY 3/15/22  Yes Koby Villa DO   mirtazapine (REMERON) 30 MG tablet TAKE 1 TABLET BY MOUTH AT BEDTIME  Patient taking differently: 15 mg TAKE 1 TABLET BY MOUTH AT BEDTIME 3/3/22 6/3/22 Yes David Villa DO   ibuprofen (ADVIL;MOTRIN) 800 MG tablet Take 1 tablet by mouth 2 times daily as needed for Pain 22  Yes Koby Villa DO   amLODIPine (NORVASC) 10 MG tablet Take 1 tablet by mouth daily TAKE 1 TABLET BY MOUTH DAILY 22  Yes Koby Villa DO   potassium chloride (KLOR-CON M) 20 MEQ extended release tablet Take 1 tablet by mouth 2 times daily 21  Yes Koby Villa DO   hydroCHLOROthiazide (HYDRODIURIL) 12.5 MG tablet Take 1 tablet by mouth daily 10/27/21  Yes Koby Villa DO   famotidine (PEPCID) 40 MG tablet Take 1 tablet by mouth every evening 21  Yes Koby Villa DO   aspirin 81 MG tablet Take 81 mg by mouth daily   Yes Historical Provider, MD          HPI: She is seen today some problems anxiety depression hyperlipidemia impaired fasting glucose hypertension hypokalemia.   Has had 2 emergency room visits since last visit here in the office. Continued problems with extreme anxiety insomnia nausea. All work-up has been negative. We reviewed his medications today and I am recommending stopping Remeron Zoloft potassium and hydrochlorothiazide at this time. Of asked that he bring all medications in for my review and I will make further changes when I see him on Monday or Tuesday of next week. Reviewed labs showing slight potassium decreased but otherwise stable findings. Currently on some cefdinir for questionable urinary tract infection this is not present and discontinued antibiotics. Review of Systems   Constitutional: Negative. HENT: Negative. Eyes: Negative. Respiratory: Negative. Gastrointestinal: Negative. Endocrine: Negative. Genitourinary: Negative. Musculoskeletal: Negative. Skin: Negative. Allergic/Immunologic: Negative. Neurological: Negative. Hematological: Negative. Psychiatric/Behavioral: Negative.                Current Outpatient Medications:     ondansetron (ZOFRAN) 4 MG tablet, Take 1 tablet by mouth 3 times daily as needed for Nausea or Vomiting, Disp: 15 tablet, Rfl: 0    hydrOXYzine (ATARAX) 25 MG tablet, TAKE 1 TABLET BY MOUTH EVERY 8 HOURS AS NEEDED FOR ITCHING, Disp: , Rfl:     promethazine (PHENERGAN) 25 MG tablet, Take 1 tablet by mouth every 6 hours as needed for Nausea, Disp: 30 tablet, Rfl: 1    losartan (COZAAR) 100 MG tablet, 1 QD, Disp: 90 tablet, Rfl: 3    pantoprazole (PROTONIX) 40 MG tablet, TAKE 1 TABLET BY MOUTH EVERY DAY, Disp: 90 tablet, Rfl: 3    mirtazapine (REMERON) 30 MG tablet, TAKE 1 TABLET BY MOUTH AT BEDTIME (Patient taking differently: 15 mg TAKE 1 TABLET BY MOUTH AT BEDTIME), Disp: 90 tablet, Rfl: 0    ibuprofen (ADVIL;MOTRIN) 800 MG tablet, Take 1 tablet by mouth 2 times daily as needed for Pain, Disp: 60 tablet, Rfl: 1    amLODIPine (NORVASC) 10 MG tablet, Take 1 tablet by mouth daily TAKE 1 TABLET BY MOUTH DAILY, Disp: 90 tablet, Rfl: 3    potassium chloride (KLOR-CON M) 20 MEQ extended release tablet, Take 1 tablet by mouth 2 times daily, Disp: 180 tablet, Rfl: 3    hydroCHLOROthiazide (HYDRODIURIL) 12.5 MG tablet, Take 1 tablet by mouth daily, Disp: 90 tablet, Rfl: 2    famotidine (PEPCID) 40 MG tablet, Take 1 tablet by mouth every evening, Disp: 90 tablet, Rfl: 3    aspirin 81 MG tablet, Take 81 mg by mouth daily, Disp: , Rfl:     No Known Allergies    Social History     Tobacco Use    Smoking status: Never Smoker    Smokeless tobacco: Never Used   Vaping Use    Vaping Use: Never used   Substance Use Topics    Alcohol use: No    Drug use: No      Past Surgical History:   Procedure Laterality Date    ANTERIOR CRUCIATE LIGAMENT REPAIR Left 11/21/2001    APPENDECTOMY      CHOLECYSTECTOMY  2007    Lap    ECHO COMPL W DOP COLOR FLOW  12/4/2012         HERNIA REPAIR Right 11/13/2002    double    SINUS SURGERY  2002,2003     done x 2    TONSILLECTOMY      TOTAL KNEE ARTHROPLASTY Left 1/28/2019    LEFT  ROBOTIC KNEE TOTAL ARTHROPLASTY  ++MEREDITH- [id number]++   ++ADDUCTOR BLOCK++ performed by Dse Hinton MD at [address] [place] [address] N/A 10/10/2019    EGD BIOPSY performed by Oneil Hamman, MD at [facility] ENDOSCOPY     No family history on file.   Past Medical History:   Diagnosis Date    Aneurysm of right renal artery (HCC)     follows with Dr. Taras Brice yearly (last visit 9/19)    Colitis     Depression     GERD (gastroesophageal reflux disease)     H/O: CVA (cerebrovascular accident) 10/14/2021    Hyperlipidemia     Hypertension     Stroke [facility])     Superior mesenteric artery stenosis ([facility] [address] 11/4/2020    TIA (transient ischemic attack)        Vitals:    04/06/22 1420   BP: 136/62   Pulse: 78   Temp: 98.1 °F (36.7 °C)   SpO2: 97%   Weight: 184 lb (83.5 kg)   Height: 6' (1.829 m)     BP Readings from Last 3 Encounters:   04/06/22 136/62   04/04/22 (!) 144/74   04/03/22 (!) 166/81 Physical Exam  Vitals and nursing note reviewed. Constitutional:       Appearance: He is well-developed. HENT:      Head: Normocephalic. Right Ear: External ear normal.      Left Ear: External ear normal.      Nose: Nose normal.   Eyes:      Conjunctiva/sclera: Conjunctivae normal.      Pupils: Pupils are equal, round, and reactive to light. Cardiovascular:      Rate and Rhythm: Normal rate. Pulmonary:      Breath sounds: Normal breath sounds. Abdominal:      General: Bowel sounds are normal.      Palpations: Abdomen is soft. Musculoskeletal:         General: Normal range of motion. Cervical back: Normal range of motion and neck supple. Skin:     General: Skin is warm and dry. Neurological:      Mental Status: He is alert and oriented to person, place, and time. Psychiatric:         Behavior: Behavior normal.     Today's vitals and physical examination stable. Heart is controlled lungs are clear. Abdomen totally benign. Medications adjusted again today. Reassessment with me next week and sooner if need be. Plan Per Assessment:  Elif Villafana was seen today for nausea, anxiety and depression. Diagnoses and all orders for this visit:    Anxiety    Depression, unspecified depression type    Mixed hyperlipidemia    Impaired fasting glucose    Essential hypertension    Hypokalemia            Return in about 5 days (around 4/11/2022). Ashby Severance, DO    Note was generated with the assistance of voice recognition software. Document was reviewed however may contain grammatical errors.

## 2022-04-08 ENCOUNTER — HOSPITAL ENCOUNTER (EMERGENCY)
Age: 87
Discharge: HOME | End: 2022-04-08
Attending: EMERGENCY MEDICINE
Payer: MEDICARE

## 2022-04-08 ENCOUNTER — OFFICE VISIT (OUTPATIENT)
Dept: FAMILY MEDICINE CLINIC | Age: 87
End: 2022-04-08
Payer: MEDICARE

## 2022-04-08 VITALS
SYSTOLIC BLOOD PRESSURE: 162 MMHG | WEIGHT: 178 LBS | HEART RATE: 74 BPM | HEIGHT: 72 IN | BODY MASS INDEX: 24.11 KG/M2 | RESPIRATION RATE: 14 BRPM | OXYGEN SATURATION: 98 % | TEMPERATURE: 97.1 F | DIASTOLIC BLOOD PRESSURE: 76 MMHG

## 2022-04-08 VITALS
SYSTOLIC BLOOD PRESSURE: 104 MMHG | DIASTOLIC BLOOD PRESSURE: 68 MMHG | WEIGHT: 184 LBS | OXYGEN SATURATION: 99 % | BODY MASS INDEX: 24.95 KG/M2 | HEART RATE: 81 BPM | TEMPERATURE: 97.1 F

## 2022-04-08 DIAGNOSIS — R11.0 NAUSEA: ICD-10-CM

## 2022-04-08 DIAGNOSIS — R42 DIZZINESS: Primary | ICD-10-CM

## 2022-04-08 DIAGNOSIS — R20.0 FACIAL NUMBNESS: ICD-10-CM

## 2022-04-08 DIAGNOSIS — R11.0 NAUSEA: Primary | ICD-10-CM

## 2022-04-08 DIAGNOSIS — R55 NEAR SYNCOPE: ICD-10-CM

## 2022-04-08 LAB
ALBUMIN SERPL-MCNC: 4.1 G/DL (ref 3.5–5.2)
ALP BLD-CCNC: 92 U/L (ref 40–129)
ALT SERPL-CCNC: 10 U/L (ref 0–40)
ANION GAP SERPL CALCULATED.3IONS-SCNC: 10 MMOL/L (ref 7–16)
AST SERPL-CCNC: 15 U/L (ref 0–39)
BACTERIA: ABNORMAL /HPF
BASOPHILS ABSOLUTE: 0.02 E9/L (ref 0–0.2)
BASOPHILS RELATIVE PERCENT: 0.3 % (ref 0–2)
BILIRUB SERPL-MCNC: 0.6 MG/DL (ref 0–1.2)
BILIRUBIN URINE: NEGATIVE
BLOOD, URINE: ABNORMAL
BUN BLDV-MCNC: 13 MG/DL (ref 6–23)
CALCIUM SERPL-MCNC: 9.2 MG/DL (ref 8.6–10.2)
CHLORIDE BLD-SCNC: 98 MMOL/L (ref 98–107)
CHP ED QC CHECK: NORMAL
CLARITY: CLEAR
CO2: 31 MMOL/L (ref 22–29)
COLOR: YELLOW
CREAT SERPL-MCNC: 0.9 MG/DL (ref 0.7–1.2)
EOSINOPHILS ABSOLUTE: 0.01 E9/L (ref 0.05–0.5)
EOSINOPHILS RELATIVE PERCENT: 0.1 % (ref 0–6)
GFR AFRICAN AMERICAN: >60
GFR NON-AFRICAN AMERICAN: >60 ML/MIN/1.73
GLUCOSE BLD-MCNC: 103 MG/DL (ref 74–99)
GLUCOSE BLD-MCNC: 109 MG/DL
GLUCOSE URINE: NEGATIVE MG/DL
HCT VFR BLD CALC: 39.9 % (ref 37–54)
HEMOGLOBIN: 13.4 G/DL (ref 12.5–16.5)
HYALINE CASTS: ABNORMAL /LPF (ref 0–2)
IMMATURE GRANULOCYTES #: 0.03 E9/L
IMMATURE GRANULOCYTES %: 0.4 % (ref 0–5)
KETONES, URINE: 40 MG/DL
LACTIC ACID: 0.9 MMOL/L (ref 0.5–2.2)
LEUKOCYTE ESTERASE, URINE: ABNORMAL
LIPASE: 17 U/L (ref 13–60)
LYMPHOCYTES ABSOLUTE: 0.86 E9/L (ref 1.5–4)
LYMPHOCYTES RELATIVE PERCENT: 11.8 % (ref 20–42)
MAGNESIUM: 2 MG/DL (ref 1.6–2.6)
MCH RBC QN AUTO: 30.1 PG (ref 26–35)
MCHC RBC AUTO-ENTMCNC: 33.6 % (ref 32–34.5)
MCV RBC AUTO: 89.7 FL (ref 80–99.9)
MONOCYTES ABSOLUTE: 0.49 E9/L (ref 0.1–0.95)
MONOCYTES RELATIVE PERCENT: 6.7 % (ref 2–12)
NEUTROPHILS ABSOLUTE: 5.88 E9/L (ref 1.8–7.3)
NEUTROPHILS RELATIVE PERCENT: 80.7 % (ref 43–80)
NITRITE, URINE: NEGATIVE
PDW BLD-RTO: 12.3 FL (ref 11.5–15)
PH UA: 6 (ref 5–9)
PLATELET # BLD: 336 E9/L (ref 130–450)
PMV BLD AUTO: 9 FL (ref 7–12)
POTASSIUM REFLEX MAGNESIUM: 3.5 MMOL/L (ref 3.5–5)
PROTEIN UA: NEGATIVE MG/DL
RBC # BLD: 4.45 E12/L (ref 3.8–5.8)
RBC UA: ABNORMAL /HPF (ref 0–2)
SODIUM BLD-SCNC: 139 MMOL/L (ref 132–146)
SPECIFIC GRAVITY UA: 1.01 (ref 1–1.03)
TOTAL PROTEIN: 7.3 G/DL (ref 6.4–8.3)
TROPONIN, HIGH SENSITIVITY: 17 NG/L (ref 0–11)
UROBILINOGEN, URINE: 0.2 E.U./DL
WBC # BLD: 7.3 E9/L (ref 4.5–11.5)
WBC UA: ABNORMAL /HPF (ref 0–5)

## 2022-04-08 PROCEDURE — 1123F ACP DISCUSS/DSCN MKR DOCD: CPT | Performed by: PHYSICIAN ASSISTANT

## 2022-04-08 PROCEDURE — 99214 OFFICE O/P EST MOD 30 MIN: CPT | Performed by: PHYSICIAN ASSISTANT

## 2022-04-08 PROCEDURE — G8427 DOCREV CUR MEDS BY ELIG CLIN: HCPCS | Performed by: PHYSICIAN ASSISTANT

## 2022-04-08 PROCEDURE — 93005 ELECTROCARDIOGRAM TRACING: CPT | Performed by: PHYSICIAN ASSISTANT

## 2022-04-08 PROCEDURE — 1036F TOBACCO NON-USER: CPT | Performed by: PHYSICIAN ASSISTANT

## 2022-04-08 PROCEDURE — 80053 COMPREHEN METABOLIC PANEL: CPT

## 2022-04-08 PROCEDURE — 87088 URINE BACTERIA CULTURE: CPT

## 2022-04-08 PROCEDURE — 81001 URINALYSIS AUTO W/SCOPE: CPT

## 2022-04-08 PROCEDURE — G8420 CALC BMI NORM PARAMETERS: HCPCS | Performed by: PHYSICIAN ASSISTANT

## 2022-04-08 PROCEDURE — 83690 ASSAY OF LIPASE: CPT

## 2022-04-08 PROCEDURE — 93000 ELECTROCARDIOGRAM COMPLETE: CPT | Performed by: PHYSICIAN ASSISTANT

## 2022-04-08 PROCEDURE — 83605 ASSAY OF LACTIC ACID: CPT

## 2022-04-08 PROCEDURE — 4040F PNEUMOC VAC/ADMIN/RCVD: CPT | Performed by: PHYSICIAN ASSISTANT

## 2022-04-08 PROCEDURE — 85025 COMPLETE CBC W/AUTO DIFF WBC: CPT

## 2022-04-08 PROCEDURE — 96374 THER/PROPH/DIAG INJ IV PUSH: CPT

## 2022-04-08 PROCEDURE — 84484 ASSAY OF TROPONIN QUANT: CPT

## 2022-04-08 PROCEDURE — 99284 EMERGENCY DEPT VISIT MOD MDM: CPT

## 2022-04-08 PROCEDURE — 82962 GLUCOSE BLOOD TEST: CPT | Performed by: PHYSICIAN ASSISTANT

## 2022-04-08 PROCEDURE — 6360000002 HC RX W HCPCS: Performed by: EMERGENCY MEDICINE

## 2022-04-08 PROCEDURE — 83735 ASSAY OF MAGNESIUM: CPT

## 2022-04-08 RX ORDER — METOCLOPRAMIDE HYDROCHLORIDE 5 MG/ML
10 INJECTION INTRAMUSCULAR; INTRAVENOUS ONCE
Status: COMPLETED | OUTPATIENT
Start: 2022-04-08 | End: 2022-04-08

## 2022-04-08 RX ADMIN — METOCLOPRAMIDE 10 MG: 5 INJECTION, SOLUTION INTRAMUSCULAR; INTRAVENOUS at 14:22

## 2022-04-08 NOTE — ED NOTES
Pt states that he has been having nausea off and on for the past 2 days, he states that his dr has been having him change meds and he feels that is part of the problem, he states that he has not had any emesis, but just feels very nausated, he denies diarrhea, 0 edema to lower legs bilateral. Pedal pulses plus 2      Jose Rater, DARRIUS  04/08/22 2115

## 2022-04-08 NOTE — PROGRESS NOTES
22  Dylon Morales : 1935 Sex: male  Age 81 y.o. Subjective:  Chief Complaint   Patient presents with    Nausea     sx since this am/had ekg  [facility]    Numbness     mouth/recent medication changes by pcp     Dizziness         HPI:   Dylon Morales , 81 y.o. male presents to [facility] for evaluation of nausea, dizziness, numbness    HPI  20-year-old male presents to [facility] for evaluation of near syncope. The patient is here for some near syncopal symptoms, unsteady on his feet, nausea. The patient states that he woke up this morning feeling nauseous. Took a Zofran and Ensure. The patient states that the nausea has not subsided. The patient states that he feels very weak and dizzy. The patient states that he feels like he is going to pass out. The patient was recently in the emergency department couple different times this past week. The patient was diagnosed with a UTI. The patient was then having a headache had work-up at that time that was negative. He saw his PCP and has adjusted some of his medications. He stopped the hydrochlorothiazide, the Remeron at night. The patient is also been told to hold the potassium. The patient is also noted some paresthesias to the right side of his face with a history of CVA back in August.      ROS:   Unless otherwise stated in this report the patient's positive and negative responses for review of systems for constitutional, eyes, ENT, cardiovascular, respiratory, gastrointestinal, neurological, , musculoskeletal, and integument systems and related systems to the presenting problem are either stated in the history of present illness or were not pertinent or were negative for the symptoms and/or complaints related to the presenting medical problem. Positives and pertinent negatives as per HPI. All others reviewed and are negative.       PMH:     Past Medical History:   Diagnosis Date    Aneurysm of right renal artery ([facility] [address]     follows with Dr. Nino Allen yearly (last visit 9/19)    Colitis     Depression     GERD (gastroesophageal reflux disease)     H/O: CVA (cerebrovascular accident) 10/14/2021    Hyperlipidemia     Hypertension     Stroke [facility])     Superior mesenteric artery stenosis ([facility] [address] 11/4/2020    TIA (transient ischemic attack)        Past Surgical History:   Procedure Laterality Date    ANTERIOR CRUCIATE LIGAMENT REPAIR Left 11/21/2001    APPENDECTOMY      CHOLECYSTECTOMY  2007    Lap    ECHO COMPL W DOP COLOR FLOW  12/4/2012         HERNIA REPAIR Right 11/13/2002    double    SINUS SURGERY  2002,2003     done x 2    TONSILLECTOMY      TOTAL KNEE ARTHROPLASTY Left 1/28/2019    LEFT  ROBOTIC KNEE TOTAL ARTHROPLASTY  ++MEREDITH- [id number]++   ++ADDUCTOR BLOCK++ performed by Demetrio Bowling MD at [facility] 10/10/2019    EGD BIOPSY performed by Willie Apley, MD at [address]       History reviewed. No pertinent family history.     Medications:     Current Outpatient Medications:     ondansetron (ZOFRAN) 4 MG tablet, Take 1 tablet by mouth 3 times daily as needed for Nausea or Vomiting, Disp: 15 tablet, Rfl: 0    hydrOXYzine (ATARAX) 25 MG tablet, TAKE 1 TABLET BY MOUTH EVERY 8 HOURS AS NEEDED FOR ITCHING, Disp: , Rfl:     promethazine (PHENERGAN) 25 MG tablet, Take 1 tablet by mouth every 6 hours as needed for Nausea, Disp: 30 tablet, Rfl: 1    losartan (COZAAR) 100 MG tablet, 1 QD, Disp: 90 tablet, Rfl: 3    pantoprazole (PROTONIX) 40 MG tablet, TAKE 1 TABLET BY MOUTH EVERY DAY, Disp: 90 tablet, Rfl: 3    mirtazapine (REMERON) 30 MG tablet, TAKE 1 TABLET BY MOUTH AT BEDTIME (Patient taking differently: 15 mg TAKE 1 TABLET BY MOUTH AT BEDTIME), Disp: 90 tablet, Rfl: 0    ibuprofen (ADVIL;MOTRIN) 800 MG tablet, Take 1 tablet by mouth 2 times daily as needed for Pain, Disp: 60 tablet, Rfl: 1    amLODIPine (NORVASC) 10 MG tablet, Take 1 tablet by mouth daily TAKE 1 TABLET BY MOUTH DAILY, Disp: 90 tablet, Rfl: 3    potassium chloride (KLOR-CON M) 20 MEQ extended release tablet, Take 1 tablet by mouth 2 times daily, Disp: 180 tablet, Rfl: 3    hydroCHLOROthiazide (HYDRODIURIL) 12.5 MG tablet, Take 1 tablet by mouth daily, Disp: 90 tablet, Rfl: 2    famotidine (PEPCID) 40 MG tablet, Take 1 tablet by mouth every evening, Disp: 90 tablet, Rfl: 3    aspirin 81 MG tablet, Take 81 mg by mouth daily, Disp: , Rfl:     Allergies:   No Known Allergies    Social History:     Social History     Tobacco Use    Smoking status: Never Smoker    Smokeless tobacco: Never Used   Vaping Use    Vaping Use: Never used   Substance Use Topics    Alcohol use: No    Drug use: No       Patient lives at home. Physical Exam:     Vitals:    04/08/22 1219   BP: 104/68   Pulse: 81   Temp: 97.1 °F (36.2 °C)   SpO2: 99%   Weight: 184 lb (83.5 kg)       Exam:  Physical Exam  Nurses note and vital signs reviewed and patient is not hypoxic. General: The patient appears well and in no apparent distress. Patient is resting comfortably on cart. Skin: Warm, dry, no pallor noted. There is no rash noted. Head: Normocephalic, atraumatic. Eye: Normal conjunctiva  Ears, Nose, Mouth, and Throat: Oral mucosa is moist  Cardiovascular: Regular Rate and Rhythm  Respiratory: Patient is in no distress, no accessory muscle use, lungs are clear to auscultation, no wheezing, crackles or rhonchi  Back: Non-tender, no CVA tenderness bilaterally to percussion. GI: Normal bowel sounds, no tenderness to palpation, no masses appreciated. No rebound, guarding, or rigidity noted. Musculoskeletal: The patient has no evidence of calf tenderness, no pitting edema, symmetrical pulses noted bilaterally  Neurological: A&O x4, normal speech, slight decrease sensation to the corner of the mouth, there is minimal asymmetry, there is no significant facial paralysis.   Normal equal  strength, normal sensory, normal

## 2022-04-08 NOTE — ED PROVIDER NOTES
HPI:  4/8/22,   Time: 2:13 PM EDT       Natasha Pandey is a 81 y.o. male presenting to the ED for nausea, beginning 1 week ago. The complaint has been intermittent, mild in severity, and worsened by nothing. Seen at [facility] ed x 2 for same, was placed on abx for uti, saw pcp 2 days for same, pcp changed meds, took off abx. Pt felt well until this am, then awoke with nausea. Went to [facility],  sent here for further eval.  Pt took apap and zofran this am.  No cp/abd pain/cough/congestion/constipation/diarrhea/fever/chills/sweats. States other than nausea, no c/o    Review of Systems:   Pertinent positives and negatives are stated within HPI, all other systems reviewed and are negative.          --------------------------------------------- PAST HISTORY ---------------------------------------------  Past Medical History:  has a past medical history of Aneurysm of right renal artery ([facility] [address] Colitis, Depression, GERD (gastroesophageal reflux disease), H/O: CVA (cerebrovascular accident), Hyperlipidemia, Hypertension, Stroke [facility]), Superior mesenteric artery stenosis ([facility] [address] and TIA (transient ischemic attack). Past Surgical History:  has a past surgical history that includes Appendectomy; Tonsillectomy; ECHO Compl W Dop Color Flow (12/4/2012); sinus surgery ([id number]); Anterior cruciate ligament repair (Left, 11/21/2001); Cholecystectomy (2007); hernia repair (Right, 11/13/2002); Total knee arthroplasty (Left, 1/28/2019); and Upper gastrointestinal endoscopy (N/A, 10/10/2019). Social History:  reports that he has never smoked. He has never used smokeless tobacco. He reports that he does not drink alcohol and does not use drugs. Family History: family history is not on file. The patients home medications have been reviewed. Allergies: Patient has no known allergies.         ---------------------------------------------------PHYSICAL EXAM--------------------------------------    Constitutional/General: Alert and oriented x3, well appearing, non toxic in NAD  Head: Normocephalic and atraumatic  Eyes: PERRL, EOMI, conjunctive normal, sclera non icteric  Mouth: Oropharynx clear, handling secretions, no trismus, no asymmetry of the posterior oropharynx or uvular edema  Neck: Supple, full ROM, non tender to palpation in the midline, no stridor, no crepitus, no meningeal signs  Respiratory: Lungs clear to auscultation bilaterally, no wheezes, rales, or rhonchi. Not in respiratory distress  Cardiovascular:  Regular rate. Regular rhythm. No murmurs, gallops, or rubs. 2+ distal pulses  Chest: No chest wall tenderness  GI:  Abdomen Soft, Non tender, Non distended. .   Musculoskeletal: Moves all extremities x 4. Warm and well perfused, no clubbing, cyanosis, or edema. Capillary refill <3 seconds  Integument: skin warm and dry. No rashes. Lymphatic: no lymphadenopathy noted  Neurologic: GCS 15, no focal deficits,   Psychiatric: Normal Affect    -------------------------------------------------- RESULTS -------------------------------------------------  I have personally reviewed all laboratory and imaging results for this patient. Results are listed below.      LABS:  Results for orders placed or performed during the hospital encounter of 04/08/22   CBC with Auto Differential   Result Value Ref Range    WBC 7.3 4.5 - 11.5 E9/L    RBC 4.45 3.80 - 5.80 E12/L    Hemoglobin 13.4 12.5 - 16.5 g/dL    Hematocrit 39.9 37.0 - 54.0 %    MCV 89.7 80.0 - 99.9 fL    MCH 30.1 26.0 - 35.0 pg    MCHC 33.6 32.0 - 34.5 %    RDW 12.3 11.5 - 15.0 fL    Platelets 482 810 - 839 E9/L    MPV 9.0 7.0 - 12.0 fL    Neutrophils % 80.7 (H) 43.0 - 80.0 %    Immature Granulocytes % 0.4 0.0 - 5.0 %    Lymphocytes % 11.8 (L) 20.0 - 42.0 %    Monocytes % 6.7 2.0 - 12.0 %    Eosinophils % 0.1 0.0 - 6.0 %    Basophils % 0.3 0.0 - 2.0 %    Neutrophils Absolute 5.88 1.80 - 7.30 E9/L    Immature Granulocytes # 0.03 E9/L    Lymphocytes Absolute 0.86 (L) 1.50 - 4.00 E9/L    Monocytes Absolute 0.49 0.10 - 0.95 E9/L    Eosinophils Absolute 0.01 (L) 0.05 - 0.50 E9/L    Basophils Absolute 0.02 0.00 - 0.20 E9/L   Comprehensive Metabolic Panel w/ Reflex to MG   Result Value Ref Range    Sodium 139 132 - 146 mmol/L    Potassium reflex Magnesium 3.5 3.5 - 5.0 mmol/L    Chloride 98 98 - 107 mmol/L    CO2 31 (H) 22 - 29 mmol/L    Anion Gap 10 7 - 16 mmol/L    Glucose 103 (H) 74 - 99 mg/dL    BUN 13 6 - 23 mg/dL    CREATININE 0.9 0.7 - 1.2 mg/dL    GFR Non-African American >60 >=60 mL/min/1.73    GFR African American >60     Calcium 9.2 8.6 - 10.2 mg/dL    Total Protein 7.3 6.4 - 8.3 g/dL    Albumin 4.1 3.5 - 5.2 g/dL    Total Bilirubin 0.6 0.0 - 1.2 mg/dL    Alkaline Phosphatase 92 40 - 129 U/L    ALT 10 0 - 40 U/L    AST 15 0 - 39 U/L   Lipase   Result Value Ref Range    Lipase 17 13 - 60 U/L   Lactic Acid   Result Value Ref Range    Lactic Acid 0.9 0.5 - 2.2 mmol/L   Troponin   Result Value Ref Range    Troponin, High Sensitivity 17 (H) 0 - 11 ng/L   Urinalysis   Result Value Ref Range    Color, UA Yellow Straw/Yellow    Clarity, UA Clear Clear    Glucose, Ur Negative Negative mg/dL    Bilirubin Urine Negative Negative    Ketones, Urine 40 (A) Negative mg/dL    Specific Gravity, UA 1.015 1.005 - 1.030    Blood, Urine TRACE-INTACT Negative    pH, UA 6.0 5.0 - 9.0    Protein, UA Negative Negative mg/dL    Urobilinogen, Urine 0.2 <2.0 E.U./dL    Nitrite, Urine Negative Negative    Leukocyte Esterase, Urine TRACE (A) Negative   Magnesium   Result Value Ref Range    Magnesium 2.0 1.6 - 2.6 mg/dL   Microscopic Urinalysis   Result Value Ref Range    Hyaline Casts, UA 0-2 0 - 2 /LPF    WBC, UA 0-1 0 - 5 /HPF    RBC, UA 1-3 0 - 2 /HPF    Bacteria, UA RARE (A) None Seen /HPF   EKG 12 Lead   Result Value Ref Range    Ventricular Rate 63 BPM    Atrial Rate 63 BPM    P-R Interval 160 ms    QRS Duration 132 ms    Q-T Interval 462 ms    QTc Calculation (Bazett) 472 ms    P Axis 72 degrees    R Axis 62 degrees    T Axis 36 degrees       RADIOLOGY:  Interpreted by Radiologist.  No orders to display       EKG: This EKG is signed and interpreted by the EP. Time: 1400  Rate: 65  Rhythm: Sinus  Interpretation: non-specific EKG  Comparison: None      ------------------------- NURSING NOTES AND VITALS REVIEWED ---------------------------   The nursing notes within the ED encounter and vital signs as below have been reviewed by myself. BP (!) 162/76   Pulse 74   Temp 97.1 °F (36.2 °C) (Oral)   Resp 14   Ht 6' (1.829 m)   Wt 178 lb (80.7 kg)   SpO2 98%   BMI 24.14 kg/m²   Oxygen Saturation Interpretation: Normal    The patients available past medical records and past encounters were reviewed. ------------------------------ ED COURSE/MEDICAL DECISION MAKING----------------------  Medications   metoclopramide (REGLAN) injection 10 mg (10 mg IntraVENous Given 4/8/22 1422)         ED COURSE:       Medical Decision Making:    W/u unremarkable, trop at baseline, recurrent hx same. Discussion had with pt, and son. Son believe psych cause of sx, has multi ed visits for same, always has neg w/u. Sx improved in ed. No emesis. Pt and family agreeable with outpt fu      This patient's ED course included: a personal history and physicial examination    This patient has remained hemodynamically stable during their ED course. Re-Evaluations:             Re-evaluation. Patients symptoms are worsening    Re-examination  4/8/22   350 PM EDT          Vital Signs:   Vitals:    04/08/22 1302 04/08/22 1412   BP: 136/88 (!) 162/76   Pulse: 75 74   Resp: 14    Temp: 97 °F (36.1 °C) 97.1 °F (36.2 °C)   TempSrc: Temporal Oral   SpO2: 98%    Weight: 178 lb (80.7 kg)    Height: 6' (1.829 m)            Counseling:    The emergency provider has spoken with the patient and family member patient and son and discussed todays results, in addition to providing specific details for the plan of care and counseling regarding the diagnosis and prognosis. Questions are answered at this time and they are agreeable with the plan.       --------------------------------- IMPRESSION AND DISPOSITION ---------------------------------    IMPRESSION  1. Nausea        DISPOSITION  Disposition: Discharge to home  Patient condition is stable    NOTE: This report was transcribed using voice recognition software.  Every effort was made to ensure accuracy; however, inadvertent computerized transcription errors may be present        Óscar Daley MD  04/08/22 1711

## 2022-04-08 NOTE — ED NOTES
Department of Emergency Medicine    FIRST PROVIDER TRIAGE NOTE             Independent MLP           4/8/22  1:04 PM EDT    Date of Encounter: 4/8/22   MRN: [id number]    Vitals:    04/08/22 1302   BP: 136/88   Pulse: 75   Resp: 14   Temp: 97 °F (36.1 °C)   TempSrc: Temporal   SpO2: 98%   Weight: 178 lb (80.7 kg)   Height: 6' (1.829 m)      HPI: Briseida Crews is a 81 y.o. male who presents to the ED for Nausea (nausea onset Sunday, was seen in [facility] ER at that time, was seen at [facility])    ROS: Negative for cp, sob or fever. Physical Exam:   Gen Appearance/Constitutional: alert  CV: regular rate     Initial Plan of Care: All treatment areas with department are currently occupied. Plan to order/Initiate the following while awaiting opening in ED: labs, EKG and imaging studies.     Initial Plan of Care: Initiate Treatment-Testing, Proceed toTreatment Area When Bed Available for ED Attending/MLP to Continue Care    Electronically signed by Gilford Keller, PA-C   DD: 4/8/22       Gilford Keller, PA-C  04/08/22 7573

## 2022-04-09 LAB
EKG ATRIAL RATE: 63 BPM
EKG P AXIS: 72 DEGREES
EKG P-R INTERVAL: 160 MS
EKG Q-T INTERVAL: 462 MS
EKG QRS DURATION: 132 MS
EKG QTC CALCULATION (BAZETT): 472 MS
EKG R AXIS: 62 DEGREES
EKG T AXIS: 36 DEGREES
EKG VENTRICULAR RATE: 63 BPM

## 2022-04-09 PROCEDURE — 93010 ELECTROCARDIOGRAM REPORT: CPT | Performed by: INTERNAL MEDICINE

## 2022-04-10 LAB — URINE CULTURE, ROUTINE: NORMAL

## 2022-04-11 ENCOUNTER — OFFICE VISIT (OUTPATIENT)
Dept: PRIMARY CARE CLINIC | Age: 87
End: 2022-04-11
Payer: MEDICARE

## 2022-04-11 VITALS
HEART RATE: 72 BPM | HEIGHT: 72 IN | OXYGEN SATURATION: 98 % | SYSTOLIC BLOOD PRESSURE: 126 MMHG | TEMPERATURE: 97 F | DIASTOLIC BLOOD PRESSURE: 70 MMHG | BODY MASS INDEX: 24.14 KG/M2

## 2022-04-11 DIAGNOSIS — I10 ESSENTIAL HYPERTENSION: Chronic | ICD-10-CM

## 2022-04-11 DIAGNOSIS — F41.9 ANXIETY: ICD-10-CM

## 2022-04-11 DIAGNOSIS — K59.00 CONSTIPATION, UNSPECIFIED CONSTIPATION TYPE: Primary | ICD-10-CM

## 2022-04-11 PROCEDURE — 4040F PNEUMOC VAC/ADMIN/RCVD: CPT | Performed by: FAMILY MEDICINE

## 2022-04-11 PROCEDURE — 99214 OFFICE O/P EST MOD 30 MIN: CPT | Performed by: FAMILY MEDICINE

## 2022-04-11 PROCEDURE — 1123F ACP DISCUSS/DSCN MKR DOCD: CPT | Performed by: FAMILY MEDICINE

## 2022-04-11 PROCEDURE — 1036F TOBACCO NON-USER: CPT | Performed by: FAMILY MEDICINE

## 2022-04-11 PROCEDURE — G8427 DOCREV CUR MEDS BY ELIG CLIN: HCPCS | Performed by: FAMILY MEDICINE

## 2022-04-11 PROCEDURE — G8420 CALC BMI NORM PARAMETERS: HCPCS | Performed by: FAMILY MEDICINE

## 2022-04-11 ASSESSMENT — ENCOUNTER SYMPTOMS
EYES NEGATIVE: 1
CONSTIPATION: 1
ALLERGIC/IMMUNOLOGIC NEGATIVE: 1
RESPIRATORY NEGATIVE: 1

## 2022-04-11 NOTE — PROGRESS NOTES
22     Omar Maier    : 1935 Sex: male   Age: 81 y.o. Chief Complaint   Patient presents with    Anxiety    Nausea     went to ER  with nausea and headache    Constipation     headache, used a suppository friday        Prior to Admission medications    Medication Sig Start Date End Date Taking? Authorizing Provider   hydrOXYzine (ATARAX) 25 MG tablet TAKE 1 TABLET BY MOUTH EVERY 8 HOURS AS NEEDED FOR ITCHING 3/16/22  Yes Historical Provider, MD   losartan (COZAAR) 100 MG tablet 1 QD 3/15/22 9/14/22 Yes David Villa,    pantoprazole (PROTONIX) 40 MG tablet TAKE 1 TABLET BY MOUTH EVERY DAY 3/15/22  Yes Carmelo Villa DO   amLODIPine (NORVASC) 10 MG tablet Take 1 tablet by mouth daily TAKE 1 TABLET BY MOUTH DAILY 22  Yes Carmelo Villa DO   famotidine (PEPCID) 40 MG tablet Take 1 tablet by mouth every evening 21  Yes Carmelo Villa DO   aspirin 81 MG tablet Take 81 mg by mouth daily   Yes Historical Provider, MD   ibuprofen (ADVIL;MOTRIN) 800 MG tablet Take 1 tablet by mouth 2 times daily as needed for Pain 22   Quinton Sanabria, DO          HPI: [facility] seen today had repeat visit to the emergency room. Issues of constipation are present so I did recommend some MiraLAX that he may use daily until adequate response and then may use every third day. All medications reviewed and my goal is to reduce as best we can and limit possible interactions. Atarax has been reduced to once a day as needed only. Protonix will continue at 40 daily baby aspirin daily Zofran discontinued losartan 100 daily ibuprofen as needed Pepcid 40 mg daily and amlodipine 10 mg daily. I will reassess him again next week blood work again and then further recommendations. Neurologically he is well. Cardiac status stable. GI appears stable. Constipation is present and treatment as noted. Review of Systems   Constitutional: Negative. HENT: Negative. Eyes: Negative. Respiratory: Negative. Gastrointestinal: Positive for constipation. Endocrine: Negative. Genitourinary: Negative. Musculoskeletal: Negative. Skin: Negative. Allergic/Immunologic: Negative. Neurological: Negative. Hematological: Negative. Psychiatric/Behavioral: Negative. Current Outpatient Medications:     hydrOXYzine (ATARAX) 25 MG tablet, TAKE 1 TABLET BY MOUTH EVERY 8 HOURS AS NEEDED FOR ITCHING, Disp: , Rfl:     losartan (COZAAR) 100 MG tablet, 1 QD, Disp: 90 tablet, Rfl: 3    pantoprazole (PROTONIX) 40 MG tablet, TAKE 1 TABLET BY MOUTH EVERY DAY, Disp: 90 tablet, Rfl: 3    amLODIPine (NORVASC) 10 MG tablet, Take 1 tablet by mouth daily TAKE 1 TABLET BY MOUTH DAILY, Disp: 90 tablet, Rfl: 3    famotidine (PEPCID) 40 MG tablet, Take 1 tablet by mouth every evening, Disp: 90 tablet, Rfl: 3    aspirin 81 MG tablet, Take 81 mg by mouth daily, Disp: , Rfl:     ibuprofen (ADVIL;MOTRIN) 800 MG tablet, Take 1 tablet by mouth 2 times daily as needed for Pain, Disp: 60 tablet, Rfl: 1    No Known Allergies    Social History     Tobacco Use    Smoking status: Never Smoker    Smokeless tobacco: Never Used   Vaping Use    Vaping Use: Never used   Substance Use Topics    Alcohol use: No    Drug use: No      Past Surgical History:   Procedure Laterality Date    ANTERIOR CRUCIATE LIGAMENT REPAIR Left 11/21/2001    APPENDECTOMY      CHOLECYSTECTOMY  2007    Lap    ECHO COMPL W DOP COLOR FLOW  12/4/2012         HERNIA REPAIR Right 11/13/2002    double    SINUS SURGERY  2002,2003     done x 2    TONSILLECTOMY      TOTAL KNEE ARTHROPLASTY Left 1/28/2019    LEFT  ROBOTIC KNEE TOTAL ARTHROPLASTY  ++MEREDITH- EEHUFXWE++   ++ADDUCTOR BLOCK++ performed by Petra Ceballos MD at [address] [place] [address] N/A 10/10/2019    EGD BIOPSY performed by Debbie Anthony MD at [facility] ENDOSCOPY     No family history on file.   Past Medical History:   Diagnosis Date    Aneurysm of right renal artery (HCC)     follows with Dr. Alonso Estrada yearly (last visit 9/19)    Colitis     Depression     GERD (gastroesophageal reflux disease)     H/O: CVA (cerebrovascular accident) 10/14/2021    Hyperlipidemia     Hypertension     Stroke [facility])     Superior mesenteric artery stenosis ([address] 11/4/2020    TIA (transient ischemic attack)        Vitals:    04/11/22 1440   BP: 126/70   Pulse: 72   Temp: 97 °F (36.1 °C)   SpO2: 98%   Height: 6' (1.829 m)     BP Readings from Last 3 Encounters:   04/11/22 126/70   04/08/22 (!) 162/76   04/08/22 104/68        Physical Exam  Vitals and nursing note reviewed. Constitutional:       Appearance: He is well-developed. HENT:      Head: Normocephalic. Right Ear: External ear normal.      Left Ear: External ear normal.      Nose: Nose normal.   Eyes:      Conjunctiva/sclera: Conjunctivae normal.      Pupils: Pupils are equal, round, and reactive to light. Cardiovascular:      Rate and Rhythm: Normal rate. Pulmonary:      Breath sounds: Normal breath sounds. Abdominal:      General: Bowel sounds are normal.      Palpations: Abdomen is soft. Musculoskeletal:         General: Normal range of motion. Cervical back: Normal range of motion and neck supple. Skin:     General: Skin is warm and dry. Neurological:      Mental Status: He is alert and oriented to person, place, and time. Psychiatric:         Behavior: Behavior normal.         physical exam findings are all stable. Heart is controlled lungs are clear. Neurologically stable as noted. Medications as noted and reassess with me next week. Plan Per Assessment:  Andria Issa was seen today for anxiety, nausea and constipation. Diagnoses and all orders for this visit:    Constipation, unspecified constipation type    Essential hypertension    Anxiety            Return in about 1 week (around 4/18/2022).       Jojo Langley DO    Note was generated with the assistance of voice recognition software. Document was reviewed however may contain grammatical errors.

## 2022-04-14 ENCOUNTER — CARE COORDINATION (OUTPATIENT)
Dept: CARE COORDINATION | Age: 87
End: 2022-04-14

## 2022-04-14 NOTE — CARE COORDINATION
Ambulatory Care Coordination Note  4/14/2022  CM Risk Score: 4  Charlson 10 Year Mortality Risk Score: 98%     ACC: Veda Lim RN    Summary Note:   ACM contacted patient to follow up on his status regarding HTN, decreased appetite, anxiety/panic attack Care Coordination.   -Pt reports complete medication compliance.  -Pt reports he received the education but he has not read it yet. -Pt reports she has not exercised / walked lately. He said he may tomorrow.   -Pt reports he did not have a UTI and his PCP stopped the FAN RYLAN.   -Pt reports he is going to meet his family for lunch tomorrow. He said they go ever Friday and he looks forward to it.   -Pt reports he is going to go to his daughters home for Easter. He is looking forward to seeing the family together. Anxiety, panic attacks, depression.  -Pt reports he feels pretty good today except for some constipation.    -Pt reports he had a PCP appt on Monday, 4-. Pt said the Remeron and Zofran was stopped. -Pt said he took a Atarax yesterday, 4/13/22 for anxiety and he never felt any better. So he said he is not going to take any more. -Pt said he has a new counselor named Angel Block at [facility]. Pt said he had his first appt yesterday, 4/13/2022. Pt said he felt if was therapeutic. Next appt 4-. Pt said he may not have worked through his grief feeling of his wife passing. He said he wants to talk with the counselor about it.   -Pt seems to be talking fast as he talks about how he feels. Discussed Pt slowing down his speech and taking his time. He said he does talk to fast when he's panicky and he's glad when he's reminded to slow down. Pt said he will try to talk slower.   -Pt reports he bought a bottle of Melatonin for sleep since the Remeron was stopped. However, he is not going to start the Melatonin until he talks with his PCP.    -Pt reports he has been keeping a journal and he finds it helpful.   -Pt reports he just sat down to read a book. Constipation  -Pt reports constipation is a new issue. He said he normally moves his bowels every morning. The constipation started last week. -Pt said the last time he moved his bowels was last Saturday, 4-9-2022.   -Pt reports he talked with his PCP Monday, 4- and Miralax was prescribed.    -Pt said he has taken the Miralax every morning. Last night he used a glycerin suppository and he only passed two round hard marbles of stool. Pt said he has been passing gas everyday including today.   -Pt reports he abdomen is soft and not distended. He said he does have the urge often but nothing comes out. He does not want to strain too hard.   -Pt reports he is keeping hydrated. -Pt said if he doesn't move his bowels by tomorrow, he is going to call PCP office and let PCP know. Appetite  -Pt reports his appetite has improved and it is more normal.   -Pt reports nausea is gone.   -Pt reports Protonix and Pepcid continue.   -Pt reports he is going to have a TV dinner tonight. HTN  -Pt has a scale and a BP monitor.  -Pt reports he weights himself often. Todays, weight 182 lbs. Pt said a normal weight is 185 lbs for him.   -4- PCP office visit: /70.  -4-6-2022 PCP office visit : /62 & weight 184 lbs.  -Pt reports he is on a low sodium diet. Pt said he does not add salt to his food. .   -Pt reports no cough, SOB or wheezing.   -Pt reports no ankle/feet edema. PLAN:  -Continue Care Coordination  -Counselor appt 9-. -PCP appt 4-  -F/u constipation  -F/u anxiety/panic attacks, depression. -F/u appetite  -F/u on weight  F/u monitor sodium.     -Next anticipated outreach by [address] [place] [address] Team: one weeks.           Care Coordination Interventions    Program Enrollment: Complex Care  Referral from Primary Care Provider: No  Suggested Interventions and [facility] [address] [address]: Completed (Comment: 3/31/22 Patient has appt with a Psychologist today. )  Meals on Wheels: Declined  Medication Assistance Program: Declined  Pharmacist: Declined  Registered Dietician: Sofia High Work: Declined  Other Services or Interventions: Will mail education regarding HTN, monitoring sodium, reading labels. Goals Addressed                 This Visit's Progress     Self Monitoring        Daily Weights - I will weight myself as directed - Daily and write down weights  Blood Pressure - I will take my blood pressure as directed - Daily  I will notify my provider of any trends of increasing or decreasing blood pressures over a month period of time. I will notify my provider of any changes in blood pressure associated with symptoms of dizziness, falls, passing out, headache, confusion/change in mental status. None Recently Recorded    Barriers: lack of education  Plan for overcoming my barriers: Care Coordination  Confidence: 7/10  Anticipated Goal Completion Date: 7- 4- Pt has checking weight daily but no BP. Pt feels his BP monitor is not accurate. Prior to Admission medications    Medication Sig Start Date End Date Taking?  Authorizing Provider   hydrOXYzine (ATARAX) 25 MG tablet TAKE 1 TABLET BY MOUTH EVERY 8 HOURS AS NEEDED FOR ITCHING 3/16/22   Historical Provider, MD   losartan (COZAAR) 100 MG tablet 1 QD 3/15/22 9/14/22  Namrata Villa, DO   pantoprazole (PROTONIX) 40 MG tablet TAKE 1 TABLET BY MOUTH EVERY DAY 3/15/22   Floy Ice, DO   ibuprofen (ADVIL;MOTRIN) 800 MG tablet Take 1 tablet by mouth 2 times daily as needed for Pain 2/16/22   Namrata Villa, DO   amLODIPine (NORVASC) 10 MG tablet Take 1 tablet by mouth daily TAKE 1 TABLET BY MOUTH DAILY 1/5/22   Floy Ice, DO   famotidine (PEPCID) 40 MG tablet Take 1 tablet by mouth every evening 9/14/21   Floy Ice, DO   aspirin 81 MG tablet Take 81 mg by mouth daily    Historical Provider, MD       Future Appointments   Date Time Provider Zelda Siddiqi   4/18/2022  3:15 PM DO KIKA Ramires [facility]   10/20/2022  1:30 PM Jonna Rodrigues MD [facility]

## 2022-04-18 ENCOUNTER — OFFICE VISIT (OUTPATIENT)
Dept: PRIMARY CARE CLINIC | Age: 87
End: 2022-04-18
Payer: MEDICARE

## 2022-04-18 VITALS
TEMPERATURE: 98.3 F | SYSTOLIC BLOOD PRESSURE: 130 MMHG | HEART RATE: 85 BPM | DIASTOLIC BLOOD PRESSURE: 62 MMHG | OXYGEN SATURATION: 97 % | WEIGHT: 186 LBS | BODY MASS INDEX: 25.23 KG/M2

## 2022-04-18 DIAGNOSIS — I10 ESSENTIAL HYPERTENSION: Chronic | ICD-10-CM

## 2022-04-18 DIAGNOSIS — F41.9 ANXIETY: Primary | ICD-10-CM

## 2022-04-18 DIAGNOSIS — F32.A DEPRESSION, UNSPECIFIED DEPRESSION TYPE: ICD-10-CM

## 2022-04-18 DIAGNOSIS — Z86.73 H/O: CVA (CEREBROVASCULAR ACCIDENT): ICD-10-CM

## 2022-04-18 DIAGNOSIS — E78.2 MIXED HYPERLIPIDEMIA: Chronic | ICD-10-CM

## 2022-04-18 PROCEDURE — 1036F TOBACCO NON-USER: CPT | Performed by: FAMILY MEDICINE

## 2022-04-18 PROCEDURE — 4040F PNEUMOC VAC/ADMIN/RCVD: CPT | Performed by: FAMILY MEDICINE

## 2022-04-18 PROCEDURE — G8427 DOCREV CUR MEDS BY ELIG CLIN: HCPCS | Performed by: FAMILY MEDICINE

## 2022-04-18 PROCEDURE — 99214 OFFICE O/P EST MOD 30 MIN: CPT | Performed by: FAMILY MEDICINE

## 2022-04-18 PROCEDURE — 1123F ACP DISCUSS/DSCN MKR DOCD: CPT | Performed by: FAMILY MEDICINE

## 2022-04-18 PROCEDURE — G8417 CALC BMI ABV UP PARAM F/U: HCPCS | Performed by: FAMILY MEDICINE

## 2022-04-18 NOTE — PROGRESS NOTES
22     Aranza Richter    : 1935 Sex: male   Age: 81 y.o. Chief Complaint   Patient presents with    Depression    Anxiety    Nausea       Prior to Admission medications    Medication Sig Start Date End Date Taking? Authorizing Provider   losartan (COZAAR) 100 MG tablet 1 QD 3/15/22 9/14/22 Yes David Villa, DO   pantoprazole (PROTONIX) 40 MG tablet TAKE 1 TABLET BY MOUTH EVERY DAY 3/15/22  Yes Hallie Villa, DO   ibuprofen (ADVIL;MOTRIN) 800 MG tablet Take 1 tablet by mouth 2 times daily as needed for Pain 22  Yes Hallie Villa, DO   amLODIPine (NORVASC) 10 MG tablet Take 1 tablet by mouth daily TAKE 1 TABLET BY MOUTH DAILY 22  Yes Hallie Villa, DO   famotidine (PEPCID) 40 MG tablet Take 1 tablet by mouth every evening 21  Yes Hallie Villa, DO   aspirin 81 MG tablet Take 81 mg by mouth daily   Yes Historical Provider, MD          HPI: Evaluated today with anxiety hypertension hyperlipidemia history of cerebrovascular disease and anxiety depression. Medically he is actually doing very well at this time. Off of all antidepressants and anxiety meds and seems to be improved. We will continue amlodipine Pepcid ibuprofen losartan Protonix and aspirin as prescribed. Reassessment again next 4 weeks. Continue with counseling. Systems review today is stable. Review of Systems   Constitutional: Negative. HENT: Negative. Eyes: Negative. Respiratory: Negative. Gastrointestinal: Negative. Endocrine: Negative. Genitourinary: Negative. Musculoskeletal: Negative. Skin: Negative. Allergic/Immunologic: Negative. Neurological: Negative. Hematological: Negative. Psychiatric/Behavioral: Negative.                Current Outpatient Medications:     losartan (COZAAR) 100 MG tablet, 1 QD, Disp: 90 tablet, Rfl: 3    pantoprazole (PROTONIX) 40 MG tablet, TAKE 1 TABLET BY MOUTH EVERY DAY, Disp: 90 tablet, Rfl: 3    ibuprofen (ADVIL;MOTRIN) 800 MG tablet, Take 1 tablet by mouth 2 times daily as needed for Pain, Disp: 60 tablet, Rfl: 1    amLODIPine (NORVASC) 10 MG tablet, Take 1 tablet by mouth daily TAKE 1 TABLET BY MOUTH DAILY, Disp: 90 tablet, Rfl: 3    famotidine (PEPCID) 40 MG tablet, Take 1 tablet by mouth every evening, Disp: 90 tablet, Rfl: 3    aspirin 81 MG tablet, Take 81 mg by mouth daily, Disp: , Rfl:     No Known Allergies    Social History     Tobacco Use    Smoking status: Never Smoker    Smokeless tobacco: Never Used   Vaping Use    Vaping Use: Never used   Substance Use Topics    Alcohol use: No    Drug use: No      Past Surgical History:   Procedure Laterality Date    ANTERIOR CRUCIATE LIGAMENT REPAIR Left 11/21/2001    APPENDECTOMY      CHOLECYSTECTOMY  2007    Lap    ECHO COMPL W DOP COLOR FLOW  12/4/2012         HERNIA REPAIR Right 11/13/2002    double    SINUS SURGERY  2002,2003     done x 2    TONSILLECTOMY      TOTAL KNEE ARTHROPLASTY Left 1/28/2019    LEFT  ROBOTIC KNEE TOTAL ARTHROPLASTY  ++MEREDITH- [id number]++   ++ADDUCTOR BLOCK++ performed by Ramon Montgomery MD at [place] [id number] N/A 10/10/2019    EGD BIOPSY performed by Rosalia Henriquez MD at [facility] ENDOSCOPY     No family history on file.   Past Medical History:   Diagnosis Date    Aneurysm of right renal artery (HCC)     follows with Dr. Richard Guillen yearly (last visit 9/19)    Colitis     Depression     GERD (gastroesophageal reflux disease)     H/O: CVA (cerebrovascular accident) 10/14/2021    Hyperlipidemia     Hypertension     Stroke [facility])     Superior mesenteric artery stenosis ([facility] [address] 11/4/2020    TIA (transient ischemic attack)        Vitals:    04/18/22 1523   BP: 130/62   Pulse: 85   Temp: 98.3 °F (36.8 °C)   SpO2: 97%   Weight: 186 lb (84.4 kg)     BP Readings from Last 3 Encounters:   04/18/22 130/62   04/11/22 126/70   04/08/22 (!) 162/76    142/70    Physical Exam  Vitals and nursing note reviewed. Constitutional:       Appearance: He is well-developed. HENT:      Head: Normocephalic. Right Ear: External ear normal.      Left Ear: External ear normal.      Nose: Nose normal.   Eyes:      Conjunctiva/sclera: Conjunctivae normal.      Pupils: Pupils are equal, round, and reactive to light. Cardiovascular:      Rate and Rhythm: Normal rate. Pulmonary:      Breath sounds: Normal breath sounds. Abdominal:      General: Bowel sounds are normal.      Palpations: Abdomen is soft. Musculoskeletal:         General: Normal range of motion. Cervical back: Normal range of motion and neck supple. Skin:     General: Skin is warm and dry. Neurological:      Mental Status: He is alert and oriented to person, place, and time. Psychiatric:         Behavior: Behavior normal.     Days vitals physical examination overall stable. Heart is controlled lungs are clear. Medications as prescribed. Reassessment with me 4 weeks and sooner if need be. Plan Per Assessment:  Mary Ellen Ward was seen today for depression, anxiety and nausea. Diagnoses and all orders for this visit:    Anxiety    Essential hypertension    Mixed hyperlipidemia    H/O: CVA (cerebrovascular accident)    Depression, unspecified depression type            Return in about 4 weeks (around 5/16/2022). Edson Cortes DO    Note was generated with the assistance of voice recognition software. Document was reviewed however may contain grammatical errors.

## 2022-04-20 ENCOUNTER — TELEPHONE (OUTPATIENT)
Dept: PRIMARY CARE CLINIC | Age: 87
End: 2022-04-20

## 2022-04-20 ENCOUNTER — OFFICE VISIT (OUTPATIENT)
Dept: PRIMARY CARE CLINIC | Age: 87
End: 2022-04-20
Payer: MEDICARE

## 2022-04-20 VITALS
DIASTOLIC BLOOD PRESSURE: 62 MMHG | OXYGEN SATURATION: 97 % | HEART RATE: 79 BPM | SYSTOLIC BLOOD PRESSURE: 134 MMHG | TEMPERATURE: 97.9 F

## 2022-04-20 DIAGNOSIS — I10 ESSENTIAL HYPERTENSION: Chronic | ICD-10-CM

## 2022-04-20 DIAGNOSIS — F41.9 ANXIETY: Primary | ICD-10-CM

## 2022-04-20 DIAGNOSIS — R11.0 NAUSEA: ICD-10-CM

## 2022-04-20 PROCEDURE — 1123F ACP DISCUSS/DSCN MKR DOCD: CPT | Performed by: FAMILY MEDICINE

## 2022-04-20 PROCEDURE — 1036F TOBACCO NON-USER: CPT | Performed by: FAMILY MEDICINE

## 2022-04-20 PROCEDURE — 4040F PNEUMOC VAC/ADMIN/RCVD: CPT | Performed by: FAMILY MEDICINE

## 2022-04-20 PROCEDURE — G8417 CALC BMI ABV UP PARAM F/U: HCPCS | Performed by: FAMILY MEDICINE

## 2022-04-20 PROCEDURE — G8427 DOCREV CUR MEDS BY ELIG CLIN: HCPCS | Performed by: FAMILY MEDICINE

## 2022-04-20 PROCEDURE — 99214 OFFICE O/P EST MOD 30 MIN: CPT | Performed by: FAMILY MEDICINE

## 2022-04-20 RX ORDER — LORAZEPAM 0.5 MG/1
TABLET ORAL
Qty: 30 TABLET | Refills: 0 | Status: SHIPPED
Start: 2022-04-20 | End: 2022-05-04 | Stop reason: SDUPTHER

## 2022-04-20 NOTE — PROGRESS NOTES
22     Vero Davis    : 1935 Sex: male   Age: 81 y.o. Chief Complaint   Patient presents with    Anxiety     worse he has felt     Nausea       Prior to Admission medications    Medication Sig Start Date End Date Taking? Authorizing Provider   LORazepam (ATIVAN) 0.5 MG tablet 1 q 12 22 Yes David Villa, DO   losartan (COZAAR) 100 MG tablet 1 QD 3/15/22 9/14/22 Yes David Villa, DO   pantoprazole (PROTONIX) 40 MG tablet TAKE 1 TABLET BY MOUTH EVERY DAY 3/15/22  Yes Americo Villa, DO   ibuprofen (ADVIL;MOTRIN) 800 MG tablet Take 1 tablet by mouth 2 times daily as needed for Pain 22  Yes Americo Villa, DO   amLODIPine (NORVASC) 10 MG tablet Take 1 tablet by mouth daily TAKE 1 TABLET BY MOUTH DAILY 22  Yes Americo Villa DO   famotidine (PEPCID) 40 MG tablet Take 1 tablet by mouth every evening 21  Yes Americo Villa DO   aspirin 81 MG tablet Take 81 mg by mouth daily   Yes Historical Provider, MD          HPI: Nisha Guidry presents today for medical follow-up on anxiety nausea hypertension. Anxiety persist.  Extensive discussion today and we are going to try lorazepam 0.5 mg twice a day basis and then reassess here next week. Issues of panic attacks associated nausea generalized anxiety that have not been responsive to serotonin inhibitors. Medication initiated today with instructions use side effects and he is to notify me if any problems. I will recheck him in the next 5 to 7 days. Review of Systems   Constitutional: Negative. HENT: Negative. Eyes: Negative. Respiratory: Negative. Gastrointestinal: Negative. Endocrine: Negative. Genitourinary: Negative. Musculoskeletal: Negative. Skin: Negative. Allergic/Immunologic: Negative. Neurological: Negative. Hematological: Negative. Psychiatric/Behavioral: Negative.                Current Outpatient Medications:     LORazepam (ATIVAN) 0.5 MG tablet, 1 q 12, Disp: 30 tablet, Rfl: 0    losartan (COZAAR) 100 MG tablet, 1 QD, Disp: 90 tablet, Rfl: 3    pantoprazole (PROTONIX) 40 MG tablet, TAKE 1 TABLET BY MOUTH EVERY DAY, Disp: 90 tablet, Rfl: 3    ibuprofen (ADVIL;MOTRIN) 800 MG tablet, Take 1 tablet by mouth 2 times daily as needed for Pain, Disp: 60 tablet, Rfl: 1    amLODIPine (NORVASC) 10 MG tablet, Take 1 tablet by mouth daily TAKE 1 TABLET BY MOUTH DAILY, Disp: 90 tablet, Rfl: 3    famotidine (PEPCID) 40 MG tablet, Take 1 tablet by mouth every evening, Disp: 90 tablet, Rfl: 3    aspirin 81 MG tablet, Take 81 mg by mouth daily, Disp: , Rfl:     No Known Allergies    Social History     Tobacco Use    Smoking status: Never Smoker    Smokeless tobacco: Never Used   Vaping Use    Vaping Use: Never used   Substance Use Topics    Alcohol use: No    Drug use: No      Past Surgical History:   Procedure Laterality Date    ANTERIOR CRUCIATE LIGAMENT REPAIR Left 11/21/2001    APPENDECTOMY      CHOLECYSTECTOMY  2007    Lap    ECHO COMPL W DOP COLOR FLOW  12/4/2012         HERNIA REPAIR Right 11/13/2002    double    SINUS SURGERY  2002,2003     done x 2    TONSILLECTOMY      TOTAL KNEE ARTHROPLASTY Left 1/28/2019    LEFT  ROBOTIC KNEE TOTAL ARTHROPLASTY  ++MEREDITH- [id number]++   ++ADDUCTOR BLOCK++ performed by Marleny Ventura MD at [address] [place] [address] N/A 10/10/2019    EGD BIOPSY performed by Isa Smith MD at [facility] ENDOSCOPY     No family history on file.   Past Medical History:   Diagnosis Date    Aneurysm of right renal artery (HCC)     follows with Dr. 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 Constitutional:       Appearance: He is well-developed. HENT:      Head: Normocephalic. Right Ear: External ear normal.      Left Ear: External ear normal.      Nose: Nose normal.   Eyes:      Conjunctiva/sclera: Conjunctivae normal.      Pupils: Pupils are equal, round, and reactive to light. Cardiovascular:      Rate and Rhythm: Normal rate. Pulmonary:      Breath sounds: Normal breath sounds. Abdominal:      General: Bowel sounds are normal.      Palpations: Abdomen is soft. Musculoskeletal:         General: Normal range of motion. Cervical back: Normal range of motion and neck supple. Skin:     General: Skin is warm and dry. Neurological:      Mental Status: He is alert and oriented to person, place, and time. Psychiatric:         Behavior: Behavior normal.        Systems review stable as noted. Physical examination stable as noted extensive problems with anxiety have been present and we had discussion today. Medication prescribed with instructions on use and caution with driving until comfortable on the medication. I will reassess him next week. Plan Per Assessment:  Demarcus Rivers was seen today for anxiety and nausea. Diagnoses and all orders for this visit:    Anxiety  -     LORazepam (ATIVAN) 0.5 MG tablet; 1 q 12    Nausea  -     LORazepam (ATIVAN) 0.5 MG tablet; 1 q 12    Essential hypertension            Return in about 5 days (around 4/25/2022). Katy Saini DO    Note was generated with the assistance of voice recognition software. Document was reviewed however may contain grammatical errors.

## 2022-04-20 NOTE — TELEPHONE ENCOUNTER
Pt calling stating he feels his anxiety is getting a little worse and asking if you can give him med.

## 2022-04-25 ENCOUNTER — OFFICE VISIT (OUTPATIENT)
Dept: PRIMARY CARE CLINIC | Age: 87
End: 2022-04-25
Payer: MEDICARE

## 2022-04-25 VITALS
DIASTOLIC BLOOD PRESSURE: 70 MMHG | OXYGEN SATURATION: 96 % | HEART RATE: 73 BPM | HEIGHT: 72 IN | BODY MASS INDEX: 25.23 KG/M2 | SYSTOLIC BLOOD PRESSURE: 128 MMHG | TEMPERATURE: 97.3 F

## 2022-04-25 DIAGNOSIS — R11.0 NAUSEA: ICD-10-CM

## 2022-04-25 DIAGNOSIS — R63.0 DECREASED APPETITE: ICD-10-CM

## 2022-04-25 DIAGNOSIS — F41.9 ANXIETY: Primary | ICD-10-CM

## 2022-04-25 PROCEDURE — 1036F TOBACCO NON-USER: CPT | Performed by: FAMILY MEDICINE

## 2022-04-25 PROCEDURE — G8427 DOCREV CUR MEDS BY ELIG CLIN: HCPCS | Performed by: FAMILY MEDICINE

## 2022-04-25 PROCEDURE — 99214 OFFICE O/P EST MOD 30 MIN: CPT | Performed by: FAMILY MEDICINE

## 2022-04-25 PROCEDURE — 1123F ACP DISCUSS/DSCN MKR DOCD: CPT | Performed by: FAMILY MEDICINE

## 2022-04-25 PROCEDURE — G8417 CALC BMI ABV UP PARAM F/U: HCPCS | Performed by: FAMILY MEDICINE

## 2022-04-25 PROCEDURE — 4040F PNEUMOC VAC/ADMIN/RCVD: CPT | Performed by: FAMILY MEDICINE

## 2022-04-25 RX ORDER — DEXLANSOPRAZOLE 60 MG/1
60 CAPSULE, DELAYED RELEASE ORAL DAILY
Qty: 30 CAPSULE | Refills: 1 | Status: SHIPPED
Start: 2022-04-25 | End: 2022-05-24

## 2022-04-25 RX ORDER — FLUOXETINE 10 MG/1
10 CAPSULE ORAL DAILY
Qty: 30 CAPSULE | Refills: 3 | Status: SHIPPED
Start: 2022-04-25 | End: 2022-05-24

## 2022-04-25 NOTE — PROGRESS NOTES
22     Rosa Sevilla    : 1935 Sex: male   Age: 81 y.o. Chief Complaint   Patient presents with    Anxiety       Prior to Admission medications    Medication Sig Start Date End Date Taking? Authorizing Provider   FLUoxetine (PROZAC) 10 MG capsule Take 1 capsule by mouth daily 22  Yes Severo Villa DO   dexlansoprazole (DEXILANT) 60 MG CPDR delayed release capsule Take 1 capsule by mouth daily 22  Yes Severo Villa DO   LORazepam (ATIVAN) 0.5 MG tablet 1 q 12 22 Yes David Villa DO   losartan (COZAAR) 100 MG tablet 1 QD 3/15/22 9/14/22 Yes Severo Villa DO   ibuprofen (ADVIL;MOTRIN) 800 MG tablet Take 1 tablet by mouth 2 times daily as needed for Pain 22  Yes Severo Villa DO   amLODIPine (NORVASC) 10 MG tablet Take 1 tablet by mouth daily TAKE 1 TABLET BY MOUTH DAILY 22  Yes Severo Villa DO   famotidine (PEPCID) 40 MG tablet Take 1 tablet by mouth every evening 21  Yes Severo Villa DO   aspirin 81 MG tablet Take 81 mg by mouth daily   Yes Historical Provider, MD          HPI: Patient evaluated today problems with hypertension reflux disease stomach upset and associated nausea. Persistent complaints of nausea. Recommended a change from Protonix to Dexilant 60 mg once a day and then will reassess again next 2 weeks. Anxiety depression persist.  Continue with Ativan twice a day dosing and the addition of Prozac today. Reassess next 2 weeks. Instructed in use and side effects. Notify me if problems. Review of Systems   Constitutional: Negative. HENT: Negative. Eyes: Negative. Respiratory: Negative. Gastrointestinal: Negative. Endocrine: Negative. Genitourinary: Negative. Musculoskeletal: Negative. Skin: Negative. Allergic/Immunologic: Negative. Neurological: Negative. Hematological: Negative. Psychiatric/Behavioral: Negative.        Today systems review is overall stable medications as prescribed. Current Outpatient Medications:     FLUoxetine (PROZAC) 10 MG capsule, Take 1 capsule by mouth daily, Disp: 30 capsule, Rfl: 3    dexlansoprazole (DEXILANT) 60 MG CPDR delayed release capsule, Take 1 capsule by mouth daily, Disp: 30 capsule, Rfl: 1    LORazepam (ATIVAN) 0.5 MG tablet, 1 q 12, Disp: 30 tablet, Rfl: 0    losartan (COZAAR) 100 MG tablet, 1 QD, Disp: 90 tablet, Rfl: 3    ibuprofen (ADVIL;MOTRIN) 800 MG tablet, Take 1 tablet by mouth 2 times daily as needed for Pain, Disp: 60 tablet, Rfl: 1    amLODIPine (NORVASC) 10 MG tablet, Take 1 tablet by mouth daily TAKE 1 TABLET BY MOUTH DAILY, Disp: 90 tablet, Rfl: 3    famotidine (PEPCID) 40 MG tablet, Take 1 tablet by mouth every evening, Disp: 90 tablet, Rfl: 3    aspirin 81 MG tablet, Take 81 mg by mouth daily, Disp: , Rfl:     No Known Allergies    Social History     Tobacco Use    Smoking status: Never Smoker    Smokeless tobacco: Never Used   Vaping Use    Vaping Use: Never used   Substance Use Topics    Alcohol use: No    Drug use: No      Past Surgical History:   Procedure Laterality Date    ANTERIOR CRUCIATE LIGAMENT REPAIR Left 11/21/2001    APPENDECTOMY      CHOLECYSTECTOMY  2007    Lap    ECHO COMPL W DOP COLOR FLOW  12/4/2012         HERNIA REPAIR Right 11/13/2002    double    SINUS SURGERY  2002,2003     done x 2    TONSILLECTOMY      TOTAL KNEE ARTHROPLASTY Left 1/28/2019    LEFT  ROBOTIC KNEE TOTAL ARTHROPLASTY  ++MEREDITH- [id number]++   ++ADDUCTOR BLOCK++ performed by Manju Gupta MD at [address] N/A 10/10/2019    EGD BIOPSY performed by Crista Arreguin MD at [facility] ENDOSCOPY     No family history on file.   Past Medical History:   Diagnosis Date    Aneurysm of right renal artery (HCC)     follows with Dr. Leonardo Jordan yearly (last visit 9/19)    Colitis     Depression     GERD (gastroesophageal reflux disease)     H/O: CVA (cerebrovascular accident) 10/14/2021    Hyperlipidemia     Hypertension     Stroke [facility])     Superior mesenteric artery stenosis ([address] 11/4/2020    TIA (transient ischemic attack)        Vitals:    04/25/22 1406   BP: 128/70   Pulse: 73   Temp: 97.3 °F (36.3 °C)   SpO2: 96%   Height: 6' (1.829 m)     BP Readings from Last 3 Encounters:   04/25/22 128/70   04/20/22 134/62   04/18/22 130/62        Physical Exam  Vitals and nursing note reviewed. Constitutional:       Appearance: He is well-developed. HENT:      Head: Normocephalic. Right Ear: External ear normal.      Left Ear: External ear normal.      Nose: Nose normal.   Eyes:      Conjunctiva/sclera: Conjunctivae normal.      Pupils: Pupils are equal, round, and reactive to light. Cardiovascular:      Rate and Rhythm: Normal rate. Pulmonary:      Breath sounds: Normal breath sounds. Abdominal:      General: Bowel sounds are normal.      Palpations: Abdomen is soft. Musculoskeletal:         General: Normal range of motion. Cervical back: Normal range of motion and neck supple. Skin:     General: Skin is warm and dry. Neurological:      Mental Status: He is alert and oriented to person, place, and time. Psychiatric:         Behavior: Behavior normal.     Today's vitals physical examination stable. Heart is controlled lungs are clear. Medications as prescribed. Reassessment with me next 2 weeks sooner if problems. Adjustments made today. Abdomen is benign. Physically otherwise he does look well. Anxiety has been persistent and hopefully we can see a response with current med changes. Plan Per Assessment:  Britney Chawla was seen today for anxiety. Diagnoses and all orders for this visit:    Anxiety    Decreased appetite    Nausea    Other orders  -     FLUoxetine (PROZAC) 10 MG capsule; Take 1 capsule by mouth daily  -     dexlansoprazole (DEXILANT) 60 MG CPDR delayed release capsule;  Take 1 capsule by mouth daily            Return in about 2 weeks (around 5/9/2022). Catia Shen DO    Note was generated with the assistance of voice recognition software. Document was reviewed however may contain grammatical errors.

## 2022-04-28 ENCOUNTER — CARE COORDINATION (OUTPATIENT)
Dept: CARE COORDINATION | Age: 87
End: 2022-04-28

## 2022-04-28 NOTE — CARE COORDINATION
Ambulatory Care Coordination Note  4/28/2022  CM Risk Score: 4  Charlson 10 Year Mortality Risk Score: 98%     ACC: Can Dill, RN    Summary Note:   ACM contacted patient to follow up on his status regarding HTN, anxiety/depression, decrease appetite.  -Pt reports complete medication compliance.  -Pt reports he walks around the block weather permitting.   -Pt reports he is going to meet his family for lunch tomorrow. He said they go every Friday and he looks forward to it.   -Pt reports Easter at his daughters was wonderful. He said there were 14 family members there and it was great.   -Pt reports constipation is gone. Anxiety, panic attacks, depression.  -Pt reports he feels fair today. He said he was off all Psych medication. He had increase anxiety and PCP started Ativan. 5 days later at PCP follow up appt, anxiety persistent, Prozac was added along with the Ativan. Upon discussion, Pt said he stopped the Ativan but today Pt remembers PCP stating add on a medication. Pt said he stopped taking the Ativan Monday and started Prozac. Pt said he will restart Ativan tonight along with the Prozac. Will inform PCP. -Pt said he googled the medications.   -[facility] appt today with Jodi Goldberg at 1pm.  Pt is glad he has an appt today. Next appt 5-.   -Discussed trying to relax and taking his time. Pt said he will try.   -Pt reports he has been taking Melatonin for about a week including last night with no relief. Pt said he will no longer take the Melatonin.    -Pt reports journaling continues and helps. -Pt reports reading continues. He just started a new book. Appetite  -Pt reports his appetite and nausea comes and goes. However, Pt said he had a cup of peppermint tea, toast and citrus fruit for breakfast today. ACM said that was a fine breakfast & Pt agreed.   -Pt reports Protonix was stopped.  Dominick Must was started and Pepcid continue.      HTN  -Pt has a scale and a BP monitor.  -Pt said a normal weight is 185 lbs for him. Pt reports he weights himself often.    -4-1125-2022 PCP office visit: /70.  -4- PCP office visit : /62 & weight 186 lbs. -Pt reports he is on a low sodium diet. Pt said he does not add salt to his food. .   -Pt reports no cough, SOB or wheezing.   -Pt reports left foot a little swollen.        PLAN:  -Continue Care Coordination  -Counselor appt 4-. -PCP appt 5-  -F/u anxiety/panic attacks, depression. -F/u appetite  -F/u on weight  F/u monitor sodium. -F/u left foot swollen    -Next anticipated outreach by [address] [place] [address] Team: one weeks. Care Coordination Interventions    Program Enrollment: Complex Care  Referral from Primary Care Provider: No  Suggested Interventions and [address] [place] [address]: Completed (Comment: 3/31/22 Patient has appt with a Psychologist today. )  Meals on Wheels: Declined  Medication Assistance Program: Declined  Pharmacist: Declined  Registered Dietician: Declined  Social Work: Declined  Other Services or Interventions: Will mail education regarding HTN, monitoring sodium, reading labels. Goals Addressed                 This Visit's Progress     Self Monitoring   Improving     Daily Weights - I will weight myself as directed - Daily and write down weights  Blood Pressure - I will take my blood pressure as directed - Daily  I will notify my provider of any trends of increasing or decreasing blood pressures over a month period of time. I will notify my provider of any changes in blood pressure associated with symptoms of dizziness, falls, passing out, headache, confusion/change in mental status. None Recently Recorded    Barriers: lack of education  Plan for overcoming my barriers: Care Coordination  Confidence: 7/10  Anticipated Goal Completion Date: 7- 4- Pt has checking weight daily but no BP. Pt feels his BP monitor is not accurate.    4- Pt continues monitoring weight. Prior to Admission medications    Medication Sig Start Date End Date Taking?  Authorizing Provider   FLUoxetine (PROZAC) 10 MG capsule Take 1 capsule by mouth daily 4/25/22   [facility] DO   dexlansoprazole [facility]) 60 MG CPDR delayed release capsule Take 1 capsule by mouth daily 4/25/22   [facility], DO   LORazepam (ATIVAN) 0.5 MG tablet 1 q 12 4/20/22 5/4/22  [facility], DO   losartan (COZAAR) 100 MG tablet 1 QD 3/15/22 9/14/22  [facility], DO   ibuprofen (ADVIL;MOTRIN) 800 MG tablet Take 1 tablet by mouth 2 times daily as needed for Pain 2/16/22   Varun Villa, DO   amLODIPine (NORVASC) 10 MG tablet Take 1 tablet by mouth daily TAKE 1 TABLET BY MOUTH DAILY 1/5/22   [facility], DO   famotidine (PEPCID) 40 MG tablet Take 1 tablet by mouth every evening 9/14/21   [facility], DO   aspirin 81 MG tablet Take 81 mg by mouth daily    Historical Provider, MD       Future Appointments   Date Time Provider Zelda Siddiqi   5/10/2022  3:00 PM DO KIKA [facility]   5/18/2022  3:45 PM DO KIKA [facility]   10/20/2022  1:30 PM Marcelo Felipe MD [facility]

## 2022-04-29 NOTE — CARE COORDINATION
-ACM spoke to patient regarding messaging PCP. -Pt reports he took the Ativan and Pepcid in the evening. He said he slept from 11p to 6am. This was a better sleep than he has had lately. Pt understands he is to continue the Ativan and Prozac as prescribed.   -Pt said he was sipping on ice last night and he became hungry. He had rice pudding, john doone cookies and a cup of tea before bed.   -Pt reports he feels better today than he has lately.   -Pt reports left foot swelling is less this morning. Discussed Pt keeping feet elevated when sitting as needed. Pt said he understands and will do. -Pt reports he will see PCP if symptoms persist.   Plan: Will outreach on scheduled date.

## 2022-05-04 DIAGNOSIS — F41.9 ANXIETY: ICD-10-CM

## 2022-05-04 DIAGNOSIS — R11.0 NAUSEA: ICD-10-CM

## 2022-05-04 RX ORDER — LORAZEPAM 0.5 MG/1
TABLET ORAL
Qty: 30 TABLET | Refills: 0 | Status: SHIPPED
Start: 2022-05-04 | End: 2022-05-10 | Stop reason: SDUPTHER

## 2022-05-05 ENCOUNTER — OFFICE VISIT (OUTPATIENT)
Dept: FAMILY MEDICINE CLINIC | Age: 87
End: 2022-05-05
Payer: MEDICARE

## 2022-05-05 VITALS
WEIGHT: 179 LBS | DIASTOLIC BLOOD PRESSURE: 66 MMHG | SYSTOLIC BLOOD PRESSURE: 120 MMHG | HEART RATE: 70 BPM | TEMPERATURE: 97.2 F | OXYGEN SATURATION: 97 % | BODY MASS INDEX: 24.24 KG/M2 | HEIGHT: 72 IN

## 2022-05-05 DIAGNOSIS — Z86.73 H/O: CVA (CEREBROVASCULAR ACCIDENT): ICD-10-CM

## 2022-05-05 DIAGNOSIS — F41.9 ANXIETY: ICD-10-CM

## 2022-05-05 DIAGNOSIS — F51.01 PRIMARY INSOMNIA: ICD-10-CM

## 2022-05-05 DIAGNOSIS — F32.A DEPRESSION, UNSPECIFIED DEPRESSION TYPE: Primary | ICD-10-CM

## 2022-05-05 PROCEDURE — 1123F ACP DISCUSS/DSCN MKR DOCD: CPT | Performed by: FAMILY MEDICINE

## 2022-05-05 PROCEDURE — G8427 DOCREV CUR MEDS BY ELIG CLIN: HCPCS | Performed by: FAMILY MEDICINE

## 2022-05-05 PROCEDURE — 4040F PNEUMOC VAC/ADMIN/RCVD: CPT | Performed by: FAMILY MEDICINE

## 2022-05-05 PROCEDURE — 1036F TOBACCO NON-USER: CPT | Performed by: FAMILY MEDICINE

## 2022-05-05 PROCEDURE — G8420 CALC BMI NORM PARAMETERS: HCPCS | Performed by: FAMILY MEDICINE

## 2022-05-05 PROCEDURE — 99214 OFFICE O/P EST MOD 30 MIN: CPT | Performed by: FAMILY MEDICINE

## 2022-05-05 RX ORDER — MIRTAZAPINE 15 MG/1
15 TABLET, FILM COATED ORAL NIGHTLY
Qty: 30 TABLET | Refills: 3 | Status: SHIPPED
Start: 2022-05-05 | End: 2022-06-10 | Stop reason: SDUPTHER

## 2022-05-05 ASSESSMENT — ENCOUNTER SYMPTOMS
NAUSEA: 1
ALLERGIC/IMMUNOLOGIC NEGATIVE: 1
EYES NEGATIVE: 1
RESPIRATORY NEGATIVE: 1

## 2022-05-05 NOTE — PROGRESS NOTES
22     Gisela Raines    : 1935 Sex: male   Age: 81 y.o. Chief Complaint   Patient presents with    Insomnia     unable to sleep    Medication Check     meds not working       Prior to Admission medications    Medication Sig Start Date End Date Taking? Authorizing Provider   mirtazapine (REMERON) 15 MG tablet Take 1 tablet by mouth nightly 22  Yes Alva Villa, DO   LORazepam (ATIVAN) 0.5 MG tablet 1 q 12 22 Yes David Villa, DO   FLUoxetine (PROZAC) 10 MG capsule Take 1 capsule by mouth daily 22  Yes Alva Villa, DO   dexlansoprazole (DEXILANT) 60 MG CPDR delayed release capsule Take 1 capsule by mouth daily 22  Yes Alva Villa, DO   losartan (COZAAR) 100 MG tablet 1 QD 3/15/22 9/14/22 Yes Alva Villa, DO   ibuprofen (ADVIL;MOTRIN) 800 MG tablet Take 1 tablet by mouth 2 times daily as needed for Pain 22  Yes Alva Villa DO   amLODIPine (NORVASC) 10 MG tablet Take 1 tablet by mouth daily TAKE 1 TABLET BY MOUTH DAILY 22  Yes Alva Villa DO   famotidine (PEPCID) 40 MG tablet Take 1 tablet by mouth every evening 21  Yes Alva Villa, DO   aspirin 81 MG tablet Take 81 mg by mouth daily   Yes Historical Provider, MD          HPI: Steve Severe  presents today in follow-up depression anxiety CVA history primary insomnia. Medications again reviewed and I encouraged him to take as prescribed. Sleep is becoming a problem and reinitiated Remeron 15 mg at at bedtime and will discuss further next week when he returns. All meds to be brought in for review at next visit. Review of Systems   Constitutional: Positive for fatigue. HENT: Negative. Eyes: Negative. Respiratory: Negative. Gastrointestinal: Positive for nausea. Endocrine: Negative. Genitourinary: Negative. Musculoskeletal: Negative. Skin: Negative. Allergic/Immunologic: Negative. Neurological: Negative. Hematological: Negative. Psychiatric/Behavioral: Positive for sleep disturbance. The patient is nervous/anxious. Current Outpatient Medications:     mirtazapine (REMERON) 15 MG tablet, Take 1 tablet by mouth nightly, Disp: 30 tablet, Rfl: 3    LORazepam (ATIVAN) 0.5 MG tablet, 1 q 12, Disp: 30 tablet, Rfl: 0    FLUoxetine (PROZAC) 10 MG capsule, Take 1 capsule by mouth daily, Disp: 30 capsule, Rfl: 3    dexlansoprazole (DEXILANT) 60 MG CPDR delayed release capsule, Take 1 capsule by mouth daily, Disp: 30 capsule, Rfl: 1    losartan (COZAAR) 100 MG tablet, 1 QD, Disp: 90 tablet, Rfl: 3    ibuprofen (ADVIL;MOTRIN) 800 MG tablet, Take 1 tablet by mouth 2 times daily as needed for Pain, Disp: 60 tablet, Rfl: 1    amLODIPine (NORVASC) 10 MG tablet, Take 1 tablet by mouth daily TAKE 1 TABLET BY MOUTH DAILY, Disp: 90 tablet, Rfl: 3    famotidine (PEPCID) 40 MG tablet, Take 1 tablet by mouth every evening, Disp: 90 tablet, Rfl: 3    aspirin 81 MG tablet, Take 81 mg by mouth daily, Disp: , Rfl:     No Known Allergies    Social History     Tobacco Use    Smoking status: Never Smoker    Smokeless tobacco: Never Used   Vaping Use    Vaping Use: Never used   Substance Use Topics    Alcohol use: No    Drug use: No      Past Surgical History:   Procedure Laterality Date    ANTERIOR CRUCIATE LIGAMENT REPAIR Left 11/21/2001    APPENDECTOMY      CHOLECYSTECTOMY  2007    Lap    ECHO COMPL W DOP COLOR FLOW  12/4/2012         HERNIA REPAIR Right 11/13/2002    double    SINUS SURGERY  2002,2003     done x 2    TONSILLECTOMY      TOTAL KNEE ARTHROPLASTY Left 1/28/2019    LEFT  ROBOTIC KNEE TOTAL ARTHROPLASTY  ++MEREDITH- FIXOHWAC++   ++ADDUCTOR BLOCK++ performed by Jaida Rae MD at [address] [place] [address] N/A 10/10/2019    EGD BIOPSY performed by Evelyn Johnston MD at [facility] ENDOSCOPY     No family history on file.   Past Medical History:   Diagnosis Date    Aneurysm of right renal artery ([facility] [address] follows with Dr. 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 Constitutional:       Appearance: He is well-developed. HENT:      Head: Normocephalic. Right Ear: External ear normal.      Left Ear: External ear normal.      Nose: Nose normal.   Eyes:      Conjunctiva/sclera: Conjunctivae normal.      Pupils: Pupils are equal, round, and reactive to light. Cardiovascular:      Rate and Rhythm: Normal rate. Pulmonary:      Breath sounds: Normal breath sounds. Abdominal:      General: Bowel sounds are normal.      Palpations: Abdomen is soft. Musculoskeletal:         General: Normal range of motion. Cervical back: Normal range of motion and neck supple. Skin:     General: Skin is warm and dry. Neurological:      Mental Status: He is alert and oriented to person, place, and time. Psychiatric:         Behavior: Behavior normal.     Today's physical exam is overall stable. Heart was regular lungs are clear. Neurologically is intact. All medications discussed but he is unclear on what he is actually taking. I have asked that he bring all meds so I can review with him and assure proper compliance. Continue the Prozac at 10 mg a day and the addition of Remeron at at bedtime today. Lorazepam to be maintained at twice a day and will continue to monitor and follow-up. Dexilant was prescribed and cost prohibitive.   When he returns we will consider alternative proton pump inhibitor. Feliz Mckay out current med and dosing. Plan Per Assessment:  Britney Chawla was seen today for insomnia and medication check. Diagnoses and all orders for this visit:    Depression, unspecified depression type    Anxiety    H/O: CVA (cerebrovascular accident)    Primary insomnia    Other orders  -     mirtazapine (REMERON) 15 MG tablet; Take 1 tablet by mouth nightly            No follow-ups on file. Bridgette Pitch, DO    Note was generated with the assistance of voice recognition software. Document was reviewed however may contain grammatical errors.

## 2022-05-10 ENCOUNTER — OFFICE VISIT (OUTPATIENT)
Dept: PRIMARY CARE CLINIC | Age: 87
End: 2022-05-10
Payer: MEDICARE

## 2022-05-10 VITALS
HEART RATE: 78 BPM | OXYGEN SATURATION: 97 % | WEIGHT: 184 LBS | SYSTOLIC BLOOD PRESSURE: 130 MMHG | TEMPERATURE: 98.7 F | DIASTOLIC BLOOD PRESSURE: 60 MMHG | BODY MASS INDEX: 24.95 KG/M2

## 2022-05-10 DIAGNOSIS — R35.1 BPH ASSOCIATED WITH NOCTURIA: ICD-10-CM

## 2022-05-10 DIAGNOSIS — F32.A DEPRESSION, UNSPECIFIED DEPRESSION TYPE: ICD-10-CM

## 2022-05-10 DIAGNOSIS — I10 ESSENTIAL HYPERTENSION: Primary | Chronic | ICD-10-CM

## 2022-05-10 DIAGNOSIS — N40.1 BPH ASSOCIATED WITH NOCTURIA: ICD-10-CM

## 2022-05-10 DIAGNOSIS — R11.0 NAUSEA: ICD-10-CM

## 2022-05-10 DIAGNOSIS — F41.9 ANXIETY: ICD-10-CM

## 2022-05-10 PROCEDURE — 1036F TOBACCO NON-USER: CPT | Performed by: FAMILY MEDICINE

## 2022-05-10 PROCEDURE — 4040F PNEUMOC VAC/ADMIN/RCVD: CPT | Performed by: FAMILY MEDICINE

## 2022-05-10 PROCEDURE — 99214 OFFICE O/P EST MOD 30 MIN: CPT | Performed by: FAMILY MEDICINE

## 2022-05-10 PROCEDURE — G8427 DOCREV CUR MEDS BY ELIG CLIN: HCPCS | Performed by: FAMILY MEDICINE

## 2022-05-10 PROCEDURE — 1123F ACP DISCUSS/DSCN MKR DOCD: CPT | Performed by: FAMILY MEDICINE

## 2022-05-10 PROCEDURE — G8420 CALC BMI NORM PARAMETERS: HCPCS | Performed by: FAMILY MEDICINE

## 2022-05-10 RX ORDER — TAMSULOSIN HYDROCHLORIDE 0.4 MG/1
0.4 CAPSULE ORAL DAILY
Qty: 30 CAPSULE | Refills: 0 | Status: SHIPPED
Start: 2022-05-10 | End: 2022-05-10

## 2022-05-10 RX ORDER — LORAZEPAM 0.5 MG/1
TABLET ORAL
Qty: 60 TABLET | Refills: 0 | Status: SHIPPED | OUTPATIENT
Start: 2022-05-10 | End: 2022-06-01 | Stop reason: SDUPTHER

## 2022-05-10 RX ORDER — TAMSULOSIN HYDROCHLORIDE 0.4 MG/1
0.4 CAPSULE ORAL DAILY
Qty: 90 CAPSULE | Refills: 0 | Status: SHIPPED
Start: 2022-05-10 | End: 2022-05-18

## 2022-05-10 NOTE — PROGRESS NOTES
5/10/22     Madan Arora    : 1935 Sex: male   Age: 81 y.o. Chief Complaint   Patient presents with    Anxiety    Depression    Hypertension       Prior to Admission medications    Medication Sig Start Date End Date Taking? Authorizing Provider   LORazepam (ATIVAN) 0.5 MG tablet 1 q 12 5/10/22 5/24/22 Yes Arron Villa DO   tamsulosin (FLOMAX) 0.4 MG capsule Take 1 capsule by mouth daily 5/10/22  Yes Arron Villa DO   mirtazapine (REMERON) 15 MG tablet Take 1 tablet by mouth nightly 22  Yes Arron Villa DO   FLUoxetine (PROZAC) 10 MG capsule Take 1 capsule by mouth daily 22  Yes Arron Villa DO   losartan (COZAAR) 100 MG tablet 1 QD 3/15/22 9/14/22 Yes David Villa DO   amLODIPine (NORVASC) 10 MG tablet Take 1 tablet by mouth daily TAKE 1 TABLET BY MOUTH DAILY 22  Yes Arron Villa DO   famotidine (PEPCID) 40 MG tablet Take 1 tablet by mouth every evening 21  Yes Arron Villa DO   aspirin 81 MG tablet Take 81 mg by mouth daily   Yes Historical Provider, MD   dexlansoprazole (DEXILANT) 60 MG CPDR delayed release capsule Take 1 capsule by mouth daily  Patient not taking: Reported on 5/10/2022 4/25/22   Gar Alert, DO          HPI: Bernice Renteria was seen today and actually improving. Nausea improved feeling wellbeing improved. Medications reviewed and we are going to continue as prescribed. Some issues of nocturia and difficulty sleeping with frequent urination at night. Recommending some Flomax and then will reassess with next visit. Remainder meds reviewed maintain as prescribed. Review of Systems   Constitutional: Negative. HENT: Negative. Eyes: Negative. Respiratory: Negative. Gastrointestinal: Negative. Endocrine: Negative. Genitourinary: Negative. Musculoskeletal: Negative. Skin: Negative. Allergic/Immunologic: Negative. Neurological: Negative. Hematological: Negative. Psychiatric/Behavioral: Negative. Him review is stable. Anxiety controlled. Meds as prescribed. Current Outpatient Medications:     LORazepam (ATIVAN) 0.5 MG tablet, 1 q 12, Disp: 60 tablet, Rfl: 0    tamsulosin (FLOMAX) 0.4 MG capsule, Take 1 capsule by mouth daily, Disp: 30 capsule, Rfl: 0    mirtazapine (REMERON) 15 MG tablet, Take 1 tablet by mouth nightly, Disp: 30 tablet, Rfl: 3    FLUoxetine (PROZAC) 10 MG capsule, Take 1 capsule by mouth daily, Disp: 30 capsule, Rfl: 3    losartan (COZAAR) 100 MG tablet, 1 QD, Disp: 90 tablet, Rfl: 3    amLODIPine (NORVASC) 10 MG tablet, Take 1 tablet by mouth daily TAKE 1 TABLET BY MOUTH DAILY, Disp: 90 tablet, Rfl: 3    famotidine (PEPCID) 40 MG tablet, Take 1 tablet by mouth every evening, Disp: 90 tablet, Rfl: 3    aspirin 81 MG tablet, Take 81 mg by mouth daily, Disp: , Rfl:     dexlansoprazole (DEXILANT) 60 MG CPDR delayed release capsule, Take 1 capsule by mouth daily (Patient not taking: Reported on 5/10/2022), Disp: 30 capsule, Rfl: 1    No Known Allergies    Social History     Tobacco Use    Smoking status: Never Smoker    Smokeless tobacco: Never Used   Vaping Use    Vaping Use: Never used   Substance Use Topics    Alcohol use: No    Drug use: No      Past Surgical History:   Procedure Laterality Date    ANTERIOR CRUCIATE LIGAMENT REPAIR Left 11/21/2001    APPENDECTOMY      CHOLECYSTECTOMY  2007    Lap    ECHO COMPL W DOP COLOR FLOW  12/4/2012         HERNIA REPAIR Right 11/13/2002    double    SINUS SURGERY  2002,2003     done x 2    TONSILLECTOMY      TOTAL KNEE ARTHROPLASTY Left 1/28/2019    LEFT  ROBOTIC KNEE TOTAL ARTHROPLASTY  ++MEREDITH- [id number]++   ++ADDUCTOR BLOCK++ performed by Tony Ervin MD at [address] N/A 10/10/2019    EGD BIOPSY performed by Scout Nava MD at [facility] ENDOSCOPY     No family history on file.   Past Medical History:   Diagnosis Date    Aneurysm of right renal artery ([facility] [address]     follows with Dr. Patterson Few yearly (last visit 9/19)    Colitis     Depression     GERD (gastroesophageal reflux disease)     H/O: CVA (cerebrovascular accident) 10/14/2021    Hyperlipidemia     Hypertension     Stroke [facility])     [facility]ent[facility] 11/4/2020    TIA (transient ischemic attack)        Vitals:    05/10/22 1437   BP: 130/60   Pulse: 78   Temp: 98.7 °F (37.1 °C)   SpO2: 97%   Weight: 184 lb (83.5 kg)     BP Readings from Last 3 Encounters:   05/10/22 130/60   05/05/22 120/66   04/25/22 128/70        Physical Exam  Vitals and nursing note reviewed. Constitutional:       Appearance: He is well-developed. HENT:      Head: Normocephalic. Right Ear: External ear normal.      Left Ear: External ear normal.      Nose: Nose normal.   Eyes:      Conjunctiva/sclera: Conjunctivae normal.      Pupils: Pupils are equal, round, and reactive to light. Cardiovascular:      Rate and Rhythm: Normal rate. Pulmonary:      Breath sounds: Normal breath sounds. Abdominal:      General: Bowel sounds are normal.      Palpations: Abdomen is soft. Musculoskeletal:         General: Normal range of motion. Cervical back: Normal range of motion and neck supple. Skin:     General: Skin is warm and dry. Neurological:      Mental Status: He is alert and oriented to person, place, and time. Psychiatric:         Behavior: Behavior normal.     Vitals physical examination overall stable. Heart is controlled lungs are clear. I will sit tight with current medications and care. Reassessment 18th May and sooner if need be. Plan Per Assessment:  Sandra Lou was seen today for anxiety, depression and hypertension.     Diagnoses and all orders for this visit:    Essential hypertension    Anxiety  -     LORazepam (ATIVAN) 0.5 MG tablet; 1 q 12    Nausea  -     LORazepam (ATIVAN) 0.5 MG tablet; 1 q 12    Depression, unspecified depression type    BPH associated with nocturia    Other orders  -     tamsulosin (FLOMAX) 0.4 MG capsule; Take 1 capsule by mouth daily            No follow-ups on file. Shaylee Vásquez,     Note was generated with the assistance of voice recognition software. Document was reviewed however may contain grammatical errors.

## 2022-05-12 ENCOUNTER — CARE COORDINATION (OUTPATIENT)
Dept: CARE COORDINATION | Age: 87
End: 2022-05-12

## 2022-05-12 NOTE — CARE COORDINATION
Ambulatory Care Coordination Note  5/12/2022  CM Risk Score: 4  Charlson 10 Year Mortality Risk Score: 98%     ACC: Yao Flaherty RN    Summary Note:   ACM contacted patient to follow up on his status regarding HTN, anxiety/depression, decrease appetite for Care Coordination. .  -Pt reports complete medication compliance.  -Pt reports he intermittently walks around the block weather permitting.   -Pt reports he is going to meet his family for lunch tomorrow. He said they go every Friday and he looks forward to this meeting.   -Pt reports he is going to take his motorcycle out for a ride to [address] [place] today. Anxiety, panic attacks, depression.  -Pt reports he feels better today. He said he thinks PCP has found the right medication combination. Pt is taking Ativan, Prozac and Remeron. Pt said Remeron is for sleep.   -[facility] continues with Adri Brown. Next appts are 5/23/22 & 6/6/22.     -Pt reports journaling continues and helps. -Pt reports reading continues. Appetite  -Pt reports his appetite has improved. Nausea is gone.   -Pt reports Lory Armor continues but is very expensive. Pt said he is probably going back on Protonix next PCP visit. HTN  -Pt has a scale and BP monitor.  -Pt said a normal weight is 185 lbs for him.   -Today's weight 183 lbs.   -5- PCP office visit: /60. & weight 184 lbs.   -Pt reports he is on a low sodium diet. Pt said he does not add salt to his food. .   -Pt reports no cough, SOB or wheezing.   -Pt reports no ankle/feet edema.      Resources  -Urology appt 5- with Dr Yuri Gooden. Pt said he needs a PSA lab test before this visit. Pt said PCP started Flomax. Pt started Flomax yesterday, 5-. PLAN:  -Continue Care Coordination  -Counselor appt 5-. -PCP appt 5-  -Urology appt 5-  -F/u anxiety/panic attacks, depression.   -F/u appetite  -F/u on weight  F/u monitor sodium.     -Next anticipated outreach by [facility]: 2 weeks. Care Coordination Interventions    Program Enrollment: Complex Care  Referral from Primary Care Provider: No  Suggested Interventions and [facility]: Completed (Comment: 3/31/22 Patient has appt with a Psychologist today. )  Meals on Wheels: Declined  Medication Assistance Program: Declined  Pharmacist: Declined  Registered Dietician: Declined  Social Work: Declined  Other Services or Interventions: Will mail education regarding HTN, monitoring sodium, reading labels. Goals Addressed                 This Visit's Progress     Self Monitoring   Improving     Daily Weights - I will weight myself as directed - Daily and write down weights  Blood Pressure - I will take my blood pressure as directed - Daily  I will notify my provider of any trends of increasing or decreasing blood pressures over a month period of time. I will notify my provider of any changes in blood pressure associated with symptoms of dizziness, falls, passing out, headache, confusion/change in mental status. None Recently Recorded    Barriers: lack of education  Plan for overcoming my barriers: Care Coordination  Confidence: 7/10  Anticipated Goal Completion Date: 7- 4- Pt has checking weight daily but no BP. Pt feels his BP monitor is not accurate. 4- Pt continues monitoring weight. 5- Daily weights continue. Prior to Admission medications    Medication Sig Start Date End Date Taking?  Authorizing Provider   LORazepam (ATIVAN) 0.5 MG tablet 1 q 12 5/10/22 5/24/22  Gui Diamond, DO   tamsulosin (FLOMAX) 0.4 MG capsule TAKE 1 CAPSULE BY MOUTH DAILY 5/10/22   Gui Diamond, DO   mirtazapine (REMERON) 15 MG tablet Take 1 tablet by mouth nightly 5/5/22   Gui Diamond, DO   FLUoxetine (PROZAC) 10 MG capsule Take 1 capsule by mouth daily 4/25/22   Cinthia Villa, DO   dexlansoprazole (DEXILANT) 60 MG CPDR delayed release capsule Take 1 capsule by mouth daily  Patient not taking: Reported on 5/10/2022 4/25/22   Dali Villa DO   losartan (COZAAR) 100 MG tablet 1 QD 3/15/22 9/14/22  Dali Villa,    amLODIPine (NORVASC) 10 MG tablet Take 1 tablet by mouth daily TAKE 1 TABLET BY MOUTH DAILY 1/5/22   Silvano Connor DO   famotidine (PEPCID) 40 MG tablet Take 1 tablet by mouth every evening 9/14/21   Silvano Connor,    aspirin 81 MG tablet Take 81 mg by mouth daily    Historical Provider, MD       Future Appointments   Date Time Provider Zelda Siddiqi   5/18/2022  3:45 PM DO KIKA [facility]   10/20/2022  1:30 PM Ruben Shahid MD [facility]

## 2022-05-16 ENCOUNTER — TELEPHONE (OUTPATIENT)
Dept: PRIMARY CARE CLINIC | Age: 87
End: 2022-05-16

## 2022-05-16 ENCOUNTER — HOSPITAL ENCOUNTER (OUTPATIENT)
Age: 87
Discharge: HOME | End: 2022-05-16
Payer: MEDICARE

## 2022-05-16 LAB — PROSTATE SPECIFIC ANTIGEN: 2.88 NG/ML (ref 0–4)

## 2022-05-16 PROCEDURE — 36415 COLL VENOUS BLD VENIPUNCTURE: CPT

## 2022-05-16 PROCEDURE — 84153 ASSAY OF PSA TOTAL: CPT

## 2022-05-16 NOTE — TELEPHONE ENCOUNTER
The pt is calling to let you know that he quit taking the flomax because it was giving him a headache and making him light headed, he'll talk to you more about it at his appointment with you Wednesday

## 2022-05-18 ENCOUNTER — OFFICE VISIT (OUTPATIENT)
Dept: PRIMARY CARE CLINIC | Age: 87
End: 2022-05-18
Payer: MEDICARE

## 2022-05-18 VITALS
SYSTOLIC BLOOD PRESSURE: 130 MMHG | BODY MASS INDEX: 25.6 KG/M2 | TEMPERATURE: 96.8 F | DIASTOLIC BLOOD PRESSURE: 66 MMHG | HEIGHT: 72 IN | OXYGEN SATURATION: 98 % | WEIGHT: 189 LBS | HEART RATE: 68 BPM

## 2022-05-18 DIAGNOSIS — F41.9 ANXIETY: ICD-10-CM

## 2022-05-18 DIAGNOSIS — R11.0 NAUSEA: ICD-10-CM

## 2022-05-18 DIAGNOSIS — E78.2 MIXED HYPERLIPIDEMIA: Chronic | ICD-10-CM

## 2022-05-18 DIAGNOSIS — I10 ESSENTIAL HYPERTENSION: Primary | Chronic | ICD-10-CM

## 2022-05-18 PROCEDURE — G8427 DOCREV CUR MEDS BY ELIG CLIN: HCPCS | Performed by: FAMILY MEDICINE

## 2022-05-18 PROCEDURE — 99214 OFFICE O/P EST MOD 30 MIN: CPT | Performed by: FAMILY MEDICINE

## 2022-05-18 PROCEDURE — 1036F TOBACCO NON-USER: CPT | Performed by: FAMILY MEDICINE

## 2022-05-18 PROCEDURE — 4040F PNEUMOC VAC/ADMIN/RCVD: CPT | Performed by: FAMILY MEDICINE

## 2022-05-18 PROCEDURE — 1123F ACP DISCUSS/DSCN MKR DOCD: CPT | Performed by: FAMILY MEDICINE

## 2022-05-18 PROCEDURE — G8417 CALC BMI ABV UP PARAM F/U: HCPCS | Performed by: FAMILY MEDICINE

## 2022-05-18 RX ORDER — PROMETHAZINE HYDROCHLORIDE 25 MG/1
25 TABLET ORAL EVERY 8 HOURS PRN
Qty: 20 TABLET | Refills: 0 | Status: SHIPPED | OUTPATIENT
Start: 2022-05-18 | End: 2022-06-17

## 2022-05-18 NOTE — PROGRESS NOTES
22     Vero Davis    : 1935 Sex: male   Age: 81 y.o. Chief Complaint   Patient presents with    Depression    Medication Check     stopped tamsulosin b/c of side effects, appetite has been better but thinks remeron is not working like it was before       Prior to Admission medications    Medication Sig Start Date End Date Taking? Authorizing Provider   promethazine (PHENERGAN) 25 MG tablet Take 1 tablet by mouth every 8 hours as needed for Nausea 22 Yes Americo Villa DO   LORazepam (ATIVAN) 0.5 MG tablet 1 q 12 5/10/22 5/24/22 Yes David Villa DO   mirtazapine (REMERON) 15 MG tablet Take 1 tablet by mouth nightly 22  Yes Americo Villa DO   FLUoxetine (PROZAC) 10 MG capsule Take 1 capsule by mouth daily 22  Yes Americo Villa DO   dexlansoprazole (DEXILANT) 60 MG CPDR delayed release capsule Take 1 capsule by mouth daily 22  Yes Americo Villa DO   losartan (COZAAR) 100 MG tablet 1 QD 3/15/22 9/14/22 Yes David Villa DO   amLODIPine (NORVASC) 10 MG tablet Take 1 tablet by mouth daily TAKE 1 TABLET BY MOUTH DAILY 22  Yes Americo Villa DO   famotidine (PEPCID) 40 MG tablet Take 1 tablet by mouth every evening 21  Yes Americo Villa DO   aspirin 81 MG tablet Take 81 mg by mouth daily   Yes Historical Provider, MD          HPI: Seen today with hypertension nausea hyperlipidemia anxiety. Medications well-tolerated. Systems review overall stable but recurrent problems with nausea. We will try some Phenergan and then further discussion next week. Adjusted Remeron to taking with his evening meal and recommended Ativan at at bedtime only if needed. May continue 0.5 mg morning dosing as prescribed. Review of Systems   Constitutional: Negative. HENT: Negative. Eyes: Negative. Respiratory: Negative. Gastrointestinal: Negative. Endocrine: Negative. Genitourinary: Negative.     Musculoskeletal: Negative. Skin: Negative. Allergic/Immunologic: Negative. Neurological: Negative. Hematological: Negative. Psychiatric/Behavioral: Negative. Today systems review overall stable aside from anxiety as noted and nausea as noted. Adjustments with meds today.         Current Outpatient Medications:     promethazine (PHENERGAN) 25 MG tablet, Take 1 tablet by mouth every 8 hours as needed for Nausea, Disp: 20 tablet, Rfl: 0    LORazepam (ATIVAN) 0.5 MG tablet, 1 q 12, Disp: 60 tablet, Rfl: 0    mirtazapine (REMERON) 15 MG tablet, Take 1 tablet by mouth nightly, Disp: 30 tablet, Rfl: 3    FLUoxetine (PROZAC) 10 MG capsule, Take 1 capsule by mouth daily, Disp: 30 capsule, Rfl: 3    dexlansoprazole (DEXILANT) 60 MG CPDR delayed release capsule, Take 1 capsule by mouth daily, Disp: 30 capsule, Rfl: 1    losartan (COZAAR) 100 MG tablet, 1 QD, Disp: 90 tablet, Rfl: 3    amLODIPine (NORVASC) 10 MG tablet, Take 1 tablet by mouth daily TAKE 1 TABLET BY MOUTH DAILY, Disp: 90 tablet, Rfl: 3    famotidine (PEPCID) 40 MG tablet, Take 1 tablet by mouth every evening, Disp: 90 tablet, Rfl: 3    aspirin 81 MG tablet, Take 81 mg by mouth daily, Disp: , Rfl:     No Known Allergies    Social History     Tobacco Use    Smoking status: Never Smoker    Smokeless tobacco: Never Used   Vaping Use    Vaping Use: Never used   Substance Use Topics    Alcohol use: No    Drug use: No      Past Surgical History:   Procedure Laterality Date    ANTERIOR CRUCIATE LIGAMENT REPAIR Left 11/21/2001    APPENDECTOMY      CHOLECYSTECTOMY  2007    Lap    ECHO COMPL W DOP COLOR FLOW  12/4/2012         HERNIA REPAIR Right 11/13/2002    double    SINUS SURGERY  2002,2003     done x 2    TONSILLECTOMY      TOTAL KNEE ARTHROPLASTY Left 1/28/2019    LEFT  ROBOTIC KNEE TOTAL ARTHROPLASTY  ++MEREDITH- [id number]++   ++ADDUCTOR BLOCK++ performed by Jhonatan Mehta MD at [address] [place] [address] N/A 10/10/2019 EGD BIOPSY performed by Jaida Mooney MD at [facility] ENDOSCOPY     No family history on file. Past Medical History:   Diagnosis Date    Aneurysm of right renal artery (HCC)     follows with Dr. 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 Constitutional:       Appearance: He is well-developed. HENT:      Head: Normocephalic. Right Ear: External ear normal.      Left Ear: External ear normal.      Nose: Nose normal.   Eyes:      Conjunctiva/sclera: Conjunctivae normal.      Pupils: Pupils are equal, round, and reactive to light. Cardiovascular:      Rate and Rhythm: Normal rate. Pulmonary:      Breath sounds: Normal breath sounds. Abdominal:      General: Bowel sounds are normal.      Palpations: Abdomen is soft. Musculoskeletal:         General: Normal range of motion. Cervical back: Normal range of motion and neck supple. Skin:     General: Skin is warm and dry. Neurological:      Mental Status: He is alert and oriented to person, place, and time. Psychiatric:         Behavior: Behavior normal.     Present vitals physical exam stable. I will sit tight with current meds and care. Reassessment 1 to 2 weeks and sooner if need be. Plan Per Assessment:  Andria Issa was seen today for depression and medication check.     Diagnoses and all orders for this visit:    Essential hypertension    Nausea    Mixed hyperlipidemia    Anxiety    Other orders  -     promethazine (PHENERGAN) 25 MG tablet; Take 1 tablet by mouth every 8 hours as needed for Nausea            Return in about 1 week (around 5/25/2022). Jimmy Nascimento DO    Note was generated with the assistance of voice recognition software. Document was reviewed however may contain grammatical errors.

## 2022-05-24 ENCOUNTER — OFFICE VISIT (OUTPATIENT)
Dept: FAMILY MEDICINE CLINIC | Age: 87
End: 2022-05-24
Payer: MEDICARE

## 2022-05-24 VITALS
OXYGEN SATURATION: 97 % | DIASTOLIC BLOOD PRESSURE: 64 MMHG | HEART RATE: 65 BPM | SYSTOLIC BLOOD PRESSURE: 128 MMHG | TEMPERATURE: 98.2 F

## 2022-05-24 DIAGNOSIS — I10 ESSENTIAL HYPERTENSION: Chronic | ICD-10-CM

## 2022-05-24 DIAGNOSIS — F41.9 ANXIETY: ICD-10-CM

## 2022-05-24 DIAGNOSIS — K29.50 CHRONIC GASTRITIS WITHOUT BLEEDING, UNSPECIFIED GASTRITIS TYPE: ICD-10-CM

## 2022-05-24 DIAGNOSIS — F32.A DEPRESSION, UNSPECIFIED DEPRESSION TYPE: Primary | ICD-10-CM

## 2022-05-24 DIAGNOSIS — F51.01 PRIMARY INSOMNIA: ICD-10-CM

## 2022-05-24 PROCEDURE — 1036F TOBACCO NON-USER: CPT | Performed by: FAMILY MEDICINE

## 2022-05-24 PROCEDURE — 99214 OFFICE O/P EST MOD 30 MIN: CPT | Performed by: FAMILY MEDICINE

## 2022-05-24 PROCEDURE — 1123F ACP DISCUSS/DSCN MKR DOCD: CPT | Performed by: FAMILY MEDICINE

## 2022-05-24 PROCEDURE — G8417 CALC BMI ABV UP PARAM F/U: HCPCS | Performed by: FAMILY MEDICINE

## 2022-05-24 PROCEDURE — G8427 DOCREV CUR MEDS BY ELIG CLIN: HCPCS | Performed by: FAMILY MEDICINE

## 2022-05-24 RX ORDER — PANTOPRAZOLE SODIUM 40 MG/1
TABLET, DELAYED RELEASE ORAL
Qty: 90 TABLET | Refills: 1 | Status: SHIPPED
Start: 2022-05-24 | End: 2022-09-26 | Stop reason: SDUPTHER

## 2022-05-24 RX ORDER — FLUOXETINE HYDROCHLORIDE 20 MG/1
20 CAPSULE ORAL DAILY
Qty: 30 CAPSULE | Refills: 3 | Status: SHIPPED
Start: 2022-05-24 | End: 2022-07-20

## 2022-05-24 RX ORDER — PANTOPRAZOLE SODIUM 40 MG/1
40 TABLET, DELAYED RELEASE ORAL
Qty: 30 TABLET | Refills: 5 | Status: SHIPPED
Start: 2022-05-24 | End: 2022-05-24

## 2022-05-24 NOTE — PROGRESS NOTES
22     Cuauhtemoc Anthony    : 1935 Sex: male   Age: 81 y.o. Chief Complaint   Patient presents with    Medication Check     pills work for 2 weeks or so then starts with headache and nausea       Prior to Admission medications    Medication Sig Start Date End Date Taking? Authorizing Provider   pantoprazole (PROTONIX) 40 MG tablet Take 1 tablet by mouth every morning (before breakfast) 22  Yes Kendall Villa DO   FLUoxetine (PROZAC) 20 MG capsule Take 1 capsule by mouth daily 22  Yes Kendall Villa DO   promethazine (PHENERGAN) 25 MG tablet Take 1 tablet by mouth every 8 hours as needed for Nausea 22 Yes Kendall Villa DO   LORazepam (ATIVAN) 0.5 MG tablet 1 q 12 5/10/22 5/24/22 Yes David Villa DO   mirtazapine (REMERON) 15 MG tablet Take 1 tablet by mouth nightly 22  Yes Kendall Villa DO   losartan (COZAAR) 100 MG tablet 1 QD 3/15/22 9/14/22 Yes David Villa DO   amLODIPine (NORVASC) 10 MG tablet Take 1 tablet by mouth daily TAKE 1 TABLET BY MOUTH DAILY 22  Yes Kendall Villa DO   aspirin 81 MG tablet Take 81 mg by mouth daily   Yes Historical Provider, MD          HPI: Patient evaluated today with some depression anxiety hypertension insomnia gastritis. Continued difficulties with his depression anxiety discussed at length today. Adjusted fluoxetine to 20 mg daily and will maintain mirtazapine at at bedtime and Ativan 0.5 mg morning and evening dose only. Reassess again next week. Reflux disease is present and Dexilant remains too expensive. Adjusted to Protonix 40 mg daily today. Hypertension controlled. Review of Systems   Constitutional: Negative. HENT: Negative. Eyes: Negative. Respiratory: Negative. Gastrointestinal: Negative. Endocrine: Negative. Genitourinary: Negative. Musculoskeletal: Negative. Skin: Negative. Allergic/Immunologic: Negative. Neurological: Negative. Hematological: Negative. Psychiatric/Behavioral: Negative. Systems review stable as noted. Medications all reviewed and will continue as prescribed. HPI complaints as noted. Current Outpatient Medications:     pantoprazole (PROTONIX) 40 MG tablet, Take 1 tablet by mouth every morning (before breakfast), Disp: 30 tablet, Rfl: 5    FLUoxetine (PROZAC) 20 MG capsule, Take 1 capsule by mouth daily, Disp: 30 capsule, Rfl: 3    promethazine (PHENERGAN) 25 MG tablet, Take 1 tablet by mouth every 8 hours as needed for Nausea, Disp: 20 tablet, Rfl: 0    LORazepam (ATIVAN) 0.5 MG tablet, 1 q 12, Disp: 60 tablet, Rfl: 0    mirtazapine (REMERON) 15 MG tablet, Take 1 tablet by mouth nightly, Disp: 30 tablet, Rfl: 3    losartan (COZAAR) 100 MG tablet, 1 QD, Disp: 90 tablet, Rfl: 3    amLODIPine (NORVASC) 10 MG tablet, Take 1 tablet by mouth daily TAKE 1 TABLET BY MOUTH DAILY, Disp: 90 tablet, Rfl: 3    aspirin 81 MG tablet, Take 81 mg by mouth daily, Disp: , Rfl:     No Known Allergies    Social History     Tobacco Use    Smoking status: Never Smoker    Smokeless tobacco: Never Used   Vaping Use    Vaping Use: Never used   Substance Use Topics    Alcohol use: No    Drug use: No      Past Surgical History:   Procedure Laterality Date    ANTERIOR CRUCIATE LIGAMENT REPAIR Left 11/21/2001    APPENDECTOMY      CHOLECYSTECTOMY  2007    Lap    ECHO COMPL W DOP COLOR FLOW  12/4/2012         HERNIA REPAIR Right 11/13/2002    double    SINUS SURGERY  2002,2003     done x 2    TONSILLECTOMY      TOTAL KNEE ARTHROPLASTY Left 1/28/2019    LEFT  ROBOTIC KNEE TOTAL ARTHROPLASTY  ++MEREDITH- [id number]++   ++ADDUCTOR BLOCK++ performed by Ramon Montgomery MD at [facility] N/A 10/10/2019    EGD BIOPSY performed by Rosalia Henriquez MD at [facility] ENDOSCOPY     No family history on file.   Past Medical History:   Diagnosis Date    Aneurysm of right renal artery ([address]     follows with Dr. 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 Constitutional:       Appearance: He is well-developed. HENT:      Head: Normocephalic. Right Ear: External ear normal.      Left Ear: External ear normal.      Nose: Nose normal.   Eyes:      Conjunctiva/sclera: Conjunctivae normal.      Pupils: Pupils are equal, round, and reactive to light. Cardiovascular:      Rate and Rhythm: Normal rate. Pulmonary:      Breath sounds: Normal breath sounds. Abdominal:      General: Bowel sounds are normal.      Palpations: Abdomen is soft. Musculoskeletal:         General: Normal range of motion. Cervical back: Normal range of motion and neck supple. Skin:     General: Skin is warm and dry. Neurological:      Mental Status: He is alert and oriented to person, place, and time. Psychiatric:         Behavior: Behavior normal.     Today's physical exam findings are overall stable. Neurologically he is stable. HEENT unremarkable. Heart is regular lungs are clear. I will continue with present meds and care. Reassessment next week. Plan Per Assessment:  Vonnie Izaguirre was seen today for medication check. Diagnoses and all orders for this visit:    Depression, unspecified depression type    Anxiety    Essential hypertension    Primary insomnia    Chronic gastritis without bleeding, unspecified gastritis type    Other orders  -     pantoprazole (PROTONIX) 40 MG tablet;  Take 1 tablet by mouth every morning (before breakfast)  -     FLUoxetine (PROZAC) 20 MG capsule; Take 1 capsule by mouth daily            No follow-ups on file. Duane Hess, DO    Note was generated with the assistance of voice recognition software. Document was reviewed however may contain grammatical errors.

## 2022-05-25 NOTE — TELEPHONE ENCOUNTER
Opened by: Holly Schaffer DO               on Mon May 16, 2022  2:17 PM        Doned by: Holly Schaffer DO               on Mon May 16, 2022  2:18 PM

## 2022-06-01 ENCOUNTER — OFFICE VISIT (OUTPATIENT)
Dept: PRIMARY CARE CLINIC | Age: 87
End: 2022-06-01
Payer: MEDICARE

## 2022-06-01 VITALS
HEIGHT: 72 IN | SYSTOLIC BLOOD PRESSURE: 130 MMHG | DIASTOLIC BLOOD PRESSURE: 72 MMHG | TEMPERATURE: 97.5 F | BODY MASS INDEX: 25.06 KG/M2 | WEIGHT: 185 LBS | OXYGEN SATURATION: 98 % | HEART RATE: 62 BPM

## 2022-06-01 DIAGNOSIS — F51.01 PRIMARY INSOMNIA: ICD-10-CM

## 2022-06-01 DIAGNOSIS — I10 ESSENTIAL HYPERTENSION: Primary | Chronic | ICD-10-CM

## 2022-06-01 DIAGNOSIS — R11.0 NAUSEA: ICD-10-CM

## 2022-06-01 DIAGNOSIS — E78.2 MIXED HYPERLIPIDEMIA: Chronic | ICD-10-CM

## 2022-06-01 DIAGNOSIS — F41.9 ANXIETY: ICD-10-CM

## 2022-06-01 PROCEDURE — G8417 CALC BMI ABV UP PARAM F/U: HCPCS | Performed by: FAMILY MEDICINE

## 2022-06-01 PROCEDURE — 1123F ACP DISCUSS/DSCN MKR DOCD: CPT | Performed by: FAMILY MEDICINE

## 2022-06-01 PROCEDURE — G8427 DOCREV CUR MEDS BY ELIG CLIN: HCPCS | Performed by: FAMILY MEDICINE

## 2022-06-01 PROCEDURE — 1036F TOBACCO NON-USER: CPT | Performed by: FAMILY MEDICINE

## 2022-06-01 PROCEDURE — 99214 OFFICE O/P EST MOD 30 MIN: CPT | Performed by: FAMILY MEDICINE

## 2022-06-01 RX ORDER — LORAZEPAM 0.5 MG/1
TABLET ORAL
Qty: 60 TABLET | Refills: 0 | Status: SHIPPED | OUTPATIENT
Start: 2022-06-01 | End: 2022-06-15

## 2022-06-01 ASSESSMENT — PATIENT HEALTH QUESTIONNAIRE - PHQ9
3. TROUBLE FALLING OR STAYING ASLEEP: 1
5. POOR APPETITE OR OVEREATING: 1
7. TROUBLE CONCENTRATING ON THINGS, SUCH AS READING THE NEWSPAPER OR WATCHING TELEVISION: 0
SUM OF ALL RESPONSES TO PHQ9 QUESTIONS 1 & 2: 1
2. FEELING DOWN, DEPRESSED OR HOPELESS: 1
9. THOUGHTS THAT YOU WOULD BE BETTER OFF DEAD, OR OF HURTING YOURSELF: 0
4. FEELING TIRED OR HAVING LITTLE ENERGY: 1
8. MOVING OR SPEAKING SO SLOWLY THAT OTHER PEOPLE COULD HAVE NOTICED. OR THE OPPOSITE, BEING SO FIGETY OR RESTLESS THAT YOU HAVE BEEN MOVING AROUND A LOT MORE THAN USUAL: 0
SUM OF ALL RESPONSES TO PHQ QUESTIONS 1-9: 4
6. FEELING BAD ABOUT YOURSELF - OR THAT YOU ARE A FAILURE OR HAVE LET YOURSELF OR YOUR FAMILY DOWN: 0
SUM OF ALL RESPONSES TO PHQ QUESTIONS 1-9: 4
SUM OF ALL RESPONSES TO PHQ QUESTIONS 1-9: 4
1. LITTLE INTEREST OR PLEASURE IN DOING THINGS: 0
10. IF YOU CHECKED OFF ANY PROBLEMS, HOW DIFFICULT HAVE THESE PROBLEMS MADE IT FOR YOU TO DO YOUR WORK, TAKE CARE OF THINGS AT HOME, OR GET ALONG WITH OTHER PEOPLE: 0
SUM OF ALL RESPONSES TO PHQ QUESTIONS 1-9: 4

## 2022-06-01 NOTE — PROGRESS NOTES
22     Rock Peralta    : 1935 Sex: male   Age: 81 y.o. Chief Complaint   Patient presents with    Anxiety    Depression       Prior to Admission medications    Medication Sig Start Date End Date Taking? Authorizing Provider   LORazepam (ATIVAN) 0.5 MG tablet 1 q 12 6/1/22 6/15/22 Yes David Villa DO   FLUoxetine (PROZAC) 20 MG capsule Take 1 capsule by mouth daily 22  Yes Jerod Villa DO   pantoprazole (PROTONIX) 40 MG tablet TAKE 1 TABLET BY MOUTH EVERY MORNING BEFORE BREAKFAST 22  Yes Jerod Villa DO   promethazine (PHENERGAN) 25 MG tablet Take 1 tablet by mouth every 8 hours as needed for Nausea 22 Yes Jerod Villa DO   mirtazapine (REMERON) 15 MG tablet Take 1 tablet by mouth nightly 22  Yes Jerod Villa DO   losartan (COZAAR) 100 MG tablet 1 QD 3/15/22 9/14/22 Yes David Villa DO   amLODIPine (NORVASC) 10 MG tablet Take 1 tablet by mouth daily TAKE 1 TABLET BY MOUTH DAILY 22  Yes Jerod Villa DO   aspirin 81 MG tablet Take 81 mg by mouth daily   Yes Historical Provider, MD          HPI: Patient evaluated today with anxiety nausea hypertension hyperlipidemia primary insomnia and headaches. Headaches have believe are secondary to his Norvasc we are going to back off to just 5 mg a day and then reassess again next week. Anxiety has improved with the Ativan twice a day and will maintain. Sleep remains a challenge but improved on Remeron at 15 we will further adjust to 30 mg at at bedtime and reassess next week. Review of Systems   Constitutional: Negative. HENT: Negative. Eyes: Negative. Respiratory: Negative. Gastrointestinal: Negative. Endocrine: Negative. Genitourinary: Negative. Musculoskeletal: Negative. Skin: Negative. Allergic/Immunologic: Negative. Neurological: Negative. Hematological: Negative. Psychiatric/Behavioral: Negative.        Today systems review overall stable medications as prescribed. Current Outpatient Medications:     LORazepam (ATIVAN) 0.5 MG tablet, 1 q 12, Disp: 60 tablet, Rfl: 0    FLUoxetine (PROZAC) 20 MG capsule, Take 1 capsule by mouth daily, Disp: 30 capsule, Rfl: 3    pantoprazole (PROTONIX) 40 MG tablet, TAKE 1 TABLET BY MOUTH EVERY MORNING BEFORE BREAKFAST, Disp: 90 tablet, Rfl: 1    promethazine (PHENERGAN) 25 MG tablet, Take 1 tablet by mouth every 8 hours as needed for Nausea, Disp: 20 tablet, Rfl: 0    mirtazapine (REMERON) 15 MG tablet, Take 1 tablet by mouth nightly, Disp: 30 tablet, Rfl: 3    losartan (COZAAR) 100 MG tablet, 1 QD, Disp: 90 tablet, Rfl: 3    amLODIPine (NORVASC) 10 MG tablet, Take 1 tablet by mouth daily TAKE 1 TABLET BY MOUTH DAILY, Disp: 90 tablet, Rfl: 3    aspirin 81 MG tablet, Take 81 mg by mouth daily, Disp: , Rfl:     No Known Allergies    Social History     Tobacco Use    Smoking status: Never Smoker    Smokeless tobacco: Never Used   Vaping Use    Vaping Use: Never used   Substance Use Topics    Alcohol use: No    Drug use: No      Past Surgical History:   Procedure Laterality Date    ANTERIOR CRUCIATE LIGAMENT REPAIR Left 11/21/2001    APPENDECTOMY      CHOLECYSTECTOMY  2007    Lap    ECHO COMPL W DOP COLOR FLOW  12/4/2012         HERNIA REPAIR Right 11/13/2002    double    SINUS SURGERY  2002,2003     done x 2    TONSILLECTOMY      TOTAL KNEE ARTHROPLASTY Left 1/28/2019    LEFT  ROBOTIC KNEE TOTAL ARTHROPLASTY  ++MEREDITH- CKEROLMB++   ++ADDUCTOR BLOCK++ performed by Augustin Michel MD at [address] N/A 10/10/2019    EGD BIOPSY performed by Yuriy Lobo MD at [facility] ENDOSCOPY     No family history on file.   Past Medical History:   Diagnosis Date    Aneurysm of right renal artery (HCC)     follows with Dr. Tati Thomas yearly (last visit 9/19)    Colitis     Depression     GERD (gastroesophageal reflux disease)     H/O: CVA (cerebrovascular accident) 10/14/2021    Hyperlipidemia     Hypertension     Stroke [facility])     [address] mesenteric artery stenosis [address] 11/4/2020    TIA (transient ischemic attack)        Vitals:    06/01/22 1505   BP: 130/72   Pulse: 62   Temp: 97.5 °F (36.4 °C)   SpO2: 98%   Weight: 185 lb (83.9 kg)   Height: 6' (1.829 m)     BP Readings from Last 3 Encounters:   06/01/22 130/72   05/24/22 128/64   05/18/22 130/66        Physical Exam  Vitals and nursing note reviewed. Constitutional:       Appearance: He is well-developed. HENT:      Head: Normocephalic. Right Ear: External ear normal.      Left Ear: External ear normal.      Nose: Nose normal.   Eyes:      Conjunctiva/sclera: Conjunctivae normal.      Pupils: Pupils are equal, round, and reactive to light. Cardiovascular:      Rate and Rhythm: Normal rate. Pulmonary:      Breath sounds: Normal breath sounds. Abdominal:      General: Bowel sounds are normal.      Palpations: Abdomen is soft. Musculoskeletal:         General: Normal range of motion. Cervical back: Normal range of motion and neck supple. Skin:     General: Skin is warm and dry. Neurological:      Mental Status: He is alert and oriented to person, place, and time. Psychiatric:         Behavior: Behavior normal.        Today's vitals physical examination stable. Medications as prescribed. Reassessment next week and sooner if problems. All meds reviewed and adjustments made and voices understanding. Plan Per Assessment:  Katie Mccormick was seen today for anxiety and depression. Diagnoses and all orders for this visit:    Essential hypertension    Anxiety  -     LORazepam (ATIVAN) 0.5 MG tablet; 1 q 12    Nausea  -     LORazepam (ATIVAN) 0.5 MG tablet; 1 q 12    Mixed hyperlipidemia    Primary insomnia            Return in about 1 week (around 6/8/2022). Nico Cano DO    Note was generated with the assistance of voice recognition software.   Document was reviewed however may contain grammatical errors.

## 2022-06-06 ENCOUNTER — CARE COORDINATION (OUTPATIENT)
Dept: CARE COORDINATION | Age: 87
End: 2022-06-06

## 2022-06-06 NOTE — CARE COORDINATION
Ambulatory Care Coordination Note  6/6/2022  CM Risk Score: 0  Charlson 10 Year Mortality Risk Score: 98%     ACC: Juan Manuel Bowen RN    Summary Note:   ACM contacted patient to follow up on his status regarding HTN, anxiety/depression, decrease appeitite for Care Coordination.  -Pt reports complete medication compliance.  -Pt reports he intermittently walks around the block weather permitting. Pt said he walked 2 laps around the block on Wednesday 6-1-2022.   -Pt reports he meet his family for lunch every Friday. He enjoys this luncheon.  -Pt reports he has not been out on his motorcycle because his son has been out of town. Pt said he probably will ride this weekend. Headache  -Pt reports he feels good except for this daily headache.   -Pt said his PCP thinks its the  Amlodipine. The dose was decreased from 10mg to 5 mg. Pt said the headache continues. Anxiety, panic attacks, depression.  -Pt reports he feels better today. He said he thinks PCP has found the right medication combination. Pt is taking Ativan, Prozac and Remeron. Pt said Remeron is for sleep.   -Pt reports he has had no anxiety or panic attacks since he started on Ativan 2x/day. Pt said he takes the Ativan 8am & 7 pm. Pt said he has increase appetite on Ativan. -[facility] continues. Pt said he had an appt  with Chrissy Huizar today. Pt said they have a plan. Next appt 6/27/22.     -Pt reports journaling continues and helps. Pt said he is keeping track of all the events.   -Pt reports he is reading a book about the [address] [place] [address] Drive history. Appetite  -Pt reports his appetite is much improved. Nausea remains gone.   -Pt reports Lucina Malling stopped. Protonix restarted. HTN  -Pt has a scale and BP monitor.  -Pt said a normal weight is 185 lbs for him.   -Today's weight 181 lbs.   -5- PCP office visit: /60. & weight 184 lbs.   -Pt reports he is on a low sodium diet.  Pt said he does not add salt to his food. .   -Pt reports no cough, SOB or wheezing.   -Pt reports no ankle/feet edema. Resources  -Urology with Dr Estefania Masterson. Pt said he had is yearly check up and all is good. needs a PSA lab test before this visit. Pt said he is off Flomax. PLAN:  -Continue Care Coordination  -Counselor appt 6-.  -Vasc appt 10-  -F/u anxiety/panic attacks, depression. -F/u appetite  -F/u on weight  F/u monitor sodium    -Next anticipated outreach by [address] [place] [address] Team: 2 weeks. Lab Results     None          Care Coordination Interventions    Program Enrollment: Complex Care  Referral from Primary Care Provider: No  Suggested Interventions and [address]: Completed (Comment: 3/31/22 Patient has appt with a Psychologist today. )  Meals on Wheels: Declined  Medication Assistance Program: [facility]  Pharmacist: Declined  Registered Dietician: Declined  Social Work: Declined  Other Services or Interventions: Will mail education regarding HTN, monitoring sodium, reading labels. Goals Addressed                 This Visit's Progress     Self Monitoring        Daily Weights - I will weight myself as directed - Daily and write down weights  Blood Pressure - I will take my blood pressure as directed - Daily  I will notify my provider of any trends of increasing or decreasing blood pressures over a month period of time. I will notify my provider of any changes in blood pressure associated with symptoms of dizziness, falls, passing out, headache, confusion/change in mental status. None Recently Recorded    Barriers: lack of education  Plan for overcoming my barriers: Care Coordination  Confidence: 7/10  Anticipated Goal Completion Date: 7- 4- Pt has checking weight daily but no BP. Pt feels his BP monitor is not accurate. 4- Pt continues monitoring weight. 5- Daily weights continue. 6-6-22 Daily weight continues.             Prior to Admission medications Medication Sig Start Date End Date Taking?  Authorizing Provider   LORazepam (ATIVAN) 0.5 MG tablet 1 q 12 6/1/22 6/15/22  Wild Orellana DO   FLUoxetine (PROZAC) 20 MG capsule Take 1 capsule by mouth daily 5/24/22   Holley Villa DO   pantoprazole (PROTONIX) 40 MG tablet TAKE 1 TABLET BY MOUTH EVERY MORNING BEFORE BREAKFAST 5/24/22   Wild Orellana DO   promethazine (PHENERGAN) 25 MG tablet Take 1 tablet by mouth every 8 hours as needed for Nausea 5/18/22 6/17/22  Wild Orellana DO   mirtazapine (REMERON) 15 MG tablet Take 1 tablet by mouth nightly 5/5/22   Wild Orellana DO   losartan (COZAAR) 100 MG tablet 1 QD 3/15/22 9/14/22  Holley Villa DO   amLODIPine (NORVASC) 10 MG tablet Take 1 tablet by mouth daily TAKE 1 TABLET BY MOUTH DAILY 1/5/22   Wild Orellana DO   aspirin 81 MG tablet Take 81 mg by mouth daily    Historical Provider, MD       Future Appointments   Date Time Provider Zelda Siddiqi   6/8/2022  3:00 PM DO KIKA [facility]   10/20/2022  1:30 PM Ava Rios MD [facility]

## 2022-06-08 ENCOUNTER — OFFICE VISIT (OUTPATIENT)
Dept: PRIMARY CARE CLINIC | Age: 87
End: 2022-06-08
Payer: MEDICARE

## 2022-06-08 VITALS
HEART RATE: 64 BPM | DIASTOLIC BLOOD PRESSURE: 70 MMHG | TEMPERATURE: 98.2 F | WEIGHT: 186 LBS | OXYGEN SATURATION: 97 % | SYSTOLIC BLOOD PRESSURE: 142 MMHG | BODY MASS INDEX: 25.23 KG/M2

## 2022-06-08 DIAGNOSIS — R51.9 ACUTE NONINTRACTABLE HEADACHE, UNSPECIFIED HEADACHE TYPE: ICD-10-CM

## 2022-06-08 DIAGNOSIS — E78.2 MIXED HYPERLIPIDEMIA: ICD-10-CM

## 2022-06-08 DIAGNOSIS — I10 ESSENTIAL HYPERTENSION: Primary | ICD-10-CM

## 2022-06-08 PROCEDURE — 1036F TOBACCO NON-USER: CPT | Performed by: FAMILY MEDICINE

## 2022-06-08 PROCEDURE — G8427 DOCREV CUR MEDS BY ELIG CLIN: HCPCS | Performed by: FAMILY MEDICINE

## 2022-06-08 PROCEDURE — 1123F ACP DISCUSS/DSCN MKR DOCD: CPT | Performed by: FAMILY MEDICINE

## 2022-06-08 PROCEDURE — G8417 CALC BMI ABV UP PARAM F/U: HCPCS | Performed by: FAMILY MEDICINE

## 2022-06-08 PROCEDURE — 99214 OFFICE O/P EST MOD 30 MIN: CPT | Performed by: FAMILY MEDICINE

## 2022-06-08 RX ORDER — HYDROCHLOROTHIAZIDE 12.5 MG/1
12.5 CAPSULE, GELATIN COATED ORAL EVERY MORNING
Qty: 90 CAPSULE | Refills: 1 | Status: ON HOLD
Start: 2022-06-08 | End: 2022-08-29 | Stop reason: HOSPADM

## 2022-06-08 RX ORDER — HYDROCHLOROTHIAZIDE 12.5 MG/1
12.5 CAPSULE, GELATIN COATED ORAL EVERY MORNING
Qty: 30 CAPSULE | Refills: 1 | Status: SHIPPED
Start: 2022-06-08 | End: 2022-06-08

## 2022-06-08 NOTE — PROGRESS NOTES
stenosis ([address] 11/4/2020    TIA (transient ischemic attack)        Vitals:    06/08/22 1459   BP: (!) 142/70   Pulse: 64   Temp: 98.2 °F (36.8 °C)   SpO2: 97%   Weight: 186 lb (84.4 kg)     BP Readings from Last 3 Encounters:   06/08/22 (!) 142/70   06/01/22 130/72   05/24/22 128/64      146/70  Physical Exam  Vitals and nursing note reviewed. Constitutional:       Appearance: He is well-developed. HENT:      Head: Normocephalic. Right Ear: External ear normal.      Left Ear: External ear normal.      Nose: Nose normal.   Eyes:      Conjunctiva/sclera: Conjunctivae normal.      Pupils: Pupils are equal, round, and reactive to light. Cardiovascular:      Rate and Rhythm: Normal rate. Pulmonary:      Breath sounds: Normal breath sounds. Abdominal:      General: Bowel sounds are normal.      Palpations: Abdomen is soft. Musculoskeletal:         General: Normal range of motion. Cervical back: Normal range of motion and neck supple. Skin:     General: Skin is warm and dry. Neurological:      Mental Status: He is alert and oriented to person, place, and time. Psychiatric:         Behavior: Behavior normal.     Today's vitals and physical examination overall stable. Neurologically he is intact. I am concerned that the Norvasc is the culprit for his headaches. Discontinued and reassess with next visit. Plan Per Assessment:  Vonnie Izaguirre was seen today for anxiety, depression and headache. Diagnoses and all orders for this visit:    Essential hypertension  -     Comprehensive Metabolic Panel; Future  -     CBC with Auto Differential; Future  -     Sedimentation Rate; Future    Mixed hyperlipidemia  -     Comprehensive Metabolic Panel; Future  -     CBC with Auto Differential; Future  -     Sedimentation Rate; Future    Acute nonintractable headache, unspecified headache type  -     Comprehensive Metabolic Panel;  Future  -     CBC with Auto Differential; Future  -     Sedimentation Rate; Future    Other orders  -     hydroCHLOROthiazide (MICROZIDE) 12.5 MG capsule; Take 1 capsule by mouth every morning            Return in about 1 week (around 6/15/2022). Katy Saini DO    Note was generated with the assistance of voice recognition software. Document was reviewed however may contain grammatical errors.

## 2022-06-10 RX ORDER — MIRTAZAPINE 30 MG/1
TABLET, FILM COATED ORAL
Qty: 90 TABLET | Refills: 0 | OUTPATIENT
Start: 2022-06-10

## 2022-06-11 RX ORDER — MIRTAZAPINE 30 MG/1
30 TABLET, FILM COATED ORAL NIGHTLY
Qty: 30 TABLET | Refills: 3 | Status: SHIPPED
Start: 2022-06-11 | End: 2022-07-20 | Stop reason: ALTCHOICE

## 2022-06-13 ENCOUNTER — HOSPITAL ENCOUNTER (OUTPATIENT)
Age: 87
Discharge: HOME | End: 2022-06-13
Payer: MEDICARE

## 2022-06-13 DIAGNOSIS — I10 ESSENTIAL HYPERTENSION: ICD-10-CM

## 2022-06-13 DIAGNOSIS — E78.2 MIXED HYPERLIPIDEMIA: ICD-10-CM

## 2022-06-13 DIAGNOSIS — R51.9 ACUTE NONINTRACTABLE HEADACHE, UNSPECIFIED HEADACHE TYPE: ICD-10-CM

## 2022-06-13 LAB
ALBUMIN SERPL-MCNC: 3.9 G/DL (ref 3.5–5.2)
ALP BLD-CCNC: 83 U/L (ref 40–129)
ALT SERPL-CCNC: 7 U/L (ref 0–40)
ANION GAP SERPL CALCULATED.3IONS-SCNC: 10 MMOL/L (ref 7–16)
AST SERPL-CCNC: 16 U/L (ref 0–39)
BASOPHILS ABSOLUTE: 0.02 E9/L (ref 0–0.2)
BASOPHILS RELATIVE PERCENT: 0.3 % (ref 0–2)
BILIRUB SERPL-MCNC: 0.6 MG/DL (ref 0–1.2)
BUN BLDV-MCNC: 9 MG/DL (ref 6–23)
CALCIUM SERPL-MCNC: 9.6 MG/DL (ref 8.6–10.2)
CHLORIDE BLD-SCNC: 100 MMOL/L (ref 98–107)
CO2: 28 MMOL/L (ref 22–29)
CREAT SERPL-MCNC: 0.9 MG/DL (ref 0.7–1.2)
EOSINOPHILS ABSOLUTE: 0.05 E9/L (ref 0.05–0.5)
EOSINOPHILS RELATIVE PERCENT: 0.8 % (ref 0–6)
GFR AFRICAN AMERICAN: >60
GFR NON-AFRICAN AMERICAN: >60 ML/MIN/1.73
GLUCOSE BLD-MCNC: 102 MG/DL (ref 74–99)
HCT VFR BLD CALC: 38.6 % (ref 37–54)
HEMOGLOBIN: 13.1 G/DL (ref 12.5–16.5)
IMMATURE GRANULOCYTES #: 0.03 E9/L
IMMATURE GRANULOCYTES %: 0.5 % (ref 0–5)
LYMPHOCYTES ABSOLUTE: 1.23 E9/L (ref 1.5–4)
LYMPHOCYTES RELATIVE PERCENT: 18.6 % (ref 20–42)
MCH RBC QN AUTO: 30.3 PG (ref 26–35)
MCHC RBC AUTO-ENTMCNC: 33.9 % (ref 32–34.5)
MCV RBC AUTO: 89.4 FL (ref 80–99.9)
MONOCYTES ABSOLUTE: 0.5 E9/L (ref 0.1–0.95)
MONOCYTES RELATIVE PERCENT: 7.6 % (ref 2–12)
NEUTROPHILS ABSOLUTE: 4.78 E9/L (ref 1.8–7.3)
NEUTROPHILS RELATIVE PERCENT: 72.2 % (ref 43–80)
PDW BLD-RTO: 13.2 FL (ref 11.5–15)
PLATELET # BLD: 326 E9/L (ref 130–450)
PMV BLD AUTO: 9.5 FL (ref 7–12)
POTASSIUM SERPL-SCNC: 3.3 MMOL/L (ref 3.5–5)
RBC # BLD: 4.32 E12/L (ref 3.8–5.8)
SEDIMENTATION RATE, ERYTHROCYTE: 15 MM/HR (ref 0–15)
SODIUM BLD-SCNC: 138 MMOL/L (ref 132–146)
TOTAL PROTEIN: 7.2 G/DL (ref 6.4–8.3)
WBC # BLD: 6.6 E9/L (ref 4.5–11.5)

## 2022-06-13 PROCEDURE — 80053 COMPREHEN METABOLIC PANEL: CPT

## 2022-06-13 PROCEDURE — 85025 COMPLETE CBC W/AUTO DIFF WBC: CPT

## 2022-06-13 PROCEDURE — 36415 COLL VENOUS BLD VENIPUNCTURE: CPT

## 2022-06-13 PROCEDURE — 85651 RBC SED RATE NONAUTOMATED: CPT

## 2022-06-14 ENCOUNTER — OFFICE VISIT (OUTPATIENT)
Dept: PRIMARY CARE CLINIC | Age: 87
End: 2022-06-14
Payer: MEDICARE

## 2022-06-14 VITALS
HEART RATE: 72 BPM | DIASTOLIC BLOOD PRESSURE: 72 MMHG | SYSTOLIC BLOOD PRESSURE: 140 MMHG | WEIGHT: 184 LBS | TEMPERATURE: 97.8 F | BODY MASS INDEX: 24.95 KG/M2 | OXYGEN SATURATION: 97 %

## 2022-06-14 DIAGNOSIS — I10 ESSENTIAL HYPERTENSION: Primary | Chronic | ICD-10-CM

## 2022-06-14 DIAGNOSIS — J01.11 ACUTE RECURRENT FRONTAL SINUSITIS: ICD-10-CM

## 2022-06-14 DIAGNOSIS — F41.9 ANXIETY: ICD-10-CM

## 2022-06-14 DIAGNOSIS — Z86.73 H/O: CVA (CEREBROVASCULAR ACCIDENT): ICD-10-CM

## 2022-06-14 DIAGNOSIS — G44.89 OTHER HEADACHE SYNDROME: ICD-10-CM

## 2022-06-14 PROCEDURE — 99214 OFFICE O/P EST MOD 30 MIN: CPT | Performed by: FAMILY MEDICINE

## 2022-06-14 PROCEDURE — 1123F ACP DISCUSS/DSCN MKR DOCD: CPT | Performed by: FAMILY MEDICINE

## 2022-06-14 PROCEDURE — G8420 CALC BMI NORM PARAMETERS: HCPCS | Performed by: FAMILY MEDICINE

## 2022-06-14 PROCEDURE — 1036F TOBACCO NON-USER: CPT | Performed by: FAMILY MEDICINE

## 2022-06-14 PROCEDURE — G8427 DOCREV CUR MEDS BY ELIG CLIN: HCPCS | Performed by: FAMILY MEDICINE

## 2022-06-14 RX ORDER — PREDNISONE 1 MG/1
TABLET ORAL
Qty: 30 TABLET | Refills: 0 | Status: SHIPPED
Start: 2022-06-14 | End: 2022-06-24

## 2022-06-14 RX ORDER — CEFDINIR 300 MG/1
300 CAPSULE ORAL 2 TIMES DAILY
Qty: 20 CAPSULE | Refills: 0 | Status: SHIPPED | OUTPATIENT
Start: 2022-06-14 | End: 2022-06-24

## 2022-06-14 RX ORDER — AMLODIPINE BESYLATE 5 MG/1
5 TABLET ORAL DAILY
Qty: 30 TABLET | Refills: 3 | Status: SHIPPED
Start: 2022-06-14 | End: 2022-09-26 | Stop reason: SDUPTHER

## 2022-06-14 ASSESSMENT — ENCOUNTER SYMPTOMS
GASTROINTESTINAL NEGATIVE: 1
ALLERGIC/IMMUNOLOGIC NEGATIVE: 1
SINUS PAIN: 1
SINUS PRESSURE: 1
RESPIRATORY NEGATIVE: 1
EYES NEGATIVE: 1

## 2022-06-14 NOTE — PROGRESS NOTES
22     Ankush Brooks    : 1935 Sex: male   Age: 81 y.o. Chief Complaint   Patient presents with    Headache     just taking tylenol and used some tension relief medication yesterday but caused him to be wobbly    Diarrhea    Discuss Labs       Prior to Admission medications    Medication Sig Start Date End Date Taking? Authorizing Provider   amLODIPine (NORVASC) 5 MG tablet Take 1 tablet by mouth daily 22  Yes Mine Villa DO   predniSONE (DELTASONE) 5 MG tablet 4 tablets daily for 3 days then 3 tablets daily for 3 days then 2 tablets daily for 3 days then 1 tablet daily for 3 days 22  Yes Mine Villa DO   cefdinir (OMNICEF) 300 MG capsule Take 1 capsule by mouth 2 times daily for 10 days 22 Yes Mine Villa DO   mirtazapine (REMERON) 30 MG tablet Take 1 tablet by mouth nightly 22  Yes Mine Villa DO   hydroCHLOROthiazide (MICROZIDE) 12.5 MG capsule TAKE 1 CAPSULE BY MOUTH EVERY MORNING 22  Yes Mine Villa DO   LORazepam (ATIVAN) 0.5 MG tablet 1 q 12 6/1/22 6/15/22 Yes David Villa DO   FLUoxetine (PROZAC) 20 MG capsule Take 1 capsule by mouth daily 22  Yes David Villa DO   pantoprazole (PROTONIX) 40 MG tablet TAKE 1 TABLET BY MOUTH EVERY MORNING BEFORE BREAKFAST 22  Yes Mine Villa DO   promethazine (PHENERGAN) 25 MG tablet Take 1 tablet by mouth every 8 hours as needed for Nausea 22 Yes Dvaid Villa DO   losartan (COZAAR) 100 MG tablet 1 QD 3/15/22 9/14/22 Yes David Villa DO   aspirin 81 MG tablet Take 81 mg by mouth daily   Yes Historical Provider, MD          HPI: Patient seen today medical follow-up recurrent problems with headache frontal sinus region. Some mild drainage. We are going to treat with some Omnicef and prednisone and then if persistent further work-up if needed. CT of the head and sinuses if necessary.   Note that a CT was completed in April of this year through the emergency room and stable findings at that time. Hypertension is present and blood pressure elevated with coming off of amlodipine. Resumed 5 mg today and reassess with next visit. Anxiety remains a challenge but doing well on current medication. Review of Systems   Constitutional: Negative. HENT: Positive for congestion, sinus pressure and sinus pain. Eyes: Negative. Respiratory: Negative. Gastrointestinal: Negative. Endocrine: Negative. Genitourinary: Negative. Musculoskeletal: Negative. Skin: Negative. Allergic/Immunologic: Negative. Neurological: Negative. Hematological: Negative. Psychiatric/Behavioral: Negative.                Current Outpatient Medications:     amLODIPine (NORVASC) 5 MG tablet, Take 1 tablet by mouth daily, Disp: 30 tablet, Rfl: 3    predniSONE (DELTASONE) 5 MG tablet, 4 tablets daily for 3 days then 3 tablets daily for 3 days then 2 tablets daily for 3 days then 1 tablet daily for 3 days, Disp: 30 tablet, Rfl: 0    cefdinir (OMNICEF) 300 MG capsule, Take 1 capsule by mouth 2 times daily for 10 days, Disp: 20 capsule, Rfl: 0    mirtazapine (REMERON) 30 MG tablet, Take 1 tablet by mouth nightly, Disp: 30 tablet, Rfl: 3    hydroCHLOROthiazide (MICROZIDE) 12.5 MG capsule, TAKE 1 CAPSULE BY MOUTH EVERY MORNING, Disp: 90 capsule, Rfl: 1    LORazepam (ATIVAN) 0.5 MG tablet, 1 q 12, Disp: 60 tablet, Rfl: 0    FLUoxetine (PROZAC) 20 MG capsule, Take 1 capsule by mouth daily, Disp: 30 capsule, Rfl: 3    pantoprazole (PROTONIX) 40 MG tablet, TAKE 1 TABLET BY MOUTH EVERY MORNING BEFORE BREAKFAST, Disp: 90 tablet, Rfl: 1    promethazine (PHENERGAN) 25 MG tablet, Take 1 tablet by mouth every 8 hours as needed for Nausea, Disp: 20 tablet, Rfl: 0    losartan (COZAAR) 100 MG tablet, 1 QD, Disp: 90 tablet, Rfl: 3    aspirin 81 MG tablet, Take 81 mg by mouth daily, Disp: , Rfl:     No Known Allergies    Social History     Tobacco Use    Smoking status: Never Smoker    Smokeless tobacco: Never Used   Vaping Use    Vaping Use: Never used   Substance Use Topics    Alcohol use: No    Drug use: No      Past Surgical History:   Procedure Laterality Date    ANTERIOR CRUCIATE LIGAMENT REPAIR Left 11/21/2001    APPENDECTOMY      CHOLECYSTECTOMY  2007    Lap    ECHO COMPL W DOP COLOR FLOW  12/4/2012         HERNIA REPAIR Right 11/13/2002    double    SINUS SURGERY  2002,2003     done x 2    TONSILLECTOMY      TOTAL KNEE ARTHROPLASTY Left 1/28/2019    LEFT  ROBOTIC KNEE TOTAL ARTHROPLASTY  ++MEREDITH- [id number]++   ++ADDUCTOR BLOCK++ performed by Carly Lilly MD at [address] [place] [address] N/A 10/10/2019    EGD BIOPSY performed by Tegan Gomez MD at [facility] ENDOSCOPY     No family history on file. Past Medical History:   Diagnosis Date    Aneurysm of right renal artery (HCC)     follows with Dr. 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 Constitutional:       Appearance: He is well-developed. HENT:      Head: Normocephalic. Right Ear: External ear normal.      Left Ear: External ear normal.      Nose: Nose normal.   Eyes:      Conjunctiva/sclera: Conjunctivae normal.      Pupils: Pupils are equal, round, and reactive to light. Cardiovascular:      Rate and Rhythm: Normal rate. Pulmonary:      Breath sounds: Normal breath sounds.    Abdominal:      General: Bowel sounds are normal.      Palpations: Abdomen is soft. Musculoskeletal:         General: Normal range of motion. Cervical back: Normal range of motion and neck supple. Skin:     General: Skin is warm and dry. Neurological:      Mental Status: He is alert and oriented to person, place, and time. Psychiatric:         Behavior: Behavior normal.      exam findings are overall stable. Systolic blood pressure elevated per myself at 160/80. Resumed Norvasc 5 mg daily. HEENT reveals tenderness frontal sinuses and some drainage. Treatment as noted. Neurologically he is intact. Adjustments with medications in 1 week follow-up. Plan Per Assessment:  Zee Yadav was seen today for headache, diarrhea and discuss labs. Diagnoses and all orders for this visit:    Essential hypertension    H/O: CVA (cerebrovascular accident)    Anxiety    Other headache syndrome    Acute recurrent frontal sinusitis    Other orders  -     amLODIPine (NORVASC) 5 MG tablet; Take 1 tablet by mouth daily  -     predniSONE (DELTASONE) 5 MG tablet; 4 tablets daily for 3 days then 3 tablets daily for 3 days then 2 tablets daily for 3 days then 1 tablet daily for 3 days  -     cefdinir (OMNICEF) 300 MG capsule; Take 1 capsule by mouth 2 times daily for 10 days            Return in about 1 week (around 6/21/2022). Jimmy Nascimento DO    Note was generated with the assistance of voice recognition software. Document was reviewed however may contain grammatical errors.

## 2022-06-22 ENCOUNTER — OFFICE VISIT (OUTPATIENT)
Dept: PRIMARY CARE CLINIC | Age: 87
End: 2022-06-22
Payer: MEDICARE

## 2022-06-22 VITALS
HEART RATE: 71 BPM | HEIGHT: 72 IN | DIASTOLIC BLOOD PRESSURE: 86 MMHG | WEIGHT: 191 LBS | OXYGEN SATURATION: 97 % | BODY MASS INDEX: 25.87 KG/M2 | SYSTOLIC BLOOD PRESSURE: 136 MMHG | TEMPERATURE: 98.6 F

## 2022-06-22 DIAGNOSIS — F32.A DEPRESSION, UNSPECIFIED DEPRESSION TYPE: ICD-10-CM

## 2022-06-22 DIAGNOSIS — I10 ESSENTIAL HYPERTENSION: Primary | Chronic | ICD-10-CM

## 2022-06-22 DIAGNOSIS — R09.81 CONGESTION OF NASAL SINUS: ICD-10-CM

## 2022-06-22 DIAGNOSIS — F41.9 ANXIETY: ICD-10-CM

## 2022-06-22 DIAGNOSIS — L98.9 SKIN LESION: ICD-10-CM

## 2022-06-22 PROCEDURE — 1123F ACP DISCUSS/DSCN MKR DOCD: CPT | Performed by: FAMILY MEDICINE

## 2022-06-22 PROCEDURE — 99214 OFFICE O/P EST MOD 30 MIN: CPT | Performed by: FAMILY MEDICINE

## 2022-06-22 PROCEDURE — G8417 CALC BMI ABV UP PARAM F/U: HCPCS | Performed by: FAMILY MEDICINE

## 2022-06-22 PROCEDURE — G8427 DOCREV CUR MEDS BY ELIG CLIN: HCPCS | Performed by: FAMILY MEDICINE

## 2022-06-22 PROCEDURE — 1036F TOBACCO NON-USER: CPT | Performed by: FAMILY MEDICINE

## 2022-06-22 RX ORDER — FLUTICASONE PROPIONATE 50 MCG
1 SPRAY, SUSPENSION (ML) NASAL DAILY
Qty: 16 G | Refills: 2 | Status: SHIPPED
Start: 2022-06-22 | End: 2022-06-24

## 2022-06-22 ASSESSMENT — ENCOUNTER SYMPTOMS
EYES NEGATIVE: 1
GASTROINTESTINAL NEGATIVE: 1
RESPIRATORY NEGATIVE: 1
ALLERGIC/IMMUNOLOGIC NEGATIVE: 1

## 2022-06-22 NOTE — PROGRESS NOTES
22     Shaye Marinelli    : 1935 Sex: male   Age: 81 y.o. Chief Complaint   Patient presents with    Medication Check    Anxiety    Depression    Headache     headache for 2 weeks, finishing abx and prednisone       Prior to Admission medications    Medication Sig Start Date End Date Taking? Authorizing Provider   fluticasone (FLONASE) 50 MCG/ACT nasal spray 1 spray by Each Nostril route daily 22  Yes Roni Villa DO   amLODIPine (NORVASC) 5 MG tablet Take 1 tablet by mouth daily 22  Yes Roni Villa, DO   predniSONE (DELTASONE) 5 MG tablet 4 tablets daily for 3 days then 3 tablets daily for 3 days then 2 tablets daily for 3 days then 1 tablet daily for 3 days 22  Yes Roni Villa, DO   cefdinir (OMNICEF) 300 MG capsule Take 1 capsule by mouth 2 times daily for 10 days 22 Yes Roni Villa DO   mirtazapine (REMERON) 30 MG tablet Take 1 tablet by mouth nightly 22  Yes Roni Villa, DO   hydroCHLOROthiazide (MICROZIDE) 12.5 MG capsule TAKE 1 CAPSULE BY MOUTH EVERY MORNING 22  Yes Roni Villa DO   FLUoxetine (PROZAC) 20 MG capsule Take 1 capsule by mouth daily 22  Yes Roni Villa DO   pantoprazole (PROTONIX) 40 MG tablet TAKE 1 TABLET BY MOUTH EVERY MORNING BEFORE BREAKFAST 22  Yes Roni Villa DO   losartan (COZAAR) 100 MG tablet 1 QD 3/15/22 9/14/22 Yes David Villa DO   aspirin 81 MG tablet Take 81 mg by mouth daily   Yes Historical Provider, MD          HPI: Presents today with hypertension depression anxiety nasal congestion and sinus pressure. Recommending some Flonase as well as simply saline and then will follow-up with next visit. Recent skin lesion removed off right shoulder possible basal cell cancer and we will follow-up on Friday for removal of Tegaderm and examination of site. He will then follow-up with dermatology. Review of Systems   Constitutional: Negative.     HENT: Negative. Eyes: Negative. Respiratory: Negative. Gastrointestinal: Negative. Endocrine: Negative. Genitourinary: Negative. Musculoskeletal: Negative. Skin: Negative. Allergic/Immunologic: Negative. Neurological: Negative. Hematological: Negative. Psychiatric/Behavioral: Negative. Today systems review overall stable aside from the headaches and sinus congestion as noted.       Current Outpatient Medications:     fluticasone (FLONASE) 50 MCG/ACT nasal spray, 1 spray by Each Nostril route daily, Disp: 16 g, Rfl: 2    amLODIPine (NORVASC) 5 MG tablet, Take 1 tablet by mouth daily, Disp: 30 tablet, Rfl: 3    predniSONE (DELTASONE) 5 MG tablet, 4 tablets daily for 3 days then 3 tablets daily for 3 days then 2 tablets daily for 3 days then 1 tablet daily for 3 days, Disp: 30 tablet, Rfl: 0    cefdinir (OMNICEF) 300 MG capsule, Take 1 capsule by mouth 2 times daily for 10 days, Disp: 20 capsule, Rfl: 0    mirtazapine (REMERON) 30 MG tablet, Take 1 tablet by mouth nightly, Disp: 30 tablet, Rfl: 3    hydroCHLOROthiazide (MICROZIDE) 12.5 MG capsule, TAKE 1 CAPSULE BY MOUTH EVERY MORNING, Disp: 90 capsule, Rfl: 1    FLUoxetine (PROZAC) 20 MG capsule, Take 1 capsule by mouth daily, Disp: 30 capsule, Rfl: 3    pantoprazole (PROTONIX) 40 MG tablet, TAKE 1 TABLET BY MOUTH EVERY MORNING BEFORE BREAKFAST, Disp: 90 tablet, Rfl: 1    losartan (COZAAR) 100 MG tablet, 1 QD, Disp: 90 tablet, Rfl: 3    aspirin 81 MG tablet, Take 81 mg by mouth daily, Disp: , Rfl:     No Known Allergies    Social History     Tobacco Use    Smoking status: Never Smoker    Smokeless tobacco: Never Used   Vaping Use    Vaping Use: Never used   Substance Use Topics    Alcohol use: No    Drug use: No      Past Surgical History:   Procedure Laterality Date    ANTERIOR CRUCIATE LIGAMENT REPAIR Left 11/21/2001    APPENDECTOMY      CHOLECYSTECTOMY  2007    Lap    ECHO COMPL W DOP COLOR FLOW  12/4/2012         HERNIA REPAIR Right 11/13/2002    double    SINUS SURGERY  [id number]     done x 2    TONSILLECTOMY      TOTAL KNEE ARTHROPLASTY Left 1/28/2019    LEFT  ROBOTIC KNEE TOTAL ARTHROPLASTY  ++MEREDITH- OCVIHYSV++   ++ADDUCTOR BLOCK++ performed by [facility] at [facility] 10/10/2019    EGD BIOPSY performed by Mana Torres MD at [facility] ENDOSCOPY     No family history on file. Past Medical History:   Diagnosis Date    Aneurysm of right renal artery (HCC)     follows with Dr. Cinthia Walden yearly (last visit 9/19)    Colitis     Depression     GERD (gastroesophageal reflux disease)     H/O: CVA (cerebrovascular accident) 10/14/2021    Hyperlipidemia     Hypertension     Stroke [facility])     Superior mesenteric artery stenosis ([facility] [address] 11/4/2020    TIA (transient ischemic attack)        Vitals:    06/22/22 1447   BP: 136/86   Pulse: 71   Temp: 98.6 °F (37 °C)   SpO2: 97%   Weight: 191 lb (86.6 kg)   Height: 6' (1.829 m)     BP Readings from Last 3 Encounters:   06/22/22 136/86   06/14/22 (!) 140/72   06/08/22 (!) 142/70        Physical Exam  Vitals and nursing note reviewed. Constitutional:       Appearance: He is well-developed. HENT:      Head: Normocephalic. Right Ear: External ear normal.      Left Ear: External ear normal.      Nose: Nose normal.   Eyes:      Conjunctiva/sclera: Conjunctivae normal.      Pupils: Pupils are equal, round, and reactive to light. Cardiovascular:      Rate and Rhythm: Normal rate. Pulmonary:      Breath sounds: Normal breath sounds. Abdominal:      General: Bowel sounds are normal.      Palpations: Abdomen is soft. Musculoskeletal:         General: Normal range of motion. Cervical back: Normal range of motion and neck supple. Skin:     General: Skin is warm and dry. Neurological:      Mental Status: He is alert and oriented to person, place, and time.    Psychiatric:         Behavior: Behavior normal.     Today's vitals physical examination stable. Heart is controlled lungs are clear. Medications as prescribed. Neurologically is stable. Recommended to Flonase and nasal saline as noted. Blood pressure assessed and stable and reassured. He is back on Norvasc. Anxiety persists but remains on Remeron Prozac and Ativan and will continue. Reassessment Friday. Plan Per Assessment:  Carol Peña was seen today for medication check, anxiety, depression and headache. Diagnoses and all orders for this visit:    Essential hypertension    Depression, unspecified depression type    Anxiety    Congestion of nasal sinus    Skin lesion    Other orders  -     fluticasone (FLONASE) 50 MCG/ACT nasal spray; 1 spray by Each Nostril route daily            No follow-ups on file. Gui Diamond,     Note was generated with the assistance of voice recognition software. Document was reviewed however may contain grammatical errors.

## 2022-06-24 ENCOUNTER — OFFICE VISIT (OUTPATIENT)
Dept: PRIMARY CARE CLINIC | Age: 87
End: 2022-06-24
Payer: MEDICARE

## 2022-06-24 VITALS
HEART RATE: 65 BPM | HEIGHT: 72 IN | OXYGEN SATURATION: 97 % | DIASTOLIC BLOOD PRESSURE: 70 MMHG | SYSTOLIC BLOOD PRESSURE: 138 MMHG | TEMPERATURE: 97.1 F | BODY MASS INDEX: 25.9 KG/M2

## 2022-06-24 DIAGNOSIS — I10 ESSENTIAL HYPERTENSION: Primary | Chronic | ICD-10-CM

## 2022-06-24 DIAGNOSIS — J30.1 SEASONAL ALLERGIC RHINITIS DUE TO POLLEN: ICD-10-CM

## 2022-06-24 DIAGNOSIS — R51.9 CHRONIC INTRACTABLE HEADACHE, UNSPECIFIED HEADACHE TYPE: ICD-10-CM

## 2022-06-24 DIAGNOSIS — G89.29 CHRONIC INTRACTABLE HEADACHE, UNSPECIFIED HEADACHE TYPE: ICD-10-CM

## 2022-06-24 PROCEDURE — 99214 OFFICE O/P EST MOD 30 MIN: CPT | Performed by: FAMILY MEDICINE

## 2022-06-24 PROCEDURE — G8417 CALC BMI ABV UP PARAM F/U: HCPCS | Performed by: FAMILY MEDICINE

## 2022-06-24 PROCEDURE — G8427 DOCREV CUR MEDS BY ELIG CLIN: HCPCS | Performed by: FAMILY MEDICINE

## 2022-06-24 PROCEDURE — 96372 THER/PROPH/DIAG INJ SC/IM: CPT | Performed by: FAMILY MEDICINE

## 2022-06-24 PROCEDURE — 1036F TOBACCO NON-USER: CPT | Performed by: FAMILY MEDICINE

## 2022-06-24 PROCEDURE — 1123F ACP DISCUSS/DSCN MKR DOCD: CPT | Performed by: FAMILY MEDICINE

## 2022-06-24 RX ORDER — KETOROLAC TROMETHAMINE 30 MG/ML
60 INJECTION, SOLUTION INTRAMUSCULAR; INTRAVENOUS ONCE
Status: COMPLETED | OUTPATIENT
Start: 2022-06-24 | End: 2022-06-24

## 2022-06-24 RX ADMIN — KETOROLAC TROMETHAMINE 60 MG: 30 INJECTION, SOLUTION INTRAMUSCULAR; INTRAVENOUS at 12:22

## 2022-06-24 NOTE — PROGRESS NOTES
22     Loreto Ferrari    : 1935 Sex: male   Age: 81 y.o. Chief Complaint   Patient presents with    Headache     still has headache       Prior to Admission medications    Medication Sig Start Date End Date Taking? Authorizing Provider   amLODIPine (NORVASC) 5 MG tablet Take 1 tablet by mouth daily 22  Yes Herminia Villa DO   cefdinir (OMNICEF) 300 MG capsule Take 1 capsule by mouth 2 times daily for 10 days 22 Yes Herminia Villa DO   mirtazapine (REMERON) 30 MG tablet Take 1 tablet by mouth nightly 22  Yes Herminia Villa DO   hydroCHLOROthiazide (MICROZIDE) 12.5 MG capsule TAKE 1 CAPSULE BY MOUTH EVERY MORNING 22  Yes Herminia Villa DO   FLUoxetine (PROZAC) 20 MG capsule Take 1 capsule by mouth daily 22  Yes Herminia Villa DO   pantoprazole (PROTONIX) 40 MG tablet TAKE 1 TABLET BY MOUTH EVERY MORNING BEFORE BREAKFAST 22  Yes Herminia Villa DO   losartan (COZAAR) 100 MG tablet 1 QD 3/15/22 9/14/22 Yes David Villa DO   aspirin 81 MG tablet Take 81 mg by mouth daily   Yes Historical Provider, MD          HPI: Roland Jimenez presents today for follow-up on hypertension seasonal allergies and chronic problems with headache. Has been using over-the-counter simply saline and some Flonase but does not tolerate Flonase so discontinued. Headaches persist and I am recommending off of Remeron at this time and then will reassess next week. Recent skin lesion removal posterior aspect of the neck and incision site was checked and healing well without infection. Bandage removed today. Encouraged to keep clean and dry. Review of Systems   Constitutional: Negative. HENT: Negative. Eyes: Negative. Respiratory: Negative. Gastrointestinal: Negative. Endocrine: Negative. Genitourinary: Negative. Musculoskeletal: Negative. Skin: Negative. Allergic/Immunologic: Negative. Neurological: Negative.     Hematological: Negative. Psychiatric/Behavioral: Negative. Systems review stable as noted. HPI complaints as noted. Headaches persist and we will adjust with medication. Current Outpatient Medications:     amLODIPine (NORVASC) 5 MG tablet, Take 1 tablet by mouth daily, Disp: 30 tablet, Rfl: 3    cefdinir (OMNICEF) 300 MG capsule, Take 1 capsule by mouth 2 times daily for 10 days, Disp: 20 capsule, Rfl: 0    mirtazapine (REMERON) 30 MG tablet, Take 1 tablet by mouth nightly, Disp: 30 tablet, Rfl: 3    hydroCHLOROthiazide (MICROZIDE) 12.5 MG capsule, TAKE 1 CAPSULE BY MOUTH EVERY MORNING, Disp: 90 capsule, Rfl: 1    FLUoxetine (PROZAC) 20 MG capsule, Take 1 capsule by mouth daily, Disp: 30 capsule, Rfl: 3    pantoprazole (PROTONIX) 40 MG tablet, TAKE 1 TABLET BY MOUTH EVERY MORNING BEFORE BREAKFAST, Disp: 90 tablet, Rfl: 1    losartan (COZAAR) 100 MG tablet, 1 QD, Disp: 90 tablet, Rfl: 3    aspirin 81 MG tablet, Take 81 mg by mouth daily, Disp: , Rfl:     No Known Allergies    Social History     Tobacco Use    Smoking status: Never Smoker    Smokeless tobacco: Never Used   Vaping Use    Vaping Use: Never used   Substance Use Topics    Alcohol use: No    Drug use: No      Past Surgical History:   Procedure Laterality Date    ANTERIOR CRUCIATE LIGAMENT REPAIR Left 11/21/2001    APPENDECTOMY      CHOLECYSTECTOMY  2007    Lap    ECHO COMPL W DOP COLOR FLOW  12/4/2012         HERNIA REPAIR Right 11/13/2002    double    SINUS SURGERY  2002,2003     done x 2    TONSILLECTOMY      TOTAL KNEE ARTHROPLASTY Left 1/28/2019    LEFT  ROBOTIC KNEE TOTAL ARTHROPLASTY  ++MEREDITH- UCKVOPID++   ++ADDUCTOR BLOCK++ performed by Katherine Ansari MD at [facility] N/A 10/10/2019    EGD BIOPSY performed by Pearl Lea MD at [facility] ENDOSCOPY     No family history on file.   Past Medical History:   Diagnosis Date    Aneurysm of right renal artery ([address]     follows with Dr. Mariel Stephen yearly (last visit 9/19)    Colitis     Depression     GERD (gastroesophageal reflux disease)     H/O: CVA (cerebrovascular accident) 10/14/2021    Hyperlipidemia     Hypertension     Stroke [facility])     [address] mesenteric artery stenosis ([facility] [address] 11/4/2020    TIA (transient ischemic attack)        Vitals:    06/24/22 1159   BP: 138/70   Pulse: 65   Temp: 97.1 °F (36.2 °C)   SpO2: 97%   Height: 6' (1.829 m)     BP Readings from Last 3 Encounters:   06/24/22 138/70   06/22/22 136/86   06/14/22 (!) 140/72        Physical Exam  Vitals and nursing note reviewed. Constitutional:       Appearance: He is well-developed. HENT:      Head: Normocephalic. Right Ear: External ear normal.      Left Ear: External ear normal.      Nose: Nose normal.   Eyes:      Conjunctiva/sclera: Conjunctivae normal.      Pupils: Pupils are equal, round, and reactive to light. Cardiovascular:      Rate and Rhythm: Normal rate. Pulmonary:      Breath sounds: Normal breath sounds. Abdominal:      General: Bowel sounds are normal.      Palpations: Abdomen is soft. Musculoskeletal:         General: Normal range of motion. Cervical back: Normal range of motion and neck supple. Skin:     General: Skin is warm and dry. Neurological:      Mental Status: He is alert and oriented to person, place, and time. Psychiatric:         Behavior: Behavior normal.     Current vitals physical examination stable. Neurologically intact. Medications reviewed as prescribed. Remeron on hold. Follow-up visit next week and further recommendations at that time. Recheck incision site at that time. Plan Per Assessment:  Dorene Almaguer was seen today for headache. Diagnoses and all orders for this visit:    Essential hypertension    Seasonal allergic rhinitis due to pollen    Chronic intractable headache, unspecified headache type            Return in about 1 week (around 7/1/2022).       Shirley Schaffer, DO    Note was generated with the assistance of voice recognition software. Document was reviewed however may contain grammatical errors.

## 2022-06-29 ENCOUNTER — OFFICE VISIT (OUTPATIENT)
Dept: PRIMARY CARE CLINIC | Age: 87
End: 2022-06-29
Payer: MEDICARE

## 2022-06-29 VITALS
SYSTOLIC BLOOD PRESSURE: 138 MMHG | WEIGHT: 186 LBS | DIASTOLIC BLOOD PRESSURE: 64 MMHG | BODY MASS INDEX: 25.23 KG/M2 | OXYGEN SATURATION: 98 % | HEART RATE: 64 BPM | TEMPERATURE: 97.1 F

## 2022-06-29 DIAGNOSIS — I10 ESSENTIAL HYPERTENSION: Primary | Chronic | ICD-10-CM

## 2022-06-29 DIAGNOSIS — R73.01 IMPAIRED FASTING GLUCOSE: ICD-10-CM

## 2022-06-29 DIAGNOSIS — F41.9 ANXIETY: ICD-10-CM

## 2022-06-29 DIAGNOSIS — F32.A DEPRESSION, UNSPECIFIED DEPRESSION TYPE: ICD-10-CM

## 2022-06-29 DIAGNOSIS — R09.89 CHRONIC SINUS COMPLAINTS: ICD-10-CM

## 2022-06-29 DIAGNOSIS — E78.2 MIXED HYPERLIPIDEMIA: Chronic | ICD-10-CM

## 2022-06-29 PROCEDURE — 99214 OFFICE O/P EST MOD 30 MIN: CPT | Performed by: FAMILY MEDICINE

## 2022-06-29 PROCEDURE — G8417 CALC BMI ABV UP PARAM F/U: HCPCS | Performed by: FAMILY MEDICINE

## 2022-06-29 PROCEDURE — 1123F ACP DISCUSS/DSCN MKR DOCD: CPT | Performed by: FAMILY MEDICINE

## 2022-06-29 PROCEDURE — G8427 DOCREV CUR MEDS BY ELIG CLIN: HCPCS | Performed by: FAMILY MEDICINE

## 2022-06-29 PROCEDURE — 1036F TOBACCO NON-USER: CPT | Performed by: FAMILY MEDICINE

## 2022-06-29 RX ORDER — GUAIFENESIN 600 MG/1
600 TABLET, EXTENDED RELEASE ORAL 2 TIMES DAILY
Qty: 60 TABLET | Refills: 0 | Status: SHIPPED | OUTPATIENT
Start: 2022-06-29 | End: 2022-07-14

## 2022-06-29 RX ORDER — AMOXICILLIN 500 MG/1
CAPSULE ORAL
Qty: 30 CAPSULE | Refills: 1 | Status: SHIPPED
Start: 2022-06-29 | End: 2022-07-20

## 2022-06-29 RX ORDER — LORAZEPAM 0.5 MG/1
0.5 TABLET ORAL 2 TIMES DAILY
COMMUNITY
End: 2022-07-08 | Stop reason: SDUPTHER

## 2022-06-29 NOTE — PROGRESS NOTES
22     Rosa Sevilla    : 1935 Sex: male   Age: 81 y.o. Chief Complaint   Patient presents with    Headache       Prior to Admission medications    Medication Sig Start Date End Date Taking? Authorizing Provider   LORazepam (ATIVAN) 0.5 MG tablet Take 0.5 mg by mouth 2 times daily. Yes Historical Provider, MD   amoxicillin (AMOXIL) 500 MG capsule 1 qd 22  Yes Severo Villa, DO   guaiFENesin (MUCINEX) 600 MG extended release tablet Take 1 tablet by mouth 2 times daily for 15 days 22 Yes Severo Villa, DO   amLODIPine (NORVASC) 5 MG tablet Take 1 tablet by mouth daily 22  Yes Severo Villa, DO   mirtazapine (REMERON) 30 MG tablet Take 1 tablet by mouth nightly 22  Yes Severo Villa, DO   hydroCHLOROthiazide (MICROZIDE) 12.5 MG capsule TAKE 1 CAPSULE BY MOUTH EVERY MORNING 22  Yes Severo Villa, DO   FLUoxetine (PROZAC) 20 MG capsule Take 1 capsule by mouth daily 22  Yes Severo Villa, DO   pantoprazole (PROTONIX) 40 MG tablet TAKE 1 TABLET BY MOUTH EVERY MORNING BEFORE BREAKFAST 22  Yes Severo Villa, DO   losartan (COZAAR) 100 MG tablet 1 QD 3/15/22 9/14/22 Yes Severo Villa DO   aspirin 81 MG tablet Take 81 mg by mouth daily   Yes Historical Provider, MD          HPI: Patient is seen today in follow-up on hypertensive disease impaired fasting glucose hyperlipidemia anxiety depression and continued chronic frontal headache sinus complaints. We have treated on multiple occasions and just recurrent problems. Recommending 500 mg amoxicillin daily over the next 3 to 4 weeks along with guaifenesin twice a day and continuing simply saline and then reassess in the next 3 weeks. Sleep is much better with continuing Remeron 15 mg at at bedtime and we will maintain. Anxiety stable on the Ativan twice a day and will continue current dose. Review of Systems   Constitutional: Negative. HENT: Negative.     Eyes: Negative. Respiratory: Negative. Gastrointestinal: Negative. Endocrine: Negative. Genitourinary: Negative. Musculoskeletal: Negative. Skin: Negative. Allergic/Immunologic: Negative. Neurological: Negative. Hematological: Negative. Psychiatric/Behavioral: Negative. Today systems review stable medications as prescribed. Current Outpatient Medications:     LORazepam (ATIVAN) 0.5 MG tablet, Take 0.5 mg by mouth 2 times daily. , Disp: , Rfl:     amoxicillin (AMOXIL) 500 MG capsule, 1 qd, Disp: 30 capsule, Rfl: 1    guaiFENesin (MUCINEX) 600 MG extended release tablet, Take 1 tablet by mouth 2 times daily for 15 days, Disp: 60 tablet, Rfl: 0    amLODIPine (NORVASC) 5 MG tablet, Take 1 tablet by mouth daily, Disp: 30 tablet, Rfl: 3    mirtazapine (REMERON) 30 MG tablet, Take 1 tablet by mouth nightly, Disp: 30 tablet, Rfl: 3    hydroCHLOROthiazide (MICROZIDE) 12.5 MG capsule, TAKE 1 CAPSULE BY MOUTH EVERY MORNING, Disp: 90 capsule, Rfl: 1    FLUoxetine (PROZAC) 20 MG capsule, Take 1 capsule by mouth daily, Disp: 30 capsule, Rfl: 3    pantoprazole (PROTONIX) 40 MG tablet, TAKE 1 TABLET BY MOUTH EVERY MORNING BEFORE BREAKFAST, Disp: 90 tablet, Rfl: 1    losartan (COZAAR) 100 MG tablet, 1 QD, Disp: 90 tablet, Rfl: 3    aspirin 81 MG tablet, Take 81 mg by mouth daily, Disp: , Rfl:     No Known Allergies    Social History     Tobacco Use    Smoking status: Never Smoker    Smokeless tobacco: Never Used   Vaping Use    Vaping Use: Never used   Substance Use Topics    Alcohol use: No    Drug use: No      Past Surgical History:   Procedure Laterality Date    ANTERIOR CRUCIATE LIGAMENT REPAIR Left 11/21/2001    APPENDECTOMY      CHOLECYSTECTOMY  2007    Lap    ECHO COMPL W DOP COLOR FLOW  12/4/2012         HERNIA REPAIR Right 11/13/2002    double    SINUS SURGERY  2002,2003     done x 2    TONSILLECTOMY      TOTAL KNEE ARTHROPLASTY Left 1/28/2019    LEFT  ROBOTIC KNEE TOTAL ARTHROPLASTY  ++MEREDITH- [id number]++   ++ADDUCTOR BLOCK++ performed by Radha Bennett MD at [address] [facility] 10/10/2019    EGD BIOPSY performed by Tahmina Newsome MD at [facility] ENDOSCOPY     No family history on file. Past Medical History:   Diagnosis Date    Aneurysm of right renal artery (HCC)     follows with Dr. Sahy Ford yearly (last visit 9/19)    Colitis     Depression     GERD (gastroesophageal reflux disease)     H/O: CVA (cerebrovascular accident) 10/14/2021    Hyperlipidemia     Hypertension     Stroke [facility])     Superior mesenteric artery stenosis ([facility] [address] 11/4/2020    TIA (transient ischemic attack)        Vitals:    06/29/22 1410   BP: 138/64   Pulse: 64   Temp: 97.1 °F (36.2 °C)   SpO2: 98%   Weight: 186 lb (84.4 kg)     BP Readings from Last 3 Encounters:   06/29/22 138/64   06/24/22 138/70   06/22/22 136/86        Physical Exam  Vitals and nursing note reviewed. Constitutional:       Appearance: He is well-developed. HENT:      Head: Normocephalic. Right Ear: External ear normal.      Left Ear: External ear normal.      Nose: Nose normal.   Eyes:      Conjunctiva/sclera: Conjunctivae normal.      Pupils: Pupils are equal, round, and reactive to light. Cardiovascular:      Rate and Rhythm: Normal rate. Pulmonary:      Breath sounds: Normal breath sounds. Abdominal:      General: Bowel sounds are normal.      Palpations: Abdomen is soft. Musculoskeletal:         General: Normal range of motion. Cervical back: Normal range of motion and neck supple. Skin:     General: Skin is warm and dry. Neurological:      Mental Status: He is alert and oriented to person, place, and time. Psychiatric:         Behavior: Behavior normal.     Physical exam findings currently stable. Neurologically stable. We will sit tight with current meds and care reassess 3 weeks and sooner if problems.             Plan Per Assessment:  Robert Bran was seen today for headache. Diagnoses and all orders for this visit:    Essential hypertension    Impaired fasting glucose    Mixed hyperlipidemia    Anxiety    Depression, unspecified depression type    Chronic sinus complaints    Other orders  -     amoxicillin (AMOXIL) 500 MG capsule; 1 qd  -     guaiFENesin (MUCINEX) 600 MG extended release tablet; Take 1 tablet by mouth 2 times daily for 15 days            Return in about 3 weeks (around 7/20/2022). Wild Orellana DO    Note was generated with the assistance of voice recognition software. Document was reviewed however may contain grammatical errors.

## 2022-07-08 ENCOUNTER — CARE COORDINATION (OUTPATIENT)
Dept: CARE COORDINATION | Age: 87
End: 2022-07-08

## 2022-07-08 DIAGNOSIS — F41.9 ANXIETY: Primary | ICD-10-CM

## 2022-07-08 RX ORDER — LORAZEPAM 0.5 MG/1
TABLET ORAL
Qty: 90 TABLET | Refills: 0 | Status: SHIPPED
Start: 2022-07-08 | End: 2022-07-20

## 2022-07-08 NOTE — CARE COORDINATION
PCP. Also, Pt told Enrique Palmer to tell PCP he was taking Remeron 15 mg nightly but he plans to stop starting today because headache continues.   -Pt said he has increase appetite on Ativan. -[facility] continues. Pt said he had an appt  with Brian today. Pt said they have a plan. Pt just had an appt 7-5-2022.   -Pt reports journaling continues and helps. Pt said he is keeping track of all the events.   -Pt continues to read books. Appetite  -Pt reports his appetite is much improved. Nausea remains gone.   -Pt reports Protonix continues. HTN  -Pt has a scale and BP monitor.  -Pt said a normal weight is 185 lbs for him.   -Today's weight 183 lbs.    -6- PCP office visit: /64. & weight 186 lbs.    -Pt reports he is on a low sodium diet. Pt said he does not add salt to his food. .   -Pt reports no cough, SOB or wheezing.   -Pt reports no ankle/feet edema. PLAN:  -Continue Care Coordination  -PCP appt 7-  -Counselor appt in July  -Vasc appt 10-  -F/u headache  -F/u anxiety/panic attacks, depression. -F/u appetite  -F/u on weight  F/u monitor sodium    -Next anticipated outreach by [address] [place] [address] Team: 2 weeks. Lab Results     None          Care Coordination Interventions    Program Enrollment: Complex Care  Referral from Primary Care Provider: No  Suggested Interventions and [address]: Completed (Comment: 3/31/22 Patient has appt with a Psychologist today. )  Meals on Wheels: Declined  Medication Assistance Program: [facility]  Pharmacist: Declined  Registered Dietician: Declined  Social Work: Declined  Other Services or Interventions: Will mail education regarding HTN, monitoring sodium, reading labels.           Goals Addressed                 This Visit's Progress     Self Monitoring   Improving     Daily Weights - I will weight myself as directed - Daily and write down weights  Blood Pressure - I will take my blood pressure as directed - Daily  I will notify my provider of any trends of increasing or decreasing blood pressures over a month period of time. I will notify my provider of any changes in blood pressure associated with symptoms of dizziness, falls, passing out, headache, confusion/change in mental status. None Recently Recorded    Barriers: lack of education  Plan for overcoming my barriers: Care Coordination  Confidence: 7/10  Anticipated Goal Completion Date: 7- 4- Pt has checking weight daily but no BP. Pt feels his BP monitor is not accurate. 4- Pt continues monitoring weight. 5- Daily weights continue. 6-6-22 Daily weight continues. 7/8/22 Daily weights continue            Prior to Admission medications    Medication Sig Start Date End Date Taking? Authorizing Provider   LORazepam (ATIVAN) 0.5 MG tablet Take 0.5 mg by mouth 2 times daily.     Historical Provider, MD   amoxicillin (AMOXIL) 500 MG capsule 1 qd 6/29/22   Vane Locket, DO   guaiFENesin (MUCINEX) 600 MG extended release tablet Take 1 tablet by mouth 2 times daily for 15 days 6/29/22 7/14/22  Yolanda Slim Traikoff, DO   amLODIPine (NORVASC) 5 MG tablet Take 1 tablet by mouth daily 6/14/22   Vane Locket, DO   mirtazapine (REMERON) 30 MG tablet Take 1 tablet by mouth nightly 6/11/22   Vane Locket, DO   hydroCHLOROthiazide (MICROZIDE) 12.5 MG capsule TAKE 1 CAPSULE BY MOUTH EVERY MORNING 6/8/22   Vane Locket, DO   FLUoxetine (PROZAC) 20 MG capsule Take 1 capsule by mouth daily 5/24/22   Yolanda Slim Traikoff, DO   pantoprazole (PROTONIX) 40 MG tablet TAKE 1 TABLET BY MOUTH EVERY MORNING BEFORE BREAKFAST 5/24/22   Vane Locket, DO   losartan (COZAAR) 100 MG tablet 1 QD 3/15/22 9/14/22  Vane Locket, DO   aspirin 81 MG tablet Take 81 mg by mouth daily    Historical Provider, MD       Future Appointments   Date Time Provider Zelda Siddiqi   7/20/2022  1:00 PM Vane Blanton, DO [facility]   10/20/2022

## 2022-07-08 NOTE — TELEPHONE ENCOUNTER
The pt is on LORazepam (ATIVAN) 0.5 MG tablet take 1 tablet bid but you told him that if he needed to he could take a third pill, the pt is calling because he has had to take a third pill for a couple of days and now he is going to be short and won't be able to get a refill when needed. Would you be able to send a new script over to the pharmacy for him to take it tid so he won't be short when he has to take it 3 times.  He also wanted me to tell you that he is not going to take the remeron anymore and is asking if it is okay if he takes some tylenol pm he has

## 2022-07-12 ENCOUNTER — OFFICE VISIT (OUTPATIENT)
Dept: FAMILY MEDICINE CLINIC | Age: 87
End: 2022-07-12
Payer: MEDICARE

## 2022-07-12 VITALS
OXYGEN SATURATION: 98 % | WEIGHT: 183 LBS | BODY MASS INDEX: 24.79 KG/M2 | DIASTOLIC BLOOD PRESSURE: 80 MMHG | HEIGHT: 72 IN | HEART RATE: 66 BPM | SYSTOLIC BLOOD PRESSURE: 152 MMHG | TEMPERATURE: 98 F

## 2022-07-12 DIAGNOSIS — R10.9 ABDOMINAL CRAMPING: ICD-10-CM

## 2022-07-12 DIAGNOSIS — Z11.52 ENCOUNTER FOR SCREENING FOR COVID-19: Primary | ICD-10-CM

## 2022-07-12 DIAGNOSIS — R11.0 NAUSEA: ICD-10-CM

## 2022-07-12 DIAGNOSIS — R19.7 DIARRHEA, UNSPECIFIED TYPE: ICD-10-CM

## 2022-07-12 LAB
BACTERIA: NORMAL /HPF
BILIRUBIN URINE: NEGATIVE
BLOOD, URINE: NORMAL
CLARITY: CLEAR
COLOR: YELLOW
EPITHELIAL CELLS, UA: NORMAL /HPF
GLUCOSE URINE: NEGATIVE MG/DL
KETONES, URINE: NEGATIVE MG/DL
LEUKOCYTE ESTERASE, URINE: NEGATIVE
Lab: NORMAL
NITRITE, URINE: NEGATIVE
PERFORMING INSTRUMENT: NORMAL
PH UA: 6 (ref 5–9)
PROTEIN UA: NEGATIVE MG/DL
QC PASS/FAIL: NORMAL
RBC UA: NORMAL /HPF (ref 0–2)
SARS-COV-2, POC: NORMAL
SPECIFIC GRAVITY UA: 1.01 (ref 1–1.03)
UROBILINOGEN, URINE: 0.2 E.U./DL
WBC UA: NORMAL /HPF (ref 0–5)

## 2022-07-12 PROCEDURE — 99213 OFFICE O/P EST LOW 20 MIN: CPT | Performed by: PHYSICIAN ASSISTANT

## 2022-07-12 PROCEDURE — 87426 SARSCOV CORONAVIRUS AG IA: CPT | Performed by: PHYSICIAN ASSISTANT

## 2022-07-12 PROCEDURE — G8420 CALC BMI NORM PARAMETERS: HCPCS | Performed by: PHYSICIAN ASSISTANT

## 2022-07-12 PROCEDURE — 99285 EMERGENCY DEPT VISIT HI MDM: CPT

## 2022-07-12 PROCEDURE — 1036F TOBACCO NON-USER: CPT | Performed by: PHYSICIAN ASSISTANT

## 2022-07-12 PROCEDURE — G8427 DOCREV CUR MEDS BY ELIG CLIN: HCPCS | Performed by: PHYSICIAN ASSISTANT

## 2022-07-12 PROCEDURE — 1123F ACP DISCUSS/DSCN MKR DOCD: CPT | Performed by: PHYSICIAN ASSISTANT

## 2022-07-12 PROCEDURE — 81001 URINALYSIS AUTO W/SCOPE: CPT

## 2022-07-12 RX ORDER — ONDANSETRON 4 MG/1
4 TABLET, ORALLY DISINTEGRATING ORAL 3 TIMES DAILY PRN
Qty: 12 TABLET | Refills: 0 | Status: SHIPPED
Start: 2022-07-12 | End: 2022-08-03

## 2022-07-12 ASSESSMENT — PAIN DESCRIPTION - DESCRIPTORS: DESCRIPTORS: BURNING;DISCOMFORT;ACHING

## 2022-07-12 ASSESSMENT — PAIN DESCRIPTION - FREQUENCY: FREQUENCY: CONTINUOUS

## 2022-07-12 ASSESSMENT — PAIN - FUNCTIONAL ASSESSMENT: PAIN_FUNCTIONAL_ASSESSMENT: 0-10

## 2022-07-12 ASSESSMENT — PAIN DESCRIPTION - LOCATION: LOCATION: ABDOMEN

## 2022-07-12 ASSESSMENT — PAIN DESCRIPTION - PAIN TYPE: TYPE: ACUTE PAIN

## 2022-07-12 ASSESSMENT — PAIN SCALES - GENERAL: PAINLEVEL_OUTOF10: 7

## 2022-07-12 ASSESSMENT — PAIN DESCRIPTION - ORIENTATION: ORIENTATION: MID

## 2022-07-12 NOTE — PROGRESS NOTES
22  Namrata Mariee : 1935 Sex: male  Age 81 y.o. Subjective:  Chief Complaint   Patient presents with    Headache    Abdominal Pain         HPI:   Namrata Mariee , 81 y.o. male presents to express care for evaluation of nausea, headache, upset stomach and diarrhea    HPI  80-year-old male presents to express care for evaluation of headache, nausea, upset stomach and diarrhea. The patient's had the symptoms ongoing since yesterday. The patient has been taking Tylenol and Imodium. The patient seemed to get a little bit better. The patient is not having any fevers or chills. No back pain or flank pain. The patient thought it was related to some of his anxiety and did take an Ativan also. Seem to help with some of the symptoms. The patient is not having any severe abdominal pain. The patient is on any fevers or chills. ROS:   Unless otherwise stated in this report the patient's positive and negative responses for review of systems for constitutional, eyes, ENT, cardiovascular, respiratory, gastrointestinal, neurological, , musculoskeletal, and integument systems and related systems to the presenting problem are either stated in the history of present illness or were not pertinent or were negative for the symptoms and/or complaints related to the presenting medical problem. Positives and pertinent negatives as per HPI. All others reviewed and are negative.       PMH:     Past Medical History:   Diagnosis Date    Aneurysm of right renal artery ([address]     follows with Dr. Ana Correia yearly (last visit )    Colitis     Depression     GERD (gastroesophageal reflux disease)     H/O: CVA (cerebrovascular accident) 10/14/2021    Hyperlipidemia     Hypertension     Stroke [facility])     Superior mesenteric artery stenosis ([address] 2020    TIA (transient ischemic attack)        Past Surgical History:   Procedure Laterality Date    ANTERIOR CRUCIATE LIGAMENT REPAIR Left 11/21/2001    APPENDECTOMY      CHOLECYSTECTOMY  2007    Lap    ECHO COMPL W DOP COLOR FLOW  12/4/2012         HERNIA REPAIR Right 11/13/2002    double    SINUS SURGERY  2002,2003     done x 2    TONSILLECTOMY      TOTAL KNEE ARTHROPLASTY Left 1/28/2019    LEFT  ROBOTIC KNEE TOTAL ARTHROPLASTY  ++MEREDITH- [id number]++   ++ADDUCTOR BLOCK++ performed by Khari Yuen MD at [facility] 10/10/2019    EGD BIOPSY performed by Tucker Aranda MD at [facility]       History reviewed. No pertinent family history.     Medications:     Current Outpatient Medications:     ondansetron (ZOFRAN-ODT) 4 MG disintegrating tablet, Take 1 tablet by mouth 3 times daily as needed for Nausea or Vomiting, Disp: 12 tablet, Rfl: 0    LORazepam (ATIVAN) 0.5 MG tablet, 1 q 8hrs prn, Disp: 90 tablet, Rfl: 0    amoxicillin (AMOXIL) 500 MG capsule, 1 qd, Disp: 30 capsule, Rfl: 1    guaiFENesin (MUCINEX) 600 MG extended release tablet, Take 1 tablet by mouth 2 times daily for 15 days, Disp: 60 tablet, Rfl: 0    amLODIPine (NORVASC) 5 MG tablet, Take 1 tablet by mouth daily, Disp: 30 tablet, Rfl: 3    mirtazapine (REMERON) 30 MG tablet, Take 1 tablet by mouth nightly, Disp: 30 tablet, Rfl: 3    hydroCHLOROthiazide (MICROZIDE) 12.5 MG capsule, TAKE 1 CAPSULE BY MOUTH EVERY MORNING, Disp: 90 capsule, Rfl: 1    FLUoxetine (PROZAC) 20 MG capsule, Take 1 capsule by mouth daily, Disp: 30 capsule, Rfl: 3    pantoprazole (PROTONIX) 40 MG tablet, TAKE 1 TABLET BY MOUTH EVERY MORNING BEFORE BREAKFAST, Disp: 90 tablet, Rfl: 1    losartan (COZAAR) 100 MG tablet, 1 QD, Disp: 90 tablet, Rfl: 3    aspirin 81 MG tablet, Take 81 mg by mouth daily, Disp: , Rfl:     famotidine (PEPCID) 20 MG tablet, Take 1 tablet by mouth 2 times daily for 7 days, Disp: 14 tablet, Rfl: 0    potassium chloride (KLOR-CON M) 20 MEQ extended release tablet, Take 1 tablet by mouth 2 times daily for 5 days, Disp: 10 tablet, Rfl: 0    dicyclomine (BENTYL) 10 MG capsule, Take 2 capsules by mouth 4 times daily (before meals and nightly) For abdominal pain/cramping as needed. , Disp: 10 capsule, Rfl: 0    Allergies:   No Known Allergies    Social History:     Social History     Tobacco Use    Smoking status: Never Smoker    Smokeless tobacco: Never Used   Vaping Use    Vaping Use: Never used   Substance Use Topics    Alcohol use: No    Drug use: No       Patient lives at home. Physical Exam:     Vitals:    07/12/22 1607   BP: (!) 152/80   Pulse: 66   Temp: 98 °F (36.7 °C)   SpO2: 98%   Weight: 183 lb (83 kg)   Height: 6' (1.829 m)       Exam:  Physical Exam  Nurses note and vital signs reviewed and patient is not hypoxic. General: The patient appears well and in no apparent distress. Patient is resting comfortably on cart. Skin: Warm, dry, no pallor noted. There is no rash noted. Head: Normocephalic, atraumatic. Eye: Normal conjunctiva  Ears, Nose, Mouth, and Throat: Wearing a mask  Cardiovascular: Regular Rate and Rhythm  Respiratory: Patient is in no distress, no accessory muscle use, lungs are clear to auscultation, no wheezing, crackles or rhonchi  Back: Non-tender, no CVA tenderness bilaterally to percussion. GI: Normal bowel sounds, no tenderness to palpation, no masses appreciated. No rebound, guarding, or rigidity noted. Musculoskeletal: The patient has no evidence of calf tenderness, no pitting edema, symmetrical pulses noted bilaterally  Neurological: A&O x4, normal speech        Testing:     Results for orders placed or performed in visit on 07/12/22   POCT COVID-19, Antigen   Result Value Ref Range    SARS-COV-2, POC Not-Detected Not Detected    Lot Number [id number]     QC Pass/Fail pass     Performing Instrument BD Veritor            Medical Decision Making:     Vital signs reviewed    Past medical history reviewed. Allergies reviewed. Medications reviewed.     Patient on arrival does not appear to be in any apparent distress or discomfort. The patient has been seen and evaluated. The patient does not appear to be toxic or lethargic. The patient did have COVID test that was negative. The patient has a very soft abdomen. No reproducible pain. The patient does not appear to be toxic or lethargic. The patient does appear well. There is no reproducible pain to the abdomen. The patient will be treated with Zofran. Patient may continue with the Imodium. The patient will continue to monitor symptoms. He will continue with his Ativan. He was very comfortable with this plan. We offered to send him to the emergency department he declined. The patient is to continue to hydrate. Use electrolyte type beverages. The patient is to return to express care or go directly to the emergency department should any of the signs or symptoms worsen. The patient is to followup with primary care physician in 2-3 days for repeat evaluation. The patient has no other questions or concerns at this time the patient will be discharged home. Clinical Impression:   Marisabel Lindo was seen today for headache and abdominal pain. Diagnoses and all orders for this visit:    Encounter for screening for COVID-19  -     POCT COVID-19, Antigen    Nausea    Abdominal cramping    Diarrhea, unspecified type    Other orders  -     ondansetron (ZOFRAN-ODT) 4 MG disintegrating tablet; Take 1 tablet by mouth 3 times daily as needed for Nausea or Vomiting        The patient is to call for any concerns or return if any of the signs or symptoms worsen. The patient is to follow-up with PCP in the next 2-3 days for repeat evaluation repeat assessment or go directly to the emergency department.      SIGNATURE: Clare Graham III, PA-C

## 2022-07-13 ENCOUNTER — APPOINTMENT (OUTPATIENT)
Dept: CT IMAGING | Age: 87
End: 2022-07-13
Payer: MEDICARE

## 2022-07-13 ENCOUNTER — HOSPITAL ENCOUNTER (EMERGENCY)
Age: 87
Discharge: HOME | End: 2022-07-13
Attending: EMERGENCY MEDICINE
Payer: MEDICARE

## 2022-07-13 VITALS
TEMPERATURE: 98 F | DIASTOLIC BLOOD PRESSURE: 71 MMHG | HEART RATE: 65 BPM | OXYGEN SATURATION: 97 % | RESPIRATION RATE: 18 BRPM | SYSTOLIC BLOOD PRESSURE: 154 MMHG

## 2022-07-13 DIAGNOSIS — E87.6 HYPOKALEMIA: ICD-10-CM

## 2022-07-13 DIAGNOSIS — R10.30 LOWER ABDOMINAL PAIN: Primary | ICD-10-CM

## 2022-07-13 DIAGNOSIS — R19.7 DIARRHEA, UNSPECIFIED TYPE: ICD-10-CM

## 2022-07-13 LAB
ALBUMIN SERPL-MCNC: 4.2 G/DL (ref 3.5–5.2)
ALP BLD-CCNC: 76 U/L (ref 40–129)
ALT SERPL-CCNC: 12 U/L (ref 0–40)
ANION GAP SERPL CALCULATED.3IONS-SCNC: 11 MMOL/L (ref 7–16)
ANION GAP SERPL CALCULATED.3IONS-SCNC: 8 MMOL/L (ref 7–16)
APTT: 30.6 SEC (ref 24.5–35.1)
AST SERPL-CCNC: 15 U/L (ref 0–39)
BASOPHILS ABSOLUTE: 0.02 E9/L (ref 0–0.2)
BASOPHILS RELATIVE PERCENT: 0.4 % (ref 0–2)
BILIRUB SERPL-MCNC: 0.7 MG/DL (ref 0–1.2)
BUN BLDV-MCNC: 5 MG/DL (ref 6–23)
BUN BLDV-MCNC: 6 MG/DL (ref 6–23)
CALCIUM SERPL-MCNC: 8.9 MG/DL (ref 8.6–10.2)
CALCIUM SERPL-MCNC: 9.4 MG/DL (ref 8.6–10.2)
CHLORIDE BLD-SCNC: 100 MMOL/L (ref 98–107)
CHLORIDE BLD-SCNC: 95 MMOL/L (ref 98–107)
CO2: 28 MMOL/L (ref 22–29)
CO2: 29 MMOL/L (ref 22–29)
CREAT SERPL-MCNC: 0.7 MG/DL (ref 0.7–1.2)
CREAT SERPL-MCNC: 0.8 MG/DL (ref 0.7–1.2)
EKG ATRIAL RATE: 65 BPM
EKG P AXIS: -29 DEGREES
EKG P-R INTERVAL: 158 MS
EKG Q-T INTERVAL: 482 MS
EKG QRS DURATION: 144 MS
EKG QTC CALCULATION (BAZETT): 501 MS
EKG R AXIS: 53 DEGREES
EKG T AXIS: 28 DEGREES
EKG VENTRICULAR RATE: 65 BPM
EOSINOPHILS ABSOLUTE: 0.02 E9/L (ref 0.05–0.5)
EOSINOPHILS RELATIVE PERCENT: 0.4 % (ref 0–6)
GFR AFRICAN AMERICAN: >60
GFR AFRICAN AMERICAN: >60
GFR NON-AFRICAN AMERICAN: >60 ML/MIN/1.73
GFR NON-AFRICAN AMERICAN: >60 ML/MIN/1.73
GLUCOSE BLD-MCNC: 108 MG/DL (ref 74–99)
GLUCOSE BLD-MCNC: 111 MG/DL (ref 74–99)
HCT VFR BLD CALC: 36.8 % (ref 37–54)
HEMOGLOBIN: 12.8 G/DL (ref 12.5–16.5)
IMMATURE GRANULOCYTES #: 0.02 E9/L
IMMATURE GRANULOCYTES %: 0.4 % (ref 0–5)
INR BLD: 1.2
LACTIC ACID: 1.1 MMOL/L (ref 0.5–2.2)
LIPASE: 15 U/L (ref 13–60)
LYMPHOCYTES ABSOLUTE: 1.2 E9/L (ref 1.5–4)
LYMPHOCYTES RELATIVE PERCENT: 21.1 % (ref 20–42)
MAGNESIUM: 1.8 MG/DL (ref 1.6–2.6)
MCH RBC QN AUTO: 30.3 PG (ref 26–35)
MCHC RBC AUTO-ENTMCNC: 34.8 % (ref 32–34.5)
MCV RBC AUTO: 87.2 FL (ref 80–99.9)
MONOCYTES ABSOLUTE: 0.61 E9/L (ref 0.1–0.95)
MONOCYTES RELATIVE PERCENT: 10.7 % (ref 2–12)
NEUTROPHILS ABSOLUTE: 3.83 E9/L (ref 1.8–7.3)
NEUTROPHILS RELATIVE PERCENT: 67 % (ref 43–80)
PDW BLD-RTO: 12.7 FL (ref 11.5–15)
PLATELET # BLD: 328 E9/L (ref 130–450)
PMV BLD AUTO: 9.3 FL (ref 7–12)
POTASSIUM SERPL-SCNC: 2.8 MMOL/L (ref 3.5–5)
POTASSIUM SERPL-SCNC: 3.2 MMOL/L (ref 3.5–5)
PRO-BNP: 274 PG/ML (ref 0–450)
PROTHROMBIN TIME: 13.1 SEC (ref 9.3–12.4)
RBC # BLD: 4.22 E12/L (ref 3.8–5.8)
SODIUM BLD-SCNC: 134 MMOL/L (ref 132–146)
SODIUM BLD-SCNC: 137 MMOL/L (ref 132–146)
TOTAL PROTEIN: 7.4 G/DL (ref 6.4–8.3)
TROPONIN, HIGH SENSITIVITY: 13 NG/L (ref 0–11)
TROPONIN, HIGH SENSITIVITY: 15 NG/L (ref 0–11)
WBC # BLD: 5.7 E9/L (ref 4.5–11.5)

## 2022-07-13 PROCEDURE — 84484 ASSAY OF TROPONIN QUANT: CPT

## 2022-07-13 PROCEDURE — 96365 THER/PROPH/DIAG IV INF INIT: CPT

## 2022-07-13 PROCEDURE — 83735 ASSAY OF MAGNESIUM: CPT

## 2022-07-13 PROCEDURE — 93005 ELECTROCARDIOGRAM TRACING: CPT | Performed by: EMERGENCY MEDICINE

## 2022-07-13 PROCEDURE — 2580000003 HC RX 258: Performed by: RADIOLOGY

## 2022-07-13 PROCEDURE — 36415 COLL VENOUS BLD VENIPUNCTURE: CPT

## 2022-07-13 PROCEDURE — 96367 TX/PROPH/DG ADDL SEQ IV INF: CPT

## 2022-07-13 PROCEDURE — 85610 PROTHROMBIN TIME: CPT

## 2022-07-13 PROCEDURE — 83880 ASSAY OF NATRIURETIC PEPTIDE: CPT

## 2022-07-13 PROCEDURE — 96366 THER/PROPH/DIAG IV INF ADDON: CPT

## 2022-07-13 PROCEDURE — 6370000000 HC RX 637 (ALT 250 FOR IP): Performed by: EMERGENCY MEDICINE

## 2022-07-13 PROCEDURE — 2580000003 HC RX 258: Performed by: EMERGENCY MEDICINE

## 2022-07-13 PROCEDURE — 6370000000 HC RX 637 (ALT 250 FOR IP)

## 2022-07-13 PROCEDURE — 6360000002 HC RX W HCPCS: Performed by: EMERGENCY MEDICINE

## 2022-07-13 PROCEDURE — 85025 COMPLETE CBC W/AUTO DIFF WBC: CPT

## 2022-07-13 PROCEDURE — 83690 ASSAY OF LIPASE: CPT

## 2022-07-13 PROCEDURE — 6360000004 HC RX CONTRAST MEDICATION: Performed by: RADIOLOGY

## 2022-07-13 PROCEDURE — 74177 CT ABD & PELVIS W/CONTRAST: CPT

## 2022-07-13 PROCEDURE — 83605 ASSAY OF LACTIC ACID: CPT

## 2022-07-13 PROCEDURE — 96375 TX/PRO/DX INJ NEW DRUG ADDON: CPT

## 2022-07-13 PROCEDURE — 80048 BASIC METABOLIC PNL TOTAL CA: CPT

## 2022-07-13 PROCEDURE — 80053 COMPREHEN METABOLIC PANEL: CPT

## 2022-07-13 PROCEDURE — 85730 THROMBOPLASTIN TIME PARTIAL: CPT

## 2022-07-13 RX ORDER — POTASSIUM CHLORIDE 20 MEQ/1
TABLET, EXTENDED RELEASE ORAL
Status: COMPLETED
Start: 2022-07-13 | End: 2022-07-13

## 2022-07-13 RX ORDER — ACETAMINOPHEN 325 MG/1
650 TABLET ORAL ONCE
Status: COMPLETED | OUTPATIENT
Start: 2022-07-13 | End: 2022-07-13

## 2022-07-13 RX ORDER — DICYCLOMINE HYDROCHLORIDE 10 MG/1
20 CAPSULE ORAL
Qty: 10 CAPSULE | Refills: 0 | Status: SHIPPED | OUTPATIENT
Start: 2022-07-13 | End: 2022-07-20 | Stop reason: CLARIF

## 2022-07-13 RX ORDER — MAGNESIUM SULFATE HEPTAHYDRATE 500 MG/ML
2000 INJECTION, SOLUTION INTRAMUSCULAR; INTRAVENOUS ONCE
Status: DISCONTINUED | OUTPATIENT
Start: 2022-07-13 | End: 2022-07-13

## 2022-07-13 RX ORDER — SODIUM CHLORIDE 0.9 % (FLUSH) 0.9 %
10 SYRINGE (ML) INJECTION ONCE
Status: COMPLETED | OUTPATIENT
Start: 2022-07-13 | End: 2022-07-13

## 2022-07-13 RX ORDER — POTASSIUM CHLORIDE 20 MEQ/1
40 TABLET, EXTENDED RELEASE ORAL ONCE
Status: COMPLETED | OUTPATIENT
Start: 2022-07-13 | End: 2022-07-13

## 2022-07-13 RX ORDER — MAGNESIUM SULFATE IN WATER 40 MG/ML
2000 INJECTION, SOLUTION INTRAVENOUS ONCE
Status: COMPLETED | OUTPATIENT
Start: 2022-07-13 | End: 2022-07-13

## 2022-07-13 RX ORDER — FAMOTIDINE 20 MG/1
20 TABLET, FILM COATED ORAL 2 TIMES DAILY
Qty: 14 TABLET | Refills: 0 | Status: SHIPPED | OUTPATIENT
Start: 2022-07-13 | End: 2022-07-22

## 2022-07-13 RX ORDER — ONDANSETRON 2 MG/ML
4 INJECTION INTRAMUSCULAR; INTRAVENOUS ONCE
Status: COMPLETED | OUTPATIENT
Start: 2022-07-13 | End: 2022-07-13

## 2022-07-13 RX ORDER — POTASSIUM CHLORIDE 7.45 MG/ML
10 INJECTION INTRAVENOUS ONCE
Status: COMPLETED | OUTPATIENT
Start: 2022-07-13 | End: 2022-07-13

## 2022-07-13 RX ORDER — POTASSIUM CHLORIDE 20 MEQ/1
20 TABLET, EXTENDED RELEASE ORAL 2 TIMES DAILY
Qty: 10 TABLET | Refills: 0 | Status: SHIPPED | OUTPATIENT
Start: 2022-07-13 | End: 2022-07-20 | Stop reason: SDUPTHER

## 2022-07-13 RX ORDER — MORPHINE SULFATE 4 MG/ML
4 INJECTION, SOLUTION INTRAMUSCULAR; INTRAVENOUS ONCE
Status: COMPLETED | OUTPATIENT
Start: 2022-07-13 | End: 2022-07-13

## 2022-07-13 RX ORDER — 0.9 % SODIUM CHLORIDE 0.9 %
1000 INTRAVENOUS SOLUTION INTRAVENOUS ONCE
Status: COMPLETED | OUTPATIENT
Start: 2022-07-13 | End: 2022-07-13

## 2022-07-13 RX ORDER — ACETAMINOPHEN 325 MG/1
TABLET ORAL
Status: COMPLETED
Start: 2022-07-13 | End: 2022-07-13

## 2022-07-13 RX ADMIN — SODIUM CHLORIDE 1000 ML: 9 INJECTION, SOLUTION INTRAVENOUS at 01:49

## 2022-07-13 RX ADMIN — MAGNESIUM SULFATE HEPTAHYDRATE 2000 MG: 40 INJECTION, SOLUTION INTRAVENOUS at 06:07

## 2022-07-13 RX ADMIN — SODIUM CHLORIDE, PRESERVATIVE FREE 10 ML: 5 INJECTION INTRAVENOUS at 04:05

## 2022-07-13 RX ADMIN — POTASSIUM CHLORIDE 10 MEQ: 7.46 INJECTION, SOLUTION INTRAVENOUS at 04:56

## 2022-07-13 RX ADMIN — ACETAMINOPHEN 325MG 650 MG: 325 TABLET ORAL at 05:06

## 2022-07-13 RX ADMIN — MORPHINE SULFATE 4 MG: 4 INJECTION, SOLUTION INTRAMUSCULAR; INTRAVENOUS at 01:53

## 2022-07-13 RX ADMIN — POTASSIUM CHLORIDE 40 MEQ: 1500 TABLET, EXTENDED RELEASE ORAL at 05:30

## 2022-07-13 RX ADMIN — ONDANSETRON 4 MG: 2 INJECTION INTRAMUSCULAR; INTRAVENOUS at 01:50

## 2022-07-13 RX ADMIN — ACETAMINOPHEN 650 MG: 325 TABLET ORAL at 05:06

## 2022-07-13 RX ADMIN — IOPAMIDOL 50 ML: 755 INJECTION, SOLUTION INTRAVENOUS at 04:05

## 2022-07-13 RX ADMIN — POTASSIUM CHLORIDE 40 MEQ: 20 TABLET, EXTENDED RELEASE ORAL at 05:30

## 2022-07-13 ASSESSMENT — PAIN SCALES - GENERAL
PAINLEVEL_OUTOF10: 6

## 2022-07-13 ASSESSMENT — PAIN DESCRIPTION - LOCATION
LOCATION: ABDOMEN
LOCATION: HEAD
LOCATION: HEAD

## 2022-07-13 ASSESSMENT — PAIN DESCRIPTION - DESCRIPTORS
DESCRIPTORS: ACHING

## 2022-07-13 ASSESSMENT — PAIN DESCRIPTION - PAIN TYPE: TYPE: CHRONIC PAIN

## 2022-07-13 ASSESSMENT — PAIN DESCRIPTION - FREQUENCY: FREQUENCY: INTERMITTENT

## 2022-07-13 ASSESSMENT — PAIN DESCRIPTION - ONSET: ONSET: ON-GOING

## 2022-07-13 ASSESSMENT — PAIN DESCRIPTION - ORIENTATION
ORIENTATION: ANTERIOR
ORIENTATION: MID
ORIENTATION: MID;ANTERIOR

## 2022-07-13 NOTE — ED PROVIDER NOTES
HPI:  7/13/22, Time: 1:13 AM EDT        Elsa Smith is a 81 y.o. male presenting to the ED for gradual onset of lower abdominal pain plus nausea and diarrhea, beginning 1 day ago. The complaint has been persistent, moderate in severity, and worsened by nothing. Patient is not taking any antidiarrheal medicine to this point. Has not had any fever/chills, no hematemesis, no melena, no hematochezia, no change in bladder patterns. Patient denies any chest heaviness/pressure/tightness or any shortness of breath associated. He rates his pain a 7/10 severity burning discomfort with aching. He denies any relieving factors. Patient denies any sharp or stabbing character abdominal pain denies any pain radiating to the back. No other complaints    Review of Systems:   A complete review of systems was performed and pertinent positives and negatives are stated within HPI, all other systems reviewed and are negative.    --------------------------------------------- PAST HISTORY ---------------------------------------------  Past Medical History:  has a past medical history of [facility]ysm [facility] Colitis, Depression, GERD (gastroesophageal reflux disease), H/O: CVA (cerebrovascular accident), Hyperlipidemia, Hypertension, Stroke [facility]), Superior mesenteric artery stenosis ([facility] and TIA (transient ischemic attack). Past Surgical History:  has a past surgical history that includes Appendectomy; Tonsillectomy; ECHO Compl W Dop Color Flow (12/4/2012); sinus surgery ([id number]); Anterior cruciate ligament repair (Left, 11/21/2001); Cholecystectomy (2007); hernia repair (Right, 11/13/2002); Total knee arthroplasty (Left, 1/28/2019); and Upper gastrointestinal endoscopy (N/A, 10/10/2019). Social History:  reports that he has never smoked. He has never used smokeless tobacco. He reports that he does not drink alcohol and does not use drugs. Family History: family history is not on file.      The patients home medications have been reviewed. Allergies: Patient has no known allergies.     -------------------------------------------------- RESULTS -------------------------------------------------  All laboratory and radiology results have been personally reviewed by myself   LABS:  Results for orders placed or performed during the hospital encounter of 07/13/22   Urinalysis with Microscopic   Result Value Ref Range    Color, UA Yellow Straw/Yellow    Clarity, UA Clear Clear    Glucose, Ur Negative Negative mg/dL    Bilirubin Urine Negative Negative    Ketones, Urine Negative Negative mg/dL    Specific Gravity, UA 1.010 1.005 - 1.030    Blood, Urine TRACE-INTACT Negative    pH, UA 6.0 5.0 - 9.0    Protein, UA Negative Negative mg/dL    Urobilinogen, Urine 0.2 <2.0 E.U./dL    Nitrite, Urine Negative Negative    Leukocyte Esterase, Urine Negative Negative    WBC, UA NONE 0 - 5 /HPF    RBC, UA 0-1 0 - 2 /HPF    Epithelial Cells, UA NONE SEEN /HPF    Bacteria, UA NONE SEEN None Seen /HPF   Troponin   Result Value Ref Range    Troponin, High Sensitivity 13 (H) 0 - 11 ng/L   Protime-INR   Result Value Ref Range    Protime 13.1 (H) 9.3 - 12.4 sec    INR 1.2    APTT   Result Value Ref Range    aPTT 30.6 24.5 - 35.1 sec   Brain Natriuretic Peptide   Result Value Ref Range    Pro- 0 - 450 pg/mL   CBC with Auto Differential   Result Value Ref Range    WBC 5.7 4.5 - 11.5 E9/L    RBC 4.22 3.80 - 5.80 E12/L    Hemoglobin 12.8 12.5 - 16.5 g/dL    Hematocrit 36.8 (L) 37.0 - 54.0 %    MCV 87.2 80.0 - 99.9 fL    MCH 30.3 26.0 - 35.0 pg    MCHC 34.8 (H) 32.0 - 34.5 %    RDW 12.7 11.5 - 15.0 fL    Platelets 020 401 - 924 E9/L    MPV 9.3 7.0 - 12.0 fL    Neutrophils % 67.0 43.0 - 80.0 %    Immature Granulocytes % 0.4 0.0 - 5.0 %    Lymphocytes % 21.1 20.0 - 42.0 %    Monocytes % 10.7 2.0 - 12.0 %    Eosinophils % 0.4 0.0 - 6.0 %    Basophils % 0.4 0.0 - 2.0 %    Neutrophils Absolute 3.83 1.80 - 7.30 E9/L    Immature Granulocytes # 0.02 E9/L    Lymphocytes Absolute 1.20 (L) 1.50 - 4.00 E9/L    Monocytes Absolute 0.61 0.10 - 0.95 E9/L    Eosinophils Absolute 0.02 (L) 0.05 - 0.50 E9/L    Basophils Absolute 0.02 0.00 - 0.20 E9/L   Comprehensive Metabolic Panel   Result Value Ref Range    Sodium 134 132 - 146 mmol/L    Potassium 2.8 (L) 3.5 - 5.0 mmol/L    Chloride 95 (L) 98 - 107 mmol/L    CO2 28 22 - 29 mmol/L    Anion Gap 11 7 - 16 mmol/L    Glucose 108 (H) 74 - 99 mg/dL    BUN 6 6 - 23 mg/dL    CREATININE 0.8 0.7 - 1.2 mg/dL    GFR Non-African American >60 >=60 mL/min/1.73    GFR African American >60     Calcium 9.4 8.6 - 10.2 mg/dL    Total Protein 7.4 6.4 - 8.3 g/dL    Albumin 4.2 3.5 - 5.2 g/dL    Total Bilirubin 0.7 0.0 - 1.2 mg/dL    Alkaline Phosphatase 76 40 - 129 U/L    ALT 12 0 - 40 U/L    AST 15 0 - 39 U/L   Lipase   Result Value Ref Range    Lipase 15 13 - 60 U/L   Lactic Acid   Result Value Ref Range    Lactic Acid 1.1 0.5 - 2.2 mmol/L   Magnesium   Result Value Ref Range    Magnesium 1.8 1.6 - 2.6 mg/dL   Troponin   Result Value Ref Range    Troponin, High Sensitivity 15 (H) 0 - 11 ng/L   EKG 12 Lead   Result Value Ref Range    Ventricular Rate 65 BPM    Atrial Rate 65 BPM    P-R Interval 158 ms    QRS Duration 144 ms    Q-T Interval 482 ms    QTc Calculation (Bazett) 501 ms    P Axis -29 degrees    R Axis 53 degrees    T Axis 28 degrees       RADIOLOGY:  Interpreted by Radiologist.  CT ABDOMEN PELVIS W IV CONTRAST Additional Contrast? 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 Hiatal hernia. No additional acute findings. ------------------------- NURSING NOTES AND VITALS REVIEWED ---------------------------  The nursing notes within the ED encounter and vital signs as below have been reviewed.    BP (!) 166/75   Pulse 58   Temp 98 °F (36.7 °C)   Resp 20   SpO2 98%   Oxygen Saturation Interpretation: Normal    ---------------------------------------------------PHYSICAL EXAM--------------------------------------    Constitutional/General: Alert and oriented x3, well appearing, non toxic in moderate distress  Head: Normocephalic and atraumatic  Eyes: PERRL, EOMI, otherwise normal  Mouth: Oropharynx clear, handling secretions, no trismus  Neck: Supple, full ROM, no JVD. Trachea midline  Pulmonary: Lungs clear to auscultation bilaterally, no wheezes, rales, or rhonchi. Not in respiratory distress  Cardiovascular:  Regular rate and rhythm, no murmurs, gallops, or rubs. 2+ distal pulses  Abdomen: Soft, nondistended with mild periumbilical tenderness but no rebound tenderness no guarding or rigidity. Normal active bowel sounds. No organomegaly or any masses. Extremities: Moves all extremities x 4. Warm and well perfused  Skin: warm and dry without rash  Neurologic: GCS 15, cranial nerves II through XII grossly intact with no focal deficits.   Psych: Normal Affect    ------------------------------ ED COURSE/MEDICAL DECISION MAKING----------------------  Medications   magnesium sulfate 2000 mg in 50 mL IVPB premix (2,000 mg IntraVENous New Bag 7/13/22 0607)   0.9 % sodium chloride bolus (0 mLs IntraVENous Stopped 7/13/22 0220)   morphine sulfate (PF) injection 4 mg (4 mg IntraVENous Given 7/13/22 0153)   ondansetron (ZOFRAN) injection 4 mg (4 mg IntraVENous Given 7/13/22 0150)   iopamidol (ISOVUE-370) 76 % injection 50 mL (50 mLs IntraVENous Given 7/13/22 0405)   sodium chloride flush 0.9 % injection 10 mL (10 mLs IntraVENous Given 7/13/22 0405)   potassium chloride 10 mEq/100 mL IVPB (Peripheral Line) (0 mEq IntraVENous Stopped 7/13/22 0602)   acetaminophen (TYLENOL) tablet 650 mg (650 mg Oral Given 7/13/22 0506)   potassium chloride (KLOR-CON M) extended release tablet 40 mEq (40 mEq Oral Given 7/13/22 0530)     ED COURSE:     Medical Decision Making:   Differential diagnoses:  Abdominal aortic aneurysm, abdominal arctic dissection, acute cholecystitis, bowel obstruction, mesenteric counseling regarding the diagnosis and prognosis. Questions are answered at this time and they are agreeable with the plan.    --------------------------------- IMPRESSION AND DISPOSITION ---------------------------------    IMPRESSION  1. Lower abdominal pain    2. Diarrhea, unspecified type    3. Hypokalemia        DISPOSITION  Disposition: Discharge to home  Patient condition is stable      NOTE: This report was transcribed using voice recognition software.  Every effort was made to ensure accuracy; however, inadvertent computerized transcription errors may be present        Arpita Mo MD  07/13/22 0413

## 2022-07-13 NOTE — ED NOTES
Patient discharged to home states understanding of home instructions      Alexus Corley RN  07/13/22 1814

## 2022-07-15 ENCOUNTER — OFFICE VISIT (OUTPATIENT)
Dept: FAMILY MEDICINE CLINIC | Age: 87
End: 2022-07-15
Payer: MEDICARE

## 2022-07-15 ENCOUNTER — HOSPITAL ENCOUNTER (OUTPATIENT)
Dept: CT IMAGING | Age: 87
Discharge: HOME | End: 2022-07-17
Payer: MEDICARE

## 2022-07-15 VITALS
OXYGEN SATURATION: 96 % | WEIGHT: 183 LBS | HEART RATE: 73 BPM | BODY MASS INDEX: 24.82 KG/M2 | DIASTOLIC BLOOD PRESSURE: 70 MMHG | TEMPERATURE: 98.3 F | SYSTOLIC BLOOD PRESSURE: 118 MMHG

## 2022-07-15 DIAGNOSIS — R51.9 NONINTRACTABLE HEADACHE, UNSPECIFIED CHRONICITY PATTERN, UNSPECIFIED HEADACHE TYPE: ICD-10-CM

## 2022-07-15 DIAGNOSIS — I72.2 ANEURYSM OF RIGHT RENAL ARTERY (HCC): ICD-10-CM

## 2022-07-15 DIAGNOSIS — R11.0 NAUSEA: ICD-10-CM

## 2022-07-15 DIAGNOSIS — R19.7 DIARRHEA, UNSPECIFIED TYPE: ICD-10-CM

## 2022-07-15 DIAGNOSIS — R51.9 NONINTRACTABLE HEADACHE, UNSPECIFIED CHRONICITY PATTERN, UNSPECIFIED HEADACHE TYPE: Primary | ICD-10-CM

## 2022-07-15 PROCEDURE — G8420 CALC BMI NORM PARAMETERS: HCPCS | Performed by: PHYSICIAN ASSISTANT

## 2022-07-15 PROCEDURE — 1036F TOBACCO NON-USER: CPT | Performed by: PHYSICIAN ASSISTANT

## 2022-07-15 PROCEDURE — 1123F ACP DISCUSS/DSCN MKR DOCD: CPT | Performed by: PHYSICIAN ASSISTANT

## 2022-07-15 PROCEDURE — G8427 DOCREV CUR MEDS BY ELIG CLIN: HCPCS | Performed by: PHYSICIAN ASSISTANT

## 2022-07-15 PROCEDURE — 70450 CT HEAD/BRAIN W/O DYE: CPT

## 2022-07-15 PROCEDURE — 99215 OFFICE O/P EST HI 40 MIN: CPT | Performed by: PHYSICIAN ASSISTANT

## 2022-07-15 RX ORDER — PROMETHAZINE HYDROCHLORIDE 25 MG/1
25 TABLET ORAL 3 TIMES DAILY PRN
Qty: 12 TABLET | Refills: 0 | Status: SHIPPED
Start: 2022-07-15 | End: 2022-07-20 | Stop reason: SDUPTHER

## 2022-07-15 NOTE — PROGRESS NOTES
7/15/22  Shefali Cohen : 1935 Sex: male  Age 81 y.o. Subjective:  Chief Complaint   Patient presents with    Headache    Diarrhea    Nausea         HPI:   Shefali Cohen , 81 y.o. male presents to express care for evaluation of headache, nausea, abdominal pain, diarrhea    HPI  71-year-old male presents to express care for evaluation of headache, nausea, abdominal pain, diarrhea. The patient has had the symptoms ongoing for the last week or so. The patient was seen and evaluated by the ER on 2022 and had laboratory studies and a CT scan. The patient was seen and evaluated in the office and had COVID performed at that time that was negative. The patient states that he is not really having any significant upper respiratory symptoms. The patient was noted to have some evidence of hypokalemia in the ED. The patient is currently on potassium supplementation at this time. The patient is still having some intermittent episodes of diarrhea. Still having some lower abdominal pain. He was concerned for the headache. The patient is not having any lateralizing weakness of the upper or lower extremities. The patient is not having any visual disturbances. ROS:   Unless otherwise stated in this report the patient's positive and negative responses for review of systems for constitutional, eyes, ENT, cardiovascular, respiratory, gastrointestinal, neurological, , musculoskeletal, and integument systems and related systems to the presenting problem are either stated in the history of present illness or were not pertinent or were negative for the symptoms and/or complaints related to the presenting medical problem. Positives and pertinent negatives as per HPI. All others reviewed and are negative.       PMH:     Past Medical History:   Diagnosis Date    Aneurysm of right renal artery ([address]     follows with Dr. Luis Schroeder yearly (last visit )    Colitis     Depression     GERD (gastroesophageal reflux disease)     H/O: CVA (cerebrovascular accident) 10/14/2021    Hyperlipidemia     Hypertension     Stroke [facility])     Superior mesenteric artery stenosis ([address] 11/4/2020    TIA (transient ischemic attack)        Past Surgical History:   Procedure Laterality Date    ANTERIOR CRUCIATE LIGAMENT REPAIR Left 11/21/2001    APPENDECTOMY      CHOLECYSTECTOMY  2007    Lap    ECHO COMPL W DOP COLOR FLOW  12/4/2012         HERNIA REPAIR Right 11/13/2002    double    SINUS SURGERY  2002,2003     done x 2    TONSILLECTOMY      TOTAL KNEE ARTHROPLASTY Left 1/28/2019    LEFT  ROBOTIC KNEE TOTAL ARTHROPLASTY  ++MEREDITH- [id number]++   ++ADDUCTOR BLOCK++ performed by Johan Jackson MD at [address] 10/10/2019    EGD BIOPSY performed by Jose F Schulz MD at [address]       History reviewed. No pertinent family history. Medications:     Current Outpatient Medications:     promethazine (PHENERGAN) 25 MG tablet, Take 1 tablet by mouth 3 times daily as needed for Nausea, Disp: 12 tablet, Rfl: 0    famotidine (PEPCID) 20 MG tablet, Take 1 tablet by mouth 2 times daily for 7 days, Disp: 14 tablet, Rfl: 0    potassium chloride (KLOR-CON M) 20 MEQ extended release tablet, Take 1 tablet by mouth 2 times daily for 5 days, Disp: 10 tablet, Rfl: 0    dicyclomine (BENTYL) 10 MG capsule, Take 2 capsules by mouth 4 times daily (before meals and nightly) For abdominal pain/cramping as needed. , Disp: 10 capsule, Rfl: 0    ondansetron (ZOFRAN-ODT) 4 MG disintegrating tablet, Take 1 tablet by mouth 3 times daily as needed for Nausea or Vomiting, Disp: 12 tablet, Rfl: 0    LORazepam (ATIVAN) 0.5 MG tablet, 1 q 8hrs prn, Disp: 90 tablet, Rfl: 0    amoxicillin (AMOXIL) 500 MG capsule, 1 qd, Disp: 30 capsule, Rfl: 1    amLODIPine (NORVASC) 5 MG tablet, Take 1 tablet by mouth daily, Disp: 30 tablet, Rfl: 3    mirtazapine (REMERON) 30 MG tablet, Take 1 tablet by mouth nightly, Disp: 30 tablet, Rfl: 3    hydroCHLOROthiazide (MICROZIDE) 12.5 MG capsule, TAKE 1 CAPSULE BY MOUTH EVERY MORNING, Disp: 90 capsule, Rfl: 1    FLUoxetine (PROZAC) 20 MG capsule, Take 1 capsule by mouth daily, Disp: 30 capsule, Rfl: 3    pantoprazole (PROTONIX) 40 MG tablet, TAKE 1 TABLET BY MOUTH EVERY MORNING BEFORE BREAKFAST, Disp: 90 tablet, Rfl: 1    losartan (COZAAR) 100 MG tablet, 1 QD, Disp: 90 tablet, Rfl: 3    aspirin 81 MG tablet, Take 81 mg by mouth daily, Disp: , Rfl:     Allergies:   No Known Allergies    Social History:     Social History     Tobacco Use    Smoking status: Never    Smokeless tobacco: Never   Vaping Use    Vaping Use: Never used   Substance Use Topics    Alcohol use: No    Drug use: No       Patient lives at home. Physical Exam:     Vitals:    07/15/22 1330   BP: 118/70   Site: Right Upper Arm   Position: Sitting   Pulse: 73   Temp: 98.3 °F (36.8 °C)   TempSrc: Temporal   SpO2: 96%   Weight: 183 lb (83 kg)       Exam:  Physical Exam  Vital signs and nurses notes reviewed. The patient is not hypoxic. ? General: The patient appears well and in no apparent distress. Patient is resting comfortably on cart. Skin: Warm, dry, no pallor noted. The patient has no evidence of rash, petechiae, or purpura noted. Head: Normocephalic, atraumatic, no temporal arterial tenderness  Neck: Supple, trachea mid-line, no tenderness, no lymphadenopathy. No meningeal signs. No nuchal rigidity. Negative jolt test.  Eye: Pupils are equal, round and reactive to light, EOMI  Ears, Nose, Mouth, and Throat: Oral mucosa is moist, TMs are clear bilaterally, no hemotympanum noted. Cardiovascular: Regular Rate and Rhythm  Respiratory: Patient is in no distress, no accessory muscle use, lungs are clear to auscultation, no wheezing, rales or rhonchi  Back: non-tender, no CVA tenderness  Musculoskeletal: normal ROM, no tenderness, no swelling, normal strength 5/5.  Normal pulses to radial 2+ bilaterally and 2+ at [facility] and PT bilaterally and symmetrically. GI: Normal bowel sounds, no tenderness to palpation, no masses appreciated. No rebound, guarding, or rigidity noted. Neurological: A&O x4, normal equal  strength, normal finger to nose, no pronator drift. The patient is not ataxic. The patient has normal speech. The patient has normal coordination. Normal motor and sensory observed.   Psychiatric: Cooperative       Testing:     Component      Latest Ref Rng & Units 7/13/2022 7/13/2022 7/13/2022 7/13/2022           7:19 AM  3:51 AM  1:40 AM  1:40 AM   WBC      4.5 - 11.5 E9/L       RBC      3.80 - 5.80 E12/L       Hemoglobin Quant      12.5 - 16.5 g/dL       Hematocrit      37.0 - 54.0 %       MCV      80.0 - 99.9 fL       MCH      26.0 - 35.0 pg       MCHC      32.0 - 34.5 %       RDW      11.5 - 15.0 fL       Platelet Count      032 - 450 E9/L       MPV      7.0 - 12.0 fL       Neutrophils %      43.0 - 80.0 %       Immature Granulocytes %      0.0 - 5.0 %       Lymphocyte %      20.0 - 42.0 %       Monocytes %      2.0 - 12.0 %       Eosinophils %      0.0 - 6.0 %       Basophils %      0.0 - 2.0 %       Neutrophils Absolute      1.80 - 7.30 E9/L       Immature Granulocytes #      E9/L       Lymphocytes Absolute      1.50 - 4.00 E9/L       Monocytes Absolute      0.10 - 0.95 E9/L       Eosinophils Absolute      0.05 - 0.50 E9/L       Basophils Absolute      0.00 - 0.20 E9/L       Sodium      132 - 146 mmol/L 137      Potassium      3.5 - 5.0 mmol/L 3.2 (L)      Chloride      98 - 107 mmol/L 100      CO2      22 - 29 mmol/L 29      Anion Gap      7 - 16 mmol/L 8      GLUCOSE, FASTING,GF      74 - 99 mg/dL 111 (H)      BUN,BUNPL      6 - 23 mg/dL 5 (L)      Creatinine      0.7 - 1.2 mg/dL 0.7      GFR Non-African American      >=60 mL/min/1.73 >60      GFR        >60      CALCIUM, SERUM, 424669      8.6 - 10.2 mg/dL 8.9      Total Protein      6.4 - 8.3 g/dL       Albumin      3.5 - 5.2 g/dL       Bilirubin 0.0 - 1.2 mg/dL       Alk Phos      40 - 129 U/L       ALT      0 - 40 U/L       AST      0 - 39 U/L       Color, UA      Straw/Yellow       Clarity, UA      Clear       Glucose, UA      Negative mg/dL       Bilirubin, Urine      Negative       Ketones, Urine      Negative mg/dL       Specific Gravity, UA      1.005 - 1.030       Blood, Urine      Negative       pH, UA      5.0 - 9.0       Protein, UA      Negative mg/dL       Urobilinogen, Urine      <2.0 E.U./dL       Nitrite, Urine      Negative       Leukocyte Esterase, Urine      Negative       WBC, UA      0 - 5 /HPF       RBC, UA      0 - 2 /HPF       Epithelial Cells, UA      /HPF       Bacteria, UA      None Seen /HPF       Prothrombin Time      9.3 - 12.4 sec       INR             Troponin, High Sensitivity      0 - 11 ng/L  15 (H)     aPTT      24.5 - 35.1 sec       Pro-BNP      0 - 450 pg/mL       Lipase      13 - 60 U/L    15   Lactic Acid      0.5 - 2.2 mmol/L       Magnesium      1.6 - 2.6 mg/dL   1.8      Component      Latest Ref Rng & Units 7/13/2022 7/13/2022 7/13/2022 7/13/2022           1:40 AM  1:40 AM  1:40 AM  1:40 AM   WBC      4.5 - 11.5 E9/L       RBC      3.80 - 5.80 E12/L       Hemoglobin Quant      12.5 - 16.5 g/dL       Hematocrit      37.0 - 54.0 %       MCV      80.0 - 99.9 fL       MCH      26.0 - 35.0 pg       MCHC      32.0 - 34.5 %       RDW      11.5 - 15.0 fL       Platelet Count      952 - 450 E9/L       MPV      7.0 - 12.0 fL       Neutrophils %      43.0 - 80.0 %       Immature Granulocytes %      0.0 - 5.0 %       Lymphocyte %      20.0 - 42.0 %       Monocytes %      2.0 - 12.0 %       Eosinophils %      0.0 - 6.0 %       Basophils %      0.0 - 2.0 %       Neutrophils Absolute      1.80 - 7.30 E9/L       Immature Granulocytes #      E9/L       Lymphocytes Absolute      1.50 - 4.00 E9/L       Monocytes Absolute      0.10 - 0.95 E9/L       Eosinophils Absolute      0.05 - 0.50 E9/L       Basophils Absolute      0.00 - 0.20 %   34.8 (H)    RDW      11.5 - 15.0 fL   12.7    Platelet Count      255 - 450 E9/L   328    MPV      7.0 - 12.0 fL   9.3    Neutrophils %      43.0 - 80.0 %   67.0    Immature Granulocytes %      0.0 - 5.0 %   0.4    Lymphocyte %      20.0 - 42.0 %   21.1    Monocytes %      2.0 - 12.0 %   10.7    Eosinophils %      0.0 - 6.0 %   0.4    Basophils %      0.0 - 2.0 %   0.4    Neutrophils Absolute      1.80 - 7.30 E9/L   3.83    Immature Granulocytes #      E9/L   0.02    Lymphocytes Absolute      1.50 - 4.00 E9/L   1.20 (L)    Monocytes Absolute      0.10 - 0.95 E9/L   0.61    Eosinophils Absolute      0.05 - 0.50 E9/L   0.02 (L)    Basophils Absolute      0.00 - 0.20 E9/L   0.02    Sodium      132 - 146 mmol/L       Potassium      3.5 - 5.0 mmol/L       Chloride      98 - 107 mmol/L       CO2      22 - 29 mmol/L       Anion Gap      7 - 16 mmol/L       GLUCOSE, FASTING,GF      74 - 99 mg/dL       BUN,BUNPL      6 - 23 mg/dL       Creatinine      0.7 - 1.2 mg/dL       GFR Non-African American      >=60 mL/min/1.73       GFR              CALCIUM, SERUM, [id number]      8.6 - 10.2 mg/dL       Total Protein      6.4 - 8.3 g/dL       Albumin      3.5 - 5.2 g/dL       Bilirubin      0.0 - 1.2 mg/dL       Alk Phos      40 - 129 U/L       ALT      0 - 40 U/L       AST      0 - 39 U/L       Color, UA      Straw/Yellow    Yellow   Clarity, UA      Clear    Clear   Glucose, UA      Negative mg/dL    Negative   Bilirubin, Urine      Negative    Negative   Ketones, Urine      Negative mg/dL    Negative   Specific Gravity, UA      1.005 - 1.030    1.010   Blood, Urine      Negative    TRACE-INTACT   pH, UA      5.0 - 9.0    6.0   Protein, UA      Negative mg/dL    Negative   Urobilinogen, Urine      <2.0 E.U./dL    0.2   Nitrite, Urine      Negative    Negative   Leukocyte Esterase, Urine      Negative    Negative   WBC, UA      0 - 5 /HPF    NONE   RBC, UA      0 - 2 /HPF    0-1   Epithelial Cells, UA      /HPF are provided for review. Automated exposure control, iterative reconstruction, and/or weight based adjustment of the mA/kV was utilized to reduce the radiation dose to as low as reasonably achievable. COMPARISON: None HISTORY: ORDERING SYSTEM PROVIDED HISTORY: abdominal pain TECHNOLOGIST PROVIDED HISTORY: Additional Contrast?->None Reason for exam:->abdominal pain FINDINGS: THORACIC STRUCTURES: Unremarkable. LIVER:  The liver is normal in size, contour and attenuation. No focal mass. No intra or extrahepatic bile duct dilation. GALL BLADDER: Previous cholecystectomy. SPLEEN:  Unremarkable. PANCREAS:  Unremarkable. ADRENAL GLANDS:  Unremarkable. ESOPHAGUS AND STOMACH:  Hiatal hernia. BOWEL: Small bowel:  Unremarkable. Large bowel: The colon and rectum are of normal course and caliber. The appendix is not visualized. There is no intraperitoneal free air or fluid. URINARY/GENITAL TRACT: Kidneys:  The kidneys are unremarkable. .  No evidence of hydronephrosis, renal calcifications or solid renal mass. Ureters: The ureters are normal course and caliber. There is no evidence of ureter calculus/calculi. URINARY BLADDER:  The urinary bladder is well distended without wall thickening or focal mass. REPRODUCTIVE ORGANS:  Unremarkable. BLOOD VESSELS: A 1.7 cm right renal artery aneurysm is visualized. LYMPH NODES:  No evidence of intraabdominal or intrapelvic lymphadenopathy. ABDOMINAL WALL & SOFT TISSUES:  Unremarkable. OSSEOUS STRUCTURES: No acute osseous lesion. A 1.7 cm right renal artery aneurysm. Hiatal hernia. No additional acute findings. Medical Decision Making:     Vital signs reviewed    Past medical history reviewed. Allergies reviewed. Medications reviewed. Patient on arrival does not appear to be in any apparent distress or discomfort. The patient has been seen and evaluated. The patient does not appear to be toxic or lethargic. The patient had laboratory studies reviewed.   The patient did have some hypokalemia. The patient was noted to have a 2.8 initially and then was given supplementation return to a 3.2. The patient was given supplementation for home. The patient's remainder blood work was essentially unremarkable. The patient had CT scan that showed a 1.7 cm right renal artery aneurysm. Hiatal hernia. No additional acute findings. I discussed differential diagnosis with the patient. He still complaining of a headache. There was not a CT done at the ER visit 2 days ago. The patient states that he did have one back in April. It was unremarkable at that time. The patient will be sent for a stat CT scan. The patient states that the Zofran is not helping and he usually takes Phenergan. We will send a prescription for Phenergan. The patient had a CT of the head that did not show any evidence of acute intracranial process as read by the radiologist.  Please see above report. The patient was updated with the results. The patient will be given Phenergan. The patient will follow-up with PCP. If the symptoms persist would recommend going to the emergency department he was reluctant to go back to the ED. The patient may need a CTA of the head and neck cannot exclude other causes of his headache. The patient is to return to express care or go directly to the emergency department should any of the signs or symptoms worsen. The patient is to followup with primary care physician in 2-3 days for repeat evaluation. The patient has no other questions or concerns at this time the patient will be discharged home. Clinical Impression:   Dragan Rivera was seen today for headache, diarrhea and nausea. Diagnoses and all orders for this visit:    Nonintractable headache, unspecified chronicity pattern, unspecified headache type  -     CT HEAD WO CONTRAST;  Future    Nausea    Diarrhea, unspecified type    Aneurysm of right renal artery (HCC)    Other orders  -     promethazine (PHENERGAN) 25 MG tablet; Take 1 tablet by mouth 3 times daily as needed for Nausea      The patient is to call for any concerns or return if any of the signs or symptoms worsen. The patient is to follow-up with PCP in the next 2-3 days for repeat evaluation repeat assessment or go directly to the emergency department. SIGNATURE: Preeti Torrez III, PA-C    Greater than 45 minutes spent with the patient evaluating the patient, reviewing ER evaluation, reviewing imaging, labs, and obtaining a stat CT scan and discussing results and need for follow-up.   Discussing medications

## 2022-07-18 ENCOUNTER — CARE COORDINATION (OUTPATIENT)
Dept: CARE COORDINATION | Age: 87
End: 2022-07-18

## 2022-07-18 NOTE — CARE COORDINATION
-ACM spoke to patient to follow up on recent ED visit on 7- and Jimy [facility] 89 in Care visit on 7/15.   -Pt reports headache and diarrhea continues. Pt said he had one bout of diarrhea and one formed BM on Saturday 7/16/22. On Sunday, 7/7/22. Pt had diarrhea x one. Pt has not moved his bowels yet today. Pt said he has been taking Imodium 2 mg once a day. -Pt reports Pepcid and potassium continue and Bentyl is completed. -Pt reports he doesn't think Remeron is causing his headache because he has been off Remeron for at least 2 weeks.    -Lower abdominal pain and nausea gone. Phenergan continues. No chest pain, SOB, lightheadedness or dizziness.   -Pt reports he does not have an appetite. He drank a can of ensure with his morning medications. Now he's drinking a cup of hot tea. Pt said he has been eating light like apple sauce & crackers, rice pudding and keeping hydrated with water.   -Pt reports he is urinating well and sleeping well. -PCP appt 7-. 26 y/o F with no pertinent pmhx pw x2-3 week hx of lower back pain. Pt states she works for [employer] and lifts and carries heavy boxes. Pt describes pain as different from typical back pain and presents concern for kidney problems. Pt describes strain that can be  severe, non radiating and rated 7/10. She has no urinary symptoms. Pt took Tylenol with minimal relief and tried to use icy hot packs. Pt is also reporting x1 week hx of vaginal odor. Otherwise no discharge. Pt has (+) hx of clamydia twice, gonorrhea once, and herpes. Pt is sexually active with x1 partner and normally is unprotected. She believes partner has multiple other partners. Pt has no fevers, no n/v, but reports (+) mild suprapubic tenderness 28 y/o F with no pertinent pmhx pw x2-3 week hx of lower back pain. Pt states she works for [employer] and lifts and carries heavy boxes. Pt describes pain as different from typical back pain and presents concerned for kidney problems. Pt describes strain that can be  severe, non radiating and rated 7/10. She has no urinary symptoms. Pt took Tylenol with minimal relief and has tried to use icy hot packs. Pt is also reporting x1 week hx of vaginal odor. Otherwise no discharge. Pt has (+) hx of clamydia twice, gonorrhea once, and herpes, pt denies vaginal rash at this time. Pt is sexually active with x1 partner and is unprotected. She believes partner has multiple other partners. Pt has no fevers, no n/v, but reports (+) mild suprapubic tenderness

## 2022-07-20 ENCOUNTER — OFFICE VISIT (OUTPATIENT)
Dept: PRIMARY CARE CLINIC | Age: 87
End: 2022-07-20
Payer: MEDICARE

## 2022-07-20 VITALS
HEART RATE: 76 BPM | BODY MASS INDEX: 24.14 KG/M2 | OXYGEN SATURATION: 97 % | WEIGHT: 178 LBS | DIASTOLIC BLOOD PRESSURE: 64 MMHG | TEMPERATURE: 98.8 F | SYSTOLIC BLOOD PRESSURE: 120 MMHG

## 2022-07-20 DIAGNOSIS — M79.10 MYALGIA: ICD-10-CM

## 2022-07-20 DIAGNOSIS — F32.A DEPRESSION, UNSPECIFIED DEPRESSION TYPE: ICD-10-CM

## 2022-07-20 DIAGNOSIS — E87.6 HYPOKALEMIA: ICD-10-CM

## 2022-07-20 DIAGNOSIS — I10 ESSENTIAL HYPERTENSION: Primary | Chronic | ICD-10-CM

## 2022-07-20 PROCEDURE — 99214 OFFICE O/P EST MOD 30 MIN: CPT | Performed by: FAMILY MEDICINE

## 2022-07-20 PROCEDURE — 1123F ACP DISCUSS/DSCN MKR DOCD: CPT | Performed by: FAMILY MEDICINE

## 2022-07-20 RX ORDER — DULOXETIN HYDROCHLORIDE 30 MG/1
30 CAPSULE, DELAYED RELEASE ORAL DAILY
Qty: 30 CAPSULE | Refills: 3 | Status: SHIPPED
Start: 2022-07-20 | End: 2022-07-22 | Stop reason: SINTOL

## 2022-07-20 RX ORDER — POTASSIUM CHLORIDE 20 MEQ/1
TABLET, EXTENDED RELEASE ORAL
Qty: 90 TABLET | OUTPATIENT
Start: 2022-07-20

## 2022-07-20 RX ORDER — PROMETHAZINE HYDROCHLORIDE 25 MG/1
25 TABLET ORAL EVERY 8 HOURS PRN
Qty: 12 TABLET | Refills: 0 | Status: SHIPPED
Start: 2022-07-20 | End: 2022-07-24

## 2022-07-20 RX ORDER — POTASSIUM CHLORIDE 20 MEQ/1
TABLET, EXTENDED RELEASE ORAL
Qty: 30 TABLET | Refills: 1 | Status: SHIPPED
Start: 2022-07-20 | End: 2022-09-26 | Stop reason: SDUPTHER

## 2022-07-20 ASSESSMENT — ENCOUNTER SYMPTOMS
NAUSEA: 1
ALLERGIC/IMMUNOLOGIC NEGATIVE: 1
RESPIRATORY NEGATIVE: 1
EYES NEGATIVE: 1

## 2022-07-20 NOTE — PROGRESS NOTES
22     Liz Villa    : 1935 Sex: male   Age: 81 y.o. Chief Complaint   Patient presents with    Nausea    Headache       Prior to Admission medications    Medication Sig Start Date End Date Taking? Authorizing Provider   DULoxetine (CYMBALTA) 30 MG extended release capsule Take 1 capsule by mouth in the morning. 22  Yes Gurwinder Villa, DO   potassium chloride (KLOR-CON M) 20 MEQ extended release tablet 1 qd 22  Yes Gurwinder Villa DO   promethazine (PHENERGAN) 25 MG tablet Take 1 tablet by mouth every 8 hours as needed for Nausea 22 Yes Gurwinder Villa DO   famotidine (PEPCID) 20 MG tablet Take 1 tablet by mouth 2 times daily for 7 days 22 Yes Hannah Hogan MD   amLODIPine (NORVASC) 5 MG tablet Take 1 tablet by mouth daily 22  Yes Gurwinder Villa DO   hydroCHLOROthiazide (MICROZIDE) 12.5 MG capsule TAKE 1 CAPSULE BY MOUTH EVERY MORNING 22  Yes Gurwinder Villa DO   pantoprazole (PROTONIX) 40 MG tablet TAKE 1 TABLET BY MOUTH EVERY MORNING BEFORE BREAKFAST 22  Yes Gurwinder Villa DO   losartan (COZAAR) 100 MG tablet 1 QD 3/15/22 9/14/22 Yes Gurwinder Villa DO   aspirin 81 MG tablet Take 81 mg by mouth daily   Yes Historical Provider, MD   ondansetron (ZOFRAN-ODT) 4 MG disintegrating tablet Take 1 tablet by mouth 3 times daily as needed for Nausea or Vomiting 22   BRYON Grimaldo III          HPI: Evaluated today with hypertension depression myalgias hypokalemia. Recently back in the emergency room for chronic headaches and then nausea stomach upset. CT scan of the abdomen was unremarkable. CT scan of the head was unremarkable. I reviewed all medications with him today and we will try to make further adjustments. Discontinued Ativan discontinued fluoxetine discontinued Zofran and he states he is no longer taking Remeron. Review of Systems   Constitutional:  Positive for fatigue. HENT: Negative.      Eyes: Negative. Respiratory: Negative. Gastrointestinal:  Positive for nausea. Endocrine: Negative. Genitourinary: Negative. Musculoskeletal: Negative. Skin: Negative. Allergic/Immunologic: Negative. Neurological:  Positive for weakness. Hematological: Negative. Psychiatric/Behavioral: Negative.               Current Outpatient Medications:     DULoxetine (CYMBALTA) 30 MG extended release capsule, Take 1 capsule by mouth in the morning., Disp: 30 capsule, Rfl: 3    potassium chloride (KLOR-CON M) 20 MEQ extended release tablet, 1 qd, Disp: 30 tablet, Rfl: 1    promethazine (PHENERGAN) 25 MG tablet, Take 1 tablet by mouth every 8 hours as needed for Nausea, Disp: 12 tablet, Rfl: 0    famotidine (PEPCID) 20 MG tablet, Take 1 tablet by mouth 2 times daily for 7 days, Disp: 14 tablet, Rfl: 0    amLODIPine (NORVASC) 5 MG tablet, Take 1 tablet by mouth daily, Disp: 30 tablet, Rfl: 3    hydroCHLOROthiazide (MICROZIDE) 12.5 MG capsule, TAKE 1 CAPSULE BY MOUTH EVERY MORNING, Disp: 90 capsule, Rfl: 1    pantoprazole (PROTONIX) 40 MG tablet, TAKE 1 TABLET BY MOUTH EVERY MORNING BEFORE BREAKFAST, Disp: 90 tablet, Rfl: 1    losartan (COZAAR) 100 MG tablet, 1 QD, Disp: 90 tablet, Rfl: 3    aspirin 81 MG tablet, Take 81 mg by mouth daily, Disp: , Rfl:     ondansetron (ZOFRAN-ODT) 4 MG disintegrating tablet, Take 1 tablet by mouth 3 times daily as needed for Nausea or Vomiting, Disp: 12 tablet, Rfl: 0    No Known Allergies    Social History     Tobacco Use    Smoking status: Never    Smokeless tobacco: Never   Vaping Use    Vaping Use: Never used   Substance Use Topics    Alcohol use: No    Drug use: No      Past Surgical History:   Procedure Laterality Date    ANTERIOR CRUCIATE LIGAMENT REPAIR Left 11/21/2001    APPENDECTOMY      CHOLECYSTECTOMY  2007    Lap    ECHO COMPL W DOP COLOR FLOW  12/4/2012         HERNIA REPAIR Right 11/13/2002    double    SINUS SURGERY  2002,2003     done x 2    TONSILLECTOMY Cymbalta 30 mg 1 daily. Follow-up next week and further recommendations. Labs reviewed and stable. Repeat CMP CBC with follow-up. Plan Per Assessment:  Ranjana Alarcon was seen today for nausea and headache. Diagnoses and all orders for this visit:    Essential hypertension  -     CBC with Auto Differential; Future  -     Comprehensive Metabolic Panel; Future  -     C-Reactive Protein; Future    Depression, unspecified depression type  -     CBC with Auto Differential; Future  -     Comprehensive Metabolic Panel; Future  -     C-Reactive Protein; Future    Myalgia  -     CBC with Auto Differential; Future  -     Comprehensive Metabolic Panel; Future  -     C-Reactive Protein; Future    Hypokalemia  -     CBC with Auto Differential; Future  -     Comprehensive Metabolic Panel; Future  -     C-Reactive Protein; Future    Other orders  -     DULoxetine (CYMBALTA) 30 MG extended release capsule; Take 1 capsule by mouth in the morning.  -     potassium chloride (KLOR-CON M) 20 MEQ extended release tablet; 1 qd  -     promethazine (PHENERGAN) 25 MG tablet; Take 1 tablet by mouth every 8 hours as needed for Nausea          No follow-ups on file. Carine Malik DO    Note was generated with the assistance of voice recognition software. Document was reviewed however may contain grammatical errors.

## 2022-07-21 ENCOUNTER — TELEPHONE (OUTPATIENT)
Dept: PRIMARY CARE CLINIC | Age: 87
End: 2022-07-21

## 2022-07-21 NOTE — TELEPHONE ENCOUNTER
The pt was her to see you yesterday and you changed him to duloxetine 30 mg, he is calling because he took it this morning and now he has diarrhea, and is nauseated

## 2022-07-22 ENCOUNTER — OFFICE VISIT (OUTPATIENT)
Dept: FAMILY MEDICINE CLINIC | Age: 87
End: 2022-07-22
Payer: MEDICARE

## 2022-07-22 VITALS
TEMPERATURE: 97.4 F | SYSTOLIC BLOOD PRESSURE: 120 MMHG | DIASTOLIC BLOOD PRESSURE: 70 MMHG | HEART RATE: 75 BPM | HEIGHT: 72 IN | BODY MASS INDEX: 24.14 KG/M2 | OXYGEN SATURATION: 97 %

## 2022-07-22 DIAGNOSIS — F51.01 PRIMARY INSOMNIA: ICD-10-CM

## 2022-07-22 DIAGNOSIS — E78.2 MIXED HYPERLIPIDEMIA: Primary | Chronic | ICD-10-CM

## 2022-07-22 DIAGNOSIS — F32.A DEPRESSION, UNSPECIFIED DEPRESSION TYPE: ICD-10-CM

## 2022-07-22 DIAGNOSIS — R63.0 DECREASED APPETITE: ICD-10-CM

## 2022-07-22 DIAGNOSIS — R11.0 NAUSEA: ICD-10-CM

## 2022-07-22 PROCEDURE — G8420 CALC BMI NORM PARAMETERS: HCPCS | Performed by: FAMILY MEDICINE

## 2022-07-22 PROCEDURE — 96372 THER/PROPH/DIAG INJ SC/IM: CPT | Performed by: FAMILY MEDICINE

## 2022-07-22 PROCEDURE — G8427 DOCREV CUR MEDS BY ELIG CLIN: HCPCS | Performed by: FAMILY MEDICINE

## 2022-07-22 PROCEDURE — 1123F ACP DISCUSS/DSCN MKR DOCD: CPT | Performed by: FAMILY MEDICINE

## 2022-07-22 PROCEDURE — 1036F TOBACCO NON-USER: CPT | Performed by: FAMILY MEDICINE

## 2022-07-22 PROCEDURE — 99214 OFFICE O/P EST MOD 30 MIN: CPT | Performed by: FAMILY MEDICINE

## 2022-07-22 RX ORDER — KETOROLAC TROMETHAMINE 30 MG/ML
60 INJECTION, SOLUTION INTRAMUSCULAR; INTRAVENOUS ONCE
Status: COMPLETED | OUTPATIENT
Start: 2022-07-22 | End: 2022-07-22

## 2022-07-22 RX ADMIN — KETOROLAC TROMETHAMINE 60 MG: 30 INJECTION, SOLUTION INTRAMUSCULAR; INTRAVENOUS at 12:22

## 2022-07-22 ASSESSMENT — ENCOUNTER SYMPTOMS
ALLERGIC/IMMUNOLOGIC NEGATIVE: 1
RESPIRATORY NEGATIVE: 1
NAUSEA: 1
EYES NEGATIVE: 1

## 2022-07-22 NOTE — PROGRESS NOTES
22     Namrata Mariee    : 1935 Sex: male   Age: 81 y.o. Chief Complaint   Patient presents with    Nausea    Headache       Prior to Admission medications    Medication Sig Start Date End Date Taking? Authorizing Provider   potassium chloride (KLOR-CON M) 20 MEQ extended release tablet 1 qd 22   Thi Villa,    promethazine (PHENERGAN) 25 MG tablet Take 1 tablet by mouth every 8 hours as needed for Nausea 22  Thi Villa, DO   ondansetron (ZOFRAN-ODT) 4 MG disintegrating tablet Take 1 tablet by mouth 3 times daily as needed for Nausea or Vomiting 22   BRYON Rose III   amLODIPine (NORVASC) 5 MG tablet Take 1 tablet by mouth daily 22   Thi Vilal, DO   hydroCHLOROthiazide (MICROZIDE) 12.5 MG capsule TAKE 1 CAPSULE BY MOUTH EVERY MORNING 22   Moreno Sales DO   pantoprazole (PROTONIX) 40 MG tablet TAKE 1 TABLET BY MOUTH EVERY MORNING BEFORE BREAKFAST 22   Moreno Sales, DO   losartan (COZAAR) 100 MG tablet 1 QD 3/15/22 9/14/22  Moreno Sales DO   aspirin 81 MG tablet Take 81 mg by mouth daily    Historical Provider, MD          HPI: Karli Lee presents today continued struggles. Headache today family are going to provide Toradol injection 60 mg. Adjustments with serotonin inhibitors have not been effective so Cymbalta has been discontinued and we will resume Remeron 15 mg at at bedtime which has at least been effective for sleep as well as appetite. All meds reviewed and they are to be maintained as prescribed. He will follow-up again with me next week. Neurologically he remained stable. He has had extensive work-up and to date all has been stable. Review of Systems   Constitutional: Negative. HENT: Negative. Eyes: Negative. Respiratory: Negative. Gastrointestinal:  Positive for nausea. Endocrine: Negative. Genitourinary: Negative. Musculoskeletal: Negative. Skin: Negative. Allergic/Immunologic: Negative. Neurological:  Positive for headaches. Hematological: Negative. Psychiatric/Behavioral:  Positive for agitation, decreased concentration and sleep disturbance. The patient is nervous/anxious.              Current Outpatient Medications:     potassium chloride (KLOR-CON M) 20 MEQ extended release tablet, 1 qd, Disp: 30 tablet, Rfl: 1    promethazine (PHENERGAN) 25 MG tablet, Take 1 tablet by mouth every 8 hours as needed for Nausea, Disp: 12 tablet, Rfl: 0    ondansetron (ZOFRAN-ODT) 4 MG disintegrating tablet, Take 1 tablet by mouth 3 times daily as needed for Nausea or Vomiting, Disp: 12 tablet, Rfl: 0    amLODIPine (NORVASC) 5 MG tablet, Take 1 tablet by mouth daily, Disp: 30 tablet, Rfl: 3    hydroCHLOROthiazide (MICROZIDE) 12.5 MG capsule, TAKE 1 CAPSULE BY MOUTH EVERY MORNING, Disp: 90 capsule, Rfl: 1    pantoprazole (PROTONIX) 40 MG tablet, TAKE 1 TABLET BY MOUTH EVERY MORNING BEFORE BREAKFAST, Disp: 90 tablet, Rfl: 1    losartan (COZAAR) 100 MG tablet, 1 QD, Disp: 90 tablet, Rfl: 3    aspirin 81 MG tablet, Take 81 mg by mouth daily, Disp: , Rfl:     No Known Allergies    Social History     Tobacco Use    Smoking status: Never    Smokeless tobacco: Never   Vaping Use    Vaping Use: Never used   Substance Use Topics    Alcohol use: No    Drug use: No      Past Surgical History:   Procedure Laterality Date    ANTERIOR CRUCIATE LIGAMENT REPAIR Left 11/21/2001    APPENDECTOMY      CHOLECYSTECTOMY  2007    Lap    ECHO COMPL W DOP COLOR FLOW  12/4/2012         HERNIA REPAIR Right 11/13/2002    double    SINUS SURGERY  2002,2003     done x 2    TONSILLECTOMY      TOTAL KNEE ARTHROPLASTY Left 1/28/2019    LEFT  ROBOTIC KNEE TOTAL ARTHROPLASTY  ++MEREDITH- [id number]++   ++ADDUCTOR BLOCK++ performed by Melida Colon MD at [facility] 26 N/A 10/10/2019    EGD BIOPSY performed by Jessica Lindo MD at [facility] ENDOSCOPY     No family history on file.  Past Medical History:   Diagnosis Date    Aneurysm of right renal artery ([facility] [address]     follows with Dr. Harlan Martinez yearly (last visit 9/19)    Colitis     Depression     GERD (gastroesophageal reflux disease)     H/O: CVA (cerebrovascular accident) 10/14/2021    Hyperlipidemia     Hypertension     Stroke [facility])     Superior mesenteric artery stenosis ([facility] [address] 11/4/2020    TIA (transient ischemic attack)        Vitals:    07/22/22 1144   BP: 120/70   Pulse: 75   Temp: 97.4 °F (36.3 °C)   SpO2: 97%   Height: 6' (1.829 m)     BP Readings from Last 3 Encounters:   07/22/22 120/70   07/20/22 120/64   07/15/22 118/70        Physical Exam  Vitals and nursing note reviewed. Constitutional:       Appearance: He is well-developed. HENT:      Head: Normocephalic. Right Ear: External ear normal.      Left Ear: External ear normal.      Nose: Nose normal.   Eyes:      Conjunctiva/sclera: Conjunctivae normal.      Pupils: Pupils are equal, round, and reactive to light. Cardiovascular:      Rate and Rhythm: Normal rate. Pulmonary:      Breath sounds: Normal breath sounds. Abdominal:      General: Bowel sounds are normal.      Palpations: Abdomen is soft. Musculoskeletal:         General: Normal range of motion. Cervical back: Normal range of motion and neck supple. Skin:     General: Skin is warm and dry. Neurological:      Mental Status: He is alert and oriented to person, place, and time. Psychiatric:         Behavior: Behavior normal.   Today's vitals and physical exam stable. Heart is controlled lungs are clear. Neurologically is intact. Toradol provided as noted. Medications all reviewed and resumed Remeron as noted. Cymbalta has been discontinued. Remainder meds as prescribed and reassessment with me next week. Plan Per Assessment:  Marisabel Lindo was seen today for nausea and headache.     Diagnoses and all orders for this visit:    Mixed hyperlipidemia    Nausea    Primary insomnia    Decreased appetite    Depression, unspecified depression type    Other orders  -     ketorolac (TORADOL) injection 60 mg          No follow-ups on file. Meng Ribeiro DO    Note was generated with the assistance of voice recognition software. Document was reviewed however may contain grammatical errors.

## 2022-07-24 ENCOUNTER — APPOINTMENT (OUTPATIENT)
Dept: GENERAL RADIOLOGY | Age: 87
End: 2022-07-24
Payer: MEDICARE

## 2022-07-24 ENCOUNTER — HOSPITAL ENCOUNTER (EMERGENCY)
Age: 87
Discharge: HOME | End: 2022-07-24
Attending: STUDENT IN AN ORGANIZED HEALTH CARE EDUCATION/TRAINING PROGRAM
Payer: MEDICARE

## 2022-07-24 VITALS
HEART RATE: 75 BPM | RESPIRATION RATE: 20 BRPM | OXYGEN SATURATION: 97 % | DIASTOLIC BLOOD PRESSURE: 90 MMHG | TEMPERATURE: 98.1 F | SYSTOLIC BLOOD PRESSURE: 170 MMHG

## 2022-07-24 DIAGNOSIS — R11.2 NAUSEA AND VOMITING, INTRACTABILITY OF VOMITING NOT SPECIFIED, UNSPECIFIED VOMITING TYPE: Primary | ICD-10-CM

## 2022-07-24 LAB
ALBUMIN SERPL-MCNC: 4.3 G/DL (ref 3.5–5.2)
ALP BLD-CCNC: 73 U/L (ref 40–129)
ALT SERPL-CCNC: 26 U/L (ref 0–40)
ANION GAP SERPL CALCULATED.3IONS-SCNC: 11 MMOL/L (ref 7–16)
AST SERPL-CCNC: 19 U/L (ref 0–39)
BACTERIA: NORMAL /HPF
BASOPHILS ABSOLUTE: 0.02 E9/L (ref 0–0.2)
BASOPHILS RELATIVE PERCENT: 0.3 % (ref 0–2)
BILIRUB SERPL-MCNC: 0.5 MG/DL (ref 0–1.2)
BILIRUBIN URINE: NEGATIVE
BLOOD, URINE: NEGATIVE
BUN BLDV-MCNC: 10 MG/DL (ref 6–23)
CALCIUM SERPL-MCNC: 9.7 MG/DL (ref 8.6–10.2)
CHLORIDE BLD-SCNC: 94 MMOL/L (ref 98–107)
CLARITY: CLEAR
CO2: 27 MMOL/L (ref 22–29)
COLOR: YELLOW
CREAT SERPL-MCNC: 0.8 MG/DL (ref 0.7–1.2)
EOSINOPHILS ABSOLUTE: 0.02 E9/L (ref 0.05–0.5)
EOSINOPHILS RELATIVE PERCENT: 0.3 % (ref 0–6)
GFR AFRICAN AMERICAN: >60
GFR NON-AFRICAN AMERICAN: >60 ML/MIN/1.73
GLUCOSE BLD-MCNC: 106 MG/DL (ref 74–99)
GLUCOSE URINE: NEGATIVE MG/DL
HCT VFR BLD CALC: 40.1 % (ref 37–54)
HEMOGLOBIN: 14 G/DL (ref 12.5–16.5)
IMMATURE GRANULOCYTES #: 0.02 E9/L
IMMATURE GRANULOCYTES %: 0.3 % (ref 0–5)
KETONES, URINE: NEGATIVE MG/DL
LACTIC ACID: 0.9 MMOL/L (ref 0.5–2.2)
LEUKOCYTE ESTERASE, URINE: NEGATIVE
LIPASE: 18 U/L (ref 13–60)
LYMPHOCYTES ABSOLUTE: 1.01 E9/L (ref 1.5–4)
LYMPHOCYTES RELATIVE PERCENT: 16 % (ref 20–42)
MCH RBC QN AUTO: 30.5 PG (ref 26–35)
MCHC RBC AUTO-ENTMCNC: 34.9 % (ref 32–34.5)
MCV RBC AUTO: 87.4 FL (ref 80–99.9)
MONOCYTES ABSOLUTE: 0.7 E9/L (ref 0.1–0.95)
MONOCYTES RELATIVE PERCENT: 11.1 % (ref 2–12)
NEUTROPHILS ABSOLUTE: 4.56 E9/L (ref 1.8–7.3)
NEUTROPHILS RELATIVE PERCENT: 72 % (ref 43–80)
NITRITE, URINE: NEGATIVE
PDW BLD-RTO: 12.5 FL (ref 11.5–15)
PH UA: 6.5 (ref 5–9)
PLATELET # BLD: 374 E9/L (ref 130–450)
PMV BLD AUTO: 9 FL (ref 7–12)
POTASSIUM REFLEX MAGNESIUM: 3.6 MMOL/L (ref 3.5–5)
PROTEIN UA: NEGATIVE MG/DL
RBC # BLD: 4.59 E12/L (ref 3.8–5.8)
RBC UA: NORMAL /HPF (ref 0–2)
SODIUM BLD-SCNC: 132 MMOL/L (ref 132–146)
SPECIFIC GRAVITY UA: <=1.005 (ref 1–1.03)
TOTAL PROTEIN: 7.4 G/DL (ref 6.4–8.3)
TROPONIN, HIGH SENSITIVITY: 17 NG/L (ref 0–11)
UROBILINOGEN, URINE: 0.2 E.U./DL
WBC # BLD: 6.3 E9/L (ref 4.5–11.5)
WBC UA: NORMAL /HPF (ref 0–5)

## 2022-07-24 PROCEDURE — 83690 ASSAY OF LIPASE: CPT

## 2022-07-24 PROCEDURE — 80053 COMPREHEN METABOLIC PANEL: CPT

## 2022-07-24 PROCEDURE — 85025 COMPLETE CBC W/AUTO DIFF WBC: CPT

## 2022-07-24 PROCEDURE — 83605 ASSAY OF LACTIC ACID: CPT

## 2022-07-24 PROCEDURE — 93005 ELECTROCARDIOGRAM TRACING: CPT | Performed by: STUDENT IN AN ORGANIZED HEALTH CARE EDUCATION/TRAINING PROGRAM

## 2022-07-24 PROCEDURE — 36415 COLL VENOUS BLD VENIPUNCTURE: CPT

## 2022-07-24 PROCEDURE — 2580000003 HC RX 258: Performed by: STUDENT IN AN ORGANIZED HEALTH CARE EDUCATION/TRAINING PROGRAM

## 2022-07-24 PROCEDURE — 6360000002 HC RX W HCPCS: Performed by: STUDENT IN AN ORGANIZED HEALTH CARE EDUCATION/TRAINING PROGRAM

## 2022-07-24 PROCEDURE — 99285 EMERGENCY DEPT VISIT HI MDM: CPT

## 2022-07-24 PROCEDURE — 96372 THER/PROPH/DIAG INJ SC/IM: CPT

## 2022-07-24 PROCEDURE — 81001 URINALYSIS AUTO W/SCOPE: CPT

## 2022-07-24 PROCEDURE — 6370000000 HC RX 637 (ALT 250 FOR IP): Performed by: STUDENT IN AN ORGANIZED HEALTH CARE EDUCATION/TRAINING PROGRAM

## 2022-07-24 PROCEDURE — 84484 ASSAY OF TROPONIN QUANT: CPT

## 2022-07-24 PROCEDURE — 71045 X-RAY EXAM CHEST 1 VIEW: CPT

## 2022-07-24 RX ORDER — DICYCLOMINE HYDROCHLORIDE 10 MG/ML
20 INJECTION INTRAMUSCULAR ONCE
Status: COMPLETED | OUTPATIENT
Start: 2022-07-24 | End: 2022-07-24

## 2022-07-24 RX ORDER — PROCHLORPERAZINE MALEATE 10 MG
10 TABLET ORAL EVERY 6 HOURS PRN
Qty: 30 TABLET | Refills: 0 | Status: SHIPPED | OUTPATIENT
Start: 2022-07-24 | End: 2022-08-03

## 2022-07-24 RX ORDER — 0.9 % SODIUM CHLORIDE 0.9 %
1000 INTRAVENOUS SOLUTION INTRAVENOUS ONCE
Status: COMPLETED | OUTPATIENT
Start: 2022-07-24 | End: 2022-07-24

## 2022-07-24 RX ORDER — ACETAMINOPHEN 325 MG/1
650 TABLET ORAL ONCE
Status: COMPLETED | OUTPATIENT
Start: 2022-07-24 | End: 2022-07-24

## 2022-07-24 RX ADMIN — SODIUM CHLORIDE 1000 ML: 9 INJECTION, SOLUTION INTRAVENOUS at 17:30

## 2022-07-24 RX ADMIN — DICYCLOMINE HYDROCHLORIDE 20 MG: 20 INJECTION, SOLUTION INTRAMUSCULAR at 18:39

## 2022-07-24 RX ADMIN — ACETAMINOPHEN 325MG 650 MG: 325 TABLET ORAL at 19:10

## 2022-07-24 RX ADMIN — TRIMETHOBENZAMIDE HYDROCHLORIDE 200 MG: 100 INJECTION INTRAMUSCULAR at 18:39

## 2022-07-24 ASSESSMENT — ENCOUNTER SYMPTOMS
EYE DISCHARGE: 0
COUGH: 0
ABDOMINAL PAIN: 1
VOMITING: 1
EYE REDNESS: 0
BACK PAIN: 0
SORE THROAT: 0
WHEEZING: 0
NAUSEA: 1
EYE PAIN: 0
DIARRHEA: 0
SHORTNESS OF BREATH: 0
SINUS PRESSURE: 0

## 2022-07-24 ASSESSMENT — PAIN DESCRIPTION - PAIN TYPE: TYPE: ACUTE PAIN

## 2022-07-24 ASSESSMENT — PAIN - FUNCTIONAL ASSESSMENT: PAIN_FUNCTIONAL_ASSESSMENT: 0-10

## 2022-07-24 ASSESSMENT — PAIN DESCRIPTION - DESCRIPTORS
DESCRIPTORS: ACHING
DESCRIPTORS: DISCOMFORT

## 2022-07-24 ASSESSMENT — PAIN DESCRIPTION - LOCATION
LOCATION: HEAD
LOCATION: ABDOMEN

## 2022-07-24 ASSESSMENT — PAIN DESCRIPTION - ORIENTATION: ORIENTATION: MID

## 2022-07-24 ASSESSMENT — PAIN DESCRIPTION - FREQUENCY: FREQUENCY: CONTINUOUS

## 2022-07-24 ASSESSMENT — PAIN SCALES - GENERAL
PAINLEVEL_OUTOF10: 8
PAINLEVEL_OUTOF10: 8
PAINLEVEL_OUTOF10: 6

## 2022-07-24 NOTE — DISCHARGE INSTRUCTIONS
Arterial Line  Performed by: Lamont Watson MD  Authorized by: Lamont Watson MD     Patient Location:  OR  Indication*:  Continuous blood pressure monitoring  Laterality*:  Left  Site*:  Radial  Max Sterile Barrier Technique*:  Sterile gloves, Sterile dressing applied and Cap/Mask  Local Anesthetic:  None  Prep*:  Chlorhexidine gluconate (CHG)  Catheter Size*:  20 G  Catheter Type:  Arrow  Line Secured*:  Tape and Transparent dressing  Events: patient tolerated procedure well with no complications    Performed By:  Anesthesiologist  Anesthesiologist:  Fay Stout MD         Stop Reglan start Compazine  Increase fluids  Follow-up with primary care doctor  If you notice any new worrisome symptoms please return to emergency department for evaluation

## 2022-07-24 NOTE — ED PROVIDER NOTES
[facility]  Department of Emergency Medicine     Written by: Bird Spaulding DO  Patient Name: Mike Baptiste  Attending Provider: No att. providers found  Admit Date: 2022  4:42 PM  MRN: [id number]                   : 1935        Chief Complaint   Patient presents with    Abdominal Pain     Continues to have abd pains, states dr sent in to be checked    - Chief complaint    Patient is a 72-year-old male past medical history of hyperlipidemia, hypertension, GERD and TIA. Patient presents with chief of nausea and headache. Patient states that symptoms have been present for the last couple of months. He states that he has had multiple episodes of nausea with intermittent episodes of vomiting. Patient also continues to have  intermittent headaches. Patient states that he has been seen by his primary care doctor multiple times as well as ER multiple times and has been tried on multiple different medications with some improvement. Patient states that symptoms are moderate in severity and intermittent since onset he denies any exacerbating relieving factors. Patient denies any fevers, chills, chest pain, cough, constipation, diarrhea, lightheadedness, numbness or tingling. The history is provided by the patient. No  was used. Review of Systems   Constitutional:  Negative for chills and fever. HENT:  Negative for ear pain, sinus pressure and sore throat. Eyes:  Negative for pain, discharge and redness. Respiratory:  Negative for cough, shortness of breath and wheezing. Cardiovascular:  Negative for chest pain. Gastrointestinal:  Positive for abdominal pain, nausea and vomiting. Negative for diarrhea. Genitourinary:  Negative for dysuria and frequency. Musculoskeletal:  Negative for arthralgias and back pain. Skin:  Negative for rash and wound. Neurological:  Negative for weakness and headaches.    Hematological:  Negative for adenopathy. All other systems reviewed and are negative. Physical Exam  Vitals and nursing note reviewed. Constitutional:       Appearance: He is well-developed. HENT:      Head: Normocephalic and atraumatic. Eyes:      Conjunctiva/sclera: Conjunctivae normal.   Cardiovascular:      Rate and Rhythm: Normal rate and regular rhythm. Heart sounds: Normal heart sounds. No murmur heard. Pulmonary:      Effort: Pulmonary effort is normal. No respiratory distress. Breath sounds: Normal breath sounds. No wheezing or rales. Abdominal:      General: Bowel sounds are normal.      Palpations: Abdomen is soft. Tenderness: There is no abdominal tenderness. There is no guarding or rebound. Musculoskeletal:         General: No tenderness or deformity. Cervical back: Normal range of motion and neck supple. Skin:     General: Skin is warm and dry. Neurological:      Mental Status: He is alert and oriented to person, place, and time. Cranial Nerves: No cranial nerve deficit. Coordination: Coordination normal.        Procedures   EKG #1:  Interpreted by emergency department physician unless otherwise noted. Time:  1733    Rate: 68  Rhythm: Sinus. Interpretation: EKG obtained demonstrated normal sinus rhythm, rate 68, normal axis, , no acute ST segment changes. Comparison: stable as compared to patient's most recent EKG. MDM  Number of Diagnoses or Management Options  Nausea and vomiting, intractability of vomiting not specified, unspecified vomiting type  Diagnosis management comments: Patient is an 50-year-old male past medical history of hyperlipidemia, hypertension, GERD and TIA. Patient with chief complaint of nausea and headache. Vital signs stable presentation sent for mildly elevated blood pressure. On physical exam heart regular rate and rhythm, lungs clear to auscultation bilaterally, abdomen soft nontender no rigidity rebound or guarding.   EKG obtained Anterior cruciate ligament repair (Left, 11/21/2001); Cholecystectomy (2007); hernia repair (Right, 11/13/2002); Total knee arthroplasty (Left, 1/28/2019); and Upper gastrointestinal endoscopy (N/A, 10/10/2019). Social History:  reports that he has never smoked. He has never used smokeless tobacco. He reports that he does not drink alcohol and does not use drugs. Family History: family history is not on file. The patients home medications have been reviewed. Allergies: Patient has no known allergies.     -------------------------------------------------- RESULTS -------------------------------------------------  Labs:  Results for orders placed or performed during the hospital encounter of 07/24/22   CBC with Auto Differential   Result Value Ref Range    WBC 6.3 4.5 - 11.5 E9/L    RBC 4.59 3.80 - 5.80 E12/L    Hemoglobin 14.0 12.5 - 16.5 g/dL    Hematocrit 40.1 37.0 - 54.0 %    MCV 87.4 80.0 - 99.9 fL    MCH 30.5 26.0 - 35.0 pg    MCHC 34.9 (H) 32.0 - 34.5 %    RDW 12.5 11.5 - 15.0 fL    Platelets 823 749 - 219 E9/L    MPV 9.0 7.0 - 12.0 fL    Neutrophils % 72.0 43.0 - 80.0 %    Immature Granulocytes % 0.3 0.0 - 5.0 %    Lymphocytes % 16.0 (L) 20.0 - 42.0 %    Monocytes % 11.1 2.0 - 12.0 %    Eosinophils % 0.3 0.0 - 6.0 %    Basophils % 0.3 0.0 - 2.0 %    Neutrophils Absolute 4.56 1.80 - 7.30 E9/L    Immature Granulocytes # 0.02 E9/L    Lymphocytes Absolute 1.01 (L) 1.50 - 4.00 E9/L    Monocytes Absolute 0.70 0.10 - 0.95 E9/L    Eosinophils Absolute 0.02 (L) 0.05 - 0.50 E9/L    Basophils Absolute 0.02 0.00 - 0.20 E9/L   Comprehensive Metabolic Panel w/ Reflex to MG   Result Value Ref Range    Sodium 132 132 - 146 mmol/L    Potassium reflex Magnesium 3.6 3.5 - 5.0 mmol/L    Chloride 94 (L) 98 - 107 mmol/L    CO2 27 22 - 29 mmol/L    Anion Gap 11 7 - 16 mmol/L    Glucose 106 (H) 74 - 99 mg/dL    BUN 10 6 - 23 mg/dL    Creatinine 0.8 0.7 - 1.2 mg/dL    GFR Non-African American >60 >=60 mL/min/1.73    GFR addition to providing specific details for the plan of care and counseling regarding the diagnosis and prognosis. Their questions are answered at this time and they are agreeable with the plan. I discussed at length with them reasons for immediate return here for re evaluation. They will followup with their primary care physician by calling their office tomorrow. --------------------------------- ADDITIONAL PROVIDER NOTES ---------------------------------  At this time the patient is without objective evidence of an acute process requiring hospitalization or inpatient management. They have remained hemodynamically stable throughout their entire ED visit and are stable for discharge with outpatient follow-up. The plan has been discussed in detail and they are aware of the specific conditions for emergent return, as well as the importance of follow-up. Discharge Medication List as of 7/24/2022  7:09 PM        START taking these medications    Details   prochlorperazine (COMPAZINE) 10 MG tablet Take 1 tablet by mouth every 6 hours as needed (Nausea), Disp-30 tablet, R-0Normal             Diagnosis:  1. Nausea and vomiting, intractability of vomiting not specified, unspecified vomiting type        Disposition:  Patient's disposition: Discharge to home  Patient's condition is stable. Patient was seen and evaluated by myself and my attending No att. providers found. Assessment and Plan discussed with attending provider, please see attestation for final plan of care.      DO Aisha Hawley DO  Resident  07/24/22 [id number]

## 2022-07-25 ENCOUNTER — OFFICE VISIT (OUTPATIENT)
Dept: FAMILY MEDICINE CLINIC | Age: 87
End: 2022-07-25
Payer: MEDICARE

## 2022-07-25 VITALS
HEART RATE: 73 BPM | WEIGHT: 178 LBS | OXYGEN SATURATION: 98 % | TEMPERATURE: 98 F | DIASTOLIC BLOOD PRESSURE: 78 MMHG | SYSTOLIC BLOOD PRESSURE: 134 MMHG | BODY MASS INDEX: 24.14 KG/M2

## 2022-07-25 DIAGNOSIS — E78.2 MIXED HYPERLIPIDEMIA: Chronic | ICD-10-CM

## 2022-07-25 DIAGNOSIS — F51.01 PRIMARY INSOMNIA: ICD-10-CM

## 2022-07-25 DIAGNOSIS — F32.A DEPRESSION, UNSPECIFIED DEPRESSION TYPE: ICD-10-CM

## 2022-07-25 DIAGNOSIS — F41.9 ANXIETY: Primary | ICD-10-CM

## 2022-07-25 LAB
EKG ATRIAL RATE: 68 BPM
EKG P AXIS: 28 DEGREES
EKG P-R INTERVAL: 152 MS
EKG Q-T INTERVAL: 452 MS
EKG QRS DURATION: 142 MS
EKG QTC CALCULATION (BAZETT): 480 MS
EKG R AXIS: 65 DEGREES
EKG T AXIS: 36 DEGREES
EKG VENTRICULAR RATE: 68 BPM

## 2022-07-25 PROCEDURE — 99214 OFFICE O/P EST MOD 30 MIN: CPT | Performed by: FAMILY MEDICINE

## 2022-07-25 PROCEDURE — G8427 DOCREV CUR MEDS BY ELIG CLIN: HCPCS | Performed by: FAMILY MEDICINE

## 2022-07-25 PROCEDURE — 1123F ACP DISCUSS/DSCN MKR DOCD: CPT | Performed by: FAMILY MEDICINE

## 2022-07-25 PROCEDURE — 1036F TOBACCO NON-USER: CPT | Performed by: FAMILY MEDICINE

## 2022-07-25 PROCEDURE — G8420 CALC BMI NORM PARAMETERS: HCPCS | Performed by: FAMILY MEDICINE

## 2022-07-25 RX ORDER — LORAZEPAM 0.5 MG/1
0.5 TABLET ORAL EVERY 8 HOURS PRN
Qty: 90 TABLET | Refills: 0 | Status: SHIPPED | OUTPATIENT
Start: 2022-07-25 | End: 2022-08-24

## 2022-07-25 NOTE — PROGRESS NOTES
22     Brielle Patino    : 1935 Sex: male   Age: 81 y.o. Chief Complaint   Patient presents with    Nausea     Has not started compazine yet     Anxiety       Prior to Admission medications    Medication Sig Start Date End Date Taking? Authorizing Provider   LORazepam (ATIVAN) 0.5 MG tablet Take 1 tablet by mouth every 8 hours as needed for Anxiety for up to 30 days. 22 Yes Jatinder Villa DO   prochlorperazine (COMPAZINE) 10 MG tablet Take 1 tablet by mouth every 6 hours as needed (Nausea) 22  Yes Jaime Louie,    potassium chloride (KLOR-CON M) 20 MEQ extended release tablet 1 qd 22  Yes Jatinder Villa DO   ondansetron (ZOFRAN-ODT) 4 MG disintegrating tablet Take 1 tablet by mouth 3 times daily as needed for Nausea or Vomiting 22  Yes BRYON Cleveland III   amLODIPine (NORVASC) 5 MG tablet Take 1 tablet by mouth daily 22  Yes Jatinder Villa DO   hydroCHLOROthiazide (MICROZIDE) 12.5 MG capsule TAKE 1 CAPSULE BY MOUTH EVERY MORNING 22  Yes aJtinder Villa DO   pantoprazole (PROTONIX) 40 MG tablet TAKE 1 TABLET BY MOUTH EVERY MORNING BEFORE BREAKFAST 22  Yes Jatinder Villa DO   losartan (COZAAR) 100 MG tablet 1 QD 3/15/22 9/14/22 Yes David Villa DO   aspirin 81 MG tablet Take 81 mg by mouth daily   Yes Historical Provider, MD          HPI: Patient in today anxiety hyperlipidemia insomnia depression. Anxiety has been much worse. Opted to resume Ativan 0.5 mg on an every 8 hour basis and then we will maintain remainder meds as prescribed. Ativan should be helpful with both anxiety and nausea panic attacks. Reassessment Wednesday. Medications all reviewed and continue as prescribed. Emotional support provided. Review of Systems   Constitutional: Negative. HENT: Negative. Eyes: Negative. Respiratory: Negative. Gastrointestinal: Negative. Endocrine: Negative. Genitourinary: Negative. Musculoskeletal: Negative. Skin: Negative. Allergic/Immunologic: Negative. Neurological: Negative. Hematological: Negative. Psychiatric/Behavioral:  Positive for agitation, decreased concentration and sleep disturbance. Systems review overall stable aside from the continued difficulties with high levels of anxiety and panic. Current Outpatient Medications:     LORazepam (ATIVAN) 0.5 MG tablet, Take 1 tablet by mouth every 8 hours as needed for Anxiety for up to 30 days. , Disp: 90 tablet, Rfl: 0    prochlorperazine (COMPAZINE) 10 MG tablet, Take 1 tablet by mouth every 6 hours as needed (Nausea), Disp: 30 tablet, Rfl: 0    potassium chloride (KLOR-CON M) 20 MEQ extended release tablet, 1 qd, Disp: 30 tablet, Rfl: 1    ondansetron (ZOFRAN-ODT) 4 MG disintegrating tablet, Take 1 tablet by mouth 3 times daily as needed for Nausea or Vomiting, Disp: 12 tablet, Rfl: 0    amLODIPine (NORVASC) 5 MG tablet, Take 1 tablet by mouth daily, Disp: 30 tablet, Rfl: 3    hydroCHLOROthiazide (MICROZIDE) 12.5 MG capsule, TAKE 1 CAPSULE BY MOUTH EVERY MORNING, Disp: 90 capsule, Rfl: 1    pantoprazole (PROTONIX) 40 MG tablet, TAKE 1 TABLET BY MOUTH EVERY MORNING BEFORE BREAKFAST, Disp: 90 tablet, Rfl: 1    losartan (COZAAR) 100 MG tablet, 1 QD, Disp: 90 tablet, Rfl: 3    aspirin 81 MG tablet, Take 81 mg by mouth daily, Disp: , Rfl:     No Known Allergies    Social History     Tobacco Use    Smoking status: Never    Smokeless tobacco: Never   Vaping Use    Vaping Use: Never used   Substance Use Topics    Alcohol use: No    Drug use: No      Past Surgical History:   Procedure Laterality Date    ANTERIOR CRUCIATE LIGAMENT REPAIR Left 11/21/2001    APPENDECTOMY      CHOLECYSTECTOMY  2007    Lap    ECHO COMPL W DOP COLOR FLOW  12/4/2012         HERNIA REPAIR Right 11/13/2002    double    SINUS SURGERY  2002,2003     done x 2    TONSILLECTOMY      TOTAL KNEE ARTHROPLASTY Left 1/28/2019    LEFT  ROBOTIC KNEE TOTAL ARTHROPLASTY  ++MEREDITH- [id number]++   ++ADDUCTOR BLOCK++ performed by Johan Jackson MD at [facility] N/A 10/10/2019    EGD BIOPSY performed by Jose F Schulz MD at [facility] ENDOSCOPY     No family history on file. Past Medical History:   Diagnosis Date    Aneurysm of right renal artery ([address]     follows with Dr. Samuel Sifuentes yearly (last visit 9/19)    Colitis     Depression     GERD (gastroesophageal reflux disease)     H/O: CVA (cerebrovascular accident) 10/14/2021    Hyperlipidemia     Hypertension     Stroke [facility])     Superior mesenteric artery stenosis ([facility] [address] 11/4/2020    TIA (transient ischemic attack)        Vitals:    07/25/22 0935   BP: 134/78   Pulse: 73   Temp: 98 °F (36.7 °C)   SpO2: 98%   Weight: 178 lb (80.7 kg)     BP Readings from Last 3 Encounters:   07/25/22 134/78   07/24/22 (!) 170/90   07/22/22 120/70        Physical Exam  Vitals and nursing note reviewed. Constitutional:       Appearance: He is well-developed. HENT:      Head: Normocephalic. Right Ear: External ear normal.      Left Ear: External ear normal.      Nose: Nose normal.   Eyes:      Conjunctiva/sclera: Conjunctivae normal.      Pupils: Pupils are equal, round, and reactive to light. Cardiovascular:      Rate and Rhythm: Normal rate. Pulmonary:      Breath sounds: Normal breath sounds. Abdominal:      General: Bowel sounds are normal.      Palpations: Abdomen is soft. Musculoskeletal:         General: Normal range of motion. Cervical back: Normal range of motion and neck supple. Skin:     General: Skin is warm and dry. Neurological:      Mental Status: He is alert and oriented to person, place, and time. Psychiatric:         Behavior: Behavior normal.      Today's vitals physical examination stable. Heart is controlled lungs are clear. Medications as prescribed.   Follow-up visit with me Wednesday and sooner if problems see addition of Ativan 0.5 mg on a every 8 hour basis as discussed prescribed. Plan Per Assessment:  Polly Stephens was seen today for nausea and anxiety. Diagnoses and all orders for this visit:    Anxiety  -     LORazepam (ATIVAN) 0.5 MG tablet; Take 1 tablet by mouth every 8 hours as needed for Anxiety for up to 30 days. Mixed hyperlipidemia    Primary insomnia    Depression, unspecified depression type          No follow-ups on file. Isaak Massey DO    Note was generated with the assistance of voice recognition software. Document was reviewed however may contain grammatical errors.

## 2022-07-27 ENCOUNTER — OFFICE VISIT (OUTPATIENT)
Dept: PRIMARY CARE CLINIC | Age: 87
End: 2022-07-27
Payer: MEDICARE

## 2022-07-27 VITALS
OXYGEN SATURATION: 98 % | HEIGHT: 72 IN | TEMPERATURE: 97.3 F | SYSTOLIC BLOOD PRESSURE: 144 MMHG | DIASTOLIC BLOOD PRESSURE: 80 MMHG | HEART RATE: 72 BPM | BODY MASS INDEX: 24.14 KG/M2

## 2022-07-27 DIAGNOSIS — E78.2 MIXED HYPERLIPIDEMIA: Chronic | ICD-10-CM

## 2022-07-27 DIAGNOSIS — F41.9 ANXIETY: ICD-10-CM

## 2022-07-27 DIAGNOSIS — I10 ESSENTIAL HYPERTENSION: Primary | Chronic | ICD-10-CM

## 2022-07-27 DIAGNOSIS — R11.0 NAUSEA: ICD-10-CM

## 2022-07-27 DIAGNOSIS — F32.A DEPRESSION, UNSPECIFIED DEPRESSION TYPE: ICD-10-CM

## 2022-07-27 PROCEDURE — 1123F ACP DISCUSS/DSCN MKR DOCD: CPT | Performed by: FAMILY MEDICINE

## 2022-07-27 PROCEDURE — G8427 DOCREV CUR MEDS BY ELIG CLIN: HCPCS | Performed by: FAMILY MEDICINE

## 2022-07-27 PROCEDURE — 99214 OFFICE O/P EST MOD 30 MIN: CPT | Performed by: FAMILY MEDICINE

## 2022-07-27 PROCEDURE — G8420 CALC BMI NORM PARAMETERS: HCPCS | Performed by: FAMILY MEDICINE

## 2022-07-27 PROCEDURE — 1036F TOBACCO NON-USER: CPT | Performed by: FAMILY MEDICINE

## 2022-07-27 RX ORDER — MIRTAZAPINE 15 MG/1
15 TABLET, FILM COATED ORAL NIGHTLY
Qty: 30 TABLET | Refills: 3 | Status: SHIPPED
Start: 2022-07-27 | End: 2022-08-11

## 2022-07-27 SDOH — ECONOMIC STABILITY: FOOD INSECURITY: WITHIN THE PAST 12 MONTHS, THE FOOD YOU BOUGHT JUST DIDN'T LAST AND YOU DIDN'T HAVE MONEY TO GET MORE.: NEVER TRUE

## 2022-07-27 SDOH — ECONOMIC STABILITY: FOOD INSECURITY: WITHIN THE PAST 12 MONTHS, YOU WORRIED THAT YOUR FOOD WOULD RUN OUT BEFORE YOU GOT MONEY TO BUY MORE.: NEVER TRUE

## 2022-07-27 ASSESSMENT — SOCIAL DETERMINANTS OF HEALTH (SDOH): HOW HARD IS IT FOR YOU TO PAY FOR THE VERY BASICS LIKE FOOD, HOUSING, MEDICAL CARE, AND HEATING?: NOT HARD AT ALL

## 2022-07-27 NOTE — PROGRESS NOTES
22     Caffie Form    : 1935 Sex: male   Age: 81 y.o. Chief Complaint   Patient presents with    Anxiety    Medication Check       Prior to Admission medications    Medication Sig Start Date End Date Taking? Authorizing Provider   mirtazapine (REMERON) 15 MG tablet Take 1 tablet by mouth nightly 22  Yes Rj Villa DO   LORazepam (ATIVAN) 0.5 MG tablet Take 1 tablet by mouth every 8 hours as needed for Anxiety for up to 30 days. 22 Yes Rj Villa DO   prochlorperazine (COMPAZINE) 10 MG tablet Take 1 tablet by mouth every 6 hours as needed (Nausea) 22  Yes Vernell Gifford DO   potassium chloride (KLOR-CON M) 20 MEQ extended release tablet 1 qd 22  Yes Rj Villa DO   ondansetron (ZOFRAN-ODT) 4 MG disintegrating tablet Take 1 tablet by mouth 3 times daily as needed for Nausea or Vomiting 22  Yes Yue Modi III, PA   amLODIPine (NORVASC) 5 MG tablet Take 1 tablet by mouth daily 22  Yes Rj Villa DO   hydroCHLOROthiazide (MICROZIDE) 12.5 MG capsule TAKE 1 CAPSULE BY MOUTH EVERY MORNING 22  Yes Rj Villa DO   pantoprazole (PROTONIX) 40 MG tablet TAKE 1 TABLET BY MOUTH EVERY MORNING BEFORE BREAKFAST 22  Yes Rj Villa DO   losartan (COZAAR) 100 MG tablet 1 QD 3/15/22 9/14/22 Yes David Villa DO   aspirin 81 MG tablet Take 81 mg by mouth daily   Yes Historical Provider, MD          HPI: Patient seen today in follow-up on anxiety depressive symptoms. Improved with resuming Ativan but requiring an increased dose through the day and we are going to try him at 1 mg in the morning 1 mg in the afternoon and half a milligram at bedtime and then reassess next week. Remeron has been resumed 15 mg at bedtime. Medically otherwise stable. Review of Systems   Constitutional: Negative. HENT: Negative. Eyes: Negative. Respiratory: Negative. Gastrointestinal: Negative.     Endocrine: Negative. Genitourinary: Negative. Musculoskeletal: Negative. Skin: Negative. Allergic/Immunologic: Negative. Neurological: Negative. Hematological: Negative. Psychiatric/Behavioral: Negative. Anxiety approved meds as prescribed. Current Outpatient Medications:     mirtazapine (REMERON) 15 MG tablet, Take 1 tablet by mouth nightly, Disp: 30 tablet, Rfl: 3    LORazepam (ATIVAN) 0.5 MG tablet, Take 1 tablet by mouth every 8 hours as needed for Anxiety for up to 30 days. , Disp: 90 tablet, Rfl: 0    prochlorperazine (COMPAZINE) 10 MG tablet, Take 1 tablet by mouth every 6 hours as needed (Nausea), Disp: 30 tablet, Rfl: 0    potassium chloride (KLOR-CON M) 20 MEQ extended release tablet, 1 qd, Disp: 30 tablet, Rfl: 1    ondansetron (ZOFRAN-ODT) 4 MG disintegrating tablet, Take 1 tablet by mouth 3 times daily as needed for Nausea or Vomiting, Disp: 12 tablet, Rfl: 0    amLODIPine (NORVASC) 5 MG tablet, Take 1 tablet by mouth daily, Disp: 30 tablet, Rfl: 3    hydroCHLOROthiazide (MICROZIDE) 12.5 MG capsule, TAKE 1 CAPSULE BY MOUTH EVERY MORNING, Disp: 90 capsule, Rfl: 1    pantoprazole (PROTONIX) 40 MG tablet, TAKE 1 TABLET BY MOUTH EVERY MORNING BEFORE BREAKFAST, Disp: 90 tablet, Rfl: 1    losartan (COZAAR) 100 MG tablet, 1 QD, Disp: 90 tablet, Rfl: 3    aspirin 81 MG tablet, Take 81 mg by mouth daily, Disp: , Rfl:     No Known Allergies    Social History     Tobacco Use    Smoking status: Never    Smokeless tobacco: Never   Vaping Use    Vaping Use: Never used   Substance Use Topics    Alcohol use: No    Drug use: No      Past Surgical History:   Procedure Laterality Date    ANTERIOR CRUCIATE LIGAMENT REPAIR Left 11/21/2001    APPENDECTOMY      CHOLECYSTECTOMY  2007    Lap    ECHO COMPL W DOP COLOR FLOW  12/4/2012         HERNIA REPAIR Right 11/13/2002    double    SINUS SURGERY  2002,2003     done x 2    TONSILLECTOMY      TOTAL KNEE ARTHROPLASTY Left 1/28/2019    LEFT  ROBOTIC KNEE TOTAL ARTHROPLASTY  ++MEREDITH- [id number]++   ++ADDUCTOR BLOCK++ performed by Edvin Reese MD at [address] N/A 10/10/2019    EGD BIOPSY performed by Tahir Miller MD at [facility] ENDOSCOPY     No family history on file. Past Medical History:   Diagnosis Date    Aneurysm of right renal artery (HCC)     follows with Dr. 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 Constitutional:       Appearance: He is well-developed. HENT:      Head: Normocephalic. Right Ear: External ear normal.      Left Ear: External ear normal.      Nose: Nose normal.   Eyes:      Conjunctiva/sclera: Conjunctivae normal.      Pupils: Pupils are equal, round, and reactive to light. Cardiovascular:      Rate and Rhythm: Normal rate. Pulmonary:      Breath sounds: Normal breath sounds. Abdominal:      General: Bowel sounds are normal.      Palpations: Abdomen is soft. Musculoskeletal:         General: Normal range of motion. Cervical back: Normal range of motion and neck supple. Skin:     General: Skin is warm and dry. Neurological:      Mental Status: He is alert and oriented to person, place, and time. Psychiatric:         Behavior: Behavior normal.      Today's vitals physical exam stable. Abdomen benign. Labs emergency room work-up have all been reviewed and stable. Medications continue to be adjusted close monitoring I will see him next week.       Lab Results   Component Value Date    TSH 1.700 03/29/2022    TSH 1.590 01/03/2022    K7GYGNG 6.9 03/29/2022    F4REGIA 6.5 01/03/2022    T4FREE 1.10 01/22/2017     Lab Results   Component Value Date    CHOL 150 03/29/2022    CHOL 148 01/03/2022     Lab Results   Component Value Date    TRIG 64 03/29/2022    TRIG 62 01/03/2022     Lab Results   Component Value Date    HDL 44 03/29/2022    HDL 44 01/03/2022     No results found for: [facility]  Lab Results   Component Value Date    LABVLDL 13 03/29/2022    LABVLDL 12 01/03/2022     No results found for: [facility]  Lab Results   Component Value Date    WBC 6.3 07/24/2022    HGB 14.0 07/24/2022    HCT 40.1 07/24/2022    MCV 87.4 07/24/2022     07/24/2022    LYMPHOPCT 16.0 (L) 07/24/2022    RBC 4.59 07/24/2022    MCH 30.5 07/24/2022    MCHC 34.9 (H) 07/24/2022    RDW 12.5 07/24/2022     Lab Results   Component Value Date     07/24/2022    K 3.6 07/24/2022    CL 94 (L) 07/24/2022    CO2 27 07/24/2022    BUN 10 07/24/2022    CREATININE 0.8 07/24/2022    GLUCOSE 106 (H) 07/24/2022    CALCIUM 9.7 07/24/2022    PROT 7.4 07/24/2022    LABALBU 4.3 07/24/2022    BILITOT 0.5 07/24/2022    ALKPHOS 73 07/24/2022    AST 19 07/24/2022    ALT 26 07/24/2022    LABGLOM >60 07/24/2022    GFRAA >60 07/24/2022        Lab Results   Component Value Date    PSA 2.88 05/16/2022    PSA 2.83 04/29/2020    PSA 2.86 04/24/2019      Lab Results   Component Value Date    LABA1C 5.6 01/03/2022    LABA1C 5.4 08/14/2021    LABA1C 5.1 01/25/2021     No results found for: EAG      Plan Per Assessment:  Nemo Morales was seen today for anxiety and medication check. Diagnoses and all orders for this visit:    Essential hypertension    Depression, unspecified depression type    Anxiety    Nausea    Mixed hyperlipidemia    Other orders  -     mirtazapine (REMERON) 15 MG tablet; Take 1 tablet by mouth nightly          Return in about 1 week (around 8/3/2022).       Darron Mensaherdelores Villa DO    Note was generated with the assistance of voice recognition software. Document was reviewed however may contain grammatical errors.

## 2022-08-02 ENCOUNTER — CARE COORDINATION (OUTPATIENT)
Dept: CARE COORDINATION | Age: 87
End: 2022-08-02

## 2022-08-02 NOTE — CARE COORDINATION
-ACM attempted to reach patient to follow up on his status regarding HTN, anxiety, headache, low appetite for Care Coordination, however no answer. -HIPAA compliant VM left introducing self and reason for call.  -Left ACM's contact information, requesting call back.  -Plan:   If no return call, ACM will attempt outreach again.

## 2022-08-03 ENCOUNTER — OFFICE VISIT (OUTPATIENT)
Dept: PRIMARY CARE CLINIC | Age: 87
End: 2022-08-03
Payer: MEDICARE

## 2022-08-03 VITALS
TEMPERATURE: 98.1 F | OXYGEN SATURATION: 97 % | SYSTOLIC BLOOD PRESSURE: 130 MMHG | HEART RATE: 68 BPM | WEIGHT: 180 LBS | BODY MASS INDEX: 24.41 KG/M2 | DIASTOLIC BLOOD PRESSURE: 70 MMHG

## 2022-08-03 DIAGNOSIS — F32.A DEPRESSION, UNSPECIFIED DEPRESSION TYPE: ICD-10-CM

## 2022-08-03 DIAGNOSIS — E78.2 MIXED HYPERLIPIDEMIA: Chronic | ICD-10-CM

## 2022-08-03 DIAGNOSIS — F41.9 ANXIETY: ICD-10-CM

## 2022-08-03 DIAGNOSIS — I10 ESSENTIAL HYPERTENSION: Primary | Chronic | ICD-10-CM

## 2022-08-03 DIAGNOSIS — R11.0 NAUSEA: ICD-10-CM

## 2022-08-03 PROCEDURE — G8420 CALC BMI NORM PARAMETERS: HCPCS | Performed by: FAMILY MEDICINE

## 2022-08-03 PROCEDURE — 1036F TOBACCO NON-USER: CPT | Performed by: FAMILY MEDICINE

## 2022-08-03 PROCEDURE — G8427 DOCREV CUR MEDS BY ELIG CLIN: HCPCS | Performed by: FAMILY MEDICINE

## 2022-08-03 PROCEDURE — 1123F ACP DISCUSS/DSCN MKR DOCD: CPT | Performed by: FAMILY MEDICINE

## 2022-08-03 PROCEDURE — 99214 OFFICE O/P EST MOD 30 MIN: CPT | Performed by: FAMILY MEDICINE

## 2022-08-03 NOTE — PROGRESS NOTES
8/3/22     Chasity Frankel    : 1935 Sex: male   Age: 81 y.o. Chief Complaint   Patient presents with    Anxiety     Taking ativan tid but stopped helping yesterday    Hypertension    Leg Pain       Prior to Admission medications    Medication Sig Start Date End Date Taking? Authorizing Provider   mirtazapine (REMERON) 15 MG tablet Take 1 tablet by mouth nightly 22  Yes Levon Villa DO   LORazepam (ATIVAN) 0.5 MG tablet Take 1 tablet by mouth every 8 hours as needed for Anxiety for up to 30 days. 22 Yes David Villa DO   potassium chloride (KLOR-CON M) 20 MEQ extended release tablet 1 qd 22  Yes Levon Villa DO   amLODIPine (NORVASC) 5 MG tablet Take 1 tablet by mouth daily 22  Yes Levon Villa DO   hydroCHLOROthiazide (MICROZIDE) 12.5 MG capsule TAKE 1 CAPSULE BY MOUTH EVERY MORNING 22  Yes Levon Villa DO   pantoprazole (PROTONIX) 40 MG tablet TAKE 1 TABLET BY MOUTH EVERY MORNING BEFORE BREAKFAST 22  Yes Levon Villa DO   losartan (COZAAR) 100 MG tablet 1 QD 3/15/22 9/14/22 Yes David Villa DO   aspirin 81 MG tablet Take 81 mg by mouth daily   Yes Historical Provider, MD          HPI: Patient evaluated today with hypertension hyperlipidemia depression anxiety and some associated nausea. All meds reviewed and some improvement. We will resume Prozac at this time 20 mg once a day and then reassess next week. Some mild lower extremity weakness has been present discussed physical therapy prefers to hold off but we will readdress when he returns. Review of Systems   Constitutional: Negative. HENT: Negative. Eyes: Negative. Respiratory: Negative. Gastrointestinal: Negative. Endocrine: Negative. Genitourinary: Negative. Musculoskeletal: Negative. Skin: Negative. Allergic/Immunologic: Negative. Neurological:  Positive for weakness. Hematological: Negative.     Psychiatric/Behavioral: Negative. Today systems review overall stable aside from the anxiety as noted and mild depression. Medications as prescribed. Current Outpatient Medications:     mirtazapine (REMERON) 15 MG tablet, Take 1 tablet by mouth nightly, Disp: 30 tablet, Rfl: 3    LORazepam (ATIVAN) 0.5 MG tablet, Take 1 tablet by mouth every 8 hours as needed for Anxiety for up to 30 days. , Disp: 90 tablet, Rfl: 0    potassium chloride (KLOR-CON M) 20 MEQ extended release tablet, 1 qd, Disp: 30 tablet, Rfl: 1    amLODIPine (NORVASC) 5 MG tablet, Take 1 tablet by mouth daily, Disp: 30 tablet, Rfl: 3    hydroCHLOROthiazide (MICROZIDE) 12.5 MG capsule, TAKE 1 CAPSULE BY MOUTH EVERY MORNING, Disp: 90 capsule, Rfl: 1    pantoprazole (PROTONIX) 40 MG tablet, TAKE 1 TABLET BY MOUTH EVERY MORNING BEFORE BREAKFAST, Disp: 90 tablet, Rfl: 1    losartan (COZAAR) 100 MG tablet, 1 QD, Disp: 90 tablet, Rfl: 3    aspirin 81 MG tablet, Take 81 mg by mouth daily, Disp: , Rfl:     No Known Allergies    Social History     Tobacco Use    Smoking status: Never    Smokeless tobacco: Never   Vaping Use    Vaping Use: Never used   Substance Use Topics    Alcohol use: No    Drug use: No      Past Surgical History:   Procedure Laterality Date    ANTERIOR CRUCIATE LIGAMENT REPAIR Left 11/21/2001    APPENDECTOMY      CHOLECYSTECTOMY  2007    Lap    ECHO COMPL W DOP COLOR FLOW  12/4/2012         HERNIA REPAIR Right 11/13/2002    double    SINUS SURGERY  2002,2003     done x 2    TONSILLECTOMY      TOTAL KNEE ARTHROPLASTY Left 1/28/2019    LEFT  ROBOTIC KNEE TOTAL ARTHROPLASTY  ++MEREDITH- [id number]++   ++ADDUCTOR BLOCK++ performed by Gracie Whitlock MD at [address] [place] [address] N/A 10/10/2019    EGD BIOPSY performed by Bull Newsome MD at [facility] ENDOSCOPY     No family history on file.   Past Medical History:   Diagnosis Date    Aneurysm of right renal artery ([address]     follows with Dr. 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 Constitutional:       Appearance: He is well-developed. HENT:      Head: Normocephalic. Right Ear: External ear normal.      Left Ear: External ear normal.      Nose: Nose normal.   Eyes:      Conjunctiva/sclera: Conjunctivae normal.      Pupils: Pupils are equal, round, and reactive to light. Cardiovascular:      Rate and Rhythm: Normal rate. Pulmonary:      Breath sounds: Normal breath sounds. Abdominal:      General: Bowel sounds are normal.      Palpations: Abdomen is soft. Musculoskeletal:         General: Normal range of motion. Cervical back: Normal range of motion and neck supple. Skin:     General: Skin is warm and dry. Neurological:      Mental Status: He is alert and oriented to person, place, and time. Psychiatric:         Behavior: Behavior normal.      Today's vitals physical exam stable. Heart is controlled lungs are clear. We will maintain current meds with the addition of the Prozac as noted and reassess with me next week. Plan Per Assessment:  Anna Carrasco was seen today for anxiety, hypertension and leg pain. Diagnoses and all orders for this visit:    Essential hypertension    Mixed hyperlipidemia    Depression, unspecified depression type    Anxiety    Nausea          Return in about 1 week (around 8/10/2022). Evaristo Form, DO    Note was generated with the assistance of voice recognition software.   Document was reviewed however may contain grammatical errors.

## 2022-08-11 ENCOUNTER — OFFICE VISIT (OUTPATIENT)
Dept: PRIMARY CARE CLINIC | Age: 87
End: 2022-08-11
Payer: MEDICARE

## 2022-08-11 VITALS
WEIGHT: 179 LBS | DIASTOLIC BLOOD PRESSURE: 64 MMHG | HEART RATE: 72 BPM | BODY MASS INDEX: 24.28 KG/M2 | OXYGEN SATURATION: 97 % | TEMPERATURE: 98.1 F | SYSTOLIC BLOOD PRESSURE: 136 MMHG

## 2022-08-11 DIAGNOSIS — R63.0 DECREASED APPETITE: ICD-10-CM

## 2022-08-11 DIAGNOSIS — F32.A DEPRESSION, UNSPECIFIED DEPRESSION TYPE: ICD-10-CM

## 2022-08-11 DIAGNOSIS — R11.0 NAUSEA: Primary | ICD-10-CM

## 2022-08-11 DIAGNOSIS — F41.9 ANXIETY: ICD-10-CM

## 2022-08-11 PROCEDURE — G8420 CALC BMI NORM PARAMETERS: HCPCS | Performed by: FAMILY MEDICINE

## 2022-08-11 PROCEDURE — 1036F TOBACCO NON-USER: CPT | Performed by: FAMILY MEDICINE

## 2022-08-11 PROCEDURE — 1123F ACP DISCUSS/DSCN MKR DOCD: CPT | Performed by: FAMILY MEDICINE

## 2022-08-11 PROCEDURE — G8427 DOCREV CUR MEDS BY ELIG CLIN: HCPCS | Performed by: FAMILY MEDICINE

## 2022-08-11 PROCEDURE — 99214 OFFICE O/P EST MOD 30 MIN: CPT | Performed by: FAMILY MEDICINE

## 2022-08-11 RX ORDER — FLUOXETINE 10 MG/1
10 CAPSULE ORAL DAILY
Qty: 30 CAPSULE | Refills: 3 | Status: SHIPPED
Start: 2022-08-11 | End: 2022-08-25 | Stop reason: ALTCHOICE

## 2022-08-11 RX ORDER — FLUOXETINE HYDROCHLORIDE 20 MG/1
20 CAPSULE ORAL DAILY
Qty: 30 CAPSULE | Refills: 5 | Status: ON HOLD | OUTPATIENT
Start: 2022-08-11 | End: 2022-08-29 | Stop reason: HOSPADM

## 2022-08-11 NOTE — PROGRESS NOTES
22     Shana Whitten    : 1935 Sex: male   Age: 81 y.o. Chief Complaint   Patient presents with    Anxiety    Nausea       Prior to Admission medications    Medication Sig Start Date End Date Taking? Authorizing Provider   FLUoxetine (PROZAC) 20 MG capsule Take 1 capsule by mouth in the morning. 22  Yes David Villa DO   FLUoxetine (PROZAC) 10 MG capsule Take 1 capsule by mouth in the morning. 22  Yes David Villa DO   LORazepam (ATIVAN) 0.5 MG tablet Take 1 tablet by mouth every 8 hours as needed for Anxiety for up to 30 days. 22 Yes David Villa DO   potassium chloride (KLOR-CON M) 20 MEQ extended release tablet 1 qd 22  Yes Lester Villa DO   amLODIPine (NORVASC) 5 MG tablet Take 1 tablet by mouth daily 22  Yes Lester Villa DO   hydroCHLOROthiazide (MICROZIDE) 12.5 MG capsule TAKE 1 CAPSULE BY MOUTH EVERY MORNING 22  Yes Lester Villa DO   pantoprazole (PROTONIX) 40 MG tablet TAKE 1 TABLET BY MOUTH EVERY MORNING BEFORE BREAKFAST 22  Yes Lester Villa DO   losartan (COZAAR) 100 MG tablet 1 QD 3/15/22 9/14/22 Yes David Villa DO   aspirin 81 MG tablet Take 81 mg by mouth daily   Yes Historical Provider, MD          HPI: Dmitriy Guerrier is in today following up on continued problems of anxiety depression associated nausea. We again reviewed medications today. Opting to discontinue mirtazapine fluoxetine we will adjust to 30 mg a day and then reassess here next week. Remainder meds reviewed and continue as prescribed have asked that he bring all medications in so that we can review and assure appropriate compliance. Review of Systems   Constitutional: Negative. HENT: Negative. Eyes: Negative. Respiratory: Negative. Gastrointestinal: Negative. Endocrine: Negative. Genitourinary: Negative. Musculoskeletal: Negative. Skin: Negative. Allergic/Immunologic: Negative.     Neurological: Negative. Hematological: Negative. Psychiatric/Behavioral: Negative. Today systems review stable medications as prescribed. Current Outpatient Medications:     FLUoxetine (PROZAC) 20 MG capsule, Take 1 capsule by mouth in the morning., Disp: 30 capsule, Rfl: 5    FLUoxetine (PROZAC) 10 MG capsule, Take 1 capsule by mouth in the morning., Disp: 30 capsule, Rfl: 3    LORazepam (ATIVAN) 0.5 MG tablet, Take 1 tablet by mouth every 8 hours as needed for Anxiety for up to 30 days. , Disp: 90 tablet, Rfl: 0    potassium chloride (KLOR-CON M) 20 MEQ extended release tablet, 1 qd, Disp: 30 tablet, Rfl: 1    amLODIPine (NORVASC) 5 MG tablet, Take 1 tablet by mouth daily, Disp: 30 tablet, Rfl: 3    hydroCHLOROthiazide (MICROZIDE) 12.5 MG capsule, TAKE 1 CAPSULE BY MOUTH EVERY MORNING, Disp: 90 capsule, Rfl: 1    pantoprazole (PROTONIX) 40 MG tablet, TAKE 1 TABLET BY MOUTH EVERY MORNING BEFORE BREAKFAST, Disp: 90 tablet, Rfl: 1    losartan (COZAAR) 100 MG tablet, 1 QD, Disp: 90 tablet, Rfl: 3    aspirin 81 MG tablet, Take 81 mg by mouth daily, Disp: , Rfl:     No Known Allergies    Social History     Tobacco Use    Smoking status: Never    Smokeless tobacco: Never   Vaping Use    Vaping Use: Never used   Substance Use Topics    Alcohol use: No    Drug use: No      Past Surgical History:   Procedure Laterality Date    ANTERIOR CRUCIATE LIGAMENT REPAIR Left 11/21/2001    APPENDECTOMY      CHOLECYSTECTOMY  2007    Lap    ECHO COMPL W DOP COLOR FLOW  12/4/2012         HERNIA REPAIR Right 11/13/2002    double    SINUS SURGERY  2002,2003     done x 2    TONSILLECTOMY      TOTAL KNEE ARTHROPLASTY Left 1/28/2019    LEFT  ROBOTIC KNEE TOTAL ARTHROPLASTY  ++MEREDITH- [id number]++   ++ADDUCTOR BLOCK++ performed by Johan Jackson MD at [facility]. N/A 10/10/2019    EGD BIOPSY performed by Jose F Schulz MD at [facility] ENDOSCOPY     No family history on file.   Past Medical History:   Diagnosis Date    Aneurysm of right renal artery ([facility]     follows with Dr. Rolanda Paris yearly (last visit 9/19)    Colitis     Depression     GERD (gastroesophageal reflux disease)     H/O: CVA (cerebrovascular accident) 10/14/2021    Hyperlipidemia     Hypertension     Stroke [facility])     Superior mesenteric artery stenosis ([facility] 11/4/2020    TIA (transient ischemic attack)        Vitals:    08/11/22 1300   BP: 136/64   Pulse: 72   Temp: 98.1 °F (36.7 °C)   SpO2: 97%   Weight: 179 lb (81.2 kg)     BP Readings from Last 3 Encounters:   08/11/22 136/64   08/03/22 130/70   07/27/22 (!) 144/80        Physical Exam  Vitals and nursing note reviewed. Constitutional:       Appearance: He is well-developed. HENT:      Head: Normocephalic. Right Ear: External ear normal.      Left Ear: External ear normal.      Nose: Nose normal.   Eyes:      Conjunctiva/sclera: Conjunctivae normal.      Pupils: Pupils are equal, round, and reactive to light. Cardiovascular:      Rate and Rhythm: Normal rate. Pulmonary:      Breath sounds: Normal breath sounds. Abdominal:      General: Bowel sounds are normal.      Palpations: Abdomen is soft. Musculoskeletal:         General: Normal range of motion. Cervical back: Normal range of motion and neck supple. Skin:     General: Skin is warm and dry. Neurological:      Mental Status: He is alert and oriented to person, place, and time. Psychiatric:         Behavior: Behavior normal.      Today's vitals and physical examination stable. I will sit tight with current meds and care. Follow-up visit next week and sooner if need be. Again to bring all medications from my review. Plan Per Assessment:  Jaskaran Flowers was seen today for anxiety and nausea. Diagnoses and all orders for this visit:    Nausea    Depression, unspecified depression type    Anxiety    Decreased appetite    Other orders  -     FLUoxetine (PROZAC) 20 MG capsule;  Take 1 capsule by mouth in the morning.  - FLUoxetine (PROZAC) 10 MG capsule; Take 1 capsule by mouth in the morning. Return in about 1 week (around 8/18/2022). Opal Sanches, DO    Note was generated with the assistance of voice recognition software. Document was reviewed however may contain grammatical errors.

## 2022-08-18 ENCOUNTER — OFFICE VISIT (OUTPATIENT)
Dept: PRIMARY CARE CLINIC | Age: 87
End: 2022-08-18
Payer: MEDICARE

## 2022-08-18 VITALS
WEIGHT: 179 LBS | DIASTOLIC BLOOD PRESSURE: 78 MMHG | HEIGHT: 72 IN | SYSTOLIC BLOOD PRESSURE: 136 MMHG | TEMPERATURE: 98.5 F | BODY MASS INDEX: 24.24 KG/M2 | HEART RATE: 78 BPM | OXYGEN SATURATION: 96 %

## 2022-08-18 DIAGNOSIS — R53.1 WEAKNESS: ICD-10-CM

## 2022-08-18 DIAGNOSIS — F32.A DEPRESSION, UNSPECIFIED DEPRESSION TYPE: ICD-10-CM

## 2022-08-18 DIAGNOSIS — F41.9 ANXIETY: ICD-10-CM

## 2022-08-18 DIAGNOSIS — I10 ESSENTIAL HYPERTENSION: Primary | Chronic | ICD-10-CM

## 2022-08-18 DIAGNOSIS — E78.2 MIXED HYPERLIPIDEMIA: Chronic | ICD-10-CM

## 2022-08-18 DIAGNOSIS — R26.9 GAIT DISTURBANCE: ICD-10-CM

## 2022-08-18 PROCEDURE — G8420 CALC BMI NORM PARAMETERS: HCPCS | Performed by: FAMILY MEDICINE

## 2022-08-18 PROCEDURE — 1123F ACP DISCUSS/DSCN MKR DOCD: CPT | Performed by: FAMILY MEDICINE

## 2022-08-18 PROCEDURE — G8427 DOCREV CUR MEDS BY ELIG CLIN: HCPCS | Performed by: FAMILY MEDICINE

## 2022-08-18 PROCEDURE — 1036F TOBACCO NON-USER: CPT | Performed by: FAMILY MEDICINE

## 2022-08-18 PROCEDURE — 99214 OFFICE O/P EST MOD 30 MIN: CPT | Performed by: FAMILY MEDICINE

## 2022-08-18 NOTE — PROGRESS NOTES
22     Osvaldo Stark    : 1935 Sex: male   Age: 81 y.o. Chief Complaint   Patient presents with    Discuss Medications     Medication is not helping pt states. Prior to Admission medications    Medication Sig Start Date End Date Taking? Authorizing Provider   FLUoxetine (PROZAC) 20 MG capsule Take 1 capsule by mouth in the morning. 22  Yes David Villa DO   FLUoxetine (PROZAC) 10 MG capsule Take 1 capsule by mouth in the morning. 22  Yes David Villa DO   LORazepam (ATIVAN) 0.5 MG tablet Take 1 tablet by mouth every 8 hours as needed for Anxiety for up to 30 days. 22 Yes David Villa DO   potassium chloride (KLOR-CON M) 20 MEQ extended release tablet 1 qd 22  Yes Nery Villa DO   amLODIPine (NORVASC) 5 MG tablet Take 1 tablet by mouth daily 22  Yes Nery Villa DO   hydroCHLOROthiazide (MICROZIDE) 12.5 MG capsule TAKE 1 CAPSULE BY MOUTH EVERY MORNING 22  Yes Nery Villa DO   pantoprazole (PROTONIX) 40 MG tablet TAKE 1 TABLET BY MOUTH EVERY MORNING BEFORE BREAKFAST 22  Yes Nery Villa DO   losartan (COZAAR) 100 MG tablet 1 QD 3/15/22 9/14/22 Yes David Villa DO   aspirin 81 MG tablet Take 81 mg by mouth daily   Yes Historical Provider, MD          HPI: Seen today in follow-up on hypertension hyperlipidemia depression anxiety generalized weakness gait disturbance. Recommending some physical therapy to improve his overall strength and mobility and prescription provided today. Anxiety depression persists and we will further adjust Prozac back to 20 mg a day and then reassess here next week. Ativan to continue as present dosing. Review of Systems   Constitutional: Negative. HENT: Negative. Eyes: Negative. Respiratory: Negative. Gastrointestinal: Negative. Endocrine: Negative. Genitourinary: Negative. Musculoskeletal: Negative. Skin: Negative.     Allergic/Immunologic: Negative. Neurological: Negative. Hematological: Negative. Psychiatric/Behavioral: Negative. Primary complaints of the weakness fatigue and difficulty with ambulation. Physical therapy recommended and we will follow-up again next week. Current Outpatient Medications:     FLUoxetine (PROZAC) 20 MG capsule, Take 1 capsule by mouth in the morning., Disp: 30 capsule, Rfl: 5    FLUoxetine (PROZAC) 10 MG capsule, Take 1 capsule by mouth in the morning., Disp: 30 capsule, Rfl: 3    LORazepam (ATIVAN) 0.5 MG tablet, Take 1 tablet by mouth every 8 hours as needed for Anxiety for up to 30 days. , Disp: 90 tablet, Rfl: 0    potassium chloride (KLOR-CON M) 20 MEQ extended release tablet, 1 qd, Disp: 30 tablet, Rfl: 1    amLODIPine (NORVASC) 5 MG tablet, Take 1 tablet by mouth daily, Disp: 30 tablet, Rfl: 3    hydroCHLOROthiazide (MICROZIDE) 12.5 MG capsule, TAKE 1 CAPSULE BY MOUTH EVERY MORNING, Disp: 90 capsule, Rfl: 1    pantoprazole (PROTONIX) 40 MG tablet, TAKE 1 TABLET BY MOUTH EVERY MORNING BEFORE BREAKFAST, Disp: 90 tablet, Rfl: 1    losartan (COZAAR) 100 MG tablet, 1 QD, Disp: 90 tablet, Rfl: 3    aspirin 81 MG tablet, Take 81 mg by mouth daily, Disp: , Rfl:     No Known Allergies    Social History     Tobacco Use    Smoking status: Never    Smokeless tobacco: Never   Vaping Use    Vaping Use: Never used   Substance Use Topics    Alcohol use: No    Drug use: No      Past Surgical History:   Procedure Laterality Date    ANTERIOR CRUCIATE LIGAMENT REPAIR Left 11/21/2001    APPENDECTOMY      CHOLECYSTECTOMY  2007    Lap    ECHO COMPL W DOP COLOR FLOW  12/4/2012         HERNIA REPAIR Right 11/13/2002    double    SINUS SURGERY  2002,2003     done x 2    TONSILLECTOMY      TOTAL KNEE ARTHROPLASTY Left 1/28/2019    LEFT  ROBOTIC KNEE TOTAL ARTHROPLASTY  ++MEREDITH- CPGUULKP++   ++ADDUCTOR BLOCK++ performed by Oj Sarah MD at [address] N/A 10/10/2019    EGD BIOPSY performed by Juliana Gill MD at [facility] ENDOSCOPY     No family history on file. Past Medical History:   Diagnosis Date    Aneurysm of right renal artery ([address]     follows with Dr. Rolanda Paris yearly (last visit 9/19)    Colitis     Depression     GERD (gastroesophageal reflux disease)     H/O: CVA (cerebrovascular accident) 10/14/2021    Hyperlipidemia     Hypertension     Stroke [facility])     Superior mesenteric artery stenosis ([facility] [address] 11/4/2020    TIA (transient ischemic attack)        Vitals:    08/18/22 1114   BP: 136/78   Pulse: 78   Temp: 98.5 °F (36.9 °C)   SpO2: 96%   Weight: 179 lb (81.2 kg)   Height: 6' (1.829 m)     BP Readings from Last 3 Encounters:   08/18/22 136/78   08/11/22 136/64   08/03/22 130/70        Physical Exam  Vitals and nursing note reviewed. Constitutional:       Appearance: He is well-developed. HENT:      Head: Normocephalic. Right Ear: External ear normal.      Left Ear: External ear normal.      Nose: Nose normal.   Eyes:      Conjunctiva/sclera: Conjunctivae normal.      Pupils: Pupils are equal, round, and reactive to light. Cardiovascular:      Rate and Rhythm: Normal rate. Pulmonary:      Breath sounds: Normal breath sounds. Abdominal:      General: Bowel sounds are normal.      Palpations: Abdomen is soft. Musculoskeletal:         General: Normal range of motion. Cervical back: Normal range of motion and neck supple. Skin:     General: Skin is warm and dry. Neurological:      Mental Status: He is alert and oriented to person, place, and time. Psychiatric:         Behavior: Behavior normal.      Today's vitals and physical exam stable. Heart is controlled lungs are clear. We will go ahead with current treatment and reassess with me next week. Adjustments made with meds and physical therapy prescribed. Plan Per Assessment:  Jaskaran Flowers was seen today for discuss medications.     Diagnoses and all orders for this visit:    Essential hypertension    Mixed hyperlipidemia    Depression, unspecified depression type    Anxiety    Weakness  -     External Referral To Physical Therapy    Gait disturbance  -     External Referral To Physical Therapy          Return in about 1 week (around 8/25/2022). Isaak Massey DO    Note was generated with the assistance of voice recognition software. Document was reviewed however may contain grammatical errors.

## 2022-08-25 ENCOUNTER — APPOINTMENT (OUTPATIENT)
Dept: GENERAL RADIOLOGY | Age: 87
DRG: 641 | End: 2022-08-25
Payer: MEDICARE

## 2022-08-25 ENCOUNTER — OFFICE VISIT (OUTPATIENT)
Dept: FAMILY MEDICINE CLINIC | Age: 87
End: 2022-08-25
Payer: MEDICARE

## 2022-08-25 ENCOUNTER — APPOINTMENT (OUTPATIENT)
Dept: CT IMAGING | Age: 87
DRG: 641 | End: 2022-08-25
Payer: MEDICARE

## 2022-08-25 ENCOUNTER — HOSPITAL ENCOUNTER (INPATIENT)
Age: 87
LOS: 4 days | Discharge: NURSING, REHAB OR LONG-TERM CARE | DRG: 641 | End: 2022-08-29
Attending: EMERGENCY MEDICINE | Admitting: STUDENT IN AN ORGANIZED HEALTH CARE EDUCATION/TRAINING PROGRAM
Payer: MEDICARE

## 2022-08-25 VITALS
TEMPERATURE: 98.5 F | HEART RATE: 75 BPM | WEIGHT: 179 LBS | BODY MASS INDEX: 24.28 KG/M2 | DIASTOLIC BLOOD PRESSURE: 70 MMHG | SYSTOLIC BLOOD PRESSURE: 112 MMHG | OXYGEN SATURATION: 97 %

## 2022-08-25 DIAGNOSIS — S09.90XA INJURY OF HEAD, INITIAL ENCOUNTER: Primary | ICD-10-CM

## 2022-08-25 DIAGNOSIS — E83.42 HYPOMAGNESEMIA: Primary | ICD-10-CM

## 2022-08-25 DIAGNOSIS — R11.0 NAUSEA: ICD-10-CM

## 2022-08-25 DIAGNOSIS — E87.6 HYPOKALEMIA: ICD-10-CM

## 2022-08-25 DIAGNOSIS — S39.92XA INJURY OF BACK, INITIAL ENCOUNTER: ICD-10-CM

## 2022-08-25 DIAGNOSIS — R29.898 WEAKNESS OF BOTH LOWER EXTREMITIES: ICD-10-CM

## 2022-08-25 DIAGNOSIS — E87.1 HYPONATREMIA: ICD-10-CM

## 2022-08-25 DIAGNOSIS — R26.2 AMBULATORY DYSFUNCTION: ICD-10-CM

## 2022-08-25 LAB
ALBUMIN SERPL-MCNC: 3.8 G/DL (ref 3.5–5.2)
ALBUMIN SERPL-MCNC: 4 G/DL (ref 3.5–5.2)
ALP BLD-CCNC: 69 U/L (ref 40–129)
ALP BLD-CCNC: 69 U/L (ref 40–129)
ALT SERPL-CCNC: 11 U/L (ref 0–40)
ALT SERPL-CCNC: 11 U/L (ref 0–40)
ANION GAP SERPL CALCULATED.3IONS-SCNC: 11 MMOL/L (ref 7–16)
ANION GAP SERPL CALCULATED.3IONS-SCNC: 12 MMOL/L (ref 7–16)
AST SERPL-CCNC: 17 U/L (ref 0–39)
AST SERPL-CCNC: 17 U/L (ref 0–39)
BACTERIA: ABNORMAL /HPF
BASOPHILS ABSOLUTE: 0.01 E9/L (ref 0–0.2)
BASOPHILS RELATIVE PERCENT: 0.2 % (ref 0–2)
BILIRUB SERPL-MCNC: 0.5 MG/DL (ref 0–1.2)
BILIRUB SERPL-MCNC: 0.6 MG/DL (ref 0–1.2)
BILIRUBIN URINE: NEGATIVE
BLOOD, URINE: ABNORMAL
BUN BLDV-MCNC: 4 MG/DL (ref 6–23)
BUN BLDV-MCNC: 6 MG/DL (ref 6–23)
CALCIUM SERPL-MCNC: 8.8 MG/DL (ref 8.6–10.2)
CALCIUM SERPL-MCNC: 8.9 MG/DL (ref 8.6–10.2)
CHLORIDE BLD-SCNC: 84 MMOL/L (ref 98–107)
CHLORIDE BLD-SCNC: 86 MMOL/L (ref 98–107)
CHLORIDE URINE RANDOM: 49 MMOL/L
CLARITY: CLEAR
CO2: 26 MMOL/L (ref 22–29)
CO2: 27 MMOL/L (ref 22–29)
COLOR: YELLOW
CREAT SERPL-MCNC: 0.6 MG/DL (ref 0.7–1.2)
CREAT SERPL-MCNC: 0.7 MG/DL (ref 0.7–1.2)
CREATININE URINE: 40 MG/DL (ref 40–278)
EKG ATRIAL RATE: 357 BPM
EKG Q-T INTERVAL: 506 MS
EKG QRS DURATION: 162 MS
EKG QTC CALCULATION (BAZETT): 506 MS
EKG R AXIS: 58 DEGREES
EKG T AXIS: 68 DEGREES
EKG VENTRICULAR RATE: 60 BPM
EOSINOPHILS ABSOLUTE: 0 E9/L (ref 0.05–0.5)
EOSINOPHILS RELATIVE PERCENT: 0 % (ref 0–6)
GFR AFRICAN AMERICAN: >60
GFR AFRICAN AMERICAN: >60
GFR NON-AFRICAN AMERICAN: >60 ML/MIN/1.73
GFR NON-AFRICAN AMERICAN: >60 ML/MIN/1.73
GLUCOSE BLD-MCNC: 106 MG/DL (ref 74–99)
GLUCOSE BLD-MCNC: 165 MG/DL (ref 74–99)
GLUCOSE URINE: NEGATIVE MG/DL
HCT VFR BLD CALC: 37.2 % (ref 37–54)
HEMOGLOBIN: 13.4 G/DL (ref 12.5–16.5)
IMMATURE GRANULOCYTES #: 0.01 E9/L
IMMATURE GRANULOCYTES %: 0.2 % (ref 0–5)
KETONES, URINE: ABNORMAL MG/DL
LACTIC ACID: 1 MMOL/L (ref 0.5–2.2)
LEUKOCYTE ESTERASE, URINE: NEGATIVE
LYMPHOCYTES ABSOLUTE: 0.64 E9/L (ref 1.5–4)
LYMPHOCYTES RELATIVE PERCENT: 15.8 % (ref 20–42)
MAGNESIUM: 1.5 MG/DL (ref 1.6–2.6)
MAGNESIUM: 1.8 MG/DL (ref 1.6–2.6)
MCH RBC QN AUTO: 30.7 PG (ref 26–35)
MCHC RBC AUTO-ENTMCNC: 36 % (ref 32–34.5)
MCV RBC AUTO: 85.3 FL (ref 80–99.9)
MONOCYTES ABSOLUTE: 0.59 E9/L (ref 0.1–0.95)
MONOCYTES RELATIVE PERCENT: 14.5 % (ref 2–12)
NEUTROPHILS ABSOLUTE: 2.81 E9/L (ref 1.8–7.3)
NEUTROPHILS RELATIVE PERCENT: 69.3 % (ref 43–80)
NITRITE, URINE: NEGATIVE
OSMOLALITY URINE: 185 MOSM/KG (ref 300–900)
OSMOLALITY: 265 MOSM/KG (ref 285–310)
PDW BLD-RTO: 12.2 FL (ref 11.5–15)
PH UA: 6.5 (ref 5–9)
PLATELET # BLD: 360 E9/L (ref 130–450)
PMV BLD AUTO: 9 FL (ref 7–12)
POTASSIUM REFLEX MAGNESIUM: 2.8 MMOL/L (ref 3.5–5)
POTASSIUM REFLEX MAGNESIUM: 3.2 MMOL/L (ref 3.5–5)
POTASSIUM, UR: 15.1 MMOL/L
PRO-BNP: 223 PG/ML (ref 0–450)
PROTEIN PROTEIN: 6 MG/DL (ref 0–12)
PROTEIN UA: NEGATIVE MG/DL
PROTEIN/CREAT RATIO: 0.2
PROTEIN/CREAT RATIO: 0.2 (ref 0–0.2)
RBC # BLD: 4.36 E12/L (ref 3.8–5.8)
RBC UA: ABNORMAL /HPF (ref 0–2)
SARS-COV-2, NAAT: NOT DETECTED
SODIUM BLD-SCNC: 122 MMOL/L (ref 132–146)
SODIUM BLD-SCNC: 124 MMOL/L (ref 132–146)
SODIUM URINE: 46 MMOL/L
SPECIFIC GRAVITY UA: <=1.005 (ref 1–1.03)
TOTAL PROTEIN: 6.5 G/DL (ref 6.4–8.3)
TOTAL PROTEIN: 6.8 G/DL (ref 6.4–8.3)
TROPONIN, HIGH SENSITIVITY: 15 NG/L (ref 0–11)
TROPONIN, HIGH SENSITIVITY: 18 NG/L (ref 0–11)
TROPONIN, HIGH SENSITIVITY: 22 NG/L (ref 0–11)
UREA NITROGEN, UR: 113 MG/DL (ref 800–1666)
UROBILINOGEN, URINE: 0.2 E.U./DL
WBC # BLD: 4.1 E9/L (ref 4.5–11.5)
WBC UA: ABNORMAL /HPF (ref 0–5)

## 2022-08-25 PROCEDURE — 2580000003 HC RX 258: Performed by: STUDENT IN AN ORGANIZED HEALTH CARE EDUCATION/TRAINING PROGRAM

## 2022-08-25 PROCEDURE — 83735 ASSAY OF MAGNESIUM: CPT

## 2022-08-25 PROCEDURE — 96365 THER/PROPH/DIAG IV INF INIT: CPT

## 2022-08-25 PROCEDURE — 1123F ACP DISCUSS/DSCN MKR DOCD: CPT | Performed by: PHYSICIAN ASSISTANT

## 2022-08-25 PROCEDURE — 83935 ASSAY OF URINE OSMOLALITY: CPT

## 2022-08-25 PROCEDURE — 93005 ELECTROCARDIOGRAM TRACING: CPT | Performed by: STUDENT IN AN ORGANIZED HEALTH CARE EDUCATION/TRAINING PROGRAM

## 2022-08-25 PROCEDURE — 1200000000 HC SEMI PRIVATE

## 2022-08-25 PROCEDURE — 82436 ASSAY OF URINE CHLORIDE: CPT

## 2022-08-25 PROCEDURE — 71045 X-RAY EXAM CHEST 1 VIEW: CPT

## 2022-08-25 PROCEDURE — 83605 ASSAY OF LACTIC ACID: CPT

## 2022-08-25 PROCEDURE — 83880 ASSAY OF NATRIURETIC PEPTIDE: CPT

## 2022-08-25 PROCEDURE — 99214 OFFICE O/P EST MOD 30 MIN: CPT | Performed by: PHYSICIAN ASSISTANT

## 2022-08-25 PROCEDURE — 96366 THER/PROPH/DIAG IV INF ADDON: CPT

## 2022-08-25 PROCEDURE — G8427 DOCREV CUR MEDS BY ELIG CLIN: HCPCS | Performed by: PHYSICIAN ASSISTANT

## 2022-08-25 PROCEDURE — G8420 CALC BMI NORM PARAMETERS: HCPCS | Performed by: PHYSICIAN ASSISTANT

## 2022-08-25 PROCEDURE — 70450 CT HEAD/BRAIN W/O DYE: CPT

## 2022-08-25 PROCEDURE — 36415 COLL VENOUS BLD VENIPUNCTURE: CPT

## 2022-08-25 PROCEDURE — 96375 TX/PRO/DX INJ NEW DRUG ADDON: CPT

## 2022-08-25 PROCEDURE — 84300 ASSAY OF URINE SODIUM: CPT

## 2022-08-25 PROCEDURE — 80053 COMPREHEN METABOLIC PANEL: CPT

## 2022-08-25 PROCEDURE — 6360000002 HC RX W HCPCS: Performed by: STUDENT IN AN ORGANIZED HEALTH CARE EDUCATION/TRAINING PROGRAM

## 2022-08-25 PROCEDURE — 84156 ASSAY OF PROTEIN URINE: CPT

## 2022-08-25 PROCEDURE — 99285 EMERGENCY DEPT VISIT HI MDM: CPT

## 2022-08-25 PROCEDURE — 85025 COMPLETE CBC W/AUTO DIFF WBC: CPT

## 2022-08-25 PROCEDURE — 87635 SARS-COV-2 COVID-19 AMP PRB: CPT

## 2022-08-25 PROCEDURE — 84133 ASSAY OF URINE POTASSIUM: CPT

## 2022-08-25 PROCEDURE — 82570 ASSAY OF URINE CREATININE: CPT

## 2022-08-25 PROCEDURE — 81001 URINALYSIS AUTO W/SCOPE: CPT

## 2022-08-25 PROCEDURE — 6370000000 HC RX 637 (ALT 250 FOR IP): Performed by: STUDENT IN AN ORGANIZED HEALTH CARE EDUCATION/TRAINING PROGRAM

## 2022-08-25 PROCEDURE — 84484 ASSAY OF TROPONIN QUANT: CPT

## 2022-08-25 PROCEDURE — 84540 ASSAY OF URINE/UREA-N: CPT

## 2022-08-25 PROCEDURE — 83930 ASSAY OF BLOOD OSMOLALITY: CPT

## 2022-08-25 PROCEDURE — 1036F TOBACCO NON-USER: CPT | Performed by: PHYSICIAN ASSISTANT

## 2022-08-25 RX ORDER — ENOXAPARIN SODIUM 100 MG/ML
40 INJECTION SUBCUTANEOUS DAILY
Status: DISCONTINUED | OUTPATIENT
Start: 2022-08-25 | End: 2022-08-29 | Stop reason: HOSPADM

## 2022-08-25 RX ORDER — PANTOPRAZOLE SODIUM 40 MG/1
40 TABLET, DELAYED RELEASE ORAL
Status: DISCONTINUED | OUTPATIENT
Start: 2022-08-26 | End: 2022-08-29 | Stop reason: HOSPADM

## 2022-08-25 RX ORDER — LANOLIN ALCOHOL/MO/W.PET/CERES
3 CREAM (GRAM) TOPICAL NIGHTLY PRN
Status: DISCONTINUED | OUTPATIENT
Start: 2022-08-25 | End: 2022-08-29 | Stop reason: HOSPADM

## 2022-08-25 RX ORDER — SODIUM CHLORIDE 9 MG/ML
INJECTION, SOLUTION INTRAVENOUS PRN
Status: DISCONTINUED | OUTPATIENT
Start: 2022-08-25 | End: 2022-08-29 | Stop reason: HOSPADM

## 2022-08-25 RX ORDER — ASPIRIN 81 MG/1
81 TABLET ORAL DAILY
Status: DISCONTINUED | OUTPATIENT
Start: 2022-08-25 | End: 2022-08-29 | Stop reason: HOSPADM

## 2022-08-25 RX ORDER — LORAZEPAM 0.5 MG/1
0.5 TABLET ORAL EVERY 8 HOURS PRN
Status: ON HOLD | COMMUNITY
End: 2022-08-29 | Stop reason: HOSPADM

## 2022-08-25 RX ORDER — LOSARTAN POTASSIUM 50 MG/1
100 TABLET ORAL DAILY
Status: DISCONTINUED | OUTPATIENT
Start: 2022-08-26 | End: 2022-08-29 | Stop reason: HOSPADM

## 2022-08-25 RX ORDER — ONDANSETRON 2 MG/ML
4 INJECTION INTRAMUSCULAR; INTRAVENOUS ONCE
Status: COMPLETED | OUTPATIENT
Start: 2022-08-25 | End: 2022-08-25

## 2022-08-25 RX ORDER — SODIUM CHLORIDE 0.9 % (FLUSH) 0.9 %
10 SYRINGE (ML) INJECTION EVERY 12 HOURS SCHEDULED
Status: DISCONTINUED | OUTPATIENT
Start: 2022-08-25 | End: 2022-08-29 | Stop reason: HOSPADM

## 2022-08-25 RX ORDER — MAGNESIUM SULFATE IN WATER 40 MG/ML
2000 INJECTION, SOLUTION INTRAVENOUS ONCE
Status: COMPLETED | OUTPATIENT
Start: 2022-08-25 | End: 2022-08-25

## 2022-08-25 RX ORDER — AMLODIPINE BESYLATE 5 MG/1
5 TABLET ORAL DAILY
Status: DISCONTINUED | OUTPATIENT
Start: 2022-08-25 | End: 2022-08-29 | Stop reason: HOSPADM

## 2022-08-25 RX ORDER — ONDANSETRON 4 MG/1
4 TABLET, ORALLY DISINTEGRATING ORAL EVERY 8 HOURS PRN
Status: DISCONTINUED | OUTPATIENT
Start: 2022-08-25 | End: 2022-08-29 | Stop reason: HOSPADM

## 2022-08-25 RX ORDER — SODIUM CHLORIDE 0.9 % (FLUSH) 0.9 %
10 SYRINGE (ML) INJECTION PRN
Status: DISCONTINUED | OUTPATIENT
Start: 2022-08-25 | End: 2022-08-29 | Stop reason: HOSPADM

## 2022-08-25 RX ORDER — ACETAMINOPHEN 325 MG/1
650 TABLET ORAL EVERY 6 HOURS PRN
Status: DISCONTINUED | OUTPATIENT
Start: 2022-08-25 | End: 2022-08-29 | Stop reason: HOSPADM

## 2022-08-25 RX ORDER — ONDANSETRON 2 MG/ML
4 INJECTION INTRAMUSCULAR; INTRAVENOUS EVERY 6 HOURS PRN
Status: DISCONTINUED | OUTPATIENT
Start: 2022-08-25 | End: 2022-08-25 | Stop reason: SDUPTHER

## 2022-08-25 RX ORDER — HYDRALAZINE HYDROCHLORIDE 20 MG/ML
10 INJECTION INTRAMUSCULAR; INTRAVENOUS EVERY 4 HOURS PRN
Status: DISCONTINUED | OUTPATIENT
Start: 2022-08-25 | End: 2022-08-29 | Stop reason: HOSPADM

## 2022-08-25 RX ORDER — POTASSIUM CHLORIDE 20 MEQ/1
40 TABLET, EXTENDED RELEASE ORAL ONCE
Status: COMPLETED | OUTPATIENT
Start: 2022-08-25 | End: 2022-08-25

## 2022-08-25 RX ORDER — ACETAMINOPHEN 650 MG/1
650 SUPPOSITORY RECTAL EVERY 6 HOURS PRN
Status: DISCONTINUED | OUTPATIENT
Start: 2022-08-25 | End: 2022-08-29 | Stop reason: HOSPADM

## 2022-08-25 RX ORDER — ACETAMINOPHEN 500 MG
1000 TABLET ORAL ONCE
Status: COMPLETED | OUTPATIENT
Start: 2022-08-25 | End: 2022-08-25

## 2022-08-25 RX ORDER — SENNA PLUS 8.6 MG/1
1 TABLET ORAL DAILY PRN
Status: DISCONTINUED | OUTPATIENT
Start: 2022-08-25 | End: 2022-08-29 | Stop reason: HOSPADM

## 2022-08-25 RX ORDER — 0.9 % SODIUM CHLORIDE 0.9 %
1000 INTRAVENOUS SOLUTION INTRAVENOUS ONCE
Status: COMPLETED | OUTPATIENT
Start: 2022-08-25 | End: 2022-08-25

## 2022-08-25 RX ORDER — ONDANSETRON 2 MG/ML
4 INJECTION INTRAMUSCULAR; INTRAVENOUS EVERY 6 HOURS PRN
Status: DISCONTINUED | OUTPATIENT
Start: 2022-08-25 | End: 2022-08-29 | Stop reason: HOSPADM

## 2022-08-25 RX ADMIN — SODIUM CHLORIDE, PRESERVATIVE FREE 10 ML: 5 INJECTION INTRAVENOUS at 21:31

## 2022-08-25 RX ADMIN — ONDANSETRON 4 MG: 2 INJECTION INTRAMUSCULAR; INTRAVENOUS at 13:43

## 2022-08-25 RX ADMIN — POTASSIUM CHLORIDE 40 MEQ: 1500 TABLET, EXTENDED RELEASE ORAL at 14:57

## 2022-08-25 RX ADMIN — MAGNESIUM SULFATE HEPTAHYDRATE 2000 MG: 40 INJECTION, SOLUTION INTRAVENOUS at 14:55

## 2022-08-25 RX ADMIN — SODIUM CHLORIDE 1000 ML: 9 INJECTION, SOLUTION INTRAVENOUS at 13:48

## 2022-08-25 RX ADMIN — ENOXAPARIN SODIUM 40 MG: 100 INJECTION SUBCUTANEOUS at 21:30

## 2022-08-25 RX ADMIN — ACETAMINOPHEN 1000 MG: 500 TABLET ORAL at 19:16

## 2022-08-25 RX ADMIN — ONDANSETRON 4 MG: 2 INJECTION INTRAMUSCULAR; INTRAVENOUS at 19:17

## 2022-08-25 ASSESSMENT — ENCOUNTER SYMPTOMS
SINUS PAIN: 0
NAUSEA: 1
CHEST TIGHTNESS: 0
SINUS PRESSURE: 0
VOMITING: 0
ABDOMINAL PAIN: 0
EYE PAIN: 0
COUGH: 0
CONSTIPATION: 0
SHORTNESS OF BREATH: 0
SORE THROAT: 0
EYE REDNESS: 0

## 2022-08-25 ASSESSMENT — PAIN - FUNCTIONAL ASSESSMENT: PAIN_FUNCTIONAL_ASSESSMENT: NONE - DENIES PAIN

## 2022-08-25 NOTE — ED PROVIDER NOTES
[facility]  Department of Emergency Medicine     Written by: Mark Norris DO  Patient Name: Naeem Harrington  Attending Provider: Chasity Sigala MD  Admit Date: 2022  1:02 PM  MRN: [id number]                   : 1935        Chief Complaint   Patient presents with    Nausea    Headache     Onset couple days ago    Other     Difficulty walking, onset one week ago    - Chief complaint    Mr. Jeanette Contreras is a 81year old male who presents to the ED due to nausea, headache and difficulty walking. Patient states that he has had increased difficulty walking over the past week. He feels that he has been more unsteady on his feet. States that he tripped over his seat yesterday and hit the back of his head on his carpet. Patient denies any loss of consciousness or vomiting following the incident. Patient states that he has had a headache for the past 2 days prior to the fall. He also notes intermittent nausea over the past several days as well without any episodes of vomiting. Patient notes that he had been prescribed Ativan and Prozac recently for anxiety and has been taking them as prescribed. Patient symptoms have been constant since onset. Denies any aggravating relieving factors. Denies any recent fevers, chills, chest pain, shortness of breath, abdominal pain, bowel or urinary changes, lower extremity edema or vision changes. Review of Systems   Constitutional:  Negative for chills, fatigue and fever. HENT:  Negative for congestion, sinus pressure, sinus pain and sore throat. Eyes:  Negative for pain, redness and visual disturbance. Respiratory:  Negative for cough, chest tightness and shortness of breath. Cardiovascular:  Negative for chest pain, palpitations and leg swelling. Gastrointestinal:  Positive for nausea. Negative for abdominal pain, constipation and vomiting. Genitourinary:  Negative for dysuria, flank pain, frequency, hematuria and urgency. Musculoskeletal:  Positive for gait problem (Unsteadiness). Negative for joint swelling. Skin:  Negative for rash. Neurological:  Positive for headaches. Negative for dizziness, tremors and light-headedness. Physical Exam  Constitutional:       General: He is not in acute distress. Appearance: Normal appearance. He is normal weight. He is not ill-appearing or toxic-appearing. HENT:      Head: Normocephalic and atraumatic. Right Ear: External ear normal.      Left Ear: External ear normal.      Nose: Nose normal.      Mouth/Throat:      Mouth: Mucous membranes are moist.      Pharynx: Oropharynx is clear. Eyes:      Extraocular Movements: Extraocular movements intact. Conjunctiva/sclera: Conjunctivae normal.   Cardiovascular:      Rate and Rhythm: Normal rate and regular rhythm. Pulses: Normal pulses. Heart sounds: Normal heart sounds. Pulmonary:      Effort: Pulmonary effort is normal. No respiratory distress. Breath sounds: Normal breath sounds. No wheezing. Abdominal:      General: Abdomen is flat. Bowel sounds are normal. There is no distension. Palpations: Abdomen is soft. Tenderness: There is no abdominal tenderness. There is no guarding or rebound. Musculoskeletal:         General: No swelling or tenderness. Normal range of motion. Cervical back: Normal range of motion and neck supple. No rigidity or tenderness. Skin:     General: Skin is warm and dry. Findings: No rash. Neurological:      General: No focal deficit present. Mental Status: He is alert and oriented to person, place, and time. Mental status is at baseline. Motor: No weakness. Gait: Gait abnormal (Unsteady).    Psychiatric:         Mood and Affect: Mood normal.         Behavior: Behavior normal.        Procedures       MDM  Number of Diagnoses or Management Options  Ambulatory dysfunction  Hypokalemia  Hypomagnesemia  Hyponatremia  Diagnosis management comments: This is a 81year old male who presents to the ED due to nausea, headache and difficulty walking. Patient initially given 1 L normal saline bolus and Zofran for his nausea. CBC revealed leukopenia with a white count of 4.1 with otherwise benign study. CMP revealed hyponatremia with a sodium of 122 and hypokalemia with a potassium of 3.2. Patient also had hypomagnesemia with a magnesium of 1.5. He was given repletion for all of these and fluid bolus was stopped at 700 mL. Lactic and BMP were both normal.  Initial troponin was 15 with a repeat of 22 and then a third of 18. Urinalysis did not reveal any signs of infection. Chest x-ray revealed no acute process. CT head revealed no acute intracranial abnormality without intracranial hemorrhage. Patient will be admitted to the hospital for further work-up and treatment of his electrolyte disturbances and ambulatory dysfunction. Dr. Olimpia Liriano will be admitting the patient for further work-up and treatment and requested urine studies and a floor consult to Dr. Marky Galindo regarding his hyponatremia.                --------------------------------------------- PAST HISTORY ---------------------------------------------  Past Medical History:  has a past medical history of Aneurysm of right renal artery ([address] Colitis, Depression, GERD (gastroesophageal reflux disease), H/O: CVA (cerebrovascular accident), Hyperlipidemia, Hypertension, Stroke [facility]), Superior mesenteric artery stenosis ([address] and TIA (transient ischemic attack). Past Surgical History:  has a past surgical history that includes Appendectomy; Tonsillectomy; ECHO Compl W Dop Color Flow (12/4/2012); sinus surgery (4165,0335); Anterior cruciate ligament repair (Left, 11/21/2001); Cholecystectomy (2007); hernia repair (Right, 11/13/2002); Total knee arthroplasty (Left, 1/28/2019); and Upper gastrointestinal endoscopy (N/A, 10/10/2019). Social History:  reports that he has never smoked.  He has never used smokeless tobacco. He reports that he does not drink alcohol and does not use drugs. Family History: family history is not on file. The patients home medications have been reviewed. Allergies: Patient has no known allergies.     -------------------------------------------------- RESULTS -------------------------------------------------    LABS:  Results for orders placed or performed during the hospital encounter of 08/25/22   COVID-19, Rapid    Specimen: Nasopharyngeal Swab   Result Value Ref Range    SARS-CoV-2, NAAT Not Detected Not Detected   Comprehensive Metabolic Panel w/ Reflex to MG   Result Value Ref Range    Sodium 122 (L) 132 - 146 mmol/L    Potassium reflex Magnesium 3.2 (L) 3.5 - 5.0 mmol/L    Chloride 84 (L) 98 - 107 mmol/L    CO2 26 22 - 29 mmol/L    Anion Gap 12 7 - 16 mmol/L    Glucose 165 (H) 74 - 99 mg/dL    BUN 6 6 - 23 mg/dL    Creatinine 0.7 0.7 - 1.2 mg/dL    GFR Non-African American >60 >=60 mL/min/1.73    GFR African American >60     Calcium 8.9 8.6 - 10.2 mg/dL    Total Protein 6.8 6.4 - 8.3 g/dL    Albumin 4.0 3.5 - 5.2 g/dL    Total Bilirubin 0.6 0.0 - 1.2 mg/dL    Alkaline Phosphatase 69 40 - 129 U/L    ALT 11 0 - 40 U/L    AST 17 0 - 39 U/L   CBC with Auto Differential   Result Value Ref Range    WBC 4.1 (L) 4.5 - 11.5 E9/L    RBC 4.36 3.80 - 5.80 E12/L    Hemoglobin 13.4 12.5 - 16.5 g/dL    Hematocrit 37.2 37.0 - 54.0 %    MCV 85.3 80.0 - 99.9 fL    MCH 30.7 26.0 - 35.0 pg    MCHC 36.0 (H) 32.0 - 34.5 %    RDW 12.2 11.5 - 15.0 fL    Platelets 211 342 - 768 E9/L    MPV 9.0 7.0 - 12.0 fL    Neutrophils % 69.3 43.0 - 80.0 %    Immature Granulocytes % 0.2 0.0 - 5.0 %    Lymphocytes % 15.8 (L) 20.0 - 42.0 %    Monocytes % 14.5 (H) 2.0 - 12.0 %    Eosinophils % 0.0 0.0 - 6.0 %    Basophils % 0.2 0.0 - 2.0 %    Neutrophils Absolute 2.81 1.80 - 7.30 E9/L    Immature Granulocytes # 0.01 E9/L    Lymphocytes Absolute 0.64 (L) 1.50 - 4.00 E9/L    Monocytes Absolute 0.59 0.10 - 0.95 E9/L    Eosinophils Absolute 0.00 (L) 0.05 - 0.50 E9/L    Basophils Absolute 0.01 0.00 - 0.20 E9/L   Troponin   Result Value Ref Range    Troponin, High Sensitivity 15 (H) 0 - 11 ng/L   Brain Natriuretic Peptide   Result Value Ref Range    Pro- 0 - 450 pg/mL   Urinalysis with Microscopic   Result Value Ref Range    Color, UA Yellow Straw/Yellow    Clarity, UA Clear Clear    Glucose, Ur Negative Negative mg/dL    Bilirubin Urine Negative Negative    Ketones, Urine TRACE (A) Negative mg/dL    Specific Gravity, UA <=1.005 1.005 - 1.030    Blood, Urine SMALL (A) Negative    pH, UA 6.5 5.0 - 9.0    Protein, UA Negative Negative mg/dL    Urobilinogen, Urine 0.2 <2.0 E.U./dL    Nitrite, Urine Negative Negative    Leukocyte Esterase, Urine Negative Negative    WBC, UA 0-1 0 - 5 /HPF    RBC, UA 1-3 0 - 2 /HPF    Bacteria, UA NONE SEEN None Seen /HPF   Lactic Acid   Result Value Ref Range    Lactic Acid 1.0 0.5 - 2.2 mmol/L   Troponin   Result Value Ref Range    Troponin, High Sensitivity 22 (H) 0 - 11 ng/L   Magnesium   Result Value Ref Range    Magnesium 1.5 (L) 1.6 - 2.6 mg/dL   Troponin   Result Value Ref Range    Troponin, High Sensitivity 18 (H) 0 - 11 ng/L   URINE ELECTROLYTES   Result Value Ref Range    Sodium, Ur 46 Not Established mmol/L    Potassium, Ur 15.1 Not Established mmol/L    Chloride 49 Not Established mmol/L   UREA NITROGEN, URINE   Result Value Ref Range    Urea Nitrogen, Ur 113 (L) 800 - 1666 mg/dL   Protein / creatinine ratio, urine   Result Value Ref Range    Protein, Ur 6 0 - 12 mg/dL    Creatinine, Ur 40 40 - 278 mg/dL    Protein/Creat Ratio 0.2 0.0 - 0.2    Protein/Creat Ratio 0.2    EKG 12 Lead   Result Value Ref Range    Ventricular Rate 60 BPM    Atrial Rate 357 BPM    QRS Duration 162 ms    Q-T Interval 506 ms    QTc Calculation (Bazett) 506 ms    R Axis 58 degrees    T Axis 68 degrees       RADIOLOGY:  CT Head WO Contrast   Final Result   1. There is no acute intracranial abnormality. Specifically, there is no   intracranial hemorrhage. 2. Atrophy and periventricular leukomalacia,         XR CHEST PORTABLE   Final Result   No acute process. EKG:  This EKG is signed and interpreted by me. Rate: 60  Rhythm: Sinus  Interpretation: right bundle branch block  Comparison: stable as compared to patient's most recent EKG      ------------------------- NURSING NOTES AND VITALS REVIEWED ---------------------------  Date / Time Roomed:  8/25/2022  1:02 PM  ED Bed Assignment:  15/15    The nursing notes within the ED encounter and vital signs as below have been reviewed. Patient Vitals for the past 24 hrs:   BP Temp Pulse Resp SpO2 Height Weight   08/25/22 1910 (!) 169/88 -- 88 18 94 % -- --   08/25/22 1730 (!) 169/81 -- 63 17 98 % -- --   08/25/22 1500 138/71 97.5 °F (36.4 °C) 67 -- 99 % -- --   08/25/22 1218 (!) 141/84 97.5 °F (36.4 °C) 73 18 98 % 6' (1.829 m) 177 lb (80.3 kg)       Oxygen Saturation Interpretation: Normal    ------------------------------------------ PROGRESS NOTES ------------------------------------------  Re-evaluation(s):  Time: 0016  Patients symptoms show no change  Repeat physical examination is not changed    Counseling:  I have spoken with the patient and discussed todays results, in addition to providing specific details for the plan of care and counseling regarding the diagnosis and prognosis. Their questions are answered at this time and they are agreeable with the plan of admission.    --------------------------------- ADDITIONAL PROVIDER NOTES ---------------------------------  Consultations:  Time: 1900. Spoke with Dr. Gris Hollingsworth. Discussed case. They will admit the patient.   This patient's ED course included: a personal history and physicial examination, re-evaluation prior to disposition, multiple bedside re-evaluations, IV medications, cardiac monitoring, and continuous pulse oximetry    This patient has remained hemodynamically stable during their ED course. Diagnosis:  1. Hypomagnesemia    2. Hyponatremia    3. Hypokalemia    4. Ambulatory dysfunction        Disposition:  Patient's disposition: Admit to telemetry  Patient's condition is stable. Patient was seen and evaluated by myself and my attending Jovana Peters MD. Assessment and Plan discussed with attending provider, please see attestation for final plan of care.      DO Hollie Gunter DO  Resident  08/25/22 0271

## 2022-08-25 NOTE — PROGRESS NOTES
22  Ary Hirsch : 1935 Sex: male  Age 81 y.o. Subjective:  Chief Complaint   Patient presents with    Headache    Abdominal Pain         HPI:   Ary Hirsch , 81 y.o. male presents to express care for evaluation of fall, head injury, nausea, weakness of the lower extremities    HPI  71-year-old male presents to express care for evaluation of a fall with head injury, back pain, leg weakness and nausea. The patient states that he was sitting with his legs up and then he tried to get up and walk. The patient states that he stumbled and fell backwards and landed on his back and hit his head on the carpeted ground. This occurred last evening. Since that time the patient has been complaining of head pain, nausea, and mid to low back pain. The patient is also complaining of upset stomach and stomach pain. He does have a hard time ambulating. The patient did take some Tylenol last night and was able to sleep. The patient has not had any vomiting but continues to be nauseous now. ROS:   Unless otherwise stated in this report the patient's positive and negative responses for review of systems for constitutional, eyes, ENT, cardiovascular, respiratory, gastrointestinal, neurological, , musculoskeletal, and integument systems and related systems to the presenting problem are either stated in the history of present illness or were not pertinent or were negative for the symptoms and/or complaints related to the presenting medical problem. Positives and pertinent negatives as per HPI. All others reviewed and are negative.       PMH:     Past Medical History:   Diagnosis Date    Aneurysm of right renal artery ([facility] [address]     follows with Dr. Renee bird (last visit )    Colitis     Depression     GERD (gastroesophageal reflux disease)     H/O: CVA (cerebrovascular accident) 10/14/2021    Hyperlipidemia     Hypertension     Stroke [facility])     Superior mesenteric artery stenosis ([facility] [address] 11/4/2020    TIA (transient ischemic attack)        Past Surgical History:   Procedure Laterality Date    ANTERIOR CRUCIATE LIGAMENT REPAIR Left 11/21/2001    APPENDECTOMY      CHOLECYSTECTOMY  2007    Lap    ECHO COMPL W DOP COLOR FLOW  12/4/2012         HERNIA REPAIR Right 11/13/2002    double    SINUS SURGERY  2002,2003     done x 2    TONSILLECTOMY      TOTAL KNEE ARTHROPLASTY Left 1/28/2019    LEFT  ROBOTIC KNEE TOTAL ARTHROPLASTY  ++MEREDITH- [id number]++   ++ADDUCTOR BLOCK++ performed by Meredith Spencer MD at [place] [address] 10/10/2019    EGD BIOPSY performed by Latosha Golden MD at [address] [place] [address]       History reviewed. No pertinent family history. Medications:     Current Outpatient Medications:     FLUoxetine (PROZAC) 20 MG capsule, Take 1 capsule by mouth in the morning., Disp: 30 capsule, Rfl: 5    FLUoxetine (PROZAC) 10 MG capsule, Take 1 capsule by mouth in the morning., Disp: 30 capsule, Rfl: 3    potassium chloride (KLOR-CON M) 20 MEQ extended release tablet, 1 qd, Disp: 30 tablet, Rfl: 1    amLODIPine (NORVASC) 5 MG tablet, Take 1 tablet by mouth daily, Disp: 30 tablet, Rfl: 3    hydroCHLOROthiazide (MICROZIDE) 12.5 MG capsule, TAKE 1 CAPSULE BY MOUTH EVERY MORNING, Disp: 90 capsule, Rfl: 1    pantoprazole (PROTONIX) 40 MG tablet, TAKE 1 TABLET BY MOUTH EVERY MORNING BEFORE BREAKFAST, Disp: 90 tablet, Rfl: 1    losartan (COZAAR) 100 MG tablet, 1 QD, Disp: 90 tablet, Rfl: 3    aspirin 81 MG tablet, Take 81 mg by mouth daily, Disp: , Rfl:     Allergies:   No Known Allergies    Social History:     Social History     Tobacco Use    Smoking status: Never    Smokeless tobacco: Never   Vaping Use    Vaping Use: Never used   Substance Use Topics    Alcohol use: No    Drug use: No       Patient lives at home.     Physical Exam:     Vitals:    08/25/22 1141   BP: 112/70   Site: Right Upper Arm   Position: Sitting   Pulse: 75   Temp: 98.5 °F (36.9 °C)   TempSrc: Temporal SpO2: 97%   Weight: 179 lb (81.2 kg)       Exam:  Physical Exam  Nurses note and vital signs reviewed and patient is not hypoxic. General: The patient appears well and in no apparent distress. Patient is resting comfortably on cart. Skin: Warm, dry, no pallor noted. There is no rash noted. Head: Normocephalic, atraumatic. No signs of trauma to the head  Eye: Normal conjunctiva  Ears, Nose, Mouth, and Throat: Oral mucosa is moist  Cardiovascular: Regular Rate and Rhythm  Respiratory: Patient is in no distress, no accessory muscle use, lungs are clear to auscultation, no wheezing, crackles or rhonchi  Back: Minimal tenderness noted through the lower thoracic spine into the lumbar spine, no significant CVA tenderness, no proximal thoracic spinal tenderness  GI: Normal bowel sounds, no tenderness to palpation, no masses appreciated. No rebound, guarding, or rigidity noted. Musculoskeletal: The patient has no evidence of calf tenderness, no pitting edema, symmetrical pulses noted bilaterally. The patient was able to stand and ambulate but did have a shuffling gait. Neurological: A&O x4, normal speech      Testing:           Medical Decision Making:     Vital signs reviewed    Past medical history reviewed. Allergies reviewed. Medications reviewed. Patient on arrival does not appear to be in any apparent distress or discomfort. The patient has been seen and evaluated. The patient does not appear to be toxic or lethargic. The patient fell and hit his back and complaining of leg weakness, nausea, and headache with head injury. Given his age the patient will be sent to the emergency department for further evaluation. The patient was comfortable with this plan. Clinical Impression:   Luis Enrique Rodriguez was seen today for headache and abdominal pain.     Diagnoses and all orders for this visit:    Injury of head, initial encounter    Injury of back, initial encounter    Weakness of both lower

## 2022-08-25 NOTE — PROGRESS NOTES
Please NOTE : the patient's QTc is 506 . The use of Zofran may further prolong the QT interval. Please DC the Zofran at this time. If an antiemetic is needed, Tigan 200 mg IM q6h prn  is a safer alternative. Thank you.

## 2022-08-26 LAB
ALBUMIN SERPL-MCNC: 3.6 G/DL (ref 3.5–5.2)
ALP BLD-CCNC: 65 U/L (ref 40–129)
ALT SERPL-CCNC: 10 U/L (ref 0–40)
ANION GAP SERPL CALCULATED.3IONS-SCNC: 12 MMOL/L (ref 7–16)
AST SERPL-CCNC: 17 U/L (ref 0–39)
BASOPHILS ABSOLUTE: 0.01 E9/L (ref 0–0.2)
BASOPHILS RELATIVE PERCENT: 0.2 % (ref 0–2)
BILIRUB SERPL-MCNC: 0.5 MG/DL (ref 0–1.2)
BUN BLDV-MCNC: 5 MG/DL (ref 6–23)
CALCIUM SERPL-MCNC: 8.9 MG/DL (ref 8.6–10.2)
CHLORIDE BLD-SCNC: 87 MMOL/L (ref 98–107)
CO2: 28 MMOL/L (ref 22–29)
CREAT SERPL-MCNC: 0.7 MG/DL (ref 0.7–1.2)
EOSINOPHILS ABSOLUTE: 0.01 E9/L (ref 0.05–0.5)
EOSINOPHILS RELATIVE PERCENT: 0.2 % (ref 0–6)
GFR AFRICAN AMERICAN: >60
GFR NON-AFRICAN AMERICAN: >60 ML/MIN/1.73
GLUCOSE BLD-MCNC: 92 MG/DL (ref 74–99)
HCT VFR BLD CALC: 35.9 % (ref 37–54)
HEMOGLOBIN: 12.9 G/DL (ref 12.5–16.5)
IMMATURE GRANULOCYTES #: 0.01 E9/L
IMMATURE GRANULOCYTES %: 0.2 % (ref 0–5)
LYMPHOCYTES ABSOLUTE: 1.2 E9/L (ref 1.5–4)
LYMPHOCYTES RELATIVE PERCENT: 25.4 % (ref 20–42)
MAGNESIUM: 1.8 MG/DL (ref 1.6–2.6)
MCH RBC QN AUTO: 30.9 PG (ref 26–35)
MCHC RBC AUTO-ENTMCNC: 35.9 % (ref 32–34.5)
MCV RBC AUTO: 85.9 FL (ref 80–99.9)
MONOCYTES ABSOLUTE: 0.65 E9/L (ref 0.1–0.95)
MONOCYTES RELATIVE PERCENT: 13.8 % (ref 2–12)
NEUTROPHILS ABSOLUTE: 2.84 E9/L (ref 1.8–7.3)
NEUTROPHILS RELATIVE PERCENT: 60.2 % (ref 43–80)
PDW BLD-RTO: 12.1 FL (ref 11.5–15)
PLATELET # BLD: 351 E9/L (ref 130–450)
PMV BLD AUTO: 9.3 FL (ref 7–12)
POTASSIUM REFLEX MAGNESIUM: 2.8 MMOL/L (ref 3.5–5)
RBC # BLD: 4.18 E12/L (ref 3.8–5.8)
SODIUM BLD-SCNC: 127 MMOL/L (ref 132–146)
TOTAL PROTEIN: 6.3 G/DL (ref 6.4–8.3)
WBC # BLD: 4.7 E9/L (ref 4.5–11.5)

## 2022-08-26 PROCEDURE — 80053 COMPREHEN METABOLIC PANEL: CPT

## 2022-08-26 PROCEDURE — 85025 COMPLETE CBC W/AUTO DIFF WBC: CPT

## 2022-08-26 PROCEDURE — 83735 ASSAY OF MAGNESIUM: CPT

## 2022-08-26 PROCEDURE — 6370000000 HC RX 637 (ALT 250 FOR IP): Performed by: STUDENT IN AN ORGANIZED HEALTH CARE EDUCATION/TRAINING PROGRAM

## 2022-08-26 PROCEDURE — 1200000000 HC SEMI PRIVATE

## 2022-08-26 PROCEDURE — 97165 OT EVAL LOW COMPLEX 30 MIN: CPT

## 2022-08-26 PROCEDURE — 6360000002 HC RX W HCPCS: Performed by: INTERNAL MEDICINE

## 2022-08-26 PROCEDURE — 2580000003 HC RX 258: Performed by: STUDENT IN AN ORGANIZED HEALTH CARE EDUCATION/TRAINING PROGRAM

## 2022-08-26 PROCEDURE — 6360000002 HC RX W HCPCS: Performed by: STUDENT IN AN ORGANIZED HEALTH CARE EDUCATION/TRAINING PROGRAM

## 2022-08-26 PROCEDURE — 36415 COLL VENOUS BLD VENIPUNCTURE: CPT

## 2022-08-26 RX ORDER — PROMETHAZINE HYDROCHLORIDE 25 MG/ML
6.25 INJECTION, SOLUTION INTRAMUSCULAR; INTRAVENOUS EVERY 6 HOURS PRN
Status: DISCONTINUED | OUTPATIENT
Start: 2022-08-26 | End: 2022-08-29 | Stop reason: HOSPADM

## 2022-08-26 RX ORDER — POTASSIUM CHLORIDE AND SODIUM CHLORIDE 900; 300 MG/100ML; MG/100ML
INJECTION, SOLUTION INTRAVENOUS CONTINUOUS
Status: DISCONTINUED | OUTPATIENT
Start: 2022-08-26 | End: 2022-08-29

## 2022-08-26 RX ADMIN — POTASSIUM CHLORIDE AND SODIUM CHLORIDE: 900; 300 INJECTION, SOLUTION INTRAVENOUS at 11:17

## 2022-08-26 RX ADMIN — LOSARTAN POTASSIUM 100 MG: 50 TABLET, FILM COATED ORAL at 09:01

## 2022-08-26 RX ADMIN — ASPIRIN 81 MG: 81 TABLET, COATED ORAL at 09:01

## 2022-08-26 RX ADMIN — ACETAMINOPHEN 650 MG: 325 TABLET ORAL at 01:39

## 2022-08-26 RX ADMIN — PANTOPRAZOLE SODIUM 40 MG: 40 TABLET, DELAYED RELEASE ORAL at 06:09

## 2022-08-26 RX ADMIN — PROMETHAZINE HYDROCHLORIDE 6.25 MG: 25 INJECTION INTRAMUSCULAR; INTRAVENOUS at 21:03

## 2022-08-26 RX ADMIN — ACETAMINOPHEN 650 MG: 325 TABLET ORAL at 14:08

## 2022-08-26 RX ADMIN — AMLODIPINE BESYLATE 5 MG: 5 TABLET ORAL at 09:11

## 2022-08-26 RX ADMIN — ENOXAPARIN SODIUM 40 MG: 100 INJECTION SUBCUTANEOUS at 20:14

## 2022-08-26 RX ADMIN — SODIUM CHLORIDE, PRESERVATIVE FREE 10 ML: 5 INJECTION INTRAVENOUS at 09:01

## 2022-08-26 RX ADMIN — Medication 3 MG: at 20:23

## 2022-08-26 ASSESSMENT — PAIN SCALES - WONG BAKER: WONGBAKER_NUMERICALRESPONSE: 0

## 2022-08-26 ASSESSMENT — PAIN - FUNCTIONAL ASSESSMENT: PAIN_FUNCTIONAL_ASSESSMENT: ACTIVITIES ARE NOT PREVENTED

## 2022-08-26 ASSESSMENT — PAIN SCALES - GENERAL
PAINLEVEL_OUTOF10: 6
PAINLEVEL_OUTOF10: 1
PAINLEVEL_OUTOF10: 0

## 2022-08-26 ASSESSMENT — PAIN DESCRIPTION - ORIENTATION: ORIENTATION: OTHER (COMMENT)

## 2022-08-26 ASSESSMENT — PAIN DESCRIPTION - LOCATION: LOCATION: HEAD

## 2022-08-26 NOTE — PROGRESS NOTES
Occupational Therapy    OCCUPATIONAL THERAPY INITIAL EVALUATION     [facility] [place], [place]         Date:2022                                                  Patient Name: Bernadette Panchal    MRN: [id number]    : 1935    Room: [address] [place] [address]      Evaluating OT: Samantha Brown OTR/L   [id number]      Referring Akanksha Browne MD    Specific Provider Orders/Date:OT eval and treat 2022      Diagnosis:  Hypokalemia [E87.6]  Hypomagnesemia [E83.42]  Hyponatremia [E87.1]  Ambulatory dysfunction [R26.2]     Pertinent Medical History: CVA, L TKA , falls     Precautions:  Fall Risk,      Assessment of current deficits    [x] Functional mobility  [x]ADLs  [x] Strength               []Cognition    [x] Functional transfers   [x] IADLs         [x] Safety Awareness   [x]Endurance    [] Fine Coordination              [x] Balance      [] Vision/perception   []Sensation     []Gross Motor Coordination  [] ROM  [] Delirium                   [] Motor Control     OT PLAN OF CARE   OT POC based on physician orders, patient diagnosis and results of clinical assessment    Frequency/Duration  2-4 days/wk for 2 weeks PRN   Specific OT Treatment Interventions to include:   ADL retraining/adapted techniques and AE recommendations to increase functional independence within precautions                    Energy conservation techniques to improve tolerance for selfcare routine   Functional transfer/mobility training/DME recommendations for increased independence, safety and fall prevention         Patient/family education to increase safety and functional independence             Environmental modifications for safe mobility and completion of ADLs                             Therapeutic activity to improve functional performance during ADLs.                                          Therapeutic exercise to improve tolerance and functional strength for ADLs

## 2022-08-26 NOTE — PLAN OF CARE
Problem: Discharge Planning  Goal: Discharge to home or other facility with appropriate resources  Outcome: Progressing  Flowsheets (Taken 8/25/2022 2015)  Discharge to home or other facility with appropriate resources: Identify barriers to discharge with patient and caregiver     Problem: Safety - Adult  Goal: Free from fall injury  Outcome: Progressing

## 2022-08-26 NOTE — H&P
Internal Medicine History & Physical     Name: Chasity Frankel  : 1935  Chief Complaint: Nausea, Headache (Onset couple days ago), and Other (Difficulty walking, onset one week ago)  Primary Care Physician: Emmy Tracy DO  Admission date: 2022  Date of service: 2022     History of Present Illness  Jaden Andres is a 81y.o. year old male presented with a chief complaint of nausea headache weakness     81year old male who presented to the ED due to nausea, headache and difficulty walking. Patient states that he has had increased difficulty walking over the past week. He feels that he has been more unsteady on his feet. States that he tripped over his seat yesterday and hit the back of his head on his carpet. Patient denies any loss of consciousness or vomiting following the incident. Patient states that he has had a headache for the past 2 days prior to the fall. He also notes intermittent nausea over the past several days as well without any episodes of vomiting. Patient notes that he had been prescribed Ativan and Prozac recently for anxiety and has been taking them as prescribed. Patient symptoms have been constant since onset. Denies any aggravating relieving factors. Denies any recent fevers, chills, chest pain, shortness of breath, abdominal pain, bowel or urinary changes, lower extremity edema or vision changes.         Past Medical History:   Diagnosis Date    Aneurysm of right renal artery ([address]     follows with Dr. 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    TIA (transient ischemic attack)        Past Surgical History:   Procedure Laterality Date    ANTERIOR CRUCIATE LIGAMENT REPAIR Left 2001    APPENDECTOMY      CHOLECYSTECTOMY      Lap    ECHO COMPL W DOP COLOR FLOW  12/4/2012         HERNIA REPAIR Right 11/13/2002    double    SINUS SURGERY  2002,2003     done x 2    TONSILLECTOMY      TOTAL KNEE ARTHROPLASTY Left 1/28/2019    LEFT  ROBOTIC KNEE TOTAL ARTHROPLASTY  ++MEREDITH- [id number]++   ++ADDUCTOR BLOCK++ performed by Salas Tucker MD at [address] [place] [address] N/A 10/10/2019    EGD BIOPSY performed by Betsy Olivares MD at [facility] ENDOSCOPY       No family history on file. Social History  Patient lives at home. At baseline patient ambulates with assistance   Illicit drugs: Denies   TOBACCO:   reports that he has never smoked. He has never used smokeless tobacco.  ETOH:   reports no history of alcohol use. Home Medications  Prior to Admission medications    Medication Sig Start Date End Date Taking? Authorizing Provider   LORazepam (ATIVAN) 0.5 MG tablet Take 0.5 mg by mouth every 8 hours as needed for Anxiety. Yes Historical Provider, MD   FLUoxetine (PROZAC) 20 MG capsule Take 1 capsule by mouth in the morning. 8/11/22   Elvis Calzada, DO   potassium chloride (KLOR-CON M) 20 MEQ extended release tablet 1 qd 7/20/22   Go Villa, DO   amLODIPine (NORVASC) 5 MG tablet Take 1 tablet by mouth daily 6/14/22   Go Villa, DO   hydroCHLOROthiazide (MICROZIDE) 12.5 MG capsule TAKE 1 CAPSULE BY MOUTH EVERY MORNING 6/8/22   Go Villa, DO   pantoprazole (PROTONIX) 40 MG tablet TAKE 1 TABLET BY MOUTH EVERY MORNING BEFORE BREAKFAST 5/24/22   Elvis Calzada, DO   losartan (COZAAR) 100 MG tablet 1 QD  Patient taking differently: 100 mg daily 1 QD 3/15/22 9/14/22  Elvis Calzada, DO   aspirin 81 MG tablet Take 81 mg by mouth daily    Historical Provider, MD       Allergies  No Known Allergies    Review of Systems  Please see HPI above. All bolded are positive. All un-bolded are negative.   Constitutional Symptoms: fever, chills, fatigue, generalized weakness, diaphoresis, increase in thirst, loss of appetite  Eyes: vision change   Ears, Nose, Mouth, Throat: hearing loss, nasal congestion, sores in the mouth  Cardiovascular: chest pain, chest heaviness, palpitations  Respiratory: shortness of breath, wheezing, coughing  Gastrointestinal: abdominal pain, nausea,vomiting, diarrhea, constipation, melena, hematochezia, hematemesis  Genitourinary: dysuria, hematuria, increased frequency  Musculoskeletal: lower extremity edema, myalgias, arthralgias, back pain  Integumentary: rashes, itching   Neurological: headache, lightheadedness, dizziness, confusion, syncope, numbness, tingling, focal weakness  Psychiatric: depression, suicidal ideation, anxiety  Endocrine: unintentional weight change  Hematologic/Lymphatic: lymphadenopathy, easy bruising, easy bleeding   Allergic/Immunologic: recurrent infections      Objective  VITALS:  BP (!) 162/71   Pulse 62   Temp 97.7 °F (36.5 °C) (Oral)   Resp 20   Ht 6' (1.829 m)   Wt 172 lb 8 oz (78.2 kg)   SpO2 97%   BMI 23.40 kg/m²     Physical Exam:  General: awake, alert, oriented to person, place, time, and purpose, appears stated age, cooperative, no acute distress, pleasant, appropriate mood  Eyes: conjunctivae/corneas clear, sclera non icteric, EOMI  Ears: no obvious scars, no lesions, no masses, hearing intact  Mouth: mucous membranes moist, no obvious oral sores  Head: normocephalic, atraumatic  Neck: no JVD, no adenopathy, no thyromegaly, neck is supple, trachea is midline  Back: ROM normal, no CVA tenderness.   Chest: no pain on palpation  Lungs: clear to auscultation bilaterally, without rhonchi, crackle, wheezing, or rale, no retractions or use of accessory muscles  Heart: regular rate and regular rhythm, no murmur, normal S1, S2  Abdomen: soft, non-tender; bowel sounds normal; no masses, no organomegaly  : Deferred   Extremities: no lower extremity edema, extremities atraumatic, no cyanosis, no clubbing, 2+ pedal pulses palpated  Skin: normal color, normal texture, normal turgor, no rashes, no lesions  Neurologic:5/5 muscle strength throughout, normal muscle tone throughout, face symmetric, hearing intact, tongue midline, speech appropriate without slurring, sensation to fine touch intact in upper and lower extremities    Labs-   Lab Results   Component Value Date    WBC 4.7 08/26/2022    HGB 12.9 08/26/2022    HCT 35.9 (L) 08/26/2022     08/26/2022     (L) 08/26/2022    K 2.8 (L) 08/26/2022    CL 87 (L) 08/26/2022    CREATININE 0.7 08/26/2022    BUN 5 (L) 08/26/2022    CO2 28 08/26/2022    GLUCOSE 92 08/26/2022    ALT 10 08/26/2022    AST 17 08/26/2022    INR 1.2 07/13/2022     Lab Results   Component Value Date    CKTOTAL 59 12/03/2012    CKMB 1.1 12/03/2012    TROPONINI <0.01 05/02/2021       Last echocardiogram:      Recent Radiological Studies:  CT Head WO Contrast   Final Result   1. There is no acute intracranial abnormality. Specifically, there is no   intracranial hemorrhage. 2. Atrophy and periventricular leukomalacia,         XR CHEST PORTABLE   Final Result   No acute process. 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]      Priority: High    Hyponatremia [E87.1]      Priority: Medium    Gait disturbance [R26.9]      Priority: Medium    Essential hypertension [I10]      Priority: Low    Constipation [K59.00]     Hypokalemia [E87.6]     Abnormal liver enzymes [R74.8]     BPH associated with nocturia [N40.1, R35.1]     Seasonal allergic rhinitis due to pollen [J30.1]     Chronic intractable headache [R51.9, G89.29]     Superior mesenteric artery stenosis (HCC) [K55.1]     Non-seasonal allergic rhinitis [J30.89]     Weakness [R53.1]     Anxiety [F41.9]     Decreased appetite [R63.0]     Chronic gastritis without bleeding [K29.50]     Depression [F32. A]     Osteoarthritis of left knee [M17.12]     Aneurysm of right renal artery ([address] [I72.2]        Patient Active Problem List    Diagnosis Date Noted    Status post left knee replacement 01/28/2019     Priority: High    Nausea 01/22/2017     Priority: High    Major depressive disorder 01/22/2017     Priority: High    Insomnia d/t depression 01/22/2017     Priority: High    Hyponatremia 08/25/2022     Priority: Medium    Gait disturbance 08/18/2022     Priority: Medium    Chronic sinus complaints 06/29/2022     Priority: Medium    Congestion of nasal sinus 06/22/2022     Priority: Medium    Acute recurrent frontal sinusitis 06/14/2022     Priority: Medium    Decreased sense of vibration at the toes 01/22/2017     Priority: Medium    Essential hypertension 01/22/2017     Priority: Low    Hyperlipidemia 01/22/2017     Priority: Low    Constipation 04/11/2022    Hypokalemia 01/19/2022    Myalgia 01/05/2022    H/O: CVA (cerebrovascular accident) 10/14/2021    BPH associated with nocturia 09/14/2021    Abnormal liver enzymes 09/14/2021    Chronic sinusitis 06/08/2021    Seasonal allergic rhinitis due to pollen 05/25/2021    Chronic intractable headache 04/02/2021    Superior mesenteric artery stenosis ([address] 11/04/2020     Approximately 40% stenosis based upon the CTA 10/20/2020      Non-seasonal allergic rhinitis 10/01/2020    Weakness 09/17/2020    Decreased appetite 10/21/2019    Anxiety 10/21/2019    Chronic gastritis without bleeding 09/04/2019    Impaired fasting glucose 08/22/2019    Depression 08/15/2019    Osteoarthritis of left knee 01/28/2019    Aneurysm of right renal artery ([address] 10/03/2018       Plan  Hyponatremia   Hold HCTZ  Hold SSRI  Serum and urine osmolality   Urine lytes  Nephrology consult   Mechanical fall   CT head negative for acute pathology   Fall precautions   PT OT   Of note there are changes of underlying dementia on the scan   PT/OT  Home medications to be reconciled and confirmed prior to being ordered  DVT prophylaxis   Code Status Full   Discharge plan: TBD pending clinical improvement     Marky Chua MD  Internal medicine   8/26/2022  8:14 AM    I can be reached through [employer]. NOTE:  This report was transcribed using voice recognition software. Every effort was made to ensure accuracy; however, inadvertent computerized transcription errors may be present.

## 2022-08-27 LAB
ALBUMIN SERPL-MCNC: 3.5 G/DL (ref 3.5–5.2)
ALP BLD-CCNC: 61 U/L (ref 40–129)
ALT SERPL-CCNC: 10 U/L (ref 0–40)
ANION GAP SERPL CALCULATED.3IONS-SCNC: 11 MMOL/L (ref 7–16)
ANION GAP SERPL CALCULATED.3IONS-SCNC: 11 MMOL/L (ref 7–16)
AST SERPL-CCNC: 20 U/L (ref 0–39)
BASOPHILS ABSOLUTE: 0.01 E9/L (ref 0–0.2)
BASOPHILS RELATIVE PERCENT: 0.2 % (ref 0–2)
BILIRUB SERPL-MCNC: 0.6 MG/DL (ref 0–1.2)
BUN BLDV-MCNC: 4 MG/DL (ref 6–23)
BUN BLDV-MCNC: 6 MG/DL (ref 6–23)
CALCIUM SERPL-MCNC: 8.7 MG/DL (ref 8.6–10.2)
CALCIUM SERPL-MCNC: 8.8 MG/DL (ref 8.6–10.2)
CHLORIDE BLD-SCNC: 91 MMOL/L (ref 98–107)
CHLORIDE BLD-SCNC: 92 MMOL/L (ref 98–107)
CHLORIDE URINE RANDOM: 87 MMOL/L
CO2: 25 MMOL/L (ref 22–29)
CO2: 26 MMOL/L (ref 22–29)
CREAT SERPL-MCNC: 0.6 MG/DL (ref 0.7–1.2)
CREAT SERPL-MCNC: 0.7 MG/DL (ref 0.7–1.2)
EOSINOPHILS ABSOLUTE: 0.01 E9/L (ref 0.05–0.5)
EOSINOPHILS RELATIVE PERCENT: 0.2 % (ref 0–6)
GFR AFRICAN AMERICAN: >60
GFR AFRICAN AMERICAN: >60
GFR NON-AFRICAN AMERICAN: >60 ML/MIN/1.73
GFR NON-AFRICAN AMERICAN: >60 ML/MIN/1.73
GLUCOSE BLD-MCNC: 108 MG/DL (ref 74–99)
GLUCOSE BLD-MCNC: 91 MG/DL (ref 74–99)
HCT VFR BLD CALC: 36.7 % (ref 37–54)
HEMOGLOBIN: 13 G/DL (ref 12.5–16.5)
IMMATURE GRANULOCYTES #: 0.01 E9/L
IMMATURE GRANULOCYTES %: 0.2 % (ref 0–5)
LYMPHOCYTES ABSOLUTE: 1.23 E9/L (ref 1.5–4)
LYMPHOCYTES RELATIVE PERCENT: 25.8 % (ref 20–42)
MAGNESIUM: 1.7 MG/DL (ref 1.6–2.6)
MCH RBC QN AUTO: 30.3 PG (ref 26–35)
MCHC RBC AUTO-ENTMCNC: 35.4 % (ref 32–34.5)
MCV RBC AUTO: 85.5 FL (ref 80–99.9)
MONOCYTES ABSOLUTE: 0.69 E9/L (ref 0.1–0.95)
MONOCYTES RELATIVE PERCENT: 14.5 % (ref 2–12)
NEUTROPHILS ABSOLUTE: 2.81 E9/L (ref 1.8–7.3)
NEUTROPHILS RELATIVE PERCENT: 59.1 % (ref 43–80)
OSMOLALITY URINE: 296 MOSM/KG (ref 300–900)
OSMOLALITY: 268 MOSM/KG (ref 285–310)
PDW BLD-RTO: 12.3 FL (ref 11.5–15)
PLATELET # BLD: 329 E9/L (ref 130–450)
PMV BLD AUTO: 8.8 FL (ref 7–12)
POTASSIUM REFLEX MAGNESIUM: 3.1 MMOL/L (ref 3.5–5)
POTASSIUM SERPL-SCNC: 3.9 MMOL/L (ref 3.5–5)
RBC # BLD: 4.29 E12/L (ref 3.8–5.8)
SODIUM BLD-SCNC: 127 MMOL/L (ref 132–146)
SODIUM BLD-SCNC: 129 MMOL/L (ref 132–146)
SODIUM URINE: 74 MMOL/L
TOTAL PROTEIN: 6 G/DL (ref 6.4–8.3)
WBC # BLD: 4.8 E9/L (ref 4.5–11.5)

## 2022-08-27 PROCEDURE — 6370000000 HC RX 637 (ALT 250 FOR IP): Performed by: STUDENT IN AN ORGANIZED HEALTH CARE EDUCATION/TRAINING PROGRAM

## 2022-08-27 PROCEDURE — 6370000000 HC RX 637 (ALT 250 FOR IP): Performed by: INTERNAL MEDICINE

## 2022-08-27 PROCEDURE — 6360000002 HC RX W HCPCS: Performed by: INTERNAL MEDICINE

## 2022-08-27 PROCEDURE — 83735 ASSAY OF MAGNESIUM: CPT

## 2022-08-27 PROCEDURE — 83935 ASSAY OF URINE OSMOLALITY: CPT

## 2022-08-27 PROCEDURE — 80048 BASIC METABOLIC PNL TOTAL CA: CPT

## 2022-08-27 PROCEDURE — 83930 ASSAY OF BLOOD OSMOLALITY: CPT

## 2022-08-27 PROCEDURE — 36415 COLL VENOUS BLD VENIPUNCTURE: CPT

## 2022-08-27 PROCEDURE — 1200000000 HC SEMI PRIVATE

## 2022-08-27 PROCEDURE — 6360000002 HC RX W HCPCS: Performed by: STUDENT IN AN ORGANIZED HEALTH CARE EDUCATION/TRAINING PROGRAM

## 2022-08-27 PROCEDURE — 80053 COMPREHEN METABOLIC PANEL: CPT

## 2022-08-27 PROCEDURE — 82436 ASSAY OF URINE CHLORIDE: CPT

## 2022-08-27 PROCEDURE — 84300 ASSAY OF URINE SODIUM: CPT

## 2022-08-27 PROCEDURE — 85025 COMPLETE CBC W/AUTO DIFF WBC: CPT

## 2022-08-27 RX ORDER — PROCHLORPERAZINE EDISYLATE 5 MG/ML
10 INJECTION INTRAMUSCULAR; INTRAVENOUS ONCE
Status: COMPLETED | OUTPATIENT
Start: 2022-08-27 | End: 2022-08-27

## 2022-08-27 RX ORDER — POTASSIUM CHLORIDE 20 MEQ/1
40 TABLET, EXTENDED RELEASE ORAL ONCE
Status: COMPLETED | OUTPATIENT
Start: 2022-08-27 | End: 2022-08-27

## 2022-08-27 RX ADMIN — PROCHLORPERAZINE EDISYLATE 10 MG: 5 INJECTION INTRAMUSCULAR; INTRAVENOUS at 14:37

## 2022-08-27 RX ADMIN — LOSARTAN POTASSIUM 100 MG: 50 TABLET, FILM COATED ORAL at 09:39

## 2022-08-27 RX ADMIN — ASPIRIN 81 MG: 81 TABLET, COATED ORAL at 09:39

## 2022-08-27 RX ADMIN — PANTOPRAZOLE SODIUM 40 MG: 40 TABLET, DELAYED RELEASE ORAL at 06:45

## 2022-08-27 RX ADMIN — AMLODIPINE BESYLATE 5 MG: 5 TABLET ORAL at 09:39

## 2022-08-27 RX ADMIN — ENOXAPARIN SODIUM 40 MG: 100 INJECTION SUBCUTANEOUS at 18:34

## 2022-08-27 RX ADMIN — POTASSIUM CHLORIDE 40 MEQ: 1500 TABLET, EXTENDED RELEASE ORAL at 11:46

## 2022-08-27 RX ADMIN — Medication 3 MG: at 23:41

## 2022-08-27 RX ADMIN — POTASSIUM CHLORIDE AND SODIUM CHLORIDE: 900; 300 INJECTION, SOLUTION INTRAVENOUS at 18:40

## 2022-08-27 ASSESSMENT — PAIN SCALES - GENERAL
PAINLEVEL_OUTOF10: 0

## 2022-08-27 ASSESSMENT — PAIN SCALES - WONG BAKER
WONGBAKER_NUMERICALRESPONSE: 0

## 2022-08-27 NOTE — PROGRESS NOTES
Internal Medicine Progress Note    Patient's name: Josette Fowler  : 1935  Chief complaints (on day of admission): Nausea, Headache (Onset couple days ago), and Other (Difficulty walking, onset one week ago)  Admission date: 2022  Date of service: 2022   Room: [facility]  Primary care physician: Mariana Horton DO  Reason for visit: Follow-up for hyponatremia    Subjective  Derik Valle was seen and examined sitting up in bed. He tells me he is feeling good today and denies any pain. He states he is a little bit nauseous and feels it is because he has not eaten yet this morning, his breakfast just arrived. He denies any shortness of breath on room air. IVF infusing. Review of Systems  There are no new complaints of chest pain, shortness of breath, abdominal pain, vomiting, diarrhea, constipation.     Hospital Medications  Current Facility-Administered Medications   Medication Dose Route Frequency Provider Last Rate Last Admin    0.9% NaCl with KCl 40 mEq infusion   IntraVENous Continuous Ramirez MD Mayo 80 mL/hr at 22 1117 New Bag at 22 1117    promethazine (PHENERGAN) injection 6.25 mg  6.25 mg IntraMUSCular Q6H PRN Giselle Youssef MD   6.25 mg at 22 2103    sodium chloride flush 0.9 % injection 10 mL  10 mL IntraVENous 2 times per day Giselle Youssef MD   10 mL at 22 0901    sodium chloride flush 0.9 % injection 10 mL  10 mL IntraVENous PRN Giselle Youssef MD        0.9 % sodium chloride infusion   IntraVENous PRN Giselle Youssef MD        enoxaparin (LOVENOX) injection 40 mg  40 mg SubCUTAneous Daily Giselle Youssef MD   40 mg at 22    ondansetron (ZOFRAN-ODT) disintegrating tablet 4 mg  4 mg Oral Q8H PRN Giselle Youssef MD        Or    ondansetron [facility]) injection 4 mg  4 mg IntraVENous Q6H PRN Giselle Youssef MD        senna (SENOKOT) tablet 8.6 mg  1 tablet Oral Daily PRN Giselle Youssef MD        acetaminophen (TYLENOL) tablet 650 mg  650 mg Oral Q6H PRN Efrain Macias Marshal Lopez MD   650 mg at 08/26/22 1408    Or    acetaminophen (TYLENOL) suppository 650 mg  650 mg Rectal Q6H PRN Magdalena Arzola MD        hydrALAZINE (APRESOLINE) injection 10 mg  10 mg IntraVENous Q4H PRN Magdalena Arzola MD        melatonin tablet 3 mg  3 mg Oral Nightly PRN Magdalena Arzola MD   3 mg at 08/26/22 2023    amLODIPine (NORVASC) tablet 5 mg  5 mg Oral Daily Magdalena Arzola MD   5 mg at 08/26/22 5234    aspirin EC tablet 81 mg  81 mg Oral Daily Magdalena Arzola MD   81 mg at 08/26/22 0901    losartan (COZAAR) tablet 100 mg  100 mg Oral Daily Magdalena Arzola MD   100 mg at 08/26/22 0901    pantoprazole (PROTONIX) tablet 40 mg  40 mg Oral QAM AC Magdalena Arzola MD   40 mg at 08/27/22 0645       PRN Medications  promethazine, sodium chloride flush, sodium chloride, ondansetron **OR** ondansetron, senna, acetaminophen **OR** acetaminophen, hydrALAZINE, melatonin    Objective  Most Recent Recorded Vitals  BP (!) 179/84   Pulse 76   Temp 97.9 °F (36.6 °C) (Oral)   Resp 16   Ht 6' (1.829 m)   Wt 173 lb 14.4 oz (78.9 kg)   SpO2 96%   BMI 23.59 kg/m²   I/O last 3 completed shifts: In: 370 [P.O.:360; I.V.:10]  Out: 525 [Urine:525]  No intake/output data recorded.     Physical Exam:  General: AAO to person/place/time/purpose, NAD, no labored breathing  Eyes: conjunctivae/corneas clear, sclera non icteric  Skin: color/texture/turgor normal, no rashes or lesions  Lungs: RA, CTAB, no retractions/use of accessory muscles, no vocal fremitus, no rhonchi, no crackle, no rales  Heart: regular rate, regular rhythm, no murmur  Abdomen: soft, NT, bowel sounds normal  Extremities: atraumatic, no edema  Neurologic: cranial nerves 2-12 grossly intact, no slurred speech    Most Recent Labs  Lab Results   Component Value Date    WBC 4.8 08/27/2022    HGB 13.0 08/27/2022    HCT 36.7 (L) 08/27/2022     08/27/2022     (L) 08/27/2022    K 3.1 (L) 08/27/2022    CL 91 (L) 08/27/2022    CREATININE 0.6 (L) 08/27/2022    BUN 4 (L) 08/27/2022    CO2 25 08/27/2022    GLUCOSE 91 08/27/2022    ALT 10 08/27/2022    AST 20 08/27/2022    INR 1.2 07/13/2022    TSH 1.700 03/29/2022    LABA1C 5.6 01/03/2022       CT Head WO Contrast   Final Result   1. There is no acute intracranial abnormality. Specifically, there is no   intracranial hemorrhage. 2. Atrophy and periventricular leukomalacia,         XR CHEST PORTABLE   Final Result   No acute process. 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 A]     Osteoarthritis of left knee [M17.12]     Aneurysm of right renal artery (HCC) [I72.2]          Plan  Hyponatremia   Hold HCTZ  Hold SSRI  Serum and urine osmolality   Urine lytes  Nephrology input appreciated    Mechanical fall   CT head negative for acute pathology   Fall precautions   PT OT   Of note there are changes of underlying dementia on the scan   PT AM-PAC-- pending  Follow labs   DVT prophylaxis  Please see orders for further management and care. Discharge plan: TBD    Electronically signed by DASHAWN Goetz CNP on 8/27/2022 at 7:32 AM    I can be reached through [facility].      Addendum: I have personally participated in a face-to-face history and physical exam on the date of service with the patient. I have discussed the case with the nurse practitioner in detail. I participated in medical decision making on the date of service and I agree with all of the pertinent clinical information unless indicated in my editing of the note. I have reviewed and edited the note above based on my findings during my history, exam, and decision making on the same day of service. The vitals, labs, microbiology, imaging and current medications/treatments were reviewed by myself in addition to with the nurse practitioner. I agree with the above documentation and treatment plan. Electronically signed by Marky Chua MD on 8/27/2022 at 6:58 PM    I can be reached through [address] [place] [address].

## 2022-08-27 NOTE — CONSULTS
[address] [address]                                  CONSULTATION    PATIENT NAME: Frank Kennedy                     :        1935  MED REC NO:   [id number]                            ROOM:       0602  ACCOUNT NO:   [de-identified]                           ADMIT DATE: 2022  PROVIDER:     Radha Doll MD    CONSULT DATE:  2022    REASON FOR CONSULTATION:  Hyponatremia. HISTORY OF PRESENT ILLNESS:  The patient is a pleasant 71-year-old male  I am being asked to see for hyponatremia. The patient presented to the ED with chief complaint of having nausea  and feeling tired. He reports he was having difficulty walking and having unsteadiness  while walking. He reports no new medication. He reports no vomiting. No diarrhea. He  does admit to some nausea. He reports one episode of diarrhea  yesterday. His past medical history includes hypertension, hyperlipidemia. He is on hydrochlorothiazide at home. He is also on Prozac. I saw him in his room. He is a pleasant 71-year-old gentleman. He  reports to me he feels better since being admitted. He has no edema. He has no JVD. His chest x-ray and his physical exam is benign. REVIEW OF SYSTEMS:  Twelve-point done and negative except for those  mentioned above. ALLERGIES:  No known allergies. PAST MEDICAL HISTORY:  Hypertension. PAST SURGICAL HISTORY:  Scoping. SOCIAL HISTORY:  No smoking. No drinking. FAMILY HISTORY:  Reviewed and noncontributory. MEDICATIONS AT HOME:  Include Prozac, hydrochlorothiazide, losartan. PHYSICAL EXAMINATION:  VITAL SIGNS:  Blood pressure 152/78, heart rate 66. HEAD:  Atraumatic and normocephalic. NECK:  Supple, symmetrical.  EYES:  Pupils are round and reactive to light bilaterally. HEART:  Normal rate. ABDOMEN:  Soft. Nontender. Bowel sounds are active. No organomegaly.   LUNGS: Good airflow bilaterally. SKIN:  Dry. PSYCHIATRIC:  Cooperative. NEUROLOGIC:  Cranial nerves are grossly intact, II through XII. BLOOD WORK:  Sodium 127. ASSESSMENT AND PLAN:  1. Hyponatremia, hypoosmolar, hypovolemic, exacerbated by hypokalemia  and by him being on hydrochlorothiazide. His urine electrolytes are  equivocal due to him being on hydrochlorothiazide. His urine osmolality  is 185, which is well below serum osmolality and is hinting towards the  point he is likely starting to correct his hyponatremia and this is  actually in line with his urine specific gravity, which is also showing  a diluted urine. 2.  He reports no history of alcoholism, which makes beer potomania  unlikely and he does appear well nourished and he has a serum albumin  above 3.5, which also makes _____ syndrome unlikely. He reports to me no extra fluid intake/no equivalent of psychogenic  polydipsia. For the time being, I would recommend:  1. Continue isotonic IV fluid in the form of normal saline with 40 mEq  potassium chloride at 90 mL an hour. 2.  Fluid restriction of 1500 mL _____ 500 mL an hour. 3.  Hold hydrochlorothiazide. 4.  Followup serial sodium and the goal for correction is no more than 6  to 8 mEq in the first 24 hours and no more than 12 to _____.         April Blank MD    D: 08/26/2022 14:13:00       T: 08/26/2022 16:10:48     [email or web address]  Job#: [id number]     Doc#: [id number]    CC:

## 2022-08-27 NOTE — PROGRESS NOTES
Summary (Last 24 hours) at 8/27/2022 1314  Last data filed at 8/26/2022 2205  Gross per 24 hour   Intake --   Output 175 ml   Net -175 ml         Wt Readings from Last 3 Encounters:   08/27/22 173 lb 14.4 oz (78.9 kg)   08/25/22 179 lb (81.2 kg)   08/18/22 179 lb (81.2 kg)       Constitutional:  in no acute distress  HEENT: NC/AT, EOMI, sclera and conjunctiva are clear and anicteric, mucus membranes moist  Neck: Trachea midline, no JVD  Cardiovascular: S1, S2 regular rhythm, no murmur,or rub  Respiratory:  No crackles, no wheeze  Gastrointestinal:  Soft, nontender, nondistended, NABS  Ext: no edema, feet warm  Skin: dry, no rash  Neuro: awake, alert, interactive      DATA:    Recent Labs     08/25/22  1335 08/26/22  0420 08/27/22  0536   WBC 4.1* 4.7 4.8   HGB 13.4 12.9 13.0   HCT 37.2 35.9* 36.7*   MCV 85.3 85.9 85.5    351 329     Recent Labs     08/25/22  2130 08/26/22  0420 08/27/22  0536   * 127* 127*   K 2.8* 2.8* 3.1*   CL 86* 87* 91*   CO2 27 28 25   MG 1.8 1.8 1.7   BUN 4* 5* 4*   CREATININE 0.6* 0.7 0.6*   ALT 11 10 10   AST 17 17 20   BILITOT 0.5 0.5 0.6   ALKPHOS 69 65 61       Lab Results   Component Value Date    LABPROT 0.2 08/25/2022    LABPROT 0.2 08/25/2022       Assessment  1. Hyponatremia, hypoosmolar, hypovolemic, exacerbated by hypokalemia and by him being on hydrochlorothiazide. His urine electrolytes are equivocal due to him being on hydrochlorothiazide. His urine osmolality is 185, which is well below serum osmolality and is hinting towards the point he is likely starting to correct his hyponatremia and this is actually in line with his urine specific gravity, which is also  showing a diluted urine. He reports no history of alcoholism, which makes beer potomania unlikely and he does appear well nourished and he has a serum  albumin above 3.5, which also makes tea and toast syndrome unlikely. He reports to me no extra fluid intake/no equivalent of psychogenic  polydipsia. [Na+] little improved since 8/25, stagnated since yesterday    Recommendations  1. Continue isotonic IV fluid in the form of normal saline with 40 mEq potassium chloride at 90 mL an hour. 1.5 supplement potassium  2. Recheck BMP. Recheck urine studies. Adjust therapy pending those results  3. Continue to Hold hydrochlorothiazide. 4.  Followup serial sodium and the goal for correction is no more than 6 to 8 mEq in the first 24 hours and no more than 12 to 18 mmol/L and 48 hours. 5.  Encourage oral intake, increase protein content of diet  6. Treat the nausea  7.   Follow labs    Electronically signed by Brooks Horton MD on 8/27/2022 at 1:14 PM

## 2022-08-28 LAB
ANION GAP SERPL CALCULATED.3IONS-SCNC: 10 MMOL/L (ref 7–16)
BASOPHILS ABSOLUTE: 0.02 E9/L (ref 0–0.2)
BASOPHILS RELATIVE PERCENT: 0.3 % (ref 0–2)
BUN BLDV-MCNC: 5 MG/DL (ref 6–23)
CALCIUM SERPL-MCNC: 8.8 MG/DL (ref 8.6–10.2)
CHLORIDE BLD-SCNC: 97 MMOL/L (ref 98–107)
CO2: 26 MMOL/L (ref 22–29)
CREAT SERPL-MCNC: 0.6 MG/DL (ref 0.7–1.2)
EOSINOPHILS ABSOLUTE: 0.01 E9/L (ref 0.05–0.5)
EOSINOPHILS RELATIVE PERCENT: 0.2 % (ref 0–6)
GFR AFRICAN AMERICAN: >60
GFR NON-AFRICAN AMERICAN: >60 ML/MIN/1.73
GLUCOSE BLD-MCNC: 95 MG/DL (ref 74–99)
HCT VFR BLD CALC: 36.4 % (ref 37–54)
HEMOGLOBIN: 12.8 G/DL (ref 12.5–16.5)
IMMATURE GRANULOCYTES #: 0.02 E9/L
IMMATURE GRANULOCYTES %: 0.3 % (ref 0–5)
LYMPHOCYTES ABSOLUTE: 1.14 E9/L (ref 1.5–4)
LYMPHOCYTES RELATIVE PERCENT: 18.8 % (ref 20–42)
MAGNESIUM: 1.6 MG/DL (ref 1.6–2.6)
MCH RBC QN AUTO: 30.8 PG (ref 26–35)
MCHC RBC AUTO-ENTMCNC: 35.2 % (ref 32–34.5)
MCV RBC AUTO: 87.5 FL (ref 80–99.9)
MONOCYTES ABSOLUTE: 0.64 E9/L (ref 0.1–0.95)
MONOCYTES RELATIVE PERCENT: 10.6 % (ref 2–12)
NEUTROPHILS ABSOLUTE: 4.22 E9/L (ref 1.8–7.3)
NEUTROPHILS RELATIVE PERCENT: 69.8 % (ref 43–80)
PDW BLD-RTO: 12.6 FL (ref 11.5–15)
PHOSPHORUS: 2.5 MG/DL (ref 2.5–4.5)
PLATELET # BLD: 337 E9/L (ref 130–450)
PMV BLD AUTO: 8.7 FL (ref 7–12)
POTASSIUM SERPL-SCNC: 3.4 MMOL/L (ref 3.5–5)
RBC # BLD: 4.16 E12/L (ref 3.8–5.8)
SODIUM BLD-SCNC: 133 MMOL/L (ref 132–146)
WBC # BLD: 6.1 E9/L (ref 4.5–11.5)

## 2022-08-28 PROCEDURE — 83735 ASSAY OF MAGNESIUM: CPT

## 2022-08-28 PROCEDURE — 36415 COLL VENOUS BLD VENIPUNCTURE: CPT

## 2022-08-28 PROCEDURE — 6370000000 HC RX 637 (ALT 250 FOR IP): Performed by: STUDENT IN AN ORGANIZED HEALTH CARE EDUCATION/TRAINING PROGRAM

## 2022-08-28 PROCEDURE — 84100 ASSAY OF PHOSPHORUS: CPT

## 2022-08-28 PROCEDURE — 6370000000 HC RX 637 (ALT 250 FOR IP): Performed by: INTERNAL MEDICINE

## 2022-08-28 PROCEDURE — 2060000000 HC ICU INTERMEDIATE R&B

## 2022-08-28 PROCEDURE — 6360000002 HC RX W HCPCS: Performed by: STUDENT IN AN ORGANIZED HEALTH CARE EDUCATION/TRAINING PROGRAM

## 2022-08-28 PROCEDURE — 80048 BASIC METABOLIC PNL TOTAL CA: CPT

## 2022-08-28 PROCEDURE — 85025 COMPLETE CBC W/AUTO DIFF WBC: CPT

## 2022-08-28 RX ORDER — POTASSIUM CHLORIDE 20 MEQ/1
40 TABLET, EXTENDED RELEASE ORAL ONCE
Status: COMPLETED | OUTPATIENT
Start: 2022-08-28 | End: 2022-08-28

## 2022-08-28 RX ORDER — ONDANSETRON 4 MG/1
4 TABLET, ORALLY DISINTEGRATING ORAL EVERY 8 HOURS PRN
Qty: 60 TABLET | Refills: 0 | Status: SHIPPED | OUTPATIENT
Start: 2022-08-28

## 2022-08-28 RX ADMIN — ASPIRIN 81 MG: 81 TABLET, COATED ORAL at 09:15

## 2022-08-28 RX ADMIN — Medication 3 MG: at 22:40

## 2022-08-28 RX ADMIN — ENOXAPARIN SODIUM 40 MG: 100 INJECTION SUBCUTANEOUS at 20:22

## 2022-08-28 RX ADMIN — POTASSIUM CHLORIDE 40 MEQ: 1500 TABLET, EXTENDED RELEASE ORAL at 11:07

## 2022-08-28 RX ADMIN — PANTOPRAZOLE SODIUM 40 MG: 40 TABLET, DELAYED RELEASE ORAL at 05:46

## 2022-08-28 RX ADMIN — LOSARTAN POTASSIUM 100 MG: 50 TABLET, FILM COATED ORAL at 09:15

## 2022-08-28 RX ADMIN — AMLODIPINE BESYLATE 5 MG: 5 TABLET ORAL at 09:15

## 2022-08-28 ASSESSMENT — PAIN SCALES - WONG BAKER
WONGBAKER_NUMERICALRESPONSE: 0
WONGBAKER_NUMERICALRESPONSE: 0

## 2022-08-28 ASSESSMENT — PAIN SCALES - GENERAL
PAINLEVEL_OUTOF10: 0
PAINLEVEL_OUTOF10: 0

## 2022-08-28 NOTE — PROGRESS NOTES
Internal Medicine Progress Note    Patient's name: Marva CuencaB: 1935  Chief complaints (on day of admission): Nausea, Headache (Onset couple days ago), and Other (Difficulty walking, onset one week ago)  Admission date: 2022  Date of service: 2022   Room: [facility]  Primary care physician: Opal Sanches DO  Reason for visit: Follow-up for hyponatremia    Subjective  Barbara Service was seen and examined     Doing quite well   He is moving his bowels, eating and drinking without issue  He has no complaints at this time   Waiting for PT OT   He wants to be placed in a [facility]   Discussed with nursing staff     Review of Systems  There are no new complaints of chest pain, shortness of breath, abdominal pain, vomiting, diarrhea, constipation.     Hospital Medications  Current Facility-Administered Medications   Medication Dose Route Frequency Provider Last Rate Last Admin    0.9% NaCl with KCl 40 mEq infusion   IntraVENous Continuous Bekah Conde MD 80 mL/hr at 22 1840 New Bag at 22 184    promethazine (PHENERGAN) injection 6.25 mg  6.25 mg IntraMUSCular Q6H PRN Andrew Pineda MD   6.25 mg at 22 2103    sodium chloride flush 0.9 % injection 10 mL  10 mL IntraVENous 2 times per day Andrew Pineda MD   10 mL at 22 0901    sodium chloride flush 0.9 % injection 10 mL  10 mL IntraVENous PRN Andrew Pineda MD        0.9 % sodium chloride infusion   IntraVENous PRN Andrew Pineda MD        enoxaparin (LOVENOX) injection 40 mg  40 mg SubCUTAneous Daily Andrew Pineda MD   40 mg at 22 183    ondansetron (ZOFRAN-ODT) disintegrating tablet 4 mg  4 mg Oral Q8H PRN Andrew Pineda MD        Or    ondansetron [facility]) injection 4 mg  4 mg IntraVENous Q6H PRN Andrew Pineda MD        senna (SENOKOT) tablet 8.6 mg  1 tablet Oral Daily PRN Andrew Pineda MD        acetaminophen (TYLENOL) tablet 650 mg  650 mg Oral Q6H PRN Andrew Pineda MD   650 mg at 22 1408    Or    acetaminophen (TYLENOL) suppository 650 mg  650 mg Rectal Q6H PRN Stephania Duque MD        hydrALAZINE (APRESOLINE) injection 10 mg  10 mg IntraVENous Q4H PRN Stephania Duque MD        melatonin tablet 3 mg  3 mg Oral Nightly PRN Stephania Duque MD   3 mg at 08/27/22 2341    amLODIPine (NORVASC) tablet 5 mg  5 mg Oral Daily Stephania Duque MD   5 mg at 08/28/22 0915    aspirin EC tablet 81 mg  81 mg Oral Daily Stephania Duque MD   81 mg at 08/28/22 0915    losartan (COZAAR) tablet 100 mg  100 mg Oral Daily Stephania Duque MD   100 mg at 08/28/22 0915    pantoprazole (PROTONIX) tablet 40 mg  40 mg Oral QAM AC Stephania Duque MD   40 mg at 08/28/22 0546       PRN Medications  promethazine, sodium chloride flush, sodium chloride, ondansetron **OR** ondansetron, senna, acetaminophen **OR** acetaminophen, hydrALAZINE, melatonin    Objective  Most Recent Recorded Vitals  BP (!) 153/75   Pulse 66   Temp 98.2 °F (36.8 °C) (Oral)   Resp 16   Ht 6' (1.829 m)   Wt 173 lb 14.4 oz (78.9 kg)   SpO2 97%   BMI 23.59 kg/m²   I/O last 3 completed shifts: In: 480 [P.O.:480]  Out: 2000 [Urine:2000]  No intake/output data recorded.     Physical Exam:  General: AAO to person/place/time/purpose, NAD, no labored breathing  Eyes: conjunctivae/corneas clear, sclera non icteric  Skin: color/texture/turgor normal, no rashes or lesions  Lungs: RA, CTAB, no retractions/use of accessory muscles, no vocal fremitus, no rhonchi, no crackle, no rales  Heart: regular rate, regular rhythm, no murmur  Abdomen: soft, NT, bowel sounds normal  Extremities: atraumatic, no edema  Neurologic: cranial nerves 2-12 grossly intact, no slurred speech    Most Recent Labs  Lab Results   Component Value Date    WBC 6.1 08/28/2022    HGB 12.8 08/28/2022    HCT 36.4 (L) 08/28/2022     08/28/2022     08/28/2022    K 3.4 (L) 08/28/2022    CL 97 (L) 08/28/2022    CREATININE 0.6 (L) 08/28/2022    BUN 5 (L) 08/28/2022    CO2 26 08/28/2022    GLUCOSE 95 08/28/2022    ALT 10 08/27/2022    AST 20 08/27/2022    INR 1.2 07/13/2022    TSH 1.700 03/29/2022    LABA1C 5.6 01/03/2022       CT Head WO Contrast   Final Result   1. There is no acute intracranial abnormality. Specifically, there is no   intracranial hemorrhage. 2. Atrophy and periventricular leukomalacia,         XR CHEST PORTABLE   Final Result   No acute process. 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 Discharge plan: Plan is [facility] per patients wishes, PT OT to see today, CM SW to discuss tomorrow     Electronically signed by Heavenly Grimes MD on 8/28/2022 at 9:27 AM    I can be reached through [facility].

## 2022-08-28 NOTE — PROGRESS NOTES
Upon hearing that he was discharged, patient stated he was surprised as he was interested in going to rehab. Son was also present. They are friends with the owners of [address] [place] [address]. They would prefer to go to [facility] as it is closet for family to visit, but if no bed is available there, they will go to any [facility].     Electronically signed by Eliana Koroma RN on 8/28/2022 at 2:18 PM

## 2022-08-28 NOTE — PROGRESS NOTES
[facility] MD Cricket Christianson, MD Genevieve Everett, CNP   Betty Love, JR  Progress Note    8/28/2022    SUBJECTIVE:   8/27: Ongoing nausea, anorexia, generally poor intake, though has improved since yesterday. Finally had a bowel movement this morning.   (-) sob/kaba/cp/palp ROS otherwise unremarkable    8/28: Resting comfortably. No new complaint or issue. PROBLEM LIST:    Principal Problem:    Hyponatremia  Active Problems:    Insomnia d/t depression    Status post left knee replacement    Gait disturbance    Essential hypertension    Aneurysm of right renal artery (HCC)    Osteoarthritis of left knee    Depression    Chronic gastritis without bleeding    Decreased appetite    Anxiety    Weakness    Non-seasonal allergic rhinitis    Superior mesenteric artery stenosis (HCC)    Chronic intractable headache    Seasonal allergic rhinitis due to pollen    BPH associated with nocturia    Abnormal liver enzymes    Hypokalemia    Constipation  Resolved Problems:    * No resolved hospital problems. *         DIET:    ADULT DIET;  Regular     MEDS (scheduled):    sodium chloride flush  10 mL IntraVENous 2 times per day    enoxaparin  40 mg SubCUTAneous Daily    amLODIPine  5 mg Oral Daily    aspirin  81 mg Oral Daily    losartan  100 mg Oral Daily    pantoprazole  40 mg Oral QAM AC       MEDS (infusions):   0.9% NaCl with KCl 40 mEq 80 mL/hr at 08/27/22 1840    sodium chloride         MEDS (prn):  promethazine, sodium chloride flush, sodium chloride, ondansetron **OR** ondansetron, senna, acetaminophen **OR** acetaminophen, hydrALAZINE, melatonin    PHYSICAL EXAM:     Patient Vitals for the past 24 hrs:   BP Temp Temp src Pulse Resp SpO2   08/28/22 1430 (!) 168/74 98 °F (36.7 °C) Oral 69 16 97 %   08/28/22 0900 (!) 153/75 98.2 °F (36.8 °C) Oral 66 16 97 %   08/27/22 1952 (!) 162/86 97.9 °F (36.6 °C) Oral 71 16 97 %   @      Intake/Output Summary (Last 24 hours) at 8/28/2022 1645  Last data filed at 8/28/2022 1421  Gross per 24 hour   Intake 2015.83 ml   Output 1725 ml   Net 290.83 ml         Wt Readings from Last 3 Encounters:   08/27/22 173 lb 14.4 oz (78.9 kg)   08/25/22 179 lb (81.2 kg)   08/18/22 179 lb (81.2 kg)       Constitutional:  in no acute distress  HEENT: NC/AT, EOMI, sclera and conjunctiva are clear and anicteric, mucus membranes moist  Neck: Trachea midline, no JVD  Cardiovascular: S1, S2 regular rhythm, no murmur,or rub  Respiratory:  No crackles, no wheeze  Gastrointestinal:  Soft, nontender, nondistended, NABS  Ext: no edema, feet warm  Skin: dry, no rash  Neuro: awake, alert, interactive      DATA:    Recent Labs     08/26/22  0420 08/27/22  0536 08/28/22  0545   WBC 4.7 4.8 6.1   HGB 12.9 13.0 12.8   HCT 35.9* 36.7* 36.4*   MCV 85.9 85.5 87.5    329 337     Recent Labs     08/25/22  2130 08/26/22  0420 08/27/22  0536 08/27/22  1400 08/28/22  0545   * 127* 127* 129* 133   K 2.8* 2.8* 3.1* 3.9 3.4*   CL 86* 87* 91* 92* 97*   CO2 27 28 25 26 26   MG 1.8 1.8 1.7  --  1.6   PHOS  --   --   --   --  2.5   BUN 4* 5* 4* 6 5*   CREATININE 0.6* 0.7 0.6* 0.7 0.6*   ALT 11 10 10  --   --    AST 17 17 20  --   --    BILITOT 0.5 0.5 0.6  --   --    ALKPHOS 69 65 61  --   --        Lab Results   Component Value Date    LABPROT 0.2 08/25/2022    LABPROT 0.2 08/25/2022       Assessment  1. Hyponatremia, hypoosmolar, hypovolemic, exacerbated by hypokalemia and by him being on hydrochlorothiazide. His urine electrolytes are equivocal due to him being on hydrochlorothiazide. His urine osmolality is 185, which is well below serum osmolality and is hinting towards the point he is likely starting to correct his hyponatremia and this is actually in line with his urine specific gravity, which is also  showing a diluted urine.   He reports no history of alcoholism, which makes beer potomania unlikely and he does appear well nourished and he has a serum albumin above 3.5, which also makes tea and toast syndrome unlikely. He reports to me no extra fluid intake/no equivalent of psychogenic  polydipsia. [Na+] improving    Recommendations  1. Continue IV fluid   2. Supplement potassium again  3. Continue to Hold hydrochlorothiazide. 4.  Followup serial sodium and the goal for correction is no more than 6 to 8 mEq in the first 24 hours and no more than 12 to 18 mmol/L and 48 hours. 5.  Encourage oral intake, increase protein content of diet  6. Treat the nausea  7.   Follow labs    Electronically signed by Emerson Hillman MD on 8/28/2022

## 2022-08-29 VITALS
DIASTOLIC BLOOD PRESSURE: 74 MMHG | RESPIRATION RATE: 16 BRPM | OXYGEN SATURATION: 96 % | HEART RATE: 86 BPM | TEMPERATURE: 97.8 F | BODY MASS INDEX: 23.93 KG/M2 | WEIGHT: 176.7 LBS | HEIGHT: 72 IN | SYSTOLIC BLOOD PRESSURE: 143 MMHG

## 2022-08-29 LAB
ALBUMIN SERPL-MCNC: 3.5 G/DL (ref 3.5–5.2)
ALP BLD-CCNC: 63 U/L (ref 40–129)
ALT SERPL-CCNC: 13 U/L (ref 0–40)
ANION GAP SERPL CALCULATED.3IONS-SCNC: 9 MMOL/L (ref 7–16)
AST SERPL-CCNC: 18 U/L (ref 0–39)
BILIRUB SERPL-MCNC: 0.6 MG/DL (ref 0–1.2)
BUN BLDV-MCNC: 5 MG/DL (ref 6–23)
CALCIUM SERPL-MCNC: 9 MG/DL (ref 8.6–10.2)
CHLORIDE BLD-SCNC: 96 MMOL/L (ref 98–107)
CO2: 25 MMOL/L (ref 22–29)
CREAT SERPL-MCNC: 0.6 MG/DL (ref 0.7–1.2)
GFR AFRICAN AMERICAN: >60
GFR NON-AFRICAN AMERICAN: >60 ML/MIN/1.73
GLUCOSE BLD-MCNC: 96 MG/DL (ref 74–99)
HCT VFR BLD CALC: 37 % (ref 37–54)
HEMOGLOBIN: 12.8 G/DL (ref 12.5–16.5)
MAGNESIUM: 1.5 MG/DL (ref 1.6–2.6)
MCH RBC QN AUTO: 30.5 PG (ref 26–35)
MCHC RBC AUTO-ENTMCNC: 34.6 % (ref 32–34.5)
MCV RBC AUTO: 88.1 FL (ref 80–99.9)
PDW BLD-RTO: 12.7 FL (ref 11.5–15)
PHOSPHORUS: 2.5 MG/DL (ref 2.5–4.5)
PLATELET # BLD: 323 E9/L (ref 130–450)
PMV BLD AUTO: 8.7 FL (ref 7–12)
POTASSIUM SERPL-SCNC: 3.7 MMOL/L (ref 3.5–5)
RBC # BLD: 4.2 E12/L (ref 3.8–5.8)
SARS-COV-2, NAAT: NOT DETECTED
SODIUM BLD-SCNC: 130 MMOL/L (ref 132–146)
TOTAL PROTEIN: 6.2 G/DL (ref 6.4–8.3)
WBC # BLD: 7.5 E9/L (ref 4.5–11.5)

## 2022-08-29 PROCEDURE — 97161 PT EVAL LOW COMPLEX 20 MIN: CPT

## 2022-08-29 PROCEDURE — 2580000003 HC RX 258: Performed by: STUDENT IN AN ORGANIZED HEALTH CARE EDUCATION/TRAINING PROGRAM

## 2022-08-29 PROCEDURE — 97530 THERAPEUTIC ACTIVITIES: CPT

## 2022-08-29 PROCEDURE — 6360000002 HC RX W HCPCS: Performed by: INTERNAL MEDICINE

## 2022-08-29 PROCEDURE — 84100 ASSAY OF PHOSPHORUS: CPT

## 2022-08-29 PROCEDURE — 87635 SARS-COV-2 COVID-19 AMP PRB: CPT

## 2022-08-29 PROCEDURE — 80053 COMPREHEN METABOLIC PANEL: CPT

## 2022-08-29 PROCEDURE — 85027 COMPLETE CBC AUTOMATED: CPT

## 2022-08-29 PROCEDURE — 36415 COLL VENOUS BLD VENIPUNCTURE: CPT

## 2022-08-29 PROCEDURE — 6370000000 HC RX 637 (ALT 250 FOR IP): Performed by: INTERNAL MEDICINE

## 2022-08-29 PROCEDURE — 6370000000 HC RX 637 (ALT 250 FOR IP): Performed by: STUDENT IN AN ORGANIZED HEALTH CARE EDUCATION/TRAINING PROGRAM

## 2022-08-29 PROCEDURE — 83735 ASSAY OF MAGNESIUM: CPT

## 2022-08-29 RX ORDER — MAGNESIUM SULFATE IN WATER 40 MG/ML
2000 INJECTION, SOLUTION INTRAVENOUS ONCE
Status: COMPLETED | OUTPATIENT
Start: 2022-08-29 | End: 2022-08-29

## 2022-08-29 RX ORDER — POTASSIUM CHLORIDE 20 MEQ/1
40 TABLET, EXTENDED RELEASE ORAL ONCE
Status: COMPLETED | OUTPATIENT
Start: 2022-08-29 | End: 2022-08-29

## 2022-08-29 RX ADMIN — ACETAMINOPHEN 650 MG: 325 TABLET ORAL at 11:58

## 2022-08-29 RX ADMIN — AMLODIPINE BESYLATE 5 MG: 5 TABLET ORAL at 09:19

## 2022-08-29 RX ADMIN — SODIUM CHLORIDE, PRESERVATIVE FREE 10 ML: 5 INJECTION INTRAVENOUS at 09:20

## 2022-08-29 RX ADMIN — LOSARTAN POTASSIUM 100 MG: 50 TABLET, FILM COATED ORAL at 09:19

## 2022-08-29 RX ADMIN — MAGNESIUM SULFATE HEPTAHYDRATE 2000 MG: 40 INJECTION, SOLUTION INTRAVENOUS at 10:54

## 2022-08-29 RX ADMIN — POTASSIUM CHLORIDE 40 MEQ: 1500 TABLET, EXTENDED RELEASE ORAL at 09:19

## 2022-08-29 RX ADMIN — PANTOPRAZOLE SODIUM 40 MG: 40 TABLET, DELAYED RELEASE ORAL at 05:17

## 2022-08-29 RX ADMIN — ASPIRIN 81 MG: 81 TABLET, COATED ORAL at 09:19

## 2022-08-29 ASSESSMENT — PAIN DESCRIPTION - DESCRIPTORS: DESCRIPTORS: ACHING

## 2022-08-29 ASSESSMENT — PAIN SCALES - GENERAL
PAINLEVEL_OUTOF10: 7
PAINLEVEL_OUTOF10: 0

## 2022-08-29 ASSESSMENT — PAIN DESCRIPTION - ORIENTATION: ORIENTATION: ANTERIOR

## 2022-08-29 ASSESSMENT — PAIN SCALES - WONG BAKER: WONGBAKER_NUMERICALRESPONSE: 0

## 2022-08-29 ASSESSMENT — PAIN DESCRIPTION - LOCATION: LOCATION: HEAD

## 2022-08-29 ASSESSMENT — PAIN - FUNCTIONAL ASSESSMENT: PAIN_FUNCTIONAL_ASSESSMENT: ACTIVITIES ARE NOT PREVENTED

## 2022-08-29 NOTE — CARE COORDINATION
Social work / Discharge planning:        Social work met with patient. He requested a referral to [facility] and states his family already called facility. Referral made and patient accepted. No precert needed. [place], [address] and ambulette form completed. Facility arranged for ambulette transport at 3:30pm via [facility].   Needs negative covid result. Electronically signed by MAURICIO Rockwell on 8/29/2022 at 10:05 AM           Message left for daughter to return call for update regarding discharge. RN updated on  time. Awaiting PT evaluation.    Electronically signed by MAURICIO Rockwell on 8/29/2022 at 10:10 AM

## 2022-08-29 NOTE — PROGRESS NOTES
Call placed to [facility]. Spoke with Aung Medley. No RN available for nurse to nurse. Discharge paperwork faxed to [phone].     Electronically signed by Ledy Jeffery RN on 8/29/2022 at 2:30 PM

## 2022-08-29 NOTE — PROGRESS NOTES
Rectal Q6H PRN Araceli Cardoza MD        hydrALAZINE (APRESOLINE) injection 10 mg  10 mg IntraVENous Q4H PRN Araceli Cardoza MD        melatonin tablet 3 mg  3 mg Oral Nightly PRN Araceli Cardoza MD   3 mg at 08/28/22 2240    amLODIPine (NORVASC) tablet 5 mg  5 mg Oral Daily Araceli Cardoza MD   5 mg at 08/28/22 0915    aspirin EC tablet 81 mg  81 mg Oral Daily Araceli Cardoza MD   81 mg at 08/28/22 0915    losartan (COZAAR) tablet 100 mg  100 mg Oral Daily Araceli Cardoza MD   100 mg at 08/28/22 0915    pantoprazole (PROTONIX) tablet 40 mg  40 mg Oral QAM AC Araceli Cardoza MD   40 mg at 08/29/22 0517       PRN Medications  promethazine, sodium chloride flush, sodium chloride, ondansetron **OR** ondansetron, senna, acetaminophen **OR** acetaminophen, hydrALAZINE, melatonin    Objective  Most Recent Recorded Vitals  BP (!) 175/88   Pulse 71   Temp 98.5 °F (36.9 °C) (Oral)   Resp 16   Ht 6' (1.829 m)   Wt 176 lb 11.2 oz (80.2 kg)   SpO2 98%   BMI 23.96 kg/m²   I/O last 3 completed shifts:   In: 2495.8 [P.O.:480; I.V.:2015.8]  Out: 2225 [Urine:2225]  I/O this shift:  In: -   Out: 450 [Urine:450]    Physical Exam:  General: AAO to person/place/time/purpose, NAD, no labored breathing  Eyes: conjunctivae/corneas clear, sclera non icteric  Skin: color/texture/turgor normal, no rashes or lesions  Lungs: RA, CTAB, no retractions/use of accessory muscles, no vocal fremitus, no rhonchi, no crackle, no rales  Heart: regular rate, regular rhythm, no murmur  Abdomen: soft, NT, bowel sounds normal  Extremities: atraumatic, no edema  Neurologic: cranial nerves 2-12 grossly intact, no slurred speech    Most Recent Labs  Lab Results   Component Value Date    WBC 7.5 08/29/2022    HGB 12.8 08/29/2022    HCT 37.0 08/29/2022     08/29/2022     (L) 08/29/2022    K 3.7 08/29/2022    CL 96 (L) 08/29/2022    CREATININE 0.6 (L) 08/29/2022    BUN 5 (L) 08/29/2022    CO2 25 08/29/2022    GLUCOSE 96 08/29/2022    ALT 13 08/29/2022    AST 18 08/29/2022    INR 1.2 07/13/2022    TSH 1.700 03/29/2022    LABA1C 5.6 01/03/2022       CT Head WO Contrast   Final Result   1. There is no acute intracranial abnormality. Specifically, there is no   intracranial hemorrhage. 2. Atrophy and periventricular leukomalacia,         XR CHEST PORTABLE   Final Result   No acute process. 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 Discharge plan: [facility] soon if ok with renal    Electronically signed by DASHAWN Mcleod CNP on 8/29/2022 at 8:21 AM    I can be reached through [employer]. Addendum: I have personally participated in a face-to-face history and physical exam on the date of service with the patient. I have discussed the case with the nurse practitioner.  I also participated in medical decision making on the date of service and I agree with all of the pertinent clinical information unless indicated in my editing of the note. I have reviewed and edited the note above based on my findings during my history, exam, and decision making on the same day of service. My additional thoughts:   Resting comfortably in bed  No complaints today  Replace magnesium  Continue to hold hydrochlorothiazide and SSRI  Okay for discharge to SNF if okay with nephrology    Electronically signed by Shweta Jimenez DO on 8/29/2022 at 10:01 AM    I can be reached through [facility].

## 2022-08-29 NOTE — DISCHARGE INSTR - COC
Continuity of Care Form    Patient Name: Wen Ocampo   :  1935  MRN:  [id number]    Admit date:  2022  Discharge date:  2022    Code Status Order: Full Code   Advance Directives:     Admitting Physician:  Tavia Banks MD  PCP: Alber Adler DO    Discharging Nurse: Maggie Schrader 23 Unit/Room#: 0612/0612-A  Discharging Unit Phone Number: [phone]    Emergency Contact:   Extended Emergency Contact Information  Primary Emergency Contact: Paola Duarte [address] Phone: [phone]  Work Phone: [phone]  Mobile Phone: [phone]  Relation: Child   needed?  No    Past Surgical History:  Past Surgical History:   Procedure Laterality Date    ANTERIOR CRUCIATE LIGAMENT REPAIR Left 2001    APPENDECTOMY      CHOLECYSTECTOMY      Lap    ECHO COMPL W DOP COLOR FLOW  2012         HERNIA REPAIR Right 2002    double    SINUS SURGERY  ,     done x 2    TONSILLECTOMY      TOTAL KNEE ARTHROPLASTY Left 2019    LEFT  ROBOTIC KNEE TOTAL ARTHROPLASTY  ++MEREDITH- AQQMIFQU++   ++ADDUCTOR BLOCK++ performed by Johan Jackson MD at [facility] N/A 10/10/2019    EGD BIOPSY performed by Jose F Schulz MD at [address] [place] [address] History:   Immunization History   Administered Date(s) Administered    COVID-19, MODERNA BLUE border, Primary or Immunocompromised, (age 12y+), IM, 100 mcg/0.5mL 2021, 2021, 10/26/2021    Influenza Virus Vaccine 10/26/2006, 10/15/2007, 10/06/2008, 2010, 2011, 2013, 2015, 10/07/2016, 10/04/2017, 2018    Influenza, FLUAD, (age 72 y+), Adjuvanted 10/01/2020, 10/07/2021    Influenza, Triv, inactivated, subunit, adjuvanted, IM (Fluad 65 yrs and older) 10/15/2019    Pneumococcal Conjugate 13-valent ([id number]) 10/15/2019    Pneumococcal Polysaccharide ([id number]) 12/15/2005, 2005, 2010, 2019 Tdap (Boostrix, Adacel) 11/09/2021    Zoster Recombinant (Shingrix) 01/28/2020, 08/15/2020       Active Problems:  Patient Active Problem List   Diagnosis Code    Nausea R11.0    Major depressive disorder F32.9    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    Chronic intractable headache R51.9, G89.29    Seasonal allergic rhinitis due to pollen J30.1    Chronic sinusitis J32.9    BPH associated with nocturia N40.1, R35.1    Abnormal liver enzymes R74.8    H/O: CVA (cerebrovascular accident) Z86.73    Myalgia M79.10    Hypokalemia E87.6    Constipation K59.00    Acute recurrent frontal sinusitis J01.11    Congestion of nasal sinus R09.81    Chronic sinus complaints R09.89    Gait disturbance R26.9    Hyponatremia E87.1       Isolation/Infection:   Isolation            No Isolation          Patient Infection Status       Infection Onset Added Last Indicated Last Indicated By Review Planned Expiration Resolved Resolved By    None active    Resolved    COVID-19 (Rule Out) 08/25/22 08/25/22 08/25/22 COVID-19, Rapid (Ordered)   08/25/22 Rule-Out Test Resulted    COVID-19 (Rule Out) 11/23/21 11/23/21 11/22/21 COVID-19 Ambulatory (Ordered)   11/24/21 Rule-Out Test Resulted    COVID-19 (Rule Out) 09/02/20 09/02/20 09/02/20 COVID-19 Ambulatory (Ordered)   09/03/20 Rule-Out Test Resulted            Nurse Assessment:  Last Vital Signs: BP (!) 143/74   Pulse 86   Temp 97.8 °F (36.6 °C) (Oral)   Resp 16   Ht 6' (1.829 m)   Wt 176 lb 11.2 oz (80.2 kg) SpO2 96%   BMI 23.96 kg/m²     Last documented pain score (0-10 scale): Pain Level: 0  Last Weight:   Wt Readings from Last 1 Encounters:   08/29/22 176 lb 11.2 oz (80.2 kg)     Mental Status:  oriented and alert    IV Access:  - None    Nursing Mobility/ADLs:  Walking   Assisted  Transfer  Assisted  Bathing  Assisted  Dressing  Assisted  Toileting  Assisted  Feeding  Independent  Med Admin  Assisted  Med Delivery   whole    Wound Care Documentation and Therapy:  Wound 08/25/22 Elbow Left;Posterior abrasion (Active)   Dressing/Treatment Dry dressing 08/28/22 1943   Wound Length (cm) 0.25 cm 08/25/22 2015   Wound Width (cm) 0.25 cm 08/25/22 2015   Wound Surface Area (cm^2) 0.0625 cm^2 08/25/22 2015   Number of days: 4        Elimination:  Continence: Bowel: Yes  Bladder: Yes  Urinary Catheter: None   Colostomy/Ileostomy/Ileal Conduit: No       Date of Last BM: 08/29/2022    Intake/Output Summary (Last 24 hours) at 8/29/2022 1000  Last data filed at 8/29/2022 0036  Gross per 24 hour   Intake 2015.83 ml   Output 850 ml   Net 1165.83 ml     I/O last 3 completed shifts: In: 2015.8 [I.V.:2015.8]  Out: 4288 [Urine:2275]    Safety Concerns:     History of Falls (last 30 days) and At Risk for Falls    Impairments/Disabilities:      None    Nutrition Therapy:  Current Nutrition Therapy:   - Oral Diet:  General    Routes of Feeding: Oral  Liquids: Thin Liquids  Daily Fluid Restriction: no  Last Modified Barium Swallow with Video (Video Swallowing Test): not done    Treatments at the Time of Hospital Discharge:   Respiratory Treatments: none  Oxygen Therapy:  is not on home oxygen therapy.   Ventilator:    - No ventilator support    Rehab Therapies: Physical Therapy and Occupational Therapy  Weight Bearing Status/Restrictions: No weight bearing restrictions  Other Medical Equipment (for information only, NOT a DME order):  walker  Other Treatments: none    Patient's personal belongings (please select all that are sent with patient):  Phone/    RN SIGNATURE:  Electronically signed by Kathleen Alatorre RN on 8/29/22 at 2:20 PM EDT    CASE MANAGEMENT/SOCIAL WORK SECTION    Inpatient Status Date: 08/25/2022      Readmission Risk Assessment Score:  Readmission Risk              Risk of Unplanned Readmission:  19           Discharging to Facility/ Agency   Name: [facility]  [address] [place] [address] [address]    Dialysis Facility (if applicable)   Name:  Address:  Dialysis Schedule:  Phone:  Fax:    / signature: Electronically signed by MAURICIO Dolan on 8/29/22 at 10:06 AM EDT    PHYSICIAN SECTION    Prognosis: Fair    Condition at Discharge: Stable    Rehab Potential (if transferring to Rehab): Fair    Recommended Labs or Other Treatments After Discharge: none    Physician Certification: I certify the above information and transfer of Perri Patient  is necessary for the continuing treatment of the diagnosis listed and that he requires [place] for less 30 days.      Update Admission H&P: No change in H&P    PHYSICIAN SIGNATURE:  Electronically signed by Saadia Juarez DO on 8/29/22 at 10:01 AM EDT

## 2022-08-29 NOTE — PROGRESS NOTES
Physical Therapy  Facility/Department: [facility]  Physical Therapy Initial Assessment    Name: Namrata Mariee  : 1935  MRN: [id number]  Date of Service: 2022          Patient Diagnosis(es): The primary encounter diagnosis was Hypomagnesemia. Diagnoses of Hyponatremia, Hypokalemia, and Ambulatory dysfunction were also pertinent to this visit. Past Medical History:  has a past medical history of Aneurysm of right renal artery ([facility] Colitis, Depression, GERD (gastroesophageal reflux disease), H/O: CVA (cerebrovascular accident), Hyperlipidemia, Hypertension, Stroke [facility]), Superior mesenteric artery stenosis ([facility] [address] and TIA (transient ischemic attack). Past Surgical History:  has a past surgical history that includes Appendectomy; Tonsillectomy; ECHO Compl W Dop Color Flow (2012); sinus surgery (4526,8832); Anterior cruciate ligament repair (Left, 2001); Cholecystectomy (); hernia repair (Right, 2002); Total knee arthroplasty (Left, 2019); and Upper gastrointestinal endoscopy (N/A, 10/10/2019). Requires PT Follow-Up: Yes     Evaluating Therapist: Luana Gonzales, PT     Referring Provider:   Dr. Jennifer Atkinson     PT order : PT eval and treat     Room #:  175   DIAGNOSIS:  hypokalemia, ambulatory dysfunction   Additional Pertinent History: falls   PRECAUTIONS:  falls     Social:  Pt lives alone  in a  1  floor plan  3  steps and  B  rails to enter. Prior to admission pt walked with  no AD. Has ww, cane  - uses as needed . Laundry in basement, 2 HRs on steps      Initial Evaluation  Date:  2022  Treatment      Short Term/ Long Term   Goals   Was pt agreeable to Eval/treatment? Yes      Does pt have pain?   Chronic L knee      Bed Mobility  Rolling:  NT   Supine to sit:  NT   Sit to supine:  NT   Scooting:  independent in sit  Pt sitting in chair upon arrival    Independent    Transfers Sit to stand:  CGA  Stand to sit:  SBA   Stand pivot: NT    Independent    Ambulation 280  feet with ww with SBA. 10 feet x1 with no AD, CGA    300  feet with AAD  with  independent        Stair negotiation: ascended and descended NT    4  steps with  1  rail with  S/I    LE ROM  WFL     LE strength  4- to 4/ 5   4+/ 5    AM- PAC RAW score   17/ 24            Pt is alert and Oriented x  3      Balance:  SB/CGA. Fall risk due to  weakness, recent falls , decreased balance     Endurance: WFL   Bed/Chair alarm: yes     ASSESSMENT  Pt displays functional ability as noted in the objective portion of this evaluation. Conditions Requiring Skilled Therapeutic Intervention:    [x]Decreased strength     []Decreased ROM  [x]Decreased functional mobility  [x]Decreased balance   []Decreased endurance   []Decreased posture  []Decreased sensation  []Decreased coordination   []Decreased vision  []Decreased safety awareness   [x]Increased pain         Treatment/Education:    Pt in chair  upon arrival ; agreeable to PT. Mobility as above. Pt needed assist to don shorts prior to mobility. Pt wanted to amb with no AD. 10 feet x 1 CGA with unsteady gait and c/o L knee pain; wide YVONNE. Pt used ww for longer distance walk to increase safety and decrease fall risk. Cues given for proper transfers technique. Mildly labored breathing noted aftr gait, SpO2 99%. Instructed on proper breathing technique           Pt educated on fall risk, safety with mobility        Patient response to education:   Pt verbalized understanding Pt demonstrated skill Pt requires further education in this area   x  x       Comments:  Pt left in chair after session, with call light in reach. Rehab potential is Good for reaching above PT goals. Pts/ family goals   1. Rehab     Patient and or family understand(s) diagnosis, prognosis, and plan of care. - yes    PLAN  PT care will be provided in accordance with the objectives noted above. Whenever appropriate, clear delegation orders will be provided for nursing staff.   Exercises and functional mobility practice will be used as well as appropriate assistive devices or modalities to obtain goals. Patient and family education will also be administered as needed. PLAN OF CARE:    Current Treatment Recommendations     [x] Strengthening to improve independence with functional mobility   [] ROM to improve independence with functional mobility   [x] Balance Training to improve static/dynamic balance and to reduce fall risk  [x] Endurance Training to improve activity tolerance during functional mobility   [x] Transfer Training to improve safety and independence with all functional transfers   [x] Gait Training to improve gait mechanics, endurance and assess need for appropriate assistive device  [x] Stair Training in preparation for safe discharge home and/or into the community   [x] Positioning to prevent skin breakdown and contractures  [x] Safety and Education Training   [x] Patient/Caregiver Education   [] HEP  [] Other     Frequency of treatments will be 2-5x/week x 7-10 days. Time in:  1017   Time out:  1043      Evaluation Time includes thorough review of current medical information, gathering information on past medical history/social history and prior level of function, completion of standardized testing/informal observation of tasks, assessment of data and education on plan of care and goals.     CPT codes:  [x] Low Complexity PT evaluation 13166  [] Moderate Complexity PT evaluation 30225  [] High Complexity PT evaluation 93908  [] PT Re-evaluation 97691  [] Gait training 17562  minutes  [x] Therapeutic activities 39342 10 minutes  [] Therapeutic exercises 20356  minutes  [] Neuromuscular reeducation 12814  minutes       Latha Troncoso number:  PT [id number]

## 2022-08-29 NOTE — DISCHARGE SUMMARY
Internal Medicine Discharge Summary    NAME: Niru Velarde :  1935  MRN:  [id number] PCP:David Villa DO    ADMITTED: 2022   DISCHARGED: 2022  4:10 PM    ADMITTING PHYSICIAN: Jeantete Guardado DO    PCP: Emory Leija DO    CONSULTANT(S):   IP CONSULT TO INTERNAL MEDICINE  IP CONSULT TO NEPHROLOGY  IP CONSULT TO CASE MANAGEMENT  IP CONSULT TO SOCIAL WORK     ADMITTING DIAGNOSIS:   Hypokalemia [E87.6]  Hypomagnesemia [E83.42]  Hyponatremia [E87.1]  Ambulatory dysfunction [R26.2]     Please see H&P for further details    DISCHARGE DIAGNOSES:   Active Hospital Problems    Diagnosis     Hyponatremia [E87.1]      Priority: High    Gait disturbance [R26.9]      Priority: Medium    Osteoarthritis of left knee [M17.12]      Priority: Medium    Constipation [K59.00]     Hypokalemia [E87.6]     Abnormal liver enzymes [R74.8]     BPH associated with nocturia [N40.1, R35.1]     Seasonal allergic rhinitis due to pollen [J30.1]     Chronic intractable headache [R51.9, G89.29]     Superior mesenteric artery stenosis (HCC) [K55.1]     Non-seasonal allergic rhinitis [J30.89]     Weakness [R53.1]     Anxiety [F41.9]     Decreased appetite [R63.0]     Chronic gastritis without bleeding [K29.50]     Depression [F32. A]     Status post left knee replacement [Z96.652]     Aneurysm of right renal artery (HCC) [I72.2]     Essential hypertension [I10]     Insomnia d/t depression [G47.00]        BRIEF HISTORY OF PRESENT ILLNESS: Niru Velarde is a 81 y.o. male patient of Emory Leija DO who  has a past medical history of Aneurysm of right renal artery ([facility] [address] Colitis, Depression, GERD (gastroesophageal reflux disease), H/O: CVA (cerebrovascular accident), Hyperlipidemia, Hypertension, Stroke [facility]), Superior mesenteric artery stenosis ([facility] [address] and TIA (transient ischemic attack). who originally had concerns including Nausea, Headache (Onset couple days ago), and Other (Difficulty walking, onset one week ago).  at presentation on 8/25/2022, and was found to have Hypokalemia [E87.6]  Hypomagnesemia [E83.42]  Hyponatremia [E87.1]  Ambulatory dysfunction [R26.2] after workup. Please see H&P for further details. HOSPITAL COURSE:   The patient presented to the hospital with the chief complaint of Nausea, Headache (Onset couple days ago), and Other (Difficulty walking, onset one week ago)  . The patient was admitted to the hospital.     Hyponatremia, resolving:  HCTZ and SSRI on hold  Follow BMP  Nephrology following-- managing IVF     Recent fall with generalized weakness:  Fall precautions  PT evaluation pending     Hypomagnesemia:  Replace and monitor      BRIEF PHYSICAL EXAMINATION AND LABORATORIES ON DAY OF DISCHARGE:  VITALS:  BP (!) 143/74   Pulse 86   Temp 97.8 °F (36.6 °C) (Oral)   Resp 16   Ht 6' (1.829 m)   Wt 176 lb 11.2 oz (80.2 kg)   SpO2 96%   BMI 23.96 kg/m²     Please see note from the same day.      LABS[de-identified]  Recent Labs     08/27/22  1400 08/28/22  0545 08/29/22  0250   * 133 130*   K 3.9 3.4* 3.7   CL 92* 97* 96*   CO2 26 26 25   BUN 6 5* 5*   CREATININE 0.7 0.6* 0.6*   GLUCOSE 108* 95 96   CALCIUM 8.8 8.8 9.0     Recent Labs     08/27/22  0536 08/29/22  0250   ALKPHOS 61 63   ALT 10 13   AST 20 18   PROT 6.0* 6.2*   BILITOT 0.6 0.6   LABALBU 3.5 3.5     Recent Labs     08/27/22  0536 08/28/22  0545 08/29/22  0250   WBC 4.8 6.1 7.5   RBC 4.29 4.16 4.20   HGB 13.0 12.8 12.8   HCT 36.7* 36.4* 37.0   MCV 85.5 87.5 88.1   MCH 30.3 30.8 30.5   MCHC 35.4* 35.2* 34.6*   RDW 12.3 12.6 12.7    337 323   MPV 8.8 8.7 8.7     Lab Results   Component Value Date    LABA1C 5.6 01/03/2022    LABA1C 5.4 08/14/2021    LABA1C 5.1 01/25/2021     Lab Results   Component Value Date    INR 1.2 07/13/2022    INR 1.2 08/31/2021    INR 1.2 12/03/2012    PROTIME 13.1 (H) 07/13/2022    PROTIME 13.3 (H) 08/31/2021    PROTIME 12.3 12/03/2012      Lab Results   Component Value Date    TSH 1.700 03/29/2022    TSH 1.590 01/03/2022    TSH 1.920 06/22/2021     Lab Results   Component Value Date    TRIG 64 03/29/2022    TRIG 62 01/03/2022    TRIG 70 08/14/2021    HDL 44 03/29/2022    HDL 44 01/03/2022    HDL 39 08/14/2021    LDLCALC 93 03/29/2022    LDLCALC 92 01/03/2022    LDLCALC 75 08/14/2021     No results for input(s): MG in the last 72 hours. No results for input(s): CKTOTAL, CKMB, TROPONINI in the last 72 hours. No results for input(s): LACTA in the last 72 hours. IMAGING:  CT Head WO Contrast    Result Date: 8/25/2022  EXAMINATION: CT OF THE HEAD WITHOUT CONTRAST  8/25/2022 1:53 pm TECHNIQUE: CT of the head was performed without the administration of intravenous contrast. Automated exposure control, iterative reconstruction, and/or weight based adjustment of the mA/kV was utilized to reduce the radiation dose to as low as reasonably achievable. COMPARISON: None. HISTORY: ORDERING SYSTEM PROVIDED HISTORY: fall TECHNOLOGIST PROVIDED HISTORY: Reason for exam:->fall Has a \"code stroke\" or \"stroke alert\" been called? ->No Decision Support Exception - unselect if not a suspected or confirmed emergency medical condition->Emergency Medical Condition (MA) FINDINGS: BRAIN/VENTRICLES: There is no acute intracranial hemorrhage, mass effect or midline shift. No abnormal extra-axial fluid collection. The gray-white differentiation is maintained without evidence of an acute infarct. There is no evidence of hydrocephalus. The ventricles, cisterns and sulci are prominent consistent with atrophy. There is decreased attenuation within the periventricular white matter consistent with periventricular leukomalacia. ORBITS: The visualized portion of the orbits demonstrate no acute abnormality. SINUSES: The visualized paranasal sinuses and mastoid air cells demonstrate no acute abnormality. SOFT TISSUES/SKULL:  No acute abnormality of the visualized skull or soft tissues. 1. There is no acute intracranial abnormality.   Specifically, there is no intracranial hemorrhage. 2. Atrophy and periventricular leukomalacia,     XR CHEST PORTABLE    Result Date: 8/25/2022  EXAMINATION: ONE XRAY VIEW OF THE CHEST 8/25/2022 12:33 pm COMPARISON: 24 July 2022 HISTORY: ORDERING SYSTEM PROVIDED HISTORY: fall TECHNOLOGIST PROVIDED HISTORY: Reason for exam:->fall FINDINGS: The lungs are without acute focal process. There is no effusion or pneumothorax. The cardiomediastinal silhouette is without acute process. The osseous structures are without acute process. No acute process. DISPOSITION:  The patient's condition is fair.    The patient is being discharged to nursing home    DISCHARGE MEDICATIONS:      Medication List        START taking these medications      ondansetron 4 MG disintegrating tablet  Commonly known as: ZOFRAN-ODT  Take 1 tablet by mouth every 8 hours as needed for Nausea or Vomiting            CONTINUE taking these medications      amLODIPine 5 MG tablet  Commonly known as: NORVASC  Take 1 tablet by mouth daily     aspirin 81 MG tablet     pantoprazole 40 MG tablet  Commonly known as: PROTONIX  TAKE 1 TABLET BY MOUTH EVERY MORNING BEFORE BREAKFAST     potassium chloride 20 MEQ extended release tablet  Commonly known as: KLOR-CON M  1 qd            STOP taking these medications      FLUoxetine 10 MG capsule  Commonly known as: PROZAC     FLUoxetine 20 MG capsule  Commonly known as: PROZAC     hydroCHLOROthiazide 12.5 MG capsule  Commonly known as: MICROZIDE     LORazepam 0.5 MG tablet  Commonly known as: ATIVAN            ASK your doctor about these medications      losartan 100 MG tablet  Commonly known as: COZAAR  1 QD               Where to Get Your Medications        These medications were sent to [address] Donovan Hernandez [phone] [address] [address] [address]      Phone: [phone]   ondansetron 4 MG disintegrating tablet           INTERNAL MEDICINE INSTRUCTIONS:  Follow-up with primary care physician as directed in discharge paperwork. Please review results of imaging studies with PCP. Follow-up with consultants as directed by them. If recurrence or worsening of symptoms go to the ED or call primary care physician. Diet: ADULT DIET; Regular    FOLLOW-IP   [address] [facility] [address]  [phone]  Go to      DO Sylvia Turnerjose [address] [place] [id number] [phone]    Schedule an appointment as soon as possible for a visit  follow up, As needed    Jae Katz MD  [address]  [phone]    Schedule an appointment as soon as possible for a visit  If symptoms worsen      Preparing for this patient's discharge, including paperwork, orders, instructions, and meeting with patient did required 34 minutes.     Electronically signed by Eric John DO on 9/1/2022 at 8:15 AM

## 2022-09-16 ENCOUNTER — TELEPHONE (OUTPATIENT)
Dept: PRIMARY CARE CLINIC | Age: 87
End: 2022-09-16

## 2022-09-16 NOTE — TELEPHONE ENCOUNTER
Patient's daughter dropped off forms for work in August- end of August beginning of September. Her work did not receive forms yet. Daughters name is Oneida Marino. Do you have these forms completed? They are denying her leave. If you don't have forms, she said she can drop then off again, but this is a time sensitive form.

## 2022-09-26 RX ORDER — ASPIRIN 81 MG/1
81 TABLET ORAL DAILY
Qty: 30 TABLET | Refills: 5 | Status: SHIPPED | OUTPATIENT
Start: 2022-09-26

## 2022-09-26 RX ORDER — CALCIUM CARBONATE 200(500)MG
1 TABLET,CHEWABLE ORAL EVERY 6 HOURS PRN
Qty: 120 TABLET | Refills: 5 | Status: SHIPPED | OUTPATIENT
Start: 2022-09-26 | End: 2022-10-26

## 2022-09-26 RX ORDER — POTASSIUM CHLORIDE 20 MEQ/1
20 TABLET, EXTENDED RELEASE ORAL DAILY
Qty: 30 TABLET | Refills: 5 | Status: SHIPPED | OUTPATIENT
Start: 2022-09-26

## 2022-09-26 RX ORDER — SENNOSIDES 8.6 MG
650 CAPSULE ORAL EVERY 6 HOURS PRN
Qty: 120 TABLET | Refills: 5 | Status: SHIPPED | OUTPATIENT
Start: 2022-09-26

## 2022-09-26 RX ORDER — PAROXETINE 10 MG/1
10 TABLET, FILM COATED ORAL DAILY
Qty: 30 TABLET | Refills: 5 | Status: SHIPPED | OUTPATIENT
Start: 2022-09-26

## 2022-09-26 RX ORDER — DOCUSATE SODIUM 100 MG/1
CAPSULE, LIQUID FILLED ORAL
COMMUNITY
Start: 2022-09-17 | End: 2022-09-26 | Stop reason: SDUPTHER

## 2022-09-26 RX ORDER — AMLODIPINE BESYLATE 5 MG/1
5 TABLET ORAL DAILY
Qty: 30 TABLET | Refills: 5 | Status: SHIPPED | OUTPATIENT
Start: 2022-09-26

## 2022-09-26 RX ORDER — LOSARTAN POTASSIUM 100 MG/1
100 TABLET ORAL DAILY
Qty: 30 TABLET | Refills: 5 | Status: SHIPPED | OUTPATIENT
Start: 2022-09-26

## 2022-09-26 RX ORDER — PANTOPRAZOLE SODIUM 40 MG/1
40 TABLET, DELAYED RELEASE ORAL DAILY
Qty: 30 TABLET | Refills: 5 | Status: SHIPPED | OUTPATIENT
Start: 2022-09-26

## 2022-09-26 RX ORDER — DOCUSATE SODIUM 100 MG/1
100 CAPSULE, LIQUID FILLED ORAL DAILY
Qty: 30 CAPSULE | Refills: 5 | Status: SHIPPED | OUTPATIENT
Start: 2022-09-26

## 2022-09-26 RX ORDER — PAROXETINE 10 MG/1
TABLET, FILM COATED ORAL
COMMUNITY
Start: 2022-09-07 | End: 2022-09-26 | Stop reason: SDUPTHER

## 2022-09-26 RX ORDER — PROCHLORPERAZINE MALEATE 10 MG
TABLET ORAL
COMMUNITY
Start: 2022-09-21 | End: 2022-09-26 | Stop reason: SDUPTHER

## 2022-09-26 RX ORDER — PROCHLORPERAZINE MALEATE 10 MG
10 TABLET ORAL EVERY 8 HOURS PRN
Qty: 120 TABLET | Refills: 5 | Status: SHIPPED | OUTPATIENT
Start: 2022-09-26

## 2022-09-26 NOTE — TELEPHONE ENCOUNTER
Daughter requesting orders for scripts to be sent to [facility]. [facility] Facility sent over list of current meds to be sent, Pended all needed. Please send.

## 2022-10-03 ENCOUNTER — TELEPHONE (OUTPATIENT)
Dept: PRIMARY CARE CLINIC | Age: 87
End: 2022-10-03

## 2022-10-03 NOTE — TELEPHONE ENCOUNTER
Left message for pt daughter to return call. Need clarifiction of what they are requesting. Dr Evin Horowitz is currently following while he is in the facility. If pt is requesting that the house doctor be taking care of him then they need to contact us or send orders. Im not sure that the patient would want anyone other than Dr Evin Horowitz following.

## 2022-10-03 NOTE — TELEPHONE ENCOUNTER
----- Message from Papito Draper sent at 10/3/2022 10:27 AM EDT -----  Subject: Message to Provider    QUESTIONS  Information for Provider? Pts daughter Maureen Youssef called and stated that   the pt has been admitted into Assisted Living at [facility]. Arnol Parks   states that she was informed that one of the 's at this practice does   follow pts at [facility] and would like to know if the pt can switch   his provider to the  that can follow him at his Assisted Living. Please   call Arnol Parks back and inform her if this is possible  ---------------------------------------------------------------------------  --------------  CALL BACK INFO  [phone]; OK to leave message on voicemail  ---------------------------------------------------------------------------  --------------  SCRIPT ANSWERS  Relationship to Patient? Other  Representative Name? Daughter/Jany  Is the Representative on the appropriate HIPAA document in Epic?  Yes

## 2022-10-13 ENCOUNTER — TELEPHONE (OUTPATIENT)
Dept: PRIMARY CARE CLINIC | Age: 87
End: 2022-10-13

## 2022-10-13 RX ORDER — LOPERAMIDE HYDROCHLORIDE 2 MG/1
2 TABLET ORAL 4 TIMES DAILY PRN
COMMUNITY
End: 2022-10-13 | Stop reason: SDUPTHER

## 2022-10-13 RX ORDER — LOPERAMIDE HYDROCHLORIDE 2 MG/1
2 TABLET ORAL EVERY 4 HOURS PRN
Qty: 36 TABLET | Refills: 0 | Status: SHIPPED | OUTPATIENT
Start: 2022-10-13

## 2022-10-19 ENCOUNTER — TELEPHONE (OUTPATIENT)
Dept: VASCULAR SURGERY | Age: 87
End: 2022-10-19

## 2022-10-20 ENCOUNTER — OFFICE VISIT (OUTPATIENT)
Dept: VASCULAR SURGERY | Age: 87
End: 2022-10-20
Payer: MEDICARE

## 2022-10-20 VITALS — BODY MASS INDEX: 22.96 KG/M2 | WEIGHT: 164 LBS | HEIGHT: 71 IN

## 2022-10-20 DIAGNOSIS — I72.2 ANEURYSM OF RIGHT RENAL ARTERY (HCC): ICD-10-CM

## 2022-10-20 PROBLEM — E87.1 HYPONATREMIA: Status: RESOLVED | Noted: 2022-08-25 | Resolved: 2022-10-20

## 2022-10-20 PROBLEM — E87.6 HYPOKALEMIA: Status: RESOLVED | Noted: 2022-01-19 | Resolved: 2022-10-20

## 2022-10-20 PROCEDURE — G8427 DOCREV CUR MEDS BY ELIG CLIN: HCPCS | Performed by: SURGERY

## 2022-10-20 PROCEDURE — 99214 OFFICE O/P EST MOD 30 MIN: CPT | Performed by: SURGERY

## 2022-10-20 PROCEDURE — 1123F ACP DISCUSS/DSCN MKR DOCD: CPT | Performed by: SURGERY

## 2022-10-20 PROCEDURE — 1036F TOBACCO NON-USER: CPT | Performed by: SURGERY

## 2022-10-20 PROCEDURE — G8484 FLU IMMUNIZE NO ADMIN: HCPCS | Performed by: SURGERY

## 2022-10-20 PROCEDURE — G8420 CALC BMI NORM PARAMETERS: HCPCS | Performed by: SURGERY

## 2022-10-20 RX ORDER — HYDROXYZINE PAMOATE 25 MG/1
25 CAPSULE ORAL 2 TIMES DAILY
COMMUNITY
Start: 2022-09-25

## 2022-10-20 NOTE — PROGRESS NOTES
120 tablet 5    ondansetron (ZOFRAN-ODT) 4 MG disintegrating tablet Take 1 tablet by mouth every 8 hours as needed for Nausea or Vomiting 60 tablet 0    aspirin 81 MG tablet Take 81 mg by mouth daily       No current facility-administered medications for this visit. Past Medical History:   Diagnosis Date    Aneurysm of right renal artery (HCC)     follows with Dr. Maddie Quarles yearly (last visit 9/19)    Colitis     Depression     GERD (gastroesophageal reflux disease)     H/O: CVA (cerebrovascular accident) 10/14/2021    Hyperlipidemia     Hypertension     Stroke [facility])     Superior mesenteric artery stenosis ([address] 11/4/2020    TIA (transient ischemic attack)        Past Surgical History:   Procedure Laterality Date    ANTERIOR CRUCIATE LIGAMENT REPAIR Left 11/21/2001    APPENDECTOMY      CHOLECYSTECTOMY  2007    Lap    ECHO COMPL W DOP COLOR FLOW  12/4/2012         HERNIA REPAIR Right 11/13/2002    double    SINUS SURGERY  2002,2003     done x 2    TONSILLECTOMY      TOTAL KNEE ARTHROPLASTY Left 1/28/2019    LEFT  ROBOTIC KNEE TOTAL ARTHROPLASTY  ++MEREDITH- TIEDMZES++   ++ADDUCTOR BLOCK++ performed by Nohelia Ragland MD at [place] [address] 10/10/2019    EGD BIOPSY performed by Savanah Donaldson MD at [facility] ENDOSCOPY       No family history on file. Social History     Socioeconomic History    Marital status:       Spouse name: Not on file    Number of children: 4    Years of education: 15    Highest education level: Not on file   Occupational History    Not on file   Tobacco Use    Smoking status: Never    Smokeless tobacco: Never   Vaping Use    Vaping Use: Never used   Substance and Sexual Activity    Alcohol use: No    Drug use: No    Sexual activity: Not on file   Other Topics Concern    Not on file   Social History Narrative    Not on file     Social Determinants of Health     Financial Resource Strain: Low Risk     Difficulty of Paying Living Expenses: Not hard at all Food Insecurity: No Food Insecurity    Worried About Running Out of Food in the Last Year: Never true    Ran Out of Food in the Last Year: Never true   Transportation Needs: No Transportation Needs    Lack of Transportation (Medical): No    Lack of Transportation (Non-Medical): No   Physical Activity: Inactive    Days of Exercise per Week: 0 days    Minutes of Exercise per Session: 0 min   Stress: No Stress Concern Present    Feeling of Stress : Not at all   Social Connections: Socially Isolated    Frequency of Communication with Friends and Family: More than three times a week    Frequency of Social Gatherings with Friends and Family: Twice a week    Attends [employer] Services: Never    Active Member of Clubs or Organizations: No    Attends Club or Organization Meetings: Never    Marital Status:    Intimate Partner Violence: Not on file   Housing Stability: Not on file       Review of Systems:    Eyes:  No blurring, diplopia or vision loss. Respiratory:  No cough, pleuritic chest pain, dyspnea, or wheezing. Cardiovascular: No angina, palpitations . Hypertension, hyperlipidemia  Musculoskeletal:  No arthritis or weakness. Neurologic:  No paralysis, paresis, paresthesia, seizures or headache. Endocrinology:   Gastrointestinal: GERD, colitis, history of mild superior mesenteric artery stenosis of 40%        Physical Exam:  General appearance:  Alert, awake, oriented x 3. No distress. Eyes:  Conjunctivae appear normal; PERRL  Neck:  No jugular venous distention, lymphadenopathy or thyromegaly. No evidence of carotid bruit  Lungs:  Clear to ausculation bilaterally. No rhonchi, crackles, wheezes  Heart:  Regular rate and rhythm. No rub or murmur  Abdomen:  Soft, non-tender. No masses, organomegaly. Musculoskeletal : No joint effusions, tenderness swelling    Neuro: Speech is intact. Moving all extremities. No focal motor or sensory deficits      Extremities:  Both feet are warm to touch.  The color of both feet is normal.        Pulses Right  Left    Brachial 3 3    Radial    3=normal   Femoral 2 2  2=diminished   Popliteal    1=barely palpable   Dorsalis pedis    0=absent   Posterior tibial 2 2  4=aneurysmal             Other pertinent information:1. The past medical records were reviewed. 2.  The CT scan of the abdomen pelvis that was done in July 2022, was personally reviewed by me densely calcified right renal artery lesion, maximum diameter 1.7 cm, unchanged from previous study noted    Assessment:    1. Aneurysm of right renal artery (HCC)              Plan:       Prescription the patient and family, informed them that have personally reviewed the CT scan, densely calcified aneurysm 1.7 cm, asymptomatic, reassured, the natural history, very slow gradual increase, because of dense calcification, unlikely to rupture but nevertheless patient was instructed, to call and come to the hospital any abdominal, flank or back pain and follow-up in 1 year with a plain x-ray monitoring because of the calcified aneurysm       All the questions were answered. Indicated follow-up: Return in about 1 year (around 10/20/2023), or if symptoms worsen or fail to improve.

## 2022-10-24 VITALS — WEIGHT: 170 LBS | BODY MASS INDEX: 23.71 KG/M2

## 2022-11-09 LAB
ALBUMIN SERPL-MCNC: 3.6 G/DL (ref 3.5–5.2)
ALP BLD-CCNC: 81 U/L (ref 40–129)
ALT SERPL-CCNC: 12 U/L (ref 0–40)
ANION GAP SERPL CALCULATED.3IONS-SCNC: 10 MMOL/L (ref 7–16)
AST SERPL-CCNC: 11 U/L (ref 0–39)
BILIRUB SERPL-MCNC: 0.4 MG/DL (ref 0–1.2)
BILIRUBIN URINE: NEGATIVE
BLOOD, URINE: NEGATIVE
BUN BLDV-MCNC: 11 MG/DL (ref 6–23)
CALCIUM SERPL-MCNC: 9.4 MG/DL (ref 8.6–10.2)
CHLORIDE BLD-SCNC: 105 MMOL/L (ref 98–107)
CHLORIDE URINE RANDOM: 76 MMOL/L
CLARITY: CLEAR
CO2: 28 MMOL/L (ref 22–29)
COLOR: YELLOW
CREAT SERPL-MCNC: 0.8 MG/DL (ref 0.7–1.2)
CREATININE URINE: 117 MG/DL (ref 40–278)
GFR SERPL CREATININE-BSD FRML MDRD: >60 ML/MIN/1.73
GLUCOSE BLD-MCNC: 82 MG/DL (ref 74–99)
GLUCOSE URINE: NEGATIVE MG/DL
HCT VFR BLD CALC: 37.9 % (ref 37–54)
HEMOGLOBIN: 12.8 G/DL (ref 12.5–16.5)
KETONES, URINE: NEGATIVE MG/DL
LEUKOCYTE ESTERASE, URINE: NEGATIVE
MCH RBC QN AUTO: 30.3 PG (ref 26–35)
MCHC RBC AUTO-ENTMCNC: 33.8 % (ref 32–34.5)
MCV RBC AUTO: 89.6 FL (ref 80–99.9)
MICROALBUMIN UR-MCNC: <12 MG/L
MICROALBUMIN/CREAT UR-RTO: ABNORMAL (ref 0–30)
NITRITE, URINE: NEGATIVE
OSMOLALITY URINE: 541 MOSM/KG (ref 300–900)
PARATHYROID HORMONE INTACT: 20 PG/ML (ref 15–65)
PDW BLD-RTO: 11.9 FL (ref 11.5–15)
PH UA: 6 (ref 5–9)
PHOSPHORUS: 3.4 MG/DL (ref 2.5–4.5)
PLATELET # BLD: 311 E9/L (ref 130–450)
PMV BLD AUTO: 10.1 FL (ref 7–12)
POTASSIUM SERPL-SCNC: 3.6 MMOL/L (ref 3.5–5)
PROTEIN PROTEIN: 7 MG/DL (ref 0–12)
PROTEIN UA: NEGATIVE MG/DL
RBC # BLD: 4.23 E12/L (ref 3.8–5.8)
SODIUM BLD-SCNC: 143 MMOL/L (ref 132–146)
SODIUM URINE: 73 MMOL/L
SPECIFIC GRAVITY UA: 1.02 (ref 1–1.03)
TOTAL PROTEIN: 6.3 G/DL (ref 6.4–8.3)
UROBILINOGEN, URINE: 0.2 E.U./DL
VITAMIN D 25-HYDROXY: 35 NG/ML (ref 30–100)
WBC # BLD: 4.5 E9/L (ref 4.5–11.5)

## 2022-11-21 RX ORDER — HYDROXYZINE PAMOATE 25 MG/1
CAPSULE ORAL
Qty: 60 CAPSULE | Refills: 0 | Status: ON HOLD
Start: 2022-11-21 | End: 2022-11-29 | Stop reason: HOSPADM

## 2022-11-22 ENCOUNTER — TELEPHONE (OUTPATIENT)
Dept: PRIMARY CARE CLINIC | Age: 87
End: 2022-11-22

## 2022-11-22 DIAGNOSIS — F41.9 ANXIETY: Primary | ICD-10-CM

## 2022-11-22 NOTE — TELEPHONE ENCOUNTER
Aurora Cabrera one of the pt's nurses at [facility] is calling because Raz's depression is getting worse to the point where he won't get showered and dressed, and he won't come out of his room

## 2022-11-23 ENCOUNTER — TELEPHONE (OUTPATIENT)
Dept: PRIMARY CARE CLINIC | Age: 87
End: 2022-11-23

## 2022-11-23 RX ORDER — LORAZEPAM 0.5 MG/1
0.5 TABLET ORAL 2 TIMES DAILY
Qty: 60 TABLET | Refills: 0 | Status: ON HOLD
Start: 2022-11-23 | End: 2022-11-29 | Stop reason: HOSPADM

## 2022-11-23 NOTE — TELEPHONE ENCOUNTER
Per nurse at [facility] daughter advised nursing staff that she spoke with you and you were going to call in an antidepressant?  Daughter mentioned to nurse that it might be Ativan

## 2022-11-28 ENCOUNTER — APPOINTMENT (OUTPATIENT)
Dept: CT IMAGING | Age: 87
DRG: 640 | End: 2022-11-28
Payer: MEDICARE

## 2022-11-28 ENCOUNTER — HOSPITAL ENCOUNTER (INPATIENT)
Age: 87
LOS: 4 days | Discharge: NURSING, REHAB OR LONG-TERM CARE | DRG: 640 | End: 2022-12-05
Attending: STUDENT IN AN ORGANIZED HEALTH CARE EDUCATION/TRAINING PROGRAM | Admitting: INTERNAL MEDICINE
Payer: MEDICARE

## 2022-11-28 ENCOUNTER — APPOINTMENT (OUTPATIENT)
Dept: GENERAL RADIOLOGY | Age: 87
DRG: 640 | End: 2022-11-28
Payer: MEDICARE

## 2022-11-28 DIAGNOSIS — R53.1 GENERALIZED WEAKNESS: ICD-10-CM

## 2022-11-28 DIAGNOSIS — R62.7 FAILURE TO THRIVE IN ADULT: Primary | ICD-10-CM

## 2022-11-28 PROBLEM — R09.81 CONGESTION OF NASAL SINUS: Status: RESOLVED | Noted: 2022-06-22 | Resolved: 2022-11-28

## 2022-11-28 PROBLEM — R11.0 NAUSEA: Status: RESOLVED | Noted: 2017-01-22 | Resolved: 2022-11-28

## 2022-11-28 PROBLEM — J01.11 ACUTE RECURRENT FRONTAL SINUSITIS: Status: RESOLVED | Noted: 2022-06-14 | Resolved: 2022-11-28

## 2022-11-28 LAB
ALBUMIN SERPL-MCNC: 3.7 G/DL (ref 3.5–5.2)
ALP BLD-CCNC: 79 U/L (ref 40–129)
ALT SERPL-CCNC: 13 U/L (ref 0–40)
ANION GAP SERPL CALCULATED.3IONS-SCNC: 6 MMOL/L (ref 7–16)
AST SERPL-CCNC: 14 U/L (ref 0–39)
BACTERIA: ABNORMAL /HPF
BASOPHILS ABSOLUTE: 0.02 E9/L (ref 0–0.2)
BASOPHILS RELATIVE PERCENT: 0.4 % (ref 0–2)
BILIRUB SERPL-MCNC: 0.5 MG/DL (ref 0–1.2)
BILIRUBIN URINE: NEGATIVE
BLOOD, URINE: NEGATIVE
BUN BLDV-MCNC: 14 MG/DL (ref 6–23)
CALCIUM SERPL-MCNC: 9.2 MG/DL (ref 8.6–10.2)
CHLORIDE BLD-SCNC: 104 MMOL/L (ref 98–107)
CLARITY: CLEAR
CO2: 28 MMOL/L (ref 22–29)
COLOR: YELLOW
CREAT SERPL-MCNC: 0.9 MG/DL (ref 0.7–1.2)
EOSINOPHILS ABSOLUTE: 0.08 E9/L (ref 0.05–0.5)
EOSINOPHILS RELATIVE PERCENT: 1.8 % (ref 0–6)
GFR SERPL CREATININE-BSD FRML MDRD: >60 ML/MIN/1.73
GLUCOSE BLD-MCNC: 95 MG/DL (ref 74–99)
GLUCOSE URINE: NEGATIVE MG/DL
HCT VFR BLD CALC: 39.2 % (ref 37–54)
HEMOGLOBIN: 13 G/DL (ref 12.5–16.5)
IMMATURE GRANULOCYTES #: 0 E9/L
IMMATURE GRANULOCYTES %: 0 % (ref 0–5)
INFLUENZA A BY PCR: NOT DETECTED
INFLUENZA B BY PCR: NOT DETECTED
KETONES, URINE: NEGATIVE MG/DL
LACTIC ACID: 0.9 MMOL/L (ref 0.5–2.2)
LEUKOCYTE ESTERASE, URINE: NEGATIVE
LIPASE: 16 U/L (ref 13–60)
LYMPHOCYTES ABSOLUTE: 0.98 E9/L (ref 1.5–4)
LYMPHOCYTES RELATIVE PERCENT: 21.8 % (ref 20–42)
MCH RBC QN AUTO: 30 PG (ref 26–35)
MCHC RBC AUTO-ENTMCNC: 33.2 % (ref 32–34.5)
MCV RBC AUTO: 90.3 FL (ref 80–99.9)
MONOCYTES ABSOLUTE: 0.47 E9/L (ref 0.1–0.95)
MONOCYTES RELATIVE PERCENT: 10.5 % (ref 2–12)
MUCUS: PRESENT /LPF
NEUTROPHILS ABSOLUTE: 2.94 E9/L (ref 1.8–7.3)
NEUTROPHILS RELATIVE PERCENT: 65.5 % (ref 43–80)
NITRITE, URINE: NEGATIVE
PDW BLD-RTO: 12.2 FL (ref 11.5–15)
PH UA: 6 (ref 5–9)
PLATELET # BLD: 276 E9/L (ref 130–450)
PMV BLD AUTO: 9.2 FL (ref 7–12)
POTASSIUM REFLEX MAGNESIUM: 4.1 MMOL/L (ref 3.5–5)
PROTEIN UA: NEGATIVE MG/DL
RBC # BLD: 4.34 E12/L (ref 3.8–5.8)
RBC UA: ABNORMAL /HPF (ref 0–2)
SARS-COV-2, NAAT: NOT DETECTED
SODIUM BLD-SCNC: 138 MMOL/L (ref 132–146)
SPECIFIC GRAVITY UA: 1.02 (ref 1–1.03)
TOTAL PROTEIN: 6.1 G/DL (ref 6.4–8.3)
TROPONIN, HIGH SENSITIVITY: 18 NG/L (ref 0–11)
TROPONIN, HIGH SENSITIVITY: 22 NG/L (ref 0–11)
UROBILINOGEN, URINE: 1 E.U./DL
WBC # BLD: 4.5 E9/L (ref 4.5–11.5)
WBC UA: ABNORMAL /HPF (ref 0–5)

## 2022-11-28 PROCEDURE — 71045 X-RAY EXAM CHEST 1 VIEW: CPT

## 2022-11-28 PROCEDURE — 99285 EMERGENCY DEPT VISIT HI MDM: CPT

## 2022-11-28 PROCEDURE — 83690 ASSAY OF LIPASE: CPT

## 2022-11-28 PROCEDURE — 93005 ELECTROCARDIOGRAM TRACING: CPT | Performed by: STUDENT IN AN ORGANIZED HEALTH CARE EDUCATION/TRAINING PROGRAM

## 2022-11-28 PROCEDURE — 84484 ASSAY OF TROPONIN QUANT: CPT

## 2022-11-28 PROCEDURE — 70450 CT HEAD/BRAIN W/O DYE: CPT

## 2022-11-28 PROCEDURE — 87502 INFLUENZA DNA AMP PROBE: CPT

## 2022-11-28 PROCEDURE — 74177 CT ABD & PELVIS W/CONTRAST: CPT

## 2022-11-28 PROCEDURE — 83605 ASSAY OF LACTIC ACID: CPT

## 2022-11-28 PROCEDURE — 6370000000 HC RX 637 (ALT 250 FOR IP): Performed by: STUDENT IN AN ORGANIZED HEALTH CARE EDUCATION/TRAINING PROGRAM

## 2022-11-28 PROCEDURE — G0378 HOSPITAL OBSERVATION PER HR: HCPCS

## 2022-11-28 PROCEDURE — 87088 URINE BACTERIA CULTURE: CPT

## 2022-11-28 PROCEDURE — 81001 URINALYSIS AUTO W/SCOPE: CPT

## 2022-11-28 PROCEDURE — 2580000003 HC RX 258: Performed by: STUDENT IN AN ORGANIZED HEALTH CARE EDUCATION/TRAINING PROGRAM

## 2022-11-28 PROCEDURE — 36415 COLL VENOUS BLD VENIPUNCTURE: CPT

## 2022-11-28 PROCEDURE — 2580000003 HC RX 258: Performed by: RADIOLOGY

## 2022-11-28 PROCEDURE — 6360000004 HC RX CONTRAST MEDICATION: Performed by: RADIOLOGY

## 2022-11-28 PROCEDURE — 85025 COMPLETE CBC W/AUTO DIFF WBC: CPT

## 2022-11-28 PROCEDURE — 80053 COMPREHEN METABOLIC PANEL: CPT

## 2022-11-28 PROCEDURE — 87635 SARS-COV-2 COVID-19 AMP PRB: CPT

## 2022-11-28 RX ORDER — SODIUM CHLORIDE 0.9 % (FLUSH) 0.9 %
10 SYRINGE (ML) INJECTION EVERY 12 HOURS SCHEDULED
Status: DISCONTINUED | OUTPATIENT
Start: 2022-11-28 | End: 2022-12-05 | Stop reason: HOSPADM

## 2022-11-28 RX ORDER — POTASSIUM CHLORIDE 20 MEQ/1
40 TABLET, EXTENDED RELEASE ORAL PRN
Status: DISCONTINUED | OUTPATIENT
Start: 2022-11-28 | End: 2022-12-05 | Stop reason: HOSPADM

## 2022-11-28 RX ORDER — ASPIRIN 81 MG/1
81 TABLET ORAL DAILY
Status: DISCONTINUED | OUTPATIENT
Start: 2022-11-29 | End: 2022-12-05 | Stop reason: HOSPADM

## 2022-11-28 RX ORDER — HYDRALAZINE HYDROCHLORIDE 20 MG/ML
10 INJECTION INTRAMUSCULAR; INTRAVENOUS EVERY 6 HOURS PRN
Status: DISCONTINUED | OUTPATIENT
Start: 2022-11-28 | End: 2022-12-05 | Stop reason: HOSPADM

## 2022-11-28 RX ORDER — PROCHLORPERAZINE MALEATE 10 MG
10 TABLET ORAL EVERY 8 HOURS PRN
Status: DISCONTINUED | OUTPATIENT
Start: 2022-11-28 | End: 2022-12-05 | Stop reason: HOSPADM

## 2022-11-28 RX ORDER — 0.9 % SODIUM CHLORIDE 0.9 %
1000 INTRAVENOUS SOLUTION INTRAVENOUS ONCE
Status: COMPLETED | OUTPATIENT
Start: 2022-11-28 | End: 2022-11-28

## 2022-11-28 RX ORDER — PAROXETINE 10 MG/1
10 TABLET, FILM COATED ORAL DAILY
Status: DISCONTINUED | OUTPATIENT
Start: 2022-11-29 | End: 2022-11-29

## 2022-11-28 RX ORDER — CALCIUM CARBONATE 200(500)MG
500 TABLET,CHEWABLE ORAL ONCE
Status: COMPLETED | OUTPATIENT
Start: 2022-11-28 | End: 2022-12-03

## 2022-11-28 RX ORDER — ONDANSETRON 2 MG/ML
4 INJECTION INTRAMUSCULAR; INTRAVENOUS ONCE
Status: DISCONTINUED | OUTPATIENT
Start: 2022-11-28 | End: 2022-12-05 | Stop reason: HOSPADM

## 2022-11-28 RX ORDER — POTASSIUM CHLORIDE 20 MEQ/1
20 TABLET, EXTENDED RELEASE ORAL DAILY
Status: DISCONTINUED | OUTPATIENT
Start: 2022-11-29 | End: 2022-12-05 | Stop reason: HOSPADM

## 2022-11-28 RX ORDER — LOSARTAN POTASSIUM 50 MG/1
100 TABLET ORAL DAILY
Status: DISCONTINUED | OUTPATIENT
Start: 2022-11-29 | End: 2022-12-05 | Stop reason: HOSPADM

## 2022-11-28 RX ORDER — AMLODIPINE BESYLATE 5 MG/1
5 TABLET ORAL DAILY
Status: DISCONTINUED | OUTPATIENT
Start: 2022-11-29 | End: 2022-12-05 | Stop reason: HOSPADM

## 2022-11-28 RX ORDER — DOCUSATE SODIUM 100 MG/1
100 CAPSULE, LIQUID FILLED ORAL DAILY
Status: DISCONTINUED | OUTPATIENT
Start: 2022-11-29 | End: 2022-12-05 | Stop reason: HOSPADM

## 2022-11-28 RX ORDER — SODIUM CHLORIDE 9 MG/ML
INJECTION, SOLUTION INTRAVENOUS PRN
Status: DISCONTINUED | OUTPATIENT
Start: 2022-11-28 | End: 2022-12-05 | Stop reason: HOSPADM

## 2022-11-28 RX ORDER — HYDROXYZINE PAMOATE 25 MG/1
25 CAPSULE ORAL 2 TIMES DAILY
Status: DISCONTINUED | OUTPATIENT
Start: 2022-11-28 | End: 2022-11-29

## 2022-11-28 RX ORDER — SODIUM CHLORIDE 0.9 % (FLUSH) 0.9 %
10 SYRINGE (ML) INJECTION PRN
Status: DISCONTINUED | OUTPATIENT
Start: 2022-11-28 | End: 2022-12-05 | Stop reason: HOSPADM

## 2022-11-28 RX ORDER — ACETAMINOPHEN 500 MG
1000 TABLET ORAL ONCE
Status: COMPLETED | OUTPATIENT
Start: 2022-11-28 | End: 2022-11-28

## 2022-11-28 RX ORDER — LORAZEPAM 0.5 MG/1
0.5 TABLET ORAL 2 TIMES DAILY
Status: DISCONTINUED | OUTPATIENT
Start: 2022-11-28 | End: 2022-11-29

## 2022-11-28 RX ORDER — SODIUM CHLORIDE 0.9 % (FLUSH) 0.9 %
10 SYRINGE (ML) INJECTION PRN
Status: COMPLETED | OUTPATIENT
Start: 2022-11-28 | End: 2022-11-28

## 2022-11-28 RX ORDER — LOPERAMIDE HYDROCHLORIDE 2 MG/1
2 CAPSULE ORAL EVERY 4 HOURS PRN
Status: DISCONTINUED | OUTPATIENT
Start: 2022-11-28 | End: 2022-12-05 | Stop reason: HOSPADM

## 2022-11-28 RX ORDER — ENOXAPARIN SODIUM 100 MG/ML
40 INJECTION SUBCUTANEOUS DAILY
Status: DISCONTINUED | OUTPATIENT
Start: 2022-11-29 | End: 2022-12-05 | Stop reason: HOSPADM

## 2022-11-28 RX ORDER — SENNA PLUS 8.6 MG/1
1 TABLET ORAL DAILY PRN
Status: DISCONTINUED | OUTPATIENT
Start: 2022-11-28 | End: 2022-12-05 | Stop reason: HOSPADM

## 2022-11-28 RX ORDER — ONDANSETRON 2 MG/ML
4 INJECTION INTRAMUSCULAR; INTRAVENOUS EVERY 6 HOURS PRN
Status: DISCONTINUED | OUTPATIENT
Start: 2022-11-28 | End: 2022-11-28

## 2022-11-28 RX ORDER — ATROPA BELLADONNA AND OPIUM 16.2; 6 MG/1; MG/1
60 SUPPOSITORY RECTAL ONCE
Status: DISCONTINUED | OUTPATIENT
Start: 2022-11-29 | End: 2022-11-29 | Stop reason: RX

## 2022-11-28 RX ORDER — ACETAMINOPHEN 325 MG/1
650 TABLET ORAL EVERY 6 HOURS PRN
Status: DISCONTINUED | OUTPATIENT
Start: 2022-11-28 | End: 2022-12-05 | Stop reason: HOSPADM

## 2022-11-28 RX ORDER — PANTOPRAZOLE SODIUM 40 MG/1
40 TABLET, DELAYED RELEASE ORAL DAILY
Status: DISCONTINUED | OUTPATIENT
Start: 2022-11-29 | End: 2022-12-05 | Stop reason: HOSPADM

## 2022-11-28 RX ORDER — POTASSIUM CHLORIDE 7.45 MG/ML
10 INJECTION INTRAVENOUS PRN
Status: DISCONTINUED | OUTPATIENT
Start: 2022-11-28 | End: 2022-12-05 | Stop reason: HOSPADM

## 2022-11-28 RX ORDER — ONDANSETRON 4 MG/1
4 TABLET, ORALLY DISINTEGRATING ORAL EVERY 8 HOURS PRN
Status: DISCONTINUED | OUTPATIENT
Start: 2022-11-28 | End: 2022-11-28

## 2022-11-28 RX ADMIN — SODIUM CHLORIDE 1000 ML: 9 INJECTION, SOLUTION INTRAVENOUS at 15:14

## 2022-11-28 RX ADMIN — ACETAMINOPHEN 1000 MG: 500 TABLET ORAL at 22:58

## 2022-11-28 RX ADMIN — IOPAMIDOL 75 ML: 755 INJECTION, SOLUTION INTRAVENOUS at 16:07

## 2022-11-28 RX ADMIN — SODIUM CHLORIDE, PRESERVATIVE FREE 10 ML: 5 INJECTION INTRAVENOUS at 16:04

## 2022-11-28 ASSESSMENT — PAIN - FUNCTIONAL ASSESSMENT
PAIN_FUNCTIONAL_ASSESSMENT: 0-10
PAIN_FUNCTIONAL_ASSESSMENT: 0-10

## 2022-11-28 ASSESSMENT — PAIN DESCRIPTION - LOCATION
LOCATION: HEAD
LOCATION: HEAD

## 2022-11-28 ASSESSMENT — PAIN SCALES - GENERAL
PAINLEVEL_OUTOF10: 4
PAINLEVEL_OUTOF10: 8

## 2022-11-28 NOTE — ED PROVIDER NOTES
Department of Emergency Medicine   ED  Provider Note  Admit Date/RoomTime: 11/28/2022  1:21 PM  ED Room: 30/0730-A          History of Present Illness:  11/28/22, Time: 3:02 PM EST  Chief Complaint   Patient presents with    Headache    Nausea    Fatigue        Lee Kim is a 81 y.o. male presenting to the ED for headache, nausea, and fatigue, beginning days ago. The complaint has been persistent, moderate in severity, and worsened by nothing. The patient is an 81year-old-male who presents to the ED complaining of a headache, nausea, and fatigue and generalized weakness. Daughter arrives to help provide further history. She states the patient is at an assisted living facility, and she has noticed a gradual decline over the past couple days. He is unable to transfer from a chair to his bed and is requiring much more assistance than he typically does. Chief complaint states headache, nausea, and fatigue; however, he states that he just feels week and fatigued in general upon further questioning. Denies any fever, chills, lightheadedness, dizziness, syncope, numbness, tingling, fall, trauma, or other acute symptoms or concerns. Review of Systems:   A complete review of systems was performed and pertinent positives and negatives are stated within HPI, all other systems reviewed and are negative.        --------------------------------------------- PAST HISTORY ---------------------------------------------  Past Medical History:  has a past medical history of Aneurysm of right renal artery ([facility] [address] Colitis, Depression, GERD (gastroesophageal reflux disease), H/O: CVA (cerebrovascular accident), Hyperlipidemia, Hypertension, Stroke [facility]), Superior mesenteric artery stenosis ([facility] [address] and TIA (transient ischemic attack). Past Surgical History:  has a past surgical history that includes Appendectomy; Tonsillectomy; ECHO Compl W Dop Color Flow (12/4/2012); sinus surgery (7867,[id number]);  Anterior cruciate ligament repair (Left, 11/21/2001); Cholecystectomy (2007); hernia repair (Right, 11/13/2002); Total knee arthroplasty (Left, 1/28/2019); and Upper gastrointestinal endoscopy (N/A, 10/10/2019). Social History:  reports that he has never smoked. He has never used smokeless tobacco. He reports that he does not drink alcohol and does not use drugs. Family History: family history is not on file. . Unless otherwise noted, family history is non contributory    The patients home medications have been reviewed. Allergies: Patient has no known allergies. I have reviewed the past medical history, past surgical history, social history, and family history    ---------------------------------------------------PHYSICAL EXAM--------------------------------------    Constitutional/General:alert and oriented to person and place but disoriented to time and situation  Head: Normocephalic and atraumatic  Eyes:  EOMI, sclera non icteric  ENT: Oropharynx clear, handling secretions, no trismus, no asymmetry of the posterior oropharynx or uvular edema  Neck: Supple, full ROM, no stridor, no meningeal signs  Respiratory: Lungs clear to auscultation bilaterally, no wheezes, rales, or rhonchi. Not in respiratory distress  Cardiovascular:  Regular rate. Regular rhythm. No murmurs, no gallops, no rubs. 2+ distal pulses. Equal extremity pulses. Gastrointestinal:  Abdomen Soft, Non tender, Non distended. No rebound, guarding, or rigidity. No pulsatile masses. Musculoskeletal: Moves all extremities x 4. Warm and well perfused, no clubbing, no cyanosis, no edema. Capillary refill <3 seconds  Skin: skin warm and dry. No rashes. Neurologic: GCS 14 confusion, no focal deficits, symmetric strength 5/5 in the upper and lower extremities bilaterally  Psychiatric: Normal Affect    -------------------------------------------------- RESULTS -------------------------------------------------  Results are listed below.      LABS: (Lab results interpreted by me)  Results for orders placed or performed during the hospital encounter of 11/28/22   Culture, Urine    Specimen: Urine, clean catch   Result Value Ref Range    Urine Culture, Routine Growth not present    COVID-19, Rapid    Specimen: Nasopharyngeal Swab   Result Value Ref Range    SARS-CoV-2, NAAT Not Detected Not Detected   Rapid influenza A/B antigens    Specimen: Nasopharyngeal   Result Value Ref Range    Influenza A by PCR Not Detected Not Detected    Influenza B by PCR Not Detected Not Detected   COVID-19, Rapid    Specimen: Nasopharyngeal Swab; Nares   Result Value Ref Range    SARS-CoV-2, NAAT Not Detected Not Detected   CBC with Auto Differential   Result Value Ref Range    WBC 4.5 4.5 - 11.5 E9/L    RBC 4.34 3.80 - 5.80 E12/L    Hemoglobin 13.0 12.5 - 16.5 g/dL    Hematocrit 39.2 37.0 - 54.0 %    MCV 90.3 80.0 - 99.9 fL    MCH 30.0 26.0 - 35.0 pg    MCHC 33.2 32.0 - 34.5 %    RDW 12.2 11.5 - 15.0 fL    Platelets 592 765 - 860 E9/L    MPV 9.2 7.0 - 12.0 fL    Neutrophils % 65.5 43.0 - 80.0 %    Immature Granulocytes % 0.0 0.0 - 5.0 %    Lymphocytes % 21.8 20.0 - 42.0 %    Monocytes % 10.5 2.0 - 12.0 %    Eosinophils % 1.8 0.0 - 6.0 %    Basophils % 0.4 0.0 - 2.0 %    Neutrophils Absolute 2.94 1.80 - 7.30 E9/L    Immature Granulocytes # 0.00 E9/L    Lymphocytes Absolute 0.98 (L) 1.50 - 4.00 E9/L    Monocytes Absolute 0.47 0.10 - 0.95 E9/L    Eosinophils Absolute 0.08 0.05 - 0.50 E9/L    Basophils Absolute 0.02 0.00 - 0.20 E9/L   Comprehensive Metabolic Panel w/ Reflex to MG   Result Value Ref Range    Sodium 138 132 - 146 mmol/L    Potassium reflex Magnesium 4.1 3.5 - 5.0 mmol/L    Chloride 104 98 - 107 mmol/L    CO2 28 22 - 29 mmol/L    Anion Gap 6 (L) 7 - 16 mmol/L    Glucose 95 74 - 99 mg/dL    BUN 14 6 - 23 mg/dL    Creatinine 0.9 0.7 - 1.2 mg/dL    Est, Glom Filt Rate >60 >=60 mL/min/1.73    Calcium 9.2 8.6 - 10.2 mg/dL    Total Protein 6.1 (L) 6.4 - 8.3 g/dL    Albumin 3.7 3.5 - 5.2 g/dL    Total Bilirubin 0.5 0.0 - 1.2 mg/dL    Alkaline Phosphatase 79 40 - 129 U/L    ALT 13 0 - 40 U/L    AST 14 0 - 39 U/L   Troponin   Result Value Ref Range    Troponin, High Sensitivity 22 (H) 0 - 11 ng/L   Lactic Acid   Result Value Ref Range    Lactic Acid 0.9 0.5 - 2.2 mmol/L   Lipase   Result Value Ref Range    Lipase 16 13 - 60 U/L   Urinalysis with Microscopic   Result Value Ref Range    Color, UA Yellow Straw/Yellow    Clarity, UA Clear Clear    Glucose, Ur Negative Negative mg/dL    Bilirubin Urine Negative Negative    Ketones, Urine Negative Negative mg/dL    Specific Gravity, UA 1.020 1.005 - 1.030    Blood, Urine Negative Negative    pH, UA 6.0 5.0 - 9.0    Protein, UA Negative Negative mg/dL    Urobilinogen, Urine 1.0 <2.0 E.U./dL    Nitrite, Urine Negative Negative    Leukocyte Esterase, Urine Negative Negative    Mucus, UA Present (A) None Seen /LPF    WBC, UA 0-1 0 - 5 /HPF    RBC, UA 0-1 0 - 2 /HPF    Bacteria, UA RARE (A) None Seen /HPF   Troponin   Result Value Ref Range    Troponin, High Sensitivity 18 (H) 0 - 11 ng/L   Comprehensive Metabolic Panel w/ Reflex to MG   Result Value Ref Range    Sodium 136 132 - 146 mmol/L    Potassium reflex Magnesium 3.3 (L) 3.5 - 5.0 mmol/L    Chloride 100 98 - 107 mmol/L    CO2 24 22 - 29 mmol/L    Anion Gap 12 7 - 16 mmol/L    Glucose 84 74 - 99 mg/dL    BUN 11 6 - 23 mg/dL    Creatinine 0.8 0.7 - 1.2 mg/dL    Est, Glom Filt Rate >60 >=60 mL/min/1.73    Calcium 9.1 8.6 - 10.2 mg/dL    Total Protein 6.1 (L) 6.4 - 8.3 g/dL    Albumin 3.5 3.5 - 5.2 g/dL    Total Bilirubin 0.6 0.0 - 1.2 mg/dL    Alkaline Phosphatase 84 40 - 129 U/L    ALT 14 0 - 40 U/L    AST 14 0 - 39 U/L   CBC auto differential   Result Value Ref Range    WBC 8.5 4.5 - 11.5 E9/L    RBC 4.39 3.80 - 5.80 E12/L    Hemoglobin 13.0 12.5 - 16.5 g/dL    Hematocrit 39.0 37.0 - 54.0 %    MCV 88.8 80.0 - 99.9 fL    MCH 29.6 26.0 - 35.0 pg    MCHC 33.3 32.0 - 34.5 %    RDW 12.2 11.5 - 15.0 fL Absolute 0.18 0.05 - 0.50 E9/L    Basophils Absolute 0.02 0.00 - 0.20 E9/L   EKG 12 Lead   Result Value Ref Range    Ventricular Rate 59 BPM    Atrial Rate 59 BPM    P-R Interval 182 ms    QRS Duration 128 ms    Q-T Interval 524 ms    QTc Calculation (Bazett) 518 ms    P Axis 66 degrees    R Axis 64 degrees    T Axis 45 degrees   EKG 12 Lead   Result Value Ref Range    Ventricular Rate 64 BPM    Atrial Rate 61 BPM    QRS Duration 124 ms    Q-T Interval 444 ms    QTc Calculation (Bazett) 458 ms    R Axis 65 degrees    T Axis 43 degrees   ,       RADIOLOGY:      Radiologist interpretation  XR CHEST 1 VIEW   Final Result   No acute process. CT ABDOMEN PELVIS W IV CONTRAST Additional Contrast? None   Final Result   1. No acute abnormality. 2. Enlarged prostate gland. 3. Thickening of urinary bladder wall is likely secondary to chronic bladder   outlet obstruction. 4. Stable right renal artery aneurysm measuring approximately 1.8 cm in size. Consider surgical or endovascular treatment. CT HEAD WO CONTRAST   Final Result   No acute intracranial abnormality. Periventricular white matter changes consistent chronic microvascular disease. EKG Interpretation  Interpreted by emergency department physician, Dr. Chin Parra     EKG: This EKG is signed and interpreted by me.     Rate: 59  Rhythm: Sinus  Interpretation: sinus bradycardia, normal axis, right bundle branch block, t wave inversions in the lateral leads, intervals normal qtc is 458  Comparison: no previous EKG available         ------------------------- NURSING NOTES AND VITALS REVIEWED ---------------------------   The nursing notes within the ED encounter and vital signs as below have been reviewed by myself  BP (!) 165/82   Pulse 80   Temp 98 °F (36.7 °C) (Oral)   Resp 14   Ht 6' (1.829 m)   Wt 157 lb (71.2 kg)   SpO2 98%   BMI 21.29 kg/m²     Oxygen Saturation Interpretation: Normal    The patients available past medical records and past encounters were reviewed.         ------------------------------ ED COURSE/MEDICAL DECISION MAKING----------------------  Medications   ondansetron (ZOFRAN) injection 4 mg (4 mg IntraVENous Not Given 11/28/22 1900)   calcium carbonate (TUMS) chewable tablet 500 mg (0 mg Oral Held 11/28/22 1900)   amLODIPine (NORVASC) tablet 5 mg (5 mg Oral Given 11/30/22 0949)   aspirin EC tablet 81 mg (81 mg Oral Given 11/30/22 0949)   docusate sodium (COLACE) capsule 100 mg (100 mg Oral Given 11/30/22 1139)   acetaminophen (TYLENOL) tablet 650 mg (has no administration in time range)   loperamide (IMODIUM) capsule 2 mg (has no administration in time range)   losartan (COZAAR) tablet 100 mg (100 mg Oral Given 11/30/22 0949)   pantoprazole (PROTONIX) tablet 40 mg (40 mg Oral Given 11/30/22 0949)   potassium chloride (KLOR-CON M) extended release tablet 20 mEq (20 mEq Oral Given 11/30/22 0949)   prochlorperazine (COMPAZINE) tablet 10 mg (has no administration in time range)   sodium chloride flush 0.9 % injection 10 mL (10 mLs IntraVENous Not Given 11/30/22 0952)   sodium chloride flush 0.9 % injection 10 mL (has no administration in time range)   0.9 % sodium chloride infusion (has no administration in time range)   potassium chloride (KLOR-CON M) extended release tablet 40 mEq (has no administration in time range)     Or   potassium bicarb-citric acid (EFFER-K) effervescent tablet 40 mEq (has no administration in time range)     Or   potassium chloride 10 mEq/100 mL IVPB (Peripheral Line) (has no administration in time range)   enoxaparin (LOVENOX) injection 40 mg (40 mg SubCUTAneous Given 11/30/22 0950)   senna (SENOKOT) tablet 8.6 mg (has no administration in time range)   hydrALAZINE (APRESOLINE) injection 10 mg (has no administration in time range)   QUEtiapine (SEROQUEL) tablet 25 mg (25 mg Oral Given 11/29/22 8553)   citalopram (CELEXA) tablet 10 mg (10 mg Oral Given 11/30/22 3225)   0.9 % sodium chloride bolus (0 mLs IntraVENous Stopped 11/28/22 1627)   iopamidol (ISOVUE-370) 76 % injection 75 mL (75 mLs IntraVENous Given 11/28/22 1607)   sodium chloride flush 0.9 % injection 10 mL (10 mLs IntraVENous Given 11/28/22 1604)   acetaminophen (TYLENOL) tablet 1,000 mg (1,000 mg Oral Given 11/28/22 2258)   magnesium sulfate 2000 mg in 50 mL IVPB premix (0 mg IntraVENous Stopped 11/29/22 1208)           The cardiac monitor revealed NSR with a heart rate in the 80s as interpreted by me. The cardiac monitor was ordered secondary to the patient's headache and to monitor the patient for dysrhythmia. CPT [id number]       I, Dr. Rd Gonzalez, am the primary provider of record    Medical Decision Making:   The patient is an 81year-old-male who presents to the ED complaining of a headache, nausea, and fatigue. He is hemodynamically stable, nontoxic, and in no distress. Labs and EKG reassuring. Delta troponin negative. CT head no acute abnormalities. He needs placement into a facility that can help provide him more assistance. Admitted to medicine for placement. While not exhaustive, the following diagnoses were considered: UTI vs pneumonia vs electrolyte abnormality vs covid vs influenza      Oxygen Saturation Interpretation: 98 % on RA. Re-Evaluations:  ED Course as of 11/30/22 2135 Mon Nov 28, 2022 2007 Consulted with Zenobia Cote NP, who accepted for admission. [KG]      ED Course User Index  [KG] Irene Kay DO             This patient's ED course included: a personal history and physicial examination, re-evaluation prior to disposition, multiple bedside re-evaluations, IV medications, cardiac monitoring, continuous pulse oximetry, and complex medical decision making and emergency management    This patient has remained hemodynamically stable during their ED course. Consultations:  Spoke with NP for Dr. Kat Machuca (Medicine). Discussed case. They will admit this patient.'          Counseling:    The emergency provider has spoken with the patient and discussed todays results, in addition to providing specific details for the plan of care and counseling regarding the diagnosis and prognosis. Questions are answered at this time and they are agreeable with the plan.       --------------------------------- IMPRESSION AND DISPOSITION ---------------------------------    IMPRESSION  1. Failure to thrive in adult    2. Generalized weakness        DISPOSITION  Disposition: Admit to med/surg floor  Patient condition is stable        NOTE: This report was transcribed using voice recognition software.  Every effort was made to ensure accuracy; however, inadvertent computerized transcription errors may be present       Zuri Krueger DO  11/30/22 5511

## 2022-11-28 NOTE — ED NOTES
Daughter, Ricardo Pastrana, at bedside. Updated on plan of care and current patient status. Denies questions.      Caterina Singh RN  11/28/22 7033

## 2022-11-29 LAB
ALBUMIN SERPL-MCNC: 3.5 G/DL (ref 3.5–5.2)
ALP BLD-CCNC: 84 U/L (ref 40–129)
ALT SERPL-CCNC: 14 U/L (ref 0–40)
ANION GAP SERPL CALCULATED.3IONS-SCNC: 12 MMOL/L (ref 7–16)
AST SERPL-CCNC: 14 U/L (ref 0–39)
BASOPHILS ABSOLUTE: 0.02 E9/L (ref 0–0.2)
BASOPHILS RELATIVE PERCENT: 0.2 % (ref 0–2)
BILIRUB SERPL-MCNC: 0.6 MG/DL (ref 0–1.2)
BUN BLDV-MCNC: 11 MG/DL (ref 6–23)
CALCIUM SERPL-MCNC: 9.1 MG/DL (ref 8.6–10.2)
CHLORIDE BLD-SCNC: 100 MMOL/L (ref 98–107)
CO2: 24 MMOL/L (ref 22–29)
CREAT SERPL-MCNC: 0.8 MG/DL (ref 0.7–1.2)
EKG ATRIAL RATE: 59 BPM
EKG ATRIAL RATE: 61 BPM
EKG P AXIS: 66 DEGREES
EKG P-R INTERVAL: 182 MS
EKG Q-T INTERVAL: 444 MS
EKG Q-T INTERVAL: 524 MS
EKG QRS DURATION: 124 MS
EKG QRS DURATION: 128 MS
EKG QTC CALCULATION (BAZETT): 458 MS
EKG QTC CALCULATION (BAZETT): 518 MS
EKG R AXIS: 64 DEGREES
EKG R AXIS: 65 DEGREES
EKG T AXIS: 43 DEGREES
EKG T AXIS: 45 DEGREES
EKG VENTRICULAR RATE: 59 BPM
EKG VENTRICULAR RATE: 64 BPM
EOSINOPHILS ABSOLUTE: 0.13 E9/L (ref 0.05–0.5)
EOSINOPHILS RELATIVE PERCENT: 1.5 % (ref 0–6)
GFR SERPL CREATININE-BSD FRML MDRD: >60 ML/MIN/1.73
GLUCOSE BLD-MCNC: 84 MG/DL (ref 74–99)
HCT VFR BLD CALC: 39 % (ref 37–54)
HEMOGLOBIN: 13 G/DL (ref 12.5–16.5)
IMMATURE GRANULOCYTES #: 0.02 E9/L
IMMATURE GRANULOCYTES %: 0.2 % (ref 0–5)
LYMPHOCYTES ABSOLUTE: 1.4 E9/L (ref 1.5–4)
LYMPHOCYTES RELATIVE PERCENT: 16.4 % (ref 20–42)
MAGNESIUM: 1.8 MG/DL (ref 1.6–2.6)
MCH RBC QN AUTO: 29.6 PG (ref 26–35)
MCHC RBC AUTO-ENTMCNC: 33.3 % (ref 32–34.5)
MCV RBC AUTO: 88.8 FL (ref 80–99.9)
MONOCYTES ABSOLUTE: 0.7 E9/L (ref 0.1–0.95)
MONOCYTES RELATIVE PERCENT: 8.2 % (ref 2–12)
NEUTROPHILS ABSOLUTE: 6.25 E9/L (ref 1.8–7.3)
NEUTROPHILS RELATIVE PERCENT: 73.5 % (ref 43–80)
PDW BLD-RTO: 12.2 FL (ref 11.5–15)
PLATELET # BLD: 292 E9/L (ref 130–450)
PMV BLD AUTO: 9.9 FL (ref 7–12)
POTASSIUM REFLEX MAGNESIUM: 3.3 MMOL/L (ref 3.5–5)
RBC # BLD: 4.39 E12/L (ref 3.8–5.8)
SARS-COV-2, NAAT: NOT DETECTED
SODIUM BLD-SCNC: 136 MMOL/L (ref 132–146)
TOTAL PROTEIN: 6.1 G/DL (ref 6.4–8.3)
WBC # BLD: 8.5 E9/L (ref 4.5–11.5)

## 2022-11-29 PROCEDURE — 93005 ELECTROCARDIOGRAM TRACING: CPT | Performed by: STUDENT IN AN ORGANIZED HEALTH CARE EDUCATION/TRAINING PROGRAM

## 2022-11-29 PROCEDURE — 96366 THER/PROPH/DIAG IV INF ADDON: CPT

## 2022-11-29 PROCEDURE — 97165 OT EVAL LOW COMPLEX 30 MIN: CPT

## 2022-11-29 PROCEDURE — G0378 HOSPITAL OBSERVATION PER HR: HCPCS

## 2022-11-29 PROCEDURE — 36415 COLL VENOUS BLD VENIPUNCTURE: CPT

## 2022-11-29 PROCEDURE — 83735 ASSAY OF MAGNESIUM: CPT

## 2022-11-29 PROCEDURE — 97161 PT EVAL LOW COMPLEX 20 MIN: CPT

## 2022-11-29 PROCEDURE — 96365 THER/PROPH/DIAG IV INF INIT: CPT

## 2022-11-29 PROCEDURE — 2580000003 HC RX 258: Performed by: NURSE PRACTITIONER

## 2022-11-29 PROCEDURE — 80053 COMPREHEN METABOLIC PANEL: CPT

## 2022-11-29 PROCEDURE — 51702 INSERT TEMP BLADDER CATH: CPT

## 2022-11-29 PROCEDURE — 85025 COMPLETE CBC W/AUTO DIFF WBC: CPT

## 2022-11-29 PROCEDURE — 96372 THER/PROPH/DIAG INJ SC/IM: CPT

## 2022-11-29 PROCEDURE — 87635 SARS-COV-2 COVID-19 AMP PRB: CPT

## 2022-11-29 PROCEDURE — 6360000002 HC RX W HCPCS: Performed by: NURSE PRACTITIONER

## 2022-11-29 PROCEDURE — 6370000000 HC RX 637 (ALT 250 FOR IP): Performed by: STUDENT IN AN ORGANIZED HEALTH CARE EDUCATION/TRAINING PROGRAM

## 2022-11-29 PROCEDURE — 93010 ELECTROCARDIOGRAM REPORT: CPT | Performed by: INTERNAL MEDICINE

## 2022-11-29 PROCEDURE — 6370000000 HC RX 637 (ALT 250 FOR IP): Performed by: NURSE PRACTITIONER

## 2022-11-29 PROCEDURE — 6360000002 HC RX W HCPCS: Performed by: STUDENT IN AN ORGANIZED HEALTH CARE EDUCATION/TRAINING PROGRAM

## 2022-11-29 RX ORDER — SENNA PLUS 8.6 MG/1
1 TABLET ORAL DAILY PRN
DISCHARGE
Start: 2022-11-29 | End: 2022-12-29

## 2022-11-29 RX ORDER — MAGNESIUM SULFATE IN WATER 40 MG/ML
2000 INJECTION, SOLUTION INTRAVENOUS ONCE
Status: COMPLETED | OUTPATIENT
Start: 2022-11-29 | End: 2022-11-29

## 2022-11-29 RX ORDER — CITALOPRAM 20 MG/1
10 TABLET ORAL DAILY
Status: DISCONTINUED | OUTPATIENT
Start: 2022-11-29 | End: 2022-12-05 | Stop reason: HOSPADM

## 2022-11-29 RX ORDER — QUETIAPINE FUMARATE 25 MG/1
25 TABLET, FILM COATED ORAL NIGHTLY
Status: DISCONTINUED | OUTPATIENT
Start: 2022-11-29 | End: 2022-12-05 | Stop reason: HOSPADM

## 2022-11-29 RX ORDER — QUETIAPINE FUMARATE 25 MG/1
25 TABLET, FILM COATED ORAL NIGHTLY
Qty: 60 TABLET | Refills: 3 | DISCHARGE
Start: 2022-11-29

## 2022-11-29 RX ORDER — CITALOPRAM 10 MG/1
10 TABLET ORAL DAILY
Qty: 30 TABLET | Refills: 3 | DISCHARGE
Start: 2022-11-29

## 2022-11-29 RX ADMIN — Medication 10 ML: at 08:25

## 2022-11-29 RX ADMIN — ASPIRIN 81 MG: 81 TABLET, COATED ORAL at 08:23

## 2022-11-29 RX ADMIN — MAGNESIUM SULFATE HEPTAHYDRATE 2000 MG: 40 INJECTION, SOLUTION INTRAVENOUS at 10:08

## 2022-11-29 RX ADMIN — POTASSIUM CHLORIDE 20 MEQ: 1500 TABLET, EXTENDED RELEASE ORAL at 08:23

## 2022-11-29 RX ADMIN — Medication 10 ML: at 22:16

## 2022-11-29 RX ADMIN — LOSARTAN POTASSIUM 100 MG: 50 TABLET, FILM COATED ORAL at 08:37

## 2022-11-29 RX ADMIN — LORAZEPAM 0.5 MG: 0.5 TABLET ORAL at 08:23

## 2022-11-29 RX ADMIN — PAROXETINE 10 MG: 10 TABLET, FILM COATED ORAL at 08:23

## 2022-11-29 RX ADMIN — AMLODIPINE BESYLATE 5 MG: 5 TABLET ORAL at 08:23

## 2022-11-29 RX ADMIN — HYDROXYZINE PAMOATE 25 MG: 25 CAPSULE ORAL at 08:23

## 2022-11-29 RX ADMIN — ENOXAPARIN SODIUM 40 MG: 100 INJECTION SUBCUTANEOUS at 08:24

## 2022-11-29 RX ADMIN — DOCUSATE SODIUM 100 MG: 100 CAPSULE, LIQUID FILLED ORAL at 08:23

## 2022-11-29 RX ADMIN — QUETIAPINE FUMARATE 25 MG: 25 TABLET ORAL at 22:16

## 2022-11-29 RX ADMIN — CITALOPRAM HYDROBROMIDE 10 MG: 20 TABLET ORAL at 14:57

## 2022-11-29 RX ADMIN — PANTOPRAZOLE SODIUM 40 MG: 40 TABLET, DELAYED RELEASE ORAL at 08:23

## 2022-11-29 NOTE — H&P
Internal Medicine History & Physical     Name: Marcus Her  : 1935  Chief Complaint: Headache, Nausea, and Fatigue  Primary Care Physician: Jean-Pierre Díaz MD  Admission date: 2022  Date of service: 2022     History of Present Illness  Dia Cross is a 81y.o. year old male with a PMH of who presented with a chief complaint of failure to thrive. Patient is present with the his daughter-in-law. Upon initial evaluation this morning patient was unable to answer questions. A later evaluation was conducted and the daughter-in-law states that you speak into the device connected to the patient's headphones he is able to hear you. .  Patient is unable to speak in full sentences. Patient is unable to provide a history and the daughter in law states that he has been having ongoing problems with a wound on his left leg. Patient appears to be at his baseline after confirming with the daughter-in-law. In the ED patient was evaluated with a urinalysis, CT of the head with no contrast, and CT abdomen pelvis with contrast.  No abnormalities were appreciated on either the UTI or the CTs. patient was also given a chest x-ray which was negative.     Discussed with family at bedside extensively     The patient was admitted for failure to thrive      Past Medical History:   Diagnosis Date    Aneurysm of right renal artery ([address]     follows with Dr. Brian Cruz yearly (last visit )    Colitis     Depression     GERD (gastroesophageal reflux disease)     H/O: CVA (cerebrovascular accident) 10/14/2021    Hyperlipidemia     Hypertension     Stroke [facility])     Superior mesenteric artery stenosis ([address] 2020    TIA (transient ischemic attack)        Past Surgical History:   Procedure Laterality Date    ANTERIOR CRUCIATE LIGAMENT REPAIR Left 2001    APPENDECTOMY      CHOLECYSTECTOMY      Lap    ECHO COMPL W DOP COLOR FLOW  2012         HERNIA REPAIR Right 2002    double    SINUS SURGERY  , done x 2    TONSILLECTOMY      TOTAL KNEE ARTHROPLASTY Left 1/28/2019    LEFT  ROBOTIC KNEE TOTAL ARTHROPLASTY  ++MEREDITH- WHRLEDRI++   ++ADDUCTOR BLOCK++ performed by Keyon Valle MD at [id number] [place] [address] N/A 10/10/2019    EGD BIOPSY performed by Randy Schaffer MD at [facility]       No family history on file. Social History  Patient lives at an assisted living facility. At baseline patient ambulates with with a walker but has been using wheelchair more frequently  Illicit drugs: Denies   TOBACCO:   reports that he has never smoked. He has never used smokeless tobacco.  ETOH:   reports no history of alcohol use. Home Medications  Prior to Admission medications    Medication Sig Start Date End Date Taking? Authorizing Provider   LORazepam (ATIVAN) 0.5 MG tablet Take 1 tablet by mouth 2 times daily for 30 days.  11/23/22 12/23/22  Grady Marchi, DO   hydrOXYzine pamoate (VISTARIL) 25 MG capsule TAKE 1 CAPSULE BY MOUTH TWICE DAILY 11/21/22   Joaquínne Marchi, DO   loperamide (IMODIUM A-D) 2 MG tablet Take 1 tablet by mouth every 4 hours as needed for Diarrhea 10/13/22   Joaquínne Marchi, DO   docusate sodium (COLACE) 100 MG capsule Take 1 capsule by mouth daily 9/26/22   Joaquínne Marchi, DO   PARoxetine (PAXIL) 10 MG tablet Take 1 tablet by mouth daily 9/26/22   Joaquínne Marchi, DO   prochlorperazine (COMPAZINE) 10 MG tablet Take 1 tablet by mouth every 8 hours as needed (Nausea) 9/26/22   Joaquínne Marchi, DO   amLODIPine (NORVASC) 5 MG tablet Take 1 tablet by mouth daily 9/26/22   Joaquínne Marchi, DO   losartan (COZAAR) 100 MG tablet Take 1 tablet by mouth daily 1 QD 9/26/22   Joaquínne Marchi, DO   pantoprazole (PROTONIX) 40 MG tablet Take 1 tablet by mouth daily 9/26/22   Joaquínne Marchi, DO   potassium chloride (KLOR-CON M) 20 MEQ extended release tablet Take 1 tablet by mouth daily 1 qd 9/26/22   Jose Villa, DO   aspirin EC 81 MG EC tablet Take 1 tablet by mouth daily 9/26/22   Altagracia Huddleston DO   acetaminophen (TYLENOL 8 HOUR) 650 MG extended release tablet Take 1 tablet by mouth every 6 hours as needed for Pain 9/26/22   Roberto Day Rayaoff,    ondansetron (ZOFRAN-ODT) 4 MG disintegrating tablet Take 1 tablet by mouth every 8 hours as needed for Nausea or Vomiting 8/28/22   Shan Peralta MD   FLUoxetine (PROZAC) 20 MG capsule Take 1 capsule by mouth in the morning. 8/11/22 8/29/22  Roberto Day Traikoff, DO   hydroCHLOROthiazide (MICROZIDE) 12.5 MG capsule TAKE 1 CAPSULE BY MOUTH EVERY MORNING 6/8/22 8/29/22  Altagracia Huddleston DO       Allergies  No Known Allergies    Review of Systems  Review of systems severely limited by patient's ability to answer questions. Please see HPI above. All bolded are positive. All un-bolded are negative.   Constitutional Symptoms: fever, chills, fatigue, generalized weakness, diaphoresis, increase in thirst, loss of appetite  Eyes: vision change   Ears, Nose, Mouth, Throat: hearing loss, nasal congestion, sores in the mouth  Cardiovascular: chest pain, chest heaviness, palpitations  Respiratory: shortness of breath, wheezing, coughing  Gastrointestinal: abdominal pain, nausea, vomiting, diarrhea, constipation, melena, hematochezia, hematemesis  Genitourinary: dysuria, hematuria, increased frequency  Musculoskeletal: lower extremity edema, myalgias, arthralgias, back pain  Integumentary: rashes, itching   Neurological: headache, lightheadedness, dizziness, confusion, syncope, numbness, tingling, focal weakness  Psychiatric: depression, suicidal ideation, anxiety  Endocrine: unintentional weight change  Hematologic/Lymphatic: lymphadenopathy, easy bruising, easy bleeding   Allergic/Immunologic: recurrent infections      Objective  VITALS:  BP (!) 157/71   Pulse 62   Temp 97.4 °F (36.3 °C) (Oral)   Resp 16   Ht 6' (1.829 m)   Wt 150 lb (68 kg)   SpO2 98%   BMI 20.34 kg/m²     Physical Exam:  General: awake, alert, extremely cachectic, no acute distress  Eyes: conjunctivae/corneas clear, sclera non icteric, EOMI  Ears: no obvious scars, no lesions, no masses, hearing intact  Mouth: mucous membranes dry, no obvious oral sores  Head: normocephalic, atraumatic  Neck: no JVD, no adenopathy, no thyromegaly, neck is supple, trachea is midline  Back: ROM normal, no CVA tenderness. Chest: no pain on palpation  Lungs: clear to auscultation bilaterally, without rhonchi, crackle, wheezing, or rale, no retractions or use of accessory muscles  Heart: regular rate and regular rhythm, no murmur, normal S1, S2  Abdomen: soft, non-tender; bowel sounds normal; no masses, no organomegaly  : Deferred   Extremities: no lower extremity edema, left lateral lower the lateral malleolus has a chronic wound, no cyanosis, no clubbing, 2+ pedal pulses palpated  Skin: normal color, normal texture, normal turgor, no rashes, no lesions  Neurologic: Patient was able to move all 4 extremities, sensation intact. Labs-   Lab Results   Component Value Date    WBC 8.5 11/29/2022    HGB 13.0 11/29/2022    HCT 39.0 11/29/2022     11/29/2022     11/29/2022    K 3.3 (L) 11/29/2022     11/29/2022    CREATININE 0.8 11/29/2022    BUN 11 11/29/2022    CO2 24 11/29/2022    GLUCOSE 84 11/29/2022    ALT 14 11/29/2022    AST 14 11/29/2022    INR 1.2 07/13/2022     Lab Results   Component Value Date    CKTOTAL 59 12/03/2012    CKMB 1.1 12/03/2012    TROPONINI <0.01 05/02/2021       Last echocardiogram:  ECHO: 1/23/17  Left ventricle grossly normal in size. Normal LV segmental wall motion. Normal left ventricular wall thickness. Estimated left ventricular ejection fraction is 70 %. Diastolic function normal as LAESV Index is <29 ml/m2. Normal right ventricular size and function. Technically fair quality study. No comparison study available. Suggest clinical correlation.     Recent Radiological Studies:  XR CHEST 1 VIEW   Final Result   No Hyperlipidemia 01/22/2017     Priority: Low    Constipation 04/11/2022    Myalgia 01/05/2022    H/O: CVA (cerebrovascular accident) 10/14/2021    BPH associated with nocturia 09/14/2021    Abnormal liver enzymes 09/14/2021    Chronic sinusitis 06/08/2021    Seasonal allergic rhinitis due to pollen 05/25/2021    Chronic intractable headache 04/02/2021    Superior mesenteric artery stenosis ([address] 11/04/2020     Approximately 40% stenosis based upon the CTA 10/20/2020      Non-seasonal allergic rhinitis 10/01/2020    Weakness 09/17/2020    Decreased appetite 10/21/2019    Anxiety 10/21/2019    Chronic gastritis without bleeding 09/04/2019    Impaired fasting glucose 08/22/2019    Depression 08/15/2019    Osteoarthritis of left knee 01/28/2019    Aneurysm of right renal artery (HCC) 10/03/2018     1.7 cm         Plan  Failure to thrive   PT OT   Fall precautions   CM SW consults   Placement likely needed   Hypokalemia and hypomagnesemia   Monitor and replete   PT/OT  Consults dietitian  Home medications to be reconciled and confirmed prior to being ordered  DVT prophylaxis Lovenox  Code Status full  Discharge plan: Can DC once accepted     Saurabh Stephens, DO  Internal medicine   11/29/2022  2:57 PM    I can be reached through [employer]. NOTE:  This report was transcribed using voice recognition software. Every effort was made to ensure accuracy; however, inadvertent computerized transcription errors may be present. Addendum: I have personally participated in an independent face-to-face history and physical exam on the date of service with the patient. I have independently formulated and executed the above assessment and plan. The vitals, labs, imaging, medications and treatment plan were reviewed independently by myself in addition to with the resident doctor.      I agree with the above documentation and treatment plan     Electronically signed by Christiane Donaldson MD on 11/29/2022 at 5:50 PM    I can be reached through [employer].

## 2022-11-29 NOTE — DISCHARGE INSTR - COC
Continuity of Care Form    Patient Name: Ward Leonard   :  1935  MRN:  [id number]    Admit date:  2022  Discharge date:  ***    Code Status Order: Full Code   Advance Directives:     Admitting Physician:  Hyacinth Whitlock DO  PCP: Patricia Hopper MD    Discharging Nurse: [facility] Unit/Room#: 80/5-A  Discharging Unit Phone Number: ***    Emergency Contact:   Extended Emergency Contact Information  Primary Emergency Contact: Fro[address] Phone: [phone]  Work Phone: [phone]  Mobile Phone: [phone]  Relation: Child   needed?  No    Past Surgical History:  Past Surgical History:   Procedure Laterality Date    ANTERIOR CRUCIATE LIGAMENT REPAIR Left 2001    APPENDECTOMY      CHOLECYSTECTOMY      Lap    ECHO COMPL W DOP COLOR FLOW  2012         HERNIA REPAIR Right 2002    double    SINUS SURGERY  ,     done x 2    TONSILLECTOMY      TOTAL KNEE ARTHROPLASTY Left 2019    LEFT  ROBOTIC KNEE TOTAL ARTHROPLASTY  ++MEREDITH- RNKYVGBH++   ++ADDUCTOR BLOCK++ performed by Jamil Isaac MD at [facility] N/A 10/10/2019    EGD BIOPSY performed by Stanley Cantu MD at [facility] History:   Immunization History   Administered Date(s) Administered    COVID-19, MODERNA BLUE border, Primary or Immunocompromised, (age 12y+), IM, 100 mcg/0.5mL 2021, 2021, 10/26/2021    COVID-19, MODERNA Bivalent BOOSTER, (age 12y+), IM, 48 mcg/0.5 mL 10/21/2022    Influenza Virus Vaccine 10/26/2006, 10/15/2007, 10/06/2008, 2010, 2011, 2013, 2015, 10/07/2016, 10/04/2017, 2018    Influenza, FLUAD, (age 72 y+), Adjuvanted, 0.5mL 10/01/2020, 10/07/2021, 10/20/2022    Influenza, Triv, inactivated, subunit, adjuvanted, IM (Fluad 65 yrs and older) 10/15/2019    Pneumococcal Conjugate 13-valent (Debqujz83) 10/15/2019    Pneumococcal Polysaccharide (Wckoqwlik15) 12/15/2005, 12/16/2005, 09/23/2010, 04/22/2019    Tdap (Boostrix, Adacel) 11/09/2021    Zoster Recombinant (Shingrix) 01/28/2020, 08/15/2020       Active Problems:  Patient Active Problem List   Diagnosis Code    Nausea R11.0    Major depressive disorder F32.9    Insomnia d/t depression G47.00    Decreased sense of vibration at the toes R20.8    Essential hypertension I10    Hyperlipidemia E78.5    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    Chronic intractable headache R51.9, G89.29    Seasonal allergic rhinitis due to pollen J30.1    Chronic sinusitis J32.9    BPH associated with nocturia N40.1, R35.1    Abnormal liver enzymes R74.8    H/O: CVA (cerebrovascular accident) Z86.73    Myalgia M79.10    Constipation K59.00    Chronic sinus complaints R09.89    Gait disturbance R26.9    Failure to thrive in adult R62.7       Isolation/Infection:   Isolation            No Isolation          Patient Infection Status       Infection Onset Added Last Indicated Last Indicated By Review Planned Expiration Resolved Resolved By    None active    Resolved    COVID-19 (Rule Out) 11/28/22 11/28/22 11/28/22 COVID-19, Rapid (Ordered)   11/28/22 Rule-Out Test Resulted    COVID-19 (Rule Out) 08/25/22 08/25/22 08/25/22 COVID-19, Rapid (Ordered)   08/25/22 Rule-Out Test Resulted    COVID-19 (Rule Out) 11/23/21 11/23/21 11/22/21 COVID-19 Ambulatory (Ordered)   11/24/21 Rule-Out Test Resulted    COVID-19 (Rule Out) 09/02/20 09/02/20 09/02/20 COVID-19 Ambulatory (Ordered)   09/03/20 Rule-Out Test Resulted            Nurse Assessment:  Last Vital Signs: BP (!) 157/71   Pulse 62   Temp 97.4 °F (36.3 °C) (Oral)   Resp 16   Ht 6' (1.829 m)   Wt 150 lb (68 kg)   SpO2 98%   BMI 20.34 kg/m²     Last documented pain score (0-10 scale): Pain Level: 4  Last Weight:   Wt Readings from Last 1 Encounters:   11/28/22 150 lb (68 kg)     Mental Status:  oriented and alert    IV Access:  - None    Nursing Mobility/ADLs:  Walking   Assisted  Transfer  Assisted  Bathing  Assisted  Dressing  Assisted  Toileting  Assisted  Feeding  Independent  Med Admin  Independent  Med Delivery   none    Wound Care Documentation and Therapy:  Wound 08/25/22 Elbow Left;Posterior abrasion (Active)   Number of days: 96        Elimination:  Continence: Bowel: Yes  Bladder: Yes  Urinary Catheter: Insertion Date: 11/30    Colostomy/Ileostomy/Ileal Conduit: No       Date of Last BM: 12/5/22    Intake/Output Summary (Last 24 hours) at 11/29/2022 1228  Last data filed at 11/29/2022 0012  Gross per 24 hour   Intake --   Output 1000 ml   Net -1000 ml     I/O last 3 completed shifts:  In: -   Out: 1000 [Urine:1000]    Safety Concerns: At Risk for Falls    Impairments/Disabilities:      None    Nutrition Therapy:  Current Nutrition Therapy:   - Oral Diet:  General    Routes of Feeding: Oral  Liquids: No Restrictions  Daily Fluid Restriction: no  Last Modified Barium Swallow with Video (Video Swallowing Test): done on ***/***    Treatments at the Time of Hospital Discharge:   Respiratory Treatments:     Oxygen Therapy:  is not on home oxygen therapy.   Ventilator:    - No ventilator support    Rehab Therapies: Physical Therapy, Occupational Therapy, and Speech/Language Therapy  Weight Bearing Status/Restrictions: No weight bearing restrictions  Other Medical Equipment (for information only, NOT a DME order):  walker  Other Treatments: ***    Patient's personal belongings (please select all that are sent with patient):  Glasses    RN SIGNATURE:  Electronically signed by Aleshia Dove RN on 12/5/22 at 3:49 PM EST    CASE MANAGEMENT/SOCIAL WORK SECTION    Inpatient Status Date: ***    Readmission Risk Assessment Score:  Readmission Risk              Risk of Unplanned Readmission:  0           Discharging to Facility/ Agency   Name: [facility]  Address: [address] [place] [address], [facility] [address]  Phone: [phone]  Fax: [phone]    Dialysis Facility (if applicable)   Name:  Address:  Dialysis Schedule:  Phone:  Fax:    / signature: Electronically signed by Darlene Shahid RN on 11/29/22 at 12:29 PM EST    PHYSICIAN SECTION    Prognosis: Fair    Condition at Discharge: Stable    Rehab Potential (if transferring to Rehab): Good    Recommended Labs or Other Treatments After Discharge: [address]    Physician Certification: I certify the above information and transfer of Abimbola Bonilla  is necessary for the continuing treatment of the diagnosis listed and that he requires [facility] for greater 30 days.      Update Admission H&P: No change in H&P    PHYSICIAN SIGNATURE:  Electronically signed by Danny Amezcua MD on 11/29/22 at 1:04 PM EST

## 2022-11-29 NOTE — PROGRESS NOTES
The combination of citalopram and quetiapine may cause QTc interval prolongation.   Please monitor QTc interval.  Eliud De Leon [facility] 11/29/2022 1:41 PM

## 2022-11-29 NOTE — PROGRESS NOTES
Internal Medicine On-call Care Coordination Note    I was called by the ED physician because they recommended admission for this patient and we cover their PCP. The history as I understand it after discussion with the ED physician is as follows:    Presented from AL with generalized weakness, fatigue, nausea and headache  Workup in ED essentially negative  Patient needs placement in SNF    I placed admission orders. Including:    General admit orders  Admit under observation for placement    Either Dr. Sin Garcia, Dr. Giovanna Dupree, or our coverage will see the patient tomorrow for H&P.     Electronically signed by DASHAWN Aguilar CNP on 11/28/2022 at 9:15 PM

## 2022-11-29 NOTE — CARE COORDINATION
Received notification from [facility] that facility  can accept patient for SNF stay today. Non emergency transportation has been arranged with Jonn Pereyra,  time 330 PM.  Patient's daughters, facility liaison and bedside nurse notified of scheduled  time. Malorie Rose, MSN RN  [facility] Case Management  [phone]    Facility notified and transportation cancelled based on LSW note. Malorie Rose, JOANNA RN  [facility] Case Management  [phone]

## 2022-11-29 NOTE — PROGRESS NOTES
Physical Therapy  Facility/Department: [facility] SURGERY  Physical Therapy Initial Assessment    Name: Yoko Lowe  : 1935  MRN: [id number]  Date of Service: 2022      Patient Diagnosis(es): The primary encounter diagnosis was Failure to thrive in adult. A diagnosis of Generalized weakness was also pertinent to this visit. Past Medical History:  has a past medical history of Aneurysm of right renal artery ([facility] Colitis, Depression, GERD (gastroesophageal reflux disease), H/O: CVA (cerebrovascular accident), Hyperlipidemia, Hypertension, Stroke [facility]), Superior mesenteric artery stenosis ([facility] and TIA (transient ischemic attack). Past Surgical History:  has a past surgical history that includes Appendectomy; Tonsillectomy; ECHO Compl W Dop Color Flow (2012); sinus surgery (0290,0163); Anterior cruciate ligament repair (Left, 2001); Cholecystectomy (); hernia repair (Right, 2002); Total knee arthroplasty (Left, 2019); and Upper gastrointestinal endoscopy (N/A, 10/10/2019). Referring provider:  Dalton Celestin MD    PT Order:  PT eval and treat     Evaluating PT:  Celia Doran PT, DPT PT [id number]    Room #:  6531/9295-I  Diagnosis:  Generalized weakness [R53.1]  Failure to thrive in adult [R62.7]  Precautions:  fall risk  Equipment Needs:  none. Pt reported owing a walker and cane. SUBJECTIVE:    Pt lives at [facility]. Pt reported he has been independent with mobility with a walker until recently he felt weak and needed more assistance. OBJECTIVE:   Initial Evaluation  Date:  Treatment Short Term/ Long Term   Goals   Was pt agreeable to Eval/treatment? yes     Does pt have pain? None reported     Bed Mobility  Rolling: NT  Supine to sit: Mod A  Sit to supine: NT  Scooting:  Mod A to sitting EOB  SBA   Transfers Sit to stand: Min A  Stand to sit: Min A  Stand pivot:  Min A  SBA   Ambulation    15 feet with w/w Min A   100 feet with w/w SBA    Stair negotiation: ascended and descended  NT      ROM BLE:  WFL     Strength BLE:  grossly 4/5  Grossly 4+/5   Balance Sitting EOB:  supervision  Dynamic Standing:  Min A  Sitting EOB:  independent  Dynamic Standing:  SBA with w/w   AM-PAC 6 Clicks 92/08       Pt is A & O x 3  Sensation:  Pt denies numbness and tingling to extremities    Patient education  Pt educated on PT objectives during eval and while in the hospital, hand placement during transfers, posture when standing. Patient response to education:   Pt verbalized understanding Pt demonstrated skill Pt requires further education in this area   yes With cueing yes     ASSESSMENT:    Conditions Requiring Skilled Therapeutic Intervention:    [x]Decreased strength     []Decreased ROM  [x]Decreased functional mobility  [x]Decreased balance   [x]Decreased endurance   [x]Decreased posture  []Decreased sensation  []Decreased coordination   []Decreased vision  []Decreased safety awareness   []Increased pain       Comments:  Pt found in bed. No report of dizziness during functional mobility. Cueing required for hand placement during transfers. Pt ambulates with slow gait speed and shuffled steps. No LOB. At end of eval, pt left in the restroom with OT present. Pt's/ family goals   1. None stated    Prognosis is good for reaching above PT goals. Patient and or family understand(s) diagnosis, prognosis, and plan of care.   yes    PHYSICAL THERAPY PLAN OF CARE:    PT POC is established based on physician order and patient diagnosis     Diagnosis:  Generalized weakness [R53.1]  Failure to thrive in adult [R62.7]    Current Treatment Recommendations:     [x] Strengthening to improve independence with functional mobility   [] ROM to improve independence with functional mobility   [x] Balance Training to improve static/dynamic balance and to reduce fall risk  [x] Endurance Training to improve activity tolerance during functional mobility   [x] Transfer Training to improve safety and independence with all functional transfers   [x] Gait Training to improve gait mechanics, endurance and assess need for appropriate assistive device  [] Stair Training in preparation for safe discharge home and/or into the community   [] Positioning to prevent skin breakdown and contractures  [x] Safety and Education Training   [x] Patient/Caregiver Education   [] HEP  [] Other     PT long term treatment goals are located in above grid    Frequency of treatments: 2-5x/week x 1-2 weeks. Time in  0918  Time out  0935    Evaluation Time includes thorough review of current medical information, gathering information on past medical history/social history and prior level of function, completion of standardized testing/informal observation of tasks, assessment of data and education on plan of care and goals.     CPT codes:  [x] Low Complexity PT evaluation 46610  [] Moderate Complexity PT evaluation 70056  [] High Complexity PT evaluation 07062  [] PT Re-evaluation 31608  [] Therapeutic activities 96392 __minutes  [] Therapeutic exercises 77437 __ minutes      Stan De La Torre, PT, DPT  PT [id number]

## 2022-11-29 NOTE — PROGRESS NOTES
Occupational Therapy    OCCUPATIONAL THERAPY INITIAL EVALUATION     [facility], [place]         OAAP:                                                  Patient Name: Berry Harmon    MRN: [id number]    : 1935    Room: [facility]      Evaluating OT: Sola Jacques OTR/L   [id number]      Referring Provider:DASHAWN Gallo CNP    Specific Provider Orders/Date:OT eval and treat 2022      Diagnosis  Generalized weakness [R53.1]  Failure to thrive in adult [R62.7]     Pertinent Medical History: stroke, HTN,      Precautions:  Fall Risk, alarm      Assessment of current deficits    [x] Functional mobility  [x]ADLs  [x] Strength               []Cognition    [x] Functional transfers   [x] IADLs         [x] Safety Awareness   [x]Endurance    [] Fine Coordination              [x] Balance      [] Vision/perception   []Sensation     []Gross Motor Coordination  [] ROM  [] Delirium                   [] Motor Control     OT PLAN OF CARE   OT POC based on physician orders, patient diagnosis and results of clinical assessment    Frequency/Duration  2-4 days/wk for 2 weeks PRN   Specific OT Treatment Interventions to include:   ADL retraining/adapted techniques and AE recommendations to increase functional independence within precautions                    Energy conservation techniques to improve tolerance for selfcare routine   Functional transfer/mobility training/DME recommendations for increased independence, safety and fall prevention         Patient/family education to increase safety and functional independence             Environmental modifications for safe mobility and completion of ADLs                             Therapeutic activity to improve functional performance during ADLs.                                          Therapeutic exercise to improve tolerance and functional strength for ADLs    Balance retraining/tolerance tasks for facilitation of postural control with dynamic challenges during ADLs . Positioning to improve functional independence        Recommended Adaptive Equipment: TBD     Home Living: Pt lives at [address]   patient reports he has been getting weaker- using wheelchair more. Otherwise ambulates with walker      Pain Level:   burning with urination ; nurse notified   Cognition: A&O:  pleasant, following commands, conversing    Memory:  forgetful    Sequencing:  fair    Problem solving:  fair    Judgement/safety:  fair      Functional Assessment:  AM-PAC Daily Activity Raw Score: 16/24   Initial Eval Status  Date: 11/29/22 Treatment Status  Date: STGs = LTGs  Time frame: 10-14 days   Feeding Set-up   Independent    Grooming Min A  Standing at sink washing hands  Supervision    UB Dressing Min A   Supervision    LB Dressing Mod A   SBA    Bathing Mod A   SBA    Toileting Min A   Assist with thorough   SBA    Bed Mobility  Mod A  Supine to sit   Min A  Sit-supine  SBA    Functional Transfers Min A  Sit - stand from bed   SBA    Functional Mobility Min A,w/walker   Ambulated to/from bathroom   SBA  with good tolerance    Balance Sitting:     Static:  SBA - EOB     Dynamic:Min A   Standing: Min A   SBA/supervision    Activity Tolerance No SOB   Overall decrease endurance and strength   Good  with ADL activity    Visual/  Perceptual Glasses: yes                 Hand Dominance right   AROM (PROM) Strength Additional Info:    RUE  WFL Fair+ good  and wfl FMC/dexterity noted during ADL tasks       LUE WFL Fair+ good  and wfl FMC/dexterity noted during ADL tasks       Hearing: WFL   Sensation:  No c/o numbness or tingling  Tone: WFL   Edema: none observed     Comments: Upon arrival patient lying in bed . At end of session, patient returned to bed  with call light and phone within reach, all lines and tubes intact.   *ALARM ON     Overall patient demonstrated  decreased independence and safety during completion of ADL/functional transfer/mobility tasks. Pt would benefit from continued skilled OT to increase safety and independence with completion of ADL/IADL tasks for functional independence and quality of life. Rehab Potential: good for established goals     Patient / Family Goal: none stated       Patient and/or family were instructed on functional diagnosis, prognosis/goals and OT plan of care. Demonstrated fair  understanding. Eval Complexity: Low    Time In: 0921  Time Out: 0945      Min Units   OT Eval Low 97165 x  1   OT Eval Medium 63784      OT Eval High 77652      OT Re-Eval [id number]       Therapeutic Ex ([phone]      Therapeutic Activities 61127       ADL/Self Care 38508       Orthotic Management 19896       Manual 90251     Neuro Re-Ed 99961       Non-Billable Time         Evaluation Time additionally includes thorough review of current medical information, gathering information on past medical history/social history and prior level of function, interpretation of standardized testing/informal observation of tasks, assessment of data and development of plan of care and goals.             Danielle Diamond  OTR/L  OT [id number]

## 2022-11-29 NOTE — CARE COORDINATION
Social Work / Discharge Planning : PASSAR completed. Referred  to further review. Once cleared, then patient can be discharged. A[facility]ing clearance from  [facility] did download as much supporting documentation that was available. H and P and ER note  still not completed. SW to follow.  Electronically signed by MAURICIO Jurado on 11/29/22 at 2:09 PM EST

## 2022-11-29 NOTE — CARE COORDINATION
Patient is here from [address] [facility]. Notes indicate goal post discharge is anusha/snf. Spoke with family (patient's two daughters) who desire rehab stay at [facility]. Referral called to [address] [place] [address] facility who can accept patient today. Communication sent to primary care team to consider discharge. Ludin Tavares.  [place], MSN RN  [facility] Case Management  [phone]

## 2022-11-30 PROBLEM — E43 SEVERE PROTEIN-CALORIE MALNUTRITION (HCC): Status: ACTIVE | Noted: 2022-11-30

## 2022-11-30 PROBLEM — R11.0 NAUSEA: Status: RESOLVED | Noted: 2017-01-22 | Resolved: 2022-11-30

## 2022-11-30 LAB
ALBUMIN SERPL-MCNC: 3.5 G/DL (ref 3.5–5.2)
ALP BLD-CCNC: 90 U/L (ref 40–129)
ALT SERPL-CCNC: 13 U/L (ref 0–40)
ANION GAP SERPL CALCULATED.3IONS-SCNC: 10 MMOL/L (ref 7–16)
AST SERPL-CCNC: 14 U/L (ref 0–39)
BASOPHILS ABSOLUTE: 0.02 E9/L (ref 0–0.2)
BASOPHILS RELATIVE PERCENT: 0.3 % (ref 0–2)
BILIRUB SERPL-MCNC: 0.5 MG/DL (ref 0–1.2)
BUN BLDV-MCNC: 12 MG/DL (ref 6–23)
CALCIUM SERPL-MCNC: 9.1 MG/DL (ref 8.6–10.2)
CHLORIDE BLD-SCNC: 102 MMOL/L (ref 98–107)
CO2: 27 MMOL/L (ref 22–29)
CREAT SERPL-MCNC: 0.9 MG/DL (ref 0.7–1.2)
EOSINOPHILS ABSOLUTE: 0.18 E9/L (ref 0.05–0.5)
EOSINOPHILS RELATIVE PERCENT: 2.5 % (ref 0–6)
GFR SERPL CREATININE-BSD FRML MDRD: >60 ML/MIN/1.73
GLUCOSE BLD-MCNC: 93 MG/DL (ref 74–99)
HCT VFR BLD CALC: 40.2 % (ref 37–54)
HEMOGLOBIN: 13.6 G/DL (ref 12.5–16.5)
IMMATURE GRANULOCYTES #: 0.03 E9/L
IMMATURE GRANULOCYTES %: 0.4 % (ref 0–5)
LYMPHOCYTES ABSOLUTE: 1.22 E9/L (ref 1.5–4)
LYMPHOCYTES RELATIVE PERCENT: 17.3 % (ref 20–42)
MCH RBC QN AUTO: 30.3 PG (ref 26–35)
MCHC RBC AUTO-ENTMCNC: 33.8 % (ref 32–34.5)
MCV RBC AUTO: 89.5 FL (ref 80–99.9)
MONOCYTES ABSOLUTE: 0.64 E9/L (ref 0.1–0.95)
MONOCYTES RELATIVE PERCENT: 9.1 % (ref 2–12)
NEUTROPHILS ABSOLUTE: 4.97 E9/L (ref 1.8–7.3)
NEUTROPHILS RELATIVE PERCENT: 70.4 % (ref 43–80)
PDW BLD-RTO: 12.1 FL (ref 11.5–15)
PLATELET # BLD: 298 E9/L (ref 130–450)
PMV BLD AUTO: 9.8 FL (ref 7–12)
POTASSIUM REFLEX MAGNESIUM: 3.7 MMOL/L (ref 3.5–5)
RBC # BLD: 4.49 E12/L (ref 3.8–5.8)
SODIUM BLD-SCNC: 139 MMOL/L (ref 132–146)
TOTAL PROTEIN: 6.2 G/DL (ref 6.4–8.3)
URINE CULTURE, ROUTINE: NORMAL
WBC # BLD: 7.1 E9/L (ref 4.5–11.5)

## 2022-11-30 PROCEDURE — G0378 HOSPITAL OBSERVATION PER HR: HCPCS

## 2022-11-30 PROCEDURE — 6370000000 HC RX 637 (ALT 250 FOR IP): Performed by: NURSE PRACTITIONER

## 2022-11-30 PROCEDURE — 6360000002 HC RX W HCPCS: Performed by: NURSE PRACTITIONER

## 2022-11-30 PROCEDURE — 80053 COMPREHEN METABOLIC PANEL: CPT

## 2022-11-30 PROCEDURE — 6370000000 HC RX 637 (ALT 250 FOR IP): Performed by: STUDENT IN AN ORGANIZED HEALTH CARE EDUCATION/TRAINING PROGRAM

## 2022-11-30 PROCEDURE — 36415 COLL VENOUS BLD VENIPUNCTURE: CPT

## 2022-11-30 PROCEDURE — 85025 COMPLETE CBC W/AUTO DIFF WBC: CPT

## 2022-11-30 PROCEDURE — 96372 THER/PROPH/DIAG INJ SC/IM: CPT

## 2022-11-30 RX ADMIN — AMLODIPINE BESYLATE 5 MG: 5 TABLET ORAL at 09:49

## 2022-11-30 RX ADMIN — CITALOPRAM HYDROBROMIDE 10 MG: 20 TABLET ORAL at 09:50

## 2022-11-30 RX ADMIN — LOSARTAN POTASSIUM 100 MG: 50 TABLET, FILM COATED ORAL at 09:49

## 2022-11-30 RX ADMIN — POTASSIUM CHLORIDE 20 MEQ: 1500 TABLET, EXTENDED RELEASE ORAL at 09:49

## 2022-11-30 RX ADMIN — DOCUSATE SODIUM 100 MG: 100 CAPSULE, LIQUID FILLED ORAL at 11:39

## 2022-11-30 RX ADMIN — ENOXAPARIN SODIUM 40 MG: 100 INJECTION SUBCUTANEOUS at 09:50

## 2022-11-30 RX ADMIN — PANTOPRAZOLE SODIUM 40 MG: 40 TABLET, DELAYED RELEASE ORAL at 09:49

## 2022-11-30 RX ADMIN — ASPIRIN 81 MG: 81 TABLET, COATED ORAL at 09:49

## 2022-11-30 RX ADMIN — QUETIAPINE FUMARATE 25 MG: 25 TABLET ORAL at 21:50

## 2022-11-30 NOTE — DISCHARGE SUMMARY
Internal Medicine Discharge Summary    NAME: Eboni Macias :  1935  MRN:  [id number] PCP:No primary care provider on file. ADMITTED: 2022   DISCHARGED: 2022  5:40 PM    ADMITTING PHYSICIAN: Bridgette Loredo, DO    PCP: ?    CONSULTANT(S):   IP CONSULT TO HOSPITALIST  IP CONSULT TO SOCIAL WORK  IP CONSULT TO CASE MANAGEMENT  IP CONSULT TO UROLOGY     ADMITTING DIAGNOSIS:   Generalized weakness [R53.1]  Failure to thrive in adult [R62.7]     Please see H&P for further details    DISCHARGE DIAGNOSES:   Active Hospital Problems    Diagnosis     Failure to thrive in adult [R62.7]      Priority: High    Generalized weakness [R53.1]      Priority: Medium    Severe protein-calorie malnutrition ([address] [E43]      Priority: Medium    Gait disturbance [R26.9]      Priority: Medium    Myalgia [M79.10]      Priority: Medium    Weakness [R53.1]      Priority: Medium    H/O: CVA (cerebrovascular accident) [Z86.73]     BPH associated with nocturia [N40.1, R35.1]     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 male patient of No primary care provider on file. who  has a past medical history of Aneurysm of right renal artery ([address] Colitis, Depression, GERD (gastroesophageal reflux disease), H/O: CVA (cerebrovascular accident), Hyperlipidemia, Hypertension, Stroke [facility]), Superior mesenteric artery stenosis ([address] and TIA (transient ischemic attack). who originally had concerns including Headache, Nausea, and Fatigue.  at presentation on 2022, and was found to have Generalized weakness [R53.1]  Failure to thrive in adult [R62.7] after workup. Please see H&P for further details. HOSPITAL COURSE:   The patient presented to the hospital with the chief complaint of Headache, Nausea, and Fatigue  . The patient was admitted to the hospital.     Failure to thrive:  Unfortunately this is close to the patient's baseline  He lives at an [address] [place] [address] at baseline  PT/OT evaluations- PT AM-PAC-- 15/24  Discharge to [facility] and eventually transition to [facility]  Consider palliative care discussions as an outpatient      BRIEF PHYSICAL EXAMINATION AND LABORATORIES ON DAY OF DISCHARGE:  VITALS:  /76   Pulse 92   Temp 98.8 °F (37.1 °C) (Oral)   Resp 16   Ht 6' (1.829 m)   Wt 157 lb (71.2 kg)   SpO2 95%   BMI 21.29 kg/m²     Please see note from the same day. LABS[de-identified]  No results for input(s): NA, K, CL, CO2, BUN, CREATININE, GLUCOSE, CALCIUM in the last 72 hours. No results for input(s): ALKPHOS, ALT, AST, PROT, BILITOT, BILIDIR, LABALBU in the last 72 hours. No results for input(s): WBC, RBC, HGB, HCT, MCV, MCH, MCHC, RDW, PLT, MPV in the last 72 hours. Lab Results   Component Value Date    LABA1C 5.6 01/03/2022    LABA1C 5.4 08/14/2021    LABA1C 5.1 01/25/2021     Lab Results   Component Value Date    INR 1.2 07/13/2022    INR 1.2 08/31/2021    INR 1.2 12/03/2012    PROTIME 13.1 (H) 07/13/2022    PROTIME 13.3 (H) 08/31/2021    PROTIME 12.3 12/03/2012      Lab Results   Component Value Date    TSH 1.700 03/29/2022    TSH 1.590 01/03/2022    TSH 1.920 06/22/2021     Lab Results   Component Value Date    TRIG 64 03/29/2022    TRIG 62 01/03/2022    TRIG 70 08/14/2021    HDL 44 03/29/2022    HDL 44 01/03/2022    HDL 39 08/14/2021    LDLCALC 93 03/29/2022    LDLCALC 92 01/03/2022    LDLCALC 75 08/14/2021     No results for input(s): MG in the last 72 hours. No results for input(s): CKTOTAL, CKMB, TROPONINI in the last 72 hours. No results for input(s): LACTA in the last 72 hours.       IMAGING:  CT HEAD WO CONTRAST    Result Date: 11/28/2022  EXAMINATION: CT OF THE HEAD WITHOUT CONTRAST  11/28/2022 3:59 pm TECHNIQUE: CT of the head was performed without the administration of intravenous contrast. Automated exposure control, iterative reconstruction, and/or weight based adjustment of the mA/kV was utilized to reduce the radiation dose to as low as reasonably achievable. COMPARISON: 08/25/2022 HISTORY: ORDERING SYSTEM PROVIDED HISTORY: headache TECHNOLOGIST PROVIDED HISTORY: Has a \"code stroke\" or \"stroke alert\" been called? ->No Reason for exam:->headache Decision Support Exception - unselect if not a suspected or confirmed emergency medical condition->Emergency Medical Condition (MA) FINDINGS: BRAIN/VENTRICLES: There is no acute intracranial hemorrhage, mass effect or midline shift. No abnormal extra-axial fluid collection. The gray-white differentiation is maintained without evidence of an acute infarct. There is no evidence of hydrocephalus. Periventricular white matter changes consistent chronic microvascular disease. ORBITS: The visualized portion of the orbits demonstrate no acute abnormality. SINUSES: The visualized paranasal sinuses and mastoid air cells demonstrate no acute abnormality. SOFT TISSUES/SKULL:  No acute abnormality of the visualized skull or soft tissues. No acute intracranial abnormality. Periventricular white matter changes consistent chronic microvascular disease. CT ABDOMEN PELVIS W IV CONTRAST Additional Contrast? None    Result Date: 11/28/2022  EXAMINATION: CT OF THE ABDOMEN AND PELVIS WITH CONTRAST 11/28/2022 3:59 pm TECHNIQUE: CT of the abdomen and pelvis was performed with the administration of intravenous contrast. Multiplanar reformatted images are provided for review. Automated exposure control, iterative reconstruction, and/or weight based adjustment of the mA/kV was utilized to reduce the radiation dose to as low as reasonably achievable.  COMPARISON: CT abdomen and pelvis 07/13/2022 HISTORY: ORDERING SYSTEM PROVIDED HISTORY: abd pain TECHNOLOGIST PROVIDED HISTORY: Reason for exam:->abd pain Additional Contrast?->None Decision Support Exception - unselect if not a suspected or confirmed emergency medical condition->Emergency Medical Condition (MA) FINDINGS: There are a couple of low-density lesions in the liver with the largest in the left hepatic lobe measuring approximately 6 mm in size. These likely represent benign, incidental findings. The spleen and pancreas are unremarkable. There is thickening of the bilateral adrenal glands which appears unchanged compared to the prior examination. The bilateral kidneys are grossly unremarkable. The patient is status post cholecystectomy without evidence of biliary ductal dilatation. There is no evidence of bowel obstruction, pneumoperitoneum, or ascites. There is enlargement of the prostate gland. There is thickening of urinary bladder wall. There is atherosclerotic disease without abdominal aortic aneurysm. There is right renal artery aneurysm measuring approximately 1.8 cm in size, stable. There is atelectasis and/or scarring within the bilateral lung bases. There are diffuse degenerative changes throughout the spine. 1. No acute abnormality. 2. Enlarged prostate gland. 3. Thickening of urinary bladder wall is likely secondary to chronic bladder outlet obstruction. 4. Stable right renal artery aneurysm measuring approximately 1.8 cm in size. Consider surgical or endovascular treatment. XR CHEST 1 VIEW    Result Date: 11/28/2022  EXAMINATION: ONE XRAY VIEW OF THE CHEST 11/28/2022 4:29 pm COMPARISON: None. HISTORY: ORDERING SYSTEM PROVIDED HISTORY: weakness, fatigue TECHNOLOGIST PROVIDED HISTORY: Reason for exam:->weakness, fatigue FINDINGS: The lungs are without acute focal process. There is no effusion or pneumothorax. The cardiomediastinal silhouette is without acute process.  The osseous structures are without acute process. No acute process. DISPOSITION:  The patient's condition is fair.    The patient is being discharged to nursing home    DISCHARGE MEDICATIONS:      Medication List        START taking these medications      citalopram 10 MG tablet  Commonly known as: CELEXA  Take 1 tablet by mouth daily     QUEtiapine 25 MG tablet  Commonly known as: SEROQUEL  Take 1 tablet by mouth nightly     senna 8.6 MG tablet  Commonly known as: SENOKOT  Take 1 tablet by mouth daily as needed (Constipation)     tamsulosin 0.4 MG capsule  Commonly known as: FLOMAX  Take 1 capsule by mouth at bedtime            CONTINUE taking these medications      acetaminophen 650 MG extended release tablet  Commonly known as: Tylenol 8 Hour  Take 1 tablet by mouth every 6 hours as needed for Pain     amLODIPine 5 MG tablet  Commonly known as: NORVASC  Take 1 tablet by mouth daily     aspirin EC 81 MG EC tablet  Take 1 tablet by mouth daily     docusate sodium 100 MG capsule  Commonly known as: COLACE  Take 1 capsule by mouth daily     loperamide 2 MG tablet  Commonly known as: IMODIUM A-D  Take 1 tablet by mouth every 4 hours as needed for Diarrhea     losartan 100 MG tablet  Commonly known as: COZAAR  Take 1 tablet by mouth daily 1 QD     ondansetron 4 MG disintegrating tablet  Commonly known as: ZOFRAN-ODT  Take 1 tablet by mouth every 8 hours as needed for Nausea or Vomiting     pantoprazole 40 MG tablet  Commonly known as: PROTONIX  Take 1 tablet by mouth daily     potassium chloride 20 MEQ extended release tablet  Commonly known as: KLOR-CON M  Take 1 tablet by mouth daily 1 qd     prochlorperazine 10 MG tablet  Commonly known as: COMPAZINE  Take 1 tablet by mouth every 8 hours as needed (Nausea)            STOP taking these medications      FLUoxetine 20 MG capsule  Commonly known as: PROZAC     hydroCHLOROthiazide 12.5 MG capsule  Commonly known as: MICROZIDE     hydrOXYzine pamoate 25 MG capsule  Commonly known as: VISTARIL LORazepam 0.5 MG tablet  Commonly known as: Ativan     PARoxetine 10 MG tablet  Commonly known as: PAXIL               Where to Get Your Medications        Information about where to get these medications is not yet available    Ask your nurse or doctor about these medications  citalopram 10 MG tablet  QUEtiapine 25 MG tablet  senna 8.6 MG tablet  tamsulosin 0.4 MG capsule           INTERNAL MEDICINE INSTRUCTIONS:  Follow-up with primary care physician as directed in discharge paperwork. Please review results of imaging studies with PCP. Follow-up with consultants as directed by them. If recurrence or worsening of symptoms go to the ED or call primary care physician. Diet: ADULT DIET; Regular    FOLLOW-IP  Mark Hernadez DO  [place] [address]  [phone]    Schedule an appointment as soon as possible for a visit in 1 week(s)  for follow-up appointment from this hospital stay after DC from [facility]SNF    Naomi Bae, [address] [place] [address].  [phone]    Schedule an appointment as soon as possible for a visit  Can remove huynh in 1 week      Preparing for this patient's discharge, including paperwork, orders, instructions, and meeting with patient did required 35 minutes.     Electronically signed by Zuly Sen DO on 12/6/2022 at 5:55 PM

## 2022-11-30 NOTE — PROGRESS NOTES
Comprehensive Nutrition Assessment    Type and Reason for Visit:  Initial, Positive Nutrition Screen    Nutrition Recommendations/Plan:   Continue current diet  Will add Ensure BID (prefers strawberry, no chocolate)     Malnutrition Assessment:  Malnutrition Status:  Severe malnutrition (11/30/22 1519)    Context:  Chronic Illness     Findings of the 6 clinical characteristics of malnutrition:  Energy Intake:  75% or less estimated energy requirements for 1 month or longer  Weight Loss:  7.5% over 3 months     Body Fat Loss:  Severe body fat loss Buccal region, Triceps   Muscle Mass Loss:  Severe muscle mass loss Clavicles (pectoralis & deltoids), Temples (temporalis)  Fluid Accumulation:  No significant fluid accumulation     Strength:  Not Performed    Nutrition Assessment:    AL pt w/ FTT, discharge order written w/ planning for SNF in progress. Hx CVA. Pt w/ severe malnutrition, will add ONS & monitor. Nutrition Related Findings:    A&O/[place], -I&Os, no edema, abd WDL Wound Type: None       Current Nutrition Intake & Therapies:    Average Meal Intake: 51-75%, % (per intake sheet in room, per daughter poor intake PTA)  Average Supplements Intake: None Ordered  ADULT DIET; Regular    Anthropometric Measures:  Height: 6' (182.9 cm)  Ideal Body Weight (IBW): 178 lbs (81 kg)    Admission Body Weight: 157 lb (71.2 kg) (11/30 bed)  Current Body Weight: 157 lb (71.2 kg), 88.2 % IBW.  Weight Source: Bed Scale (11/30)  Current BMI (kg/m2): 21.3  Usual Body Weight: 170 lb (77.1 kg) (8/2022 actual per EMR)  % Weight Change (Calculated): -7.6  Weight Adjustment For: No Adjustment                 BMI Categories: Underweight (BMI less than 22) age over 72    Estimated Daily Nutrient Needs:  Energy Requirements Based On: Kcal/kg  Weight Used for Energy Requirements: Current  Energy (kcal/day):   Weight Used for Protein Requirements: Current  Protein (g/day): 80-90 (1.1-1.3)  Method Used for Fluid Requirements: 1 ml/kcal  Fluid (ml/day):     Nutrition Diagnosis:   Severe malnutrition, In context of chronic illness related to inadequate protein-energy intake as evidenced by Criteria as identified in malnutrition assessment    Nutrition Interventions:   Food and/or Nutrient Delivery: Continue Current Diet, Start Oral Nutrition Supplement  Nutrition Education/Counseling: Education initiated (discussed intake/ONS w/ pt & pt's daughter)  Coordination of Nutrition Care: Continue to monitor while inpatient       Goals:     Goals: by next RD assessment, PO intake 75% or greater       Nutrition Monitoring and Evaluation:      Food/Nutrient Intake Outcomes: Food and Nutrient Intake, Supplement Intake  Physical Signs/Symptoms Outcomes: Biochemical Data, GI Status, Fluid Status or Edema, Nutrition Focused Physical Findings, Skin, Weight    Discharge Planning:    Continue Oral Nutrition Supplement     Angelica Young RD, LD  Contact: [phone]

## 2022-11-30 NOTE — PROGRESS NOTES
Internal Medicine Progress Note    Patient's name: Pankaj Laura  : 1935  Chief complaints (on day of admission): Headache, Nausea, and Fatigue  Admission date: 2022  Date of service: 2022   Room: [address]  Primary care physician: No primary care provider on file. Reason for visit: Follow-up for weakness and failure to thrive     Subjective  Roxy Canales was seen and examined. He was lying in bed. He is very hard of hearing. He had no complaints. He seemed to be okay with going to a nursing facility today. Review of Systems  There are no new complaints of chest pain, shortness of breath, abdominal pain, nausea, vomiting, diarrhea, constipation.     Hospital Medications  Current Facility-Administered Medications   Medication Dose Route Frequency Provider Last Rate Last Admin    QUEtiapine (SEROQUEL) tablet 25 mg  25 mg Oral Nightly Alma Sequeira MD   25 mg at 22 2216    citalopram (CELEXA) tablet 10 mg  10 mg Oral Daily Alma Sequeira MD   10 mg at 22 0950    ondansetron [facility]) injection 4 mg  4 mg IntraVENous Once Ada Guerra DO        calcium carbonate (TUMS) chewable tablet 500 mg  500 mg Oral Once Carolina Pelayo DO        amLODIPine (NORVASC) tablet 5 mg  5 mg Oral Daily Wyvonna Baptiste, APRN - CNP   5 mg at 22 0949    aspirin EC tablet 81 mg  81 mg Oral Daily Wyvonna Baptiste, APRN - CNP   81 mg at 22 0949    docusate sodium (COLACE) capsule 100 mg  100 mg Oral Daily Wyvonna Baptiste, APRN - CNP   100 mg at 22 8232    acetaminophen (TYLENOL) tablet 650 mg  650 mg Oral Q6H PRN Wyvonna Baptiste, APRN - CNP        loperamide (IMODIUM) capsule 2 mg  2 mg Oral Q4H PRN Wyvonna Baptiste, APRN - CNP        losartan (COZAAR) tablet 100 mg  100 mg Oral Daily Wyvonna Baptiste, APRN - CNP   100 mg at 22 0949    pantoprazole (PROTONIX) tablet 40 mg  40 mg Oral Daily Wyvonna Baptiste, APRN - CNP   40 mg at 22 0949    potassium chloride (KLOR-CON M) extended release tablet 20 mEq  20 mEq Oral Daily Kranthi Reji, APRN - CNP   20 mEq at 11/30/22 0949    prochlorperazine (COMPAZINE) tablet 10 mg  10 mg Oral Q8H PRN Kranthi Reji, APRN - CNP        sodium chloride flush 0.9 % injection 10 mL  10 mL IntraVENous 2 times per day Kranthi Reji, APRN - CNP   10 mL at 11/29/22 2216    sodium chloride flush 0.9 % injection 10 mL  10 mL IntraVENous PRN Kranthi Reji, APRN - CNP        0.9 % sodium chloride infusion   IntraVENous PRN Kranthi Reji, APRN - CNP        potassium chloride (KLOR-CON M) extended release tablet 40 mEq  40 mEq Oral PRN Kranthi Reji, APRN - CNP        Or    potassium bicarb-citric acid (EFFER-K) effervescent tablet 40 mEq  40 mEq Oral PRN Kranthi Reji, APRN - CNP        Or    potassium chloride 10 mEq/100 mL IVPB (Peripheral Line)  10 mEq IntraVENous PRN Kranthi Reji, APRN - CNP        enoxaparin (LOVENOX) injection 40 mg  40 mg SubCUTAneous Daily Kranthi Reji, APRN - CNP   40 mg at 11/30/22 0950    senna (SENOKOT) tablet 8.6 mg  1 tablet Oral Daily PRN Kranthi Reji, APRN - CNP        hydrALAZINE (APRESOLINE) injection 10 mg  10 mg IntraVENous Q6H PRN Kranthi Reji, APRN - CNP           PRN Medications  acetaminophen, loperamide, prochlorperazine, sodium chloride flush, sodium chloride, potassium chloride **OR** potassium alternative oral replacement **OR** potassium chloride, senna, hydrALAZINE    Objective  Most Recent Recorded Vitals  BP (!) 152/82   Pulse 87   Temp 98 °F (36.7 °C) (Oral)   Resp 16   Ht 6' (1.829 m)   Wt 150 lb (68 kg)   SpO2 98%   BMI 20.34 kg/m²   I/O last 3 completed shifts:  In: -   Out: 1800 [Urine:1800]  No intake/output data recorded.     Physical Exam:  General: AAO to person/place/time/purpose, NAD, no labored breathing, extremely hard of hearing, appears weak and frail  Eyes: conjunctivae/corneas clear, sclera non icteric  Skin: color/texture/turgor normal, no rashes or lesions  Lungs: CTAB, no retractions/use of accessory muscles, no vocal fremitus, no rhonchi, no crackle, no rales  Heart: regular rate, regular rhythm, no murmur  Abdomen: soft, NT, bowel sounds normal  Extremities: atraumatic, no edema  Neurologic: cranial nerves 2-12 grossly intact, no slurred speech    Most Recent Labs  Lab Results   Component Value Date    WBC 7.1 11/30/2022    HGB 13.6 11/30/2022    HCT 40.2 11/30/2022     11/30/2022     11/30/2022    K 3.7 11/30/2022     11/30/2022    CREATININE 0.9 11/30/2022    BUN 12 11/30/2022    CO2 27 11/30/2022    GLUCOSE 93 11/30/2022    ALT 13 11/30/2022    AST 14 11/30/2022    INR 1.2 07/13/2022    TSH 1.700 03/29/2022    LABA1C 5.6 01/03/2022    LABMICR <12.0 11/09/2022       XR CHEST 1 VIEW   Final Result   No acute process. CT ABDOMEN PELVIS W IV CONTRAST Additional Contrast? None   Final Result   1. No acute abnormality. 2. Enlarged prostate gland. 3. Thickening of urinary bladder wall is likely secondary to chronic bladder   outlet obstruction. 4. Stable right renal artery aneurysm measuring approximately 1.8 cm in size. Consider surgical or endovascular treatment. CT HEAD WO CONTRAST   Final Result   No acute intracranial abnormality. Periventricular white matter changes consistent chronic microvascular disease. 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 Discharge plan: [facility]/SNF once PASSAR is cleared-okay to go today from my point of view    Electronically signed by Faith Young DO on 11/30/2022 at 10:37 AM    I can be reached through [employer].

## 2022-11-30 NOTE — CARE COORDINATION
11/30/2022  Social Work Discharge Planning:Plan is to go to [facility]. Awaiting further review-waiting for response from [address] Medicaid. Rajesh BELL, [facility] and transport form are completed.  Electronically signed by MAURICIO Cesar on 11/30/2022 at 9:06 AM

## 2022-12-01 PROBLEM — R53.1 GENERALIZED WEAKNESS: Status: ACTIVE | Noted: 2022-12-01

## 2022-12-01 LAB
ALBUMIN SERPL-MCNC: 3.7 G/DL (ref 3.5–5.2)
ALP BLD-CCNC: 93 U/L (ref 40–129)
ALT SERPL-CCNC: 13 U/L (ref 0–40)
ANION GAP SERPL CALCULATED.3IONS-SCNC: 9 MMOL/L (ref 7–16)
AST SERPL-CCNC: 14 U/L (ref 0–39)
BASOPHILS ABSOLUTE: 0.02 E9/L (ref 0–0.2)
BASOPHILS RELATIVE PERCENT: 0.3 % (ref 0–2)
BILIRUB SERPL-MCNC: 0.5 MG/DL (ref 0–1.2)
BUN BLDV-MCNC: 13 MG/DL (ref 6–23)
CALCIUM SERPL-MCNC: 9.3 MG/DL (ref 8.6–10.2)
CHLORIDE BLD-SCNC: 103 MMOL/L (ref 98–107)
CO2: 28 MMOL/L (ref 22–29)
CREAT SERPL-MCNC: 0.9 MG/DL (ref 0.7–1.2)
EOSINOPHILS ABSOLUTE: 0.13 E9/L (ref 0.05–0.5)
EOSINOPHILS RELATIVE PERCENT: 2 % (ref 0–6)
GFR SERPL CREATININE-BSD FRML MDRD: >60 ML/MIN/1.73
GLUCOSE BLD-MCNC: 87 MG/DL (ref 74–99)
HCT VFR BLD CALC: 41 % (ref 37–54)
HEMOGLOBIN: 13.6 G/DL (ref 12.5–16.5)
IMMATURE GRANULOCYTES #: 0.02 E9/L
IMMATURE GRANULOCYTES %: 0.3 % (ref 0–5)
LYMPHOCYTES ABSOLUTE: 1.28 E9/L (ref 1.5–4)
LYMPHOCYTES RELATIVE PERCENT: 19.6 % (ref 20–42)
MCH RBC QN AUTO: 29.8 PG (ref 26–35)
MCHC RBC AUTO-ENTMCNC: 33.2 % (ref 32–34.5)
MCV RBC AUTO: 89.9 FL (ref 80–99.9)
MONOCYTES ABSOLUTE: 0.72 E9/L (ref 0.1–0.95)
MONOCYTES RELATIVE PERCENT: 11 % (ref 2–12)
NEUTROPHILS ABSOLUTE: 4.35 E9/L (ref 1.8–7.3)
NEUTROPHILS RELATIVE PERCENT: 66.8 % (ref 43–80)
PDW BLD-RTO: 12.3 FL (ref 11.5–15)
PLATELET # BLD: 307 E9/L (ref 130–450)
PMV BLD AUTO: 10 FL (ref 7–12)
POTASSIUM REFLEX MAGNESIUM: 3.6 MMOL/L (ref 3.5–5)
RBC # BLD: 4.56 E12/L (ref 3.8–5.8)
SODIUM BLD-SCNC: 140 MMOL/L (ref 132–146)
TOTAL PROTEIN: 5.9 G/DL (ref 6.4–8.3)
WBC # BLD: 6.5 E9/L (ref 4.5–11.5)

## 2022-12-01 PROCEDURE — 6370000000 HC RX 637 (ALT 250 FOR IP): Performed by: STUDENT IN AN ORGANIZED HEALTH CARE EDUCATION/TRAINING PROGRAM

## 2022-12-01 PROCEDURE — 36415 COLL VENOUS BLD VENIPUNCTURE: CPT

## 2022-12-01 PROCEDURE — 97530 THERAPEUTIC ACTIVITIES: CPT

## 2022-12-01 PROCEDURE — 1200000000 HC SEMI PRIVATE

## 2022-12-01 PROCEDURE — 6370000000 HC RX 637 (ALT 250 FOR IP): Performed by: INTERNAL MEDICINE

## 2022-12-01 PROCEDURE — 6370000000 HC RX 637 (ALT 250 FOR IP): Performed by: NURSE PRACTITIONER

## 2022-12-01 PROCEDURE — 80053 COMPREHEN METABOLIC PANEL: CPT

## 2022-12-01 PROCEDURE — 85025 COMPLETE CBC W/AUTO DIFF WBC: CPT

## 2022-12-01 PROCEDURE — 6360000002 HC RX W HCPCS: Performed by: NURSE PRACTITIONER

## 2022-12-01 RX ORDER — TAMSULOSIN HYDROCHLORIDE 0.4 MG/1
0.4 CAPSULE ORAL NIGHTLY
Qty: 30 CAPSULE | Refills: 0 | DISCHARGE
Start: 2022-12-01

## 2022-12-01 RX ORDER — TAMSULOSIN HYDROCHLORIDE 0.4 MG/1
0.4 CAPSULE ORAL NIGHTLY
Status: DISCONTINUED | OUTPATIENT
Start: 2022-12-01 | End: 2022-12-05 | Stop reason: HOSPADM

## 2022-12-01 RX ADMIN — POTASSIUM CHLORIDE 20 MEQ: 1500 TABLET, EXTENDED RELEASE ORAL at 10:32

## 2022-12-01 RX ADMIN — ENOXAPARIN SODIUM 40 MG: 100 INJECTION SUBCUTANEOUS at 10:33

## 2022-12-01 RX ADMIN — ASPIRIN 81 MG: 81 TABLET, COATED ORAL at 10:32

## 2022-12-01 RX ADMIN — DOCUSATE SODIUM 100 MG: 100 CAPSULE, LIQUID FILLED ORAL at 10:31

## 2022-12-01 RX ADMIN — AMLODIPINE BESYLATE 5 MG: 5 TABLET ORAL at 10:31

## 2022-12-01 RX ADMIN — PANTOPRAZOLE SODIUM 40 MG: 40 TABLET, DELAYED RELEASE ORAL at 10:32

## 2022-12-01 RX ADMIN — QUETIAPINE FUMARATE 25 MG: 25 TABLET ORAL at 20:59

## 2022-12-01 RX ADMIN — TAMSULOSIN HYDROCHLORIDE 0.4 MG: 0.4 CAPSULE ORAL at 20:59

## 2022-12-01 RX ADMIN — CITALOPRAM HYDROBROMIDE 10 MG: 20 TABLET ORAL at 10:32

## 2022-12-01 RX ADMIN — LOSARTAN POTASSIUM 100 MG: 50 TABLET, FILM COATED ORAL at 10:32

## 2022-12-01 ASSESSMENT — PAIN SCALES - GENERAL
PAINLEVEL_OUTOF10: 0
PAINLEVEL_OUTOF10: 0

## 2022-12-01 NOTE — PROGRESS NOTES
Sagar Wilburn NP with urology notified of consult via answering service.  Shantell Dyer 12/1/2022 8:57 AM

## 2022-12-01 NOTE — CARE COORDINATION
12/1/2022  Social Work Discharge Planning:Still waiting for outcome of [facility] which went for further review. Plan is to go to [place]. [facility] and transport form are completed.  Electronically signed by MAURICIO Coronado on 12/1/2022 at 8:31 AM

## 2022-12-01 NOTE — PROGRESS NOTES
Internal Medicine Progress Note    Patient's name: Luis De Leon  : 1935  Chief complaints (on day of admission): Headache, Nausea, and Fatigue  Admission date: 2022  Date of service: 2022   Room: [address]  Primary care physician: No primary care provider on file. Reason for visit: Follow-up for weakness and failure to thrive     Subjective  Ingrid Serrano was seen and examined. He was sitting up in a chair in his room. He has a Panchal catheter in place. He was moving around well. He seemed to be able to hear better today. He was okay with going to rehab if approved by insurance. Review of Systems   There are no new complaints of chest pain, shortness of breath, abdominal pain, nausea, vomiting, diarrhea, constipation.     Hospital Medications  Current Facility-Administered Medications   Medication Dose Route Frequency Provider Last Rate Last Admin    tamsulosin (FLOMAX) capsule 0.4 mg  0.4 mg Oral Nightly Calos García DO        QUEtiapine (SEROQUEL) tablet 25 mg  25 mg Oral Nightly Luciana Ornelas MD   25 mg at 22 2150    citalopram (CELEXA) tablet 10 mg  10 mg Oral Daily Luciana Ornelas MD   10 mg at 22 0950    ondansetron [facility]) injection 4 mg  4 mg IntraVENous Once Paige Fair DO        calcium carbonate (TUMS) chewable tablet 500 mg  500 mg Oral Once Carolina Pelayo DO        amLODIPine (NORVASC) tablet 5 mg  5 mg Oral Daily Blinda Nova, APRN - CNP   5 mg at 22 0949    aspirin EC tablet 81 mg  81 mg Oral Daily Blinda Nova, APRN - CNP   81 mg at 22 0949    docusate sodium (COLACE) capsule 100 mg  100 mg Oral Daily Blinda Nova, APRN - CNP   100 mg at 22 1139    acetaminophen (TYLENOL) tablet 650 mg  650 mg Oral Q6H PRN Blinda Nova, APRN - CNP        loperamide (IMODIUM) capsule 2 mg  2 mg Oral Q4H PRN Blinda Nova, APRN - CNP        losartan (COZAAR) tablet 100 mg  100 mg Oral Daily Blinda Nova, APRN - CNP 100 mg at 11/30/22 0949    pantoprazole (PROTONIX) tablet 40 mg  40 mg Oral Daily Drucella Heidi, APRN - CNP   40 mg at 11/30/22 0949    potassium chloride (KLOR-CON M) extended release tablet 20 mEq  20 mEq Oral Daily Drucella Heidi, APRN - CNP   20 mEq at 11/30/22 0949    prochlorperazine (COMPAZINE) tablet 10 mg  10 mg Oral Q8H PRN Drucella Heidi, APRN - CNP        sodium chloride flush 0.9 % injection 10 mL  10 mL IntraVENous 2 times per day Drucella Heidi, APRN - CNP   10 mL at 11/29/22 2216    sodium chloride flush 0.9 % injection 10 mL  10 mL IntraVENous PRN Drucella Heidi, APRN - CNP        0.9 % sodium chloride infusion   IntraVENous PRN Drucella Heidi, APRN - CNP        potassium chloride (KLOR-CON M) extended release tablet 40 mEq  40 mEq Oral PRN Drucella Heidi, APRN - CNP        Or    potassium bicarb-citric acid (EFFER-K) effervescent tablet 40 mEq  40 mEq Oral PRN Drucella Heidi, APRN - CNP        Or    potassium chloride 10 mEq/100 mL IVPB (Peripheral Line)  10 mEq IntraVENous PRN Drucella Heidi, APRN - CNP        enoxaparin (LOVENOX) injection 40 mg  40 mg SubCUTAneous Daily Drucella Heidi, APRN - CNP   40 mg at 11/30/22 0950    senna (SENOKOT) tablet 8.6 mg  1 tablet Oral Daily PRN Drucella Heidi, APRN - CNP        hydrALAZINE (APRESOLINE) injection 10 mg  10 mg IntraVENous Q6H PRN Drucella Heidi, APRN - CNP           PRN Medications  acetaminophen, loperamide, prochlorperazine, sodium chloride flush, sodium chloride, potassium chloride **OR** potassium alternative oral replacement **OR** potassium chloride, senna, hydrALAZINE    Objective  Most Recent Recorded Vitals  BP (!) 175/74   Pulse 60   Temp 97.7 °F (36.5 °C) (Oral)   Resp 16   Ht 6' (1.829 m)   Wt 157 lb (71.2 kg)   SpO2 98%   BMI 21.29 kg/m²   I/O last 3 completed shifts:  In: -   Out: 350 [Urine:350]  No intake/output data recorded.     Physical Exam:   General: Awake and alert, seems oriented, NAD, no labored breathing, extremely hard of hearing, appears weak and frail  Eyes: conjunctivae/corneas clear, sclera non icteric  Skin: color/texture/turgor normal, no rashes or lesions  Lungs: CTAB, no retractions/use of accessory muscles, no vocal fremitus, no rhonchi, no crackle, no rales  Heart: regular rate, regular rhythm, no murmur  Abdomen: soft, NT, bowel sounds normal, Panchal catheter in place  Extremities: atraumatic, no edema  Neurologic: cranial nerves 2-12 grossly intact, no slurred speech    Most Recent Labs  Lab Results   Component Value Date    WBC 6.5 12/01/2022    HGB 13.6 12/01/2022    HCT 41.0 12/01/2022     12/01/2022     12/01/2022    K 3.6 12/01/2022     12/01/2022    CREATININE 0.9 12/01/2022    BUN 13 12/01/2022    CO2 28 12/01/2022    GLUCOSE 87 12/01/2022    ALT 13 12/01/2022    AST 14 12/01/2022    INR 1.2 07/13/2022    TSH 1.700 03/29/2022    LABA1C 5.6 01/03/2022    LABMICR <12.0 11/09/2022       XR CHEST 1 VIEW   Final Result   No acute process. CT ABDOMEN PELVIS W IV CONTRAST Additional Contrast? None   Final Result   1. No acute abnormality. 2. Enlarged prostate gland. 3. Thickening of urinary bladder wall is likely secondary to chronic bladder   outlet obstruction. 4. Stable right renal artery aneurysm measuring approximately 1.8 cm in size. Consider surgical or endovascular treatment. CT HEAD WO CONTRAST   Final Result   No acute intracranial abnormality. Periventricular white matter changes consistent chronic microvascular disease.                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]          Plan  Failure to thrive:  Unfortunately this is close to the patient's baseline mental status  He lives at an AL at baseline  PT/OT evaluations- PT AM-PAC-- 15/24-Case discussed with PT on 12/1  Discharge to SNF/[facility] and eventually transition to [facility]  Consider palliative care discussions as an outpatient    Urinary retention:   Continue Panchal catheter for now-placed on 11/30  Urology consultation  Start Flomax  Urinalysis negative on 11/28  Urine culture showed no growth on 11/28    Follow labs   DVT prophylaxis  Please see orders for further management and care. Discharge plan: [facility]/SNF once PASSAR is cleared-okay to go today from my point of view if okay with urology    Electronically signed by Lin Mccall DO on 12/1/2022 at 9:07 AM    I can be reached through [address] [place] [address].

## 2022-12-01 NOTE — PROGRESS NOTES
Physical Therapy  Facility/Department: [facility]  Treatment Note    Name: Yoko Lowe  : 1935  MRN: [id number]  Date of Service: 2022      Patient Diagnosis(es): The primary encounter diagnosis was Failure to thrive in adult. A diagnosis of Generalized weakness was also pertinent to this visit. Past Medical History:  has a past medical history of Aneurysm of right renal artery ([facility] [address] Colitis, Depression, GERD (gastroesophageal reflux disease), H/O: CVA (cerebrovascular accident), Hyperlipidemia, Hypertension, Stroke [facility]), Superior mesenteric artery stenosis ([facility] [address] and TIA (transient ischemic attack). Past Surgical History:  has a past surgical history that includes Appendectomy; Tonsillectomy; ECHO Compl W Dop Color Flow (2012); sinus surgery (8902,3545); Anterior cruciate ligament repair (Left, 2001); Cholecystectomy (); hernia repair (Right, 2002); Total knee arthroplasty (Left, 2019); and Upper gastrointestinal endoscopy (N/A, 10/10/2019). Referring provider:  Sylvie Villarreal DO    PT Order:  PT eval and treat     Evaluating PT:  Celia Doran PT, DPT PT [id number]    Room #:  7950/4106-G  Diagnosis:  Generalized weakness [R53.1]  Failure to thrive in adult [R62.7]  Precautions:  fall risk  Equipment Needs:  none. Pt reported owing a walker and cane. SUBJECTIVE:    Pt lives at [facility] facility. Pt reported he has been independent with mobility with a walker until recently he felt weak and needed more assistance. OBJECTIVE:   Initial Evaluation  Date:  Treatment  2022 Short Term/ Long Term   Goals   Was pt agreeable to Eval/treatment? yes yes    Does pt have pain? None reported No complaints    Bed Mobility  Rolling: NT  Supine to sit: Mod A  Sit to supine: NT  Scooting:  Mod A to sitting EOB NT SBA   Transfers Sit to stand: Min A  Stand to sit: Min A  Stand pivot:  Min A Sit <> stand: Min A SBA   Ambulation    15 feet with w/w Min A  60 feet x 1 using [address] for support Min A for balance 100 feet with w/w SBA    Stair negotiation: ascended and descended  NT      ROM BLE:  WFL     Strength BLE:  grossly 4/5  Grossly 4+/5   Balance Sitting EOB:  supervision  Dynamic Standing:  Min A  Sitting EOB:  independent  Dynamic Standing:  SBA with w/w   AM-PAC 6 Clicks 07/49 77/69        Pt is alert and able to follow instruction  Balance: poor dynamic using [address] for support    Pt performed therapeutic exercise of the following: NT    Patient education/treatment  Pt was educated on UE usage to assist with transfer safety, gait mechanics promoting upright posture and staying within [address] base of support    Patient response to education:   Pt verbalized understanding Pt demonstrated skill Pt requires further education in this area   yes With prompt for transfers yes     ASSESSMENT:   Comments: Pt found in a bedside chair. Gait slow and consistent. Pt unsteady throughout, required constant hands on assist for balance and safety. Pt deconditioned, fatigued after activity, is unsafe to gait or transfer alone presently. Pt short of breath, 02 saturation 97% on room air. Pt was left in a bedside chair with call light in reach, waffle cushion in place    Chair/bed alarm: chair alarm active    Time in 0827   Time out 0840   Total Treatment Time 13 minutes   CPT codes:     Therapeutic activities 52510 13 minutes   Therapeutic exercises 04462 0 minutes       Pt is making fair progress toward established Physical Therapy goals. Continue with physical therapy current plan of care.     Ryan Rojas PTA   License Number: [id number]

## 2022-12-01 NOTE — PROGRESS NOTES
Contacted Dr. Rolando Swenson who was covering for Dr. Cynthia Whitmore. The patient was experiencing difficulty urinating with urgency. Upon palpation the bladder felt firm and the patient expressed discomfort. I then bladder scanned the patient and received a reading greater than 200cc with less than 140cc in the last 4 hours. I then inserted a huynh catheter per 's order and had dark christian urine return with blood clots. The patient expressed relief and tolerated the huynh catheter insertion well without insertion resistance.

## 2022-12-01 NOTE — PROGRESS NOTES
The patient removed his huynh catheter with 600cc of urine noted in the drainage bag. The patient complained of pain and was having bloody discharge from his penis.

## 2022-12-01 NOTE — CONSULTS
Admission: [DISCONTINUED] LORazepam (ATIVAN) 0.5 MG tablet, Take 1 tablet by mouth 2 times daily for 30 days. [DISCONTINUED] hydrOXYzine pamoate (VISTARIL) 25 MG capsule, TAKE 1 CAPSULE BY MOUTH TWICE DAILY  loperamide (IMODIUM A-D) 2 MG tablet, Take 1 tablet by mouth every 4 hours as needed for Diarrhea  docusate sodium (COLACE) 100 MG capsule, Take 1 capsule by mouth daily  prochlorperazine (COMPAZINE) 10 MG tablet, Take 1 tablet by mouth every 8 hours as needed (Nausea)  amLODIPine (NORVASC) 5 MG tablet, Take 1 tablet by mouth daily  losartan (COZAAR) 100 MG tablet, Take 1 tablet by mouth daily 1 QD  pantoprazole (PROTONIX) 40 MG tablet, Take 1 tablet by mouth daily  potassium chloride (KLOR-CON M) 20 MEQ extended release tablet, Take 1 tablet by mouth daily 1 qd  aspirin EC 81 MG EC tablet, Take 1 tablet by mouth daily  acetaminophen (TYLENOL 8 HOUR) 650 MG extended release tablet, Take 1 tablet by mouth every 6 hours as needed for Pain  [DISCONTINUED] PARoxetine (PAXIL) 10 MG tablet, Take 1 tablet by mouth daily  ondansetron (ZOFRAN-ODT) 4 MG disintegrating tablet, Take 1 tablet by mouth every 8 hours as needed for Nausea or Vomiting    Allergies:    Patient has no known allergies. Social History:    reports that he has never smoked. He has never used smokeless tobacco. He reports that he does not drink alcohol and does not use drugs. Family History:   Non-contributory to this Urological problem  family history is not on file.     Review of Systems:  Constitutional: No fever or chills   Respiratory: negative for cough and hemoptysis  Cardiovascular: negative for chest pain and dyspnea  Gastrointestinal: negative for abdominal pain, diarrhea, nausea and vomiting   Derm: negative for rash and skin lesion(s)  Neurological: negative for seizures and tremors  Musculoskeletal: Negative    Psychiatric: Negative   : As above in the HPI, otherwise negative  All other reviews are negative    Physical Exam: Vitals:  /77   Pulse 81   Temp 97.8 °F (36.6 °C) (Axillary)   Resp 16   Ht 6' (1.829 m)   Wt 157 lb (71.2 kg)   SpO2 99%   BMI 21.29 kg/m²     General:  awake and alert,   HEENT:  Normocephalic, atraumatic. Lungs:  Respirations symmetric and non-labored. Abdomen:  soft, nontender, no masses  Extremities:  No clubbing, cyanosis, or edema  Skin:  Warm and dry, no open lesions or rashes  Neuro: There are no motor or sensory deficits in the 4 quadrant extremities   Rectal: deferred  Genitourinary:  huynh with yellow urine     Labs:     Recent Labs     11/29/22  0340 11/30/22  0359 12/01/22  0508   WBC 8.5 7.1 6.5   RBC 4.39 4.49 4.56   HGB 13.0 13.6 13.6   HCT 39.0 40.2 41.0   MCV 88.8 89.5 89.9   MCH 29.6 30.3 29.8   MCHC 33.3 33.8 33.2   RDW 12.2 12.1 12.3    298 307   MPV 9.9 9.8 10.0         Recent Labs     11/29/22  0340 11/30/22  0359 12/01/22  0508   CREATININE 0.8 0.9 0.9       Lab Results   Component Value Date    PSA 2.88 05/16/2022    PSA 2.83 04/29/2020    PSA 2.86 04/24/2019       Imaging:   Narrative   EXAMINATION:   CT OF THE ABDOMEN AND PELVIS WITH CONTRAST 11/28/2022 3:59 pm       TECHNIQUE:   CT of the abdomen and pelvis was performed with the administration of   intravenous contrast. Multiplanar reformatted images are provided for review. Automated exposure control, iterative reconstruction, and/or weight based   adjustment of the mA/kV was utilized to reduce the radiation dose to as low   as reasonably achievable.        COMPARISON:   CT abdomen and pelvis 07/13/2022       HISTORY:   ORDERING SYSTEM PROVIDED HISTORY: abd pain   TECHNOLOGIST PROVIDED HISTORY:   Reason for exam:->abd pain   Additional Contrast?->None   Decision Support Exception - unselect if not a suspected or confirmed   emergency medical condition->Emergency Medical Condition (MA)       FINDINGS:   There are a couple of low-density lesions in the liver with the largest in   the left hepatic lobe measuring approximately 6 mm in size. These likely   represent benign, incidental findings. The spleen and pancreas are   unremarkable. There is thickening of the bilateral adrenal glands which   appears unchanged compared to the prior examination. The bilateral kidneys   are grossly unremarkable. The patient is status post cholecystectomy without evidence of biliary ductal   dilatation. There is no evidence of bowel obstruction, pneumoperitoneum, or   ascites. There is enlargement of the prostate gland. There is thickening of   urinary bladder wall. There is atherosclerotic disease without abdominal   aortic aneurysm. There is right renal artery aneurysm measuring   approximately 1.8 cm in size, stable. There is atelectasis and/or scarring   within the bilateral lung bases. There are diffuse degenerative changes   throughout the spine. Impression   1. No acute abnormality. 2. Enlarged prostate gland. 3. Thickening of urinary bladder wall is likely secondary to chronic bladder   outlet obstruction. 4. Stable right renal artery aneurysm measuring approximately 1.8 cm in size. Consider surgical or endovascular treatment. Narrative   EXAMINATION:   CT OF THE ABDOMEN AND PELVIS WITH CONTRAST 11/28/2022 3:59 pm       TECHNIQUE:   CT of the abdomen and pelvis was performed with the administration of   intravenous contrast. Multiplanar reformatted images are provided for review. Automated exposure control, iterative reconstruction, and/or weight based   adjustment of the mA/kV was utilized to reduce the radiation dose to as low   as reasonably achievable.        COMPARISON:   CT abdomen and pelvis 07/13/2022       HISTORY:   ORDERING SYSTEM PROVIDED HISTORY: abd pain   TECHNOLOGIST PROVIDED HISTORY:   Reason for exam:->abd pain   Additional Contrast?->None   Decision Support Exception - unselect if not a suspected or confirmed   emergency medical condition->Emergency Medical Condition (MA) FINDINGS:   There are a couple of low-density lesions in the liver with the largest in   the left hepatic lobe measuring approximately 6 mm in size. These likely   represent benign, incidental findings. The spleen and pancreas are   unremarkable. There is thickening of the bilateral adrenal glands which   appears unchanged compared to the prior examination. The bilateral kidneys   are grossly unremarkable. The patient is status post cholecystectomy without evidence of biliary ductal   dilatation. There is no evidence of bowel obstruction, pneumoperitoneum, or   ascites. There is enlargement of the prostate gland. There is thickening of   urinary bladder wall. There is atherosclerotic disease without abdominal   aortic aneurysm. There is right renal artery aneurysm measuring   approximately 1.8 cm in size, stable. There is atelectasis and/or scarring   within the bilateral lung bases. There are diffuse degenerative changes   throughout the spine. Impression   1. No acute abnormality. 2. Enlarged prostate gland. 3. Thickening of urinary bladder wall is likely secondary to chronic bladder   outlet obstruction. 4. Stable right renal artery aneurysm measuring approximately 1.8 cm in size. Consider surgical or endovascular treatment. Assessment/plan:  Urinary retention   Traumatic huynh catheter removal       Urine culture no growth   Creatinine stable   Cont the huynh catheter at this time   Will do a voiding trial as an outpatient due to recent traumatic removal last night   Hold on further acute  interventions     Electronically signed by DASHAWN Spicer CNP on 12/1/2022 at 3:33 PM  [facility]           The patient was seen and examined. I have reviewed the medical record in detail. I agree with the plan as outlined by JAMES Spicer.     Rody Roe MD  5:21 PM  12/1/2022

## 2022-12-02 PROCEDURE — 1200000000 HC SEMI PRIVATE

## 2022-12-02 PROCEDURE — 6370000000 HC RX 637 (ALT 250 FOR IP): Performed by: INTERNAL MEDICINE

## 2022-12-02 PROCEDURE — 6370000000 HC RX 637 (ALT 250 FOR IP): Performed by: STUDENT IN AN ORGANIZED HEALTH CARE EDUCATION/TRAINING PROGRAM

## 2022-12-02 PROCEDURE — 6360000002 HC RX W HCPCS: Performed by: NURSE PRACTITIONER

## 2022-12-02 PROCEDURE — 6370000000 HC RX 637 (ALT 250 FOR IP): Performed by: NURSE PRACTITIONER

## 2022-12-02 RX ADMIN — ASPIRIN 81 MG: 81 TABLET, COATED ORAL at 08:08

## 2022-12-02 RX ADMIN — TAMSULOSIN HYDROCHLORIDE 0.4 MG: 0.4 CAPSULE ORAL at 21:38

## 2022-12-02 RX ADMIN — QUETIAPINE FUMARATE 25 MG: 25 TABLET ORAL at 21:38

## 2022-12-02 RX ADMIN — LOSARTAN POTASSIUM 100 MG: 50 TABLET, FILM COATED ORAL at 08:08

## 2022-12-02 RX ADMIN — POTASSIUM CHLORIDE 20 MEQ: 1500 TABLET, EXTENDED RELEASE ORAL at 08:08

## 2022-12-02 RX ADMIN — CITALOPRAM HYDROBROMIDE 10 MG: 20 TABLET ORAL at 08:08

## 2022-12-02 RX ADMIN — ENOXAPARIN SODIUM 40 MG: 100 INJECTION SUBCUTANEOUS at 08:09

## 2022-12-02 RX ADMIN — AMLODIPINE BESYLATE 5 MG: 5 TABLET ORAL at 08:09

## 2022-12-02 RX ADMIN — DOCUSATE SODIUM 100 MG: 100 CAPSULE, LIQUID FILLED ORAL at 08:09

## 2022-12-02 RX ADMIN — ACETAMINOPHEN 650 MG: 325 TABLET ORAL at 17:51

## 2022-12-02 RX ADMIN — PANTOPRAZOLE SODIUM 40 MG: 40 TABLET, DELAYED RELEASE ORAL at 08:09

## 2022-12-02 RX ADMIN — ACETAMINOPHEN 650 MG: 325 TABLET ORAL at 11:18

## 2022-12-02 ASSESSMENT — PAIN SCALES - GENERAL
PAINLEVEL_OUTOF10: 7
PAINLEVEL_OUTOF10: 8

## 2022-12-02 NOTE — CARE COORDINATION
12/2/2022  Social Work Discharge Planning:No change. Still waiting for outcome of [facility] which went for further review. Plan is to go to [facility]. [facility] and transport form are completed. Electronically signed by MAURICIO Ferguson on 12/2/2022 at 8:30 AM    12/2/2022 Social Work Discharge Planning:SW left a voicemail with Luís Starks with [phone]. Requested a return call.  Electronically signed by MAURICIO Ferguson on 12/2/2022 at 2:15 PM

## 2022-12-02 NOTE — PROGRESS NOTES
Internal Medicine Progress Note    Patient's name: Ailin Bates  : 1935  Chief complaints (on day of admission): Headache, Nausea, and Fatigue  Admission date: 2022  Date of service: 2022   Room: [address]  Primary care physician: No primary care provider on file. Reason for visit: Follow-up for weakness and failure to thrive     Subjective  Marry Scott was seen and examined. Doing ok today   States his R hand is numb, hand seems stiff but after some movement he was able to squeeze without any weakness noted, other extremities WNL, no new issues per nursing staff, issues with DC planning continue     Review of Systems   There are no new complaints of chest pain, shortness of breath, abdominal pain, nausea, vomiting, diarrhea, constipation.     Hospital Medications  Current Facility-Administered Medications   Medication Dose Route Frequency Provider Last Rate Last Admin    tamsulosin (FLOMAX) capsule 0.4 mg  0.4 mg Oral Nightly Calos García DO   0.4 mg at 22    QUEtiapine (SEROQUEL) tablet 25 mg  25 mg Oral Nightly Daryle Self, MD   25 mg at 22    citalopram (CELEXA) tablet 10 mg  10 mg Oral Daily Daryle Self, MD   10 mg at 22 0808    ondansetron [facility]) injection 4 mg  4 mg IntraVENous Once Shawn Solano DO        calcium carbonate (TUMS) chewable tablet 500 mg  500 mg Oral Once Carolina Pelayo DO        amLODIPine (NORVASC) tablet 5 mg  5 mg Oral Daily Pilo Lose, APRN - CNP   5 mg at 22 0809    aspirin EC tablet 81 mg  81 mg Oral Daily Pilo Lose, APRN - CNP   81 mg at 22 1216    docusate sodium (COLACE) capsule 100 mg  100 mg Oral Daily Pilo Lose, APRN - CNP   100 mg at 22 5911    acetaminophen (TYLENOL) tablet 650 mg  650 mg Oral Q6H PRN Pilo Lose, APRN - CNP        loperamide (IMODIUM) capsule 2 mg  2 mg Oral Q4H PRN Pilo Lose, APRN - CNP        losartan (COZAAR) tablet 100 mg 100 mg Oral Daily Carline Keisha, APRN - CNP   100 mg at 12/02/22 0808    pantoprazole (PROTONIX) tablet 40 mg  40 mg Oral Daily Carline Keisha, APRN - CNP   40 mg at 12/02/22 0809    potassium chloride (KLOR-CON M) extended release tablet 20 mEq  20 mEq Oral Daily Carline Keisha, APRN - CNP   20 mEq at 12/02/22 8941    prochlorperazine (COMPAZINE) tablet 10 mg  10 mg Oral Q8H PRN Carline Keisha, APRN - CNP        sodium chloride flush 0.9 % injection 10 mL  10 mL IntraVENous 2 times per day Carline Keisha, APRN - CNP   10 mL at 11/29/22 2216    sodium chloride flush 0.9 % injection 10 mL  10 mL IntraVENous PRN Carline Keisha, APRN - CNP        0.9 % sodium chloride infusion   IntraVENous PRN Carline Keisha, APRN - CNP        potassium chloride (KLOR-CON M) extended release tablet 40 mEq  40 mEq Oral PRN Carline Keisha, APRN - CNP        Or    potassium bicarb-citric acid (EFFER-K) effervescent tablet 40 mEq  40 mEq Oral PRN Carline Keisha, APRN - CNP        Or    potassium chloride 10 mEq/100 mL IVPB (Peripheral Line)  10 mEq IntraVENous PRN Carline Keisha, APRN - CNP        enoxaparin (LOVENOX) injection 40 mg  40 mg SubCUTAneous Daily Carline Keisha, APRN - CNP   40 mg at 12/02/22 0809    senna (SENOKOT) tablet 8.6 mg  1 tablet Oral Daily PRN Carline Keisha, APRN - CNP        hydrALAZINE (APRESOLINE) injection 10 mg  10 mg IntraVENous Q6H PRN Carline Keisha, APRN - CNP           PRN Medications  acetaminophen, loperamide, prochlorperazine, sodium chloride flush, sodium chloride, potassium chloride **OR** potassium alternative oral replacement **OR** potassium chloride, senna, hydrALAZINE    Objective  Most Recent Recorded Vitals  BP (!) 144/70   Pulse 70   Temp 97.5 °F (36.4 °C) (Oral)   Resp 16   Ht 6' (1.829 m)   Wt 157 lb (71.2 kg)   SpO2 97%   BMI 21.29 kg/m²   I/O last 3 completed shifts:  In: -   Out: 1250 [Urine:1250]  No intake/output data recorded.     Physical Exam:   General: Awake and alert, seems oriented, NAD, no labored breathing, extremely hard of hearing, appears weak and frail  Eyes: conjunctivae/corneas clear, sclera non icteric  Skin: color/texture/turgor normal, no rashes or lesions  Lungs: CTAB, no retractions/use of accessory muscles, no vocal fremitus, no rhonchi, no crackle, no rales  Heart: regular rate, regular rhythm, no murmur  Abdomen: soft, NT, bowel sounds normal, Panchal catheter in place  Extremities: atraumatic, no edema  Neurologic: cranial nerves 2-12 grossly intact, no slurred speech    Most Recent Labs  Lab Results   Component Value Date    WBC 6.5 12/01/2022    HGB 13.6 12/01/2022    HCT 41.0 12/01/2022     12/01/2022     12/01/2022    K 3.6 12/01/2022     12/01/2022    CREATININE 0.9 12/01/2022    BUN 13 12/01/2022    CO2 28 12/01/2022    GLUCOSE 87 12/01/2022    ALT 13 12/01/2022    AST 14 12/01/2022    INR 1.2 07/13/2022    TSH 1.700 03/29/2022    LABA1C 5.6 01/03/2022    LABMICR <12.0 11/09/2022       XR CHEST 1 VIEW   Final Result   No acute process. CT ABDOMEN PELVIS W IV CONTRAST Additional Contrast? None   Final Result   1. No acute abnormality. 2. Enlarged prostate gland. 3. Thickening of urinary bladder wall is likely secondary to chronic bladder   outlet obstruction. 4. Stable right renal artery aneurysm measuring approximately 1.8 cm in size. Consider surgical or endovascular treatment. CT HEAD WO CONTRAST   Final Result   No acute intracranial abnormality. Periventricular white matter changes consistent chronic microvascular disease.                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]      Priority: High    Generalized weakness [R53.1]      Priority: Medium    Severe protein-calorie malnutrition (HCC) [E43]      Priority: Medium    Failure to thrive in adult [R62.7]      Priority: Medium    Gait disturbance [R26.9]      Priority: Medium    Essential hypertension [I10]      Priority: Low    Hyperlipidemia [E78.5]      Priority: Low    Myalgia [M79.10]     H/O: CVA (cerebrovascular accident) [Z86.73]     BPH associated with nocturia [N40.1, R35.1]     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 A]     Osteoarthritis of left knee [M17.12]     Aneurysm of right renal artery ([address] [I72.2]          Plan  Failure to thrive:  Unfortunately this is close to the patient's baseline mental status  He lives at an [address] [place] [address] at baseline  PT/OT evaluations- PT AM-PAC-- 15/24-Case discussed with PT on 12/1  Discharge to SNF/[facility] and [facility] transition to [facility]  Consider palliative care discussions as an outpatient    Urinary retention:   Continue Panchal catheter for now-placed on 11/30  Urology consultation  Start Flomax  Urinalysis negative on 11/28  Urine culture showed no growth on 11/28    Follow labs   DVT prophylaxis  Please see orders for further management and care. Discharge plan: [facility]/SNF once [facility] is cleared-okay to go today from my point of view if okay with urology    Electronically signed by Raz Vail MD on 12/2/2022 at 9:17 AM    I can be reached through [address] [place] [address].

## 2022-12-02 NOTE — PROGRESS NOTES
Education Documentation  Tamsulosin HCl, taught by Viji Cavazos RN at 12/1/2022 11:43 PM.  Learner: Patient  Readiness: Acceptance  Method: Explanation  Response: Verbalizes Understanding    INSTRUCT  ON USE OF SAFETY DEVICES, taught by Viji Cavazos RN at 12/1/2022 11:43 PM.  Learner: Patient  Readiness: Acceptance  Method: Explanation  Response: Verbalizes Understanding    Medication Safety, taught by Viji Cavazos RN at 12/1/2022 11:43 PM.  Learner: Patient  Readiness: Acceptance  Method: Explanation  Response: Verbalizes Understanding    Fall Prevention, taught by Viji Cavazos RN at 12/1/2022 11:43 PM.  Learner: Patient  Readiness: Acceptance  Method: Explanation  Response: Verbalizes Understanding    Education Comments  No comments found.

## 2022-12-02 NOTE — PROGRESS NOTES
12/2/2022 8:42 AM  Service: Urology  Group: [facility] ([facility])    Janette Viera  [id number]    Subjective: No  complaints  Tolerating huynh  Urine essentially clear  Ate all of breakfast  Afebrile      Review of Systems  Constitutional: no chills  Respiratory: negative for cough and shortness of breath  Cardiovascular: negative for chest pain  Gastrointestinal: negative for abdominal pain, nausea, and vomiting  Dermatologic: negative  Hematologic/lymphatic: negative  Musculoskeletal:positive for stiff joints  Neurological: negative for seizures and positive for tremors  Endocrine: negative  Psychiatric: negative    Scheduled Meds:   tamsulosin  0.4 mg Oral Nightly    QUEtiapine  25 mg Oral Nightly    citalopram  10 mg Oral Daily    ondansetron  4 mg IntraVENous Once    calcium carbonate  500 mg Oral Once    amLODIPine  5 mg Oral Daily    aspirin EC  81 mg Oral Daily    docusate sodium  100 mg Oral Daily    losartan  100 mg Oral Daily    pantoprazole  40 mg Oral Daily    potassium chloride  20 mEq Oral Daily    sodium chloride flush  10 mL IntraVENous 2 times per day    enoxaparin  40 mg SubCUTAneous Daily       Objective:  Vitals:    12/02/22 0724   BP: (!) 144/70   Pulse: 70   Resp: 16   Temp: 97.5 °F (36.4 °C)   SpO2: 97%         Allergies: Patient has no known allergies.     General Appearance: alert, conversing, frail  Skin: no rash or erythema  Head: normocephalic and atraumatic  Pulmonary/Chest: normal air movement, no respiratory distress Abdomen: soft, non-tender, non-distended  Genitalia: huynh catheter present and draining essentially clear urine, with a very slight pink tinge         Labs:     Recent Labs     12/01/22  0508      K 3.6      CO2 28   BUN 13   CREATININE 0.9   GLUCOSE 87   CALCIUM 9.3       Lab Results   Component Value Date/Time    HGB 13.6 12/01/2022 05:08 AM    HCT 41.0 12/01/2022 05:08 AM     Lab Results   Component Value Date     PSA 2.88 05/16/2022     PSA 2.83 04/29/2020     PSA 2.86 04/24/2019       Assessment/Plan:    Urinary retention   Traumatic huynh catheter removal         Urine culture no growth   Creatinine stable   Cont the huynh catheter at this time   Will do a voiding trial as an outpatient due to recent traumatic removal l  Hold on further acute  interventions   Follow up as OP in 1 week  Will sign off  Call if needed     DASHAWN Bajwa - CNP   [facility]  Urology

## 2022-12-03 PROCEDURE — 6360000002 HC RX W HCPCS: Performed by: NURSE PRACTITIONER

## 2022-12-03 PROCEDURE — 6370000000 HC RX 637 (ALT 250 FOR IP): Performed by: INTERNAL MEDICINE

## 2022-12-03 PROCEDURE — 6370000000 HC RX 637 (ALT 250 FOR IP): Performed by: STUDENT IN AN ORGANIZED HEALTH CARE EDUCATION/TRAINING PROGRAM

## 2022-12-03 PROCEDURE — 6370000000 HC RX 637 (ALT 250 FOR IP): Performed by: NURSE PRACTITIONER

## 2022-12-03 PROCEDURE — 1200000000 HC SEMI PRIVATE

## 2022-12-03 PROCEDURE — 6360000002 HC RX W HCPCS: Performed by: INTERNAL MEDICINE

## 2022-12-03 RX ADMIN — QUETIAPINE FUMARATE 25 MG: 25 TABLET ORAL at 20:06

## 2022-12-03 RX ADMIN — CITALOPRAM HYDROBROMIDE 10 MG: 20 TABLET ORAL at 08:54

## 2022-12-03 RX ADMIN — AMLODIPINE BESYLATE 5 MG: 5 TABLET ORAL at 08:54

## 2022-12-03 RX ADMIN — PROCHLORPERAZINE MALEATE 10 MG: 10 TABLET ORAL at 18:17

## 2022-12-03 RX ADMIN — DOCUSATE SODIUM 100 MG: 100 CAPSULE, LIQUID FILLED ORAL at 08:54

## 2022-12-03 RX ADMIN — ENOXAPARIN SODIUM 40 MG: 100 INJECTION SUBCUTANEOUS at 08:54

## 2022-12-03 RX ADMIN — PANTOPRAZOLE SODIUM 40 MG: 40 TABLET, DELAYED RELEASE ORAL at 08:54

## 2022-12-03 RX ADMIN — POTASSIUM CHLORIDE 20 MEQ: 1500 TABLET, EXTENDED RELEASE ORAL at 08:54

## 2022-12-03 RX ADMIN — TAMSULOSIN HYDROCHLORIDE 0.4 MG: 0.4 CAPSULE ORAL at 20:06

## 2022-12-03 RX ADMIN — TRIMETHOBENZAMIDE HYDROCHLORIDE 200 MG: 100 INJECTION INTRAMUSCULAR at 15:03

## 2022-12-03 RX ADMIN — LOSARTAN POTASSIUM 100 MG: 50 TABLET, FILM COATED ORAL at 08:54

## 2022-12-03 RX ADMIN — CALCIUM CARBONATE 500 MG: 500 TABLET, CHEWABLE ORAL at 17:59

## 2022-12-03 RX ADMIN — ACETAMINOPHEN 650 MG: 325 TABLET ORAL at 12:22

## 2022-12-03 RX ADMIN — ASPIRIN 81 MG: 81 TABLET, COATED ORAL at 08:54

## 2022-12-03 ASSESSMENT — PAIN DESCRIPTION - LOCATION: LOCATION: HAND;WRIST

## 2022-12-03 ASSESSMENT — PAIN SCALES - GENERAL: PAINLEVEL_OUTOF10: 7

## 2022-12-03 ASSESSMENT — PAIN DESCRIPTION - DESCRIPTORS: DESCRIPTORS: ACHING;DISCOMFORT

## 2022-12-03 ASSESSMENT — PAIN DESCRIPTION - ORIENTATION: ORIENTATION: RIGHT

## 2022-12-03 NOTE — PROGRESS NOTES
Patient OK to remain without IV access per Dr. Arvind Garrett.  Electronically signed by Josie Knutson RN on 12/3/2022 at 3:41 PM

## 2022-12-03 NOTE — PROGRESS NOTES
Internal Medicine Progress Note    Patient's name: Cyndy Farrar  : 1935  Chief complaints (on day of admission): Headache, Nausea, and Fatigue  Admission date: 2022  Date of service: 12/3/2022   Room: [address]  Primary care physician: No primary care provider on file. Reason for visit: Follow-up for weakness and failure to thrive     Subjective  Isidrosteffi RiveraCristiano was seen and examined. Patient laying in bed this am. He states that his right hand numbness has improved this am hand seems stiff but after some movement he was able to squeeze without any weakness noted, other extremities WNL, no new issues per nursing staff, issues with DC planning continue     Review of Systems   There are no new complaints of chest pain, shortness of breath, abdominal pain, nausea, vomiting, diarrhea, constipation.     Hospital Medications  Current Facility-Administered Medications   Medication Dose Route Frequency Provider Last Rate Last Admin    tamsulosin (FLOMAX) capsule 0.4 mg  0.4 mg Oral Nightly Calos García DO   0.4 mg at 22    QUEtiapine (SEROQUEL) tablet 25 mg  25 mg Oral Nightly Raz Vail MD   25 mg at 22    citalopram (CELEXA) tablet 10 mg  10 mg Oral Daily Raz Vail MD   10 mg at 22 0808    ondansetron [facility]) injection 4 mg  4 mg IntraVENous Once Zuri Krueger DO        calcium carbonate (TUMS) chewable tablet 500 mg  500 mg Oral Once Carolina Pelayo DO        amLODIPine (NORVASC) tablet 5 mg  5 mg Oral Daily Samul Pen, APRN - CNP   5 mg at 22 0809    aspirin EC tablet 81 mg  81 mg Oral Daily Samul Pen, APRN - CNP   81 mg at 22 7589    docusate sodium (COLACE) capsule 100 mg  100 mg Oral Daily Samul Pen, APRN - CNP   100 mg at 22 0809    acetaminophen (TYLENOL) tablet 650 mg  650 mg Oral Q6H PRN Samul Pen, APRN - CNP   650 mg at 22 1751    loperamide (IMODIUM) capsule 2 mg  2 mg Oral Q4H PRN Davee Ropes, APRN - CNP        losartan (COZAAR) tablet 100 mg  100 mg Oral Daily Davee Ropes, APRN - CNP   100 mg at 12/02/22 0808    pantoprazole (PROTONIX) tablet 40 mg  40 mg Oral Daily Davee Ropes, APRN - CNP   40 mg at 12/02/22 0809    potassium chloride (KLOR-CON M) extended release tablet 20 mEq  20 mEq Oral Daily Davee Ropes, APRN - CNP   20 mEq at 12/02/22 0055    prochlorperazine (COMPAZINE) tablet 10 mg  10 mg Oral Q8H PRN Davee Ropes, APRN - CNP        sodium chloride flush 0.9 % injection 10 mL  10 mL IntraVENous 2 times per day Davee Ropes, APRN - CNP   10 mL at 11/29/22 2216    sodium chloride flush 0.9 % injection 10 mL  10 mL IntraVENous PRN Davee Ropes, APRN - CNP        0.9 % sodium chloride infusion   IntraVENous PRN Davee Ropes, APRN - CNP        potassium chloride (KLOR-CON M) extended release tablet 40 mEq  40 mEq Oral PRN Davee Ropes, APRN - CNP        Or    potassium bicarb-citric acid (EFFER-K) effervescent tablet 40 mEq  40 mEq Oral PRN Davee Ropes, APRN - CNP        Or    potassium chloride 10 mEq/100 mL IVPB (Peripheral Line)  10 mEq IntraVENous PRN Davee Ropes, APRN - CNP        enoxaparin (LOVENOX) injection 40 mg  40 mg SubCUTAneous Daily Davee Ropes, APRN - CNP   40 mg at 12/02/22 0809    senna (SENOKOT) tablet 8.6 mg  1 tablet Oral Daily PRN Davee Ropes, APRN - CNP        hydrALAZINE (APRESOLINE) injection 10 mg  10 mg IntraVENous Q6H PRN Davee Ropes, APRN - CNP           PRN Medications  acetaminophen, loperamide, prochlorperazine, sodium chloride flush, sodium chloride, potassium chloride **OR** potassium alternative oral replacement **OR** potassium chloride, senna, hydrALAZINE    Objective  Most Recent Recorded Vitals  /75   Pulse 69   Temp 97.7 °F (36.5 °C) (Oral)   Resp 16   Ht 6' (1.829 m)   Wt 157 lb (71.2 kg)   SpO2 94%   BMI 21.29 kg/m²   I/O last 3 completed shifts:  In: -   Out: 2400 [Urine:2400]  No intake/output data recorded. Physical Exam:   General: Awake and alert, seems oriented, NAD, no labored breathing, extremely hard of hearing, appears weak and frail  Eyes: conjunctivae/corneas clear, sclera non icteric  Skin: color/texture/turgor normal, no rashes or lesions  Lungs: CTAB, no retractions/use of accessory muscles, no vocal fremitus, no rhonchi, no crackle, no rales  Heart: regular rate, regular rhythm, no murmur  Abdomen: soft, NT, bowel sounds normal, Panchal catheter in place  Extremities: atraumatic, no edema  Neurologic: cranial nerves 2-12 grossly intact, no slurred speech    Most Recent Labs  Lab Results   Component Value Date    WBC 6.5 12/01/2022    HGB 13.6 12/01/2022    HCT 41.0 12/01/2022     12/01/2022     12/01/2022    K 3.6 12/01/2022     12/01/2022    CREATININE 0.9 12/01/2022    BUN 13 12/01/2022    CO2 28 12/01/2022    GLUCOSE 87 12/01/2022    ALT 13 12/01/2022    AST 14 12/01/2022    INR 1.2 07/13/2022    TSH 1.700 03/29/2022    LABA1C 5.6 01/03/2022    LABMICR <12.0 11/09/2022       XR CHEST 1 VIEW   Final Result   No acute process. CT ABDOMEN PELVIS W IV CONTRAST Additional Contrast? None   Final Result   1. No acute abnormality. 2. Enlarged prostate gland. 3. Thickening of urinary bladder wall is likely secondary to chronic bladder   outlet obstruction. 4. Stable right renal artery aneurysm measuring approximately 1.8 cm in size. Consider surgical or endovascular treatment. CT HEAD WO CONTRAST   Final Result   No acute intracranial abnormality. Periventricular white matter changes consistent chronic microvascular disease.                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]      Priority: High    Generalized weakness [R53.1]      Priority: Medium    Severe protein-calorie malnutrition ([facility] [address] [E43]      Priority: Medium    Failure to thrive in adult [R62.7]      Priority: Medium    Gait disturbance [R26.9]      Priority: Medium    Essential hypertension [I10]      Priority: Low    Hyperlipidemia [E78.5]      Priority: Low    Myalgia [M79.10]     H/O: CVA (cerebrovascular accident) [Z86.73]     BPH associated with nocturia [N40.1, R35.1]     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 A]     Osteoarthritis of left knee [M17.12]     Aneurysm of right renal artery ([address] [I72.2]          Plan  Failure to thrive:  Unfortunately this is close to the patient's baseline mental status  He lives at an [address] [place] [address] at baseline  PT/OT evaluations- PT AM-PAC-- 15/24  Discharge to SNF/[facility] and eventually transition to [facility] awaiting insurance approval.   Consider palliative care discussions as an outpatient    Urinary retention:   Continue Panchal catheter for now-placed on 11/30  Urology consultation  Start Flomax  Urinalysis negative on 11/28  Urine culture showed no growth on 11/28    Follow labs   DVT prophylaxis  Please see orders for further management and care. Discharge plan: [facility]/SNF once PASSAR is cleared-okay to go discharge once obtained. Electronically signed by Shun Whitaker DO on 12/3/2022 at 8:28 AM    I can be reached through Answering service.

## 2022-12-04 PROCEDURE — 6370000000 HC RX 637 (ALT 250 FOR IP): Performed by: NURSE PRACTITIONER

## 2022-12-04 PROCEDURE — 1200000000 HC SEMI PRIVATE

## 2022-12-04 PROCEDURE — 6360000002 HC RX W HCPCS: Performed by: NURSE PRACTITIONER

## 2022-12-04 PROCEDURE — 6370000000 HC RX 637 (ALT 250 FOR IP): Performed by: STUDENT IN AN ORGANIZED HEALTH CARE EDUCATION/TRAINING PROGRAM

## 2022-12-04 PROCEDURE — 6370000000 HC RX 637 (ALT 250 FOR IP): Performed by: INTERNAL MEDICINE

## 2022-12-04 RX ADMIN — ACETAMINOPHEN 650 MG: 325 TABLET ORAL at 19:20

## 2022-12-04 RX ADMIN — ASPIRIN 81 MG: 81 TABLET, COATED ORAL at 08:41

## 2022-12-04 RX ADMIN — CITALOPRAM HYDROBROMIDE 10 MG: 20 TABLET ORAL at 08:41

## 2022-12-04 RX ADMIN — ACETAMINOPHEN 650 MG: 325 TABLET ORAL at 08:41

## 2022-12-04 RX ADMIN — TAMSULOSIN HYDROCHLORIDE 0.4 MG: 0.4 CAPSULE ORAL at 21:14

## 2022-12-04 RX ADMIN — DOCUSATE SODIUM 100 MG: 100 CAPSULE, LIQUID FILLED ORAL at 08:40

## 2022-12-04 RX ADMIN — PANTOPRAZOLE SODIUM 40 MG: 40 TABLET, DELAYED RELEASE ORAL at 08:40

## 2022-12-04 RX ADMIN — QUETIAPINE FUMARATE 25 MG: 25 TABLET ORAL at 21:14

## 2022-12-04 RX ADMIN — AMLODIPINE BESYLATE 5 MG: 5 TABLET ORAL at 08:41

## 2022-12-04 RX ADMIN — LOSARTAN POTASSIUM 100 MG: 50 TABLET, FILM COATED ORAL at 08:41

## 2022-12-04 RX ADMIN — POTASSIUM CHLORIDE 20 MEQ: 1500 TABLET, EXTENDED RELEASE ORAL at 08:40

## 2022-12-04 RX ADMIN — ENOXAPARIN SODIUM 40 MG: 100 INJECTION SUBCUTANEOUS at 08:40

## 2022-12-04 ASSESSMENT — PAIN - FUNCTIONAL ASSESSMENT: PAIN_FUNCTIONAL_ASSESSMENT: PREVENTS OR INTERFERES SOME ACTIVE ACTIVITIES AND ADLS

## 2022-12-04 ASSESSMENT — PAIN DESCRIPTION - DESCRIPTORS: DESCRIPTORS: ACHING;SORE;SHARP

## 2022-12-04 ASSESSMENT — PAIN DESCRIPTION - LOCATION: LOCATION: GENERALIZED

## 2022-12-04 ASSESSMENT — PAIN SCALES - GENERAL
PAINLEVEL_OUTOF10: 4
PAINLEVEL_OUTOF10: 0

## 2022-12-04 NOTE — PROGRESS NOTES
Internal Medicine Progress Note    Patient's name: Pankaj Laura  : 1935  Chief complaints (on day of admission): Headache, Nausea, and Fatigue  Admission date: 2022  Date of service: 2022   Room: [address]  Primary care physician: No primary care provider on file. Reason for visit: Follow-up for weakness and failure to thrive     Subjective  Roxy Canales was seen and examined. Patient laying in bed this am. He states that his right hand numbness continues to improve this am. Patient had nausea yesterday but states that this is improving. No issues per nursing. Review of Systems   There are no new complaints of chest pain, shortness of breath, abdominal pain, nausea, vomiting, diarrhea, constipation.     Hospital Medications  Current Facility-Administered Medications   Medication Dose Route Frequency Provider Last Rate Last Admin    trimethobenzamide (TIGAN) injection 200 mg  200 mg IntraMUSCular Q6H PRN Lay Ratliff, DO   200 mg at 22 1503    tamsulosin (FLOMAX) capsule 0.4 mg  0.4 mg Oral Nightly Calos García, DO   0.4 mg at 22    QUEtiapine (SEROQUEL) tablet 25 mg  25 mg Oral Nightly Alma Sequeira MD   25 mg at 22    citalopram (CELEXA) tablet 10 mg  10 mg Oral Daily Alma Sequeira MD   10 mg at 22 0854    ondansetron [facility]) injection 4 mg  4 mg IntraVENous Once Carolina Pelayo DO        amLODIPine (NORVASC) tablet 5 mg  5 mg Oral Daily Wyvonna Baptiste, APRN - CNP   5 mg at 22 0854    aspirin EC tablet 81 mg  81 mg Oral Daily Wyvonna Baptiste, APRN - CNP   81 mg at 22 0854    docusate sodium (COLACE) capsule 100 mg  100 mg Oral Daily Wyvonna Baptiste, APRN - CNP   100 mg at 22 0854    acetaminophen (TYLENOL) tablet 650 mg  650 mg Oral Q6H PRN Wyvonna Baptiste, APRN - CNP   650 mg at 22 1222    loperamide (IMODIUM) capsule 2 mg  2 mg Oral Q4H PRN Wyvonna Baptiste, APRN - CNP        losartan (COZAAR) tablet 100 mg  100 mg Oral Daily Isabell Fey, APRN - CNP   100 mg at 12/03/22 0854    pantoprazole (PROTONIX) tablet 40 mg  40 mg Oral Daily Isabell Fey, APRN - CNP   40 mg at 12/03/22 0854    potassium chloride (KLOR-CON M) extended release tablet 20 mEq  20 mEq Oral Daily Isabell Fey, APRN - CNP   20 mEq at 12/03/22 0854    prochlorperazine (COMPAZINE) tablet 10 mg  10 mg Oral Q8H PRN Isabell Fey, APRN - CNP   10 mg at 12/03/22 1817    sodium chloride flush 0.9 % injection 10 mL  10 mL IntraVENous 2 times per day Isabelldixie Castroy, APRN - CNP   10 mL at 11/29/22 2216    sodium chloride flush 0.9 % injection 10 mL  10 mL IntraVENous PRN Isabell Fey, APRN - CNP        0.9 % sodium chloride infusion   IntraVENous PRN Isabell Fey, APRN - CNP        potassium chloride (KLOR-CON M) extended release tablet 40 mEq  40 mEq Oral PRN Isabell Fey, APRN - CNP        Or    potassium bicarb-citric acid (EFFER-K) effervescent tablet 40 mEq  40 mEq Oral PRN Isabell Fey, APRN - CNP        Or    potassium chloride 10 mEq/100 mL IVPB (Peripheral Line)  10 mEq IntraVENous PRN Isabell Fey, APRN - CNP        enoxaparin (LOVENOX) injection 40 mg  40 mg SubCUTAneous Daily Isabell Castroy, APRN - CNP   40 mg at 12/03/22 0854    senna (SENOKOT) tablet 8.6 mg  1 tablet Oral Daily PRN Isabell Fey, APRN - CNP        hydrALAZINE (APRESOLINE) injection 10 mg  10 mg IntraVENous Q6H PRN Isabell Fey, APRN - CNP           PRN Medications  trimethobenzamide, acetaminophen, loperamide, prochlorperazine, sodium chloride flush, sodium chloride, potassium chloride **OR** potassium alternative oral replacement **OR** potassium chloride, senna, hydrALAZINE    Objective  Most Recent Recorded Vitals  /81   Pulse 66   Temp 97.8 °F (36.6 °C) (Oral)   Resp 16   Ht 6' (1.829 m)   Wt 157 lb (71.2 kg)   SpO2 97%   BMI 21.29 kg/m²   I/O last 3 completed shifts:  In: -   Out: 3700 [Urine:3700]  No intake/output data recorded. Physical Exam:   General: Awake and alert, seems oriented, NAD, no labored breathing, extremely hard of hearing, appears weak and frail  Eyes: conjunctivae/corneas clear, sclera non icteric  Skin: color/texture/turgor normal, no rashes or lesions  Lungs: CTAB, no retractions/use of accessory muscles, no vocal fremitus, no rhonchi, no crackle, no rales  Heart: regular rate, regular rhythm, no murmur  Abdomen: soft, NT, bowel sounds normal, Panchal catheter in place  Extremities: atraumatic, no edema  Neurologic: cranial nerves 2-12 grossly intact, no slurred speech    Most Recent Labs  Lab Results   Component Value Date    WBC 6.5 12/01/2022    HGB 13.6 12/01/2022    HCT 41.0 12/01/2022     12/01/2022     12/01/2022    K 3.6 12/01/2022     12/01/2022    CREATININE 0.9 12/01/2022    BUN 13 12/01/2022    CO2 28 12/01/2022    GLUCOSE 87 12/01/2022    ALT 13 12/01/2022    AST 14 12/01/2022    INR 1.2 07/13/2022    TSH 1.700 03/29/2022    LABA1C 5.6 01/03/2022    LABMICR <12.0 11/09/2022       XR CHEST 1 VIEW   Final Result   No acute process. CT ABDOMEN PELVIS W IV CONTRAST Additional Contrast? None   Final Result   1. No acute abnormality. 2. Enlarged prostate gland. 3. Thickening of urinary bladder wall is likely secondary to chronic bladder   outlet obstruction. 4. Stable right renal artery aneurysm measuring approximately 1.8 cm in size. Consider surgical or endovascular treatment. CT HEAD WO CONTRAST   Final Result   No acute intracranial abnormality. Periventricular white matter changes consistent chronic microvascular disease.                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]      Priority: High    Generalized weakness [R53.1]      Priority: Medium    Severe protein-calorie malnutrition (HCC) [E43]      Priority: Medium    Failure to thrive in adult [R62.7]      Priority: Medium    Gait disturbance [R26.9]      Priority: Medium    Essential hypertension [I10]      Priority: Low    Hyperlipidemia [E78.5]      Priority: Low    Myalgia [M79.10]     H/O: CVA (cerebrovascular accident) [Z86.73]     BPH associated with nocturia [N40.1, R35.1]     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 A]     Osteoarthritis of left knee [M17.12]     Aneurysm of right renal artery ([address] [I72.2]          Plan  Failure to thrive:  Unfortunately this is close to the patient's baseline mental status  He lives at an [address] [place] [address] at baseline  PT/OT evaluations- PT AM-PAC-- 15/24  Discharge to SNF/[facility] and eventually transition to [facility] awaiting insurance approval.   Consider palliative care discussions as an outpatient    Urinary retention:   Continue Panchal catheter for now-placed on 11/30  Urology consultation  Start Flomax  Urinalysis negative on 11/28  Urine culture showed no growth on 11/28    Follow labs   DVT prophylaxis  Please see orders for further management and care. Discharge plan: [facility]/SNF once PASSAR is cleared-okay to go discharge once obtained. Per nursing was evaluated by [facility] yesterday. Electronically signed by Maria Fernanda Morillo DO on 12/4/2022 at 7:32 AM    I can be reached through Answering service.

## 2022-12-05 VITALS
HEART RATE: 92 BPM | RESPIRATION RATE: 16 BRPM | OXYGEN SATURATION: 95 % | SYSTOLIC BLOOD PRESSURE: 119 MMHG | HEIGHT: 72 IN | WEIGHT: 157 LBS | TEMPERATURE: 98.8 F | BODY MASS INDEX: 21.26 KG/M2 | DIASTOLIC BLOOD PRESSURE: 76 MMHG

## 2022-12-05 LAB — SARS-COV-2, NAAT: NOT DETECTED

## 2022-12-05 PROCEDURE — 6370000000 HC RX 637 (ALT 250 FOR IP): Performed by: STUDENT IN AN ORGANIZED HEALTH CARE EDUCATION/TRAINING PROGRAM

## 2022-12-05 PROCEDURE — 97530 THERAPEUTIC ACTIVITIES: CPT

## 2022-12-05 PROCEDURE — 87635 SARS-COV-2 COVID-19 AMP PRB: CPT

## 2022-12-05 PROCEDURE — 6360000002 HC RX W HCPCS: Performed by: NURSE PRACTITIONER

## 2022-12-05 PROCEDURE — 6370000000 HC RX 637 (ALT 250 FOR IP): Performed by: NURSE PRACTITIONER

## 2022-12-05 PROCEDURE — 6360000002 HC RX W HCPCS: Performed by: INTERNAL MEDICINE

## 2022-12-05 RX ADMIN — CITALOPRAM HYDROBROMIDE 10 MG: 20 TABLET ORAL at 10:05

## 2022-12-05 RX ADMIN — ENOXAPARIN SODIUM 40 MG: 100 INJECTION SUBCUTANEOUS at 10:05

## 2022-12-05 RX ADMIN — AMLODIPINE BESYLATE 5 MG: 5 TABLET ORAL at 10:05

## 2022-12-05 RX ADMIN — LOSARTAN POTASSIUM 100 MG: 50 TABLET, FILM COATED ORAL at 10:05

## 2022-12-05 RX ADMIN — TRIMETHOBENZAMIDE HYDROCHLORIDE 200 MG: 100 INJECTION INTRAMUSCULAR at 14:34

## 2022-12-05 RX ADMIN — ASPIRIN 81 MG: 81 TABLET, COATED ORAL at 10:05

## 2022-12-05 RX ADMIN — ACETAMINOPHEN 650 MG: 325 TABLET ORAL at 10:05

## 2022-12-05 RX ADMIN — DOCUSATE SODIUM 100 MG: 100 CAPSULE, LIQUID FILLED ORAL at 10:05

## 2022-12-05 RX ADMIN — POTASSIUM CHLORIDE 20 MEQ: 1500 TABLET, EXTENDED RELEASE ORAL at 10:05

## 2022-12-05 RX ADMIN — PANTOPRAZOLE SODIUM 40 MG: 40 TABLET, DELAYED RELEASE ORAL at 10:05

## 2022-12-05 NOTE — CARE COORDINATION
Social Work:    PASRAJNI/Edy cleared patient for transfer to [facility]. Gwen Motta was arranged to transport Mr. Bonnie Dhillon today at 4:30 p.m. Social work updated daughter Washington Mcneil ([phone]), as well as Crystal at [facility].     Electronically signed by MAURICIO Linares on 12/5/2022 at 3:08 PM

## 2022-12-05 NOTE — PROGRESS NOTES
Internal Medicine Progress Note    Patient's name: Mitzy Walsh  : 1935  Chief complaints (on day of admission): Headache, Nausea, and Fatigue  Admission date: 2022  Date of service: 2022   Room: [address]  Primary care physician: No primary care provider on file. Reason for visit: Follow-up for weakness and failure to thrive     Subjective  Chilo Mckee was seen and examined. Doing well today no new complaints   Comfortable in bed   No new issues at this time   DC planning on going     Review of Systems   There are no new complaints of chest pain, shortness of breath, abdominal pain, nausea, vomiting, diarrhea, constipation.     Hospital Medications  Current Facility-Administered Medications   Medication Dose Route Frequency Provider Last Rate Last Admin    trimethobenzamide (TIGAN) injection 200 mg  200 mg IntraMUSCular Q6H PRN Lay Ratliff, DO   200 mg at 22 1503    tamsulosin (FLOMAX) capsule 0.4 mg  0.4 mg Oral Nightly Calos García DO   0.4 mg at 22    QUEtiapine (SEROQUEL) tablet 25 mg  25 mg Oral Nightly León Dominguez MD   25 mg at 22    citalopram (CELEXA) tablet 10 mg  10 mg Oral Daily León Dominguez MD   10 mg at 22    ondansetron [facility]) injection 4 mg  4 mg IntraVENous Once Carolina Pelayo DO        amLODIPine (NORVASC) tablet 5 mg  5 mg Oral Daily Kaleta Link, APRN - CNP   5 mg at 22 0841    aspirin EC tablet 81 mg  81 mg Oral Daily Kaleta Link, APRN - CNP   81 mg at 22 0841    docusate sodium (COLACE) capsule 100 mg  100 mg Oral Daily Kaleta Link, APRN - CNP   100 mg at 22 0840    acetaminophen (TYLENOL) tablet 650 mg  650 mg Oral Q6H PRN Kaleta Link, APRN - CNP   650 mg at 22 192    loperamide (IMODIUM) capsule 2 mg  2 mg Oral Q4H PRN Kaleta Link, APRN - CNP        losartan (COZAAR) tablet 100 mg  100 mg Oral Daily Kaleta Link, APRN - CNP   100 mg at 22 7774 pantoprazole (PROTONIX) tablet 40 mg  40 mg Oral Daily Elena Bernabe, APRN - CNP   40 mg at 12/04/22 0840    potassium chloride (KLOR-CON M) extended release tablet 20 mEq  20 mEq Oral Daily Elena Bernabe, APRN - CNP   20 mEq at 12/04/22 0840    prochlorperazine (COMPAZINE) tablet 10 mg  10 mg Oral Q8H PRN Elena Bernabe, APRN - CNP   10 mg at 12/03/22 1817    sodium chloride flush 0.9 % injection 10 mL  10 mL IntraVENous 2 times per day Elena Bernabe, APRN - CNP   10 mL at 11/29/22 2216    sodium chloride flush 0.9 % injection 10 mL  10 mL IntraVENous PRN Elena Bernabe, APRN - CNP        0.9 % sodium chloride infusion   IntraVENous PRN Elena Bernabe, APRN - CNP        potassium chloride (KLOR-CON M) extended release tablet 40 mEq  40 mEq Oral PRN Elena Bernabe, APRN - CNP        Or    potassium bicarb-citric acid (EFFER-K) effervescent tablet 40 mEq  40 mEq Oral PRN Elena Bernabe, APRN - CNP        Or    potassium chloride 10 mEq/100 mL IVPB (Peripheral Line)  10 mEq IntraVENous PRN Elena Bernabe, APRN - CNP        enoxaparin (LOVENOX) injection 40 mg  40 mg SubCUTAneous Daily Elena Quan, APRN - CNP   40 mg at 12/04/22 0840    senna (SENOKOT) tablet 8.6 mg  1 tablet Oral Daily PRN Elena Bernabe, APRN - CNP        hydrALAZINE (APRESOLINE) injection 10 mg  10 mg IntraVENous Q6H PRN Elena Bernabe, APRN - CNP           PRN Medications  trimethobenzamide, acetaminophen, loperamide, prochlorperazine, sodium chloride flush, sodium chloride, potassium chloride **OR** potassium alternative oral replacement **OR** potassium chloride, senna, hydrALAZINE    Objective  Most Recent Recorded Vitals  /72   Pulse 72   Temp 98.4 °F (36.9 °C) (Oral)   Resp 16   Ht 6' (1.829 m)   Wt 157 lb (71.2 kg)   SpO2 96%   BMI 21.29 kg/m²   I/O last 3 completed shifts: In: 61 [P.O.:60]  Out: 2725 [Urine:2725]  No intake/output data recorded.     Physical Exam:   General: Awake and alert, seems oriented, NAD, no labored breathing, extremely hard of hearing, appears weak and frail  Eyes: conjunctivae/corneas clear, sclera non icteric  Skin: color/texture/turgor normal, no rashes or lesions  Lungs: CTAB, no retractions/use of accessory muscles, no vocal fremitus, no rhonchi, no crackle, no rales  Heart: regular rate, regular rhythm, no murmur  Abdomen: soft, NT, bowel sounds normal, Panchal catheter in place  Extremities: atraumatic, no edema  Neurologic: cranial nerves 2-12 grossly intact, no slurred speech    Most Recent Labs  Lab Results   Component Value Date    WBC 6.5 12/01/2022    HGB 13.6 12/01/2022    HCT 41.0 12/01/2022     12/01/2022     12/01/2022    K 3.6 12/01/2022     12/01/2022    CREATININE 0.9 12/01/2022    BUN 13 12/01/2022    CO2 28 12/01/2022    GLUCOSE 87 12/01/2022    ALT 13 12/01/2022    AST 14 12/01/2022    INR 1.2 07/13/2022    TSH 1.700 03/29/2022    LABA1C 5.6 01/03/2022    LABMICR <12.0 11/09/2022       XR CHEST 1 VIEW   Final Result   No acute process. CT ABDOMEN PELVIS W IV CONTRAST Additional Contrast? None   Final Result   1. No acute abnormality. 2. Enlarged prostate gland. 3. Thickening of urinary bladder wall is likely secondary to chronic bladder   outlet obstruction. 4. Stable right renal artery aneurysm measuring approximately 1.8 cm in size. Consider surgical or endovascular treatment. CT HEAD WO CONTRAST   Final Result   No acute intracranial abnormality. Periventricular white matter changes consistent chronic microvascular disease.                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]      Priority: High    Generalized weakness [R53.1]      Priority: Medium    Severe protein-calorie malnutrition (HCC) [E43]      Priority: Medium    Failure to thrive in adult [R62.7]      Priority: Medium    Gait disturbance [R26.9] Priority: Medium    Essential hypertension [I10]      Priority: Low    Hyperlipidemia [E78.5]      Priority: Low    Myalgia [M79.10]     H/O: CVA (cerebrovascular accident) [Z86.73]     BPH associated with nocturia [N40.1, R35.1]     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 A]     Osteoarthritis of left knee [M17.12]     Aneurysm of right renal artery ([address] [I72.2]          Plan  Failure to thrive:  Unfortunately this is close to the patient's baseline mental status  He lives at [facility] at baseline  PT/OT evaluations- PT AM-PAC-- 15/24  Discharge to SNF/[facility] and eventually transition to [facility] awaiting insurance approval.   Consider palliative care discussions as an outpatient    Urinary retention:   Continue Panchal catheter for now-placed on 11/30  Urology consultation  Start Flomax  Urinalysis negative on 11/28  Urine culture showed no growth on 11/28    Follow labs   DVT prophylaxis  Please see orders for further management and care. Discharge plan: [facility]/SNF once PASSAR is cleared-okay to go discharge once obtained. Per nursing was evaluated by [facility] yesterday. Electronically signed by Danny Amezcua MD on 12/5/2022 at 9:25 AM    I can be reached through Answering service.

## 2022-12-05 NOTE — PROGRESS NOTES
Physical Therapy  Facility/Department: [facility] SURGICAL  Treatment Note    Name: Ward Leonard  : 1935  MRN: [id number]  Date of Service: 2022      Patient Diagnosis(es): The primary encounter diagnosis was Failure to thrive in adult. A diagnosis of Generalized weakness was also pertinent to this visit. Past Medical History:  has a past medical history of Aneurysm of right renal artery ([facility] [place] [address] Colitis, Depression, GERD (gastroesophageal reflux disease), H/O: CVA (cerebrovascular accident), Hyperlipidemia, Hypertension, Stroke [facility]), Superior mesenteric artery stenosis ([facility] [place] [address] and TIA (transient ischemic attack). Past Surgical History:  has a past surgical history that includes Appendectomy; Tonsillectomy; ECHO Compl W Dop Color Flow (2012); sinus surgery (8098,5776); Anterior cruciate ligament repair (Left, 2001); Cholecystectomy (); hernia repair (Right, 2002); Total knee arthroplasty (Left, 2019); and Upper gastrointestinal endoscopy (N/A, 10/10/2019). Referring provider:  Shaye Motta MD    PT Order:  PT eval and treat     Evaluating PT:  Radha Perez PT, DPT PT [id number]    Room #:  4487/6266-Q  Diagnosis:  Generalized weakness [R53.1]  Failure to thrive in adult [R62.7]  Precautions:  fall risk  Equipment Needs:  none. Pt reported owing a walker and cane. SUBJECTIVE:    Pt lives at [facility]. Pt reported he has been independent with mobility with a walker until recently he felt weak and needed more assistance. OBJECTIVE:   Initial Evaluation  Date:  Treatment  2022 Short Term/ Long Term   Goals   Was pt agreeable to Eval/treatment? yes yes    Does pt have pain? None reported No complaints    Bed Mobility  Rolling: NT  Supine to sit: Mod A  Sit to supine: NT  Scooting:  Mod A to sitting EOB Rolling: Min A  Supine to sit: Min A  Scooting: Min A seated to EOB SBA   Transfers Sit to stand: Min A  Stand to sit: Min A  Stand pivot:  Min A Sit <> stand: Min A SBA   Ambulation    15 feet with w/w Min A  80 feet x 1 using [employer] for support Min A for balance 100 feet with w/w SBA    Stair negotiation: ascended and descended  NT      ROM BLE:  WFL     Strength BLE:  grossly 4/5  Grossly 4+/5   Balance Sitting EOB:  supervision  Dynamic Standing:  Min A  Sitting EOB:  independent  Dynamic Standing:  SBA with w/w   AM-PAC 6 Clicks 22/88 19/72        Pt is alert and able to follow instruction  Balance: poor dynamic using [employer] for support    Pt performed therapeutic exercise of the following: NT    Patient education/treatment  Pt was educated on UE usage to assist with transfer safety, gait mechanics promoting upright posture and staying within [employer] base of support    Patient response to education:   Pt verbalized understanding Pt demonstrated skill Pt requires further education in this area   yes With prompt for transfers yes     ASSESSMENT:   Comments: Pt found in bed, sat EOB SBA. Gait slow and consistent. Pt unsteady throughout, required constant hands on assist for balance and safety. Pt deconditioned, fatigued after activity, is unsafe to gait or transfer alone presently. Pt was left in a bedside chair with call light in reach, waffle cushion in place    Chair/bed alarm: chair alarm active    Time in 0836  Time out 0852   Total Treatment Time 16 minutes   CPT codes:     Therapeutic activities 18801 16 minutes   Therapeutic exercises 07353 0 minutes       Pt is making fair progress toward established Physical Therapy goals. Continue with physical therapy current plan of care.     Beverlie Hodgkins [facility]   License Number: [id number]

## 2022-12-05 NOTE — PLAN OF CARE
Problem: Discharge Planning  Goal: Discharge to home or other facility with appropriate resources  11/29/2022 1011 by Edu Modi RN  Outcome: Progressing  11/29/2022 1011 by Edu Modi RN  Outcome: Progressing     Problem: Pain  Goal: Verbalizes/displays adequate comfort level or baseline comfort level  11/29/2022 1011 by Edu Modi RN  Outcome: Progressing  11/29/2022 1011 by Edu Modi RN  Outcome: Progressing     Problem: Safety - Adult  Goal: Free from fall injury  11/29/2022 1011 by Edu Modi RN  Outcome: Progressing  11/29/2022 1011 by Edu Modi RN  Outcome: Progressing     Problem: ABCDS Injury Assessment  Goal: Absence of physical injury  11/29/2022 1011 by Edu Modi RN  Outcome: Progressing  11/29/2022 1011 by Edu Modi RN  Outcome: Progressing     Problem: Chronic Conditions and Co-morbidities  Goal: Patient's chronic conditions and co-morbidity symptoms are monitored and maintained or improved  11/29/2022 1011 by Edu Modi RN  Outcome: Progressing  11/29/2022 1011 by Edu Modi RN  Outcome: Progressing
Problem: Discharge Planning  Goal: Discharge to home or other facility with appropriate resources  11/29/2022 1035 by Elizabeth Turner RN  Outcome: Progressing  11/29/2022 1011 by Elizabeth Turner RN  Outcome: Progressing  11/29/2022 1011 by Elizabeth Turner RN  Outcome: Progressing     Problem: Pain  Goal: Verbalizes/displays adequate comfort level or baseline comfort level  11/29/2022 1035 by Elizabeth Turner RN  Outcome: Progressing  11/29/2022 1011 by Elizabeth Turner RN  Outcome: Progressing  11/29/2022 1011 by Elizabeth Turner RN  Outcome: Progressing     Problem: Safety - Adult  Goal: Free from fall injury  11/29/2022 1035 by Elizabeth Turner RN  Outcome: Progressing  11/29/2022 1011 by Elizabeth Turner RN  Outcome: Progressing  11/29/2022 1011 by Elizabeth Turner RN  Outcome: Progressing     Problem: ABCDS Injury Assessment  Goal: Absence of physical injury  11/29/2022 1035 by Elizabeth Turner RN  Outcome: Progressing  11/29/2022 1011 by Elizabeth Turner RN  Outcome: Progressing  11/29/2022 1011 by Elizabeth Turner RN  Outcome: Progressing     Problem: Chronic Conditions and Co-morbidities  Goal: Patient's chronic conditions and co-morbidity symptoms are monitored and maintained or improved  11/29/2022 1035 by Elizabeth Turner RN  Outcome: Progressing  11/29/2022 1011 by Elizabeth Turner RN  Outcome: Progressing  11/29/2022 1011 by Elizabeth Turner RN  Outcome: Progressing     Problem: Skin/Tissue Integrity  Goal: Absence of new skin breakdown  Description: 1. Monitor for areas of redness and/or skin breakdown  2. Assess vascular access sites hourly  3. Every 4-6 hours minimum:  Change oxygen saturation probe site  4. Every 4-6 hours:  If on nasal continuous positive airway pressure, respiratory therapy assess nares and determine need for appliance change or resting period.   Outcome: Progressing
Problem: Discharge Planning  Goal: Discharge to home or other facility with appropriate resources  12/3/2022 0054 by Kimmie Martinez RN  Outcome: Progressing     Problem: Pain  Goal: Verbalizes/displays adequate comfort level or baseline comfort level  12/3/2022 0054 by Kimmie Martinez RN  Outcome: Progressing     Problem: Safety - Adult  Goal: Free from fall injury  12/3/2022 0054 by Kimmie Martinez RN  Outcome: Progressing     Problem: ABCDS Injury Assessment  Goal: Absence of physical injury  12/3/2022 0054 by Kimmie Martinez RN  Outcome: Progressing     Problem: Chronic Conditions and Co-morbidities  Goal: Patient's chronic conditions and co-morbidity symptoms are monitored and maintained or improved  12/3/2022 0054 by Kimmie Martinez RN  Outcome: Progressing     Problem: Skin/Tissue Integrity  Goal: Absence of new skin breakdown  Description: 1. Monitor for areas of redness and/or skin breakdown  2. Assess vascular access sites hourly  3. Every 4-6 hours minimum:  Change oxygen saturation probe site  4. Every 4-6 hours:  If on nasal continuous positive airway pressure, respiratory therapy assess nares and determine need for appliance change or resting period.   12/3/2022 0054 by Kimmie Martinez RN  Outcome: Progressing     Problem: Nutrition Deficit:  Goal: Optimize nutritional status  Outcome: Progressing
Problem: Discharge Planning  Goal: Discharge to home or other facility with appropriate resources  Outcome: Adequate for Discharge     Problem: Pain  Goal: Verbalizes/displays adequate comfort level or baseline comfort level  Outcome: Adequate for Discharge     Problem: Safety - Adult  Goal: Free from fall injury  Outcome: Adequate for Discharge     Problem: ABCDS Injury Assessment  Goal: Absence of physical injury  Outcome: Adequate for Discharge     Problem: Chronic Conditions and Co-morbidities  Goal: Patient's chronic conditions and co-morbidity symptoms are monitored and maintained or improved  Outcome: Adequate for Discharge     Problem: Skin/Tissue Integrity  Goal: Absence of new skin breakdown  Description: 1. Monitor for areas of redness and/or skin breakdown  2. Assess vascular access sites hourly  3. Every 4-6 hours minimum:  Change oxygen saturation probe site  4. Every 4-6 hours:  If on nasal continuous positive airway pressure, respiratory therapy assess nares and determine need for appliance change or resting period.   Outcome: Adequate for Discharge
Problem: Discharge Planning  Goal: Discharge to home or other facility with appropriate resources  Outcome: Progressing     Problem: Pain  Goal: Verbalizes/displays adequate comfort level or baseline comfort level  Outcome: Progressing     Problem: Safety - Adult  Goal: Free from fall injury  Outcome: Progressing     Problem: ABCDS Injury Assessment  Goal: Absence of physical injury  Outcome: Progressing     Problem: Chronic Conditions and Co-morbidities  Goal: Patient's chronic conditions and co-morbidity symptoms are monitored and maintained or improved  Outcome: Progressing
Problem: Discharge Planning  Goal: Discharge to home or other facility with appropriate resources  Outcome: Progressing     Problem: Pain  Goal: Verbalizes/displays adequate comfort level or baseline comfort level  Outcome: Progressing     Problem: Safety - Adult  Goal: Free from fall injury  Outcome: Progressing     Problem: ABCDS Injury Assessment  Goal: Absence of physical injury  Outcome: Progressing     Problem: Chronic Conditions and Co-morbidities  Goal: Patient's chronic conditions and co-morbidity symptoms are monitored and maintained or improved  Outcome: Progressing     Problem: Skin/Tissue Integrity  Goal: Absence of new skin breakdown  Description: 1. Monitor for areas of redness and/or skin breakdown  2. Assess vascular access sites hourly  3. Every 4-6 hours minimum:  Change oxygen saturation probe site  4. Every 4-6 hours:  If on nasal continuous positive airway pressure, respiratory therapy assess nares and determine need for appliance change or resting period.   Outcome: Progressing
Problem: Discharge Planning  Goal: Discharge to home or other facility with appropriate resources  Outcome: Progressing     Problem: Pain  Goal: Verbalizes/displays adequate comfort level or baseline comfort level  Outcome: Progressing     Problem: Safety - Adult  Goal: Free from fall injury  Outcome: Progressing     Problem: ABCDS Injury Assessment  Goal: Absence of physical injury  Outcome: Progressing     Problem: Chronic Conditions and Co-morbidities  Goal: Patient's chronic conditions and co-morbidity symptoms are monitored and maintained or improved  Outcome: Progressing     Problem: Skin/Tissue Integrity  Goal: Absence of new skin breakdown  Description: 1. Monitor for areas of redness and/or skin breakdown  2. Assess vascular access sites hourly  3. Every 4-6 hours minimum:  Change oxygen saturation probe site  4. Every 4-6 hours:  If on nasal continuous positive airway pressure, respiratory therapy assess nares and determine need for appliance change or resting period.   Outcome: Progressing     Problem: Nutrition Deficit:  Goal: Optimize nutritional status  Outcome: Progressing
Problem: Discharge Planning  Goal: Discharge to home or other facility with appropriate resources  Outcome: Progressing  Flowsheets (Taken 12/4/2022 0730 by Aysha Pearl, RN)  Discharge to home or other facility with appropriate resources: Identify barriers to discharge with patient and caregiver     Problem: Pain  Goal: Verbalizes/displays adequate comfort level or baseline comfort level  Outcome: Progressing     Problem: Safety - Adult  Goal: Free from fall injury  Outcome: Progressing     Problem: ABCDS Injury Assessment  Goal: Absence of physical injury  Outcome: Progressing     Problem: Chronic Conditions and Co-morbidities  Goal: Patient's chronic conditions and co-morbidity symptoms are monitored and maintained or improved  Outcome: Progressing  Flowsheets (Taken 12/4/2022 0730 by Aysha Pearl RN)  Care Plan - Patient's Chronic Conditions and Co-Morbidity Symptoms are Monitored and Maintained or Improved: Monitor and assess patient's chronic conditions and comorbid symptoms for stability, deterioration, or improvement     Problem: Skin/Tissue Integrity  Goal: Absence of new skin breakdown  Description: 1. Monitor for areas of redness and/or skin breakdown  2. Assess vascular access sites hourly  3. Every 4-6 hours minimum:  Change oxygen saturation probe site  4. Every 4-6 hours:  If on nasal continuous positive airway pressure, respiratory therapy assess nares and determine need for appliance change or resting period.   Outcome: Progressing     Problem: Nutrition Deficit:  Goal: Optimize nutritional status  Outcome: Progressing
Problem: Pain  Goal: Verbalizes/displays adequate comfort level or baseline comfort level  12/5/2022 0029 by Kamille Maier RN  Outcome: Progressing  12/4/2022 1842 by Jigar Mg RN  Outcome: Progressing     Problem: Safety - Adult  Goal: Free from fall injury  12/5/2022 0029 by Kamille Maier RN  Outcome: Progressing  12/4/2022 1842 by Jigar Mg RN  Outcome: Progressing     Problem: ABCDS Injury Assessment  Goal: Absence of physical injury  12/5/2022 0029 by Kamille Maier RN  Outcome: Progressing     Problem: Chronic Conditions and Co-morbidities  Goal: Patient's chronic conditions and co-morbidity symptoms are monitored and maintained or improved  Recent Flowsheet Documentation  Taken 12/4/2022 1959 by Ely Arizmendi 34 - Patient's Chronic Conditions and Co-Morbidity Symptoms are Monitored and Maintained or Improved: Monitor and assess patient's chronic conditions and comorbid symptoms for stability, deterioration, or improvement  12/4/2022 1842 by Jigar Mg RN  Outcome: Progressing  Flowsheets (Taken 12/4/2022 0730 by Arlyn Fothergill, RN)  Care Plan - Patient's Chronic Conditions and Co-Morbidity Symptoms are Monitored and Maintained or Improved: Monitor and assess patient's chronic conditions and comorbid symptoms for stability, deterioration, or improvement
Problem: Pain  Goal: Verbalizes/displays adequate comfort level or baseline comfort level  Outcome: Progressing     Problem: Safety - Adult  Goal: Free from fall injury  Outcome: Progressing     Problem: ABCDS Injury Assessment  Goal: Absence of physical injury  Outcome: Progressing     Problem: Chronic Conditions and Co-morbidities  Goal: Patient's chronic conditions and co-morbidity symptoms are monitored and maintained or improved  Outcome: Progressing  Flowsheets (Taken 12/3/2022 0800 by Crista Alfred RN)  Care Plan - Patient's Chronic Conditions and Co-Morbidity Symptoms are Monitored and Maintained or Improved: Monitor and assess patient's chronic conditions and comorbid symptoms for stability, deterioration, or improvement
pressure, respiratory therapy assess nares and determine need for appliance change or resting period.   11/29/2022 1638 by Rebeca Silva RN  Outcome: Progressing  11/29/2022 1035 by Rebeca Silva RN  Outcome: Progressing

## 2022-12-10 NOTE — PROGRESS NOTES
Physician Progress Note      PATIENT:               Joel Whyte  CSN #:                  [id number]  :                       1935  ADMIT DATE:       2022 1:21 PM  [address] DATE:        2022 5:40 PM  RESPONDING  PROVIDER #:        Rita Cassidy DO          QUERY TEXT:    Dr. Suzanne Snyder,    Patient admitted with failure to thrive and noted to have severe malnutrition. If possible, please document in progress notes and discharge summary the   relationship, if any, between failure to thrive and malnutrition. The medical record reflects the following:  Risk Factors: Advanced age, failure to thrive, BMI 21  Clinical Indicators: per dietary \"Severe malnutrition, In context of chronic   illness related to inadequate protein-energy intake\"  Treatment: ONS, PT/OT    Thank you,  Yaw Sepulveda RN  Clinical Documentation Improvement  [email or web address]  Options provided:  -- Failure to thrive due to severe malnutrition  -- Failure to thrive unrelated to severe malnutrition  -- Other - I will add my own diagnosis  -- Disagree - Not applicable / Not valid  -- Disagree - Clinically unable to determine / Unknown  -- Refer to Clinical Documentation Reviewer    PROVIDER RESPONSE TEXT:    This patient has failure to thrive due to severe malnutrition.     Query created by: Zaheer Laureano on 2022 1:18 PM      Electronically signed by:  Rita Cassidy DO 12/10/2022 6:27 AM

## 2022-12-12 ENCOUNTER — OUTSIDE SERVICES (OUTPATIENT)
Dept: FAMILY MEDICINE CLINIC | Age: 87
End: 2022-12-12
Payer: MEDICARE

## 2022-12-12 DIAGNOSIS — M79.10 MYALGIA: ICD-10-CM

## 2022-12-12 DIAGNOSIS — F51.01 PRIMARY INSOMNIA: ICD-10-CM

## 2022-12-12 DIAGNOSIS — I72.2 ANEURYSM OF RIGHT RENAL ARTERY (HCC): ICD-10-CM

## 2022-12-12 DIAGNOSIS — K29.50 CHRONIC GASTRITIS WITHOUT BLEEDING, UNSPECIFIED GASTRITIS TYPE: ICD-10-CM

## 2022-12-12 DIAGNOSIS — F32.9 MAJOR DEPRESSIVE DISORDER, REMISSION STATUS UNSPECIFIED, UNSPECIFIED WHETHER RECURRENT: Primary | ICD-10-CM

## 2022-12-12 DIAGNOSIS — F41.9 ANXIETY: ICD-10-CM

## 2022-12-12 DIAGNOSIS — R63.0 DECREASED APPETITE: ICD-10-CM

## 2022-12-12 DIAGNOSIS — Z96.652 STATUS POST LEFT KNEE REPLACEMENT: ICD-10-CM

## 2022-12-12 DIAGNOSIS — M17.12 PRIMARY OSTEOARTHRITIS OF LEFT KNEE: ICD-10-CM

## 2022-12-12 DIAGNOSIS — K55.1 SUPERIOR MESENTERIC ARTERY STENOSIS (HCC): ICD-10-CM

## 2022-12-12 DIAGNOSIS — Z86.73 H/O: CVA (CEREBROVASCULAR ACCIDENT): ICD-10-CM

## 2022-12-12 DIAGNOSIS — I10 ESSENTIAL HYPERTENSION: Chronic | ICD-10-CM

## 2022-12-12 DIAGNOSIS — R53.1 GENERALIZED WEAKNESS: ICD-10-CM

## 2022-12-12 DIAGNOSIS — E43 SEVERE PROTEIN-CALORIE MALNUTRITION (HCC): ICD-10-CM

## 2022-12-12 PROCEDURE — 99305 1ST NF CARE MODERATE MDM 35: CPT | Performed by: FAMILY MEDICINE

## 2022-12-12 ASSESSMENT — ENCOUNTER SYMPTOMS
RECTAL PAIN: 0
WHEEZING: 0
ALLERGIC/IMMUNOLOGIC NEGATIVE: 1
BLOOD IN STOOL: 0
ABDOMINAL PAIN: 0
SHORTNESS OF BREATH: 0
VOMITING: 0
EYE PAIN: 0
RHINORRHEA: 0
EYE ITCHING: 0
STRIDOR: 0
SINUS PRESSURE: 0
CHOKING: 0
COUGH: 0
EYE REDNESS: 0
PHOTOPHOBIA: 0
RESPIRATORY NEGATIVE: 1
CHEST TIGHTNESS: 0
APNEA: 0
SINUS PAIN: 0
FACIAL SWELLING: 0
VOICE CHANGE: 0
ABDOMINAL DISTENTION: 0
BACK PAIN: 1
GASTROINTESTINAL NEGATIVE: 1
TROUBLE SWALLOWING: 0
SORE THROAT: 0
CONSTIPATION: 0
ANAL BLEEDING: 0
NAUSEA: 0
EYE DISCHARGE: 0
DIARRHEA: 0

## 2022-12-12 NOTE — PROGRESS NOTES
Dea Faith is a 81 y.o. male. Seen: 12/8/2022   HPI/Chief C/O:  He is here post hospital for adult failure to thrive due to depression  No Known Allergies  This patient is here to establish care as a new patient in our facility   We will review all past medical history and go over past and current medications   We will review all medical records that are available to us  We will reconcile our care planning and treatment goals to past and present for this patient           ROS:  Review of Systems   Constitutional:  Positive for fatigue. Negative for activity change, appetite change, chills, diaphoresis, fever and unexpected weight change. HENT: Negative. Negative for congestion, dental problem, drooling, ear discharge, ear pain, facial swelling, hearing loss, mouth sores, nosebleeds, postnasal drip, rhinorrhea, sinus pressure, sinus pain, sneezing, sore throat, tinnitus, trouble swallowing and voice change. Eyes:  Negative for photophobia, pain, discharge, redness, itching and visual disturbance. Respiratory: Negative. Negative for apnea, cough, choking, chest tightness, shortness of breath, wheezing and stridor. Cardiovascular: Negative. Negative for chest pain, palpitations and leg swelling. Gastrointestinal: Negative. Negative for abdominal distention, abdominal pain, anal bleeding, blood in stool, constipation, diarrhea, nausea, rectal pain and vomiting. Endocrine: Negative. Negative for cold intolerance, heat intolerance, polydipsia, polyphagia and polyuria. Genitourinary: Negative. Negative for decreased urine volume, difficulty urinating, dysuria, enuresis, flank pain, frequency, genital sores, hematuria, penile discharge, penile pain, penile swelling, scrotal swelling, testicular pain and urgency. Musculoskeletal:  Positive for arthralgias, back pain, gait problem and myalgias. Negative for joint swelling, neck pain and neck stiffness. Skin:  Negative for wound. Allergic/Immunologic: Negative. Negative for environmental allergies, food allergies and immunocompromised state. Neurological:  Positive for weakness. Negative for dizziness, tremors, seizures, syncope, facial asymmetry, speech difficulty, light-headedness, numbness and headaches. Hematological: Negative. Negative for adenopathy. Does not bruise/bleed easily. Psychiatric/Behavioral:  Positive for decreased concentration, dysphoric mood and sleep disturbance. Negative for agitation, behavioral problems, confusion, hallucinations, self-injury and suicidal ideas. The patient is nervous/anxious. The patient is not hyperactive. Past Medical/Surgical Hx;  Reviewed with patient      Diagnosis Date    Aneurysm of right renal artery ([facility] [address]     follows with Dr. Vicente Cox yearly (last visit 9/19)    Colitis     Depression     GERD (gastroesophageal reflux disease)     H/O: CVA (cerebrovascular accident) 10/14/2021    Hyperlipidemia     Hypertension     Stroke [facility])     Superior mesenteric artery stenosis ([facility] [address] 11/4/2020    TIA (transient ischemic attack)      Past Surgical History:   Procedure Laterality Date    ANTERIOR CRUCIATE LIGAMENT REPAIR Left 11/21/2001    APPENDECTOMY      CHOLECYSTECTOMY  2007    Lap    ECHO COMPL W DOP COLOR FLOW  12/4/2012         HERNIA REPAIR Right 11/13/2002    double    SINUS SURGERY  2002,2003     done x 2    TONSILLECTOMY      TOTAL KNEE ARTHROPLASTY Left 1/28/2019    LEFT  ROBOTIC KNEE TOTAL ARTHROPLASTY  ++MEREDITH- [id number]++   ++ADDUCTOR BLOCK++ performed by Ruddy Alvarado MD at [address] [place] [address] N/A 10/10/2019    EGD BIOPSY performed by Gage Rangel MD at [facility] ENDOSCOPY       Past Family Hx:  Reviewed with patient  No family history on file.     Social Hx:  Reviewed with patient  Social History     Tobacco Use    Smoking status: Never    Smokeless tobacco: Never   Substance Use Topics    Alcohol use: No       OBJECTIVE  There were no vitals taken for this visit. Problem List:  John Mckenna does not have any pertinent problems on file. PHYS EX:  Physical Exam  Vitals and nursing note reviewed. Constitutional:       General: He is not in acute distress. Appearance: Normal appearance. He is well-developed. He is not ill-appearing, toxic-appearing or diaphoretic. HENT:      Head: Normocephalic and atraumatic. Right Ear: External ear normal. There is no impacted cerumen. Left Ear: External ear normal. There is no impacted cerumen. Nose: Nose normal. No congestion or rhinorrhea. Mouth/Throat:      Mouth: Mucous membranes are moist.      Pharynx: Oropharynx is clear. No oropharyngeal exudate or posterior oropharyngeal erythema. Eyes:      General: No scleral icterus. Right eye: No discharge. Left eye: No discharge. Extraocular Movements: Extraocular movements intact. Conjunctiva/sclera: Conjunctivae normal.      Pupils: Pupils are equal, round, and reactive to light. Neck:      Thyroid: No thyromegaly. Vascular: No carotid bruit. Trachea: No tracheal deviation. Cardiovascular:      Rate and Rhythm: Normal rate and regular rhythm. Pulses: Normal pulses. Heart sounds: Normal heart sounds. No murmur heard. No friction rub. No gallop. Pulmonary:      Effort: Pulmonary effort is normal. No respiratory distress. Breath sounds: Normal breath sounds. No stridor. No wheezing, rhonchi or rales. Chest:      Chest wall: No tenderness. Abdominal:      General: Bowel sounds are normal. There is no distension. Palpations: Abdomen is soft. There is no mass. Tenderness: There is no abdominal tenderness. There is no right CVA tenderness, left CVA tenderness, guarding or rebound. Hernia: No hernia is present. Genitourinary:     Penis: No tenderness. Musculoskeletal:         General: Tenderness (multiple joint pains) present. No swelling, deformity or signs of injury. Normal range of motion. Cervical back: Normal range of motion and neck supple. No rigidity. No muscular tenderness. Right lower leg: No edema. Left lower leg: No edema. Lymphadenopathy:      Cervical: No cervical adenopathy. Neurological:      General: No focal deficit present. Mental Status: He is alert. Cranial Nerves: No cranial nerve deficit. Sensory: Sensory deficit present. Motor: Weakness present. No abnormal muscle tone. Coordination: Coordination abnormal.      Gait: Gait abnormal.      Deep Tendon Reflexes: Reflexes abnormal.       ASSESSMENT/PLAN  Diagnoses and all orders for this visit:    Major depressive disorder, remission status unspecified, unspecified whether recurrent  Long talk on treatment and prevention  Literature is given   Counseling is given for anxiety and depression   All questions were taken and answers are given        Primary insomnia  Long talk on treatment and prevention  Literature is given       Status post left knee replacement  --stable on current care planning  -- continue treatment as we are meeting goals       Severe protein-calorie malnutrition ([facility] [address]  --stable on current care planning  -- continue treatment as we are meeting goals       Generalized weakness  --stable on current care planning  -- continue treatment as we are meeting goals       Aneurysm of right renal artery ([facility] [address]  --stable on current care planning  -- continue treatment as we are meeting goals       Primary osteoarthritis of left knee     Medication List            Accurate as of December 12, 2022  8:05 AM. If you have any questions, ask your nurse or doctor.                 CONTINUE taking these medications      acetaminophen 650 MG extended release tablet  Commonly known as: Tylenol 8 Hour  Take 1 tablet by mouth every 6 hours as needed for Pain     amLODIPine 5 MG tablet  Commonly known as: NORVASC  Take 1 tablet by mouth daily     aspirin EC 81 MG EC tablet  Take 1 tablet by mouth daily     citalopram 10 MG tablet  Commonly known as: CELEXA  Take 1 tablet by mouth daily     docusate sodium 100 MG capsule  Commonly known as: COLACE  Take 1 capsule by mouth daily     loperamide 2 MG tablet  Commonly known as: IMODIUM A-D  Take 1 tablet by mouth every 4 hours as needed for Diarrhea     losartan 100 MG tablet  Commonly known as: COZAAR  Take 1 tablet by mouth daily 1 QD     ondansetron 4 MG disintegrating tablet  Commonly known as: ZOFRAN-ODT  Take 1 tablet by mouth every 8 hours as needed for Nausea or Vomiting     pantoprazole 40 MG tablet  Commonly known as: PROTONIX  Take 1 tablet by mouth daily     potassium chloride 20 MEQ extended release tablet  Commonly known as: KLOR-CON M  Take 1 tablet by mouth daily 1 qd     prochlorperazine 10 MG tablet  Commonly known as: COMPAZINE  Take 1 tablet by mouth every 8 hours as needed (Nausea)     QUEtiapine 25 MG tablet  Commonly known as: SEROQUEL  Take 1 tablet by mouth nightly     senna 8.6 MG tablet  Commonly known as: SENOKOT  Take 1 tablet by mouth daily as needed (Constipation)     tamsulosin 0.4 MG capsule  Commonly known as: FLOMAX  Take 1 capsule by mouth at bedtime          '    Chronic gastritis without bleeding, unspecified gastritis type  --stable on current care planning  -- continue treatment as we are meeting goals       Decreased appetite  Long talk on treatment and prevention  Literature is given       Anxiety  Counseling is given for anxiety and depression   All questions were taken and answers are given        Superior mesenteric artery stenosis (HCC)  --stable on current care planning  -- continue treatment as we are meeting goals       H/O: CVA (cerebrovascular accident)  --stable on current care planning  -- continue treatment as we are meeting goals       Essential hypertension    ---VASCULAR PANEL  A) ASA, plavix, aggrenox  B) coumadin, pletal, tzd, statin  C) ARB, hctz, folic, CCB  D) cannikinumab, fish oils ---CARDIAC---ASA, ARB, beta, statin, hctz, ( CCB )     Myalgia  Long talk on treatment and prevention  Literature is given         Outpatient Encounter Medications as of 12/12/2022   Medication Sig Dispense Refill    tamsulosin (FLOMAX) 0.4 MG capsule Take 1 capsule by mouth at bedtime 30 capsule 0    citalopram (CELEXA) 10 MG tablet Take 1 tablet by mouth daily 30 tablet 3    QUEtiapine (SEROQUEL) 25 MG tablet Take 1 tablet by mouth nightly 60 tablet 3    senna (SENOKOT) 8.6 MG tablet Take 1 tablet by mouth daily as needed (Constipation)      loperamide (IMODIUM A-D) 2 MG tablet Take 1 tablet by mouth every 4 hours as needed for Diarrhea 36 tablet 0    docusate sodium (COLACE) 100 MG capsule Take 1 capsule by mouth daily 30 capsule 5    prochlorperazine (COMPAZINE) 10 MG tablet Take 1 tablet by mouth every 8 hours as needed (Nausea) 120 tablet 5    amLODIPine (NORVASC) 5 MG tablet Take 1 tablet by mouth daily 30 tablet 5    losartan (COZAAR) 100 MG tablet Take 1 tablet by mouth daily 1 QD 30 tablet 5    pantoprazole (PROTONIX) 40 MG tablet Take 1 tablet by mouth daily 30 tablet 5    potassium chloride (KLOR-CON M) 20 MEQ extended release tablet Take 1 tablet by mouth daily 1 qd 30 tablet 5    aspirin EC 81 MG EC tablet Take 1 tablet by mouth daily 30 tablet 5    acetaminophen (TYLENOL 8 HOUR) 650 MG extended release tablet Take 1 tablet by mouth every 6 hours as needed for Pain 120 tablet 5    ondansetron (ZOFRAN-ODT) 4 MG disintegrating tablet Take 1 tablet by mouth every 8 hours as needed for Nausea or Vomiting 60 tablet 0    [DISCONTINUED] FLUoxetine (PROZAC) 20 MG capsule Take 1 capsule by mouth in the morning. 30 capsule 5    [DISCONTINUED] hydroCHLOROthiazide (MICROZIDE) 12.5 MG capsule TAKE 1 CAPSULE BY MOUTH EVERY MORNING 90 capsule 1     No facility-administered encounter medications on file as of 12/12/2022. No follow-ups on file.         Reviewed recent labs related to Raz's current problems      Discussed importance of regular Health Maintenance follow up  Health Maintenance   Topic    Annual Wellness Visit (AWV)     Depression Monitoring     DTaP/Tdap/Td vaccine (2 - Td or Tdap)    Flu vaccine     Shingles vaccine     Pneumococcal 65+ years Vaccine     COVID-19 Vaccine     Hepatitis A vaccine     Hib vaccine     Meningococcal (ACWY) vaccine

## 2022-12-16 ENCOUNTER — CARE COORDINATION (OUTPATIENT)
Dept: CARE COORDINATION | Age: 87
End: 2022-12-16

## 2022-12-16 NOTE — CARE COORDINATION
-ACM attempted to reach patient to follow up on his status regarding HTN, anxiety/depression, weight loss for Care Coordination, however no answer. -HIPAA compliant VM left introducing self and reason for call.  -Left ACM's contact information, requesting call back.  -Plan:   Pt is actively in [employer] - [facility]. ACM will sign off, resolve episode and remove name from care team.   -Will inform PCP.

## 2022-12-23 LAB
ANION GAP SERPL CALCULATED.3IONS-SCNC: 9 MMOL/L (ref 7–16)
BASOPHILS ABSOLUTE: 0.03 E9/L (ref 0–0.2)
BASOPHILS RELATIVE PERCENT: 0.6 % (ref 0–2)
BUN BLDV-MCNC: 10 MG/DL (ref 6–23)
CALCIUM SERPL-MCNC: 9.1 MG/DL (ref 8.6–10.2)
CHLORIDE BLD-SCNC: 100 MMOL/L (ref 98–107)
CO2: 28 MMOL/L (ref 22–29)
CREAT SERPL-MCNC: 0.7 MG/DL (ref 0.7–1.2)
EOSINOPHILS ABSOLUTE: 0.27 E9/L (ref 0.05–0.5)
EOSINOPHILS RELATIVE PERCENT: 5 % (ref 0–6)
GFR SERPL CREATININE-BSD FRML MDRD: >60 ML/MIN/1.73
GLUCOSE BLD-MCNC: 85 MG/DL (ref 74–99)
HCT VFR BLD CALC: 36.3 % (ref 37–54)
HEMOGLOBIN: 12.3 G/DL (ref 12.5–16.5)
IMMATURE GRANULOCYTES #: 0.01 E9/L
IMMATURE GRANULOCYTES %: 0.2 % (ref 0–5)
LYMPHOCYTES ABSOLUTE: 1.75 E9/L (ref 1.5–4)
LYMPHOCYTES RELATIVE PERCENT: 32.5 % (ref 20–42)
MCH RBC QN AUTO: 29.3 PG (ref 26–35)
MCHC RBC AUTO-ENTMCNC: 33.9 % (ref 32–34.5)
MCV RBC AUTO: 86.4 FL (ref 80–99.9)
MONOCYTES ABSOLUTE: 0.55 E9/L (ref 0.1–0.95)
MONOCYTES RELATIVE PERCENT: 10.2 % (ref 2–12)
NEUTROPHILS ABSOLUTE: 2.78 E9/L (ref 1.8–7.3)
NEUTROPHILS RELATIVE PERCENT: 51.5 % (ref 43–80)
PDW BLD-RTO: 13.1 FL (ref 11.5–15)
PLATELET # BLD: 356 E9/L (ref 130–450)
PMV BLD AUTO: 10.1 FL (ref 7–12)
POTASSIUM SERPL-SCNC: 4 MMOL/L (ref 3.5–5)
RBC # BLD: 4.2 E12/L (ref 3.8–5.8)
SODIUM BLD-SCNC: 137 MMOL/L (ref 132–146)
WBC # BLD: 5.4 E9/L (ref 4.5–11.5)

## 2023-01-02 ENCOUNTER — HOSPITAL ENCOUNTER (INPATIENT)
Age: 88
LOS: 3 days | Discharge: OTHER INSTITUTION | DRG: 312 | End: 2023-01-05
Attending: STUDENT IN AN ORGANIZED HEALTH CARE EDUCATION/TRAINING PROGRAM | Admitting: INTERNAL MEDICINE
Payer: MEDICARE

## 2023-01-02 ENCOUNTER — APPOINTMENT (OUTPATIENT)
Dept: CT IMAGING | Age: 88
DRG: 312 | End: 2023-01-02
Payer: MEDICARE

## 2023-01-02 ENCOUNTER — APPOINTMENT (OUTPATIENT)
Dept: GENERAL RADIOLOGY | Age: 88
DRG: 312 | End: 2023-01-02
Payer: MEDICARE

## 2023-01-02 DIAGNOSIS — R29.90 STROKE-LIKE SYMPTOMS: Primary | ICD-10-CM

## 2023-01-02 PROBLEM — G45.9 TIA (TRANSIENT ISCHEMIC ATTACK): Status: ACTIVE | Noted: 2023-01-02

## 2023-01-02 LAB
ALBUMIN SERPL-MCNC: 2.9 G/DL (ref 3.5–5.2)
ALP BLD-CCNC: 110 U/L (ref 40–129)
ALT SERPL-CCNC: 6 U/L (ref 0–40)
ANION GAP SERPL CALCULATED.3IONS-SCNC: 9 MMOL/L (ref 7–16)
AST SERPL-CCNC: 10 U/L (ref 0–39)
BASOPHILS ABSOLUTE: 0.09 E9/L (ref 0–0.2)
BASOPHILS RELATIVE PERCENT: 0.9 % (ref 0–2)
BILIRUB SERPL-MCNC: 0.3 MG/DL (ref 0–1.2)
BILIRUBIN URINE: NEGATIVE
BLOOD, URINE: NEGATIVE
BUN BLDV-MCNC: 16 MG/DL (ref 6–23)
CALCIUM SERPL-MCNC: 9.2 MG/DL (ref 8.6–10.2)
CHLORIDE BLD-SCNC: 101 MMOL/L (ref 98–107)
CLARITY: CLEAR
CO2: 25 MMOL/L (ref 22–29)
COLOR: YELLOW
CREAT SERPL-MCNC: 0.8 MG/DL (ref 0.7–1.2)
EKG ATRIAL RATE: 64 BPM
EKG P AXIS: 61 DEGREES
EKG P-R INTERVAL: 170 MS
EKG Q-T INTERVAL: 430 MS
EKG QRS DURATION: 132 MS
EKG QTC CALCULATION (BAZETT): 443 MS
EKG R AXIS: 68 DEGREES
EKG T AXIS: 57 DEGREES
EKG VENTRICULAR RATE: 64 BPM
EOSINOPHILS ABSOLUTE: 0.08 E9/L (ref 0.05–0.5)
EOSINOPHILS RELATIVE PERCENT: 0.8 % (ref 0–6)
GFR SERPL CREATININE-BSD FRML MDRD: >60 ML/MIN/1.73
GLUCOSE BLD-MCNC: 163 MG/DL (ref 74–99)
GLUCOSE URINE: NEGATIVE MG/DL
HCT VFR BLD CALC: 34.1 % (ref 37–54)
HEMOGLOBIN: 11.8 G/DL (ref 12.5–16.5)
KETONES, URINE: NEGATIVE MG/DL
LACTIC ACID: 1.3 MMOL/L (ref 0.5–2.2)
LEUKOCYTE ESTERASE, URINE: NEGATIVE
LYMPHOCYTES ABSOLUTE: 0.31 E9/L (ref 1.5–4)
LYMPHOCYTES RELATIVE PERCENT: 2.6 % (ref 20–42)
MAGNESIUM: 1.8 MG/DL (ref 1.6–2.6)
MCH RBC QN AUTO: 29.4 PG (ref 26–35)
MCHC RBC AUTO-ENTMCNC: 34.6 % (ref 32–34.5)
MCV RBC AUTO: 84.8 FL (ref 80–99.9)
MONOCYTES ABSOLUTE: 0.41 E9/L (ref 0.1–0.95)
MONOCYTES RELATIVE PERCENT: 3.5 % (ref 2–12)
NEUTROPHILS ABSOLUTE: 9.38 E9/L (ref 1.8–7.3)
NEUTROPHILS RELATIVE PERCENT: 92.2 % (ref 43–80)
NITRITE, URINE: NEGATIVE
OVALOCYTES: ABNORMAL
PDW BLD-RTO: 13 FL (ref 11.5–15)
PH UA: 6 (ref 5–9)
PLATELET # BLD: 314 E9/L (ref 130–450)
PMV BLD AUTO: 9.4 FL (ref 7–12)
POIKILOCYTES: ABNORMAL
POTASSIUM SERPL-SCNC: 3.9 MMOL/L (ref 3.5–5)
PROTEIN UA: NEGATIVE MG/DL
RBC # BLD: 4.02 E12/L (ref 3.8–5.8)
SODIUM BLD-SCNC: 135 MMOL/L (ref 132–146)
SPECIFIC GRAVITY UA: 1.02 (ref 1–1.03)
TOTAL PROTEIN: 6 G/DL (ref 6.4–8.3)
TROPONIN, HIGH SENSITIVITY: 17 NG/L (ref 0–11)
TROPONIN, HIGH SENSITIVITY: 19 NG/L (ref 0–11)
UROBILINOGEN, URINE: 2 E.U./DL
WBC # BLD: 10.2 E9/L (ref 4.5–11.5)

## 2023-01-02 PROCEDURE — 83735 ASSAY OF MAGNESIUM: CPT

## 2023-01-02 PROCEDURE — 80053 COMPREHEN METABOLIC PANEL: CPT

## 2023-01-02 PROCEDURE — 6360000002 HC RX W HCPCS: Performed by: EMERGENCY MEDICINE

## 2023-01-02 PROCEDURE — 81003 URINALYSIS AUTO W/O SCOPE: CPT

## 2023-01-02 PROCEDURE — 99285 EMERGENCY DEPT VISIT HI MDM: CPT

## 2023-01-02 PROCEDURE — 2060000000 HC ICU INTERMEDIATE R&B

## 2023-01-02 PROCEDURE — 6370000000 HC RX 637 (ALT 250 FOR IP): Performed by: INTERNAL MEDICINE

## 2023-01-02 PROCEDURE — 93010 ELECTROCARDIOGRAM REPORT: CPT | Performed by: INTERNAL MEDICINE

## 2023-01-02 PROCEDURE — 93005 ELECTROCARDIOGRAM TRACING: CPT | Performed by: STUDENT IN AN ORGANIZED HEALTH CARE EDUCATION/TRAINING PROGRAM

## 2023-01-02 PROCEDURE — 84484 ASSAY OF TROPONIN QUANT: CPT

## 2023-01-02 PROCEDURE — 83605 ASSAY OF LACTIC ACID: CPT

## 2023-01-02 PROCEDURE — 70450 CT HEAD/BRAIN W/O DYE: CPT

## 2023-01-02 PROCEDURE — 71045 X-RAY EXAM CHEST 1 VIEW: CPT

## 2023-01-02 PROCEDURE — 85025 COMPLETE CBC W/AUTO DIFF WBC: CPT

## 2023-01-02 PROCEDURE — 51701 INSERT BLADDER CATHETER: CPT

## 2023-01-02 PROCEDURE — 36415 COLL VENOUS BLD VENIPUNCTURE: CPT

## 2023-01-02 RX ORDER — ACETAMINOPHEN 325 MG/1
650 TABLET ORAL EVERY 6 HOURS PRN
Status: DISCONTINUED | OUTPATIENT
Start: 2023-01-02 | End: 2023-01-05 | Stop reason: HOSPADM

## 2023-01-02 RX ORDER — PANTOPRAZOLE SODIUM 40 MG/1
40 TABLET, DELAYED RELEASE ORAL DAILY
Status: DISCONTINUED | OUTPATIENT
Start: 2023-01-02 | End: 2023-01-05 | Stop reason: HOSPADM

## 2023-01-02 RX ORDER — CITALOPRAM 10 MG/1
10 TABLET ORAL DAILY
Status: DISCONTINUED | OUTPATIENT
Start: 2023-01-02 | End: 2023-01-03 | Stop reason: ALTCHOICE

## 2023-01-02 RX ORDER — AMLODIPINE BESYLATE 5 MG/1
5 TABLET ORAL DAILY
Status: DISCONTINUED | OUTPATIENT
Start: 2023-01-02 | End: 2023-01-04

## 2023-01-02 RX ORDER — QUETIAPINE FUMARATE 25 MG/1
25 TABLET, FILM COATED ORAL NIGHTLY
Status: DISCONTINUED | OUTPATIENT
Start: 2023-01-02 | End: 2023-01-05 | Stop reason: HOSPADM

## 2023-01-02 RX ORDER — DOCUSATE SODIUM 100 MG/1
100 CAPSULE, LIQUID FILLED ORAL DAILY
Status: DISCONTINUED | OUTPATIENT
Start: 2023-01-02 | End: 2023-01-05 | Stop reason: HOSPADM

## 2023-01-02 RX ORDER — LOSARTAN POTASSIUM 50 MG/1
100 TABLET ORAL DAILY
Status: DISCONTINUED | OUTPATIENT
Start: 2023-01-02 | End: 2023-01-05 | Stop reason: HOSPADM

## 2023-01-02 RX ORDER — TAMSULOSIN HYDROCHLORIDE 0.4 MG/1
0.4 CAPSULE ORAL NIGHTLY
Status: DISCONTINUED | OUTPATIENT
Start: 2023-01-02 | End: 2023-01-04

## 2023-01-02 RX ORDER — ONDANSETRON 2 MG/ML
4 INJECTION INTRAMUSCULAR; INTRAVENOUS ONCE
Status: COMPLETED | OUTPATIENT
Start: 2023-01-02 | End: 2023-01-02

## 2023-01-02 RX ORDER — ASPIRIN 81 MG/1
81 TABLET ORAL DAILY
Status: DISCONTINUED | OUTPATIENT
Start: 2023-01-02 | End: 2023-01-05 | Stop reason: HOSPADM

## 2023-01-02 RX ORDER — ONDANSETRON 4 MG/1
4 TABLET, ORALLY DISINTEGRATING ORAL EVERY 8 HOURS PRN
Status: DISCONTINUED | OUTPATIENT
Start: 2023-01-02 | End: 2023-01-05 | Stop reason: HOSPADM

## 2023-01-02 RX ORDER — POTASSIUM CHLORIDE 20 MEQ/1
20 TABLET, EXTENDED RELEASE ORAL DAILY
Status: DISCONTINUED | OUTPATIENT
Start: 2023-01-02 | End: 2023-01-05 | Stop reason: HOSPADM

## 2023-01-02 RX ORDER — ENOXAPARIN SODIUM 100 MG/ML
40 INJECTION SUBCUTANEOUS DAILY
Status: DISCONTINUED | OUTPATIENT
Start: 2023-01-02 | End: 2023-01-05 | Stop reason: HOSPADM

## 2023-01-02 RX ORDER — MORPHINE SULFATE 2 MG/ML
2 INJECTION, SOLUTION INTRAMUSCULAR; INTRAVENOUS
Status: COMPLETED | OUTPATIENT
Start: 2023-01-02 | End: 2023-01-02

## 2023-01-02 RX ADMIN — TAMSULOSIN HYDROCHLORIDE 0.4 MG: 0.4 CAPSULE ORAL at 23:00

## 2023-01-02 RX ADMIN — QUETIAPINE FUMARATE 25 MG: 25 TABLET ORAL at 23:00

## 2023-01-02 RX ADMIN — ONDANSETRON 4 MG: 2 INJECTION INTRAMUSCULAR; INTRAVENOUS at 17:46

## 2023-01-02 RX ADMIN — MORPHINE SULFATE 2 MG: 2 INJECTION, SOLUTION INTRAMUSCULAR; INTRAVENOUS at 17:46

## 2023-01-02 ASSESSMENT — PAIN DESCRIPTION - LOCATION: LOCATION: HEAD

## 2023-01-02 ASSESSMENT — PAIN SCALES - GENERAL: PAINLEVEL_OUTOF10: 7

## 2023-01-02 NOTE — LETTER
[address] Medicaid  CERTIFICATION OF NECESSITY  FOR TRANSPORTATION   BY WHEELCHAIR VAN     Individual Information   1. Name: Ruth Hare 2. [facility] Medicaid Billing Number: private pay;facility at [facility]    3. Address: [address] [place] [address]      Transportation Provider Information   4. Provider Name:    5. [place] Medicaid Provider Number:  National Provider Identifier (NPI):      Certification  7. Criteria:  By signing this document, the practitioner certifies that two statements are true:  A. This individual must be accompanied by a mobility-related assistive device from the point of pick-up to the point of drop-off. B. Transport of this individual by standard passenger vehicle or common carrier is precluded or contraindicated. 8. Period Beginning Date:    9. Length  [x] Not more than 1 day(s)  [] One Year     Additional Information Relevant to Certification   10. Comments or Explanations, If Necessary or Appropriate     Weakness      Certifying Practitioner Information   11. Name of Practitioner: Lakhwinder Bermudez M.D.    12. [facility] Provider Number, If Applicable:   [address] Provider Identifier (NPI):      Signature Information   14. Date of Signature:  15. Name of Person Signing: Annia House    12. Signature and Professional Designation:      [facility] [id number]  Rev. 2015  #            Ruth Hare : 1935 (87 yrs)   Address: [place] [address] Sex: Male   City: [place] [address]         Marital Status:    Employer: RETIRED         [employer]ist: [employer]   Primary Care Provider:           Primary Phone: [phone]   EMERGENCY CONTACT   Contact Name Legal Guardian? Relationship to Patient Home Phone Work Phone   1. Jany Kay  2.  [address] No  No Child  Child [phone]            GUARANTOR            Guarantor: Ruth Hare     : 1935   Address: [address] [place] [address] Sex: Male     [address] [id number]     Relation to Patient: Self       Home Phone: [phone]   Guarantor ID: [id number]       Work Phone: [phone]   Guarantor Employer: RETIRED         Status: RETIRED      COVERAGE  PRIMARY INSURANCE   Payor: MEDICARE Plan: MEDICARE PART A AND B   Payor Address: [facility] [address],  [address] [address]       Group Number:   Insurance Type: INDEMNITY   Subscriber Name: Getachew Goode : 1935   Subscriber ID: [id number] Pat. Rel. to Sub: Self   SECONDARY INSURANCE   Payor: [employer] Plan: [address] 61*   Payor Address:   [id number], [place], [place] [address]          Group Number:   Insurance Type: INDEMNITY   Subscriber Name: Getachew Goode : 1935   Subscriber ID: [id number] Pat.  Rel. to Sub: SELF

## 2023-01-02 NOTE — ED PROVIDER NOTES
[facility] Name: Maranda Koroma  MRN: [id number]  [place] 1935  Date of evaluation: 1/2/2023  Provider: Viktoriya Kingston MD  PCP: No primary care provider on file. Note Started: 12:30 PM EST 1/2/23    HPI     Patient is a 81 y.o. male presents with a chief complaint of   Chief Complaint   Patient presents with    Transient Ischemic Attack     Patient arrives with last known well of [phone] from nursing home. Patient had episode of slurred speech and facila droop   . Altered mental status. Patient reportedly had a last known well of 1130. Patient then had left-sided facial droop. Slurring of his speech. Patient has no complaints at this time. Patient takes aspirin. Patient denies any fevers, chills, nausea, vomiting, abdominal patient. Patient has no chest pain or shortness of breath. Patient reportedly has been having improvement of symptoms. Nursing Notes were all reviewed and agreed with or any disagreements were addressed in the HPI. History From: EMS and pateint    Review of Systems   Positives and Pertinent negatives as per HPI. Physical Exam  Vitals and nursing note reviewed. Constitutional:       Appearance: He is well-developed. HENT:      Head: Normocephalic and atraumatic. Eyes:      Conjunctiva/sclera: Conjunctivae normal.   Cardiovascular:      Rate and Rhythm: Normal rate and regular rhythm. Heart sounds: Normal heart sounds. No murmur heard. Pulmonary:      Effort: Pulmonary effort is normal. No respiratory distress. Breath sounds: Normal breath sounds. No wheezing or rales. Abdominal:      General: Bowel sounds are normal.      Palpations: Abdomen is soft. Tenderness: There is no abdominal tenderness. There is no guarding or rebound. Musculoskeletal:         General: No tenderness or deformity. Cervical back: Normal range of motion and neck supple. Skin:     General: Skin is warm and dry. Neurological:      Mental Status: He is alert. He is disoriented. Cranial Nerves: No cranial nerve deficit. Coordination: Coordination normal.      Comments: Patient has no focal neurological deficits. Patient does have some mild slurred speech and aphasia. This is intermittent. Following commands intermittently. Procedures       MDM     Amount and/or Complexity of Data Reviewed  Decide to obtain previous medical records or to obtain history from someone other than the patient: yes         ED Course as of 01/06/23 1147   Mon Jan 02, 2023   1439 EGD interpreted by me. Rate 64 bpm.  Normal axis. Normal MT interval.  Bundle-branch block. No ST elevations or depressions. Compared to prior EKG on November 29, 2022 with no changes. [JM]   1701 I spoke with Dr. Kavon Jc who agreed to accept the patient for admission. [TO]      ED Course User Index  [JM] Robyn Hernández MD  [TO] Clarisa Scott DO     Patient is a 81 y.o. male presenting with Altered mental status. Patient has been having improvement of symptoms but was reportedly having unilateral weakness. Extra history was obtained by EMS. Ct of the head was done to rule out intracranial hemorrage. Interpreted by me shows no acute bleed. Patient had labs drawn. Patient clinically appears well. Improvement in symptoms are consistent with a TIA. Patient clinically appeared well. Report is that patient had unilateral deficits. Patient's labs were interpreted by me. Patient's labs are benign and at baseline. Patient was not anemic. Patient's symptoms are consistent with a TIA. Patient was signed off to oncoming provider. Oncoming provider admitted patient by discussing case with internal medicine.         CONSULTS: (Who and What was discussed)  IP CONSULT TO INTERNAL MEDICINE  IP CONSULT TO NEUROLOGY  IP CONSULT TO ELECTROPHYSIOLOGYInt      Medications given in the emergency room:  Medications   ondansetron (ZOFRAN) injection 4 mg (4 mg IntraVENous Given 1/2/23 1746)   morphine (PF) injection 2 mg (2 mg IntraVENous Given 1/2/23 1746)   LORazepam (ATIVAN) tablet 0.5 mg (0.5 mg Oral Given 1/3/23 1543)        Patient was seen and evaluated by myself and my attending Cary Nassar MD. Assessment and Plan discussed with attending provider, please see attestation for final plan of care. This note was done using dictation software and there may be some grammatical errors associated with this. LABS:    Labs Reviewed   CBC WITH AUTO DIFFERENTIAL - Abnormal; Notable for the following components:       Result Value    Hemoglobin 11.8 (*)     Hematocrit 34.1 (*)     MCHC 34.6 (*)     Neutrophils % 92.2 (*)     Lymphocytes % 2.6 (*)     Neutrophils Absolute 9.38 (*)     Lymphocytes Absolute 0.31 (*)     All other components within normal limits   COMPREHENSIVE METABOLIC PANEL - Abnormal; Notable for the following components:    Glucose 163 (*)     Total Protein 6.0 (*)     Albumin 2.9 (*)     All other components within normal limits   TROPONIN - Abnormal; Notable for the following components:    Troponin, High Sensitivity 19 (*)     All other components within normal limits   URINALYSIS - Abnormal; Notable for the following components:    Urobilinogen, Urine 2.0 (*)     All other components within normal limits   TROPONIN - Abnormal; Notable for the following components:    Troponin, High Sensitivity 17 (*)     All other components within normal limits   COVID-19, RAPID   LACTIC ACID   MAGNESIUM   POCT GLUCOSE       As interpreted by me, the above displayed labs are abnormal. All other labs obtained during this visit were within normal range or not returned as of this dictation. RADIOLOGY:   Non-plain film images such as CT, Ultrasound and MRI are read by the radiologist. Plain radiographic images are visualized and preliminarily interpreted by the ED Provider with the below findings:    Ct shows no acute process as interpreted by me.  Chest xray shows no acute findings. Interpretation per the Radiologist below, if available at the time of this note:    XR CHEST PORTABLE   Final Result   No acute process. 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 CT HEAD WO CONTRAST   Final Result   1. There is no acute intracranial abnormality. Specifically, there is no   intracranial hemorrhage. 2. Atrophy and periventricular leukomalacia,           No results found. No results found. Skye Dennis MD                  ED Course as of 01/06/23 1147   Mon Jan 02, 2023   1439 EGD interpreted by me. Rate 64 bpm.  Normal axis. Normal WI interval.  Bundle-branch block. No ST elevations or depressions. Compared to prior EKG on November 29, 2022 with no changes. [JM]   0013 I spoke with Dr. Last Bazan who agreed to accept the patient for admission. [TO]      ED Course User Index  [JM] Skye Dennis MD  [TO] Demarco Dave, DO       --------------------------------------------- PAST HISTORY ---------------------------------------------  Past Medical History:  has a past medical history of Aneurysm of right renal artery ([facility] [address] Colitis, Depression, GERD (gastroesophageal reflux disease), H/O: CVA (cerebrovascular accident), Hyperlipidemia, Hypertension, Stroke [facility]), Superior mesenteric artery stenosis ([facility] [address] and TIA (transient ischemic attack). Past Surgical History:  has a past surgical history that includes Appendectomy; Tonsillectomy; ECHO Compl W Dop Color Flow (12/4/2012); sinus surgery (9439,290); Anterior cruciate ligament repair (Left, 11/21/2001); Cholecystectomy (2007); hernia repair (Right, 11/13/2002); Total knee arthroplasty (Left, 1/28/2019); and Upper gastrointestinal endoscopy (N/A, 10/10/2019). Social History:  reports that he has never smoked. He has never used smokeless tobacco. He reports that he does not drink alcohol and does not use drugs. Family History: family history is not on file.      The patients home medications have been reviewed. Allergies: Patient has no known allergies.     -------------------------------------------------- RESULTS -------------------------------------------------    LABS:  Results for orders placed or performed during the hospital encounter of 01/02/23   COVID-19, Rapid    Specimen: Nasopharyngeal Swab; Nares   Result Value Ref Range    SARS-CoV-2, NAAT Not Detected Not Detected   CBC with Auto Differential   Result Value Ref Range    WBC 10.2 4.5 - 11.5 E9/L    RBC 4.02 3.80 - 5.80 E12/L    Hemoglobin 11.8 (L) 12.5 - 16.5 g/dL    Hematocrit 34.1 (L) 37.0 - 54.0 %    MCV 84.8 80.0 - 99.9 fL    MCH 29.4 26.0 - 35.0 pg    MCHC 34.6 (H) 32.0 - 34.5 %    RDW 13.0 11.5 - 15.0 fL    Platelets 912 115 - 652 E9/L    MPV 9.4 7.0 - 12.0 fL    Neutrophils % 92.2 (H) 43.0 - 80.0 %    Lymphocytes % 2.6 (L) 20.0 - 42.0 %    Monocytes % 3.5 2.0 - 12.0 %    Eosinophils % 0.8 0.0 - 6.0 %    Basophils % 0.9 0.0 - 2.0 %    Neutrophils Absolute 9.38 (H) 1.80 - 7.30 E9/L    Lymphocytes Absolute 0.31 (L) 1.50 - 4.00 E9/L    Monocytes Absolute 0.41 0.10 - 0.95 E9/L    Eosinophils Absolute 0.08 0.05 - 0.50 E9/L    Basophils Absolute 0.09 0.00 - 0.20 E9/L    Poikilocytes 1+     Ovalocytes 2+    CMP   Result Value Ref Range    Sodium 135 132 - 146 mmol/L    Potassium 3.9 3.5 - 5.0 mmol/L    Chloride 101 98 - 107 mmol/L    CO2 25 22 - 29 mmol/L    Anion Gap 9 7 - 16 mmol/L    Glucose 163 (H) 74 - 99 mg/dL    BUN 16 6 - 23 mg/dL    Creatinine 0.8 0.7 - 1.2 mg/dL    Est, Glom Filt Rate >60 >=60 mL/min/1.73    Calcium 9.2 8.6 - 10.2 mg/dL    Total Protein 6.0 (L) 6.4 - 8.3 g/dL    Albumin 2.9 (L) 3.5 - 5.2 g/dL    Total Bilirubin 0.3 0.0 - 1.2 mg/dL    Alkaline Phosphatase 110 40 - 129 U/L    ALT 6 0 - 40 U/L    AST 10 0 - 39 U/L   Lactic Acid   Result Value Ref Range    Lactic Acid 1.3 0.5 - 2.2 mmol/L   Troponin   Result Value Ref Range    Troponin, High Sensitivity 19 (H) 0 - 11 ng/L   Magnesium   Result Value Ref Range    Magnesium 1.8 1.6 - 2.6 mg/dL   Urinalysis   Result Value Ref Range    Color, UA Yellow Straw/Yellow    Clarity, UA Clear Clear    Glucose, Ur Negative Negative mg/dL    Bilirubin Urine Negative Negative    Ketones, Urine Negative Negative mg/dL    Specific Gravity, UA 1.020 1.005 - 1.030    Blood, Urine Negative Negative    pH, UA 6.0 5.0 - 9.0    Protein, UA Negative Negative mg/dL    Urobilinogen, Urine 2.0 (A) <2.0 E.U./dL    Nitrite, Urine Negative Negative    Leukocyte Esterase, Urine Negative Negative   Troponin   Result Value Ref Range    Troponin, High Sensitivity 17 (H) 0 - 11 ng/L   EKG 12 Lead   Result Value Ref Range    Ventricular Rate 64 BPM    Atrial Rate 64 BPM    P-R Interval 170 ms    QRS Duration 132 ms    Q-T Interval 430 ms    QTc Calculation (Bazett) 443 ms    P Axis 61 degrees    R Axis 68 degrees    T Axis 57 degrees       RADIOLOGY:  XR CHEST PORTABLE   Final Result   No acute process. 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 CT HEAD WO CONTRAST   Final Result   1. There is no acute intracranial abnormality. Specifically, there is no   intracranial hemorrhage. 2. Atrophy and periventricular leukomalacia,                 ------------------------- NURSING NOTES AND VITALS REVIEWED ---------------------------  Date / Time Roomed:  1/2/2023 12:46 PM  ED Bed Assignment:  [id number]    The nursing notes within the ED encounter and vital signs as below have been reviewed.      Patient Vitals for the past 24 hrs:   BP Temp Temp src Pulse Resp SpO2   01/05/23 1200 (!) 161/74 97 °F (36.1 °C) Temporal 64 16 95 %       Oxygen Saturation Interpretation: Normal    ------------------------------------------ PROGRESS NOTES ------------------------------------------  Re-evaluation(s):   Patients symptoms show no change  Repeat physical examination is improved    Counseling:  I have spoken with the patient and discussed todays results, in addition to providing specific details for the plan of care and counseling regarding the diagnosis and prognosis. Their questions are answered at this time and they are agreeable with the plan of admission.    --------------------------------- ADDITIONAL PROVIDER NOTES ---------------------------------  Consultations:  Spoke with Dr. Yumiko Haynes. Discussed case. They will admit the patient. This patient's ED course included: a personal history and physicial examination, re-evaluation prior to disposition, and multiple bedside re-evaluations    This patient has remained hemodynamically stable during their ED course. Diagnosis:  1.  Stroke-like symptoms        Disposition:  Patient's disposition: Admit to telemetry  Patient's condition is Stable                                   Jorge Flores MD  Resident  01/06/23 9621

## 2023-01-03 PROCEDURE — 6370000000 HC RX 637 (ALT 250 FOR IP): Performed by: EMERGENCY MEDICINE

## 2023-01-03 PROCEDURE — 99223 1ST HOSP IP/OBS HIGH 75: CPT | Performed by: STUDENT IN AN ORGANIZED HEALTH CARE EDUCATION/TRAINING PROGRAM

## 2023-01-03 PROCEDURE — 2060000000 HC ICU INTERMEDIATE R&B

## 2023-01-03 PROCEDURE — 6370000000 HC RX 637 (ALT 250 FOR IP): Performed by: INTERNAL MEDICINE

## 2023-01-03 RX ORDER — ACETAMINOPHEN 325 MG/1
650 TABLET ORAL EVERY 6 HOURS PRN
COMMUNITY

## 2023-01-03 RX ORDER — POLYETHYLENE GLYCOL 3350 17 G/17G
17 POWDER, FOR SOLUTION ORAL NIGHTLY
COMMUNITY

## 2023-01-03 RX ORDER — VENLAFAXINE HYDROCHLORIDE 37.5 MG/1
37.5 CAPSULE, EXTENDED RELEASE ORAL DAILY
Status: ON HOLD | COMMUNITY
Start: 2022-12-29 | End: 2023-01-05 | Stop reason: HOSPADM

## 2023-01-03 RX ORDER — POTASSIUM CHLORIDE 20 MEQ/1
20 TABLET, EXTENDED RELEASE ORAL DAILY
COMMUNITY

## 2023-01-03 RX ORDER — ONDANSETRON 4 MG/1
4 TABLET, FILM COATED ORAL EVERY 4 HOURS PRN
COMMUNITY

## 2023-01-03 RX ORDER — SENNA PLUS 8.6 MG/1
1 TABLET ORAL DAILY PRN
COMMUNITY

## 2023-01-03 RX ORDER — LOSARTAN POTASSIUM 100 MG/1
100 TABLET ORAL DAILY
COMMUNITY

## 2023-01-03 RX ORDER — SODIUM PHOSPHATE, DIBASIC AND SODIUM PHOSPHATE, MONOBASIC 7; 19 G/133ML; G/133ML
1 ENEMA RECTAL DAILY PRN
COMMUNITY

## 2023-01-03 RX ORDER — CALCIUM CARBONATE 200(500)MG
2 TABLET,CHEWABLE ORAL 3 TIMES DAILY PRN
COMMUNITY

## 2023-01-03 RX ORDER — BISACODYL 10 MG
10 SUPPOSITORY, RECTAL RECTAL DAILY PRN
COMMUNITY

## 2023-01-03 RX ORDER — LORAZEPAM 0.5 MG/1
0.5 TABLET ORAL ONCE
Status: COMPLETED | OUTPATIENT
Start: 2023-01-03 | End: 2023-01-03

## 2023-01-03 RX ADMIN — ACETAMINOPHEN 650 MG: 325 TABLET ORAL at 23:17

## 2023-01-03 RX ADMIN — LORAZEPAM 0.5 MG: 0.5 TABLET ORAL at 15:43

## 2023-01-03 RX ADMIN — TAMSULOSIN HYDROCHLORIDE 0.4 MG: 0.4 CAPSULE ORAL at 20:28

## 2023-01-03 RX ADMIN — QUETIAPINE FUMARATE 25 MG: 25 TABLET ORAL at 20:28

## 2023-01-03 SDOH — HEALTH STABILITY: PHYSICAL HEALTH: ON AVERAGE, HOW MANY DAYS PER WEEK DO YOU ENGAGE IN MODERATE TO STRENUOUS EXERCISE (LIKE A BRISK WALK)?: 0 DAYS

## 2023-01-03 SDOH — HEALTH STABILITY: PHYSICAL HEALTH: ON AVERAGE, HOW MANY MINUTES DO YOU ENGAGE IN EXERCISE AT THIS LEVEL?: 0 MIN

## 2023-01-03 SDOH — ECONOMIC STABILITY: FOOD INSECURITY: WITHIN THE PAST 12 MONTHS, YOU WORRIED THAT YOUR FOOD WOULD RUN OUT BEFORE YOU GOT MONEY TO BUY MORE.: NEVER TRUE

## 2023-01-03 SDOH — ECONOMIC STABILITY: HOUSING INSECURITY
IN THE LAST 12 MONTHS, WAS THERE A TIME WHEN YOU DID NOT HAVE A STEADY PLACE TO SLEEP OR SLEPT IN A SHELTER (INCLUDING NOW)?: NO

## 2023-01-03 SDOH — ECONOMIC STABILITY: HOUSING INSECURITY: IN THE LAST 12 MONTHS, HOW MANY PLACES HAVE YOU LIVED?: 1

## 2023-01-03 SDOH — ECONOMIC STABILITY: INCOME INSECURITY: IN THE LAST 12 MONTHS, WAS THERE A TIME WHEN YOU WERE NOT ABLE TO PAY THE MORTGAGE OR RENT ON TIME?: NO

## 2023-01-03 SDOH — ECONOMIC STABILITY: FOOD INSECURITY: WITHIN THE PAST 12 MONTHS, THE FOOD YOU BOUGHT JUST DIDN'T LAST AND YOU DIDN'T HAVE MONEY TO GET MORE.: NEVER TRUE

## 2023-01-03 ASSESSMENT — SOCIAL DETERMINANTS OF HEALTH (SDOH)
WITHIN THE LAST YEAR, HAVE YOU BEEN AFRAID OF YOUR PARTNER OR EX-PARTNER?: NO
HOW OFTEN DO YOU ATTEND CHURCH OR RELIGIOUS SERVICES?: 1 TO 4 TIMES PER YEAR
DO YOU BELONG TO ANY CLUBS OR ORGANIZATIONS SUCH AS CHURCH GROUPS UNIONS, FRATERNAL OR ATHLETIC GROUPS, OR SCHOOL GROUPS?: NO
IN A TYPICAL WEEK, HOW MANY TIMES DO YOU TALK ON THE PHONE WITH FAMILY, FRIENDS, OR NEIGHBORS?: MORE THAN THREE TIMES A WEEK
WITHIN THE LAST YEAR, HAVE YOU BEEN HUMILIATED OR EMOTIONALLY ABUSED IN OTHER WAYS BY YOUR PARTNER OR EX-PARTNER?: NO
HOW OFTEN DO YOU GET TOGETHER WITH FRIENDS OR RELATIVES?: ONCE A WEEK
HOW HARD IS IT FOR YOU TO PAY FOR THE VERY BASICS LIKE FOOD, HOUSING, MEDICAL CARE, AND HEATING?: NOT HARD AT ALL
HOW OFTEN DO YOU ATTENT MEETINGS OF THE CLUB OR ORGANIZATION YOU BELONG TO?: NEVER
WITHIN THE LAST YEAR, HAVE TO BEEN RAPED OR FORCED TO HAVE ANY KIND OF SEXUAL ACTIVITY BY YOUR PARTNER OR EX-PARTNER?: NO
WITHIN THE LAST YEAR, HAVE YOU BEEN KICKED, HIT, SLAPPED, OR OTHERWISE PHYSICALLY HURT BY YOUR PARTNER OR EX-PARTNER?: NO

## 2023-01-03 ASSESSMENT — LIFESTYLE VARIABLES
HOW OFTEN DO YOU HAVE A DRINK CONTAINING ALCOHOL: NEVER
HOW MANY STANDARD DRINKS CONTAINING ALCOHOL DO YOU HAVE ON A TYPICAL DAY: PATIENT DOES NOT DRINK
HOW OFTEN DO YOU HAVE A DRINK CONTAINING ALCOHOL: NEVER
HOW MANY STANDARD DRINKS CONTAINING ALCOHOL DO YOU HAVE ON A TYPICAL DAY: PATIENT DOES NOT DRINK

## 2023-01-03 ASSESSMENT — PAIN SCALES - GENERAL
PAINLEVEL_OUTOF10: 6
PAINLEVEL_OUTOF10: 0
PAINLEVEL_OUTOF10: 0

## 2023-01-03 ASSESSMENT — PAIN DESCRIPTION - LOCATION: LOCATION: ABDOMEN

## 2023-01-03 NOTE — H&P
Eliana Hollingsworth MD [address]  Internal medicine   History and Physical      CHIEF COMPLAINT: Syncope      HISTORY OF PRESENT ILLNESS:    Patient was seen in the emergency room earlier this morning at the main campus of [address] reviewed in detail  Spoke with the ER physician  71-year-old  man apparently lives in assisted living where he was in rehab  Was on treadmill when he apparently sustained a syncopal episode  He denied dysarthria dysphagia or any weakness in the arms or legs  Being admitted for further management  Patient suffered from prior stroke    Past Medical History:    Past Medical History:   Diagnosis Date    Aneurysm of right renal artery ([address]     follows with Dr. Tyler Vidales yearly (last visit 9/19)    Colitis     Depression     GERD (gastroesophageal reflux disease)     H/O: CVA (cerebrovascular accident) 10/14/2021    Hyperlipidemia     Hypertension     Stroke [facility])     Superior mesenteric artery stenosis ([facility] [address] 11/4/2020    TIA (transient ischemic attack)        Past Surgical History:    Past Surgical History:   Procedure Laterality Date    ANTERIOR CRUCIATE LIGAMENT REPAIR Left 11/21/2001    APPENDECTOMY      CHOLECYSTECTOMY  2007    Lap    ECHO COMPL W DOP COLOR FLOW  12/4/2012         HERNIA REPAIR Right 11/13/2002    double    SINUS SURGERY  2002,2003     done x 2    TONSILLECTOMY      TOTAL KNEE ARTHROPLASTY Left 1/28/2019    LEFT  ROBOTIC KNEE TOTAL ARTHROPLASTY  ++MEREDITH- [id number]++   ++ADDUCTOR BLOCK++ performed by Mouna Hurley MD at [address] [place] [address] N/A 10/10/2019    EGD BIOPSY performed by Ольга Bhatia MD at [facility] ENDOSCOPY       Medications Prior to Admission:    List reviewed    Allergies:    Patient has no known allergies. Social History:    reports that he has never smoked. He has never used smokeless tobacco. He reports that he does not drink alcohol and does not use drugs.     Family History:   family history is not on file.    REVIEW OF SYSTEMS:  As above in the HPI, otherwise negative    PHYSICAL EXAM:    Vitals:  BP (!) 142/85   Pulse 80   Temp 97.2 °F (36.2 °C)   Resp 16   Ht 6' (1.829 m)   Wt 157 lb (71.2 kg)   SpO2 98%   BMI 21.29 kg/m²     General:  Awake, alert, oriented X 3. Thin built and chronically ill-appearing  HEENT:  Normocephalic, atraumatic. Pupils equal, round, reactive to light. No scleral icterus. No conjunctival injection. No nasal discharge. Neck:  Supple  Heart:  RRR, no murmurs, gallops, rubs  Lungs:  CTA bilaterally, bilat symmetrical expansion, no wheeze, rales, or rhonchi  Abdomen:   Bowel sounds present, soft, nontender, no masses, no organomegaly, no peritoneal signs  Extremities:  No clubbing, cyanosis, or edema  Skin:  Warm and dry, no open lesions or rash  Neuro:  Cranial nerves 2-12 intact, no focal deficits  Generalized symmetrical muscle atrophy noted in all extremities  Breast: deferred  Rectal: deferred  Genitalia:  deferred    LABS:  Data reviewed      ASSESSMENT:      Principal Problem:    TIA (transient ischemic attack) unlikely  Syncope  Prior stroke with no residual deficits  BPH  Essential hypertension      PLAN:  Admit  Check orthostatics  EP and neurology consults  Telemetry  Questions answered to Jonathan Martin MD  8:04 AM  1/3/2023

## 2023-01-03 NOTE — CONSULTS
NEUROLOGY CONSULT NOTE      Requesting Physician: Isis Morales MD    Reason for Consult:  Evaluate for TIA. History of Present Illness:  Emperatriz Zuniga is a 81 y.o. male  with h/o HTN, HLD, CVA/TIA, right renal artery aneurysm, and superior mesenteric artery stenosis who was admitted to [facility] on 1/2/2023 with presentation of headache, nausea, fatigue and generalized weakness. Patient is currently residing in an assisted living facility where staff has noticed gradual decline the past few days. He was unable to transfer from his wheelchair to his bed with lower assistance required in normal.  At this time patient denies any symptoms either before or after or events. Patient denied any specific triggering event with symptom onset. History of similar symptoms in the past per his report.       Past Medical History:        Diagnosis Date    Aneurysm of right renal artery ([facility] [address]     follows with Dr. Omid Mcgee yearly (last visit 9/19)    Colitis     Depression     GERD (gastroesophageal reflux disease)     H/O: CVA (cerebrovascular accident) 10/14/2021    Hyperlipidemia     Hypertension     Stroke [facility])     Superior mesenteric artery stenosis ([facility] [address] 11/4/2020    TIA (transient ischemic attack)            Procedure Laterality Date    ANTERIOR CRUCIATE LIGAMENT REPAIR Left 11/21/2001    APPENDECTOMY      CHOLECYSTECTOMY  2007    Lap    ECHO COMPL W DOP COLOR FLOW  12/4/2012         HERNIA REPAIR Right 11/13/2002    double    SINUS SURGERY  2002,2003     done x 2    TONSILLECTOMY      TOTAL KNEE ARTHROPLASTY Left 1/28/2019    LEFT  ROBOTIC KNEE TOTAL ARTHROPLASTY  ++MEREDITH- [id number]++   ++ADDUCTOR BLOCK++ performed by Ebonie Rasmussen MD at [facility] N/A 10/10/2019    EGD BIOPSY performed by Caleb Hurley MD at [address] [place] [address] History:  Social History     Tobacco Use   Smoking Status Never   Smokeless Tobacco Never     Social History     Substance and Sexual Activity   Alcohol Use No     Social History     Substance and Sexual Activity   Drug Use No         Family History:   History reviewed. No pertinent family history. Review of Systems:  All systems reviewed are negative except what is mentioned in history of present illness. Allergies:    No Known Allergies     Current Medications:   perflutren lipid microspheres (DEFINITY) injection 1.5 mL, ONCE PRN  acetaminophen (TYLENOL) tablet 650 mg, Q6H PRN  amLODIPine (NORVASC) tablet 5 mg, Daily  aspirin EC tablet 81 mg, Daily  docusate sodium (COLACE) capsule 100 mg, Daily  losartan (COZAAR) tablet 100 mg, Daily  ondansetron (ZOFRAN-ODT) disintegrating tablet 4 mg, Q8H PRN  pantoprazole (PROTONIX) tablet 40 mg, Daily  potassium chloride (KLOR-CON M) extended release tablet 20 mEq, Daily  QUEtiapine (SEROQUEL) tablet 25 mg, Nightly  tamsulosin (FLOMAX) capsule 0.4 mg, Nightly  enoxaparin (LOVENOX) injection 40 mg, Daily         Physical Exam:  /72   Pulse 62   Temp 97.4 °F (36.3 °C) (Temporal)   Resp 16   Ht 6' (1.829 m)   Wt 157 lb (71.2 kg)   SpO2 94%   BMI 21.29 kg/m²  I Body mass index is 21.29 kg/m². I   Wt Readings from Last 1 Encounters:   01/03/23 157 lb (71.2 kg)          HEENT: Normocephalic, atraumatic, no lesions or abnormalities noted. Neck:  supple with full ROM; no masses, nodes or bruits; no cervical tenderness on palpation. Lungs:  clear to auscultation  bilaterally     CV: RRR without gallops or murmurs     Extremities: no c/c/e      Back: no tenderness with palpation / Tenderness per diagram ; no scoliosis; no kyphosis negative straight leg raising normal ROM in low back, pelvis, hips         Neurologic Exam     Mental Status:  Patient was alert, responsive, oriented, appropriate, answering questions, and following commands. Speech was fluent with normal sensorium and cognition.    (Mental status exam was grossly intact. )   Cranial Nerves: Pupils were equal round and reactive to light and accommodation;    Visual fields were full on confrontation;  Funduscopic exam was normal; no pappilledema   Extraocular movements were intact; no nystagmus; Intact facial sensation to temp, pinprick, and light touch; Symmetric facial movements with good lip and eye closure bilaterally; Hearing was intact; Barfield was midline;    Normal palatal elevation with midline uvula  Sternocleidomastoid  / Trapezius strength of 5/5. Tongue was midline with no atrophy or fasciculations  (CN -II-XII was grossly intact.)  Motor Exam:  Strength was 5/5 throughout; normal tone and bulk; no atrophy or fasiculations noted. (Normal strength bilaterally in all muscle groups tested)    Sensory Exam:  Normal sensation to light touch, pin-prick, and temperature; No sensory extinction. (Intact sensation to all modalities. Left / Right sided sensory deficits   Focal sensory deficits: __ )      Cerebella Exam:  Intact finger-nose-finger, rapidly alternating movements, fine motor movements, and heel-down-shin maneuvers; No cerebellar rebound or drift; No tremors   Normal tandem gait. Negative Romberg   (Normal cerebellum function on gross exam)    Gait:  Normal gait pattern with intact heel/toe walking. (Gait was not tested. Normal Gait). Reflexes: normal and symmetric bilaterally; Babinski is negative     Labs:    CBC:   Recent Labs     01/02/23  1429   WBC 10.2   HGB 11.8*      MCV 84.8   MCH 29.4   MCHC 34.6*   RDW 13.0     CMP:  Recent Labs     01/02/23  1429      K 3.9      CO2 25   BUN 16   CREATININE 0.8   LABGLOM >60   GLUCOSE 163*   CALCIUM 9.2     Liver:   Recent Labs     01/02/23  1429   AST 10   ALT 6   ALKPHOS 110   PROT 6.0*   LABALBU 2.9*   BILITOT 0.3     INR: No results for input(s): PROTIME, INR in the last 72 hours. ToxicologyNo results for input(s): PHENYTOIN, CARBTOT, PHENOBARB, VALPROATE, LAMOTRIG in the last 72 hours.     Invalid input(s): KEPPRA  No results for input(s): AMPMETHURSCR, BARBTQTU, BDZQTU, CANNABQUANT, COCMETQTU, OPIAU, PCPQUANT in the last 72 hours. Radiology:  CT HEAD WO CONTRAST    Result Date: 1/2/2023  EXAMINATION: CT OF THE HEAD WITHOUT CONTRAST  1/2/2023 1:15 pm TECHNIQUE: CT of the head was performed without the administration of intravenous contrast. Automated exposure control, iterative reconstruction, and/or weight based adjustment of the mA/kV was utilized to reduce the radiation dose to as low as reasonably achievable. COMPARISON: None. HISTORY: ORDERING SYSTEM PROVIDED HISTORY: altered mental status TECHNOLOGIST PROVIDED HISTORY: Reason for exam:->altered mental status Has a \"code stroke\" or \"stroke alert\" been called? ->No Decision Support Exception - unselect if not a suspected or confirmed emergency medical condition->Emergency Medical Condition (MA) What reading provider will be dictating this exam?->CRC FINDINGS: BRAIN/VENTRICLES: There is no acute intracranial hemorrhage, mass effect or midline shift. No abnormal extra-axial fluid collection. The gray-white differentiation is maintained without evidence of an acute infarct. There is no evidence of hydrocephalus. The ventricles, cisterns and sulci are prominent consistent with atrophy. There is decreased attenuation within the periventricular white matter consistent with periventricular leukomalacia. There are findings of an old infarct within the left periventricular white matter. ORBITS: The visualized portion of the orbits demonstrate no acute abnormality. SINUSES: The visualized paranasal sinuses and mastoid air cells demonstrate no acute abnormality. SOFT TISSUES/SKULL:  No acute abnormality of the visualized skull or soft tissues. 1. There is no acute intracranial abnormality. Specifically, there is no intracranial hemorrhage.  2. Atrophy and periventricular leukomalacia,     XR CHEST PORTABLE    Result Date: 1/2/2023  EXAMINATION: ONE XRAY VIEW OF THE CHEST 1/2/2023 12:18 pm COMPARISON: 28 November 2022 HISTORY: ORDERING SYSTEM PROVIDED HISTORY: ams TECHNOLOGIST PROVIDED HISTORY: Reason for exam:->ams What reading provider will be dictating this exam?->CRC FINDINGS: The lungs are without acute focal process. There is no effusion or pneumothorax. The cardiomediastinal silhouette is without acute process. The osseous structures are without acute process. A calcified right renal artery aneurysm is again noted which measures roughly 1.9 cm in diameter. This was present on a prior CT of the abdomen dated 28 November 2022     No acute process. 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 The patient's records from referring provider and available information in the EHR was reviewed. Impression:  ***  ***    Principal Problem:    TIA (transient ischemic attack)  Resolved Problems:    * No resolved hospital problems. *      Recommendations:                                            ***  ***  Case was discussed with primary service. All questions were answered. It was my pleasure to evaluate Ruth Hare today. Please call with questions.       Electronically signed by Peggy Martin MD on 1/3/2023 at 6:19 PM

## 2023-01-03 NOTE — CONSULTS
[address] [facility]  CARDIAC ELECTROPHYSIOLOGY DEPARTMENT/DIVISION OF CARDIOLOGY  Inpatient consultation Report  PATIENT: Liz Villa  MEDICAL RECORD NUMBER: [id number]  DATE OF SERVICE:  1/3/2023  ATTENDING ELECTROPHYSIOLOGIST:  Alber Ocasio DO   REFERRING PHYSICIAN: No ref. provider found and No primary care provider on file. CHIEF COMPLAINT: Syncope    HPI: Liz Villa  is a 81 y.o. male with a history of recurrent syncope (2012 and 2017, suspected vasovagal versus orthostatic), RBBB, HTN, PAD (right renal artery aneurysm, superior mesenteric artery stenosis), CVA/TIA, depression/failure to thrive, BPH, and OA sp left TKA (2019). He is managed by PCP with aspirin 81 mg daily, amlodipine 5 mg daily, losartan 100 mg daily, and PPI. In 2012, he had an episode of syncope, which he described as standing at work when he began to feel nauseated followed by LOC, then attempted to stand followed by recurrent syncopal event. This occurred shortly after starting an antibiotic as well as Phenergan. He was evaluated by cardiology, who reported normal echo and ECG. In 2017, he reported recurrence of syncopal event, which she described as sitting at basketball game then began to feel nauseous. He stood up and walked to the drinking fountain that had syncopal event. He was noted to have a new RBBB (QRS D = 134 ms) and echo at that time was grossly normal. He was again evaluated by cardiology and who suspected etiology of vasovagal versus orthostatic. 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 Antiplatelet therapy and statin were recommended. In 11/2022, patient was admitted from assisted living for failure to thrive, then discharged to SNF. On 1/2/2023, patient presented from assisted living center/rehab with chief complaint of syncopal event.  EP service is now consulted for further evaluation and management of syncope. Patient reports riding bike at rehab, then got off and felt weak. He attempted to sit down to resolve symptoms, then had syncopal event. Patient denies any other complaints at this time. Prior cardiac testing:  - ECG (1/2/23): Sinus at 64 bpm, RBBB (QRS D = 132 ms), QTc = 443 ms  - TTE (1/23/2017): LVEF = 70%, normal.  - ECG (1/21/2017): Sinus at 75 bpm, RBBB (QRS D = 134 ms), QTc = 448 ms  - TTE (12/4/2012): LVEF = 55 to 60%, mild RV dilation. - ECG (12/3/2012): Sinus arrhythmia at 61 bpm    Past Medical History:   Diagnosis Date    Aneurysm of right renal artery ([facility] [address]     follows with Dr. Jorge Deleon yearly (last visit 9/19)    Colitis     Depression     GERD (gastroesophageal reflux disease)     H/O: CVA (cerebrovascular accident) 10/14/2021    Hyperlipidemia     Hypertension     Stroke [facility])     Superior mesenteric artery stenosis ([facility] [address] 11/4/2020    TIA (transient ischemic attack)      Past Surgical History:   Procedure Laterality Date    ANTERIOR CRUCIATE LIGAMENT REPAIR Left 11/21/2001    APPENDECTOMY      CHOLECYSTECTOMY  2007    Lap    ECHO COMPL W DOP COLOR FLOW  12/4/2012         HERNIA REPAIR Right 11/13/2002    double    SINUS SURGERY  2002,2003     done x 2    TONSILLECTOMY      TOTAL KNEE ARTHROPLASTY Left 1/28/2019    LEFT  ROBOTIC KNEE TOTAL ARTHROPLASTY  ++MEREDITH- [id number]++   ++ADDUCTOR BLOCK++ performed by Cali Kendrick MD at [address] [place] [address] 10/10/2019    EGD BIOPSY performed by Nelson Summers MD at [facility] ENDOSCOPY        No family history on file.   There is no family history of sudden cardiac arrest    Social History     Tobacco Use    Smoking status: Never    Smokeless tobacco: Never   Substance Use Topics    Alcohol use: No       Current Facility-Administered Medications   Medication Dose Route Frequency Provider Last Rate Last Admin    acetaminophen (TYLENOL) extended release tablet 650 mg  650 mg Oral Q6H PRN Jose Evans MD        amLODIPine (NORVASC) tablet 5 mg  5 mg Oral Daily Jose Evans MD        aspirin EC tablet 81 mg  81 mg Oral Daily Jose Evans MD        citalopram (CELEXA) tablet 10 mg  10 mg Oral Daily Jose Evans MD        docusate sodium (COLACE) capsule 100 mg  100 mg Oral Daily Jose Evans MD        losartan (COZAAR) tablet 100 mg  100 mg Oral Daily Jose Evans MD        ondansetron (ZOFRAN-ODT) disintegrating tablet 4 mg  4 mg Oral Q8H PRN Jose Evans MD        pantoprazole (PROTONIX) tablet 40 mg  40 mg Oral Daily Jose Evans MD        potassium chloride (KLOR-CON M) extended release tablet 20 mEq  20 mEq Oral Daily Jose Evans MD        QUEtiapine (SEROQUEL) tablet 25 mg  25 mg Oral Nightly Jose Evans MD   25 mg at 01/02/23 2300    tamsulosin (FLOMAX) capsule 0.4 mg  0.4 mg Oral Nightly Jose Evans MD   0.4 mg at 01/02/23 2300    enoxaparin (LOVENOX) injection 40 mg  40 mg SubCUTAneous Daily Jose Evans MD         Current Outpatient Medications   Medication Sig Dispense Refill    tamsulosin (FLOMAX) 0.4 MG capsule Take 1 capsule by mouth at bedtime 30 capsule 0    citalopram (CELEXA) 10 MG tablet Take 1 tablet by mouth daily 30 tablet 3    QUEtiapine (SEROQUEL) 25 MG tablet Take 1 tablet by mouth nightly 60 tablet 3    loperamide (IMODIUM A-D) 2 MG tablet Take 1 tablet by mouth every 4 hours as needed for Diarrhea 36 tablet 0    docusate sodium (COLACE) 100 MG capsule Take 1 capsule by mouth daily 30 capsule 5    prochlorperazine (COMPAZINE) 10 MG tablet Take 1 tablet by mouth every 8 hours as needed (Nausea) 120 tablet 5    amLODIPine (NORVASC) 5 MG tablet Take 1 tablet by mouth daily 30 tablet 5    losartan (COZAAR) 100 MG tablet Take 1 tablet by mouth daily 1 QD 30 tablet 5    pantoprazole (PROTONIX) 40 MG tablet Take 1 tablet by mouth daily 30 tablet 5    potassium chloride (KLOR-CON M) 20 MEQ extended release tablet Take 1 tablet by mouth daily 1 qd 30 tablet 5    aspirin EC 81 MG EC tablet Take 1 tablet by mouth daily 30 tablet 5    acetaminophen (TYLENOL 8 HOUR) 650 MG extended release tablet Take 1 tablet by mouth every 6 hours as needed for Pain 120 tablet 5    ondansetron (ZOFRAN-ODT) 4 MG disintegrating tablet Take 1 tablet by mouth every 8 hours as needed for Nausea or Vomiting 60 tablet 0      No Known Allergies    ROS:   Constitutional: Negative for fever, activity change and appetite change. HENT: Negative for epistaxis. Eyes: Negative for diploplia, blurred vision. Respiratory: Negative for cough, chest tightness, shortness of breath and wheezing. Cardiovascular: pertinent positives in HPI  Gastrointestinal: Negative for abdominal pain and blood in stool. Genitourinary: Negative for hematuria and difficulty urinating. Musculoskeletal: Negative for myalgias and gait problem. Skin: Negative for color change and rash. Neurological: Negative for syncope and light-headedness. Psychiatric/Behavioral: Negative for confusion and agitation. The patient is not nervous/anxious. Heme: no bleeding disorders, no melena or hematochezia  All other review of systems are negative     PHYSICAL EXAM:  Constitutional   Vitals:    01/02/23 1227   BP: (!) 142/85   Pulse: 80   Resp: 16   Temp: 97.2 °F (36.2 °C)   SpO2: 98%   Weight: 157 lb (71.2 kg)   Height: 6' (1.829 m)    Well-developed, no acute distress, well groomed  Eyes: conjunctivae normal, no xanthelasma   Ears, Nose, Throat: oral mucosa moist, no cyanosis   Neck: supple, no JVD, no bruits, no thyromegaly   CV: normal rate, regular rhythm,  no murmurs, rubs, or gallops.  PMI is nondisplaced, Peripheral pulses normal including carotid auscultation, no noted aortic bruit, bilateral femoral and pedal pulses are normal in quality  Lungs: clear to auscultation bilaterally, normal respiratory effort without used of accessory muscles, no wheezes  Abdomen: soft, non-tender, bowel sounds present, no masses or hepatomegaly   Extremities: no digital clubbing, no edema   Skin: warm, no rashes   Neuro/Psych: A&O x 2, normal mood and affect    Data:    Recent Labs     01/02/23  1429   WBC 10.2   HGB 11.8*   HCT 34.1*        Recent Labs     01/02/23  1429      K 3.9      CO2 25   BUN 16   CREATININE 0.8   CALCIUM 9.2     No results for input(s): INR in the last 72 hours. No results for input(s): TSH in the last 72 hours. Lab Results   Component Value Date/Time    MG 1.8 01/02/2023 02:29 PM     Telemetry: patient not on telemetry     Assessment/plan:  recurrent syncope (2012 and 2017, suspected vasovagal versus orthostatic)  - In 2012, he had an episode of syncope, which he described as standing at work when he began to feel nauseated followed by LOC, then attempted to stand followed by recurrent syncopal event. This occurred shortly after starting an antibiotic as well as Phenergan. He was evaluated by cardiology, who reported normal echo and ECG. In 2017, he reported recurrence of syncopal event, which she described as sitting at basketball game then began to feel nauseous. He stood up and walked to the drinking fountain that had syncopal event. He was noted to have a new RBBB (QRS D = 134 ms) and echo at that time was grossly normal. He was again evaluated by cardiology and who suspected etiology of vasovagal versus orthostatic. -- Syncope this admit was post-exercise, which is typically thought to be vasovagal in etiology.  - Recommend orthostatic vital sign assessment.  - Recommend TTE.  - Recommend monitor on telemetry. - Consider exercise stress. He does not feel he is able to walk on treadmill, but is able to ride bicycle. I contacted stress lab to inquire if bicycle stress is available at this institution. Awaiting response. -- Consider cardiac monitor at discharge.     Hypertension  -On amlodipine 5 mg daily and losartan 100 mg daily.  -Depending on results of orthostatic vital signs, may require reduction therapy. BPH  -On Flomax. -Depending on results of orthostatic vital signs, may benefit from urology consultation with plan to discontinue Flomax and consider surgical intervention of BPH. depression/failure to thrive  -Management per admitting physician. I have spent a total of 110 minutes with the patient and his/her family reviewing the above stated recommendations. And a total of >50% of that time involved face-to-face time providing counseling and or coordination of care with the other providers. Thank you for allowing me to participate in your patient's care. Please call me if there are any questions.       Scot Knox, DO   Cardiac Electrophysiology  Dl Cardiology  [facility]) Physicians

## 2023-01-03 NOTE — ED NOTES
Report received from previous RN. Pt soiled upon initial assessment, see output. Depends changed and davey care provided. Pt tolerates well.      Sumaya Castrejon, DARRIUS  01/02/23 2025

## 2023-01-03 NOTE — ED NOTES
Orthostatic vitals  L 152/72; 60HR; 95%  Sitting 149/70; 62HR; 95%  Standing 112/60; 85HR; 96%     Donia Sleet  01/03/23 1300

## 2023-01-04 ENCOUNTER — TELEPHONE (OUTPATIENT)
Dept: NON INVASIVE DIAGNOSTICS | Age: 88
End: 2023-01-04

## 2023-01-04 DIAGNOSIS — I10 ESSENTIAL HYPERTENSION: Primary | ICD-10-CM

## 2023-01-04 DIAGNOSIS — G45.9 TIA (TRANSIENT ISCHEMIC ATTACK): ICD-10-CM

## 2023-01-04 LAB
LV EF: 58 %
LVEF MODALITY: NORMAL

## 2023-01-04 PROCEDURE — 6360000002 HC RX W HCPCS: Performed by: INTERNAL MEDICINE

## 2023-01-04 PROCEDURE — 97535 SELF CARE MNGMENT TRAINING: CPT

## 2023-01-04 PROCEDURE — 97530 THERAPEUTIC ACTIVITIES: CPT

## 2023-01-04 PROCEDURE — 2060000000 HC ICU INTERMEDIATE R&B

## 2023-01-04 PROCEDURE — 6370000000 HC RX 637 (ALT 250 FOR IP): Performed by: INTERNAL MEDICINE

## 2023-01-04 PROCEDURE — 97165 OT EVAL LOW COMPLEX 30 MIN: CPT

## 2023-01-04 PROCEDURE — 93306 TTE W/DOPPLER COMPLETE: CPT

## 2023-01-04 PROCEDURE — 99233 SBSQ HOSP IP/OBS HIGH 50: CPT | Performed by: STUDENT IN AN ORGANIZED HEALTH CARE EDUCATION/TRAINING PROGRAM

## 2023-01-04 PROCEDURE — 97161 PT EVAL LOW COMPLEX 20 MIN: CPT

## 2023-01-04 RX ADMIN — DOCUSATE SODIUM 100 MG: 100 CAPSULE, LIQUID FILLED ORAL at 08:52

## 2023-01-04 RX ADMIN — ACETAMINOPHEN 650 MG: 325 TABLET ORAL at 19:48

## 2023-01-04 RX ADMIN — POTASSIUM CHLORIDE 20 MEQ: 1500 TABLET, EXTENDED RELEASE ORAL at 08:52

## 2023-01-04 RX ADMIN — QUETIAPINE FUMARATE 25 MG: 25 TABLET ORAL at 19:53

## 2023-01-04 RX ADMIN — ENOXAPARIN SODIUM 40 MG: 100 INJECTION SUBCUTANEOUS at 08:51

## 2023-01-04 RX ADMIN — PANTOPRAZOLE SODIUM 40 MG: 40 TABLET, DELAYED RELEASE ORAL at 08:52

## 2023-01-04 RX ADMIN — ACETAMINOPHEN 650 MG: 325 TABLET ORAL at 08:51

## 2023-01-04 RX ADMIN — ASPIRIN 81 MG: 81 TABLET, COATED ORAL at 08:52

## 2023-01-04 RX ADMIN — AMLODIPINE BESYLATE 5 MG: 5 TABLET ORAL at 08:52

## 2023-01-04 RX ADMIN — LOSARTAN POTASSIUM 100 MG: 50 TABLET, FILM COATED ORAL at 08:52

## 2023-01-04 RX ADMIN — ONDANSETRON 4 MG: 4 TABLET, ORALLY DISINTEGRATING ORAL at 12:14

## 2023-01-04 RX ADMIN — ONDANSETRON 4 MG: 4 TABLET, ORALLY DISINTEGRATING ORAL at 19:53

## 2023-01-04 ASSESSMENT — PAIN SCALES - GENERAL
PAINLEVEL_OUTOF10: 0
PAINLEVEL_OUTOF10: 4
PAINLEVEL_OUTOF10: 6
PAINLEVEL_OUTOF10: 0

## 2023-01-04 ASSESSMENT — PAIN DESCRIPTION - LOCATION
LOCATION: HEAD;FACE
LOCATION: HEAD

## 2023-01-04 ASSESSMENT — PAIN DESCRIPTION - ORIENTATION: ORIENTATION: ANTERIOR

## 2023-01-04 ASSESSMENT — PAIN DESCRIPTION - DESCRIPTORS
DESCRIPTORS: DISCOMFORT;ACHING
DESCRIPTORS: ACHING;THROBBING

## 2023-01-04 NOTE — DISCHARGE INSTR - COC
Continuity of Care Form    Patient Name: Baron Purcell   :  1935  MRN:  [id number]    Admit date:  2023  Discharge date:  23    Code Status Order: Prior   Advance Directives:     Admitting Physician:  Ary Rojas MD  PCP: No primary care provider on file. Discharging Nurse: as  [address] Unit/Room#: 8502/8502-B  Discharging Unit Phone Number: [phone]    Emergency Contact:   Extended Emergency Contact Information  Primary Emergency Contact: Angela [address] Phone: [phone]  Work Phone: [phone]  Mobile Phone: [phone]  Relation: Child   needed?  No    Past Surgical History:  Past Surgical History:   Procedure Laterality Date    ANTERIOR CRUCIATE LIGAMENT REPAIR Left 2001    APPENDECTOMY      CHOLECYSTECTOMY      Lap    ECHO COMPL W DOP COLOR FLOW  2012         HERNIA REPAIR Right 2002    double    SINUS SURGERY  ,     done x 2    TONSILLECTOMY      TOTAL KNEE ARTHROPLASTY Left 2019    LEFT  ROBOTIC KNEE TOTAL ARTHROPLASTY  ++MEREDITH- [id number]++   ++ADDUCTOR BLOCK++ performed by Sherle Olszewski, MD at [facility] N/A 10/10/2019    EGD BIOPSY performed by Minnie Segundo MD at [address] [place] [address] History:   Immunization History   Administered Date(s) Administered    COVID-19, MODERNA BLUE border, Primary or Immunocompromised, (age 12y+), IM, 100 mcg/0.5mL 2021, 2021, 10/26/2021    COVID-19, MODERNA Bivalent BOOSTER, (age 12y+), IM, 48 mcg/0.5 mL 10/21/2022    Influenza Virus Vaccine 10/26/2006, 10/15/2007, 10/06/2008, 2010, 2011, 2013, 2015, 10/07/2016, 10/04/2017, 2018    Influenza, FLUAD, (age 72 y+), Adjuvanted, 0.5mL 10/01/2020, 10/07/2021, 10/20/2022    Influenza, Triv, inactivated, subunit, adjuvanted, IM (Fluad 65 yrs and older) 10/15/2019    Pneumococcal Conjugate 13-valent (Christina Wren) 10/15/2019 Pneumococcal Polysaccharide ([id number]) 12/15/2005, 12/16/2005, 09/23/2010, 04/22/2019    Tdap (Boostrix, Adacel) 11/09/2021    Zoster Recombinant (Shingrix) 01/28/2020, 08/15/2020       Active Problems:  Patient Active Problem List   Diagnosis Code    Major depressive disorder F32.9    Insomnia d/t depression G47.00    Decreased sense of vibration at the toes R20.8    Essential hypertension I10    Hyperlipidemia E78.5    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    Chronic intractable headache R51.9, G89.29    Seasonal allergic rhinitis due to pollen J30.1    Chronic sinusitis J32.9    BPH associated with nocturia N40.1, R35.1    Abnormal liver enzymes R74.8    H/O: CVA (cerebrovascular accident) Z86.73    Myalgia M79.10    Constipation K59.00    Chronic sinus complaints R09.89    Gait disturbance R26.9    Failure to thrive in adult R62.7    Severe protein-calorie malnutrition (HCC) E43    Generalized weakness R53.1    TIA (transient ischemic attack) G45.9       Isolation/Infection:   Isolation            No Isolation          Patient Infection Status       Infection Onset Added Last Indicated Last Indicated By Review Planned Expiration Resolved Resolved By    None active    Resolved    COVID-19 (Rule Out) 11/28/22 11/28/22 11/28/22 COVID-19, Rapid (Ordered)   11/28/22 Rule-Out Test Resulted    COVID-19 (Rule Out) 08/25/22 08/25/22 08/25/22 COVID-19, Rapid (Ordered)   08/25/22 Rule-Out Test Resulted    COVID-19 (Rule Out) 11/23/21 11/23/21 11/22/21 COVID-19 Ambulatory (Ordered)   11/24/21 Rule-Out Test Resulted    COVID-19 (Rule Out) 09/02/20 09/02/20 09/02/20 COVID-19 Ambulatory (Ordered)   09/03/20 Rule-Out Test Resulted            Nurse Assessment:  Last Vital Signs: BP (!) 143/92   Pulse 60   Temp 97.4 °F (36.3 °C) (Temporal)   Resp 18   Ht 6' (1.829 m)   Wt 157 lb (71.2 kg)   SpO2 95%   BMI 21.29 kg/m²     Last documented pain score (0-10 scale): Pain Level: 4  Last Weight:   Wt Readings from Last 1 Encounters:   01/03/23 157 lb (71.2 kg)     Mental Status:  oriented, alert, logical, thought processes intact, and able to concentrate and follow conversation    IV Access:  [address] [facility]:[id number]}    Nursing Mobility/ADLs:  Walking   Independent  Transfer  Independent  Bathing  Independent  Dressing  Independent  Toileting  Independent  Feeding  Independent  Med Admin  Assisted  Med Delivery   whole    Wound Care Documentation and Therapy:  Wound 08/25/22 Elbow Left;Posterior abrasion (Active)   Number of days: 132        Elimination:  Continence: Bowel: Yes  Bladder: Yes  Urinary Catheter: None   Colostomy/Ileostomy/Ileal Conduit: No       Date of Last BM: 1/4/23, multiple    Intake/Output Summary (Last 24 hours) at 1/4/2023 1050  Last data filed at 1/4/2023 0835  Gross per 24 hour   Intake --   Output 650 ml   Net -650 ml     No intake/output data recorded. Safety Concerns:     History of Falls (last 30 days) and At Risk for Falls    Impairments/Disabilities:      None    Nutrition Therapy:  Current Nutrition Therapy:   - Oral Diet:  General    Routes of Feeding: Oral  Liquids: Thin Liquids  Daily Fluid Restriction: no  Last Modified Barium Swallow with Video (Video Swallowing Test): not done    Treatments at the Time of Hospital Discharge:   Respiratory Treatments: n/a    Oxygen Therapy:  is not on home oxygen therapy. Ventilator:    - No ventilator support    Rehab Therapies: Physical Therapy and Occupational Therapy  Weight Bearing Status/Restrictions: No weight bearing restrictions  Other Medical Equipment (for information only, NOT a DME order):     Other Treatments: zio patch    Patient's personal belongings (please select all that are sent with patient):  Glasses    RN SIGNATURE:  Electronically signed by Mnoique Valdovinos RN on 1/5/23 at 11:00 AM EST    CASE MANAGEMENT/SOCIAL WORK SECTION    Inpatient Status Date: ***    Readmission Risk Assessment Score:  Readmission Risk              Risk of Unplanned Readmission:  21           Discharging to Facility/ Agency   [facility]   Address:  Phone:  Fax:    Dialysis Facility (if applicable)   Name:  Address:  Dialysis Schedule:  Phone:  Fax:    / signature: Electronically signed by MAURICIO Cummins on 1/5/2023 at 10:49 AM        PHYSICIAN SECTION    Prognosis: {Prognosis:4968327571}    Condition at Discharge: Luis Alonzo Patient Condition:[id number]}    Rehab Potential (if transferring to Rehab): {Prognosis:4909520479}    Recommended Labs or Other Treatments After Discharge: ***    Physician Certification: I certify the above information and transfer of Chasity Frankel  is necessary for the continuing treatment of the diagnosis listed and that he requires Roman Sid for less 30 days.      Update Admission H&P: {CHP DME Changes in HCGAM:365643127}    PHYSICIAN SIGNATURE:  {Esignature:[id number]}

## 2023-01-04 NOTE — CARE COORDINATION
SOCIAL WORK/CASEMANAGEMENT TRANSITION OF CARE PLANNINGMeir Shaw, [address]): I met with pt who is from [facility] and was a snf;loc., bed hold. All discharge paper work is in place to return. Precert is not needed since pt has medicare. I called margot the daughter and she is in agreement for pt to return. She also said to tell the rn that pt is allergic to ativan and effexor and they family wants him to be a dnrcc. I let the charge rn know and left a note about the medication for the rn and pcp. Ofelia Ramos, MAURICIO  1/4/2023    The Plan for Transition of Care is related to the following treatment goals: anusha     The Patient and/or patient representative  was provided with a choice of provider and agrees   with the discharge plan. [x] Yes [] No    Freedom of choice list was provided with basic dialogue that supports the patient's individualized plan of care/goals, treatment preferences and shares the quality data associated with the providers.  [x] Yes [] No

## 2023-01-04 NOTE — PLAN OF CARE
Plan of care    Neurology consulted for TIA although CC was syncope    Pt had syncopal episode while on a treadmill. Pt had no other neuro symptoms. He has a hx of prior stroke. Pt currently being worked up by EP for syncope. Orthostatic vitals  L 152/72; 60HR; 95%  Sitting 149/70; 62HR; 95%  Standing 112/60; 85HR; 96%    CTH was negative for stroke or acute findings.     Plan:  Continue syncopal work up by EP  No further Neurologic workup at this time  Neurology to sign off   Please call with any questions or concerns

## 2023-01-04 NOTE — PROGRESS NOTES
[facility] MD [facility]  Internal medicine  Progress Notes      CHIEF COMPLAINT: Syncope      HISTORY OF PRESENT ILLNESS:    1/3/2023  Patient was seen in the emergency room earlier this morning at the main campus of [address] reviewed in detail  Spoke with the ER physician  28-year-old  man apparently lives in assisted living where he was in rehab  Was on treadmill when he apparently sustained a syncopal episode  He denied dysarthria dysphagia or any weakness in the arms or legs  Being admitted for further management  Patient suffered from prior stroke  1/4//2023  Patient was seen on the floor earlier this morning  Nursing staff at bedside  He still feels dizzy upon standing  Past Medical History:    Past Medical History:   Diagnosis Date    Aneurysm of right renal artery ([address]     follows with Dr. Cata Velazquez yearly (last visit 9/19)    Colitis     Depression     GERD (gastroesophageal reflux disease)     H/O: CVA (cerebrovascular accident) 10/14/2021    Hyperlipidemia     Hypertension     Stroke [facility])     Superior mesenteric artery stenosis [address] 11/4/2020    TIA (transient ischemic attack)        Past Surgical History:    Past Surgical History:   Procedure Laterality Date    ANTERIOR CRUCIATE LIGAMENT REPAIR Left 11/21/2001    APPENDECTOMY      CHOLECYSTECTOMY  2007    Lap    ECHO COMPL W DOP COLOR FLOW  12/4/2012         HERNIA REPAIR Right 11/13/2002    double    SINUS SURGERY  2002,2003     done x 2    TONSILLECTOMY      TOTAL KNEE ARTHROPLASTY Left 1/28/2019    LEFT  ROBOTIC KNEE TOTAL ARTHROPLASTY  ++MEREDITH- MOMDRVQU++   ++ADDUCTOR BLOCK++ performed by Edvin Reese MD at [address] [place] [address] N/A 10/10/2019    EGD BIOPSY performed by Tahir Miller MD at [facility] ENDOSCOPY       Medications Prior to Admission:    List reviewed    Allergies:    Patient has no known allergies. Social History:    reports that he has never smoked.  He has never used smokeless tobacco. He reports that he does not drink alcohol and does not use drugs. Family History:   family history is not on file. REVIEW OF SYSTEMS:  As above in the HPI, otherwise negative    PHYSICAL EXAM:    Vitals:  BP (!) 143/92   Pulse 60   Temp 97.4 °F (36.3 °C) (Temporal)   Resp 18   Ht 6' (1.829 m)   Wt 157 lb (71.2 kg)   SpO2 95%   BMI 21.29 kg/m²     General:  Awake, alert, oriented X 3. Thin built and chronically ill-appearing  HEENT:  Normocephalic, atraumatic. Pupils equal, round, reactive to light. No scleral icterus. No conjunctival injection. No nasal discharge. Neck:  Supple  Heart:  RRR, no murmurs, gallops, rubs  Lungs:  CTA bilaterally, bilat symmetrical expansion, no wheeze, rales, or rhonchi  Abdomen:   Bowel sounds present, soft, nontender, no masses, no organomegaly, no peritoneal signs  Extremities:  No clubbing, cyanosis, or edema  Skin:  Warm and dry, no open lesions or rash  Neuro:  Cranial nerves 2-12 intact, no focal deficits  Generalized symmetrical muscle atrophy noted in all extremities  Breast: deferred  Rectal: deferred  Genitalia:  deferred    LABS:  Data reviewed      ASSESSMENT:      Principal Problem:    TIA (transient ischemic attack) unlikely  Syncope from orthostatic hypotension  Prior stroke with no residual deficits  BPH  Essential hypertension      PLAN:  PT OT consults  Advance diet and activity  Discontinue amlodipine and Flomax  Telemetry  Questions answered to Kaylin Daley MD  3:43 PM  1/4/2023

## 2023-01-04 NOTE — ACP (ADVANCE CARE PLANNING)
Advance Care Planning   Healthcare Decision Maker:    Primary Decision Maker: Tawanae Spurling - Child - [phone]    Click here to complete Healthcare Decision Makers including selection of the Healthcare Decision Maker Relationship (ie \"Primary\").

## 2023-01-04 NOTE — PROGRESS NOTES
[facility] CARDIOLOGY  CARDIAC ELECTROPHYSIOLOGY DEPARTMENT/DIVISION OF CARDIOLOGY  Inpatient Progress Report  PATIENT: Ryne Caballero  MEDICAL RECORD NUMBER: [id number]  DATE OF SERVICE:  1/4/2023  ATTENDING ELECTROPHYSIOLOGIST:  Carlene Atwood DO.    REFERRING PHYSICIAN: Monika  CHIEF COMPLAINT: Syncope    HPI: Ryne Caballero  is a 81 y.o. male with a history of recurrent syncope (2012 and 2017, suspected vasovagal versus orthostatic), RBBB, HTN, PAD (right renal artery aneurysm, superior mesenteric artery stenosis), CVA/TIA, depression/failure to thrive, BPH, and OA sp left TKA (2019). He is managed by PCP with aspirin 81 mg daily, amlodipine 5 mg daily, losartan 100 mg daily, and PPI. In 2012, he had an episode of syncope, which he described as standing at work when he began to feel nauseated followed by LOC, then attempted to stand followed by recurrent syncopal event. This occurred shortly after starting an antibiotic as well as Phenergan. He was evaluated by cardiology, who reported normal echo and ECG. In 2017, he reported recurrence of syncopal event, which she described as sitting at basketball game then began to feel nauseous. He stood up and walked to the drinking fountain that had syncopal event. He was noted to have a new RBBB (QRS D = 134 ms) and echo at that time was grossly normal. He was again evaluated by cardiology and who suspected etiology of vasovagal versus orthostatic. 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 Antiplatelet therapy and statin were recommended. In 11/2022, patient was admitted from assisted living for failure to thrive, then discharged to SNF. On 1/2/2023, patient presented from assisted living center/rehab with chief complaint of syncopal event.  EP service is now consulted for further evaluation and management of syncope. Patient reports riding bike at rehab, then got off and felt weak. He attempted to sit down to resolve symptoms, then had syncopal event. Patient denies any other complaints at this time. 01/04/2023: Orthostatic vital signs in the emergency department positive (152/70 laying 112/60 standing) and they remain positive today (166/73 laying, 113/68 standing) his Effexor has been discontinued that he remains on Norvasc, Flomax, Seroquel all agents known to potentiate orthostatic hypotension. He remains in sinus without arrhythmia marked cardiac complaint. Prior cardiac testing:  - ECG (1/2/23): Sinus at 64 bpm, RBBB (QRS D = 132 ms), QTc = 443 ms  - TTE (1/23/2017): LVEF = 70%, normal.  - ECG (1/21/2017): Sinus at 75 bpm, RBBB (QRS D = 134 ms), QTc = 448 ms  - TTE (12/4/2012): LVEF = 55 to 60%, mild RV dilation. - ECG (12/3/2012): Sinus arrhythmia at 61 bpm    Past Medical History:   Diagnosis Date    Aneurysm of right renal artery ([address]     follows with Dr. Joaquin Neal yearly (last visit 9/19)    Colitis     Depression     GERD (gastroesophageal reflux disease)     H/O: CVA (cerebrovascular accident) 10/14/2021    Hyperlipidemia     Hypertension     Stroke [facility])     Superior mesenteric artery stenosis ([facility] [address] [address] 11/4/2020    TIA (transient ischemic attack)      Past Surgical History:   Procedure Laterality Date    ANTERIOR CRUCIATE LIGAMENT REPAIR Left 11/21/2001    APPENDECTOMY      CHOLECYSTECTOMY  2007    Lap    ECHO COMPL W DOP COLOR FLOW  12/4/2012         HERNIA REPAIR Right 11/13/2002    double    SINUS SURGERY  2002,2003     done x 2    TONSILLECTOMY      TOTAL KNEE ARTHROPLASTY Left 1/28/2019    LEFT  ROBOTIC KNEE TOTAL ARTHROPLASTY  ++MEREDITH- CYIKOSSC++   ++ADDUCTOR BLOCK++ performed by Tyler Huerta MD at [address] 10/10/2019    EGD BIOPSY performed by Sumanth Walker MD at [address]        History reviewed. No pertinent family history.   There is no family history of sudden cardiac arrest    Social History     Tobacco Use    Smoking status: Never    Smokeless tobacco: Never   Substance Use Topics    Alcohol use: No       Current Facility-Administered Medications   Medication Dose Route Frequency Provider Last Rate Last Admin    perflutren lipid microspheres (DEFINITY) injection 1.5 mL  1.5 mL IntraVENous ONCE PRN Layla Babin DO        acetaminophen (TYLENOL) tablet 650 mg  650 mg Oral Q6H PRN Amanda Metzger MD   650 mg at 01/04/23 0851    amLODIPine (NORVASC) tablet 5 mg  5 mg Oral Daily Amanda Metzger MD   5 mg at 01/04/23 2810    aspirin EC tablet 81 mg  81 mg Oral Daily Amanda Metzger MD   81 mg at 01/04/23 0852    docusate sodium (COLACE) capsule 100 mg  100 mg Oral Daily Amanda Metzger MD   100 mg at 01/04/23 0852    losartan (COZAAR) tablet 100 mg  100 mg Oral Daily Amanda Metzger MD   100 mg at 01/04/23 0852    ondansetron (ZOFRAN-ODT) disintegrating tablet 4 mg  4 mg Oral Q8H PRN Amanda Metzger MD        pantoprazole (PROTONIX) tablet 40 mg  40 mg Oral Daily Amanda Metzger MD   40 mg at 01/04/23 0852    potassium chloride (KLOR-CON M) extended release tablet 20 mEq  20 mEq Oral Daily Amanda Metzger MD   20 mEq at 01/04/23 0852    QUEtiapine (SEROQUEL) tablet 25 mg  25 mg Oral Nightly Amanda Metzger MD   25 mg at 01/03/23 2028    tamsulosin (FLOMAX) capsule 0.4 mg  0.4 mg Oral Nightly Amanda Metzger MD   0.4 mg at 01/03/23 2028    enoxaparin (LOVENOX) injection 40 mg  40 mg SubCUTAneous Daily Amanda Metzger MD   40 mg at 01/04/23 0851      No Known Allergies    ROS:   Constitutional: Negative for fever, activity change and appetite change. HENT: Negative for epistaxis. Eyes: Negative for diploplia, blurred vision. Respiratory: Negative for cough, chest tightness, shortness of breath and wheezing. Cardiovascular: pertinent positives in HPI  Gastrointestinal: Negative for abdominal pain and blood in stool.    Genitourinary: Negative for hematuria and difficulty urinating. Musculoskeletal: Negative for myalgias and gait problem. Skin: Negative for color change and rash. Neurological: Negative for syncope and light-headedness. Psychiatric/Behavioral: Negative for confusion and agitation. The patient is not nervous/anxious. Heme: no bleeding disorders, no melena or hematochezia  All other review of systems are negative     PHYSICAL EXAM:  Constitutional   Vitals:    01/03/23 1314 01/03/23 1700 01/03/23 2030 01/04/23 0830   BP:  122/72 (!) 157/87 (!) 143/92   Pulse:  62 60    Resp:  16 18 18   Temp: 98.2 °F (36.8 °C) 97.4 °F (36.3 °C) 98 °F (36.7 °C) 97.4 °F (36.3 °C)   TempSrc:  Temporal Temporal Temporal   SpO2:  94% 95% 95%   Weight:  157 lb (71.2 kg)     Height:  6' (1.829 m)      Well-developed, no acute distress, well groomed  Eyes: conjunctivae normal, no xanthelasma   Ears, Nose, Throat: oral mucosa moist, no cyanosis   Neck: supple, no JVD, no bruits, no thyromegaly   CV: normal rate, regular rhythm,  no murmurs, rubs, or gallops. PMI is nondisplaced, Peripheral pulses normal including carotid auscultation, no noted aortic bruit, bilateral femoral and pedal pulses are normal in quality  Lungs: clear to auscultation bilaterally, normal respiratory effort without used of accessory muscles, no wheezes  Abdomen: soft, non-tender, bowel sounds present, no masses or hepatomegaly   Extremities: no digital clubbing, no edema   Skin: warm, no rashes   Neuro/Psych: A&O x 2, normal mood and affect    Data:    Recent Labs     01/02/23  1429   WBC 10.2   HGB 11.8*   HCT 34.1*        Recent Labs     01/02/23  1429      K 3.9      CO2 25   BUN 16   CREATININE 0.8   CALCIUM 9.2     No results for input(s): INR in the last 72 hours. No results for input(s): TSH in the last 72 hours.   Lab Results   Component Value Date/Time    MG 1.8 01/02/2023 02:29 PM     Telemetry: Sinus rhythm 55 to 96 bpm     Assessment/plan:  recurrent syncope (2012 and 2017, suspected vasovagal versus orthostatic)  - In 2012, he had an episode of syncope, which he described as standing at work when he began to feel nauseated followed by LOC, then attempted to stand followed by recurrent syncopal event. This occurred shortly after starting an antibiotic as well as Phenergan. He was evaluated by cardiology, who reported normal echo and ECG. In 2017, he reported recurrence of syncopal event, which she described as sitting at basketball game then began to feel nauseous. He stood up and walked to the drinking fountain that had syncopal event. He was noted to have a new RBBB (QRS D = 134 ms) and echo at that time was grossly normal. He was again evaluated by cardiology and who suspected etiology of vasovagal versus orthostatic. -- Syncope this admit was post-exercise, which is typically thought to be vasovagal in etiology.  -Orthostatic vital signs positive in the ER and on hospital day 1. Possible offending medications include Seroquel, Norvasc, Flomax. Home Effexor has been discontinued.  -Lifestyle modifications including use of compression stockings (bilateral thigh-high), encouraging oral hydration, transition slowly between positions, participate in isometric exercises, sleep with the head of the bed elevated, if feeling lightheaded   -Await TTE.  - Recommend monitor on telemetry. - Unable to complete exercise stress test, stationary bike not offered. -- Zio Xt for 14 days at the time of discharge. - EP to sign off. Hypertension  -On amlodipine 5 mg daily and losartan 100 mg daily. - Consider require reduction therapy. BPH  -On Flomax. -Depending on results of orthostatic vital signs, may benefit from urology consultation with plan to discontinue Flomax and consider surgical intervention of BPH. depression/failure to thrive  -Management per admitting physician.     I have spent a total of 10 minutes with the patient and his/her family reviewing the above stated recommendations. And a total of >50% of that time involved face-to-face time providing counseling and or coordination of care with the other providers. Thank you for allowing me to participate in your patient's care. Please call me if there are any questions. Discussed with Dr. Vicente Rojas. Bard Cochran, APRN - CNP   Cardiac Electrophysiology  [address] [facility]    81 y.o. male with recurrent syncope. Orthostatic hypotension. Recommend thigh high compression stockings. Slow change of position with return to prior position if develop symptoms until symptoms can resolve. Drink at least 70 fluid ounces of water. Avoid medications associated with orthostatic hypotension. Denies prior urinary retention. Recommend stop flomax. TTE normal. In future, if continues to have issues with orthostatic hypotension, consider change in HTN meds. EP service will sign off. Please contact with questions/concerns. No need for EP follow-up. Attending Supervising Physicians Attestation Statement  I performed at least 51% of the work.  I was present with the nurse practitioner during the history and exam. I discussed the findings and plans with the nurse practitioner and agree as documented in his note     Electronically signed by Enid Chaudhry DO on 1/4/23 at 9:25 PM EST

## 2023-01-04 NOTE — FLOWSHEET NOTE
01/03/23 2030   Vital Signs   Blood Pressure Lying 166/73   Pulse Lying 66 PER MINUTE   Blood Pressure Sitting 133/80   Pulse Sitting 76 PER MINUTE   Blood Pressure Standing 113/68   Pulse Standing 90 PER MINUTE

## 2023-01-04 NOTE — PROGRESS NOTES
Occupational Therapy  OCCUPATIONAL THERAPY INITIAL EVALUATION      [address] [address] [address]       Date:2023                                                  Patient Name: Ary Hirsch  MRN: [id number]  : 1935  Room: [address] [place] [address]    Evaluating OT: Diane Richter [place] #[id number]    Referring Provider[de-identified]  Alma Whitten MD    Specific Provider Orders/Date: OT evaluation and treatment 23    Diagnosis:  TIA (transient ischemic attack) [G45.9]  Stroke-like symptoms [R29.90]      Pertinent Medical History:  has a past medical history of Aneurysm of right renal artery ([facility] [address] Colitis, Depression, GERD (gastroesophageal reflux disease), H/O: CVA (cerebrovascular accident), Hyperlipidemia, Hypertension, Stroke [facility]), Superior mesenteric artery stenosis ([facility] [address] and TIA (transient ischemic attack).        Precautions:  Fall Risk, +orthostatic hypotension, bed alarm    Assessment of current deficits   [x] Functional mobility   [x]ADLs  [x] Strength               []Cognition   [x] Functional transfers   [] IADLs         [x] Safety Awareness   [x]Endurance   [] Fine Coordination              [x] Balance      [] Vision/perception   []Sensation    []Gross Motor Coordination  [] ROM  [] Delirium                   [] Motor Control     OT PLAN OF CARE   OT POC based on physician orders, patient diagnosis and results of clinical assessment    Frequency/Duration 1-3 days/wk for 2 weeks PRN   Specific OT Treatment to include:   * Instruction/training on adapted ADL techniques and AE recommendations to increase functional independence within precautions       * Functional transfer/mobility training/DME recommendations for increased independence, safety, and fall prevention  * Patient/Family education to increase follow through with safety techniques and functional independence  * Recommendation of environmental modifications for increased safety with functional transfers/mobility and ADLs  * Therapeutic exercise to improve motor endurance, ROM, and functional strength for ADLs/functional transfers  * Therapeutic activities to facilitate/challenge dynamic balance, stand tolerance for increased safety and independence with ADLs  * Therapeutic activities to facilitate gross/fine motor skills for increased independence with ADLs  * Positioning to improve skin integrity, interaction with environment and functional independence      Recommended Adaptive Equipment:  TBD     Home Living:  per Pt, he was living in  [facility] but recently had a decline and stated he is in the [facility] as of recent  Prior Level of Function:  assist as of recent  with ADLs ,    and with IADLs. Ambulated with ww, short distance    Pain Level: 0/10    Cognition: A&O: 4/4;   Follows multi step directions: good    Memory:  good    Sequencing:  fair    Problem solving:  fair    Judgement/safety:  fair     Functional Assessment:   AM-PAC Daily Activity Raw Score: 16/24     Initial Eval Status  Date: 1/4/23 Treatment Status  Date: STG=LTG  Time frame: 10-14 days   Feeding Independent   Independent    Grooming Minimal Assist   Seated d/t poor standing tolerance  Modified [place]    UB Dressing Minimal Assist   Modified [place]    LB Dressing Maximal Assist   Limited reach to LE  Minimal Assist    Bathing Maximal Assist  Limited reach to LE   Minimal Assist    Toileting Maximal Assist   Urgency to use bathroom upon standing. Max A for hygiene  Minimal Assist    Bed Mobility  Supine to sit: NT   Sit to supine: NT   Supine to sit:  Independent   Sit to supine: Independent    Functional Transfers Sit to stand: Minimal Assist   Stand to sit: Minimal Assist   Stand pivot: Minimal Assist   Supervision    Functional Mobility Minimal Assist  with ww  Short distance,  No c/o lightheadedness   Supervision  with [employer]   Balance Sitting: Supervision      Standing: Minimal Assist with ww  Sitting: Independent Standing: Supervision    Activity Tolerance Ppor+  Fair+   Visual/  Perceptual Glasses: yes  Perception: NT          BUE  ROM/Strength/  Fine motor Coordination Hand dominance: R    RUE: ROM WFL     Strength: grossly 4-/5      Strength:  WFL     Coordination:   WFL    LUE: ROM  WFL     Strength: grossly 4-/5      Strength:  WFL     Coordination: WFL       Vitals: per RN prior OT eval  BP supine: 166/73 BP sitting  133/80 BP standing 113/68   HR supine:  bpm HR  sitting   bpm HR standing  bpm            Hearing: WFL   Sensation:  No c/o numbness or tingling   Tone:  WFL   Edema:  None noted    Comments:   RN cleared patient for OT. Upon arrival, patient was sitting EOB. RN notified therapy of pt presenting with + orthostatic hypotension  . Therapist facilitated and instructed pt on adapted  techniques & compensatory strategies to improve safety and independence with basic ADLs, functional transfers & functional mobility  to allow pt to achieve highest level of independence and safely. At end of session, patient in bed with call light and phone within reach, all lines and tubes intact. Pt would benefit from continued skilled OT to increase safety and independence with completion of ADL tasks and functional mobility for improved quality of life. Treatment: OT treatment provided this date includes: ADL-  Instruction/training on safety and adapted techniques for completion of toileting, dressing,tasks   Mobility-  Instruction/training on safety and improved independence with  functional transfers  and functional mobility with ww. Rehab Potential: Good  for established goals     Patient / Family Goal:  Not stated    Patient and/or family were instructed on functional diagnosis, prognosis/goals and OT plan of care. Demonstrated fair understanding.      Eval Complexity: Low    Time In: 9:10  Time Out: 9:45  Total Treatment Time: 23 minutes    Min Units   OT Eval Low 97778  x     OT Eval Medium [address]       OT Re-Eval [id number]       Therapeutic Ex [id number]       Therapeutic Activities 57713  10 1   ADL/Self Care 62701  13 1   Orthotic Management 49091       Neuro Re-Ed 40157       Non-Billable Time          Evaluation Time includes thorough review of current medical information, gathering information on past medical history/social history and prior level of function, completion of standardized testing/informal observation of tasks, assessment of data and education on plan of care and goals.             [place], [place] #[id number]

## 2023-01-04 NOTE — PROGRESS NOTES
Physical Therapy  Physical Therapy Initial Assessment     Name: Osvaldo Stark  : 1935  MRN: [id number]      Date of Service: 2023    Evaluating PT:  Irvin Sifuentes PT, DPT    Room #:  7147/8496-B  Diagnosis:  TIA (transient ischemic attack) [G45.9]  Stroke-like symptoms [R29.90]  PMHx/PSHx:  CVA, HLD, HTN, depression  Procedure/Surgery:  N/A  Precautions:  orthostatic hypotension, fall risk  Equipment Needs:  TBD    SUBJECTIVE:    Pt resides at [address] [facility]. Pt used to ambulate with ww independently, recently requiring more assistance PTA. OBJECTIVE:   Initial Evaluation  Date: 23 Treatment Short Term/ Long Term   Goals   AM-PAC 6 Clicks 83/28     Was pt agreeable to Eval/treatment? yes     Does pt have pain? No pain     Bed Mobility  Rolling: NT  Supine to sit: NT  Sit to supine: min A  Scooting: min A  Rolling: mod I  Supine to sit: mod I  Sit to supine: mod I  Scooting: mod I   Transfers Sit to stand: min A  Stand to sit: min A  Stand pivot: min A with ww  Sit to stand: mod I  Stand to sit: mod I  Stand pivot: mod I   Ambulation    10'x2 with ww min A  100'+ with AAD mod I   Stair negotiation: ascended and descended  NT       Strength/ROM:   BLE grossly 4/5  BLE AROM WFL    Balance:   Static Sitting: SBA  Dynamic Sitting: CGA  Static Standing: CGA with ww  Dynamic Standing: min A with ww    Pt is A & O x 3  Sensation:  Pt denies numbness and tingling to extremities  Edema:  unremarkable    Vitals:  Per nursing, BP supine 166/73, sitting 133/80, standing 113/68    Therapeutic Exercises:    Bed mobility: supine<>sit, cued for EOB positioning  Transfers:  STSx1, cued for hand placement and postural correction  Ambulation: 10'x2 with ww  BLE AROM    Patient education  Pt educated on role of PT, importance of functional mobility during hospital stay, safety with functional mobility    Patient response to education:   Pt verbalized understanding Pt demonstrated skill Pt requires further education in this area   yes yes reinforce     ASSESSMENT:    Conditions Requiring Skilled Therapeutic Intervention:    [x]Decreased strength     []Decreased ROM  [x]Decreased functional mobility  [x]Decreased balance   [x]Decreased endurance   [x]Decreased posture  []Decreased sensation  []Decreased coordination   []Decreased vision  []Decreased safety awareness   []Increased pain       Comments:    Pt sitting at EOB with nursing present upon entering, agreeable to participate. Pt demonstrating good static sitting balance. Pt instructed to transfer from EOB, demonstrating fair static standing balance with ww. Pt requesting to ambulate to bathroom. Pt cued for safe transfer on/off of toilet. Pt demonstrated [place] with short step length and flexed trunk during ambulation. Pt was assisted back to bedside after bathroom use. Pt was assisted to supine position and made comfortable. All needs met and call bell in reach prior to exiting. Treatment:  Patient practiced and was instructed in the following treatment:    Bed mobility training - pt given verbal and tactile cues to facilitate proper sequencing and safety during rolling and sit>supine as well as provided with physical assistance to complete task   STS and pivot transfer training - pt educated on proper hand and foot placement, safety and sequencing, and use of verbal and tactile cues to safely complete sit<>stand and pivot transfers with physical assistance to complete task safely   Gait training- pt was given verbal and tactile cues to facilitate pt safety with ww use during ambulation as well as provided with physical assistance. Pt's/ family goals   1. [facility]    Prognosis is good for reaching above PT goals. Patient and or family understand(s) diagnosis, prognosis, and plan of care.   yes    PHYSICAL THERAPY PLAN OF CARE:    PT POC is established based on physician order and patient diagnosis     Referring provider/PT Order:  Beth Riley MD  Diagnosis: TIA (transient ischemic attack) [G45.9]  Stroke-like symptoms [R29.90]  Specific instructions for next treatment:  Progress as tolerated    Current Treatment Recommendations:     [x] Strengthening to improve independence with functional mobility   [] ROM to improve independence with functional mobility   [x] Balance Training to improve static/dynamic balance and to reduce fall risk  [x] Endurance Training to improve activity tolerance during functional mobility   [x] Transfer Training to improve safety and independence with all functional transfers   [x] Gait Training to improve gait mechanics, endurance and assess need for appropriate assistive device  [] Stair Training in preparation for safe discharge home and/or into the community   [] Positioning to prevent skin breakdown and contractures  [x] Safety and Education Training   [x] Patient/Caregiver Education   [] HEP  [] Other     PT long term treatment goals are located in above grid    Frequency of treatments: 2-5x/week x 1-2 weeks. Time in  0911  Time out  0945    Total Treatment Time  15 minutes     Evaluation Time includes thorough review of current medical information, gathering information on past medical history/social history and prior level of function, completion of standardized testing/informal observation of tasks, assessment of data and education on plan of care and goals.     CPT codes:  [x] Low Complexity PT evaluation 11260  [] Moderate Complexity PT evaluation 20641  [] High Complexity PT evaluation 65718  [] PT Re-evaluation 60560  [] Gait training 98042 -- minutes  [] Manual therapy [id number] -- minutes  [x] Therapeutic activities 10505 15 minutes  [] Therapeutic exercises 53537 -- minutes  [] Neuromuscular reeducation 48011 -- minutes     Lexy Hodges, PT, DPT  [id number]

## 2023-01-04 NOTE — TELEPHONE ENCOUNTER
----- Message from Arely Zuleta, APRN - CNP sent at 1/4/2023 11:37 AM EST -----  Ary Hirsch was seen inpatient and will need a 14 day Zio Xt at the time of discharge to be read by Dr. Nayana Alexandre. He is in room 2722-B, thanks.

## 2023-01-05 VITALS
TEMPERATURE: 97 F | HEART RATE: 64 BPM | RESPIRATION RATE: 16 BRPM | OXYGEN SATURATION: 95 % | DIASTOLIC BLOOD PRESSURE: 74 MMHG | HEIGHT: 72 IN | SYSTOLIC BLOOD PRESSURE: 161 MMHG | WEIGHT: 157 LBS | BODY MASS INDEX: 21.26 KG/M2

## 2023-01-05 LAB — SARS-COV-2, NAAT: NOT DETECTED

## 2023-01-05 PROCEDURE — 93242 EXT ECG>48HR<7D RECORDING: CPT

## 2023-01-05 PROCEDURE — 6370000000 HC RX 637 (ALT 250 FOR IP): Performed by: INTERNAL MEDICINE

## 2023-01-05 PROCEDURE — 6360000002 HC RX W HCPCS: Performed by: INTERNAL MEDICINE

## 2023-01-05 PROCEDURE — 87635 SARS-COV-2 COVID-19 AMP PRB: CPT

## 2023-01-05 RX ADMIN — LOSARTAN POTASSIUM 100 MG: 50 TABLET, FILM COATED ORAL at 08:47

## 2023-01-05 RX ADMIN — ASPIRIN 81 MG: 81 TABLET, COATED ORAL at 08:47

## 2023-01-05 RX ADMIN — POTASSIUM CHLORIDE 20 MEQ: 1500 TABLET, EXTENDED RELEASE ORAL at 08:47

## 2023-01-05 RX ADMIN — ONDANSETRON 4 MG: 4 TABLET, ORALLY DISINTEGRATING ORAL at 09:44

## 2023-01-05 RX ADMIN — ENOXAPARIN SODIUM 40 MG: 100 INJECTION SUBCUTANEOUS at 08:47

## 2023-01-05 RX ADMIN — PANTOPRAZOLE SODIUM 40 MG: 40 TABLET, DELAYED RELEASE ORAL at 08:47

## 2023-01-05 ASSESSMENT — PAIN SCALES - GENERAL: PAINLEVEL_OUTOF10: 0

## 2023-01-05 NOTE — CARE COORDINATION
SOCIAL WORK/CASEMANAGEMENT TRANSITION OF CARE PLANNING( [address] [place] [address], [facility]):  met with pt in the room this a.m. anticipate pt to go back to [facility] today. EP signed off and echo done with EF 55-60%. Sent perfect serve to the pcp for discharge orders, etc. All discharge paperwork is in place. Precert not needed. PT and OT saw pt yesterday.  MAURICIO Cummins  1/5/2023

## 2023-01-05 NOTE — PROGRESS NOTES
Went over in detail patient discharge paperwork. All questions were answered with daughter at bedside. IV was pulled. Patient left with paperwork in folder, and belongings as well as Zio patch box and information in bag.

## 2023-01-05 NOTE — PLAN OF CARE
Problem: Chronic Conditions and Co-morbidities  Goal: Patient's chronic conditions and co-morbidity symptoms are monitored and maintained or improved  1/4/2023 2100 by Juli Hill RN  Outcome: Progressing  1/4/2023 0858 by Arnel Diamond RN  Outcome: Progressing     Problem: Discharge Planning  Goal: Discharge to home or other facility with appropriate resources  1/4/2023 2100 by Juli Hill RN  Outcome: Progressing  1/4/2023 0858 by Arnel Diamond RN  Outcome: Progressing     Problem: Safety - Adult  Goal: Free from fall injury  1/4/2023 2100 by Juli Hill RN  Outcome: Progressing  1/4/2023 0858 by Arnel Diamond RN  Outcome: Progressing     Problem: ABCDS Injury Assessment  Goal: Absence of physical injury  1/4/2023 2100 by Juli Hill RN  Outcome: Progressing  1/4/2023 0858 by Arnel Diamond RN  Outcome: Progressing     Problem: Skin/Tissue Integrity  Goal: Absence of new skin breakdown  Description: 1. Monitor for areas of redness and/or skin breakdown  2. Assess vascular access sites hourly  3. Every 4-6 hours minimum:  Change oxygen saturation probe site  4. Every 4-6 hours:  If on nasal continuous positive airway pressure, respiratory therapy assess nares and determine need for appliance change or resting period.   1/4/2023 2100 by Juli Hill RN  Outcome: Progressing  1/4/2023 0858 by Arnel Diamond RN  Outcome: Progressing     Problem: Pain  Goal: Verbalizes/displays adequate comfort level or baseline comfort level  1/4/2023 2100 by Juli Hill RN  Outcome: Progressing  1/4/2023 0858 by Arnel Diamond RN  Outcome: Progressing  Flowsheets (Taken 1/4/2023 0830)  Verbalizes/displays adequate comfort level or baseline comfort level: Encourage patient to monitor pain and request assistance

## 2023-01-05 NOTE — PROGRESS NOTES
Attempted to call report.  placed on hold then cam back to say that there was no one available to take nurse to nurse, then she switched me to voicemail. Will attempt to call later     Nurse to nurse given to 47Roderick Santana.

## 2023-01-05 NOTE — DISCHARGE SUMMARY
Physician Discharge Summary     Patient ID:  Bernadette Panchal  [id number]  81 y.o.  1935    Admit date: 1/2/2023    Discharge date and time: 1/5/2023  1:58 PM     Admission Diagnoses: TIA (transient ischemic attack) [G45.9]  Stroke-like symptoms [R29.90]    Discharge Diagnoses:   TIA ruled out  Orthostatic hypotension  Syncope  Patient Active Problem List   Diagnosis    Major depressive disorder    Insomnia d/t depression    Decreased sense of vibration at the toes    Essential hypertension    Hyperlipidemia    Aneurysm of right renal artery (HCC)    Osteoarthritis of left knee    Status post left knee replacement    Depression    Impaired fasting glucose    Chronic gastritis without bleeding    Decreased appetite    Anxiety    Weakness    Non-seasonal allergic rhinitis    Superior mesenteric artery stenosis (HCC)    Chronic intractable headache    Seasonal allergic rhinitis due to pollen    Chronic sinusitis    BPH associated with nocturia    Abnormal liver enzymes    H/O: CVA (cerebrovascular accident)    Myalgia    Constipation    Chronic sinus complaints    Gait disturbance    Failure to thrive in adult    Severe protein-calorie malnutrition (HCC)    Generalized weakness    TIA (transient ischemic attack)       Consults: Neurology and electrophysiology    Procedures:     Hospital Course:   52-year-old  man admitted from assisted living with the episode of syncopal episode on exertion  TIA was ruled out by examination  Seen by EP  Profound orthostatic hypotension noted  Flomax and amlodipine were discontinued  He remained stable during the rest of the hospital course  Discharge back to assisted living    Discharge Exam:  See progress note from today    Disposition: Stable at the time of discharge  Assisted living    Patient Instructions:   Discharge Medication List as of 1/5/2023 11:10 AM        CONTINUE these medications which have NOT CHANGED    Details   acetaminophen (TYLENOL) 325 MG tablet Take 650 mg by mouth every 6 hours as needed for Pain or FeverHistorical Med      bisacodyl (DULCOLAX) 10 MG suppository Place 10 mg rectally daily as needed for ConstipationHistorical Med      sodium phosphate (FLEET) 7-19 GM/118ML Place 1 enema rectally daily as neededHistorical Med      losartan (COZAAR) 100 MG tablet Take 100 mg by mouth dailyHistorical Med      magnesium hydroxide (MILK OF MAGNESIA) 400 MG/5ML suspension Take 30 mLs by mouth daily as needed for ConstipationHistorical Med      polyethylene glycol (GLYCOLAX) 17 g packet Take 17 g by mouth at bedtimeHistorical Med      potassium chloride (KLOR-CON M) 20 MEQ extended release tablet Take 20 mEq by mouth dailyHistorical Med      senna (SENOKOT) 8.6 MG tablet Take 1 tablet by mouth daily as needed for ConstipationHistorical Med      calcium carbonate (TUMS) 500 MG chewable tablet Take 2 tablets by mouth 3 times daily as needed for HeartburnHistorical Med      ondansetron (ZOFRAN) 4 MG tablet Take 4 mg by mouth every 4 hours as needed for Nausea or VomitingHistorical Med      QUEtiapine (SEROQUEL) 25 MG tablet Take 1 tablet by mouth nightly, Disp-60 tablet, R-3DC to SNF      loperamide (IMODIUM A-D) 2 MG tablet Take 1 tablet by mouth every 4 hours as needed for Diarrhea, Disp-36 tablet, R-0Normal      docusate sodium (COLACE) 100 MG capsule Take 1 capsule by mouth daily, Disp-30 capsule, R-5Normal      prochlorperazine (COMPAZINE) 10 MG tablet Take 1 tablet by mouth every 8 hours as needed (Nausea), Disp-120 tablet, R-5Normal      pantoprazole (PROTONIX) 40 MG tablet Take 1 tablet by mouth daily, Disp-30 tablet, R-5Normal      aspirin EC 81 MG EC tablet Take 1 tablet by mouth daily, Disp-30 tablet, R-5Normal      ondansetron (ZOFRAN-ODT) 4 MG disintegrating tablet Take 1 tablet by mouth every 8 hours as needed for Nausea or Vomiting, Disp-60 tablet, R-0Normal           STOP taking these medications       venlafaxine (EFFEXOR XR) 37.5 MG extended release capsule Comments:   Reason for Stopping:         tamsulosin (FLOMAX) 0.4 MG capsule Comments:   Reason for Stopping:         citalopram (CELEXA) 10 MG tablet Comments:   Reason for Stopping:         amLODIPine (NORVASC) 5 MG tablet Comments:   Reason for Stopping:         acetaminophen (TYLENOL 8 HOUR) 650 MG extended release tablet Comments:   Reason for Stopping:         FLUoxetine (PROZAC) 20 MG capsule Comments:   Reason for Stopping:         hydroCHLOROthiazide (MICROZIDE) 12.5 MG capsule Comments:   Reason for Stopping:             Activity: As tolerated  Diet: General    Follow-up with PCP and EP    Note that over 40 minutes was spent in preparing discharge papers, discussing discharge with patient, medication review, etc.    Signed:  Monika Yuen MD  1/5/2023  6:16 PM

## 2023-01-05 NOTE — CARE COORDINATION
SOCIAL WORK/CASEMANAGEMENT TRANSITION OF CARE PLANNINGNanda Shaw, [address]):  discharge back to [facility] today. The [facility]a will send a van around 1 p.m. with a w/c to take pt back. Snf;loc. Called daughter , Shirin Pryor, and she with pt are in agreement. Code status was never changed and margot said the anusha will address it. Rn sent for a rapid covid.  MAURICIO Cifuentes  1/5/2023

## 2023-01-10 ENCOUNTER — OUTSIDE SERVICES (OUTPATIENT)
Dept: FAMILY MEDICINE CLINIC | Age: 88
End: 2023-01-10

## 2023-01-10 DIAGNOSIS — F32.9 MAJOR DEPRESSIVE DISORDER, REMISSION STATUS UNSPECIFIED, UNSPECIFIED WHETHER RECURRENT: Primary | ICD-10-CM

## 2023-01-10 DIAGNOSIS — E43 SEVERE PROTEIN-CALORIE MALNUTRITION (HCC): ICD-10-CM

## 2023-01-10 DIAGNOSIS — I72.2 ANEURYSM OF RIGHT RENAL ARTERY (HCC): ICD-10-CM

## 2023-01-10 DIAGNOSIS — K55.1 SUPERIOR MESENTERIC ARTERY STENOSIS (HCC): ICD-10-CM

## 2023-01-10 ASSESSMENT — ENCOUNTER SYMPTOMS
VOMITING: 0
DIARRHEA: 0
SORE THROAT: 0
SINUS PRESSURE: 0
COUGH: 0
EYE ITCHING: 0
RESPIRATORY NEGATIVE: 1
PHOTOPHOBIA: 0
TROUBLE SWALLOWING: 0
BLOOD IN STOOL: 0
CHOKING: 0
VOICE CHANGE: 0
CONSTIPATION: 0
SINUS PAIN: 0
ABDOMINAL DISTENTION: 0
NAUSEA: 0
STRIDOR: 0
GASTROINTESTINAL NEGATIVE: 1
ALLERGIC/IMMUNOLOGIC NEGATIVE: 1
FACIAL SWELLING: 0
RHINORRHEA: 0
BACK PAIN: 1
SHORTNESS OF BREATH: 0
APNEA: 0
WHEEZING: 0
EYE REDNESS: 0
EYE PAIN: 0
ANAL BLEEDING: 0
ABDOMINAL PAIN: 0
CHEST TIGHTNESS: 0
EYE DISCHARGE: 0
RECTAL PAIN: 0

## 2023-01-10 NOTE — PROGRESS NOTES
Kelsey Pedraza is a 81 y.o. male. Seen: 12/8/2022   HPI/Chief C/O:  He is here post hospital for adult failure to thrive due to depression  No Known Allergies  I am in to assess treatments and progress towards our goals   We will review all past medical history and go over past and current medications   We will review all medical records that are available to us  We will reconcile our care planning and treatment goals to past and present for this patient           ROS:  Review of Systems   Constitutional:  Positive for fatigue. Negative for activity change, appetite change, chills, diaphoresis, fever and unexpected weight change. HENT: Negative. Negative for congestion, dental problem, drooling, ear discharge, ear pain, facial swelling, hearing loss, mouth sores, nosebleeds, postnasal drip, rhinorrhea, sinus pressure, sinus pain, sneezing, sore throat, tinnitus, trouble swallowing and voice change. Eyes:  Negative for photophobia, pain, discharge, redness, itching and visual disturbance. Respiratory: Negative. Negative for apnea, cough, choking, chest tightness, shortness of breath, wheezing and stridor. Cardiovascular: Negative. Negative for chest pain, palpitations and leg swelling. Gastrointestinal: Negative. Negative for abdominal distention, abdominal pain, anal bleeding, blood in stool, constipation, diarrhea, nausea, rectal pain and vomiting. Endocrine: Negative. Negative for cold intolerance, heat intolerance, polydipsia, polyphagia and polyuria. Genitourinary: Negative. Negative for decreased urine volume, difficulty urinating, dysuria, enuresis, flank pain, frequency, genital sores, hematuria, penile discharge, penile pain, penile swelling, scrotal swelling, testicular pain and urgency. Musculoskeletal:  Positive for arthralgias, back pain, gait problem and myalgias. Negative for joint swelling, neck pain and neck stiffness. Skin:  Negative for wound. Allergic/Immunologic: Negative. Negative for environmental allergies, food allergies and immunocompromised state. Neurological:  Positive for weakness. Negative for dizziness, tremors, seizures, syncope, facial asymmetry, speech difficulty, light-headedness, numbness and headaches. Hematological: Negative. Negative for adenopathy. Does not bruise/bleed easily. Psychiatric/Behavioral:  Positive for decreased concentration, dysphoric mood and sleep disturbance. Negative for agitation, behavioral problems, confusion, hallucinations, self-injury and suicidal ideas. The patient is nervous/anxious. The patient is not hyperactive. Past Medical/Surgical Hx;  Reviewed with patient      Diagnosis Date    Aneurysm of right renal artery ([facility] [address]     follows with Dr. Bella Justice yearly (last visit 9/19)    Colitis     Depression     GERD (gastroesophageal reflux disease)     H/O: CVA (cerebrovascular accident) 10/14/2021    Hyperlipidemia     Hypertension     Stroke [facility])     Superior mesenteric artery stenosis ([facility] [address] 11/4/2020    TIA (transient ischemic attack)      Past Surgical History:   Procedure Laterality Date    ANTERIOR CRUCIATE LIGAMENT REPAIR Left 11/21/2001    APPENDECTOMY      CHOLECYSTECTOMY  2007    Lap    ECHO COMPL W DOP COLOR FLOW  12/4/2012         HERNIA REPAIR Right 11/13/2002    double    SINUS SURGERY  2002,2003     done x 2    TONSILLECTOMY      TOTAL KNEE ARTHROPLASTY Left 1/28/2019    LEFT  ROBOTIC KNEE TOTAL ARTHROPLASTY  ++MEREDITH- GMFYHYRY++   ++ADDUCTOR BLOCK++ performed by Moisés Alvarado MD at [address] [place] [address] N/A 10/10/2019    EGD BIOPSY performed by Jayant Macias MD at [facility] ENDOSCOPY       Past Family Hx:  Reviewed with patient  No family history on file.     Social Hx:  Reviewed with patient  Social History     Tobacco Use    Smoking status: Never    Smokeless tobacco: Never   Substance Use Topics    Alcohol use: No       OBJECTIVE  There were no vitals taken for this visit. Problem List:  Hoda Vásquez does not have any pertinent problems on file. PHYS EX:  Physical Exam  Vitals and nursing note reviewed. Constitutional:       General: He is not in acute distress. Appearance: Normal appearance. He is well-developed. He is not ill-appearing, toxic-appearing or diaphoretic. HENT:      Head: Normocephalic and atraumatic. Right Ear: External ear normal. There is no impacted cerumen. Left Ear: External ear normal. There is no impacted cerumen. Nose: Nose normal. No congestion or rhinorrhea. Mouth/Throat:      Mouth: Mucous membranes are moist.      Pharynx: Oropharynx is clear. No oropharyngeal exudate or posterior oropharyngeal erythema. Eyes:      General: No scleral icterus. Right eye: No discharge. Left eye: No discharge. Extraocular Movements: Extraocular movements intact. Conjunctiva/sclera: Conjunctivae normal.      Pupils: Pupils are equal, round, and reactive to light. Neck:      Thyroid: No thyromegaly. Vascular: No carotid bruit. Trachea: No tracheal deviation. Cardiovascular:      Rate and Rhythm: Normal rate and regular rhythm. Pulses: Normal pulses. Heart sounds: Normal heart sounds. No murmur heard. No friction rub. No gallop. Pulmonary:      Effort: Pulmonary effort is normal. No respiratory distress. Breath sounds: Normal breath sounds. No stridor. No wheezing, rhonchi or rales. Chest:      Chest wall: No tenderness. Abdominal:      General: Bowel sounds are normal. There is no distension. Palpations: Abdomen is soft. There is no mass. Tenderness: There is no abdominal tenderness. There is no right CVA tenderness, left CVA tenderness, guarding or rebound. Hernia: No hernia is present. Genitourinary:     Penis: No tenderness. Musculoskeletal:         General: Tenderness (multiple joint pains) present. No swelling, deformity or signs of injury. Normal range of motion. Cervical back: Normal range of motion and neck supple. No rigidity. No muscular tenderness. Right lower leg: No edema. Left lower leg: No edema. Lymphadenopathy:      Cervical: No cervical adenopathy. Neurological:      General: No focal deficit present. Mental Status: He is alert. Cranial Nerves: No cranial nerve deficit. Sensory: Sensory deficit present. Motor: Weakness present. No abnormal muscle tone. Coordination: Coordination abnormal.      Gait: Gait abnormal.      Deep Tendon Reflexes: Reflexes abnormal.       ASSESSMENT/PLAN  Diagnoses and all orders for this visit:    Major depressive disorder, remission status unspecified, unspecified whether recurrent  Long talk on treatment and prevention  Literature is given   Counseling is given for anxiety and depression   All questions were taken and answers are given        Primary insomnia  Long talk on treatment and prevention  Literature is given       Status post left knee replacement  --stable on current care planning  -- continue treatment as we are meeting goals       Severe protein-calorie malnutrition ([facility] [address]  --stable on current care planning  -- continue treatment as we are meeting goals       Generalized weakness  --stable on current care planning  -- continue treatment as we are meeting goals       Aneurysm of right renal artery ([facility] [address]  --stable on current care planning  -- continue treatment as we are meeting goals       Primary osteoarthritis of left knee     Medication List            Accurate as of January 10, 2023  1:14 PM. If you have any questions, ask your nurse or doctor.                 CONTINUE taking these medications      acetaminophen 325 MG tablet  Commonly known as: TYLENOL     aspirin EC 81 MG EC tablet  Take 1 tablet by mouth daily     bisacodyl 10 MG suppository  Commonly known as: DULCOLAX     calcium carbonate 500 MG chewable tablet  Commonly known as: TUMS docusate sodium 100 MG capsule  Commonly known as: COLACE  Take 1 capsule by mouth daily     loperamide 2 MG tablet  Commonly known as: IMODIUM A-D  Take 1 tablet by mouth every 4 hours as needed for Diarrhea     losartan 100 MG tablet  Commonly known as: COZAAR     magnesium hydroxide 400 MG/5ML suspension  Commonly known as: MILK OF MAGNESIA     ondansetron 4 MG disintegrating tablet  Commonly known as: ZOFRAN-ODT  Take 1 tablet by mouth every 8 hours as needed for Nausea or Vomiting     ondansetron 4 MG tablet  Commonly known as: ZOFRAN     pantoprazole 40 MG tablet  Commonly known as: PROTONIX  Take 1 tablet by mouth daily     polyethylene glycol 17 g packet  Commonly known as: GLYCOLAX     potassium chloride 20 MEQ extended release tablet  Commonly known as: KLOR-CON M     prochlorperazine 10 MG tablet  Commonly known as: COMPAZINE  Take 1 tablet by mouth every 8 hours as needed (Nausea)     QUEtiapine 25 MG tablet  Commonly known as: SEROQUEL  Take 1 tablet by mouth nightly     senna 8.6 MG tablet  Commonly known as: SENOKOT     sodium phosphate 7-19 GM/118ML          '    Chronic gastritis without bleeding, unspecified gastritis type  --stable on current care planning  -- continue treatment as we are meeting goals       Decreased appetite  Long talk on treatment and prevention  Literature is given       Anxiety  Counseling is given for anxiety and depression   All questions were taken and answers are given        Superior mesenteric artery stenosis (HCC)  --stable on current care planning  -- continue treatment as we are meeting goals       H/O: CVA (cerebrovascular accident)  --stable on current care planning  -- continue treatment as we are meeting goals       Essential hypertension    ---VASCULAR PANEL  A) ASA, plavix, aggrenox  B) coumadin, pletal, tzd, statin  C) ARB, hctz, folic, CCB  D) cannikinumab, fish oils     ---CARDIAC---ASA, ARB, beta, statin, hctz, ( CCB )     Myalgia  Long talk on treatment and prevention  Literature is given         Outpatient Encounter Medications as of 1/10/2023   Medication Sig Dispense Refill    acetaminophen (TYLENOL) 325 MG tablet Take 650 mg by mouth every 6 hours as needed for Pain or Fever      bisacodyl (DULCOLAX) 10 MG suppository Place 10 mg rectally daily as needed for Constipation      sodium phosphate (FLEET) 7-19 GM/118ML Place 1 enema rectally daily as needed      losartan (COZAAR) 100 MG tablet Take 100 mg by mouth daily      magnesium hydroxide (MILK OF MAGNESIA) 400 MG/5ML suspension Take 30 mLs by mouth daily as needed for Constipation      polyethylene glycol (GLYCOLAX) 17 g packet Take 17 g by mouth at bedtime      potassium chloride (KLOR-CON M) 20 MEQ extended release tablet Take 20 mEq by mouth daily      senna (SENOKOT) 8.6 MG tablet Take 1 tablet by mouth daily as needed for Constipation      calcium carbonate (TUMS) 500 MG chewable tablet Take 2 tablets by mouth 3 times daily as needed for Heartburn      ondansetron (ZOFRAN) 4 MG tablet Take 4 mg by mouth every 4 hours as needed for Nausea or Vomiting      QUEtiapine (SEROQUEL) 25 MG tablet Take 1 tablet by mouth nightly 60 tablet 3    loperamide (IMODIUM A-D) 2 MG tablet Take 1 tablet by mouth every 4 hours as needed for Diarrhea 36 tablet 0    docusate sodium (COLACE) 100 MG capsule Take 1 capsule by mouth daily 30 capsule 5    prochlorperazine (COMPAZINE) 10 MG tablet Take 1 tablet by mouth every 8 hours as needed (Nausea) 120 tablet 5    pantoprazole (PROTONIX) 40 MG tablet Take 1 tablet by mouth daily 30 tablet 5    aspirin EC 81 MG EC tablet Take 1 tablet by mouth daily 30 tablet 5    ondansetron (ZOFRAN-ODT) 4 MG disintegrating tablet Take 1 tablet by mouth every 8 hours as needed for Nausea or Vomiting 60 tablet 0    [DISCONTINUED] FLUoxetine (PROZAC) 20 MG capsule Take 1 capsule by mouth in the morning.  30 capsule 5    [DISCONTINUED] hydroCHLOROthiazide (MICROZIDE) 12.5 MG capsule TAKE 1 CAPSULE BY MOUTH EVERY MORNING 90 capsule 1     No facility-administered encounter medications on file as of 1/10/2023. No follow-ups on file.         Reviewed recent labs related to Raz's current problems      Discussed importance of regular Health Maintenance follow up  Health Maintenance   Topic    Annual Wellness Visit (AWV)     Depression Monitoring     DTaP/Tdap/Td vaccine (2 - Td or Tdap)    Flu vaccine     Shingles vaccine     Pneumococcal 65+ years Vaccine     COVID-19 Vaccine     Hepatitis A vaccine     Hib vaccine     Meningococcal (ACWY) vaccine

## 2023-02-06 LAB — HBA1C MFR BLD: 5.5 % (ref 4–5.6)

## 2023-02-15 ENCOUNTER — OUTSIDE SERVICES (OUTPATIENT)
Dept: FAMILY MEDICINE CLINIC | Age: 88
End: 2023-02-15

## 2023-02-15 DIAGNOSIS — Z86.73 H/O: CVA (CEREBROVASCULAR ACCIDENT): ICD-10-CM

## 2023-02-15 DIAGNOSIS — K55.1 SUPERIOR MESENTERIC ARTERY STENOSIS (HCC): ICD-10-CM

## 2023-02-15 DIAGNOSIS — I10 ESSENTIAL HYPERTENSION: Chronic | ICD-10-CM

## 2023-02-15 DIAGNOSIS — Z96.652 STATUS POST LEFT KNEE REPLACEMENT: ICD-10-CM

## 2023-02-15 DIAGNOSIS — F32.9 MAJOR DEPRESSIVE DISORDER, REMISSION STATUS UNSPECIFIED, UNSPECIFIED WHETHER RECURRENT: Primary | ICD-10-CM

## 2023-02-15 DIAGNOSIS — I72.2 ANEURYSM OF RIGHT RENAL ARTERY (HCC): ICD-10-CM

## 2023-02-15 PROBLEM — E43 SEVERE PROTEIN-CALORIE MALNUTRITION (HCC): Status: RESOLVED | Noted: 2022-11-30 | Resolved: 2023-02-15

## 2023-02-15 ASSESSMENT — ENCOUNTER SYMPTOMS
ANAL BLEEDING: 0
FACIAL SWELLING: 0
EYE DISCHARGE: 0
ABDOMINAL PAIN: 0
EYE ITCHING: 0
ABDOMINAL DISTENTION: 0
BACK PAIN: 1
GASTROINTESTINAL NEGATIVE: 1
RESPIRATORY NEGATIVE: 1
EYE PAIN: 0
BLOOD IN STOOL: 0
WHEEZING: 0
SORE THROAT: 0
RECTAL PAIN: 0
APNEA: 0
STRIDOR: 0
CHEST TIGHTNESS: 0
CHOKING: 0
CONSTIPATION: 0
VOICE CHANGE: 0
VOMITING: 0
TROUBLE SWALLOWING: 0
NAUSEA: 0
DIARRHEA: 0
RHINORRHEA: 0
PHOTOPHOBIA: 0
SINUS PAIN: 0
SHORTNESS OF BREATH: 0
ALLERGIC/IMMUNOLOGIC NEGATIVE: 1
EYE REDNESS: 0
COUGH: 0
SINUS PRESSURE: 0

## 2023-02-15 NOTE — PROGRESS NOTES
Joby Angel is a 81 y.o. male. Seen: 12/8/2022   HPI/Chief C/O:  He is here post hospital for adult failure to thrive due to depression  No Known Allergies  I am in to assess treatments and progress towards our goals   He is medically stable today  There are no new issues as per nursing   We are working through his depression           ROS:  Review of Systems   Constitutional:  Positive for fatigue. Negative for activity change, appetite change, chills, diaphoresis, fever and unexpected weight change. HENT: Negative. Negative for congestion, dental problem, drooling, ear discharge, ear pain, facial swelling, hearing loss, mouth sores, nosebleeds, postnasal drip, rhinorrhea, sinus pressure, sinus pain, sneezing, sore throat, tinnitus, trouble swallowing and voice change. Eyes:  Negative for photophobia, pain, discharge, redness, itching and visual disturbance. Respiratory: Negative. Negative for apnea, cough, choking, chest tightness, shortness of breath, wheezing and stridor. Cardiovascular: Negative. Negative for chest pain, palpitations and leg swelling. Gastrointestinal: Negative. Negative for abdominal distention, abdominal pain, anal bleeding, blood in stool, constipation, diarrhea, nausea, rectal pain and vomiting. Endocrine: Negative. Negative for cold intolerance, heat intolerance, polydipsia, polyphagia and polyuria. Genitourinary: Negative. Negative for decreased urine volume, difficulty urinating, dysuria, enuresis, flank pain, frequency, genital sores, hematuria, penile discharge, penile pain, penile swelling, scrotal swelling, testicular pain and urgency. Musculoskeletal:  Positive for arthralgias, back pain, gait problem and myalgias. Negative for joint swelling, neck pain and neck stiffness. Skin:  Negative for wound. Allergic/Immunologic: Negative. Negative for environmental allergies, food allergies and immunocompromised state.    Neurological:  Positive for weakness. Negative for dizziness, tremors, seizures, syncope, facial asymmetry, speech difficulty, light-headedness, numbness and headaches. Hematological: Negative. Negative for adenopathy. Does not bruise/bleed easily. Psychiatric/Behavioral:  Positive for decreased concentration, dysphoric mood and sleep disturbance. Negative for agitation, behavioral problems, confusion, hallucinations, self-injury and suicidal ideas. The patient is nervous/anxious. The patient is not hyperactive. Past Medical/Surgical Hx;  Reviewed with patient      Diagnosis Date    Aneurysm of right renal artery ([facility] [address]     follows with Dr. Ravinder Farrar yearly (last visit 9/19)    Colitis     Depression     GERD (gastroesophageal reflux disease)     H/O: CVA (cerebrovascular accident) 10/14/2021    Hyperlipidemia     Hypertension     Stroke [facility])     Superior mesenteric artery stenosis ([facility] [address] 11/4/2020    TIA (transient ischemic attack)      Past Surgical History:   Procedure Laterality Date    ANTERIOR CRUCIATE LIGAMENT REPAIR Left 11/21/2001    APPENDECTOMY      CHOLECYSTECTOMY  2007    Lap    ECHO COMPL W DOP COLOR FLOW  12/4/2012         HERNIA REPAIR Right 11/13/2002    double    SINUS SURGERY  2002,2003     done x 2    TONSILLECTOMY      TOTAL KNEE ARTHROPLASTY Left 1/28/2019    LEFT  ROBOTIC KNEE TOTAL ARTHROPLASTY  ++MEREDITH- GECSXBVA++   ++ADDUCTOR BLOCK++ performed by Andreina Salinas MD at [address] [place] [address] N/A 10/10/2019    EGD BIOPSY performed by Destiny Bill MD at [facility] ENDOSCOPY       Past Family Hx:  Reviewed with patient  No family history on file. Social Hx:  Reviewed with patient  Social History     Tobacco Use    Smoking status: Never    Smokeless tobacco: Never   Substance Use Topics    Alcohol use: No       OBJECTIVE  There were no vitals taken for this visit. Problem List:  Haylie Lobato does not have any pertinent problems on file.     PHYS EX:  Physical Exam  Vitals and nursing note reviewed. Constitutional:       General: He is not in acute distress. Appearance: Normal appearance. He is well-developed. He is not ill-appearing, toxic-appearing or diaphoretic. HENT:      Head: Normocephalic and atraumatic. Right Ear: External ear normal. There is no impacted cerumen. Left Ear: External ear normal. There is no impacted cerumen. Nose: Nose normal. No congestion or rhinorrhea. Mouth/Throat:      Mouth: Mucous membranes are moist.      Pharynx: Oropharynx is clear. No oropharyngeal exudate or posterior oropharyngeal erythema. Eyes:      General: No scleral icterus. Right eye: No discharge. Left eye: No discharge. Extraocular Movements: Extraocular movements intact. Conjunctiva/sclera: Conjunctivae normal.      Pupils: Pupils are equal, round, and reactive to light. Neck:      Thyroid: No thyromegaly. Vascular: No carotid bruit. Trachea: No tracheal deviation. Cardiovascular:      Rate and Rhythm: Normal rate and regular rhythm. Pulses: Normal pulses. Heart sounds: Normal heart sounds. No murmur heard. No friction rub. No gallop. Pulmonary:      Effort: Pulmonary effort is normal. No respiratory distress. Breath sounds: Normal breath sounds. No stridor. No wheezing, rhonchi or rales. Chest:      Chest wall: No tenderness. Abdominal:      General: Bowel sounds are normal. There is no distension. Palpations: Abdomen is soft. There is no mass. Tenderness: There is no abdominal tenderness. There is no right CVA tenderness, left CVA tenderness, guarding or rebound. Hernia: No hernia is present. Genitourinary:     Penis: No tenderness. Musculoskeletal:         General: Tenderness (multiple joint pains) present. No swelling, deformity or signs of injury. Normal range of motion. Cervical back: Normal range of motion and neck supple. No rigidity. No muscular tenderness.       Right lower leg: No edema. Left lower leg: No edema. Lymphadenopathy:      Cervical: No cervical adenopathy. Neurological:      General: No focal deficit present. Mental Status: He is alert. Cranial Nerves: No cranial nerve deficit. Sensory: Sensory deficit present. Motor: Weakness present. No abnormal muscle tone. Coordination: Coordination abnormal.      Gait: Gait abnormal.      Deep Tendon Reflexes: Reflexes abnormal.       ASSESSMENT/PLAN  Diagnoses and all orders for this visit:    Major depressive disorder, remission status unspecified, unspecified whether recurrent  Long talk on treatment and prevention  Literature is given   Counseling is given for anxiety and depression   All questions were taken and answers are given        Primary insomnia  Long talk on treatment and prevention  Literature is given       Status post left knee replacement  --stable on current care planning  -- continue treatment as we are meeting goals       Severe protein-calorie malnutrition ([facility] [address]  --stable on current care planning  -- continue treatment as we are meeting goals       Generalized weakness  --stable on current care planning  -- continue treatment as we are meeting goals       Aneurysm of right renal artery ([facility] [address]  --stable on current care planning  -- continue treatment as we are meeting goals       Primary osteoarthritis of left knee     Medication List            Accurate as of February 15, 2023 12:51 PM. If you have any questions, ask your nurse or doctor.                 CONTINUE taking these medications      acetaminophen 325 MG tablet  Commonly known as: TYLENOL     aspirin EC 81 MG EC tablet  Take 1 tablet by mouth daily     bisacodyl 10 MG suppository  Commonly known as: DULCOLAX     calcium carbonate 500 MG chewable tablet  Commonly known as: TUMS     docusate sodium 100 MG capsule  Commonly known as: COLACE  Take 1 capsule by mouth daily     loperamide 2 MG tablet  Commonly known as: IMODIUM A-D  Take 1 tablet by mouth every 4 hours as needed for Diarrhea     losartan 100 MG tablet  Commonly known as: COZAAR     magnesium hydroxide 400 MG/5ML suspension  Commonly known as: MILK OF MAGNESIA     ondansetron 4 MG disintegrating tablet  Commonly known as: ZOFRAN-ODT  Take 1 tablet by mouth every 8 hours as needed for Nausea or Vomiting     ondansetron 4 MG tablet  Commonly known as: ZOFRAN     pantoprazole 40 MG tablet  Commonly known as: PROTONIX  Take 1 tablet by mouth daily     polyethylene glycol 17 g packet  Commonly known as: GLYCOLAX     potassium chloride 20 MEQ extended release tablet  Commonly known as: KLOR-CON M     prochlorperazine 10 MG tablet  Commonly known as: COMPAZINE  Take 1 tablet by mouth every 8 hours as needed (Nausea)     QUEtiapine 25 MG tablet  Commonly known as: SEROQUEL  Take 1 tablet by mouth nightly     senna 8.6 MG tablet  Commonly known as: SENOKOT     sodium phosphate 7-19 GM/118ML          '    Chronic gastritis without bleeding, unspecified gastritis type  --stable on current care planning  -- continue treatment as we are meeting goals       Decreased appetite  Long talk on treatment and prevention  Literature is given       Anxiety  Counseling is given for anxiety and depression   All questions were taken and answers are given        Superior mesenteric artery stenosis ([facility])  --stable on current care planning  -- continue treatment as we are meeting goals       H/O: CVA (cerebrovascular accident)  --stable on current care planning  -- continue treatment as we are meeting goals       Essential hypertension    ---VASCULAR PANEL  A) ASA, plavix, aggrenox  B) coumadin, pletal, tzd, statin  C) ARB, hctz, folic, CCB  D) cannikinumab, fish oils     ---CARDIAC---ASA, ARB, beta, statin, hctz, ( CCB )     Myalgia  Long talk on treatment and prevention  Literature is given         Outpatient Encounter Medications as of 2/15/2023   Medication Sig Dispense Refill    acetaminophen (TYLENOL) 325 MG tablet Take 650 mg by mouth every 6 hours as needed for Pain or Fever      bisacodyl (DULCOLAX) 10 MG suppository Place 10 mg rectally daily as needed for Constipation      sodium phosphate (FLEET) 7-19 GM/118ML Place 1 enema rectally daily as needed      losartan (COZAAR) 100 MG tablet Take 100 mg by mouth daily      magnesium hydroxide (MILK OF MAGNESIA) 400 MG/5ML suspension Take 30 mLs by mouth daily as needed for Constipation      polyethylene glycol (GLYCOLAX) 17 g packet Take 17 g by mouth at bedtime      potassium chloride (KLOR-CON M) 20 MEQ extended release tablet Take 20 mEq by mouth daily      senna (SENOKOT) 8.6 MG tablet Take 1 tablet by mouth daily as needed for Constipation      calcium carbonate (TUMS) 500 MG chewable tablet Take 2 tablets by mouth 3 times daily as needed for Heartburn      ondansetron (ZOFRAN) 4 MG tablet Take 4 mg by mouth every 4 hours as needed for Nausea or Vomiting      QUEtiapine (SEROQUEL) 25 MG tablet Take 1 tablet by mouth nightly 60 tablet 3    loperamide (IMODIUM A-D) 2 MG tablet Take 1 tablet by mouth every 4 hours as needed for Diarrhea 36 tablet 0    docusate sodium (COLACE) 100 MG capsule Take 1 capsule by mouth daily 30 capsule 5    prochlorperazine (COMPAZINE) 10 MG tablet Take 1 tablet by mouth every 8 hours as needed (Nausea) 120 tablet 5    pantoprazole (PROTONIX) 40 MG tablet Take 1 tablet by mouth daily 30 tablet 5    aspirin EC 81 MG EC tablet Take 1 tablet by mouth daily 30 tablet 5    ondansetron (ZOFRAN-ODT) 4 MG disintegrating tablet Take 1 tablet by mouth every 8 hours as needed for Nausea or Vomiting 60 tablet 0    [DISCONTINUED] FLUoxetine (PROZAC) 20 MG capsule Take 1 capsule by mouth in the morning. 30 capsule 5    [DISCONTINUED] hydroCHLOROthiazide (MICROZIDE) 12.5 MG capsule TAKE 1 CAPSULE BY MOUTH EVERY MORNING 90 capsule 1     No facility-administered encounter medications on file as of 2/15/2023.        No follow-ups on file.        Reviewed recent labs related to Raz's current problems      Discussed importance of regular Health Maintenance follow up  Health Maintenance   Topic    Annual Wellness Visit (AWV)     Depression Monitoring     DTaP/Tdap/Td vaccine (2 - Td or Tdap)    Flu vaccine     Shingles vaccine     Pneumococcal 65+ years Vaccine     COVID-19 Vaccine     Hepatitis A vaccine     Hib vaccine     Meningococcal (ACWY) vaccine          Can Velazquez DO

## 2023-02-16 ENCOUNTER — TELEPHONE (OUTPATIENT)
Dept: NON INVASIVE DIAGNOSTICS | Age: 88
End: 2023-02-16

## 2023-02-16 NOTE — TELEPHONE ENCOUNTER
----- Message from Layla Babin DO sent at 2/14/2023  4:17 PM EST -----  Regarding: monitor  monitor: grossly normal except for brief/asymptomatic episode of SVT (longest: 9.3 sec at 138 bpm)    -Layla Babin DO

## 2023-03-15 ENCOUNTER — OUTSIDE SERVICES (OUTPATIENT)
Dept: FAMILY MEDICINE CLINIC | Age: 88
End: 2023-03-15

## 2023-03-15 DIAGNOSIS — F32.9 MAJOR DEPRESSIVE DISORDER, REMISSION STATUS UNSPECIFIED, UNSPECIFIED WHETHER RECURRENT: Primary | ICD-10-CM

## 2023-03-15 DIAGNOSIS — Z86.73 H/O: CVA (CEREBROVASCULAR ACCIDENT): ICD-10-CM

## 2023-03-15 DIAGNOSIS — K55.1 SUPERIOR MESENTERIC ARTERY STENOSIS (HCC): ICD-10-CM

## 2023-03-15 DIAGNOSIS — F51.01 PRIMARY INSOMNIA: ICD-10-CM

## 2023-03-15 DIAGNOSIS — I72.2 ANEURYSM OF RIGHT RENAL ARTERY (HCC): ICD-10-CM

## 2023-03-15 DIAGNOSIS — I10 ESSENTIAL HYPERTENSION: Chronic | ICD-10-CM

## 2023-03-15 ASSESSMENT — ENCOUNTER SYMPTOMS
FACIAL SWELLING: 0
EYE PAIN: 0
GASTROINTESTINAL NEGATIVE: 1
ANAL BLEEDING: 0
SINUS PRESSURE: 0
WHEEZING: 0
VOICE CHANGE: 0
VOMITING: 0
CONSTIPATION: 0
EYE REDNESS: 0
TROUBLE SWALLOWING: 0
NAUSEA: 0
APNEA: 0
ABDOMINAL DISTENTION: 0
SINUS PAIN: 0
STRIDOR: 0
COUGH: 0
ALLERGIC/IMMUNOLOGIC NEGATIVE: 1
RESPIRATORY NEGATIVE: 1
EYE DISCHARGE: 0
SORE THROAT: 0
PHOTOPHOBIA: 0
EYE ITCHING: 0
CHEST TIGHTNESS: 0
BLOOD IN STOOL: 0
BACK PAIN: 1
RECTAL PAIN: 0
RHINORRHEA: 0
SHORTNESS OF BREATH: 0
DIARRHEA: 0
ABDOMINAL PAIN: 0
CHOKING: 0

## 2023-03-15 NOTE — PROGRESS NOTES
Emiliana Graff is a 81 y.o. male. Seen: 3/9/2023   HPI/Chief C/O:  He is here post hospital for adult failure to thrive due to depression  No Known Allergies  I am in to assess treatments and progress towards our goals   He is medically stable today  He is very anxious today and is having a lot of issues with extreme anxiety  We are working to adjust medications and help him   We are working through his depression           ROS:  Review of Systems   Constitutional:  Positive for fatigue. Negative for activity change, appetite change, chills, diaphoresis, fever and unexpected weight change. HENT: Negative. Negative for congestion, dental problem, drooling, ear discharge, ear pain, facial swelling, hearing loss, mouth sores, nosebleeds, postnasal drip, rhinorrhea, sinus pressure, sinus pain, sneezing, sore throat, tinnitus, trouble swallowing and voice change. Eyes:  Negative for photophobia, pain, discharge, redness, itching and visual disturbance. Respiratory: Negative. Negative for apnea, cough, choking, chest tightness, shortness of breath, wheezing and stridor. Cardiovascular: Negative. Negative for chest pain, palpitations and leg swelling. Gastrointestinal: Negative. Negative for abdominal distention, abdominal pain, anal bleeding, blood in stool, constipation, diarrhea, nausea, rectal pain and vomiting. Endocrine: Negative. Negative for cold intolerance, heat intolerance, polydipsia, polyphagia and polyuria. Genitourinary: Negative. Negative for decreased urine volume, difficulty urinating, dysuria, enuresis, flank pain, frequency, genital sores, hematuria, penile discharge, penile pain, penile swelling, scrotal swelling, testicular pain and urgency. Musculoskeletal:  Positive for arthralgias, back pain, gait problem and myalgias. Negative for joint swelling, neck pain and neck stiffness. Skin:  Negative for wound. Allergic/Immunologic: Negative.   Negative for environmental allergies, food allergies and immunocompromised state. Neurological:  Positive for weakness. Negative for dizziness, tremors, seizures, syncope, facial asymmetry, speech difficulty, light-headedness, numbness and headaches. Hematological: Negative. Negative for adenopathy. Does not bruise/bleed easily. Psychiatric/Behavioral:  Positive for decreased concentration, dysphoric mood and sleep disturbance. Negative for agitation, behavioral problems, confusion, hallucinations, self-injury and suicidal ideas. The patient is nervous/anxious. The patient is not hyperactive. Past Medical/Surgical Hx;  Reviewed with patient      Diagnosis Date    Aneurysm of right renal artery ([facility] [address]     follows with Dr. Анна Calderon yearly (last visit 9/19)    Colitis     Depression     GERD (gastroesophageal reflux disease)     H/O: CVA (cerebrovascular accident) 10/14/2021    Hyperlipidemia     Hypertension     Stroke [facility])     Superior mesenteric artery stenosis ([facility] [address] 11/4/2020    TIA (transient ischemic attack)      Past Surgical History:   Procedure Laterality Date    ANTERIOR CRUCIATE LIGAMENT REPAIR Left 11/21/2001    APPENDECTOMY      CHOLECYSTECTOMY  2007    Lap    ECHO COMPL W DOP COLOR FLOW  12/4/2012         HERNIA REPAIR Right 11/13/2002    double    SINUS SURGERY  2002,2003     done x 2    TONSILLECTOMY      TOTAL KNEE ARTHROPLASTY Left 1/28/2019    LEFT  ROBOTIC KNEE TOTAL ARTHROPLASTY  ++MEREDITH- [id number]++   ++ADDUCTOR BLOCK++ performed by Justin Salazar MD at [address] [place] [address] N/A 10/10/2019    EGD BIOPSY performed by Bridget Parikh MD at [facility] ENDOSCOPY       Past Family Hx:  Reviewed with patient  No family history on file. Social Hx:  Reviewed with patient  Social History     Tobacco Use    Smoking status: Never    Smokeless tobacco: Never   Substance Use Topics    Alcohol use: No       OBJECTIVE  There were no vitals taken for this visit.     Problem List:  Joe Minium does not have any pertinent problems on file. PHYS EX:  Physical Exam  Vitals and nursing note reviewed. Constitutional:       General: He is not in acute distress. Appearance: Normal appearance. He is well-developed. He is not ill-appearing, toxic-appearing or diaphoretic. HENT:      Head: Normocephalic and atraumatic. Right Ear: External ear normal. There is no impacted cerumen. Left Ear: External ear normal. There is no impacted cerumen. Nose: Nose normal. No congestion or rhinorrhea. Mouth/Throat:      Mouth: Mucous membranes are moist.      Pharynx: Oropharynx is clear. No oropharyngeal exudate or posterior oropharyngeal erythema. Eyes:      General: No scleral icterus. Right eye: No discharge. Left eye: No discharge. Extraocular Movements: Extraocular movements intact. Conjunctiva/sclera: Conjunctivae normal.      Pupils: Pupils are equal, round, and reactive to light. Neck:      Thyroid: No thyromegaly. Vascular: No carotid bruit. Trachea: No tracheal deviation. Cardiovascular:      Rate and Rhythm: Normal rate and regular rhythm. Pulses: Normal pulses. Heart sounds: Normal heart sounds. No murmur heard. No friction rub. No gallop. Pulmonary:      Effort: Pulmonary effort is normal. No respiratory distress. Breath sounds: Normal breath sounds. No stridor. No wheezing, rhonchi or rales. Chest:      Chest wall: No tenderness. Abdominal:      General: Bowel sounds are normal. There is no distension. Palpations: Abdomen is soft. There is no mass. Tenderness: There is no abdominal tenderness. There is no right CVA tenderness, left CVA tenderness, guarding or rebound. Hernia: No hernia is present. Genitourinary:     Penis: No tenderness. Musculoskeletal:         General: Tenderness (multiple joint pains) present. No swelling, deformity or signs of injury. Normal range of motion.       Cervical back: Normal range of motion and neck supple. No rigidity. No muscular tenderness. Right lower leg: No edema. Left lower leg: No edema. Lymphadenopathy:      Cervical: No cervical adenopathy. Neurological:      General: No focal deficit present. Mental Status: He is alert. Cranial Nerves: No cranial nerve deficit. Sensory: Sensory deficit present. Motor: Weakness present. No abnormal muscle tone. Coordination: Coordination abnormal.      Gait: Gait abnormal.      Deep Tendon Reflexes: Reflexes abnormal.       ASSESSMENT/PLAN  Diagnoses and all orders for this visit:    Major depressive disorder, remission status unspecified, unspecified whether recurrent  Long talk on treatment and prevention  Literature is given   Counseling is given for anxiety and depression   All questions were taken and answers are given    --severe anxiety. We have added Klonopin       Primary insomnia  Long talk on treatment and prevention  Literature is given       Status post left knee replacement  --stable on current care planning  -- continue treatment as we are meeting goals       Severe protein-calorie malnutrition ([facility] [address]  --stable on current care planning  -- continue treatment as we are meeting goals       Generalized weakness  --stable on current care planning  -- continue treatment as we are meeting goals       Aneurysm of right renal artery ([facility] [address]  --stable on current care planning  -- continue treatment as we are meeting goals       Primary osteoarthritis of left knee     Medication List            Accurate as of March 15, 2023  8:31 AM. If you have any questions, ask your nurse or doctor.                 CONTINUE taking these medications      acetaminophen 325 MG tablet  Commonly known as: TYLENOL     aspirin EC 81 MG EC tablet  Take 1 tablet by mouth daily     bisacodyl 10 MG suppository  Commonly known as: DULCOLAX     calcium carbonate 500 MG chewable tablet  Commonly known as: TUMS     docusate sodium 100 MG capsule  Commonly known as: COLACE  Take 1 capsule by mouth daily     loperamide 2 MG tablet  Commonly known as: IMODIUM A-D  Take 1 tablet by mouth every 4 hours as needed for Diarrhea     losartan 100 MG tablet  Commonly known as: COZAAR     magnesium hydroxide 400 MG/5ML suspension  Commonly known as: MILK OF MAGNESIA     ondansetron 4 MG disintegrating tablet  Commonly known as: ZOFRAN-ODT  Take 1 tablet by mouth every 8 hours as needed for Nausea or Vomiting     ondansetron 4 MG tablet  Commonly known as: ZOFRAN     pantoprazole 40 MG tablet  Commonly known as: PROTONIX  Take 1 tablet by mouth daily     polyethylene glycol 17 g packet  Commonly known as: GLYCOLAX     potassium chloride 20 MEQ extended release tablet  Commonly known as: KLOR-CON M     prochlorperazine 10 MG tablet  Commonly known as: COMPAZINE  Take 1 tablet by mouth every 8 hours as needed (Nausea)     QUEtiapine 25 MG tablet  Commonly known as: SEROQUEL  Take 1 tablet by mouth nightly     senna 8.6 MG tablet  Commonly known as: SENOKOT     sodium phosphate 7-19 GM/118ML          '    Chronic gastritis without bleeding, unspecified gastritis type  --stable on current care planning  -- continue treatment as we are meeting goals       Decreased appetite  Long talk on treatment and prevention  Literature is given       Anxiety  Counseling is given for anxiety and depression   All questions were taken and answers are given        Superior mesenteric artery stenosis (HCC)  --stable on current care planning  -- continue treatment as we are meeting goals       H/O: CVA (cerebrovascular accident)  --stable on current care planning  -- continue treatment as we are meeting goals       Essential hypertension    ---VASCULAR PANEL  A) ASA, plavix, aggrenox  B) coumadin, pletal, tzd, statin  C) ARB, hctz, folic, CCB  D) cannikinumab, fish oils     ---CARDIAC---ASA, ARB, beta, statin, hctz, ( CCB )     Myalgia  Long talk on treatment and prevention  Literature is given         Outpatient Encounter Medications as of 3/15/2023   Medication Sig Dispense Refill    acetaminophen (TYLENOL) 325 MG tablet Take 650 mg by mouth every 6 hours as needed for Pain or Fever      bisacodyl (DULCOLAX) 10 MG suppository Place 10 mg rectally daily as needed for Constipation      sodium phosphate (FLEET) 7-19 GM/118ML Place 1 enema rectally daily as needed      losartan (COZAAR) 100 MG tablet Take 100 mg by mouth daily      magnesium hydroxide (MILK OF MAGNESIA) 400 MG/5ML suspension Take 30 mLs by mouth daily as needed for Constipation      polyethylene glycol (GLYCOLAX) 17 g packet Take 17 g by mouth at bedtime      potassium chloride (KLOR-CON M) 20 MEQ extended release tablet Take 20 mEq by mouth daily      senna (SENOKOT) 8.6 MG tablet Take 1 tablet by mouth daily as needed for Constipation      calcium carbonate (TUMS) 500 MG chewable tablet Take 2 tablets by mouth 3 times daily as needed for Heartburn      ondansetron (ZOFRAN) 4 MG tablet Take 4 mg by mouth every 4 hours as needed for Nausea or Vomiting      QUEtiapine (SEROQUEL) 25 MG tablet Take 1 tablet by mouth nightly 60 tablet 3    loperamide (IMODIUM A-D) 2 MG tablet Take 1 tablet by mouth every 4 hours as needed for Diarrhea 36 tablet 0    docusate sodium (COLACE) 100 MG capsule Take 1 capsule by mouth daily 30 capsule 5    prochlorperazine (COMPAZINE) 10 MG tablet Take 1 tablet by mouth every 8 hours as needed (Nausea) 120 tablet 5    pantoprazole (PROTONIX) 40 MG tablet Take 1 tablet by mouth daily 30 tablet 5    aspirin EC 81 MG EC tablet Take 1 tablet by mouth daily 30 tablet 5    ondansetron (ZOFRAN-ODT) 4 MG disintegrating tablet Take 1 tablet by mouth every 8 hours as needed for Nausea or Vomiting 60 tablet 0    [DISCONTINUED] FLUoxetine (PROZAC) 20 MG capsule Take 1 capsule by mouth in the morning.  30 capsule 5    [DISCONTINUED] hydroCHLOROthiazide (MICROZIDE) 12.5 MG capsule TAKE 1 CAPSULE BY MOUTH EVERY MORNING 90 capsule 1     No facility-administered encounter medications on file as of 3/15/2023. No follow-ups on file.         Reviewed recent labs related to Raz's current problems      Discussed importance of regular Health Maintenance follow up  Health Maintenance   Topic    Annual Wellness Visit (AWV)     Depression Monitoring     DTaP/Tdap/Td vaccine (2 - Td or Tdap)    Flu vaccine     Shingles vaccine     Pneumococcal 65+ years Vaccine     COVID-19 Vaccine     Hepatitis A vaccine     Hib vaccine     Meningococcal (ACWY) vaccine          Can Velazquez DO

## 2023-03-24 LAB — C DIFF TOXIN/ANTIGEN: NORMAL

## 2023-03-25 LAB — HEMOCCULT SP1 STL QL: NORMAL

## 2023-03-26 LAB — BACTERIA STL CULT: NORMAL

## 2023-03-27 LAB
ANION GAP SERPL CALCULATED.3IONS-SCNC: 5 MMOL/L (ref 7–16)
BACTERIA STL CULT: NORMAL
BASOPHILS # BLD: 0.02 E9/L (ref 0–0.2)
BASOPHILS NFR BLD: 0.4 % (ref 0–2)
BUN SERPL-MCNC: 17 MG/DL (ref 6–23)
CALCIUM SERPL-MCNC: 8.5 MG/DL (ref 8.6–10.2)
CHLORIDE SERPL-SCNC: 101 MMOL/L (ref 98–107)
CO2 SERPL-SCNC: 28 MMOL/L (ref 22–29)
CREAT SERPL-MCNC: 0.7 MG/DL (ref 0.7–1.2)
EOSINOPHIL # BLD: 0.28 E9/L (ref 0.05–0.5)
EOSINOPHIL NFR BLD: 5 % (ref 0–6)
ERYTHROCYTE [DISTWIDTH] IN BLOOD BY AUTOMATED COUNT: 14.6 FL (ref 11.5–15)
GLUCOSE SERPL-MCNC: 105 MG/DL (ref 74–99)
HBA1C MFR BLD: 4.8 % (ref 4–5.6)
HCT VFR BLD AUTO: 32.5 % (ref 37–54)
HGB BLD-MCNC: 10.8 G/DL (ref 12.5–16.5)
IMM GRANULOCYTES # BLD: 0.02 E9/L
IMM GRANULOCYTES NFR BLD: 0.4 % (ref 0–5)
LYMPHOCYTES # BLD: 1.02 E9/L (ref 1.5–4)
LYMPHOCYTES NFR BLD: 18.1 % (ref 20–42)
MCH RBC QN AUTO: 30.4 PG (ref 26–35)
MCHC RBC AUTO-ENTMCNC: 33.2 % (ref 32–34.5)
MCV RBC AUTO: 91.5 FL (ref 80–99.9)
MONOCYTES # BLD: 0.6 E9/L (ref 0.1–0.95)
MONOCYTES NFR BLD: 10.7 % (ref 2–12)
NEUTROPHILS # BLD: 3.68 E9/L (ref 1.8–7.3)
NEUTS SEG NFR BLD: 65.4 % (ref 43–80)
PLATELET # BLD AUTO: 253 E9/L (ref 130–450)
PMV BLD AUTO: 10.2 FL (ref 7–12)
POTASSIUM SERPL-SCNC: 3.8 MMOL/L (ref 3.5–5)
RBC # BLD AUTO: 3.55 E12/L (ref 3.8–5.8)
SODIUM SERPL-SCNC: 134 MMOL/L (ref 132–146)
T4 SERPL-MCNC: 4.8 MCG/DL (ref 4.5–11.7)
TSH SERPL-MCNC: 1.7 UIU/ML (ref 0.27–4.2)
WBC # BLD: 5.6 E9/L (ref 4.5–11.5)

## 2023-04-06 ENCOUNTER — CLINICAL DOCUMENTATION (OUTPATIENT)
Dept: FAMILY MEDICINE CLINIC | Age: 88
End: 2023-04-06

## 2023-04-06 DIAGNOSIS — F41.9 ANXIETY: Primary | ICD-10-CM

## 2023-04-06 ASSESSMENT — ENCOUNTER SYMPTOMS
PHOTOPHOBIA: 0
NAUSEA: 0
ANAL BLEEDING: 0
TROUBLE SWALLOWING: 0
EYE DISCHARGE: 0
VOMITING: 0
BACK PAIN: 1
EYE PAIN: 0
GASTROINTESTINAL NEGATIVE: 1
RESPIRATORY NEGATIVE: 1
SINUS PAIN: 0
COUGH: 0
VOICE CHANGE: 0
RECTAL PAIN: 0
ABDOMINAL DISTENTION: 0
STRIDOR: 0
ABDOMINAL PAIN: 0
WHEEZING: 0
ALLERGIC/IMMUNOLOGIC NEGATIVE: 1
CONSTIPATION: 0
SHORTNESS OF BREATH: 0
CHOKING: 0
DIARRHEA: 0
EYE ITCHING: 0
SINUS PRESSURE: 0
CHEST TIGHTNESS: 0
FACIAL SWELLING: 0
EYE REDNESS: 0
SORE THROAT: 0
APNEA: 0
BLOOD IN STOOL: 0
RHINORRHEA: 0

## 2023-05-16 ENCOUNTER — CLINICAL DOCUMENTATION (OUTPATIENT)
Dept: FAMILY MEDICINE CLINIC | Age: 88
End: 2023-05-16

## 2023-05-16 ASSESSMENT — ENCOUNTER SYMPTOMS
EYE REDNESS: 0
PHOTOPHOBIA: 0
SHORTNESS OF BREATH: 0
BACK PAIN: 1
RESPIRATORY NEGATIVE: 1
ABDOMINAL DISTENTION: 0
ABDOMINAL PAIN: 0
ALLERGIC/IMMUNOLOGIC NEGATIVE: 1
TROUBLE SWALLOWING: 0
SORE THROAT: 0
GASTROINTESTINAL NEGATIVE: 1
SINUS PAIN: 0
COUGH: 0
VOICE CHANGE: 0
EYE PAIN: 0
RHINORRHEA: 0
VOMITING: 0
CHOKING: 0
EYE DISCHARGE: 0
APNEA: 0
RECTAL PAIN: 0
NAUSEA: 0
WHEEZING: 0
BLOOD IN STOOL: 0
CHEST TIGHTNESS: 0
ANAL BLEEDING: 0
STRIDOR: 0
EYE ITCHING: 0
CONSTIPATION: 0
DIARRHEA: 0
SINUS PRESSURE: 0
FACIAL SWELLING: 0

## 2023-05-16 NOTE — PROGRESS NOTES
Lynn Freeman is a 81 y.o. male. Seen: 5/16/2023   HPI/Chief C/O:  He is here post hospital for adult failure to thrive due to depression  No Known Allergies    Patient seen today after R posterior forearm abrasion. States that he doesn't remember any trauma to area. Denies fall or pain to area. Had dressing to area and wrapped this morning. Pleasant encounter. ROS:  Review of Systems   Constitutional:  Positive for fatigue. Negative for activity change, appetite change, chills, diaphoresis, fever and unexpected weight change. HENT: Negative. Negative for congestion, dental problem, drooling, ear discharge, ear pain, facial swelling, hearing loss, mouth sores, nosebleeds, postnasal drip, rhinorrhea, sinus pressure, sinus pain, sneezing, sore throat, tinnitus, trouble swallowing and voice change. Eyes:  Negative for photophobia, pain, discharge, redness, itching and visual disturbance. Respiratory: Negative. Negative for apnea, cough, choking, chest tightness, shortness of breath, wheezing and stridor. Cardiovascular: Negative. Negative for chest pain, palpitations and leg swelling. Gastrointestinal: Negative. Negative for abdominal distention, abdominal pain, anal bleeding, blood in stool, constipation, diarrhea, nausea, rectal pain and vomiting. Endocrine: Negative. Negative for cold intolerance, heat intolerance, polydipsia, polyphagia and polyuria. Genitourinary: Negative. Negative for decreased urine volume, difficulty urinating, dysuria, enuresis, flank pain, frequency, genital sores, hematuria, penile discharge, penile pain, penile swelling, scrotal swelling, testicular pain and urgency. Musculoskeletal:  Positive for arthralgias, back pain, gait problem and myalgias. Negative for joint swelling, neck pain and neck stiffness. Skin:  Positive for wound. Allergic/Immunologic: Negative. Negative for environmental allergies, food allergies and immunocompromised state.

## 2023-05-20 ENCOUNTER — HOSPITAL ENCOUNTER (EMERGENCY)
Age: 88
Discharge: HOME | End: 2023-05-21
Attending: EMERGENCY MEDICINE
Payer: MEDICARE

## 2023-05-20 ENCOUNTER — APPOINTMENT (OUTPATIENT)
Dept: CT IMAGING | Age: 88
End: 2023-05-20
Payer: MEDICARE

## 2023-05-20 VITALS
BODY MASS INDEX: 25.5 KG/M2 | TEMPERATURE: 98.4 F | WEIGHT: 188 LBS | OXYGEN SATURATION: 96 % | SYSTOLIC BLOOD PRESSURE: 174 MMHG | RESPIRATION RATE: 26 BRPM | HEART RATE: 72 BPM | DIASTOLIC BLOOD PRESSURE: 71 MMHG

## 2023-05-20 DIAGNOSIS — R51.9 NONINTRACTABLE HEADACHE, UNSPECIFIED CHRONICITY PATTERN, UNSPECIFIED HEADACHE TYPE: Primary | ICD-10-CM

## 2023-05-20 LAB
ANION GAP SERPL CALCULATED.3IONS-SCNC: 8 MMOL/L (ref 7–16)
BASOPHILS # BLD: 0.03 E9/L (ref 0–0.2)
BASOPHILS NFR BLD: 0.4 % (ref 0–2)
BUN SERPL-MCNC: 17 MG/DL (ref 6–23)
CALCIUM SERPL-MCNC: 8.5 MG/DL (ref 8.6–10.2)
CHLORIDE SERPL-SCNC: 105 MMOL/L (ref 98–107)
CO2 SERPL-SCNC: 24 MMOL/L (ref 22–29)
CREAT SERPL-MCNC: 0.9 MG/DL (ref 0.7–1.2)
EOSINOPHIL # BLD: 0.26 E9/L (ref 0.05–0.5)
EOSINOPHIL NFR BLD: 3.7 % (ref 0–6)
ERYTHROCYTE [DISTWIDTH] IN BLOOD BY AUTOMATED COUNT: 13.8 FL (ref 11.5–15)
GLUCOSE SERPL-MCNC: 108 MG/DL (ref 74–99)
HCT VFR BLD AUTO: 35.9 % (ref 37–54)
HGB BLD-MCNC: 11.5 G/DL (ref 12.5–16.5)
IMM GRANULOCYTES # BLD: 0.02 E9/L
IMM GRANULOCYTES NFR BLD: 0.3 % (ref 0–5)
LYMPHOCYTES # BLD: 0.83 E9/L (ref 1.5–4)
LYMPHOCYTES NFR BLD: 12 % (ref 20–42)
MCH RBC QN AUTO: 30.7 PG (ref 26–35)
MCHC RBC AUTO-ENTMCNC: 32 % (ref 32–34.5)
MCV RBC AUTO: 95.7 FL (ref 80–99.9)
MONOCYTES # BLD: 0.52 E9/L (ref 0.1–0.95)
MONOCYTES NFR BLD: 7.5 % (ref 2–12)
NEUTROPHILS # BLD: 5.28 E9/L (ref 1.8–7.3)
NEUTS SEG NFR BLD: 76.1 % (ref 43–80)
PLATELET # BLD AUTO: 249 E9/L (ref 130–450)
PMV BLD AUTO: 10.4 FL (ref 7–12)
POTASSIUM SERPL-SCNC: 5.8 MMOL/L (ref 3.5–5)
RBC # BLD AUTO: 3.75 E12/L (ref 3.8–5.8)
SODIUM SERPL-SCNC: 137 MMOL/L (ref 132–146)
WBC # BLD: 6.9 E9/L (ref 4.5–11.5)

## 2023-05-20 PROCEDURE — 36415 COLL VENOUS BLD VENIPUNCTURE: CPT

## 2023-05-20 PROCEDURE — 85025 COMPLETE CBC W/AUTO DIFF WBC: CPT

## 2023-05-20 PROCEDURE — 96375 TX/PRO/DX INJ NEW DRUG ADDON: CPT

## 2023-05-20 PROCEDURE — 6370000000 HC RX 637 (ALT 250 FOR IP): Performed by: EMERGENCY MEDICINE

## 2023-05-20 PROCEDURE — 70450 CT HEAD/BRAIN W/O DYE: CPT

## 2023-05-20 PROCEDURE — 6360000002 HC RX W HCPCS: Performed by: EMERGENCY MEDICINE

## 2023-05-20 PROCEDURE — 96374 THER/PROPH/DIAG INJ IV PUSH: CPT

## 2023-05-20 PROCEDURE — 99284 EMERGENCY DEPT VISIT MOD MDM: CPT

## 2023-05-20 PROCEDURE — 80048 BASIC METABOLIC PNL TOTAL CA: CPT

## 2023-05-20 RX ORDER — HYDRALAZINE HYDROCHLORIDE 20 MG/ML
10 INJECTION INTRAMUSCULAR; INTRAVENOUS ONCE
Status: COMPLETED | OUTPATIENT
Start: 2023-05-20 | End: 2023-05-20

## 2023-05-20 RX ORDER — ACETAMINOPHEN 325 MG/1
650 TABLET ORAL ONCE
Status: COMPLETED | OUTPATIENT
Start: 2023-05-20 | End: 2023-05-20

## 2023-05-20 RX ORDER — KETOROLAC TROMETHAMINE 30 MG/ML
15 INJECTION, SOLUTION INTRAMUSCULAR; INTRAVENOUS ONCE
Status: COMPLETED | OUTPATIENT
Start: 2023-05-20 | End: 2023-05-20

## 2023-05-20 RX ADMIN — ACETAMINOPHEN 650 MG: 325 TABLET ORAL at 19:35

## 2023-05-20 RX ADMIN — HYDRALAZINE HYDROCHLORIDE 10 MG: 20 INJECTION INTRAMUSCULAR; INTRAVENOUS at 17:51

## 2023-05-20 RX ADMIN — KETOROLAC TROMETHAMINE 15 MG: 30 INJECTION, SOLUTION INTRAMUSCULAR; INTRAVENOUS at 17:49

## 2023-05-20 ASSESSMENT — PAIN DESCRIPTION - FREQUENCY
FREQUENCY: CONTINUOUS
FREQUENCY: CONTINUOUS

## 2023-05-20 ASSESSMENT — PAIN - FUNCTIONAL ASSESSMENT: PAIN_FUNCTIONAL_ASSESSMENT: 0-10

## 2023-05-20 ASSESSMENT — PAIN DESCRIPTION - PAIN TYPE
TYPE: ACUTE PAIN
TYPE: ACUTE PAIN

## 2023-05-20 ASSESSMENT — PAIN DESCRIPTION - LOCATION
LOCATION: HEAD
LOCATION: ABDOMEN

## 2023-05-20 ASSESSMENT — PAIN DESCRIPTION - DESCRIPTORS
DESCRIPTORS: ACHING
DESCRIPTORS: ACHING

## 2023-05-20 ASSESSMENT — PAIN SCALES - GENERAL
PAINLEVEL_OUTOF10: 10
PAINLEVEL_OUTOF10: 6

## 2023-05-20 ASSESSMENT — PAIN DESCRIPTION - ONSET: ONSET: ON-GOING

## 2023-05-20 ASSESSMENT — PAIN DESCRIPTION - ORIENTATION
ORIENTATION: RIGHT
ORIENTATION: ANTERIOR

## 2023-05-21 PROCEDURE — 6370000000 HC RX 637 (ALT 250 FOR IP): Performed by: STUDENT IN AN ORGANIZED HEALTH CARE EDUCATION/TRAINING PROGRAM

## 2023-05-21 RX ORDER — ACETAMINOPHEN 325 MG/1
650 TABLET ORAL ONCE
Status: COMPLETED | OUTPATIENT
Start: 2023-05-21 | End: 2023-05-21

## 2023-05-21 RX ADMIN — ACETAMINOPHEN 650 MG: 325 TABLET ORAL at 02:03

## 2023-05-21 ASSESSMENT — PAIN DESCRIPTION - LOCATION: LOCATION: HEAD

## 2023-05-21 ASSESSMENT — PAIN SCALES - GENERAL: PAINLEVEL_OUTOF10: 6

## 2023-05-21 ASSESSMENT — PAIN DESCRIPTION - DESCRIPTORS: DESCRIPTORS: ACHING

## 2023-05-23 ENCOUNTER — OUTSIDE SERVICES (OUTPATIENT)
Dept: FAMILY MEDICINE CLINIC | Age: 88
End: 2023-05-23
Payer: MEDICARE

## 2023-05-23 DIAGNOSIS — F32.9 MAJOR DEPRESSIVE DISORDER, REMISSION STATUS UNSPECIFIED, UNSPECIFIED WHETHER RECURRENT: Primary | ICD-10-CM

## 2023-05-23 DIAGNOSIS — K55.1 SUPERIOR MESENTERIC ARTERY STENOSIS (HCC): ICD-10-CM

## 2023-05-23 DIAGNOSIS — I72.2 ANEURYSM OF RIGHT RENAL ARTERY (HCC): ICD-10-CM

## 2023-05-23 DIAGNOSIS — I10 ESSENTIAL HYPERTENSION: Chronic | ICD-10-CM

## 2023-05-23 LAB
ANION GAP SERPL CALCULATED.3IONS-SCNC: 12 MMOL/L (ref 7–16)
BUN SERPL-MCNC: 15 MG/DL (ref 6–23)
CALCIUM SERPL-MCNC: 8.7 MG/DL (ref 8.6–10.2)
CHLORIDE SERPL-SCNC: 104 MMOL/L (ref 98–107)
CO2 SERPL-SCNC: 24 MMOL/L (ref 22–29)
CREAT SERPL-MCNC: 0.8 MG/DL (ref 0.7–1.2)
GLUCOSE SERPL-MCNC: 89 MG/DL (ref 74–99)
POTASSIUM SERPL-SCNC: 3.4 MMOL/L (ref 3.5–5)
SODIUM SERPL-SCNC: 140 MMOL/L (ref 132–146)

## 2023-05-23 PROCEDURE — 99309 SBSQ NF CARE MODERATE MDM 30: CPT | Performed by: FAMILY MEDICINE

## 2023-05-23 ASSESSMENT — ENCOUNTER SYMPTOMS
WHEEZING: 0
BACK PAIN: 1
SINUS PAIN: 0
EYE DISCHARGE: 0
SORE THROAT: 0
NAUSEA: 0
VOMITING: 0
STRIDOR: 0
APNEA: 0
EYE PAIN: 0
RHINORRHEA: 0
ABDOMINAL PAIN: 0
CHEST TIGHTNESS: 0
SHORTNESS OF BREATH: 0
COUGH: 0
BLOOD IN STOOL: 0
FACIAL SWELLING: 0
GASTROINTESTINAL NEGATIVE: 1
RECTAL PAIN: 0
PHOTOPHOBIA: 0
ALLERGIC/IMMUNOLOGIC NEGATIVE: 1
CONSTIPATION: 0
CHOKING: 0
ANAL BLEEDING: 0
VOICE CHANGE: 0
ABDOMINAL DISTENTION: 0
TROUBLE SWALLOWING: 0
EYE REDNESS: 0
SINUS PRESSURE: 0
EYE ITCHING: 0
RESPIRATORY NEGATIVE: 1
DIARRHEA: 0

## 2023-05-23 NOTE — PROGRESS NOTES
allergies, food allergies and immunocompromised state. Neurological:  Positive for weakness. Negative for dizziness, tremors, seizures, syncope, facial asymmetry, speech difficulty, light-headedness, numbness and headaches. Hematological: Negative. Negative for adenopathy. Does not bruise/bleed easily. Psychiatric/Behavioral:  Positive for decreased concentration, dysphoric mood and sleep disturbance. Negative for agitation, behavioral problems, confusion, hallucinations, self-injury and suicidal ideas. The patient is nervous/anxious. The patient is not hyperactive. Past Medical/Surgical Hx;  Reviewed with patient      Diagnosis Date    Aneurysm of right renal artery ([facility] [address]     follows with Dr. Catherine Rodriguez yearly (last visit 9/19)    Colitis     Depression     GERD (gastroesophageal reflux disease)     H/O: CVA (cerebrovascular accident) 10/14/2021    Hyperlipidemia     Hypertension     Stroke [facility])     Superior mesenteric artery stenosis ([facility] [address] 11/4/2020    TIA (transient ischemic attack)      Past Surgical History:   Procedure Laterality Date    ANTERIOR CRUCIATE LIGAMENT REPAIR Left 11/21/2001    APPENDECTOMY      CHOLECYSTECTOMY  2007    Lap    ECHO COMPL W DOP COLOR FLOW  12/4/2012         HERNIA REPAIR Right 11/13/2002    double    SINUS SURGERY  2002,2003     done x 2    TONSILLECTOMY      TOTAL KNEE ARTHROPLASTY Left 1/28/2019    LEFT  ROBOTIC KNEE TOTAL ARTHROPLASTY  ++MEREDITH- [id number]++   ++ADDUCTOR BLOCK++ performed by Jessica Badillo MD at [address] [place] [address] Rd N/A 10/10/2019    EGD BIOPSY performed by Ismael Hedrick MD at [facility] ENDOSCOPY       Past Family Hx:  Reviewed with patient  No family history on file. Social Hx:  Reviewed with patient  Social History     Tobacco Use    Smoking status: Never    Smokeless tobacco: Never   Substance Use Topics    Alcohol use: No       OBJECTIVE  There were no vitals taken for this visit.     Problem List:  Soraya Hsu does not have any

## 2023-06-14 NOTE — CARE COORDINATION
COVID - 8/25. Met w/ patient. Explained role of  and plan of care. Lives alone in a 1 story house- 2 steps to entrance. Independent PTA. Has cane, walker, shower chair but does not use. [facility]. No [facility]. PCP is Dr. Liang Bowling and pharmacy is [facility]. Monitoring labs. PT/OT evals pending. Per pt, plan is to return home on discharge-states his son or daughter will provide transportation. Anticipating no needs.  Will follow Sadaf Hurd RN case manager [phone]

## 2023-06-28 LAB
ANION GAP SERPL CALCULATED.3IONS-SCNC: 13 MMOL/L (ref 7–16)
BASOPHILS # BLD: 0.03 E9/L (ref 0–0.2)
BASOPHILS NFR BLD: 0.4 % (ref 0–2)
BUN SERPL-MCNC: 13 MG/DL (ref 6–23)
CALCIUM SERPL-MCNC: 8.7 MG/DL (ref 8.6–10.2)
CHLORIDE SERPL-SCNC: 105 MMOL/L (ref 98–107)
CO2 SERPL-SCNC: 22 MMOL/L (ref 22–29)
CREAT SERPL-MCNC: 0.7 MG/DL (ref 0.7–1.2)
EOSINOPHIL # BLD: 0.29 E9/L (ref 0.05–0.5)
EOSINOPHIL NFR BLD: 3.5 % (ref 0–6)
ERYTHROCYTE [DISTWIDTH] IN BLOOD BY AUTOMATED COUNT: 13.1 FL (ref 11.5–15)
GLUCOSE SERPL-MCNC: 98 MG/DL (ref 74–99)
HCT VFR BLD AUTO: 37.3 % (ref 37–54)
HGB BLD-MCNC: 11.5 G/DL (ref 12.5–16.5)
IMM GRANULOCYTES # BLD: 0.15 E9/L
IMM GRANULOCYTES NFR BLD: 1.8 % (ref 0–5)
LYMPHOCYTES # BLD: 1.35 E9/L (ref 1.5–4)
LYMPHOCYTES NFR BLD: 16.2 % (ref 20–42)
MCH RBC QN AUTO: 29.9 PG (ref 26–35)
MCHC RBC AUTO-ENTMCNC: 30.8 % (ref 32–34.5)
MCV RBC AUTO: 96.9 FL (ref 80–99.9)
MONOCYTES # BLD: 0.87 E9/L (ref 0.1–0.95)
MONOCYTES NFR BLD: 10.4 % (ref 2–12)
NEUTROPHILS # BLD: 5.64 E9/L (ref 1.8–7.3)
NEUTS SEG NFR BLD: 67.7 % (ref 43–80)
PLATELET # BLD AUTO: 429 E9/L (ref 130–450)
PMV BLD AUTO: 10 FL (ref 7–12)
POTASSIUM SERPL-SCNC: 3.6 MMOL/L (ref 3.5–5)
RBC # BLD AUTO: 3.85 E12/L (ref 3.8–5.8)
SODIUM SERPL-SCNC: 140 MMOL/L (ref 132–146)
WBC # BLD: 8.3 E9/L (ref 4.5–11.5)

## 2023-07-20 ENCOUNTER — CLINICAL DOCUMENTATION (OUTPATIENT)
Dept: FAMILY MEDICINE CLINIC | Age: 88
End: 2023-07-20

## 2023-07-20 ASSESSMENT — ENCOUNTER SYMPTOMS
BLOOD IN STOOL: 0
STRIDOR: 0
CHEST TIGHTNESS: 0
GASTROINTESTINAL NEGATIVE: 1
EYE DISCHARGE: 0
SINUS PRESSURE: 0
EYE REDNESS: 0
BACK PAIN: 1
SORE THROAT: 0
PHOTOPHOBIA: 0
VOICE CHANGE: 0
ABDOMINAL DISTENTION: 0
DIARRHEA: 0
EYE ITCHING: 0
VOMITING: 0
CONSTIPATION: 0
SINUS PAIN: 0
ALLERGIC/IMMUNOLOGIC NEGATIVE: 1
COUGH: 0
ANAL BLEEDING: 0
SHORTNESS OF BREATH: 0
RESPIRATORY NEGATIVE: 1
RECTAL PAIN: 0
NAUSEA: 0
CHOKING: 0
APNEA: 0
FACIAL SWELLING: 0
TROUBLE SWALLOWING: 0
ABDOMINAL PAIN: 0
RHINORRHEA: 0
WHEEZING: 0
EYE PAIN: 0

## 2023-07-20 NOTE — PROGRESS NOTES
CVA tenderness, guarding or rebound. Hernia: No hernia is present. Genitourinary:     Penis: No tenderness. Musculoskeletal:         General: Tenderness (multiple joint pains) present. No swelling, deformity or signs of injury. Normal range of motion. Cervical back: Normal range of motion and neck supple. No rigidity. No muscular tenderness. Right lower leg: No edema. Left lower leg: No edema. Lymphadenopathy:      Cervical: No cervical adenopathy. Neurological:      General: No focal deficit present. Mental Status: He is alert. Cranial Nerves: No cranial nerve deficit. Sensory: Sensory deficit present. Motor: Weakness present. No abnormal muscle tone. Coordination: Coordination abnormal.      Gait: Gait abnormal.      Deep Tendon Reflexes: Reflexes abnormal.       ASSESSMENT/PLAN  Diagnoses and all orders for this visit:    Major depressive disorder/Anxiety  Previously had long talk on treatment and prevention. Literature is given   Counseling is given for anxiety and depression. All questions were taken and answers are given.      Orthostatic hypotension  Admitted on 1/2/2023: Flomax and amlodipine were discontinued    Primary insomnia  Long talk on treatment and prevention  Literature is given     Severe protein-calorie malnutrition (HCC)  --stable on current care planning  -- continue treatment as we are meeting goals     Generalized weakness  --stable on current care planning  -- continue treatment as we are meeting goals     Aneurysm of right renal artery ([address])  --stable on current care planning  -- continue treatment as we are meeting goals     Primary osteoarthritis of left knee S/P Left knee replacement  --stable on current care planning  -- continue treatment as we are meeting goals     Chronic gastritis without bleeding, unspecified gastritis type  --stable on current care planning  -- continue treatment as we are meeting goals     Superior mesenteric

## 2023-07-24 ENCOUNTER — OUTSIDE SERVICES (OUTPATIENT)
Dept: FAMILY MEDICINE CLINIC | Age: 88
End: 2023-07-24
Payer: MEDICARE

## 2023-07-24 DIAGNOSIS — F32.9 MAJOR DEPRESSIVE DISORDER, REMISSION STATUS UNSPECIFIED, UNSPECIFIED WHETHER RECURRENT: Primary | ICD-10-CM

## 2023-07-24 DIAGNOSIS — Z86.73 H/O: CVA (CEREBROVASCULAR ACCIDENT): ICD-10-CM

## 2023-07-24 DIAGNOSIS — I10 ESSENTIAL HYPERTENSION: Chronic | ICD-10-CM

## 2023-07-24 DIAGNOSIS — K55.1 SUPERIOR MESENTERIC ARTERY STENOSIS (HCC): ICD-10-CM

## 2023-07-24 DIAGNOSIS — I72.2 ANEURYSM OF RIGHT RENAL ARTERY (HCC): ICD-10-CM

## 2023-07-24 PROCEDURE — 99309 SBSQ NF CARE MODERATE MDM 30: CPT | Performed by: FAMILY MEDICINE

## 2023-07-24 ASSESSMENT — ENCOUNTER SYMPTOMS
APNEA: 0
WHEEZING: 0
BLOOD IN STOOL: 0
ANAL BLEEDING: 0
TROUBLE SWALLOWING: 0
COUGH: 0
DIARRHEA: 0
SINUS PAIN: 0
CHEST TIGHTNESS: 0
STRIDOR: 0
ALLERGIC/IMMUNOLOGIC NEGATIVE: 1
CHOKING: 0
BACK PAIN: 1
EYE ITCHING: 0
ABDOMINAL DISTENTION: 0
GASTROINTESTINAL NEGATIVE: 1
SINUS PRESSURE: 0
EYE REDNESS: 0
VOICE CHANGE: 0
NAUSEA: 0
ABDOMINAL PAIN: 0
RECTAL PAIN: 0
PHOTOPHOBIA: 0
EYE DISCHARGE: 0
CONSTIPATION: 0
SHORTNESS OF BREATH: 0
RESPIRATORY NEGATIVE: 1
EYE PAIN: 0
VOMITING: 0
FACIAL SWELLING: 0
SORE THROAT: 0
RHINORRHEA: 0

## 2023-07-24 NOTE — PROGRESS NOTES
Normal range of motion. Cervical back: Normal range of motion and neck supple. No rigidity. No muscular tenderness. Right lower leg: No edema. Left lower leg: No edema. Lymphadenopathy:      Cervical: No cervical adenopathy. Neurological:      General: No focal deficit present. Mental Status: He is alert. Cranial Nerves: No cranial nerve deficit. Sensory: Sensory deficit present. Motor: Weakness present. No abnormal muscle tone. Coordination: Coordination abnormal.      Gait: Gait abnormal.      Deep Tendon Reflexes: Reflexes abnormal.       ASSESSMENT/PLAN  Diagnoses and all orders for this visit:    Major depressive disorder/Anxiety  Previously had long talk on treatment and prevention. Literature is given   Counseling is given for anxiety and depression. All questions were taken and answers are given.      Orthostatic hypotension  Admitted on 1/2/2023: Flomax and amlodipine were discontinued    Primary insomnia  Long talk on treatment and prevention  Literature is given     Severe protein-calorie malnutrition (HCC)  --stable on current care planning  -- continue treatment as we are meeting goals     Generalized weakness  --stable on current care planning  -- continue treatment as we are meeting goals     Aneurysm of right renal artery ([address])  --stable on current care planning  -- continue treatment as we are meeting goals     Primary osteoarthritis of left knee S/P Left knee replacement  --stable on current care planning  -- continue treatment as we are meeting goals     Chronic gastritis without bleeding, unspecified gastritis type  --stable on current care planning  -- continue treatment as we are meeting goals     Superior mesenteric artery stenosis ([address])  --stable on current care planning  -- continue treatment as we are meeting goals     H/O: CVA (cerebrovascular accident)  --stable on current care planning  -- continue treatment as we are meeting goals

## 2023-08-01 ENCOUNTER — CLINICAL DOCUMENTATION (OUTPATIENT)
Dept: RHEUMATOLOGY | Age: 88
End: 2023-08-01

## 2023-08-01 ASSESSMENT — ENCOUNTER SYMPTOMS
CHEST TIGHTNESS: 0
FACIAL SWELLING: 0
RECTAL PAIN: 0
RHINORRHEA: 0
SINUS PRESSURE: 0
CONSTIPATION: 0
BACK PAIN: 1
BLOOD IN STOOL: 0
VOICE CHANGE: 0
TROUBLE SWALLOWING: 0
PHOTOPHOBIA: 0
SHORTNESS OF BREATH: 0
CHOKING: 0
SINUS PAIN: 0
ALLERGIC/IMMUNOLOGIC NEGATIVE: 1
COUGH: 0
EYE REDNESS: 0
APNEA: 0
WHEEZING: 0
SORE THROAT: 0
STRIDOR: 0
DIARRHEA: 0
EYE DISCHARGE: 0
NAUSEA: 1
EYE PAIN: 0
RESPIRATORY NEGATIVE: 1
VOMITING: 0
EYE ITCHING: 0
ANAL BLEEDING: 0
ABDOMINAL DISTENTION: 0
ABDOMINAL PAIN: 0

## 2023-08-01 NOTE — PROGRESS NOTES
Russell Girard is a 81 y.o. male. Seen: 07/07/2023     HPI/Chief C/O:  He is here post hospital for adult failure to thrive due to depression  No Known Allergies  He was having some episodes on nausea and emesis overnight. He is getting Zofran which is helping with the nausea. She is medically stable and doing better  There are no new issues per nursing         ROS:  Review of Systems   Constitutional:  Positive for fatigue. Negative for activity change, appetite change, chills, diaphoresis, fever and unexpected weight change. HENT: Negative. Negative for congestion, dental problem, drooling, ear discharge, ear pain, facial swelling, hearing loss, mouth sores, nosebleeds, postnasal drip, rhinorrhea, sinus pressure, sinus pain, sneezing, sore throat, tinnitus, trouble swallowing and voice change. Eyes:  Negative for photophobia, pain, discharge, redness, itching and visual disturbance. Respiratory: Negative. Negative for apnea, cough, choking, chest tightness, shortness of breath, wheezing and stridor. Cardiovascular: Negative. Negative for chest pain, palpitations and leg swelling. Gastrointestinal:  Positive for nausea. Negative for abdominal distention, abdominal pain, anal bleeding, blood in stool, constipation, diarrhea, rectal pain and vomiting. Endocrine: Negative. Negative for cold intolerance, heat intolerance, polydipsia, polyphagia and polyuria. Genitourinary: Negative. Negative for decreased urine volume, difficulty urinating, dysuria, enuresis, flank pain, frequency, genital sores, hematuria, penile discharge, penile pain, penile swelling, scrotal swelling, testicular pain and urgency. Musculoskeletal:  Positive for arthralgias, back pain, gait problem and myalgias. Negative for joint swelling, neck pain and neck stiffness. Skin:  Negative for wound. Allergic/Immunologic: Negative.   Negative for environmental allergies, food allergies and immunocompromised

## 2023-08-09 ENCOUNTER — CLINICAL DOCUMENTATION (OUTPATIENT)
Dept: FAMILY MEDICINE CLINIC | Age: 88
End: 2023-08-09

## 2023-08-09 ASSESSMENT — ENCOUNTER SYMPTOMS
ALLERGIC/IMMUNOLOGIC NEGATIVE: 1
EYE REDNESS: 0
BLOOD IN STOOL: 0
VOMITING: 0
SINUS PRESSURE: 0
RHINORRHEA: 0
FACIAL SWELLING: 0
RECTAL PAIN: 0
ABDOMINAL DISTENTION: 0
EYE DISCHARGE: 0
CHOKING: 0
VOICE CHANGE: 0
BACK PAIN: 1
SHORTNESS OF BREATH: 0
ANAL BLEEDING: 0
TROUBLE SWALLOWING: 0
SINUS PAIN: 0
APNEA: 0
WHEEZING: 0
PHOTOPHOBIA: 0
RESPIRATORY NEGATIVE: 1
SORE THROAT: 0
ABDOMINAL PAIN: 0
DIARRHEA: 0
STRIDOR: 0
EYE PAIN: 0
CHEST TIGHTNESS: 0
EYE ITCHING: 0
CONSTIPATION: 0
GASTROINTESTINAL NEGATIVE: 1
NAUSEA: 0
COUGH: 0

## 2023-08-09 NOTE — PROGRESS NOTES
abdominal tenderness. There is no right CVA tenderness, left CVA tenderness, guarding or rebound. Hernia: No hernia is present. Genitourinary:     Penis: No tenderness. Musculoskeletal:         General: Tenderness (multiple joint pains) present. No swelling, deformity or signs of injury. Normal range of motion. Cervical back: Normal range of motion and neck supple. No rigidity. No muscular tenderness. Right lower leg: No edema. Left lower leg: No edema. Lymphadenopathy:      Cervical: No cervical adenopathy. Neurological:      General: No focal deficit present. Mental Status: He is alert. Cranial Nerves: No cranial nerve deficit. Sensory: Sensory deficit present. Motor: Weakness present. No abnormal muscle tone. Coordination: Coordination abnormal.      Gait: Gait abnormal.      Deep Tendon Reflexes: Reflexes abnormal.       ASSESSMENT/PLAN  Diagnoses and all orders for this visit:    Major depressive disorder/Anxiety  Previously had long talk on treatment and prevention. Literature is given   Counseling is given for anxiety and depression. All questions were taken and answers are given.      Orthostatic hypotension  Admitted on 1/2/2023: Flomax and amlodipine were discontinued    Primary insomnia  Long talk on treatment and prevention  Literature is given     Severe protein-calorie malnutrition (HCC)  --stable on current care planning  -- continue treatment as we are meeting goals     Generalized weakness  --stable on current care planning  -- continue treatment as we are meeting goals     Aneurysm of right renal artery ([address])  --stable on current care planning  -- continue treatment as we are meeting goals     Primary osteoarthritis of left knee S/P Left knee replacement  --stable on current care planning  -- continue treatment as we are meeting goals     Chronic gastritis without bleeding, unspecified gastritis type  --stable on current care planning  --

## 2023-08-17 ENCOUNTER — CLINICAL DOCUMENTATION (OUTPATIENT)
Dept: FAMILY MEDICINE CLINIC | Age: 88
End: 2023-08-17

## 2023-08-28 ENCOUNTER — OUTSIDE SERVICES (OUTPATIENT)
Dept: FAMILY MEDICINE CLINIC | Age: 88
End: 2023-08-28
Payer: MEDICARE

## 2023-08-28 DIAGNOSIS — Z86.73 H/O: CVA (CEREBROVASCULAR ACCIDENT): ICD-10-CM

## 2023-08-28 DIAGNOSIS — I10 ESSENTIAL HYPERTENSION: Chronic | ICD-10-CM

## 2023-08-28 DIAGNOSIS — I72.2 ANEURYSM OF RIGHT RENAL ARTERY (HCC): ICD-10-CM

## 2023-08-28 DIAGNOSIS — K55.1 SUPERIOR MESENTERIC ARTERY STENOSIS (HCC): ICD-10-CM

## 2023-08-28 DIAGNOSIS — F32.9 MAJOR DEPRESSIVE DISORDER, REMISSION STATUS UNSPECIFIED, UNSPECIFIED WHETHER RECURRENT: Primary | ICD-10-CM

## 2023-08-28 LAB — SURGICAL PATHOLOGY REPORT: NORMAL

## 2023-08-28 PROCEDURE — 99309 SBSQ NF CARE MODERATE MDM 30: CPT | Performed by: FAMILY MEDICINE

## 2023-08-28 ASSESSMENT — ENCOUNTER SYMPTOMS
ABDOMINAL DISTENTION: 0
CONSTIPATION: 0
EYE PAIN: 0
ANAL BLEEDING: 0
WHEEZING: 0
SINUS PRESSURE: 0
EYE DISCHARGE: 0
FACIAL SWELLING: 0
PHOTOPHOBIA: 0
ABDOMINAL PAIN: 0
VOMITING: 0
EYE ITCHING: 0
NAUSEA: 0
BLOOD IN STOOL: 0
RESPIRATORY NEGATIVE: 1
SINUS PAIN: 0
CHEST TIGHTNESS: 0
COUGH: 0
CHOKING: 0
DIARRHEA: 0
SHORTNESS OF BREATH: 0
SORE THROAT: 0
RECTAL PAIN: 0
STRIDOR: 0
EYE REDNESS: 0
GASTROINTESTINAL NEGATIVE: 1
APNEA: 0
VOICE CHANGE: 0
TROUBLE SWALLOWING: 0
BACK PAIN: 1
RHINORRHEA: 0
ALLERGIC/IMMUNOLOGIC NEGATIVE: 1

## 2023-08-28 NOTE — PROGRESS NOTES
disintegrating tablet Take 1 tablet by mouth every 8 hours as needed for Nausea or Vomiting 60 tablet 0    [DISCONTINUED] FLUoxetine (PROZAC) 20 MG capsule Take 1 capsule by mouth in the morning. 30 capsule 5    [DISCONTINUED] hydroCHLOROthiazide (MICROZIDE) 12.5 MG capsule TAKE 1 CAPSULE BY MOUTH EVERY MORNING 90 capsule 1     No facility-administered encounter medications on file as of 8/28/2023.         Reviewed recent labs related to Raz's current problems    Seen with the resident  I have reviewed and agree with current care planning       Kim Traylor,

## 2023-08-29 ASSESSMENT — ENCOUNTER SYMPTOMS
CONSTIPATION: 0
DIARRHEA: 0
BACK PAIN: 1
ABDOMINAL PAIN: 0
SORE THROAT: 0
VOMITING: 0
COUGH: 0
NAUSEA: 1

## 2023-08-29 NOTE — PROGRESS NOTES
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[address])  --stable on current care planning  -- continue treatment as we are meeting goals     H/O: CVA (cerebrovascular accident)  --stable on current care planning  -- continue treatment as we are meeting goals       Essential hypertension  Currently on losartan 100 mg daily  Atenolol 25 mg QD  --we are adjusting for better control and watching closely     Myalgia  Long talk on treatment and prevention  Literature is given     Outpatient Encounter Medications as of 8/17/2023   Medication Sig Dispense Refill    acetaminophen (TYLENOL) 325 MG tablet Take 650 mg by mouth every 6 hours as needed for Pain or Fever      bisacodyl (DULCOLAX) 10 MG suppository Place 10 mg rectally daily as needed for Constipation      sodium phosphate (FLEET) 7-19 GM/118ML Place 1 enema rectally daily as needed      losartan (COZAAR) 100 MG tablet Take 100 mg by mouth daily      magnesium hydroxide (MILK OF MAGNESIA) 400 MG/5ML suspension Take 30 mLs by mouth daily as needed for Constipation      polyethylene glycol (GLYCOLAX) 17 g packet Take 17 g by mouth at bedtime      potassium chloride (KLOR-CON M) 20 MEQ extended release tablet Take 20 mEq by mouth daily      senna (SENOKOT) 8.6 MG tablet Take 1 tablet by mouth daily as needed for Constipation      ondansetron (ZOFRAN) 4 MG tablet Take 4 mg by mouth every 4 hours as needed for Nausea or Vomiting      QUEtiapine (SEROQUEL) 25 MG

## 2023-09-18 ENCOUNTER — CLINICAL DOCUMENTATION (OUTPATIENT)
Dept: FAMILY MEDICINE CLINIC | Age: 88
End: 2023-09-18

## 2023-09-18 ASSESSMENT — ENCOUNTER SYMPTOMS
CHEST TIGHTNESS: 0
ABDOMINAL PAIN: 0
WHEEZING: 0
ANAL BLEEDING: 0
CONSTIPATION: 0
TROUBLE SWALLOWING: 0
SINUS PRESSURE: 0
EYE DISCHARGE: 0
NAUSEA: 0
EYE PAIN: 0
BACK PAIN: 1
SINUS PAIN: 0
EYE ITCHING: 0
PHOTOPHOBIA: 0
VOMITING: 0
ALLERGIC/IMMUNOLOGIC NEGATIVE: 1
FACIAL SWELLING: 0
VOICE CHANGE: 0
RESPIRATORY NEGATIVE: 1
BLOOD IN STOOL: 0
CHOKING: 0
EYE REDNESS: 0
COUGH: 0
APNEA: 0
SORE THROAT: 0
GASTROINTESTINAL NEGATIVE: 1
SHORTNESS OF BREATH: 0
RECTAL PAIN: 0
DIARRHEA: 0
STRIDOR: 0
RHINORRHEA: 0
ABDOMINAL DISTENTION: 0

## 2023-09-18 NOTE — PROGRESS NOTES
4 MG disintegrating tablet Take 1 tablet by mouth every 8 hours as needed for Nausea or Vomiting 60 tablet 0    [DISCONTINUED] FLUoxetine (PROZAC) 20 MG capsule Take 1 capsule by mouth in the morning. 30 capsule 5    [DISCONTINUED] hydroCHLOROthiazide (MICROZIDE) 12.5 MG capsule TAKE 1 CAPSULE BY MOUTH EVERY MORNING 90 capsule 1     No facility-administered encounter medications on file as of 9/18/2023.         Reviewed recent labs related to Raz's current problems    Seen with the resident  I have reviewed and agree with current care Alice Momin MD

## 2023-09-25 ENCOUNTER — OUTSIDE SERVICES (OUTPATIENT)
Dept: FAMILY MEDICINE CLINIC | Age: 88
End: 2023-09-25
Payer: MEDICARE

## 2023-09-25 DIAGNOSIS — I10 ESSENTIAL HYPERTENSION: Chronic | ICD-10-CM

## 2023-09-25 DIAGNOSIS — K55.1 SUPERIOR MESENTERIC ARTERY STENOSIS (HCC): ICD-10-CM

## 2023-09-25 DIAGNOSIS — Z86.73 H/O: CVA (CEREBROVASCULAR ACCIDENT): ICD-10-CM

## 2023-09-25 DIAGNOSIS — I72.2 ANEURYSM OF RIGHT RENAL ARTERY (HCC): Primary | ICD-10-CM

## 2023-09-25 PROCEDURE — 99309 SBSQ NF CARE MODERATE MDM 30: CPT | Performed by: FAMILY MEDICINE

## 2023-09-25 ASSESSMENT — ENCOUNTER SYMPTOMS
NAUSEA: 0
VOMITING: 0
EYE DISCHARGE: 0
CONSTIPATION: 0
ALLERGIC/IMMUNOLOGIC NEGATIVE: 1
ANAL BLEEDING: 0
SINUS PAIN: 0
WHEEZING: 0
SINUS PRESSURE: 0
APNEA: 0
ABDOMINAL DISTENTION: 0
VOICE CHANGE: 0
BLOOD IN STOOL: 0
RHINORRHEA: 0
DIARRHEA: 0
SORE THROAT: 0
FACIAL SWELLING: 0
STRIDOR: 0
SHORTNESS OF BREATH: 0
RECTAL PAIN: 0
GASTROINTESTINAL NEGATIVE: 1
EYE PAIN: 0
COUGH: 0
CHEST TIGHTNESS: 0
EYE REDNESS: 0
CHOKING: 0
BACK PAIN: 1
EYE ITCHING: 0
RESPIRATORY NEGATIVE: 1
PHOTOPHOBIA: 0
TROUBLE SWALLOWING: 0
ABDOMINAL PAIN: 0

## 2023-09-25 NOTE — PROGRESS NOTES
(cerebrovascular accident)  --stable on current care planning  -- continue treatment as we are meeting goals       Essential hypertension  Currently on losartan 100 mg daily  Atenolol 25 mg QD  --we are adjusting for better control and watching closely     Myalgia  Long talk on treatment and prevention  Literature is given     Outpatient Encounter Medications as of 9/25/2023   Medication Sig Dispense Refill    acetaminophen (TYLENOL) 325 MG tablet Take 650 mg by mouth every 6 hours as needed for Pain or Fever      bisacodyl (DULCOLAX) 10 MG suppository Place 10 mg rectally daily as needed for Constipation      sodium phosphate (FLEET) 7-19 GM/118ML Place 1 enema rectally daily as needed      losartan (COZAAR) 100 MG tablet Take 100 mg by mouth daily      magnesium hydroxide (MILK OF MAGNESIA) 400 MG/5ML suspension Take 30 mLs by mouth daily as needed for Constipation      polyethylene glycol (GLYCOLAX) 17 g packet Take 17 g by mouth at bedtime      potassium chloride (KLOR-CON M) 20 MEQ extended release tablet Take 20 mEq by mouth daily      senna (SENOKOT) 8.6 MG tablet Take 1 tablet by mouth daily as needed for Constipation      ondansetron (ZOFRAN) 4 MG tablet Take 4 mg by mouth every 4 hours as needed for Nausea or Vomiting      QUEtiapine (SEROQUEL) 25 MG tablet Take 1 tablet by mouth nightly 60 tablet 3    loperamide (IMODIUM A-D) 2 MG tablet Take 1 tablet by mouth every 4 hours as needed for Diarrhea 36 tablet 0    docusate sodium (COLACE) 100 MG capsule Take 1 capsule by mouth daily 30 capsule 5    prochlorperazine (COMPAZINE) 10 MG tablet Take 1 tablet by mouth every 8 hours as needed (Nausea) 120 tablet 5    pantoprazole (PROTONIX) 40 MG tablet Take 1 tablet by mouth daily 30 tablet 5    ondansetron (ZOFRAN-ODT) 4 MG disintegrating tablet Take 1 tablet by mouth every 8 hours as needed for Nausea or Vomiting 60 tablet 0    [DISCONTINUED] FLUoxetine (PROZAC) 20 MG capsule Take 1 capsule by mouth in the

## 2023-09-30 ENCOUNTER — CLINICAL DOCUMENTATION (OUTPATIENT)
Dept: FAMILY MEDICINE CLINIC | Age: 88
End: 2023-09-30

## 2023-10-04 ASSESSMENT — ENCOUNTER SYMPTOMS
STRIDOR: 0
VOMITING: 0
RESPIRATORY NEGATIVE: 1
BLOOD IN STOOL: 0
COUGH: 0
SINUS PAIN: 0
CONSTIPATION: 0
ABDOMINAL DISTENTION: 0
ABDOMINAL PAIN: 0
SINUS PRESSURE: 0
PHOTOPHOBIA: 0
CHOKING: 0
RECTAL PAIN: 0
EYE PAIN: 0
SORE THROAT: 0
EYE REDNESS: 0
NAUSEA: 1
TROUBLE SWALLOWING: 0
BACK PAIN: 1
ALLERGIC/IMMUNOLOGIC NEGATIVE: 1
EYE DISCHARGE: 0
APNEA: 0
ANAL BLEEDING: 0
FACIAL SWELLING: 0
WHEEZING: 0
CHEST TIGHTNESS: 0
RHINORRHEA: 0
EYE ITCHING: 0
SHORTNESS OF BREATH: 0
DIARRHEA: 0
VOICE CHANGE: 0

## 2023-10-04 NOTE — PROGRESS NOTES
Reviewed recent labs related to Raz's current problems    Seen with the resident  I have reviewed and agree with current care planning       Pam Jauregui MD

## 2023-10-10 ENCOUNTER — CLINICAL DOCUMENTATION (OUTPATIENT)
Dept: FAMILY MEDICINE CLINIC | Age: 88
End: 2023-10-10

## 2023-10-10 ASSESSMENT — ENCOUNTER SYMPTOMS
WHEEZING: 0
STRIDOR: 0
ANAL BLEEDING: 0
EYE REDNESS: 0
CHEST TIGHTNESS: 0
SINUS PRESSURE: 0
EYE ITCHING: 0
COUGH: 0
DIARRHEA: 0
VOMITING: 0
RECTAL PAIN: 0
EYE PAIN: 0
BACK PAIN: 1
ABDOMINAL DISTENTION: 0
RHINORRHEA: 0
CONSTIPATION: 0
EYE DISCHARGE: 0
VOICE CHANGE: 0
ABDOMINAL PAIN: 0
ALLERGIC/IMMUNOLOGIC NEGATIVE: 1
SHORTNESS OF BREATH: 0
SINUS PAIN: 0
APNEA: 0
PHOTOPHOBIA: 0
NAUSEA: 1
SORE THROAT: 0
FACIAL SWELLING: 0
CHOKING: 0
BLOOD IN STOOL: 0
TROUBLE SWALLOWING: 0
RESPIRATORY NEGATIVE: 1

## 2023-10-10 NOTE — PROGRESS NOTES
Nadya Lucio is a 81 y.o. male. Seen: 9/18/23    HPI/Chief C/O:  He is here post hospital for adult failure to thrive due to depression. ROS:  Review of Systems   Constitutional:  Positive for fatigue. Negative for activity change, appetite change, chills, diaphoresis, fever and unexpected weight change. HENT: Negative. Negative for congestion, dental problem, drooling, ear discharge, ear pain, facial swelling, hearing loss, mouth sores, nosebleeds, postnasal drip, rhinorrhea, sinus pressure, sinus pain, sneezing, sore throat, tinnitus, trouble swallowing and voice change. Eyes:  Negative for photophobia, pain, discharge, redness, itching and visual disturbance. Respiratory: Negative. Negative for apnea, cough, choking, chest tightness, shortness of breath, wheezing and stridor. Cardiovascular: Negative. Negative for chest pain, palpitations and leg swelling. Gastrointestinal:  Positive for nausea. Negative for abdominal distention, abdominal pain, anal bleeding, blood in stool, constipation, diarrhea, rectal pain and vomiting. Endocrine: Negative. Negative for cold intolerance, heat intolerance, polydipsia, polyphagia and polyuria. Genitourinary: Negative. Negative for decreased urine volume, difficulty urinating, dysuria, enuresis, flank pain, frequency, genital sores, hematuria, penile discharge, penile pain, penile swelling, scrotal swelling, testicular pain and urgency. Musculoskeletal:  Positive for arthralgias and back pain. Negative for gait problem, joint swelling, myalgias, neck pain and neck stiffness. Skin:  Negative for wound. Allergic/Immunologic: Negative. Negative for environmental allergies, food allergies and immunocompromised state. Neurological:  Positive for headaches. Negative for dizziness, tremors, seizures, syncope, facial asymmetry, speech difficulty, weakness, light-headedness and numbness. Hematological: Negative.   Negative for

## 2023-10-24 ENCOUNTER — OUTSIDE SERVICES (OUTPATIENT)
Dept: FAMILY MEDICINE CLINIC | Age: 88
End: 2023-10-24
Payer: MEDICARE

## 2023-10-24 DIAGNOSIS — K55.1 SUPERIOR MESENTERIC ARTERY STENOSIS (HCC): ICD-10-CM

## 2023-10-24 DIAGNOSIS — I72.2 ANEURYSM OF RIGHT RENAL ARTERY (HCC): ICD-10-CM

## 2023-10-24 DIAGNOSIS — F32.9 MAJOR DEPRESSIVE DISORDER, REMISSION STATUS UNSPECIFIED, UNSPECIFIED WHETHER RECURRENT: Primary | ICD-10-CM

## 2023-10-24 DIAGNOSIS — I10 ESSENTIAL HYPERTENSION: Chronic | ICD-10-CM

## 2023-10-24 DIAGNOSIS — Z86.73 H/O: CVA (CEREBROVASCULAR ACCIDENT): ICD-10-CM

## 2023-10-24 PROCEDURE — 99309 SBSQ NF CARE MODERATE MDM 30: CPT | Performed by: FAMILY MEDICINE

## 2023-10-24 ASSESSMENT — ENCOUNTER SYMPTOMS
SINUS PRESSURE: 0
CHOKING: 0
CONSTIPATION: 0
SHORTNESS OF BREATH: 0
RESPIRATORY NEGATIVE: 1
COUGH: 0
BLOOD IN STOOL: 0
RECTAL PAIN: 0
TROUBLE SWALLOWING: 0
ABDOMINAL PAIN: 0
VOMITING: 0
DIARRHEA: 0
BACK PAIN: 1
ANAL BLEEDING: 0
EYE REDNESS: 0
EYE PAIN: 0
EYE DISCHARGE: 0
VOICE CHANGE: 0
PHOTOPHOBIA: 0
SORE THROAT: 0
STRIDOR: 0
WHEEZING: 0
SINUS PAIN: 0
NAUSEA: 1
EYE ITCHING: 0
CHEST TIGHTNESS: 0
FACIAL SWELLING: 0
APNEA: 0
ALLERGIC/IMMUNOLOGIC NEGATIVE: 1
RHINORRHEA: 0
ABDOMINAL DISTENTION: 0

## 2023-10-24 NOTE — PROGRESS NOTES
Sultana Holder is a 81 y.o. male. Seen: 10/19/2023    HPI/Chief C/O:  He is here post hospital for adult failure to thrive due to depression. His anxiety seems a lot better  He is medically stable and no new issues per nursing         ROS:  Review of Systems   Constitutional:  Positive for fatigue. Negative for activity change, appetite change, chills, diaphoresis, fever and unexpected weight change. HENT: Negative. Negative for congestion, dental problem, drooling, ear discharge, ear pain, facial swelling, hearing loss, mouth sores, nosebleeds, postnasal drip, rhinorrhea, sinus pressure, sinus pain, sneezing, sore throat, tinnitus, trouble swallowing and voice change. Eyes:  Negative for photophobia, pain, discharge, redness, itching and visual disturbance. Respiratory: Negative. Negative for apnea, cough, choking, chest tightness, shortness of breath, wheezing and stridor. Cardiovascular: Negative. Negative for chest pain, palpitations and leg swelling. Gastrointestinal:  Positive for nausea. Negative for abdominal distention, abdominal pain, anal bleeding, blood in stool, constipation, diarrhea, rectal pain and vomiting. Endocrine: Negative. Negative for cold intolerance, heat intolerance, polydipsia, polyphagia and polyuria. Genitourinary: Negative. Negative for decreased urine volume, difficulty urinating, dysuria, enuresis, flank pain, frequency, genital sores, hematuria, penile discharge, penile pain, penile swelling, scrotal swelling, testicular pain and urgency. Musculoskeletal:  Positive for arthralgias and back pain. Negative for gait problem, joint swelling, myalgias, neck pain and neck stiffness. Skin:  Negative for wound. Allergic/Immunologic: Negative. Negative for environmental allergies, food allergies and immunocompromised state. Neurological:  Positive for headaches.  Negative for dizziness, tremors, seizures, syncope, facial asymmetry, speech difficulty,

## 2023-10-30 LAB
ANION GAP SERPL CALCULATED.3IONS-SCNC: 14 MMOL/L (ref 7–16)
BASOPHILS # BLD: 0.01 K/UL (ref 0–0.2)
BASOPHILS NFR BLD: 0 % (ref 0–2)
BUN SERPL-MCNC: 12 MG/DL (ref 6–23)
CALCIUM SERPL-MCNC: 8.2 MG/DL (ref 8.6–10.2)
CHLORIDE SERPL-SCNC: 98 MMOL/L (ref 98–107)
CO2 SERPL-SCNC: 26 MMOL/L (ref 22–29)
CREAT SERPL-MCNC: 0.8 MG/DL (ref 0.7–1.2)
EOSINOPHIL # BLD: 0.03 K/UL (ref 0.05–0.5)
EOSINOPHILS RELATIVE PERCENT: 1 % (ref 0–6)
ERYTHROCYTE [DISTWIDTH] IN BLOOD BY AUTOMATED COUNT: 12.6 % (ref 11.5–15)
GFR SERPL CREATININE-BSD FRML MDRD: >60 ML/MIN/1.73M2
GLUCOSE SERPL-MCNC: 86 MG/DL (ref 74–99)
HCT VFR BLD AUTO: 40 % (ref 37–54)
HGB BLD-MCNC: 13.2 G/DL (ref 12.5–16.5)
IMM GRANULOCYTES # BLD AUTO: <0.03 K/UL (ref 0–0.58)
IMM GRANULOCYTES NFR BLD: 0 % (ref 0–5)
LYMPHOCYTES NFR BLD: 0.94 K/UL (ref 1.5–4)
LYMPHOCYTES RELATIVE PERCENT: 27 % (ref 20–42)
MCH RBC QN AUTO: 30.1 PG (ref 26–35)
MCHC RBC AUTO-ENTMCNC: 33 G/DL (ref 32–34.5)
MCV RBC AUTO: 91.3 FL (ref 80–99.9)
MONOCYTES NFR BLD: 0.85 K/UL (ref 0.1–0.95)
MONOCYTES NFR BLD: 24 % (ref 2–12)
NEUTROPHILS NFR BLD: 48 % (ref 43–80)
NEUTS SEG NFR BLD: 1.7 K/UL (ref 1.8–7.3)
PLATELET # BLD AUTO: 243 K/UL (ref 130–450)
PMV BLD AUTO: 10.4 FL (ref 7–12)
POTASSIUM SERPL-SCNC: 3.7 MMOL/L (ref 3.5–5)
RBC # BLD AUTO: 4.38 M/UL (ref 3.8–5.8)
SODIUM SERPL-SCNC: 138 MMOL/L (ref 132–146)
WBC OTHER # BLD: 3.5 K/UL (ref 4.5–11.5)

## 2023-11-06 ENCOUNTER — APPOINTMENT (OUTPATIENT)
Dept: GENERAL RADIOLOGY | Age: 88
End: 2023-11-06
Payer: MEDICARE

## 2023-11-06 ENCOUNTER — APPOINTMENT (OUTPATIENT)
Dept: CT IMAGING | Age: 88
End: 2023-11-06
Payer: MEDICARE

## 2023-11-06 ENCOUNTER — HOSPITAL ENCOUNTER (EMERGENCY)
Age: 88
Discharge: HOME | End: 2023-11-07
Attending: EMERGENCY MEDICINE
Payer: MEDICARE
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
## 2023-11-06 PROCEDURE — 72125 CT NECK SPINE W/O DYE: CPT

## 2023-11-06 PROCEDURE — 99284 EMERGENCY DEPT VISIT MOD MDM: CPT

## 2023-11-06 PROCEDURE — 70450 CT HEAD/BRAIN W/O DYE: CPT

## 2023-11-06 PROCEDURE — 71045 X-RAY EXAM CHEST 1 VIEW: CPT

## 2023-11-06 PROCEDURE — 72170 X-RAY EXAM OF PELVIS: CPT

## 2023-11-06 NOTE — ED PROVIDER NOTES
[address] [address]  Phone: [phone]  Primary Provider: Belem Zavala  Pre-op Performing Provider: BELEM ZAVALA      PREOPERATIVE EVALUATION:  Today's date: 10/10/2023    Margo is a 62 year old female who presents for a preoperative evaluation.      Surgical Information:  Surgery/Procedure: Right reverse total shoulder arthroplasty   Surgery Location: [facility]  Surgeon: Zhang Smart MD   Surgery Date: 10/24/23  Time of Surgery: 10  Where patient plans to recover: At home with family  Fax number for surgical facility: Note does not need to be faxed, will be available electronically in Epic.    Assessment & Plan     The proposed surgical procedure is considered INTERMEDIATE risk.    Preop general physical exam    - Potassium  - Creatinine  - Potassium  - Creatinine    Chronic right shoulder pain  Scheduled for surgery 10/24/23    Essential hypertension with goal blood pressure less than 140/90  BP elevated, she endorses anxiety with coming to the clinic and is also in significant pain. Home blood pressures are in the 140/80's.    Radiculopathy of cervical spine  Pertinent history. Patient is s/p decompression, fusion cervical anterior, one level, combined 3/2017.    Morbid obesity (H)  Benefits of weight loss reviewed in detail, encouraged hier to cut back on the carbohydrates in the diet, consume more fruits and vegetables, drink plenty of water, avoid fruit juices, sodas, get 150 min moderate exercise/week.  Recheck weight in 6 months.      Former smoker  Congratulated her on quitting smoking.    Screen for colon cancer    - Colonoscopy Screening  Referral    COVID-19 vaccination refused  Conscientious objector    Hyperkalemia  K+ 5.4- recheck prior to procedure on 10/24/23.             Risks and Recommendations:  The patient has the following additional risks and recommendations for  perioperative complications:   - Consult Hospitalist / IM to assist with post-op medical management    Antiplatelet or Anticoagulation Medication Instructions:   - Patient is on no antiplatelet or anticoagulation medications.    Additional Medication Instructions:  Patient is to take all scheduled medications on the day of surgery EXCEPT for modifications listed below:   - ACE/ARB: HOLD on day of surgery (minimum 11 hours for general anesthesia).   - Statins: Continue taking on the day of surgery.    - diclofenac (Voltaren): HOLD for 3 days for high bleeding risk or PRN use.  Hold fish oil starting 7 days prior to your procedure    RECOMMENDATION:  APPROVAL GIVEN to proceed with proposed procedure, PLEASE CHECK POTASSIUM PRIOR TO HER PROCEDURE.    Ordering of each unique test  Prescription drug management  34 minutes spent by me on the date of the encounter doing chart review, history and exam, documentation and further activities per the note      Subjective       HPI related to upcoming procedure: Patient is having Right shoulder replaced 10/24/23        10/7/2023     3:10 PM   Preop Questions   1. Have you ever had a heart attack or stroke? No   2. Have you ever had surgery on your heart or blood vessels, such as a stent placement, a coronary artery bypass, or surgery on an artery in your head, neck, heart, or legs? No   3. Do you have chest pain with activity? No   4. Do you have a history of  heart failure? No   5. Do you currently have a cold, bronchitis or symptoms of other infection? No   6. Do you have a cough, shortness of breath, or wheezing? No   7. Do you or anyone in your family have previous history of blood clots? UNKNOWN    8. Do you or does anyone in your family have a serious bleeding problem such as prolonged bleeding following surgeries or cuts? No   9. Have you ever had problems with anemia or been told to take iron pills? YES - with pregnancy   10. Have you had any abnormal blood loss such as  black, tarry or bloody stools, or abnormal vaginal bleeding? No   11. Have you ever had a blood transfusion? YES - with first pregnancy   11a. Have you ever had a transfusion reaction? No   12. Are you willing to have a blood transfusion if it is medically needed before, during, or after your surgery? Yes   13. Have you or any of your relatives ever had problems with anesthesia? None   14. Do you have sleep apnea, excessive snoring or daytime drowsiness? No   15. Do you have any artifical heart valves or other implanted medical devices like a pacemaker, defibrillator, or continuous glucose monitor? No   16. Do you have artificial joints? YES - bilateral knees, upper neck, low back fusion, left hip SI screws   17. Are you allergic to latex? No       Health Care Directive:  Patient does not have a Health Care Directive or Living Will: Patient states has Advance Directive and will bring in a copy to clinic.    Preoperative Review of :   reviewed - controlled substances reflected in medication list.      Status of Chronic Conditions:  See problem list for active medical problems.  Problems all longstanding and stable, except as noted/documented.  See ROS for pertinent symptoms related to these conditions.    HYPERLIPIDEMIA - Patient has a long history of significant Hyperlipidemia requiring medication for treatment with recent good control. Patient reports no problems or side effects with the medication.      HYPERTENSION - Patient has longstanding history of HTN , currently denies any symptoms referable to elevated blood pressure. Specifically denies chest pain, palpitations, dyspnea, orthopnea, PND or peripheral edema. Blood pressure readings have not been in normal range. Current medication regimen is as listed below. Patient denies any side effects of medication.     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 EMERGENCY DEPARTMENT COURSE    Vitals:    Vitals:    11/06/23 2000 11/06/23 2100 11/06/23 2215 11/07/23 0100   BP: (!) 156/80 (!) 154/79 135/88 138/66   Pulse:  78     Resp:       Temp:       TempSrc:       SpO2: 93% 94% (!) 82% 96%   Weight:           Patient was given the following medications:  Medications - No data to display        Medical Decision Making/Differential Diagnosis:    CC/HPI Summary, Social Determinants of health, Records Reviewed, DDx, testing done/not done, ED Course, Reassessment, disposition considerations/shared decision making with patient, consults, disposition:            Medical Decision Making  Amount and/or Complexity of Data Reviewed  Radiology: ordered. Patient is a 81 y.o. male who presents from a nursing home for fall. Patient is DNR CC. Differential includes, but not limited to intracranial hemorrhage, acute fractures, contusions, pneumothorax. CT head and cervical spine obtained and showed no acute abnormality. Chest and pelvis x-ray showed no acute fracture or pneumothorax. Patient stable for discharge back to nursing facility. CONSULTS:   None        I am the Primary Clinician of Record. FINAL IMPRESSION      1.  Fall from bed, initial encounter          DISPOSITION/PLAN     DISPOSITION Decision To Discharge 11/06/2023 11:22:42 PM      PATIENT REFERRED TO:  Family doctor  Call [phone] for [facility] physician hotline if you do not have a family doctor  Schedule an appointment as soon as possible for a visit         DISCHARGE MEDICATIONS:  New Prescriptions    No medications on file       DISCONTINUED NEGATIVE for significant cough or SOB  CV: NEGATIVE for chest pain, palpitations or peripheral edema  ROS otherwise negative    Patient Active Problem List    Diagnosis Date Noted    Pain of left thumb 10/17/2023     Priority: Medium    Arthritis of carpometacarpal (CMC) joint of left thumb 07/14/2023     Priority: Medium    Combined form of age-related cataract, right eye 03/16/2023     Priority: Medium     Added automatically from request for surgery [id number]      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 03/16/2023     Priority: Medium     Added automatically from request for surgery [id number]      Severe stage glaucoma 02/15/2023     Priority: Medium     Added automatically from request for surgery [id number]      Bilateral hand numbness 01/27/2023     Priority: Medium    Osteoarthritis of both hands, unspecified osteoarthritis type 01/14/2023     Priority: Medium    Facet arthritis of lumbar region 01/11/2023     Priority: Medium    Diverticulosis of sigmoid colon 06/15/2020     Priority: Medium    Morbid obesity (H) 04/05/2019     Priority: Medium    Papanicolaou smear of cervix with low grade squamous intraepithelial lesion (LGSIL) 04/18/2018     Priority: Medium    Essential hypertension with goal blood pressure less than 140/90 03/29/2018     Priority: Medium    Adjustment disorder with mixed anxiety and depressed mood 03/29/2018     Priority: Medium    S/P cervical spinal fusion 03/30/2017     Priority: Medium    Radiculopathy of cervical spine 03/20/2017     Priority: Medium    Cervical high risk HPV (human papillomavirus) test positive 01/10/2017     Priority: Medium     2000, 2003, and 2009 NIL paps. in Care Everywhere.  9/5/13 NIL pap  1/10/17 NIL pap, + HR HPV (not 16 or 18). Plan: cotest in 1 year, due by 1/10/18  3/29/18 LSIL pap, + HR HPV (not 16 or 18). Plan: colp bef 6/29/18 4/18/18 [facility] bx: negative. Plan: cotest in 6 mo, per provider.   11/9/18 Lost to follow-up for pap  tracking  9/11/2019 Pap: NIL/neg HR HPV. Plan cotest in 3 years.  1/11/23 NIL pap, Neg HPV. Plan cotest in 3 years.         Microscopic hematuria 06/02/2016     Priority: Medium     Recommend f/u with primary MD for UA, blood pressure, and urine cytology at 6, 12, 24, and 36 months from this time.  If negative for 3 years then no further monitoring needed.      Refer back to urology for any of the following:              -cytology is suspicious or positive              -gross hematuria              -irritative voiding symptoms with evidence of infection/ worsening dysuria or urgency.     If persistent microscopic hematuria WITH: hypertension, proteinuria, or glomerular/dysmorphic RBCs in urine then evaluate for primary renal disease/refer instead to nephrology.       Tobacco dependency 03/06/2015     Priority: Medium    S/P carpal tunnel release 07/22/2014     Priority: Medium    Trigger thumb 07/22/2014     Priority: Medium    CTS (carpal tunnel syndrome) - bilateral 05/27/2014     Priority: Medium    S/P total knee arthroplasty juan 01/23/2014     Priority: Medium    Acute posthemorrhagic anemia 01/23/2014     Priority: Medium    Muscle spasm 01/23/2014     Priority: Medium    RLS (restless legs syndrome) 01/22/2012     Priority: Medium    Migraine 12/06/2011     Priority: Medium    Pseudogout 05/05/2011     Priority: Medium    OA (OSTEOARTHRITIS) OF KNEE - bilateral 05/05/2011     Priority: Medium    Hyperlipidemia LDL goal <130 10/31/2010     Priority: Medium    Asthma 12/16/1996     Priority: Medium      Past Medical History:   Diagnosis Date    Arthritis     Cervical high risk HPV (human papillomavirus) test positive 01/10/2017    1/10/17 NIL pap/+ HR HPV (not 16 or 18). Plan: cotest in 1 year, due by 1/10/18     Chronic infection     HX of MRSA. 2 neg swabs at [facility] in 2006 and 2007.    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 & get it at least once a year     Depressive disorder Back in my mid 20's    History of blood transfusion     Hypertension     Numbness and tingling     Right arm    PONV (postoperative nausea and vomiting)     Uncomplicated asthma      Past Surgical History:   Procedure Laterality Date    ABDOMEN SURGERY  1996    hysterectomy    APPENDECTOMY      ARTHROPLASTY CARPOMETACARPAL (THUMB JOINT) Left 09/19/2023    Procedure: LEFT THUMB CARPOMETACARPAL JOINT ARTHROPLASTY WITH SUTURE SUSPENSION;  Surgeon: Mat Frye MD;  Location: [facility] OR    ARTHROSCOPY KNEE  09/16/2011    Procedure:ARTHROSCOPY KNEE; left knee arthroscopy with debridement, open lateral patellar spur excision; Surgeon:LUIS A MONTOYA; Location:[facility] OR    BIOPSY      CATARACT IOL, RT/LT      COLONOSCOPY N/A 02/16/2016    Procedure: COLONOSCOPY;  Surgeon: Belem Khalil MD;  Location: [facility]    COLONOSCOPY N/A 02/16/2016    Procedure: COMBINED COLONOSCOPY, SINGLE OR MULTIPLE BIOPSY/POLYPECTOMY BY BIOPSY;  Surgeon: Belem Khalil MD;  Location: [facility]    COLONOSCOPY N/A 06/15/2020    Procedure: Colonoscopy, With Polypectomy And Biopsy;  Surgeon: Torres Gallegos DO;  Location: [facility]    COLONOSCOPY WITH CO2 INSUFFLATION N/A 02/16/2016    Procedure: COLONOSCOPY WITH CO2 INSUFFLATION;  Surgeon: Belem Khalil MD;  Location: [facility]    COLONOSCOPY WITH CO2 INSUFFLATION N/A 06/15/2020    Procedure: COLONOSCOPY, WITH CO2 INSUFFLATION;  Surgeon: Torres Gallegos DO;  Location: [facility]    CYSTOSCOPY  01/01/2016    microscopic hematuria    DECOMPRESSION, FUSION CERVICAL ANTERIOR ONE LEVEL, COMBINED N/A 03/30/2017    Procedure: COMBINED DECOMPRESSION, FUSION CERVICAL ANTERIOR ONE LEVEL;  Surgeon: Joseph Klein MD;  Location: [facility] OR    ECTOPIC PREGNANCY SURGERY      ENT SURGERY      GENITOURINARY SURGERY      GONIOSYNECHIALYSIS Left 02/20/2023    Procedure: goniosynechialysis left;  Surgeon: Gaston Guzman MD;  [place] [facility] [place]    GYN  SURGERY      HC INCISION TENDON SHEATH FINGER Left 07/11/2014    Thumb TF release    HC KNEE SCOPE,MED/LAT MENISECTOMY  09/16/2011    left, with partial medial menisectomy ONLY    HC REVISE MEDIAN N/CARPAL TUNNEL SURG Left 07/11/2014    PRIMARY - not revision    HEAD & NECK SURGERY  7/22/2020    4 fusions in neck    LASIK Bilateral     [id number]    ORTHOPEDIC SURGERY      PHACOEMULSIFICATION CLEAR CORNEA W/ STANDARD IOL IMPLANT, ENDOSCOPIC CYCLOPHOTOCOAGULATION, COMBINED Left 02/20/2023    Procedure: LEFT EYE COMPLEX PHACOEMULSIFICATION, CATARACT, CLEAR CORNEAL INCISION APPROACH, WITH STANDARD INTRAOCULAR LENS IMPLANT INSERTION AND ENDOSCOPIC CYCLOPHOTOCOAGULATION< GONISYNECHIOLYSIS;  Surgeon: Gaston Guzman MD;  Location: [facility] OR    PHACOEMULSIFICATION WITH STANDARD INTRAOCULAR LENS IMPLANT Right 05/12/2023    Procedure: RIGHT EYE PHACOEMULSIFICATION, CATARACT, WITH STANDARD INTRAOCULAR LENS IMPLANT INSERTION;  Surgeon: Gaston Guzman MD;  Location: [facility] OR    RELEASE CARPAL TUNNEL  07/11/2014    Procedure: RELEASE CARPAL TUNNEL;  Surgeon: Rg Franco MD;  Location: [facility]    RELEASE CARPAL TUNNEL Right 11/07/2014    Procedure: RELEASE CARPAL TUNNEL;  Surgeon: Rg Franco MD;  Location: [facility]    RELEASE TRIGGER FINGER  07/11/2014    Procedure: RELEASE TRIGGER FINGER;  Surgeon: Rg Franco MD;  Location: [facility]    RELEASE TRIGGER FINGER Right 11/07/2014    Procedure: RELEASE TRIGGER FINGER;  Surgeon: Rg Franco MD;  Location: [facility]    SOFT TISSUE SURGERY      tonsils      [facility] PART REMV FEMUR/PROX TIB/FIB  09/16/2011    left, open lateral patellar bone spur excision    [facility] TOTAL KNEE ARTHROPLASTY  01/17/2014    Bilateral     Current Outpatient Medications   Medication Sig Dispense Refill    acetaminophen (TYLENOL) 500 MG tablet Take 500-1,000 mg by mouth every 6 hours as needed.      albuterol (PROAIR HFA/PROVENTIL HFA/VENTOLIN HFA) 108 (90 Base) MCG/ACT inhaler  Inhale 2 puffs into the lungs every 4 hours as needed for shortness of breath / dyspnea or wheezing 18 g 3    ammonium lactate (LAC-HYDRIN) 12 % external lotion       atorvastatin (LIPITOR) 80 MG tablet TAKE 1 TABLET(80 MG) BY MOUTH DAILY 90 tablet 2    calcium carbonate 500 mg, elemental, 1250 (500 Ca) MG tablet chewable Take 500 mg by mouth      cyclobenzaprine (FLEXERIL) 10 MG tablet TAKE 1 TABLET(10 MG) BY MOUTH EVERY NIGHT AS NEEDED FOR MUSCLE SPASMS 90 tablet 3    cyclobenzaprine (FLEXERIL) 10 MG tablet Take 10 mg by mouth daily      diclofenac (VOLTAREN) 75 MG EC tablet TAKE 1 TABLET(75 MG) BY MOUTH TWICE DAILY 180 tablet 0    dorzolamide-timolol (COSOPT) 2-0.5 % ophthalmic solution Place 1 drop Into the left eye 2 times daily 10 mL 5    losartan (COZAAR) 25 MG tablet TAKE 1 TABLET(25 MG) BY MOUTH DAILY 90 tablet 0    Misc. Devices (ROLLATOR ULTRA-LIGHT) MISC Walker with front wheels for home use.      Omega-3 Fatty Acids (FISH OIL PO)       ondansetron (ZOFRAN) 4 MG tablet Take 1 tablet (4 mg) by mouth every 6 hours as needed for nausea 12 tablet 0    polyethylene glycol-propylene glycol (SYSTANE ULTRA) 0.4-0.3 % SOLN ophthalmic solution Apply 1 drop to eye      TUMS ULTRA 1000 1000 MG CHEW CHEW 1 TABLET BY MOUTH TWICE DAILY WITH MEALS         Allergies   Allergen Reactions    Wasp Venom Protein Anaphylaxis    Bee Anaphylaxis    Erythromycin Hives    Wasps [Hornets] Anaphylaxis    Shellfish Allergy Nausea and Vomiting and Rash    Shellfish-Derived Products Rash and Nausea and Vomiting        Social History     Tobacco Use    Smoking status: Former     Packs/day: 0.00     Years: 15.00     Additional pack years: 0.00     Total pack years: 0.00     Types: Cigarettes     Start date: 1976     Quit date: 2023     Years since quittin.7     Passive exposure: Never    Smokeless tobacco: Never    Tobacco comments:     I am currently on Chantix   Substance Use Topics    Alcohol use: Yes     Comment:  "occasionally     Family History   Problem Relation Age of Onset    C.A.D. Father     Hyperlipidemia Father     Breast Cancer Maternal Grandmother     Ovarian Cancer Maternal Grandmother     Cancer - colorectal Maternal Grandfather     Colon Cancer Maternal Grandfather     Prostate Cancer Maternal Grandfather     C.A.D. Paternal Grandfather     Lung Cancer Sister     Colon Cancer Sister     Brain Cancer Sister     Other Cancer Sister         Brain cancer    Hypertension Sister     Anxiety Disorder Daughter     Glaucoma No family hx of     Macular Degeneration No family hx of      History   Drug Use No         Objective     BP (!) 173/96   Pulse 85   Temp 98.4  F (36.9  C) (Tympanic)   Resp 20   Ht 1.549 m (5' 1\")   Wt 102.5 kg (226 lb)   LMP 09/16/2011   SpO2 97%   BMI 42.70 kg/m      Physical Exam    GENERAL APPEARANCE: healthy, alert and no distress     EYES: EOMI, PERRL     HENT: ear canals and TM's normal and nose and mouth without ulcers or lesions     NECK: no adenopathy, no asymmetry, masses, or scars and thyroid normal to palpation     RESP: lungs clear to auscultation - no rales, rhonchi or wheezes     CV: regular rates and rhythm, normal S1 S2, no S3 or S4 and no murmur, click or rub     ABDOMEN:  soft, nontender, no HSM or masses and bowel sounds normal     SKIN: no suspicious lesions or rashes     NEURO: Normal strength and tone, sensory exam grossly normal, mentation intact and speech normal     PSYCH: mentation appears normal. and affect normal/bright     LYMPHATICS: No cervical adenopathy    Recent Labs   Lab Test 09/07/23  0938 04/26/23  1144 01/11/23  1439 09/12/22  1422 06/27/22  1102   HGB  --   --  15.1 14.4 15.0   PLT  --   --  349 318 339   INR  --   --   --   --  1.04   NA  --   --  140 139 138   POTASSIUM 4.8 4.4 4.6 4.5 3.8   CR 0.69 0.59 0.62 0.59 0.72        Diagnostics:  Labs pending at this time.  Results will be reviewed when available.   No EKG required, no history of coronary " heart disease, significant arrhythmia, peripheral arterial disease or other structural heart disease.  Component      Latest Ref Rng 10/10/2023  2:18 PM   Cotinine Confirm      ng/mL <15    Nicotine Confirmation Urine      ng/mL 17    3-OH-Cotinine, Urn, Quant      ng/mL 76    Anabasine, Urn, Quant      ng/mL <5    Creatinine      0.51 - 0.95 mg/dL 0.67    GFR Estimate      >60 mL/min/1.73m2 >90    Calcium Ionized Whole Blood      4.4 - 5.2 mg/dL    Potassium      3.4 - 5.3 mmol/L 5.4 (H)    Creatinine Urine      mg/dL       Legend:  (H) High  Revised Cardiac Risk Index (RCRI):  The patient has the following serious cardiovascular risks for perioperative complications:   - No serious cardiac risks = 0 points     RCRI Interpretation: 0 points: Class I (very low risk - 0.4% complication rate)         Signed Electronically by: JOSE R Sarabia CNP  Copy of this evaluation report is provided to requesting physician.

## 2023-11-07 VITALS
RESPIRATION RATE: 16 BRPM | SYSTOLIC BLOOD PRESSURE: 141 MMHG | OXYGEN SATURATION: 95 % | DIASTOLIC BLOOD PRESSURE: 84 MMHG | TEMPERATURE: 98.3 F | WEIGHT: 188 LBS | BODY MASS INDEX: 25.5 KG/M2 | HEART RATE: 76 BPM

## 2023-11-07 NOTE — DISCHARGE INSTRUCTIONS
Follow-up with your primary care provider. CT HEAD WO CONTRAST   Final Result   No acute intracranial abnormality. CT CERVICAL SPINE WO CONTRAST   Final Result   No acute abnormality of the cervical spine. Multilevel degenerative changes. XR CHEST PORTABLE   Final Result   Suspect retrocardiac infiltrates/atelectasis. Interval development of mild left pleural effusion. XR PELVIS (1-2 VIEWS)   Final Result   No acute abnormality of the pelvis.

## 2023-11-08 LAB
BILIRUB UR QL STRIP: NEGATIVE
CLARITY UR: CLEAR
COLOR UR: YELLOW
GLUCOSE UR STRIP-MCNC: NEGATIVE MG/DL
HGB UR QL STRIP.AUTO: NEGATIVE
KETONES UR STRIP-MCNC: NEGATIVE MG/DL
LEUKOCYTE ESTERASE UR QL STRIP: NEGATIVE
NITRITE UR QL STRIP: NEGATIVE
PH UR STRIP: 6 [PH] (ref 5–9)
PROT UR STRIP-MCNC: 100 MG/DL
RBC #/AREA URNS HPF: ABNORMAL /HPF
SP GR UR STRIP: 1.02 (ref 1–1.03)
UROBILINOGEN UR STRIP-ACNC: 0.2 EU/DL (ref 0–1)
WBC #/AREA URNS HPF: ABNORMAL /HPF

## 2023-11-10 LAB
ANION GAP SERPL CALCULATED.3IONS-SCNC: 11 MMOL/L (ref 7–16)
BASOPHILS # BLD: 0.03 K/UL (ref 0–0.2)
BASOPHILS NFR BLD: 1 % (ref 0–2)
BUN SERPL-MCNC: 13 MG/DL (ref 6–23)
CALCIUM SERPL-MCNC: 8.7 MG/DL (ref 8.6–10.2)
CHLORIDE SERPL-SCNC: 104 MMOL/L (ref 98–107)
CO2 SERPL-SCNC: 22 MMOL/L (ref 22–29)
CREAT SERPL-MCNC: 0.6 MG/DL (ref 0.7–1.2)
EOSINOPHIL # BLD: 0.14 K/UL (ref 0.05–0.5)
EOSINOPHILS RELATIVE PERCENT: 2 % (ref 0–6)
ERYTHROCYTE [DISTWIDTH] IN BLOOD BY AUTOMATED COUNT: 12.4 % (ref 11.5–15)
GFR SERPL CREATININE-BSD FRML MDRD: >60 ML/MIN/1.73M2
GLUCOSE SERPL-MCNC: 83 MG/DL (ref 74–99)
HCT VFR BLD AUTO: 41.2 % (ref 37–54)
HGB BLD-MCNC: 13.4 G/DL (ref 12.5–16.5)
IMM GRANULOCYTES # BLD AUTO: 0.06 K/UL (ref 0–0.58)
IMM GRANULOCYTES NFR BLD: 1 % (ref 0–5)
LYMPHOCYTES NFR BLD: 1.11 K/UL (ref 1.5–4)
LYMPHOCYTES RELATIVE PERCENT: 17 % (ref 20–42)
MCH RBC QN AUTO: 29.6 PG (ref 26–35)
MCHC RBC AUTO-ENTMCNC: 32.5 G/DL (ref 32–34.5)
MCV RBC AUTO: 90.9 FL (ref 80–99.9)
MICROORGANISM SPEC CULT: ABNORMAL
MONOCYTES NFR BLD: 1.13 K/UL (ref 0.1–0.95)
MONOCYTES NFR BLD: 17 % (ref 2–12)
NEUTROPHILS NFR BLD: 63 % (ref 43–80)
NEUTS SEG NFR BLD: 4.16 K/UL (ref 1.8–7.3)
PLATELET # BLD AUTO: 368 K/UL (ref 130–450)
PMV BLD AUTO: 10.3 FL (ref 7–12)
POTASSIUM SERPL-SCNC: 3.3 MMOL/L (ref 3.5–5)
RBC # BLD AUTO: 4.53 M/UL (ref 3.8–5.8)
SODIUM SERPL-SCNC: 137 MMOL/L (ref 132–146)
SPECIMEN DESCRIPTION: ABNORMAL
WBC OTHER # BLD: 6.6 K/UL (ref 4.5–11.5)

## 2023-11-20 ENCOUNTER — OUTSIDE SERVICES (OUTPATIENT)
Dept: FAMILY MEDICINE CLINIC | Age: 88
End: 2023-11-20
Payer: MEDICARE

## 2023-11-20 DIAGNOSIS — I10 ESSENTIAL HYPERTENSION: Chronic | ICD-10-CM

## 2023-11-20 DIAGNOSIS — Z86.73 H/O: CVA (CEREBROVASCULAR ACCIDENT): ICD-10-CM

## 2023-11-20 DIAGNOSIS — I72.2 ANEURYSM OF RIGHT RENAL ARTERY (HCC): ICD-10-CM

## 2023-11-20 DIAGNOSIS — K55.1 SUPERIOR MESENTERIC ARTERY STENOSIS (HCC): ICD-10-CM

## 2023-11-20 DIAGNOSIS — F32.9 MAJOR DEPRESSIVE DISORDER, REMISSION STATUS UNSPECIFIED, UNSPECIFIED WHETHER RECURRENT: Primary | ICD-10-CM

## 2023-11-20 PROCEDURE — 99309 SBSQ NF CARE MODERATE MDM 30: CPT | Performed by: FAMILY MEDICINE

## 2023-11-20 ASSESSMENT — ENCOUNTER SYMPTOMS
TROUBLE SWALLOWING: 0
EYE DISCHARGE: 0
ABDOMINAL PAIN: 0
NAUSEA: 1
EYE PAIN: 0
COUGH: 0
RESPIRATORY NEGATIVE: 1
DIARRHEA: 0
CHOKING: 0
WHEEZING: 0
SINUS PRESSURE: 0
CONSTIPATION: 0
ALLERGIC/IMMUNOLOGIC NEGATIVE: 1
ABDOMINAL DISTENTION: 0
RECTAL PAIN: 0
VOMITING: 0
EYE ITCHING: 0
BLOOD IN STOOL: 0
PHOTOPHOBIA: 0
SINUS PAIN: 0
STRIDOR: 0
CHEST TIGHTNESS: 0
APNEA: 0
BACK PAIN: 1
FACIAL SWELLING: 0
RHINORRHEA: 0
ANAL BLEEDING: 0
VOICE CHANGE: 0
SORE THROAT: 0
EYE REDNESS: 0
SHORTNESS OF BREATH: 0

## 2023-11-20 NOTE — PROGRESS NOTES
Renu Strange is a 81 y.o. male. Seen: 11/16/2023    HPI/Chief C/O:  He is post Covid and getting over it just fine   His anxiety seems a lot better  He is medically stable and no new issues per nursing         ROS:  Review of Systems   Constitutional:  Positive for fatigue. Negative for activity change, appetite change, chills, diaphoresis, fever and unexpected weight change. HENT: Negative. Negative for congestion, dental problem, drooling, ear discharge, ear pain, facial swelling, hearing loss, mouth sores, nosebleeds, postnasal drip, rhinorrhea, sinus pressure, sinus pain, sneezing, sore throat, tinnitus, trouble swallowing and voice change. Eyes:  Negative for photophobia, pain, discharge, redness, itching and visual disturbance. Respiratory: Negative. Negative for apnea, cough, choking, chest tightness, shortness of breath, wheezing and stridor. Cardiovascular: Negative. Negative for chest pain, palpitations and leg swelling. Gastrointestinal:  Positive for nausea. Negative for abdominal distention, abdominal pain, anal bleeding, blood in stool, constipation, diarrhea, rectal pain and vomiting. Endocrine: Negative. Negative for cold intolerance, heat intolerance, polydipsia, polyphagia and polyuria. Genitourinary: Negative. Negative for decreased urine volume, difficulty urinating, dysuria, enuresis, flank pain, frequency, genital sores, hematuria, penile discharge, penile pain, penile swelling, scrotal swelling, testicular pain and urgency. Musculoskeletal:  Positive for arthralgias and back pain. Negative for gait problem, joint swelling, myalgias, neck pain and neck stiffness. Skin:  Negative for wound. Allergic/Immunologic: Negative. Negative for environmental allergies, food allergies and immunocompromised state. Neurological:  Positive for headaches.  Negative for dizziness, tremors, seizures, syncope, facial asymmetry, speech difficulty, weakness,

## 2024-01-18 ENCOUNTER — CLINICAL DOCUMENTATION (OUTPATIENT)
Dept: FAMILY MEDICINE CLINIC | Age: 89
End: 2024-01-18

## 2024-01-23 ENCOUNTER — CLINICAL DOCUMENTATION (OUTPATIENT)
Dept: FAMILY MEDICINE CLINIC | Age: 89
End: 2024-01-23

## 2024-01-23 NOTE — PROGRESS NOTES
SUBJECTIVE  Raz Han is a 88 y.o. male.  Seen: 1/23/2024    HPI/Chief C/O:    Patient reports he has a black toenail on his right foot and podiatry is coming to look at it tomorrow. Patient reports chronic complaints of daily headache and nausea for which Zofran and Tylenol helps. He denies vomiting. He reports he doesn't have much appetite, but this is chronic for him. He is looking forward to having [employer] on Thursday.      ROS:  As above.     Past Medical/Surgical Hx;  Reviewed with patient      Diagnosis Date    Aneurysm of right renal artery (HCC)     follows with Dr. 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    TIA (transient ischemic attack)      Past Surgical History:   Procedure Laterality Date    ANTERIOR CRUCIATE LIGAMENT REPAIR Left 11/21/2001    APPENDECTOMY      CHOLECYSTECTOMY  2007    Lap    ECHO COMPL W DOP COLOR FLOW  12/4/2012         HERNIA REPAIR Right 11/13/2002    double    SINUS SURGERY  2002,2003     done x 2    TONSILLECTOMY      TOTAL KNEE ARTHROPLASTY Left 1/28/2019    LEFT  ROBOTIC KNEE TOTAL ARTHROPLASTY  ++MEREDITH- STANDARD++   ++ADDUCTOR BLOCK++ performed by David Chapa MD at [facility] OR    UPPER GASTROINTESTINAL ENDOSCOPY N/A 10/10/2019    EGD BIOPSY performed by Leobardo Michel MD at [facility] ENDOSCOPY       Past Family Hx:  Reviewed with patient  No family history on file.    Social Hx:  Reviewed with patient  Social History     Tobacco Use    Smoking status: Never    Smokeless tobacco: Never   Substance Use Topics    Alcohol use: No       OBJECTIVE  There were no vitals taken for this visit.    Problem List:  Raz does not have any pertinent problems on file.    PHYS EX:    Physical Exam  Constitutional:       General: They are not in acute distress.  HENT:

## 2024-01-24 ENCOUNTER — OUTSIDE SERVICES (OUTPATIENT)
Dept: FAMILY MEDICINE CLINIC | Age: 89
End: 2024-01-24

## 2024-01-24 DIAGNOSIS — I10 ESSENTIAL HYPERTENSION: Chronic | ICD-10-CM

## 2024-01-24 DIAGNOSIS — Z86.73 H/O: CVA (CEREBROVASCULAR ACCIDENT): ICD-10-CM

## 2024-01-24 DIAGNOSIS — K55.1 SUPERIOR MESENTERIC ARTERY STENOSIS (HCC): ICD-10-CM

## 2024-01-24 DIAGNOSIS — F32.9 MAJOR DEPRESSIVE DISORDER, REMISSION STATUS UNSPECIFIED, UNSPECIFIED WHETHER RECURRENT: ICD-10-CM

## 2024-01-24 DIAGNOSIS — I72.2 ANEURYSM OF RIGHT RENAL ARTERY (HCC): Primary | ICD-10-CM

## 2024-01-24 NOTE — PROGRESS NOTES
EX:    Physical Exam  Constitutional:       General: They are not in acute distress.  HENT:      Head: Normocephalic and atraumatic.   Eyes:      Extraocular Movements: Extraocular movements intact.   Cardiovascular:      Rate and Rhythm: Normal rate and regular rhythm.      Heart sounds: No murmur heard.     No friction rub. No gallop.   Pulmonary:      Breath sounds: Normal breath sounds. No wheezing, rhonchi or rales.   Abdominal:      General: There is no distension.      Tenderness: There is no abdominal tenderness. There is no guarding.   Musculoskeletal:      Right lower leg: No edema.      Left lower leg: No edema.   Right big toe wrapped with gauze, area without erythema, warmth, or streaking.  Neurological:      Mental Status: She is alert.      ASSESSMENT/PLAN  Diagnoses and all orders for this visit:    Right big toenail lesion  Podiatry to see patient, no sign of acute infection    Major depressive disorder/Anxiety  Will begin Trintellix 10 mg daily  Continue Klonopin 0.5 mg QHS  Klonopin 0.25 MWF  Buspar TID     Primary insomnia  Long talk on treatment and prevention  Literature is given     Nausea  Zofran  Phenergan    Severe protein-calorie malnutrition, chronic gastritis without bleeding, unspecified gastritis type  Decreased appetite per patient chronic issue  Protonix 40 mg daily  Tums Q8H prn    Generalized weakness  stable on current care planning  continue treatment as we are meeting goals     Primary osteoarthritis of left knee S/P Left knee replacement    H/O: CVA (cerebrovascular accident)  ASA 81 mg daily    Essential hypertension  Currently on losartan 100 mg daily  Atenolol 25 mg QD  Amlodipine 10 mg        Outpatient Encounter Medications as of 1/24/2024   Medication Sig Dispense Refill    acetaminophen (TYLENOL) 325 MG tablet Take 650 mg by mouth every 6 hours as needed for Pain or Fever      bisacodyl (DULCOLAX) 10 MG suppository Place 10 mg rectally daily as needed for Constipation

## 2024-02-12 ENCOUNTER — CLINICAL DOCUMENTATION (OUTPATIENT)
Dept: FAMILY MEDICINE CLINIC | Age: 89
End: 2024-02-12

## 2024-02-12 NOTE — PROGRESS NOTES
SUBJECTIVE  Raz Han is a 88 y.o. male.  Seen: 2/12/2024    HPI/Chief C/O:  Patient is doing well this morning  Reports some mild nausea which is better than baseline  States pain in his toes is resolved  No other concerns at this time         ROS:  As above.     Past Medical/Surgical Hx;  Reviewed with patient      Diagnosis Date    Aneurysm of right renal artery (HCC)     follows with Dr. 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    TIA (transient ischemic attack)      Past Surgical History:   Procedure Laterality Date    ANTERIOR CRUCIATE LIGAMENT REPAIR Left 11/21/2001    APPENDECTOMY      CHOLECYSTECTOMY  2007    Lap    ECHO COMPL W DOP COLOR FLOW  12/4/2012         HERNIA REPAIR Right 11/13/2002    double    SINUS SURGERY  2002,2003     done x 2    TONSILLECTOMY      TOTAL KNEE ARTHROPLASTY Left 1/28/2019    LEFT  ROBOTIC KNEE TOTAL ARTHROPLASTY  ++MEREDITH- STANDARD++   ++ADDUCTOR BLOCK++ performed by David Chapa MD at [facility] OR    UPPER GASTROINTESTINAL ENDOSCOPY N/A 10/10/2019    EGD BIOPSY performed by Leobardo Michel MD at [facility] ENDOSCOPY       Past Family Hx:  Reviewed with patient  No family history on file.    Social Hx:  Reviewed with patient  Social History     Tobacco Use    Smoking status: Never    Smokeless tobacco: Never   Substance Use Topics    Alcohol use: No       OBJECTIVE  There were no vitals taken for this visit.    Problem List:  Raz has Major depressive disorder; Insomnia d/t depression; and Status post left knee replacement on their pertinent problem list.    PHYS EX:    Physical Exam  Constitutional:       General: They are not in acute distress.  HENT:      Head: Normocephalic and atraumatic.   Eyes:      Extraocular Movements: Extraocular movements intact.

## 2024-02-12 NOTE — PROGRESS NOTES
1/18/2024   Medication Sig Dispense Refill    acetaminophen (TYLENOL) 325 MG tablet Take 650 mg by mouth every 6 hours as needed for Pain or Fever      bisacodyl (DULCOLAX) 10 MG suppository Place 10 mg rectally daily as needed for Constipation      sodium phosphate (FLEET) 7-19 GM/118ML Place 1 enema rectally daily as needed      losartan (COZAAR) 100 MG tablet Take 100 mg by mouth daily      magnesium hydroxide (MILK OF MAGNESIA) 400 MG/5ML suspension Take 30 mLs by mouth daily as needed for Constipation      polyethylene glycol (GLYCOLAX) 17 g packet Take 17 g by mouth at bedtime      potassium chloride (KLOR-CON M) 20 MEQ extended release tablet Take 20 mEq by mouth daily      senna (SENOKOT) 8.6 MG tablet Take 1 tablet by mouth daily as needed for Constipation      ondansetron (ZOFRAN) 4 MG tablet Take 4 mg by mouth every 4 hours as needed for Nausea or Vomiting      QUEtiapine (SEROQUEL) 25 MG tablet Take 1 tablet by mouth nightly 60 tablet 3    loperamide (IMODIUM A-D) 2 MG tablet Take 1 tablet by mouth every 4 hours as needed for Diarrhea 36 tablet 0    docusate sodium (COLACE) 100 MG capsule Take 1 capsule by mouth daily 30 capsule 5    prochlorperazine (COMPAZINE) 10 MG tablet Take 1 tablet by mouth every 8 hours as needed (Nausea) 120 tablet 5    pantoprazole (PROTONIX) 40 MG tablet Take 1 tablet by mouth daily 30 tablet 5    ondansetron (ZOFRAN-ODT) 4 MG disintegrating tablet Take 1 tablet by mouth every 8 hours as needed for Nausea or Vomiting 60 tablet 0     No facility-administered encounter medications on file as of 1/18/2024.           Tarun Sim MD

## 2024-02-20 ENCOUNTER — OUTSIDE SERVICES (OUTPATIENT)
Dept: FAMILY MEDICINE CLINIC | Age: 89
End: 2024-02-20

## 2024-02-20 DIAGNOSIS — I72.2 ANEURYSM OF RIGHT RENAL ARTERY (HCC): Primary | ICD-10-CM

## 2024-02-20 DIAGNOSIS — F32.9 MAJOR DEPRESSIVE DISORDER, REMISSION STATUS UNSPECIFIED, UNSPECIFIED WHETHER RECURRENT: ICD-10-CM

## 2024-02-20 DIAGNOSIS — Z86.73 H/O: CVA (CEREBROVASCULAR ACCIDENT): ICD-10-CM

## 2024-02-20 DIAGNOSIS — K55.1 SUPERIOR MESENTERIC ARTERY STENOSIS (HCC): ICD-10-CM

## 2024-02-20 DIAGNOSIS — I10 ESSENTIAL HYPERTENSION: Chronic | ICD-10-CM

## 2024-02-20 NOTE — PROGRESS NOTES
SUBJECTIVE  Raz Han is a 88 y.o. male.  Seen: 2/15/2024     HPI/Chief C/O:  Patient is doing well this morning  Reports some mild nausea which is better than baseline  States pain in his toes is resolved  No other concerns at this time         ROS:  As above.     Past Medical/Surgical Hx;  Reviewed with patient      Diagnosis Date    Aneurysm of right renal artery (HCC)     follows with Dr. 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    TIA (transient ischemic attack)      Past Surgical History:   Procedure Laterality Date    ANTERIOR CRUCIATE LIGAMENT REPAIR Left 11/21/2001    APPENDECTOMY      CHOLECYSTECTOMY  2007    Lap    ECHO COMPL W DOP COLOR FLOW  12/4/2012         HERNIA REPAIR Right 11/13/2002    double    SINUS SURGERY  2002,2003     done x 2    TONSILLECTOMY      TOTAL KNEE ARTHROPLASTY Left 1/28/2019    LEFT  ROBOTIC KNEE TOTAL ARTHROPLASTY  ++MEREDITH- STANDARD++   ++ADDUCTOR BLOCK++ performed by David Chapa MD at [facility] OR    UPPER GASTROINTESTINAL ENDOSCOPY N/A 10/10/2019    EGD BIOPSY performed by Leobardo Michel MD at [facility] ENDOSCOPY       Past Family Hx:  Reviewed with patient  No family history on file.    Social Hx:  Reviewed with patient  Social History     Tobacco Use    Smoking status: Never    Smokeless tobacco: Never   Substance Use Topics    Alcohol use: No       OBJECTIVE  There were no vitals taken for this visit.    Problem List:  Raz has Major depressive disorder; Insomnia d/t depression; and Status post left knee replacement on their pertinent problem list.    PHYS EX:    Physical Exam  Constitutional:       General: They are not in acute distress.  HENT:      Head: Normocephalic and atraumatic.   Eyes:      Extraocular Movements: Extraocular movements

## 2024-03-21 ENCOUNTER — CLINICAL DOCUMENTATION (OUTPATIENT)
Dept: FAMILY MEDICINE CLINIC | Age: 89
End: 2024-03-21

## 2024-03-21 NOTE — PROGRESS NOTES
SUBJECTIVE  Raz Han is a 88 y.o. male.  Seen: 3/21/2024     HPI/Chief C/O:  Overall doing well. Still complaining of nausea. No other complaints at this time.           ROS:  As above.     Past Medical/Surgical Hx;  Reviewed with patient      Diagnosis Date    Aneurysm of right renal artery (HCC)     follows with Dr. 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    TIA (transient ischemic attack)      Past Surgical History:   Procedure Laterality Date    ANTERIOR CRUCIATE LIGAMENT REPAIR Left 11/21/2001    APPENDECTOMY      CHOLECYSTECTOMY  2007    Lap    ECHO COMPL W DOP COLOR FLOW  12/4/2012         HERNIA REPAIR Right 11/13/2002    double    SINUS SURGERY  2002,2003     done x 2    TONSILLECTOMY      TOTAL KNEE ARTHROPLASTY Left 1/28/2019    LEFT  ROBOTIC KNEE TOTAL ARTHROPLASTY  ++MEREDITH- STANDARD++   ++ADDUCTOR BLOCK++ performed by David Chapa MD at [facility] OR    UPPER GASTROINTESTINAL ENDOSCOPY N/A 10/10/2019    EGD BIOPSY performed by Leobardo Michel MD at [facility] ENDOSCOPY       Past Family Hx:  Reviewed with patient  No family history on file.    Social Hx:  Reviewed with patient  Social History     Tobacco Use    Smoking status: Never    Smokeless tobacco: Never   Substance Use Topics    Alcohol use: No       OBJECTIVE  There were no vitals taken for this visit.    Problem List:  Raz has Major depressive disorder; Insomnia d/t depression; and Status post left knee replacement on their pertinent problem list.    PHYS EX:    Physical Exam  Constitutional:       General: They are not in acute distress.  HENT:      Head: Normocephalic and atraumatic.   Eyes:      Extraocular Movements: Extraocular movements intact.   Cardiovascular:      Rate and Rhythm: Normal rate and regular rhythm.

## 2024-03-24 ENCOUNTER — OUTSIDE SERVICES (OUTPATIENT)
Dept: FAMILY MEDICINE CLINIC | Age: 89
End: 2024-03-24

## 2024-03-24 DIAGNOSIS — K55.1 SUPERIOR MESENTERIC ARTERY STENOSIS (HCC): ICD-10-CM

## 2024-03-24 DIAGNOSIS — I10 ESSENTIAL HYPERTENSION: Chronic | ICD-10-CM

## 2024-03-24 DIAGNOSIS — Z86.73 H/O: CVA (CEREBROVASCULAR ACCIDENT): ICD-10-CM

## 2024-03-24 DIAGNOSIS — F32.9 MAJOR DEPRESSIVE DISORDER, REMISSION STATUS UNSPECIFIED, UNSPECIFIED WHETHER RECURRENT: ICD-10-CM

## 2024-03-24 DIAGNOSIS — I72.2 ANEURYSM OF RIGHT RENAL ARTERY (HCC): Primary | ICD-10-CM

## 2024-04-02 NOTE — PROGRESS NOTES
bisacodyl (DULCOLAX) 10 MG suppository Place 10 mg rectally daily as needed for Constipation      sodium phosphate (FLEET) 7-19 GM/118ML Place 1 enema rectally daily as needed      losartan (COZAAR) 100 MG tablet Take 100 mg by mouth daily      magnesium hydroxide (MILK OF MAGNESIA) 400 MG/5ML suspension Take 30 mLs by mouth daily as needed for Constipation      polyethylene glycol (GLYCOLAX) 17 g packet Take 17 g by mouth at bedtime      potassium chloride (KLOR-CON M) 20 MEQ extended release tablet Take 20 mEq by mouth daily      senna (SENOKOT) 8.6 MG tablet Take 1 tablet by mouth daily as needed for Constipation      ondansetron (ZOFRAN) 4 MG tablet Take 4 mg by mouth every 4 hours as needed for Nausea or Vomiting      QUEtiapine (SEROQUEL) 25 MG tablet Take 1 tablet by mouth nightly 60 tablet 3    loperamide (IMODIUM A-D) 2 MG tablet Take 1 tablet by mouth every 4 hours as needed for Diarrhea 36 tablet 0    docusate sodium (COLACE) 100 MG capsule Take 1 capsule by mouth daily 30 capsule 5    prochlorperazine (COMPAZINE) 10 MG tablet Take 1 tablet by mouth every 8 hours as needed (Nausea) 120 tablet 5    pantoprazole (PROTONIX) 40 MG tablet Take 1 tablet by mouth daily 30 tablet 5    ondansetron (ZOFRAN-ODT) 4 MG disintegrating tablet Take 1 tablet by mouth every 8 hours as needed for Nausea or Vomiting 60 tablet 0     No facility-administered encounter medications on file as of 3/21/2024.       Seen with the resident  I have reviewed and agree with current care planning     Cameron Byers DO    4/2/2024 11:17 AM

## 2024-04-24 ENCOUNTER — OUTSIDE SERVICES (OUTPATIENT)
Dept: FAMILY MEDICINE CLINIC | Age: 89
End: 2024-04-24
Payer: MEDICARE

## 2024-04-24 DIAGNOSIS — F32.9 MAJOR DEPRESSIVE DISORDER, REMISSION STATUS UNSPECIFIED, UNSPECIFIED WHETHER RECURRENT: ICD-10-CM

## 2024-04-24 DIAGNOSIS — I72.2 ANEURYSM OF RIGHT RENAL ARTERY (HCC): Primary | ICD-10-CM

## 2024-04-24 DIAGNOSIS — I10 ESSENTIAL HYPERTENSION: Chronic | ICD-10-CM

## 2024-04-24 DIAGNOSIS — K55.1 SUPERIOR MESENTERIC ARTERY STENOSIS (HCC): ICD-10-CM

## 2024-04-24 PROCEDURE — 99309 SBSQ NF CARE MODERATE MDM 30: CPT | Performed by: FAMILY MEDICINE

## 2024-04-24 NOTE — PROGRESS NOTES
SUBJECTIVE  Raz Han is a 88 y.o. male.  Seen: 4/11/2024    HPI/Chief C/O:  Overall doing well. Still complaining of nausea. No other complaints at this time.           ROS:  As above.     Past Medical/Surgical Hx;  Reviewed with patient      Diagnosis Date    Aneurysm of right renal artery (HCC)     follows with Dr. 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    TIA (transient ischemic attack)      Past Surgical History:   Procedure Laterality Date    ANTERIOR CRUCIATE LIGAMENT REPAIR Left 11/21/2001    APPENDECTOMY      CHOLECYSTECTOMY  2007    Lap    ECHO COMPL W DOP COLOR FLOW  12/4/2012         HERNIA REPAIR Right 11/13/2002    double    SINUS SURGERY  2002,2003     done x 2    TONSILLECTOMY      TOTAL KNEE ARTHROPLASTY Left 1/28/2019    LEFT  ROBOTIC KNEE TOTAL ARTHROPLASTY  ++MEREDITH- STANDARD++   ++ADDUCTOR BLOCK++ performed by David Chapa MD at [facility] OR    UPPER GASTROINTESTINAL ENDOSCOPY N/A 10/10/2019    EGD BIOPSY performed by Leobardo Michel MD at [facility] ENDOSCOPY       Past Family Hx:  Reviewed with patient  No family history on file.    Social Hx:  Reviewed with patient  Social History     Tobacco Use    Smoking status: Never    Smokeless tobacco: Never   Substance Use Topics    Alcohol use: No       OBJECTIVE  There were no vitals taken for this visit.    Problem List:  Raz has Major depressive disorder; Insomnia d/t depression; and Status post left knee replacement on their pertinent problem list.    PHYS EX:    Physical Exam  Constitutional:       General: They are not in acute distress.  HENT:      Head: Normocephalic and atraumatic.   Eyes:      Extraocular Movements: Extraocular movements intact.   Cardiovascular:      Rate and Rhythm: Normal rate and regular rhythm.

## 2024-05-28 ENCOUNTER — OUTSIDE SERVICES (OUTPATIENT)
Dept: FAMILY MEDICINE CLINIC | Age: 89
End: 2024-05-28
Payer: MEDICARE

## 2024-05-28 DIAGNOSIS — I72.2 ANEURYSM OF RIGHT RENAL ARTERY (HCC): Primary | ICD-10-CM

## 2024-05-28 DIAGNOSIS — Z86.73 H/O: CVA (CEREBROVASCULAR ACCIDENT): ICD-10-CM

## 2024-05-28 DIAGNOSIS — K55.1 SUPERIOR MESENTERIC ARTERY STENOSIS (HCC): ICD-10-CM

## 2024-05-28 DIAGNOSIS — F32.9 MAJOR DEPRESSIVE DISORDER, REMISSION STATUS UNSPECIFIED, UNSPECIFIED WHETHER RECURRENT: ICD-10-CM

## 2024-05-28 DIAGNOSIS — F41.9 ANXIETY: ICD-10-CM

## 2024-05-28 DIAGNOSIS — I10 ESSENTIAL HYPERTENSION: Chronic | ICD-10-CM

## 2024-05-28 DIAGNOSIS — G45.9 TIA (TRANSIENT ISCHEMIC ATTACK): ICD-10-CM

## 2024-05-28 PROCEDURE — 99309 SBSQ NF CARE MODERATE MDM 30: CPT | Performed by: FAMILY MEDICINE

## 2024-05-28 NOTE — PROGRESS NOTES
SUBJECTIVE  Raz Han is a 88 y.o. male.  Seen: 5/23/2024     HPI/Chief C/O:  Overall doing well. Still complaining of nausea. No other complaints at this time.   He is medically stable at this time  Family is at bedside and we have a discussion today           ROS:  As above.     Past Medical/Surgical Hx;  Reviewed with patient      Diagnosis Date    Aneurysm of right renal artery (HCC)     follows with Dr. 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    TIA (transient ischemic attack)      Past Surgical History:   Procedure Laterality Date    ANTERIOR CRUCIATE LIGAMENT REPAIR Left 11/21/2001    APPENDECTOMY      CHOLECYSTECTOMY  2007    Lap    ECHO COMPL W DOP COLOR FLOW  12/4/2012         HERNIA REPAIR Right 11/13/2002    double    SINUS SURGERY  2002,2003     done x 2    TONSILLECTOMY      TOTAL KNEE ARTHROPLASTY Left 1/28/2019    LEFT  ROBOTIC KNEE TOTAL ARTHROPLASTY  ++MEREDITH- STANDARD++   ++ADDUCTOR BLOCK++ performed by David Chapa MD at [facility] OR    UPPER GASTROINTESTINAL ENDOSCOPY N/A 10/10/2019    EGD BIOPSY performed by Leobardo Michel MD at [facility] ENDOSCOPY       Past Family Hx:  Reviewed with patient  No family history on file.    Social Hx:  Reviewed with patient  Social History     Tobacco Use    Smoking status: Never    Smokeless tobacco: Never   Substance Use Topics    Alcohol use: No       OBJECTIVE  There were no vitals taken for this visit.    Problem List:  Raz has Major depressive disorder; Insomnia d/t depression; and Status post left knee replacement on their pertinent problem list.    PHYS EX:    Physical Exam  Constitutional:       General: They are not in acute distress.  HENT:      Head: Normocephalic and atraumatic.   Eyes:      Extraocular Movements: Extraocular

## 2024-06-21 ENCOUNTER — OUTSIDE SERVICES (OUTPATIENT)
Dept: FAMILY MEDICINE CLINIC | Age: 89
End: 2024-06-21

## 2024-06-21 DIAGNOSIS — I72.2 ANEURYSM OF RIGHT RENAL ARTERY (HCC): Primary | ICD-10-CM

## 2024-06-21 DIAGNOSIS — G89.29 CHRONIC INTRACTABLE HEADACHE, UNSPECIFIED HEADACHE TYPE: ICD-10-CM

## 2024-06-21 DIAGNOSIS — G45.9 TIA (TRANSIENT ISCHEMIC ATTACK): ICD-10-CM

## 2024-06-21 DIAGNOSIS — K55.1 SUPERIOR MESENTERIC ARTERY STENOSIS (HCC): ICD-10-CM

## 2024-06-21 DIAGNOSIS — I10 ESSENTIAL HYPERTENSION: Chronic | ICD-10-CM

## 2024-06-21 DIAGNOSIS — R51.9 CHRONIC INTRACTABLE HEADACHE, UNSPECIFIED HEADACHE TYPE: ICD-10-CM

## 2024-06-21 NOTE — PROGRESS NOTES
SUBJECTIVE  Raz Han is a 88 y.o. male.  Seen: 6/20/2024    HPI/Chief C/O:  Overall doing well. Still complaining of nausea. No other complaints at this time.   He is medically stable at this time  There are no new issues as per nursing  I actually think that he looks much better  We will continue to work on this nausea           ROS:  As above.     Past Medical/Surgical Hx;  Reviewed with patient      Diagnosis Date    Aneurysm of right renal artery (HCC)     follows with Dr. 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    TIA (transient ischemic attack)      Past Surgical History:   Procedure Laterality Date    ANTERIOR CRUCIATE LIGAMENT REPAIR Left 11/21/2001    APPENDECTOMY      CHOLECYSTECTOMY  2007    Lap    ECHO COMPL W DOP COLOR FLOW  12/4/2012         HERNIA REPAIR Right 11/13/2002    double    SINUS SURGERY  2002,2003     done x 2    TONSILLECTOMY      TOTAL KNEE ARTHROPLASTY Left 1/28/2019    LEFT  ROBOTIC KNEE TOTAL ARTHROPLASTY  ++MEREDITH- STANDARD++   ++ADDUCTOR BLOCK++ performed by David Chapa MD at [facility] OR    UPPER GASTROINTESTINAL ENDOSCOPY N/A 10/10/2019    EGD BIOPSY performed by Leobardo Michel MD at [facility] ENDOSCOPY       Past Family Hx:  Reviewed with patient  No family history on file.    Social Hx:  Reviewed with patient  Social History     Tobacco Use    Smoking status: Never    Smokeless tobacco: Never   Substance Use Topics    Alcohol use: No       OBJECTIVE  There were no vitals taken for this visit.    Problem List:  Raz has Major depressive disorder; Insomnia d/t depression; and Status post left knee replacement on their pertinent problem list.    PHYS EX:    Physical Exam  Constitutional:       General: They are not in acute distress.  HENT:      Head:

## 2024-07-05 ENCOUNTER — CLINICAL DOCUMENTATION (OUTPATIENT)
Dept: FAMILY MEDICINE CLINIC | Age: 89
End: 2024-07-05

## 2024-07-05 NOTE — PROGRESS NOTES
rhythm.      Heart sounds: No murmur heard.     No friction rub. No gallop.   Pulmonary:      Breath sounds: Normal breath sounds. No wheezing, rhonchi or rales.   Abdominal:      General: There is no distension.      Tenderness: There is no abdominal tenderness. There is no guarding.   Musculoskeletal:      Right lower leg: 3+ pitting edema.      Left lower leg: 3+ pitting edema.   Neurological:      Mental Status: She is alert.      ASSESSMENT/PLAN  Diagnoses and all orders for this visit:    Right big toenail lesion  Podiatry to see patient, no sign of acute infection    Major depressive disorder/Anxiety  Trintellix 10 mg daily  Continue Klonopin 0.5 mg QHS  Klonopin 0.25 MWF  Buspar TID     Primary insomnia  Long talk on treatment and prevention  Literature is given     Nausea  Zofran  Phenergan    Severe protein-calorie malnutrition, chronic gastritis without bleeding, unspecified gastritis type  Decreased appetite per patient chronic issue  Protonix 40 mg daily  Tums Q8H prn    Generalized weakness  stable on current care planning  continue treatment as we are meeting goals     Primary osteoarthritis of left knee S/P Left knee replacement    H/O: CVA (cerebrovascular accident)  ASA 81 mg daily    Essential hypertension  Currently on losartan 100 mg daily  Atenolol 25 mg QD  Reduce Amlodipine down to 5 mg        Outpatient Encounter Medications as of 7/5/2024   Medication Sig Dispense Refill    acetaminophen (TYLENOL) 325 MG tablet Take 650 mg by mouth every 6 hours as needed for Pain or Fever      bisacodyl (DULCOLAX) 10 MG suppository Place 10 mg rectally daily as needed for Constipation      sodium phosphate (FLEET) 7-19 GM/118ML Place 1 enema rectally daily as needed      losartan (COZAAR) 100 MG tablet Take 100 mg by mouth daily      magnesium hydroxide (MILK OF MAGNESIA) 400 MG/5ML suspension Take 30 mLs by mouth daily as needed for Constipation      polyethylene glycol (GLYCOLAX) 17 g packet Take 17 g by

## 2024-07-12 ENCOUNTER — OUTSIDE SERVICES (OUTPATIENT)
Dept: FAMILY MEDICINE CLINIC | Age: 89
End: 2024-07-12

## 2024-07-12 DIAGNOSIS — I10 ESSENTIAL HYPERTENSION: Chronic | ICD-10-CM

## 2024-07-12 DIAGNOSIS — K55.1 SUPERIOR MESENTERIC ARTERY STENOSIS (HCC): ICD-10-CM

## 2024-07-12 DIAGNOSIS — I72.2 ANEURYSM OF RIGHT RENAL ARTERY (HCC): Primary | ICD-10-CM

## 2024-07-12 NOTE — PROGRESS NOTES
SUBJECTIVE  Raz Han is a 88 y.o. male.  Seen: 7/5/2024    HPI/Chief C/O:  Overall doing well. Reports he has chronic nausea and takes zofran for relief. Had successful BM yesterday. Is reporting some leg swelling after his amlodipine was increased.           ROS:  As above.     Past Medical/Surgical Hx;  Reviewed with patient      Diagnosis Date    Aneurysm of right renal artery (HCC)     follows with Dr. 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 OR    UPPER GASTROINTESTINAL ENDOSCOPY N/A 10/10/2019    EGD BIOPSY performed by Leobardo Michel MD at [facility] ENDOSCOPY       Past Family Hx:  Reviewed with patient  No family history on file.    Social Hx:  Reviewed with patient  Social History     Tobacco Use    Smoking status: Never    Smokeless tobacco: Never   Substance Use Topics    Alcohol use: No       OBJECTIVE  There were no vitals taken for this visit.    Problem List:  Raz has Major depressive disorder; Insomnia d/t depression; and Status post left knee replacement on their pertinent problem list.    PHYS EX:    Physical Exam  Constitutional:       General: They are not in acute distress.  HENT:      Head: Normocephalic and atraumatic.   Eyes:      Extraocular Movements: Extraocular

## 2024-08-01 NOTE — ADDENDUM NOTE
Addended by: Jorge Mary on: 8/16/2021 03:08 PM     Modules accepted: Orders Mary Alice calls back returning Dr. Saleem's call.    She says [facility] told her to wean off  gabapentin (she believes you were aware).    She thought you were switching it to another medicine.    She has been weaning off per directions from 7/11 appt with Giovanna Connell CNP    Please advise for the new medication    Message sent.

## 2024-08-24 ENCOUNTER — OUTSIDE SERVICES (OUTPATIENT)
Dept: FAMILY MEDICINE CLINIC | Age: 89
End: 2024-08-24

## 2024-08-24 DIAGNOSIS — I72.2 ANEURYSM OF RIGHT RENAL ARTERY (HCC): Primary | ICD-10-CM

## 2024-08-24 DIAGNOSIS — I10 ESSENTIAL HYPERTENSION: Chronic | ICD-10-CM

## 2024-08-24 DIAGNOSIS — Z86.73 H/O: CVA (CEREBROVASCULAR ACCIDENT): ICD-10-CM

## 2024-08-24 DIAGNOSIS — K55.1 SUPERIOR MESENTERIC ARTERY STENOSIS (HCC): ICD-10-CM

## 2024-08-24 NOTE — PROGRESS NOTES
SUBJECTIVE  Raz Han is a 88 y.o. male.  Seen: 8/15/2024    HPI/Chief C/O:  Overall doing well. Reports he has chronic nausea and takes zofran for relief.      He is medically stable  There are no new issues as per nursing    I will continue to work on this constipation and nausea issue          ROS:  As above.     Past Medical/Surgical Hx;  Reviewed with patient      Diagnosis Date    Aneurysm of right renal artery (HCC)     follows with Dr. 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    TIA (transient ischemic attack)      Past Surgical History:   Procedure Laterality Date    ANTERIOR CRUCIATE LIGAMENT REPAIR Left 11/21/2001    APPENDECTOMY      CHOLECYSTECTOMY  2007    Lap    ECHO COMPL W DOP COLOR FLOW  12/4/2012         HERNIA REPAIR Right 11/13/2002    double    SINUS SURGERY  2002,2003     done x 2    TONSILLECTOMY      TOTAL KNEE ARTHROPLASTY Left 1/28/2019    LEFT  ROBOTIC KNEE TOTAL ARTHROPLASTY  ++MEREDITH- STANDARD++   ++ADDUCTOR BLOCK++ performed by David Chapa MD at [facility] OR    UPPER GASTROINTESTINAL ENDOSCOPY N/A 10/10/2019    EGD BIOPSY performed by Leobardo Michel MD at [facility] ENDOSCOPY       Past Family Hx:  Reviewed with patient  No family history on file.    Social Hx:  Reviewed with patient  Social History     Tobacco Use    Smoking status: Never    Smokeless tobacco: Never   Substance Use Topics    Alcohol use: No       OBJECTIVE  There were no vitals taken for this visit.    Problem List:  Raz has Major depressive disorder; Insomnia d/t depression; and Status post left knee replacement on their pertinent problem list.    PHYS EX:    Physical Exam  Constitutional:       General: They are not in acute distress.  HENT:      Head: Normocephalic and atraumatic.   Eyes:

## 2024-09-10 ENCOUNTER — OUTSIDE SERVICES (OUTPATIENT)
Dept: FAMILY MEDICINE CLINIC | Age: 89
End: 2024-09-10

## 2024-09-10 DIAGNOSIS — F32.9 MAJOR DEPRESSIVE DISORDER, REMISSION STATUS UNSPECIFIED, UNSPECIFIED WHETHER RECURRENT: Primary | ICD-10-CM

## 2024-09-10 DIAGNOSIS — K55.1 SUPERIOR MESENTERIC ARTERY STENOSIS (HCC): ICD-10-CM

## 2024-09-10 DIAGNOSIS — Z86.73 H/O: CVA (CEREBROVASCULAR ACCIDENT): ICD-10-CM

## 2024-09-10 DIAGNOSIS — I72.2 ANEURYSM OF RIGHT RENAL ARTERY (HCC): ICD-10-CM

## 2024-09-10 DIAGNOSIS — I10 ESSENTIAL HYPERTENSION: Chronic | ICD-10-CM

## 2024-10-28 ENCOUNTER — OUTSIDE SERVICES (OUTPATIENT)
Dept: FAMILY MEDICINE CLINIC | Age: 89
End: 2024-10-28
Payer: COMMERCIAL

## 2024-10-28 DIAGNOSIS — I72.2 ANEURYSM OF RIGHT RENAL ARTERY (HCC): Primary | ICD-10-CM

## 2024-10-28 DIAGNOSIS — F32.9 MAJOR DEPRESSIVE DISORDER, REMISSION STATUS UNSPECIFIED, UNSPECIFIED WHETHER RECURRENT: ICD-10-CM

## 2024-10-28 DIAGNOSIS — K55.1 SUPERIOR MESENTERIC ARTERY STENOSIS (HCC): ICD-10-CM

## 2024-10-28 DIAGNOSIS — I10 ESSENTIAL HYPERTENSION: Chronic | ICD-10-CM

## 2024-10-28 PROCEDURE — 99309 SBSQ NF CARE MODERATE MDM 30: CPT | Performed by: FAMILY MEDICINE

## 2024-10-28 NOTE — PROGRESS NOTES
SUBJECTIVE  Raz Han is a 88 y.o. male.  Seen: 10/24/2024    HPI/Chief C/O:  Overall doing well. Reports he has chronic nausea and takes zofran for relief.      He says that the nausea is mostly when he gets up in the AM    He is medically stable  There are no new issues as per nursing    I will continue to work on this constipation and nausea issue  I will add a low dose reglan 5 mg BID  The reglan seems to be having a positive effect           ROS:  As above.     Past Medical/Surgical Hx;  Reviewed with patient      Diagnosis Date    Aneurysm of right renal artery (HCC)     follows with Dr. 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    TIA (transient ischemic attack)      Past Surgical History:   Procedure Laterality Date    ANTERIOR CRUCIATE LIGAMENT REPAIR Left 11/21/2001    APPENDECTOMY      CHOLECYSTECTOMY  2007    Lap    ECHO COMPL W DOP COLOR FLOW  12/4/2012         HERNIA REPAIR Right 11/13/2002    double    SINUS SURGERY  2002,2003     done x 2    TONSILLECTOMY      TOTAL KNEE ARTHROPLASTY Left 1/28/2019    LEFT  ROBOTIC KNEE TOTAL ARTHROPLASTY  ++MEREDITH- STANDARD++   ++ADDUCTOR BLOCK++ performed by David Chapa MD at [facility] OR    UPPER GASTROINTESTINAL ENDOSCOPY N/A 10/10/2019    EGD BIOPSY performed by Leobardo Michel MD at [facility] ENDOSCOPY       Past Family Hx:  Reviewed with patient  No family history on file.    Social Hx:  Reviewed with patient  Social History     Tobacco Use    Smoking status: Never    Smokeless tobacco: Never   Substance Use Topics    Alcohol use: No       OBJECTIVE  There were no vitals taken for this visit.    Problem List:  Raz has Major depressive disorder; Insomnia d/t depression; and Status post left knee replacement on their pertinent problem

## 2024-11-20 ENCOUNTER — OUTSIDE SERVICES (OUTPATIENT)
Dept: FAMILY MEDICINE CLINIC | Age: 89
End: 2024-11-20
Payer: MEDICARE

## 2024-11-20 DIAGNOSIS — I72.2 ANEURYSM OF RIGHT RENAL ARTERY (HCC): ICD-10-CM

## 2024-11-20 DIAGNOSIS — I10 ESSENTIAL HYPERTENSION: Chronic | ICD-10-CM

## 2024-11-20 DIAGNOSIS — F32.9 MAJOR DEPRESSIVE DISORDER, REMISSION STATUS UNSPECIFIED, UNSPECIFIED WHETHER RECURRENT: Primary | ICD-10-CM

## 2024-11-20 DIAGNOSIS — K55.1 SUPERIOR MESENTERIC ARTERY STENOSIS (HCC): ICD-10-CM

## 2024-11-20 PROCEDURE — 99309 SBSQ NF CARE MODERATE MDM 30: CPT | Performed by: FAMILY MEDICINE

## 2024-11-20 NOTE — PROGRESS NOTES
SUBJECTIVE  Raz Han is a 88 y.o. male.  Seen: 11/14/2024    HPI/Chief C/O:  Overall doing well. Reports he has chronic nausea and takes zofran for relief.    He is medically stable  There are no new issues as per nursing    I will continue to work on this constipation and nausea issue  I will add a low dose reglan 5 mg BID  The reglan seems to be having a positive effect           ROS:  As above.     Past Medical/Surgical Hx;  Reviewed with patient      Diagnosis Date    Aneurysm of right renal artery (HCC)     follows with Dr. 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    TIA (transient ischemic attack)      Past Surgical History:   Procedure Laterality Date    ANTERIOR CRUCIATE LIGAMENT REPAIR Left 11/21/2001    APPENDECTOMY      CHOLECYSTECTOMY  2007    Lap    ECHO COMPL W DOP COLOR FLOW  12/4/2012         HERNIA REPAIR Right 11/13/2002    double    SINUS SURGERY  2002,2003     done x 2    TONSILLECTOMY      TOTAL KNEE ARTHROPLASTY Left 1/28/2019    LEFT  ROBOTIC KNEE TOTAL ARTHROPLASTY  ++MEREDITH- STANDARD++   ++ADDUCTOR BLOCK++ performed by David Chapa MD at [facility] OR    UPPER GASTROINTESTINAL ENDOSCOPY N/A 10/10/2019    EGD BIOPSY performed by Leobardo Michel MD at [facility] ENDOSCOPY       Past Family Hx:  Reviewed with patient  No family history on file.    Social Hx:  Reviewed with patient  Social History     Tobacco Use    Smoking status: Never    Smokeless tobacco: Never   Substance Use Topics    Alcohol use: No       OBJECTIVE  There were no vitals taken for this visit.    Problem List:  Raz has Major depressive disorder; Insomnia d/t depression; and Status post left knee replacement on their pertinent problem list.    PHYS EX:    Physical Exam  Constitutional:       General: They

## 2024-12-04 ENCOUNTER — CLINICAL DOCUMENTATION (OUTPATIENT)
Dept: FAMILY MEDICINE CLINIC | Age: 89
End: 2024-12-04

## 2024-12-04 NOTE — PROGRESS NOTES
SUBJECTIVE  Raz Han is a 88 y.o. male.    HPI/Chief C/O:  Overall doing well.   Patient was seen and examined at bedside this morning. He states having chronic nausea every morning but it generally settles through out the day. He is able to eat and drink without vomiting. He denies having constipation currently, stating that he has had good bowel movements last night and the night before. Denies having any chest pain, sob, abdominal pain, symptoms of heartburn.  He was concerned about itching on his left foot and was wonderiing if it was infected. I examined his feet and found them to be very dry and the skin was scaly and flaky. I discussed with patient that I can't appreciate any signs of infection currently but for the itching he can benefit from twice daily application of a moisturizer such as Cera Ve or Eucerin.    He is medically stable  There are no new issues as per nursing    ROS:  As above.     Past Medical/Surgical Hx;  Reviewed with patient      Diagnosis Date    Aneurysm of right renal artery (HCC)     follows with Dr. 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    TIA (transient ischemic attack)      Past Surgical History:   Procedure Laterality Date    ANTERIOR CRUCIATE LIGAMENT REPAIR Left 11/21/2001    APPENDECTOMY      CHOLECYSTECTOMY  2007    Lap    ECHO COMPL W DOP COLOR FLOW  12/4/2012         HERNIA REPAIR Right 11/13/2002    double    SINUS SURGERY  2002,2003     done x 2    TONSILLECTOMY      TOTAL KNEE ARTHROPLASTY Left 1/28/2019    LEFT  ROBOTIC KNEE TOTAL ARTHROPLASTY  ++MEREDITH- STANDARD++   ++ADDUCTOR BLOCK++ performed by David Chapa MD at [facility] OR    UPPER GASTROINTESTINAL ENDOSCOPY N/A 10/10/2019    EGD BIOPSY performed by Leobardo Michel MD at [facility] ENDOSCOPY

## 2024-12-16 ENCOUNTER — OUTSIDE SERVICES (OUTPATIENT)
Dept: FAMILY MEDICINE CLINIC | Age: 89
End: 2024-12-16
Payer: COMMERCIAL

## 2024-12-16 DIAGNOSIS — R53.1 GENERALIZED WEAKNESS: ICD-10-CM

## 2024-12-16 DIAGNOSIS — I10 ESSENTIAL HYPERTENSION: Chronic | ICD-10-CM

## 2024-12-16 DIAGNOSIS — K55.1 SUPERIOR MESENTERIC ARTERY STENOSIS (HCC): ICD-10-CM

## 2024-12-16 DIAGNOSIS — F32.9 MAJOR DEPRESSIVE DISORDER, REMISSION STATUS UNSPECIFIED, UNSPECIFIED WHETHER RECURRENT: ICD-10-CM

## 2024-12-16 DIAGNOSIS — I72.2 ANEURYSM OF RIGHT RENAL ARTERY (HCC): Primary | ICD-10-CM

## 2024-12-16 PROCEDURE — 99309 SBSQ NF CARE MODERATE MDM 30: CPT | Performed by: FAMILY MEDICINE

## 2024-12-16 NOTE — PROGRESS NOTES
SUBJECTIVE  Raz Han is a 88 y.o. male.  Seen: 12/12/2024  HPI/Chief C/O:  Overall doing well.   Patient was seen and examined at bedside this morning. He states having chronic nausea every morning but it generally settles through out the day. He is able to eat and drink without vomiting. He denies having constipation currently, stating that he has had good bowel movements last night and the night before. Denies having any chest pain, sob, abdominal pain, symptoms of heartburn.  He was concerned about itching on his left foot and was wonderiing if it was infected. I examined his feet and found them to be very dry and the skin was scaly and flaky. I discussed with patient that I can't appreciate any signs of infection currently but for the itching he can benefit from twice daily application of a moisturizer such as Cera Ve or Eucerin.    He is medically stable  There are no new issues as per nursing    ROS:  As above.     Past Medical/Surgical Hx;  Reviewed with patient      Diagnosis Date    Aneurysm of right renal artery (HCC)     follows with Dr. 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 OR    UPPER GASTROINTESTINAL ENDOSCOPY N/A 10/10/2019    EGD BIOPSY performed by Leobardo Michel MD

## 2024-12-20 ENCOUNTER — CLINICAL DOCUMENTATION (OUTPATIENT)
Dept: FAMILY MEDICINE CLINIC | Age: 89
End: 2024-12-20

## 2024-12-20 NOTE — PROGRESS NOTES
SUBJECTIVE  Raz Han is a 88 y.o. male.  Seen: 12/20/2024  HPI/Chief C/O:  Overall doing well.   Patient was seen and examined at bedside this morning. He states having chronic nausea every morning but this morning feels well. He mentioned he hasn't had too much appetite but he will try to eat more today. Denies having any chest pain, sob, abdominal pain, symptoms of heartburn, urinary symptoms or changes in BM. Last BM patient had was yesterday.    He is medically stable  There are no new issues as per nursing    ROS:  As above.     Past Medical/Surgical Hx;  Reviewed with patient      Diagnosis Date    Aneurysm of right renal artery (HCC)     follows with Dr. 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 OR    UPPER GASTROINTESTINAL ENDOSCOPY N/A 10/10/2019    EGD BIOPSY performed by Leobardo Michel MD at [facility] ENDOSCOPY       Past Family Hx:  Reviewed with patient  No family history on file.    Social Hx:  Reviewed with patient  Social History     Tobacco Use    Smoking status: Never    Smokeless tobacco: Never   Substance Use Topics    Alcohol use: No       OBJECTIVE  /76 mmHg, respiratory rate 18, weight 227 pounds, temperature 97.0, pulse 74, saturation of oxygen 96% on room air,

## 2025-01-17 ENCOUNTER — CLINICAL DOCUMENTATION (OUTPATIENT)
Dept: FAMILY MEDICINE CLINIC | Age: 89
End: 2025-01-17

## 2025-01-20 ENCOUNTER — OUTSIDE SERVICES (OUTPATIENT)
Dept: FAMILY MEDICINE CLINIC | Age: 89
End: 2025-01-20
Payer: COMMERCIAL

## 2025-01-20 DIAGNOSIS — I10 ESSENTIAL HYPERTENSION: Chronic | ICD-10-CM

## 2025-01-20 DIAGNOSIS — K55.1 SUPERIOR MESENTERIC ARTERY STENOSIS (HCC): ICD-10-CM

## 2025-01-20 DIAGNOSIS — I72.2 ANEURYSM OF RIGHT RENAL ARTERY (HCC): Primary | ICD-10-CM

## 2025-01-20 PROCEDURE — 99309 SBSQ NF CARE MODERATE MDM 30: CPT | Performed by: FAMILY MEDICINE

## 2025-01-20 NOTE — PROGRESS NOTES
SUBJECTIVE  Raz Han is a 89 y.o. male.  Seen: 1/16/2025  HPI/Chief C/O:  Overall doing well.   Patient was seen and examined at bedside this morning. He states having chronic nausea that has largely been stable. Nursing spoke to me with concerns that patient has \"migraines\" which he apparently has dealt with for years and used to follow with ENT for. Tylenol has not been helping much. Upon further questioning it sounds more like sinus congestion/pain.     He is medically stable  No new issues as  per nursing      We will review all past medical history and go over past and current medications  We will review all medical records that are available to us  We will reconcile our care planning and treatment goals to past and present for this patient       ROS:  As above.     Past Medical/Surgical Hx;  Reviewed with patient      Diagnosis Date    Aneurysm of right renal artery (HCC)     follows with Dr. 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    TIA (transient ischemic attack)      Past Surgical History:   Procedure Laterality Date    ANTERIOR CRUCIATE LIGAMENT REPAIR Left 11/21/2001    APPENDECTOMY      CHOLECYSTECTOMY  2007    Lap    ECHO COMPL W DOP COLOR FLOW  12/4/2012         HERNIA REPAIR Right 11/13/2002    double    SINUS SURGERY  2002,2003     done x 2    TONSILLECTOMY      TOTAL KNEE ARTHROPLASTY Left 1/28/2019    LEFT  ROBOTIC KNEE TOTAL ARTHROPLASTY  ++MEREDITH- STANDARD++   ++ADDUCTOR BLOCK++ performed by David Chapa MD at [facility] OR    UPPER GASTROINTESTINAL ENDOSCOPY N/A 10/10/2019    EGD BIOPSY performed by Leobardo Michel MD at [facility] ENDOSCOPY       Past Family Hx:  Reviewed with patient  No family history on file.    Social Hx:  Reviewed with patient  Social History

## 2025-01-20 NOTE — PROGRESS NOTES
SUBJECTIVE  Raz Han is a 89 y.o. male.  Seen: 1/17/2024  HPI/Chief C/O:  Overall doing well.   Patient was seen and examined at bedside this morning. He states having chronic nausea that has largely been stable. Nursing spoke to me with concerns that patient has \"migraines\" which he apparently has dealt with for years and used to follow with ENT for. Tylenol has not been helping much. Upon further questioning it sounds more like sinus congestion/pain.     He is medically stable      ROS:  As above.     Past Medical/Surgical Hx;  Reviewed with patient      Diagnosis Date    Aneurysm of right renal artery (HCC)     follows with Dr. 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    TIA (transient ischemic attack)      Past Surgical History:   Procedure Laterality Date    ANTERIOR CRUCIATE LIGAMENT REPAIR Left 11/21/2001    APPENDECTOMY      CHOLECYSTECTOMY  2007    Lap    ECHO COMPL W DOP COLOR FLOW  12/4/2012         HERNIA REPAIR Right 11/13/2002    double    SINUS SURGERY  2002,2003     done x 2    TONSILLECTOMY      TOTAL KNEE ARTHROPLASTY Left 1/28/2019    LEFT  ROBOTIC KNEE TOTAL ARTHROPLASTY  ++MEREDITH- STANDARD++   ++ADDUCTOR BLOCK++ performed by David Chapa MD at [facility] OR    UPPER GASTROINTESTINAL ENDOSCOPY N/A 10/10/2019    EGD BIOPSY performed by Leobardo Michel MD at [facility] ENDOSCOPY       Past Family Hx:  Reviewed with patient  No family history on file.    Social Hx:  Reviewed with patient  Social History     Tobacco Use    Smoking status: Never    Smokeless tobacco: Never   Substance Use Topics    Alcohol use: No       OBJECTIVE  Problem List:  Raz does not have any pertinent problems on file.    PHYS EX:    Physical Exam  Constitutional:       General: They are not in acute

## 2025-02-10 ENCOUNTER — OUTSIDE SERVICES (OUTPATIENT)
Dept: FAMILY MEDICINE CLINIC | Age: 89
End: 2025-02-10
Payer: COMMERCIAL

## 2025-02-10 DIAGNOSIS — F32.9 MAJOR DEPRESSIVE DISORDER, REMISSION STATUS UNSPECIFIED, UNSPECIFIED WHETHER RECURRENT: ICD-10-CM

## 2025-02-10 DIAGNOSIS — I10 ESSENTIAL HYPERTENSION: Chronic | ICD-10-CM

## 2025-02-10 DIAGNOSIS — K55.1 SUPERIOR MESENTERIC ARTERY STENOSIS (HCC): ICD-10-CM

## 2025-02-10 DIAGNOSIS — I72.2 ANEURYSM OF RIGHT RENAL ARTERY (HCC): Primary | ICD-10-CM

## 2025-02-10 PROCEDURE — 99309 SBSQ NF CARE MODERATE MDM 30: CPT | Performed by: FAMILY MEDICINE

## 2025-02-10 NOTE — PROGRESS NOTES
SUBJECTIVE  Raz Han is a 89 y.o. male.  Seen: 2/6/2025  HPI/Chief C/O:  Overall doing well.       He is medically stable  No new issues as  per nursing      We will review all past medical history and go over past and current medications  We will review all medical records that are available to us  We will reconcile our care planning and treatment goals to past and present for this patient       ROS:  As above.     Past Medical/Surgical Hx;  Reviewed with patient      Diagnosis Date    Aneurysm of right renal artery (HCC)     follows with Dr. 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    TIA (transient ischemic attack)      Past Surgical History:   Procedure Laterality Date    ANTERIOR CRUCIATE LIGAMENT REPAIR Left 11/21/2001    APPENDECTOMY      CHOLECYSTECTOMY  2007    Lap    ECHO COMPL W DOP COLOR FLOW  12/4/2012         HERNIA REPAIR Right 11/13/2002    double    SINUS SURGERY  2002,2003     done x 2    TONSILLECTOMY      TOTAL KNEE ARTHROPLASTY Left 1/28/2019    LEFT  ROBOTIC KNEE TOTAL ARTHROPLASTY  ++MEREDITH- STANDARD++   ++ADDUCTOR BLOCK++ performed by David Chapa MD at [facility] OR    UPPER GASTROINTESTINAL ENDOSCOPY N/A 10/10/2019    EGD BIOPSY performed by Leobardo Michel MD at [facility] ENDOSCOPY       Past Family Hx:  Reviewed with patient  No family history on file.    Social Hx:  Reviewed with patient  Social History     Tobacco Use    Smoking status: Never    Smokeless tobacco: Never   Substance Use Topics    Alcohol use: No       OBJECTIVE  Problem List:  Raz has Major depressive disorder; Insomnia d/t depression; and Status post left knee replacement on their pertinent problem list.    PHYS EX:    Physical Exam  Constitutional:       General: They are not in acute

## 2025-02-17 ENCOUNTER — CLINICAL DOCUMENTATION (OUTPATIENT)
Dept: FAMILY MEDICINE CLINIC | Age: 89
End: 2025-02-17

## 2025-02-17 NOTE — PROGRESS NOTES
SUBJECTIVE  Raz Han is a 89 y.o. male.    HPI/Chief C/O:  Overall doing well.   No new complaints.  Patient still tells about chronic nausea and congestion.     He is medically stable     ROS:  As above.        Physical Exam  Constitutional:       General: They are not in acute distress.  HENT:      Head: Normocephalic and atraumatic. Sinus tenderness (frontal)   Eyes:      Extraocular Movements: Extraocular movements intact.   Cardiovascular:      Rate and Rhythm: Normal rate and regular rhythm.      Heart sounds: No murmur heard.     No friction rub. No gallop.   Pulmonary:      Breath sounds: Normal breath sounds. No wheezing, rhonchi or rales.   Abdominal:      General: There is no distension.      Tenderness: There is no abdominal tenderness. There is no guarding.   Musculoskeletal:      Right lower le+ pitting edema.      Left lower le+ pitting edema.   Neurological:      Mental Status: She is alert.       ASSESSMENT/PLAN     Headaches  Tylenol prn  Consider topamax  Consider ENT consult as patient has followed with them in past      Right big toenail lesion  Cera Ve or Aveeno or Eucerin to the feet BID to help with itching secondary to dryness.     Major depressive disorder/Anxiety  Trintellix 10 mg daily  Klonopin 0.25 MWF BID  Buspar 15 Mg TID      Primary insomnia  Trazodone 25 Mg nightly  Melatonin 2 tablets nightly    Chronic nausea   Controlled with medications  -Zofran as needed  - Reglan 5 mg BID     GERD  Pepcid 20 Mg daily    Severe protein-calorie malnutrition, chronic gastritis without bleeding, unspecified gastritis type  Decreased appetite per patient chronic issue  Protonix 40 mg daily  Tums Q8H prn     Generalized weakness  stable      Primary osteoarthritis of left knee S/P Left knee replacement  Stable    H/O: CVA (cerebrovascular accident)  ASA 81 mg daily     Essential hypertension  losartan 100 mg daily  Atenolol 25 mg QD  Amlodipine 10 Mg daily    Edema  Furosemide 20 Mg

## 2025-03-19 ENCOUNTER — OUTSIDE SERVICES (OUTPATIENT)
Dept: FAMILY MEDICINE CLINIC | Age: 89
End: 2025-03-19
Payer: COMMERCIAL

## 2025-03-19 DIAGNOSIS — F32.9 MAJOR DEPRESSIVE DISORDER, REMISSION STATUS UNSPECIFIED, UNSPECIFIED WHETHER RECURRENT: ICD-10-CM

## 2025-03-19 DIAGNOSIS — R53.1 GENERALIZED WEAKNESS: ICD-10-CM

## 2025-03-19 DIAGNOSIS — I10 ESSENTIAL HYPERTENSION: Primary | Chronic | ICD-10-CM

## 2025-03-19 DIAGNOSIS — R26.9 GAIT DISTURBANCE: ICD-10-CM

## 2025-03-19 PROCEDURE — 99309 SBSQ NF CARE MODERATE MDM 30: CPT | Performed by: FAMILY MEDICINE

## 2025-03-19 NOTE — PROGRESS NOTES
SUBJECTIVE  Raz Han is a 89 y.o. male.    Seen: 3/13/2025    HPI/Chief C/O:  Overall doing well.   No new complaints.  Patient still tells about chronic nausea and congestion.     He is medically stable      We will review all past medical history and go over past and current medications   We will review all medical records that are available to us  We will reconcile our care planning and treatment goals to past and present for this patient                ROS:  As above.        Physical Exam  Constitutional:       General: They are not in acute distress.  HENT:      Head: Normocephalic and atraumatic. Sinus tenderness (frontal)   Eyes:      Extraocular Movements: Extraocular movements intact.   Cardiovascular:      Rate and Rhythm: Normal rate and regular rhythm.      Heart sounds: No murmur heard.     No friction rub. No gallop.   Pulmonary:      Breath sounds: Normal breath sounds. No wheezing, rhonchi or rales.   Abdominal:      General: There is no distension.      Tenderness: There is no abdominal tenderness. There is no guarding.   Musculoskeletal:      Right lower le+ pitting edema.      Left lower le+ pitting edema.   Neurological:      Mental Status: She is alert.       ASSESSMENT/PLAN     Headaches  Tylenol prn  Consider topamax  Consider ENT consult as patient has followed with them in past      Right big toenail lesion  Cera Ve or Aveeno or Eucerin to the feet BID to help with itching secondary to dryness.     Major depressive disorder/Anxiety  Trintellix 10 mg daily  Klonopin 0.25 MWF BID  Buspar 15 Mg TID      Primary insomnia  Trazodone 25 Mg nightly  Melatonin 2 tablets nightly    Chronic nausea   Controlled with medications  -Zofran as needed  - Reglan 5 mg BID     GERD  Pepcid 20 Mg daily    Severe protein-calorie malnutrition, chronic gastritis without bleeding, unspecified gastritis type  Decreased appetite per patient chronic issue  Protonix 40 mg daily  Tums Q8H prn

## 2025-04-08 NOTE — PROGRESS NOTES
Pt informed via phone call   [facility] Quality Flow/Interdisciplinary Rounds Progress Note        Quality Flow Rounds held on December 2, 2022    Disciplines Attending:  Bedside Nurse, , , and Nursing Unit Leadership    Yoko Lowe was admitted on 11/28/2022  1:21 PM    Anticipated Discharge Date:  12/3/2022    Disposition:    Jose R Score:  Jose R Scale Score: 20    Readmission Risk              Risk of Unplanned Readmission:  17           Discussed patient goal for the day, patient clinical progression, and barriers to discharge.   The following Goal(s) of the Day/Commitment(s) have been identified:   I/Os, discharge [address], RN  December 2, 2022

## 2025-04-16 ENCOUNTER — OUTSIDE SERVICES (OUTPATIENT)
Dept: FAMILY MEDICINE CLINIC | Age: 89
End: 2025-04-16
Payer: COMMERCIAL

## 2025-04-16 DIAGNOSIS — R53.1 GENERALIZED WEAKNESS: ICD-10-CM

## 2025-04-16 DIAGNOSIS — F32.9 MAJOR DEPRESSIVE DISORDER, REMISSION STATUS UNSPECIFIED, UNSPECIFIED WHETHER RECURRENT: Primary | ICD-10-CM

## 2025-04-16 DIAGNOSIS — F41.9 ANXIETY: ICD-10-CM

## 2025-04-16 DIAGNOSIS — I10 ESSENTIAL HYPERTENSION: Chronic | ICD-10-CM

## 2025-04-16 DIAGNOSIS — M79.10 MYALGIA: ICD-10-CM

## 2025-04-16 DIAGNOSIS — R26.9 GAIT DISTURBANCE: ICD-10-CM

## 2025-04-16 PROCEDURE — 99309 SBSQ NF CARE MODERATE MDM 30: CPT | Performed by: FAMILY MEDICINE

## 2025-04-16 NOTE — PROGRESS NOTES
SUBJECTIVE  Raz Han is a 89 y.o. male.    Seen: 4/10/202    HPI/Chief C/O:  Overall doing well.   No new complaints.  Patient still tells about chronic nausea and congestion.     He is medically stable      He was happy and playing cards with another resident                ROS:  As above.        Physical Exam  Constitutional:       General: They are not in acute distress.  HENT:      Head: Normocephalic and atraumatic. Sinus tenderness (frontal)   Eyes:      Extraocular Movements: Extraocular movements intact.   Cardiovascular:      Rate and Rhythm: Normal rate and regular rhythm.      Heart sounds: No murmur heard.     No friction rub. No gallop.   Pulmonary:      Breath sounds: Normal breath sounds. No wheezing, rhonchi or rales.   Abdominal:      General: There is no distension.      Tenderness: There is no abdominal tenderness. There is no guarding.   Musculoskeletal:      Right lower le+ pitting edema.      Left lower le+ pitting edema.   Neurological:      Mental Status: She is alert.       ASSESSMENT/PLAN     Headaches  Tylenol prn  Consider topamax  Consider ENT consult as patient has followed with them in past      Right big toenail lesion  Cera Ve or Aveeno or Eucerin to the feet BID to help with itching secondary to dryness.     Major depressive disorder/Anxiety  Trintellix 10 mg daily  Klonopin 0.25 MWF BID  Buspar 15 Mg TID      Primary insomnia  Trazodone 25 Mg nightly  Melatonin 2 tablets nightly    Chronic nausea   Controlled with medications  -Zofran as needed  - Reglan 5 mg BID     GERD  Pepcid 20 Mg daily    Severe protein-calorie malnutrition, chronic gastritis without bleeding, unspecified gastritis type  Decreased appetite per patient chronic issue  Protonix 40 mg daily  Tums Q8H prn     Generalized weakness  stable      Primary osteoarthritis of left knee S/P Left knee replacement  Stable    H/O: CVA (cerebrovascular accident)  ASA 81 mg daily     Essential

## 2025-05-27 ENCOUNTER — OUTSIDE SERVICES (OUTPATIENT)
Dept: FAMILY MEDICINE CLINIC | Age: 89
End: 2025-05-27
Payer: COMMERCIAL

## 2025-05-27 DIAGNOSIS — I10 ESSENTIAL HYPERTENSION: Chronic | ICD-10-CM

## 2025-05-27 DIAGNOSIS — F32.9 MAJOR DEPRESSIVE DISORDER, REMISSION STATUS UNSPECIFIED, UNSPECIFIED WHETHER RECURRENT: Primary | ICD-10-CM

## 2025-05-27 DIAGNOSIS — I72.2 ANEURYSM OF RIGHT RENAL ARTERY: ICD-10-CM

## 2025-05-27 PROCEDURE — 99309 SBSQ NF CARE MODERATE MDM 30: CPT | Performed by: FAMILY MEDICINE

## 2025-05-27 NOTE — PROGRESS NOTES
SUBJECTIVE  Raz Han is a 89 y.o. male.    Seen: 5/15/2025    HPI/Chief C/O:  Overall doing well.   No new complaints.  Patient still tells about chronic nausea and congestion.   he is seen playing cards and enjoying himself   He is medically stable      We will review all past medical history and go over past and current medications   We will review all medical records that are available to us  We will reconcile our care planning and treatment goals to past and present for this patient                    ROS:  As above.        Physical Exam  Constitutional:       General: They are not in acute distress.  HENT:      Head: Normocephalic and atraumatic. Sinus tenderness (frontal)   Eyes:      Extraocular Movements: Extraocular movements intact.   Cardiovascular:      Rate and Rhythm: Normal rate and regular rhythm.      Heart sounds: No murmur heard.     No friction rub. No gallop.   Pulmonary:      Breath sounds: Normal breath sounds. No wheezing, rhonchi or rales.   Abdominal:      General: There is no distension.      Tenderness: There is no abdominal tenderness. There is no guarding.   Musculoskeletal:      Right lower le+ pitting edema.      Left lower le+ pitting edema.   Neurological:      Mental Status: She is alert.       ASSESSMENT/PLAN     Headaches  Tylenol prn  Consider topamax  Consider ENT consult as patient has followed with them in past      Right big toenail lesion  Cera Ve or Aveeno or Eucerin to the feet BID to help with itching secondary to dryness.     Major depressive disorder/Anxiety  Trintellix 10 mg daily  Klonopin 0.25 MWF BID  Buspar 15 Mg TID      Primary insomnia  Trazodone 25 Mg nightly  Melatonin 2 tablets nightly    Chronic nausea   Controlled with medications  -Zofran as needed  - Reglan 5 mg BID     GERD  Pepcid 20 Mg daily    Severe protein-calorie malnutrition, chronic gastritis without bleeding, unspecified gastritis type  Decreased appetite per patient chronic

## 2025-06-24 ENCOUNTER — OUTSIDE SERVICES (OUTPATIENT)
Dept: FAMILY MEDICINE CLINIC | Age: 89
End: 2025-06-24
Payer: COMMERCIAL

## 2025-06-24 DIAGNOSIS — I10 ESSENTIAL HYPERTENSION: Chronic | ICD-10-CM

## 2025-06-24 DIAGNOSIS — F32.9 MAJOR DEPRESSIVE DISORDER, REMISSION STATUS UNSPECIFIED, UNSPECIFIED WHETHER RECURRENT: Primary | ICD-10-CM

## 2025-06-24 PROCEDURE — 99309 SBSQ NF CARE MODERATE MDM 30: CPT | Performed by: FAMILY MEDICINE

## 2025-06-24 NOTE — PROGRESS NOTES
SUBJECTIVE  Raz Han is a 89 y.o. male.    Seen: 2025    HPI/Chief C/O:  Overall doing well.   No new complaints.    He is medically stable    HPI       ROS:  As above.        Physical Exam  Constitutional:       General: They are not in acute distress.  HENT:      Head: Normocephalic and atraumatic. Sinus tenderness (frontal)   Eyes:      Extraocular Movements: Extraocular movements intact.   Cardiovascular:      Rate and Rhythm: Normal rate and regular rhythm.      Heart sounds: No murmur heard.     No friction rub. No gallop.   Pulmonary:      Breath sounds: Normal breath sounds. No wheezing, rhonchi or rales.   Abdominal:      General: There is no distension.      Tenderness: There is no abdominal tenderness. There is no guarding.   Musculoskeletal:      Right lower le+ pitting edema.      Left lower le+ pitting edema.   Neurological:      Mental Status: She is alert.       ASSESSMENT/PLAN     Headaches  Tylenol prn  Consider topamax  Consider ENT consult as patient has followed with them in past      Right big toenail lesion  Cera Ve or Aveeno or Eucerin to the feet BID to help with itching secondary to dryness.     Major depressive disorder/Anxiety  Trintellix 10 mg daily  Klonopin 0.25 MWF BID  Buspar 15 Mg TID      Primary insomnia  Trazodone 25 Mg nightly  Melatonin 2 tablets nightly    Chronic nausea   Controlled with medications  -Zofran as needed  - Reglan 5 mg BID     GERD  Pepcid 20 Mg daily    Severe protein-calorie malnutrition, chronic gastritis without bleeding, unspecified gastritis type  Decreased appetite per patient chronic issue  Protonix 40 mg daily  Tums Q8H prn     Generalized weakness  stable      Primary osteoarthritis of left knee S/P Left knee replacement  Stable    H/O: CVA (cerebrovascular accident)  ASA 81 mg daily     Essential hypertension  losartan 100 mg daily  Atenolol 25 mg QD  Amlodipine 10 Mg daily    Edema  Furosemide 20 Mg daily    Constipation  Multitude

## 2025-07-02 ENCOUNTER — CLINICAL DOCUMENTATION (OUTPATIENT)
Dept: FAMILY MEDICINE CLINIC | Age: 89
End: 2025-07-02

## 2025-07-02 NOTE — PROGRESS NOTES
weakness  stable      Primary osteoarthritis of left knee S/P Left knee replacement  Stable    H/O: CVA (cerebrovascular accident)  ASA 81 mg daily     Essential hypertension  losartan 100 mg daily  Atenolol 25 mg QD  Amlodipine 10 Mg daily    Edema  Furosemide 20 Mg daily    Constipation  Multitude of medications as needed          Neva Navarro MD

## 2025-07-08 ENCOUNTER — OUTSIDE SERVICES (OUTPATIENT)
Dept: FAMILY MEDICINE CLINIC | Age: 89
End: 2025-07-08

## 2025-07-08 DIAGNOSIS — F32.9 MAJOR DEPRESSIVE DISORDER, REMISSION STATUS UNSPECIFIED, UNSPECIFIED WHETHER RECURRENT: ICD-10-CM

## 2025-07-08 DIAGNOSIS — I10 ESSENTIAL HYPERTENSION: Primary | Chronic | ICD-10-CM

## 2025-07-08 NOTE — PROGRESS NOTES
SUBJECTIVE  Raz Han is a 89 y.o. male.    Seen: 7/3/2025    HPI/Chief C/O:  Overall doing well.   No new complaints.     there are no new issues as per nursing   He is medically stable                     ROS:  As above.        Physical Exam  Constitutional:       General: They are not in acute distress.  HENT:      Head: Normocephalic and atraumatic. Sinus tenderness (frontal)   Eyes:      Extraocular Movements: Extraocular movements intact.   Cardiovascular:      Rate and Rhythm: Normal rate and regular rhythm.      Heart sounds: No murmur heard.     No friction rub. No gallop.   Pulmonary:      Breath sounds: Normal breath sounds. No wheezing, rhonchi or rales.   Abdominal:      General: There is no distension.      Tenderness: There is no abdominal tenderness. There is no guarding.   Musculoskeletal:      Right lower le+ pitting edema.      Left lower le+ pitting edema.   Neurological:      Mental Status: She is alert.       ASSESSMENT/PLAN     Headaches  Tylenol prn  Consider topamax  Consider ENT consult as patient has followed with them in past      Right big toenail lesion  Cera Ve or Aveeno or Eucerin to the feet BID to help with itching secondary to dryness.     Major depressive disorder/Anxiety  Trintellix 10 mg daily  Klonopin 0.25 MWF BID  Buspar 15 Mg TID      Primary insomnia  Trazodone 25 Mg nightly  Melatonin 2 tablets nightly    Chronic nausea   Controlled with medications  -Zofran as needed  - Reglan 5 mg BID     GERD  Pepcid 20 Mg daily    Severe protein-calorie malnutrition, chronic gastritis without bleeding, unspecified gastritis type  Decreased appetite per patient chronic issue  Protonix 40 mg daily  Tums Q8H prn     Generalized weakness  stable      Primary osteoarthritis of left knee S/P Left knee replacement  Stable    H/O: CVA (cerebrovascular accident)  ASA 81 mg daily     Essential hypertension  losartan 100 mg daily  Atenolol 25 mg QD  Amlodipine 10 Mg

## 2025-07-16 ENCOUNTER — CLINICAL DOCUMENTATION (OUTPATIENT)
Dept: FAMILY MEDICINE CLINIC | Age: 89
End: 2025-07-16

## 2025-07-16 NOTE — PROGRESS NOTES
Signed         SUBJECTIVE  Raz Han is a 89 y.o. male.     Seen: 2025     HPI/Chief C/O:  Overall doing well.   No new complaints.     there are no new issues as per nursing   He is medically stable  Patient reports much improvement in vision after his cataract surgery.  Patient denies any recent admission to the hospital.  Patient reports normal bowel and bladder habit.     ROS:  As above.      Physical Exam  Constitutional:       General: They are not in acute distress.  HENT:      Head: Normocephalic and atraumatic. Sinus tenderness (frontal)   Eyes:      Extraocular Movements: Extraocular movements intact.   Cardiovascular:      Rate and Rhythm: Normal rate and regular rhythm.      Heart sounds: No murmur heard.     No friction rub. No gallop.   Pulmonary:      Breath sounds: Normal breath sounds. No wheezing, rhonchi or rales.   Abdominal:      General: There is no distension.      Tenderness: There is no abdominal tenderness. There is no guarding.   Musculoskeletal:      Right lower le+ pitting edema.      Left lower le+ pitting edema.   Neurological:      Mental Status: She is alert.       ASSESSMENT/PLAN     Headaches  Tylenol prn  Consider topamax  Consider ENT consult as patient has followed with them in past      Right big toenail lesion  Cera Ve or Aveeno or Eucerin to the feet BID to help with itching secondary to dryness.     Major depressive disorder/Anxiety  Trintellix 10 mg daily  Klonopin 0.25 MWF BID  Buspar 15 Mg TID      Primary insomnia  Trazodone 25 Mg nightly  Melatonin 2 tablets nightly     Chronic nausea   Controlled with medications  -Zofran as needed  - Reglan 5 mg BID     GERD  Pepcid 20 Mg daily     Severe protein-calorie malnutrition, chronic gastritis without bleeding, unspecified gastritis type  Decreased appetite per patient chronic issue  Protonix 40 mg daily  Tums Q8H prn     Generalized weakness  stable      Primary osteoarthritis of left knee S/P Left knee

## 2025-08-06 ENCOUNTER — OUTSIDE SERVICES (OUTPATIENT)
Dept: FAMILY MEDICINE CLINIC | Age: 89
End: 2025-08-06
Payer: MEDICARE

## 2025-08-06 DIAGNOSIS — I10 ESSENTIAL HYPERTENSION: Chronic | ICD-10-CM

## 2025-08-06 DIAGNOSIS — F32.9 MAJOR DEPRESSIVE DISORDER, REMISSION STATUS UNSPECIFIED, UNSPECIFIED WHETHER RECURRENT: Primary | ICD-10-CM

## 2025-08-06 DIAGNOSIS — F41.9 ANXIETY: ICD-10-CM

## 2025-08-06 PROCEDURE — 99309 SBSQ NF CARE MODERATE MDM 30: CPT | Performed by: FAMILY MEDICINE

## 2025-08-21 ENCOUNTER — CLINICAL DOCUMENTATION (OUTPATIENT)
Dept: FAMILY MEDICINE CLINIC | Age: 89
End: 2025-08-21

## 2025-08-21 PROBLEM — B35.1 ONYCHOMYCOSIS: Status: ACTIVE | Noted: 2023-05-25

## 2025-08-21 PROBLEM — L84 CORNS AND CALLOSITIES: Status: ACTIVE | Noted: 2023-05-25

## 2025-08-21 PROBLEM — I70.203: Status: ACTIVE | Noted: 2023-05-25

## 2025-08-21 RX ORDER — FLUTICASONE PROPIONATE 50 MCG
2 SPRAY, SUSPENSION (ML) NASAL DAILY PRN
COMMUNITY
Start: 2025-06-04

## 2025-08-21 RX ORDER — CLONAZEPAM 0.5 MG/1
0.5 TABLET ORAL NIGHTLY
COMMUNITY
Start: 2025-08-01

## 2025-08-21 RX ORDER — ATENOLOL 25 MG/1
25 TABLET ORAL DAILY
COMMUNITY
Start: 2025-08-06

## (undated) DEVICE — TOTAL KNEE PK

## (undated) DEVICE — TAPE ADH W3INXL10YD WHT COT WVN BK POWERFUL RUB BASE HIGHLY

## (undated) DEVICE — KIT TRK KNEE PROC VIZADISC

## (undated) DEVICE — SUTURE VIC +  4-0 27 IN PS1 UD VCP935H

## (undated) DEVICE — GOWN,SIRUS,POLYRNF,BRTHSLV,XL,30/CS: Brand: MEDLINE

## (undated) DEVICE — STANDARD HYPODERMIC NEEDLE,POLYPROPYLENE HUB: Brand: MONOJECT

## (undated) DEVICE — APPLICATOR PREP 26ML 0.7% IOD POVACRYLEX 74% ISO ALC ST

## (undated) DEVICE — KIT DRP FOR RIO ROBOTIC ARM ASST SYS

## (undated) DEVICE — ZIMMER® STERILE DISPOSABLE TOURNIQUET CUFF WITH PLC, DUAL PORT, SINGLE BLADDER, 30 IN. (76 CM)

## (undated) DEVICE — RETRACTOR SURG POST CRUCE LIG

## (undated) DEVICE — SOLUTION IV 500ML 0.9% SOD CHL PH 5 INJ USP VIAFLX PLAS

## (undated) DEVICE — ZIMMER® STERILE DISPOSABLE TOURNIQUET CUFF WITH PLC, DUAL PORT, SINGLE BLADDER, 42 IN. (107 CM)

## (undated) DEVICE — PATIENT RETURN ELECTRODE, SINGLE-USE, CONTACT QUALITY MONITORING, ADULT, WITH 9FT CORD, FOR PATIENTS WEIGING OVER 33LBS. (15KG): Brand: MEGADYNE

## (undated) DEVICE — Z DISCONTINUED PER MEDLINE USE 2741942 DRESSING AQUACEL 6 IN ALG W9XL15CM SIL CVR WTRPRF VIR BACT BARR ANTIMIC

## (undated) DEVICE — PACK PROCEDURE SURG GEN CUST

## (undated) DEVICE — GOWN,SIRUS,POLYRNF,BRTHSLV,LG,30/CS: Brand: MEDLINE

## (undated) DEVICE — BLOCK BITE 60FR RUBBER ADLT DENTAL

## (undated) DEVICE — GRADUATE TRIANG MEASURE 1000ML BLK PRNT

## (undated) DEVICE — DRAPE,TOP,102X53,STERILE: Brand: MEDLINE

## (undated) DEVICE — KIT INT FIX FEM TIB CKPT MAKOPLASTY

## (undated) DEVICE — TOWEL,OR,DSP,ST,BLUE,STD,6/PK,12PK/CS: Brand: MEDLINE

## (undated) DEVICE — CORD RETRCT SIL

## (undated) DEVICE — Z INACTIVE USE 2660664 SOLUTION IRRIG 3000ML 0.9% SOD CHL USP UROMATIC PLAS CONT

## (undated) DEVICE — SPONGE GZ W4XL4IN RAYON POLY FILL CVR W/ NONWOVEN FAB

## (undated) DEVICE — SOLUTION IV IRRIG WATER 1000ML POUR BRL 2F7114

## (undated) DEVICE — DOUBLE BASIN SET: Brand: MEDLINE INDUSTRIES, INC.

## (undated) DEVICE — BNDG,ELSTC,MATRIX,STRL,6"X5YD,LF,HOOK&LP: Brand: MEDLINE

## (undated) DEVICE — BANDAGE COMPR W6INXL12FT SMOOTH FOR LIMB EXSANG ESMARCH

## (undated) DEVICE — KIT PLT RATIO DISPNS KT 2IN CANN TIP SPRY TIP DISP MAGELLAN

## (undated) DEVICE — Z DISCONTINUED PER MEDLINE USE 2741944 DRESSING AQUACEL 12 IN SURG W9XL30CM SIL CVR WTRPRF VIR BACT BARR ANTIMIC

## (undated) DEVICE — SPONGE LAP W18XL18IN WHT COT 4 PLY FLD STRUNG RADPQ DISP ST

## (undated) DEVICE — BOWL AND CEMENT CARTRIDGE WITH BREAKAWAY FEMORAL NOZZLE: Brand: ACM

## (undated) DEVICE — Z DISCONTINUED USE 2368412 KIT POS LEG DISP

## (undated) DEVICE — TUBING, SUCTION, 9/32" X 10', STRAIGHT: Brand: MEDLINE

## (undated) DEVICE — DECANTER: Brand: UNBRANDED

## (undated) DEVICE — STERILE PVP: Brand: MEDLINE INDUSTRIES, INC.

## (undated) DEVICE — PIN BNE FIX TEMP L110MM DIA4MM MAKO

## (undated) DEVICE — DRESSING FOAM W22XL25CM FILVE LAYR FOAM DP DEF SAFETAC

## (undated) DEVICE — PIN BNE FIX TEMP L140MM DIA4MM MAKO

## (undated) DEVICE — PADDING CAST W6INXL4YD COT LO LINTING WYTEX

## (undated) DEVICE — 4-PORT MANIFOLD: Brand: NEPTUNE 2

## (undated) DEVICE — TUBE IRRIG HNDPC HI FLO TP INTRPULS W/SUCTION TUBE

## (undated) DEVICE — BLADE SURG SAW STD S STL OSC W/ SERR EDGE DISP

## (undated) DEVICE — SYRINGE MED 50ML LUERLOCK TIP

## (undated) DEVICE — STRIP,CLOSURE,WOUND,MEDI-STRIP,1/2X4: Brand: MEDLINE

## (undated) DEVICE — FORCEPS BX OVL CUP FEN DISPOSABLE CAP L 160CM RAD JAW 4